# Patient Record
Sex: FEMALE | Race: WHITE | Employment: OTHER | URBAN - NONMETROPOLITAN AREA
[De-identification: names, ages, dates, MRNs, and addresses within clinical notes are randomized per-mention and may not be internally consistent; named-entity substitution may affect disease eponyms.]

---

## 2017-01-03 ENCOUNTER — CARE COORDINATION (OUTPATIENT)
Dept: CARE COORDINATION | Age: 57
End: 2017-01-03

## 2017-01-13 DIAGNOSIS — I25.10 CAD (CORONARY ARTERY DISEASE): ICD-10-CM

## 2017-01-13 DIAGNOSIS — I10 HYPERTENSION: ICD-10-CM

## 2017-01-16 RX ORDER — METOPROLOL SUCCINATE 50 MG/1
TABLET, EXTENDED RELEASE ORAL
Qty: 30 TABLET | Refills: 11 | Status: SHIPPED | OUTPATIENT
Start: 2017-01-16 | End: 2017-05-22 | Stop reason: SDUPTHER

## 2017-01-16 RX ORDER — TIZANIDINE 4 MG/1
TABLET ORAL
Qty: 90 TABLET | Refills: 0 | Status: SHIPPED | OUTPATIENT
Start: 2017-01-16 | End: 2017-02-13 | Stop reason: SDUPTHER

## 2017-01-19 ENCOUNTER — OFFICE VISIT (OUTPATIENT)
Dept: PRIMARY CARE CLINIC | Age: 57
End: 2017-01-19
Payer: MEDICARE

## 2017-01-19 VITALS
BODY MASS INDEX: 43.79 KG/M2 | HEART RATE: 80 BPM | SYSTOLIC BLOOD PRESSURE: 130 MMHG | DIASTOLIC BLOOD PRESSURE: 80 MMHG | WEIGHT: 238 LBS | OXYGEN SATURATION: 97 % | HEIGHT: 62 IN | TEMPERATURE: 97 F

## 2017-01-19 DIAGNOSIS — R60.0 BILATERAL EDEMA OF LOWER EXTREMITY: ICD-10-CM

## 2017-01-19 DIAGNOSIS — R30.0 DYSURIA: ICD-10-CM

## 2017-01-19 DIAGNOSIS — N18.32 CHRONIC KIDNEY DISEASE (CKD) STAGE G3B/A2, MODERATELY DECREASED GLOMERULAR FILTRATION RATE (GFR) BETWEEN 30-44 ML/MIN/1.73 SQUARE METER AND ALBUMINURIA CREATININE RATIO BETWEEN 30-299 MG/G (HCC): ICD-10-CM
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
## 2017-01-19 DIAGNOSIS — D22.9 CHANGE IN MOLE: ICD-10-CM

## 2017-01-19 DIAGNOSIS — E11.42 TYPE 2 DIABETES MELLITUS WITH DIABETIC POLYNEUROPATHY, WITH LONG-TERM CURRENT USE OF INSULIN (HCC): Primary | ICD-10-CM

## 2017-01-19 DIAGNOSIS — Z80.8 FAMILY HISTORY OF SKIN CANCER: ICD-10-CM

## 2017-01-19 DIAGNOSIS — Z79.4 TYPE 2 DIABETES MELLITUS WITH DIABETIC POLYNEUROPATHY, WITH LONG-TERM CURRENT USE OF INSULIN (HCC): Primary | ICD-10-CM

## 2017-01-19 DIAGNOSIS — M17.5 OTHER SECONDARY OSTEOARTHRITIS OF RIGHT KNEE: ICD-10-CM

## 2017-01-19 DIAGNOSIS — I10 ESSENTIAL HYPERTENSION: ICD-10-CM

## 2017-01-19 PROCEDURE — G8419 CALC BMI OUT NRM PARAM NOF/U: HCPCS | Performed by: NURSE PRACTITIONER

## 2017-01-19 PROCEDURE — 3044F HG A1C LEVEL LT 7.0%: CPT | Performed by: NURSE PRACTITIONER

## 2017-01-19 PROCEDURE — G8427 DOCREV CUR MEDS BY ELIG CLIN: HCPCS | Performed by: NURSE PRACTITIONER

## 2017-01-19 PROCEDURE — G8599 NO ASA/ANTIPLAT THER USE RNG: HCPCS | Performed by: NURSE PRACTITIONER

## 2017-01-19 PROCEDURE — 1036F TOBACCO NON-USER: CPT | Performed by: NURSE PRACTITIONER

## 2017-01-19 PROCEDURE — 3017F COLORECTAL CA SCREEN DOC REV: CPT | Performed by: NURSE PRACTITIONER

## 2017-01-19 PROCEDURE — 99214 OFFICE O/P EST MOD 30 MIN: CPT | Performed by: NURSE PRACTITIONER

## 2017-01-19 PROCEDURE — G8484 FLU IMMUNIZE NO ADMIN: HCPCS | Performed by: NURSE PRACTITIONER

## 2017-01-19 PROCEDURE — 3014F SCREEN MAMMO DOC REV: CPT | Performed by: NURSE PRACTITIONER

## 2017-01-19 RX ORDER — FUROSEMIDE 40 MG/1
40 TABLET ORAL DAILY
Qty: 30 TABLET | Refills: 5 | Status: SHIPPED | OUTPATIENT
Start: 2017-01-19 | End: 2017-08-29 | Stop reason: SDUPTHER

## 2017-01-19 RX ORDER — CEFDINIR 300 MG/1
300 CAPSULE ORAL 2 TIMES DAILY
Qty: 20 CAPSULE | Refills: 0 | Status: SHIPPED | OUTPATIENT
Start: 2017-01-19 | End: 2017-01-29

## 2017-01-19 ASSESSMENT — ENCOUNTER SYMPTOMS
WHEEZING: 0
SORE THROAT: 0
VOMITING: 0
BACK PAIN: 0
EYE DISCHARGE: 0
EYE PAIN: 0
NAUSEA: 0
COUGH: 0
SINUS PRESSURE: 0
VOICE CHANGE: 0
CONSTIPATION: 0
ABDOMINAL PAIN: 0
SHORTNESS OF BREATH: 0
DIARRHEA: 0

## 2017-02-09 ENCOUNTER — OFFICE VISIT (OUTPATIENT)
Dept: PRIMARY CARE CLINIC | Age: 57
End: 2017-02-09
Payer: MEDICARE

## 2017-02-09 VITALS
BODY MASS INDEX: 42.51 KG/M2 | DIASTOLIC BLOOD PRESSURE: 80 MMHG | HEART RATE: 78 BPM | HEIGHT: 62 IN | TEMPERATURE: 97.6 F | WEIGHT: 231 LBS | OXYGEN SATURATION: 98 % | SYSTOLIC BLOOD PRESSURE: 126 MMHG

## 2017-02-09 DIAGNOSIS — Z79.4 TYPE 2 DIABETES MELLITUS WITH DIABETIC POLYNEUROPATHY, WITH LONG-TERM CURRENT USE OF INSULIN (HCC): ICD-10-CM

## 2017-02-09 DIAGNOSIS — E11.42 TYPE 2 DIABETES MELLITUS WITH DIABETIC POLYNEUROPATHY, WITH LONG-TERM CURRENT USE OF INSULIN (HCC): ICD-10-CM

## 2017-02-09 DIAGNOSIS — N18.32 CHRONIC KIDNEY DISEASE (CKD) STAGE G3B/A2, MODERATELY DECREASED GLOMERULAR FILTRATION RATE (GFR) BETWEEN 30-44 ML/MIN/1.73 SQUARE METER AND ALBUMINURIA CREATININE RATIO BETWEEN 30-299 MG/G (HCC): ICD-10-CM

## 2017-02-09 DIAGNOSIS — N39.0 URINARY TRACT INFECTION, SITE UNSPECIFIED: ICD-10-CM

## 2017-02-09 DIAGNOSIS — D50.8 OTHER IRON DEFICIENCY ANEMIA: ICD-10-CM

## 2017-02-09 DIAGNOSIS — M79.89 SWELLING OF BOTH LOWER EXTREMITIES: ICD-10-CM

## 2017-02-09 DIAGNOSIS — R63.5 WEIGHT GAIN: Primary | ICD-10-CM

## 2017-02-09 LAB
BILIRUBIN, POC: NORMAL
BLOOD URINE, POC: NORMAL
CLARITY, POC: CLEAR
COLOR, POC: YELLOW
GLUCOSE URINE, POC: 250
KETONES, POC: NORMAL
LEUKOCYTE EST, POC: NORMAL
NITRITE, POC: NORMAL
PH, POC: 5
PROTEIN, POC: NORMAL
SPECIFIC GRAVITY, POC: 1.01
UROBILINOGEN, POC: 0.2

## 2017-02-09 PROCEDURE — 3014F SCREEN MAMMO DOC REV: CPT | Performed by: NURSE PRACTITIONER

## 2017-02-09 PROCEDURE — 3044F HG A1C LEVEL LT 7.0%: CPT | Performed by: NURSE PRACTITIONER

## 2017-02-09 PROCEDURE — G8484 FLU IMMUNIZE NO ADMIN: HCPCS | Performed by: NURSE PRACTITIONER

## 2017-02-09 PROCEDURE — G8427 DOCREV CUR MEDS BY ELIG CLIN: HCPCS | Performed by: NURSE PRACTITIONER

## 2017-02-09 PROCEDURE — 1036F TOBACCO NON-USER: CPT | Performed by: NURSE PRACTITIONER

## 2017-02-09 PROCEDURE — G8599 NO ASA/ANTIPLAT THER USE RNG: HCPCS | Performed by: NURSE PRACTITIONER

## 2017-02-09 PROCEDURE — 81002 URINALYSIS NONAUTO W/O SCOPE: CPT | Performed by: NURSE PRACTITIONER

## 2017-02-09 PROCEDURE — 99214 OFFICE O/P EST MOD 30 MIN: CPT | Performed by: NURSE PRACTITIONER

## 2017-02-09 PROCEDURE — 3017F COLORECTAL CA SCREEN DOC REV: CPT | Performed by: NURSE PRACTITIONER

## 2017-02-09 PROCEDURE — G8419 CALC BMI OUT NRM PARAM NOF/U: HCPCS | Performed by: NURSE PRACTITIONER

## 2017-02-09 ASSESSMENT — ENCOUNTER SYMPTOMS
SHORTNESS OF BREATH: 0
BACK PAIN: 0
DIARRHEA: 0
EYE PAIN: 0
SINUS PRESSURE: 0
EYE DISCHARGE: 0
COUGH: 0
VOMITING: 0
SORE THROAT: 0
NAUSEA: 0
VOICE CHANGE: 0
ABDOMINAL PAIN: 0
WHEEZING: 0
CONSTIPATION: 0

## 2017-02-13 RX ORDER — TIZANIDINE 4 MG/1
TABLET ORAL
Qty: 90 TABLET | Refills: 0 | Status: SHIPPED | OUTPATIENT
Start: 2017-02-13 | End: 2017-03-13 | Stop reason: SDUPTHER

## 2017-02-20 ENCOUNTER — TELEPHONE (OUTPATIENT)
Dept: PRIMARY CARE CLINIC | Age: 57
End: 2017-02-20

## 2017-02-22 ENCOUNTER — OFFICE VISIT (OUTPATIENT)
Dept: PRIMARY CARE CLINIC | Age: 57
End: 2017-02-22
Payer: MEDICARE

## 2017-02-22 VITALS
DIASTOLIC BLOOD PRESSURE: 90 MMHG | BODY MASS INDEX: 43.05 KG/M2 | SYSTOLIC BLOOD PRESSURE: 128 MMHG | TEMPERATURE: 98.1 F | HEIGHT: 61 IN | HEART RATE: 66 BPM | WEIGHT: 228 LBS | OXYGEN SATURATION: 99 %

## 2017-02-22 DIAGNOSIS — N18.32 CHRONIC KIDNEY DISEASE (CKD) STAGE G3B/A2, MODERATELY DECREASED GLOMERULAR FILTRATION RATE (GFR) BETWEEN 30-44 ML/MIN/1.73 SQUARE METER AND ALBUMINURIA CREATININE RATIO BETWEEN 30-299 MG/G (HCC): ICD-10-CM

## 2017-02-22 DIAGNOSIS — I10 ESSENTIAL HYPERTENSION: Primary | ICD-10-CM

## 2017-02-22 DIAGNOSIS — E78.2 MIXED HYPERLIPIDEMIA: ICD-10-CM

## 2017-02-22 DIAGNOSIS — E03.9 ACQUIRED HYPOTHYROIDISM: ICD-10-CM

## 2017-02-22 DIAGNOSIS — F31.76 BIPOLAR DISORDER, IN FULL REMISSION, MOST RECENT EPISODE DEPRESSED (HCC): ICD-10-CM

## 2017-02-22 DIAGNOSIS — F51.01 PRIMARY INSOMNIA: ICD-10-CM

## 2017-02-22 DIAGNOSIS — E11.42 TYPE 2 DIABETES MELLITUS WITH DIABETIC POLYNEUROPATHY, WITH LONG-TERM CURRENT USE OF INSULIN (HCC): ICD-10-CM

## 2017-02-22 DIAGNOSIS — Z79.4 TYPE 2 DIABETES MELLITUS WITH DIABETIC POLYNEUROPATHY, WITH LONG-TERM CURRENT USE OF INSULIN (HCC): ICD-10-CM

## 2017-02-22 DIAGNOSIS — K21.9 GASTROESOPHAGEAL REFLUX DISEASE WITHOUT ESOPHAGITIS: ICD-10-CM

## 2017-02-22 DIAGNOSIS — F41.9 ANXIETY: ICD-10-CM

## 2017-02-22 PROCEDURE — 1036F TOBACCO NON-USER: CPT | Performed by: PEDIATRICS

## 2017-02-22 PROCEDURE — 99214 OFFICE O/P EST MOD 30 MIN: CPT | Performed by: PEDIATRICS

## 2017-02-22 PROCEDURE — 3044F HG A1C LEVEL LT 7.0%: CPT | Performed by: PEDIATRICS

## 2017-02-22 PROCEDURE — 3014F SCREEN MAMMO DOC REV: CPT | Performed by: PEDIATRICS

## 2017-02-22 PROCEDURE — G8427 DOCREV CUR MEDS BY ELIG CLIN: HCPCS | Performed by: PEDIATRICS

## 2017-02-22 PROCEDURE — G8417 CALC BMI ABV UP PARAM F/U: HCPCS | Performed by: PEDIATRICS

## 2017-02-22 PROCEDURE — G8599 NO ASA/ANTIPLAT THER USE RNG: HCPCS | Performed by: PEDIATRICS

## 2017-02-22 PROCEDURE — 3017F COLORECTAL CA SCREEN DOC REV: CPT | Performed by: PEDIATRICS

## 2017-02-22 PROCEDURE — G8484 FLU IMMUNIZE NO ADMIN: HCPCS | Performed by: PEDIATRICS

## 2017-02-22 RX ORDER — DIAZEPAM 10 MG/1
10 TABLET ORAL EVERY 8 HOURS PRN
Qty: 60 TABLET | Refills: 0 | Status: SHIPPED | OUTPATIENT
Start: 2017-02-22 | End: 2017-04-03 | Stop reason: SDUPTHER

## 2017-02-22 RX ORDER — RAMELTEON 8 MG/1
8 TABLET ORAL NIGHTLY PRN
Qty: 30 TABLET | Refills: 1 | Status: SHIPPED | OUTPATIENT
Start: 2017-02-22 | End: 2017-03-29 | Stop reason: SDUPTHER

## 2017-02-22 RX ORDER — PREDNISONE 10 MG/1
10 TABLET ORAL DAILY
COMMUNITY
Start: 2017-02-10 | End: 2017-03-28

## 2017-02-22 ASSESSMENT — ENCOUNTER SYMPTOMS
WHEEZING: 0
VOMITING: 0
DIARRHEA: 0
BACK PAIN: 0
COUGH: 0
SORE THROAT: 0
SINUS PRESSURE: 0
SHORTNESS OF BREATH: 0
EYE PAIN: 0
CONSTIPATION: 0
NAUSEA: 0
EYE DISCHARGE: 0
VOICE CHANGE: 0
ABDOMINAL PAIN: 0

## 2017-02-27 ENCOUNTER — PREP FOR PROCEDURE (OUTPATIENT)
Dept: SURGERY | Age: 57
End: 2017-02-27

## 2017-02-27 DIAGNOSIS — Z01.818 PRE-OP EVALUATION: Primary | ICD-10-CM

## 2017-02-27 RX ORDER — SODIUM CHLORIDE 0.9 % (FLUSH) 0.9 %
10 SYRINGE (ML) INJECTION PRN
Status: CANCELLED | OUTPATIENT
Start: 2017-02-27

## 2017-02-27 RX ORDER — SODIUM CHLORIDE, SODIUM LACTATE, POTASSIUM CHLORIDE, CALCIUM CHLORIDE 600; 310; 30; 20 MG/100ML; MG/100ML; MG/100ML; MG/100ML
INJECTION, SOLUTION INTRAVENOUS CONTINUOUS
Status: CANCELLED | OUTPATIENT
Start: 2017-02-27

## 2017-02-27 RX ORDER — SODIUM CHLORIDE 0.9 % (FLUSH) 0.9 %
10 SYRINGE (ML) INJECTION EVERY 12 HOURS SCHEDULED
Status: CANCELLED | OUTPATIENT
Start: 2017-02-27

## 2017-03-02 ENCOUNTER — OFFICE VISIT (OUTPATIENT)
Dept: PRIMARY CARE CLINIC | Age: 57
End: 2017-03-02
Payer: MEDICARE

## 2017-03-02 VITALS
TEMPERATURE: 97.6 F | HEIGHT: 61 IN | DIASTOLIC BLOOD PRESSURE: 62 MMHG | HEART RATE: 76 BPM | WEIGHT: 226 LBS | BODY MASS INDEX: 42.67 KG/M2 | SYSTOLIC BLOOD PRESSURE: 120 MMHG | OXYGEN SATURATION: 98 %

## 2017-03-02 DIAGNOSIS — G43.019 INTRACTABLE MIGRAINE WITHOUT AURA AND WITHOUT STATUS MIGRAINOSUS: ICD-10-CM

## 2017-03-02 DIAGNOSIS — R11.0 NAUSEA: ICD-10-CM

## 2017-03-02 DIAGNOSIS — E11.42 TYPE 2 DIABETES MELLITUS WITH DIABETIC POLYNEUROPATHY, WITH LONG-TERM CURRENT USE OF INSULIN (HCC): Primary | ICD-10-CM

## 2017-03-02 DIAGNOSIS — Z79.4 TYPE 2 DIABETES MELLITUS WITH DIABETIC POLYNEUROPATHY, WITH LONG-TERM CURRENT USE OF INSULIN (HCC): Primary | ICD-10-CM

## 2017-03-02 DIAGNOSIS — N18.3 CKD (CHRONIC KIDNEY DISEASE), STAGE 3 (MODERATE): ICD-10-CM

## 2017-03-02 LAB — HBA1C MFR BLD: 6.6 %

## 2017-03-02 PROCEDURE — 3017F COLORECTAL CA SCREEN DOC REV: CPT | Performed by: NURSE PRACTITIONER

## 2017-03-02 PROCEDURE — G8417 CALC BMI ABV UP PARAM F/U: HCPCS | Performed by: NURSE PRACTITIONER

## 2017-03-02 PROCEDURE — 1036F TOBACCO NON-USER: CPT | Performed by: NURSE PRACTITIONER

## 2017-03-02 PROCEDURE — 3044F HG A1C LEVEL LT 7.0%: CPT | Performed by: NURSE PRACTITIONER

## 2017-03-02 PROCEDURE — G8427 DOCREV CUR MEDS BY ELIG CLIN: HCPCS | Performed by: NURSE PRACTITIONER

## 2017-03-02 PROCEDURE — 3014F SCREEN MAMMO DOC REV: CPT | Performed by: NURSE PRACTITIONER

## 2017-03-02 PROCEDURE — 99214 OFFICE O/P EST MOD 30 MIN: CPT | Performed by: NURSE PRACTITIONER

## 2017-03-02 PROCEDURE — 83036 HEMOGLOBIN GLYCOSYLATED A1C: CPT | Performed by: NURSE PRACTITIONER

## 2017-03-02 PROCEDURE — G8599 NO ASA/ANTIPLAT THER USE RNG: HCPCS | Performed by: NURSE PRACTITIONER

## 2017-03-02 PROCEDURE — G8484 FLU IMMUNIZE NO ADMIN: HCPCS | Performed by: NURSE PRACTITIONER

## 2017-03-02 RX ORDER — BUTORPHANOL TARTRATE 10 MG/ML
1 SPRAY, METERED NASAL EVERY 4 HOURS PRN
Qty: 2.5 ML | Refills: 0 | Status: SHIPPED | OUTPATIENT
Start: 2017-03-02 | End: 2017-04-03 | Stop reason: SDUPTHER

## 2017-03-02 ASSESSMENT — ENCOUNTER SYMPTOMS
BACK PAIN: 0
VOMITING: 0
DIARRHEA: 0
EYE PAIN: 0
SINUS PRESSURE: 0
WHEEZING: 0
EYE DISCHARGE: 0
SORE THROAT: 0
VOICE CHANGE: 0
COUGH: 0
SHORTNESS OF BREATH: 0
ABDOMINAL PAIN: 0
CONSTIPATION: 0
NAUSEA: 0

## 2017-03-13 ENCOUNTER — TELEPHONE (OUTPATIENT)
Dept: PRIMARY CARE CLINIC | Age: 57
End: 2017-03-13

## 2017-03-13 RX ORDER — ATORVASTATIN CALCIUM 40 MG/1
40 TABLET, FILM COATED ORAL DAILY
Qty: 30 TABLET | Refills: 11 | Status: SHIPPED | OUTPATIENT
Start: 2017-03-13 | End: 2018-04-04 | Stop reason: SDUPTHER

## 2017-03-13 RX ORDER — TIZANIDINE 4 MG/1
TABLET ORAL
Qty: 90 TABLET | Refills: 3 | Status: SHIPPED | OUTPATIENT
Start: 2017-03-13 | End: 2017-07-13 | Stop reason: SDUPTHER

## 2017-03-14 ENCOUNTER — TELEPHONE (OUTPATIENT)
Dept: PRIMARY CARE CLINIC | Age: 57
End: 2017-03-14

## 2017-03-14 ENCOUNTER — NURSE ONLY (OUTPATIENT)
Dept: PRIMARY CARE CLINIC | Age: 57
End: 2017-03-14
Payer: MEDICARE

## 2017-03-14 DIAGNOSIS — Z12.11 COLON CANCER SCREENING: Primary | ICD-10-CM

## 2017-03-14 LAB
CONTROL: NORMAL
HEMOCCULT STL QL: NORMAL

## 2017-03-14 PROCEDURE — 82274 ASSAY TEST FOR BLOOD FECAL: CPT | Performed by: PEDIATRICS

## 2017-03-28 ENCOUNTER — HOSPITAL ENCOUNTER (OUTPATIENT)
Dept: INFUSION THERAPY | Age: 57
Setting detail: INFUSION SERIES
Discharge: HOME | End: 2017-03-28
Payer: MEDICARE

## 2017-03-28 VITALS
HEART RATE: 69 BPM | SYSTOLIC BLOOD PRESSURE: 153 MMHG | RESPIRATION RATE: 18 BRPM | TEMPERATURE: 98 F | DIASTOLIC BLOOD PRESSURE: 92 MMHG

## 2017-03-28 LAB
BASOPHILS ABSOLUTE: 0 K/UL (ref 0–0.2)
BASOPHILS RELATIVE PERCENT: 0.2 % (ref 0–1)
EOSINOPHILS ABSOLUTE: 0.1 K/UL (ref 0–0.6)
EOSINOPHILS RELATIVE PERCENT: 1.6 % (ref 0–5)
HCT VFR BLD CALC: 37.4 % (ref 37–47)
HEMOGLOBIN: 10.9 G/DL (ref 12–16)
LYMPHOCYTES ABSOLUTE: 1.4 K/UL (ref 1.1–4.5)
LYMPHOCYTES RELATIVE PERCENT: 16.7 % (ref 20–40)
MCH RBC QN AUTO: 23.7 PG (ref 27–31)
MCHC RBC AUTO-ENTMCNC: 29.1 G/DL (ref 33–37)
MCV RBC AUTO: 81.3 FL (ref 81–99)
MONOCYTES ABSOLUTE: 0.7 K/UL (ref 0–0.9)
MONOCYTES RELATIVE PERCENT: 8 % (ref 0–10)
NEUTROPHILS ABSOLUTE: 6.1 K/UL (ref 1.5–7.5)
NEUTROPHILS RELATIVE PERCENT: 73.3 % (ref 50–65)
PDW BLD-RTO: 19 % (ref 11.5–14.5)
PLATELET # BLD: 369 K/UL (ref 130–400)
PMV BLD AUTO: 11.6 FL (ref 7.4–10.4)
RBC # BLD: 4.6 M/UL (ref 4.2–5.4)
WBC # BLD: 8.4 K/UL (ref 4.8–10.8)

## 2017-03-28 PROCEDURE — 96365 THER/PROPH/DIAG IV INF INIT: CPT

## 2017-03-28 PROCEDURE — 2580000003 HC RX 258: Performed by: INTERNAL MEDICINE

## 2017-03-28 PROCEDURE — 85025 COMPLETE CBC W/AUTO DIFF WBC: CPT

## 2017-03-28 PROCEDURE — 6360000002 HC RX W HCPCS: Performed by: INTERNAL MEDICINE

## 2017-03-28 RX ADMIN — FERRIC CARBOXYMALTOSE INJECTION 750 MG: 50 INJECTION, SOLUTION INTRAVENOUS at 10:31

## 2017-03-29 DIAGNOSIS — F51.01 PRIMARY INSOMNIA: ICD-10-CM

## 2017-03-29 RX ORDER — RAMELTEON 8 MG/1
8 TABLET ORAL NIGHTLY PRN
Qty: 30 TABLET | Refills: 1 | Status: SHIPPED | OUTPATIENT
Start: 2017-03-29 | End: 2017-05-22 | Stop reason: SDUPTHER

## 2017-04-03 ENCOUNTER — OFFICE VISIT (OUTPATIENT)
Dept: PRIMARY CARE CLINIC | Age: 57
End: 2017-04-03
Payer: MEDICARE

## 2017-04-03 VITALS
SYSTOLIC BLOOD PRESSURE: 130 MMHG | OXYGEN SATURATION: 98 % | WEIGHT: 228 LBS | BODY MASS INDEX: 43.05 KG/M2 | TEMPERATURE: 97 F | HEIGHT: 61 IN | DIASTOLIC BLOOD PRESSURE: 72 MMHG | HEART RATE: 78 BPM

## 2017-04-03 DIAGNOSIS — E11.42 DIABETIC POLYNEUROPATHY ASSOCIATED WITH TYPE 2 DIABETES MELLITUS (HCC): ICD-10-CM

## 2017-04-03 DIAGNOSIS — G47.09 OTHER INSOMNIA: ICD-10-CM

## 2017-04-03 DIAGNOSIS — Z79.4 TYPE 2 DIABETES MELLITUS WITH DIABETIC POLYNEUROPATHY, WITH LONG-TERM CURRENT USE OF INSULIN (HCC): Primary | ICD-10-CM

## 2017-04-03 DIAGNOSIS — F33.41 RECURRENT MAJOR DEPRESSIVE DISORDER, IN PARTIAL REMISSION (HCC): ICD-10-CM

## 2017-04-03 DIAGNOSIS — F41.9 ANXIETY: ICD-10-CM

## 2017-04-03 DIAGNOSIS — N18.3 CKD (CHRONIC KIDNEY DISEASE), STAGE 3 (MODERATE): ICD-10-CM

## 2017-04-03 DIAGNOSIS — G43.019 INTRACTABLE MIGRAINE WITHOUT AURA AND WITHOUT STATUS MIGRAINOSUS: ICD-10-CM

## 2017-04-03 DIAGNOSIS — E03.9 ACQUIRED HYPOTHYROIDISM: ICD-10-CM

## 2017-04-03 DIAGNOSIS — K59.03 DRUG-INDUCED CONSTIPATION: ICD-10-CM

## 2017-04-03 DIAGNOSIS — R11.0 NAUSEA: ICD-10-CM

## 2017-04-03 DIAGNOSIS — E11.42 TYPE 2 DIABETES MELLITUS WITH DIABETIC POLYNEUROPATHY, WITH LONG-TERM CURRENT USE OF INSULIN (HCC): Primary | ICD-10-CM

## 2017-04-03 PROCEDURE — G8599 NO ASA/ANTIPLAT THER USE RNG: HCPCS | Performed by: NURSE PRACTITIONER

## 2017-04-03 PROCEDURE — 99214 OFFICE O/P EST MOD 30 MIN: CPT | Performed by: NURSE PRACTITIONER

## 2017-04-03 PROCEDURE — 3014F SCREEN MAMMO DOC REV: CPT | Performed by: NURSE PRACTITIONER

## 2017-04-03 PROCEDURE — 3044F HG A1C LEVEL LT 7.0%: CPT | Performed by: NURSE PRACTITIONER

## 2017-04-03 PROCEDURE — G8417 CALC BMI ABV UP PARAM F/U: HCPCS | Performed by: NURSE PRACTITIONER

## 2017-04-03 PROCEDURE — 3017F COLORECTAL CA SCREEN DOC REV: CPT | Performed by: NURSE PRACTITIONER

## 2017-04-03 PROCEDURE — 1036F TOBACCO NON-USER: CPT | Performed by: NURSE PRACTITIONER

## 2017-04-03 PROCEDURE — G8427 DOCREV CUR MEDS BY ELIG CLIN: HCPCS | Performed by: NURSE PRACTITIONER

## 2017-04-03 RX ORDER — LINACLOTIDE 145 UG/1
145 CAPSULE, GELATIN COATED ORAL DAILY
Qty: 30 CAPSULE | Refills: 11 | Status: SHIPPED | OUTPATIENT
Start: 2017-04-03 | End: 2017-05-22 | Stop reason: DRUGHIGH

## 2017-04-03 RX ORDER — BUTORPHANOL TARTRATE 10 MG/ML
1 SPRAY, METERED NASAL EVERY 4 HOURS PRN
Qty: 2.5 ML | Refills: 0 | Status: SHIPPED | OUTPATIENT
Start: 2017-04-03 | End: 2017-05-22

## 2017-04-03 RX ORDER — DIAZEPAM 10 MG/1
10 TABLET ORAL EVERY 8 HOURS PRN
Qty: 60 TABLET | Refills: 0 | Status: SHIPPED | OUTPATIENT
Start: 2017-04-03 | End: 2017-05-22

## 2017-04-03 RX ORDER — PREGABALIN 150 MG/1
150 CAPSULE ORAL 2 TIMES DAILY
Qty: 60 CAPSULE | Refills: 5 | Status: SHIPPED | OUTPATIENT
Start: 2017-04-03 | End: 2017-11-03 | Stop reason: SDUPTHER

## 2017-04-03 RX ORDER — LEVOTHYROXINE SODIUM 88 UG/1
TABLET ORAL
Qty: 90 TABLET | Refills: 3 | Status: SHIPPED | OUTPATIENT
Start: 2017-04-03 | End: 2017-08-29 | Stop reason: SDUPTHER

## 2017-04-03 RX ORDER — QUETIAPINE FUMARATE 300 MG/1
450 TABLET, FILM COATED ORAL NIGHTLY
Qty: 45 TABLET | Refills: 11 | Status: SHIPPED | OUTPATIENT
Start: 2017-04-03 | End: 2017-06-05 | Stop reason: SDUPTHER

## 2017-04-03 ASSESSMENT — ENCOUNTER SYMPTOMS
SINUS PRESSURE: 0
CONSTIPATION: 0
SORE THROAT: 0
VOMITING: 0
BACK PAIN: 0
DIARRHEA: 0
NAUSEA: 0
COUGH: 0
ABDOMINAL PAIN: 0
EYE PAIN: 0
EYE DISCHARGE: 0
VOICE CHANGE: 0
WHEEZING: 0
SHORTNESS OF BREATH: 0

## 2017-04-04 ENCOUNTER — HOSPITAL ENCOUNTER (OUTPATIENT)
Dept: INFUSION THERAPY | Age: 57
Setting detail: INFUSION SERIES
Discharge: HOME | End: 2017-04-04
Payer: MEDICARE

## 2017-04-04 VITALS
HEART RATE: 84 BPM | RESPIRATION RATE: 18 BRPM | DIASTOLIC BLOOD PRESSURE: 65 MMHG | TEMPERATURE: 97.5 F | SYSTOLIC BLOOD PRESSURE: 110 MMHG

## 2017-04-04 LAB
BASOPHILS ABSOLUTE: 0 K/UL (ref 0–0.2)
BASOPHILS RELATIVE PERCENT: 0.4 % (ref 0–1)
EOSINOPHILS ABSOLUTE: 0.2 K/UL (ref 0–0.6)
EOSINOPHILS RELATIVE PERCENT: 2.4 % (ref 0–5)
HCT VFR BLD CALC: 34.6 % (ref 37–47)
HEMOGLOBIN: 10.6 G/DL (ref 12–16)
LYMPHOCYTES ABSOLUTE: 1.4 K/UL (ref 1.1–4.5)
LYMPHOCYTES RELATIVE PERCENT: 18.4 % (ref 20–40)
MCH RBC QN AUTO: 25.4 PG (ref 27–31)
MCHC RBC AUTO-ENTMCNC: 30.6 G/DL (ref 33–37)
MCV RBC AUTO: 83 FL (ref 81–99)
MONOCYTES ABSOLUTE: 0.7 K/UL (ref 0–0.9)
MONOCYTES RELATIVE PERCENT: 8.6 % (ref 0–10)
NEUTROPHILS ABSOLUTE: 5.5 K/UL (ref 1.5–7.5)
NEUTROPHILS RELATIVE PERCENT: 70.2 % (ref 50–65)
PDW BLD-RTO: 21.2 % (ref 11.5–14.5)
PLATELET # BLD: 304 K/UL (ref 130–400)
PMV BLD AUTO: 12 FL (ref 7.4–10.4)
RBC # BLD: 4.17 M/UL (ref 4.2–5.4)
WBC # BLD: 7.8 K/UL (ref 4.8–10.8)

## 2017-04-04 PROCEDURE — 2580000003 HC RX 258: Performed by: INTERNAL MEDICINE

## 2017-04-04 PROCEDURE — 96365 THER/PROPH/DIAG IV INF INIT: CPT

## 2017-04-04 PROCEDURE — 85025 COMPLETE CBC W/AUTO DIFF WBC: CPT

## 2017-04-04 PROCEDURE — 6360000002 HC RX W HCPCS: Performed by: INTERNAL MEDICINE

## 2017-04-04 RX ORDER — DIPHENHYDRAMINE HYDROCHLORIDE 50 MG/ML
25 INJECTION INTRAMUSCULAR; INTRAVENOUS
Status: ACTIVE | OUTPATIENT
Start: 2017-04-04 | End: 2017-04-04

## 2017-04-04 RX ORDER — METHYLPREDNISOLONE SODIUM SUCCINATE 125 MG/2ML
125 INJECTION, POWDER, LYOPHILIZED, FOR SOLUTION INTRAMUSCULAR; INTRAVENOUS
Status: ACTIVE | OUTPATIENT
Start: 2017-04-04 | End: 2017-04-04

## 2017-04-04 RX ADMIN — FERRIC CARBOXYMALTOSE INJECTION 750 MG: 50 INJECTION, SOLUTION INTRAVENOUS at 10:28

## 2017-04-24 ENCOUNTER — TELEPHONE (OUTPATIENT)
Dept: PRIMARY CARE CLINIC | Age: 57
End: 2017-04-24

## 2017-04-24 DIAGNOSIS — R79.89 ABNORMAL CBC: Primary | ICD-10-CM

## 2017-04-24 DIAGNOSIS — Z00.00 PERIODIC HEALTH ASSESSMENT, GENERAL SCREENING, ADULT: ICD-10-CM

## 2017-04-25 ENCOUNTER — TELEPHONE (OUTPATIENT)
Dept: PRIMARY CARE CLINIC | Age: 57
End: 2017-04-25

## 2017-04-25 DIAGNOSIS — E03.9 ACQUIRED HYPOTHYROIDISM: ICD-10-CM

## 2017-04-25 DIAGNOSIS — E11.42 TYPE 2 DIABETES MELLITUS WITH DIABETIC POLYNEUROPATHY, WITH LONG-TERM CURRENT USE OF INSULIN (HCC): ICD-10-CM

## 2017-04-25 DIAGNOSIS — Z00.00 PERIODIC HEALTH ASSESSMENT, GENERAL SCREENING, ADULT: ICD-10-CM

## 2017-04-25 DIAGNOSIS — I10 ESSENTIAL HYPERTENSION: ICD-10-CM

## 2017-04-25 DIAGNOSIS — E78.2 MIXED HYPERLIPIDEMIA: ICD-10-CM

## 2017-04-25 DIAGNOSIS — R79.89 ABNORMAL CBC: ICD-10-CM

## 2017-04-25 DIAGNOSIS — Z79.4 TYPE 2 DIABETES MELLITUS WITH DIABETIC POLYNEUROPATHY, WITH LONG-TERM CURRENT USE OF INSULIN (HCC): ICD-10-CM

## 2017-04-25 LAB
ALBUMIN SERPL-MCNC: 3.6 G/DL (ref 3.5–5.2)
ALP BLD-CCNC: 142 U/L (ref 35–104)
ALT SERPL-CCNC: 18 U/L (ref 5–33)
ANION GAP SERPL CALCULATED.3IONS-SCNC: 16 MMOL/L (ref 7–19)
AST SERPL-CCNC: 23 U/L (ref 5–32)
BASOPHILS ABSOLUTE: 0.1 K/UL (ref 0–0.2)
BASOPHILS RELATIVE PERCENT: 0.7 % (ref 0–1)
BILIRUB SERPL-MCNC: <0.2 MG/DL (ref 0.2–1.2)
BUN BLDV-MCNC: 22 MG/DL (ref 6–20)
CALCIUM SERPL-MCNC: 9 MG/DL (ref 8.6–10)
CHLORIDE BLD-SCNC: 103 MMOL/L (ref 98–111)
CHOLESTEROL, TOTAL: 128 MG/DL (ref 160–199)
CO2: 22 MMOL/L (ref 22–29)
CREAT SERPL-MCNC: 1.5 MG/DL (ref 0.5–0.9)
EOSINOPHILS ABSOLUTE: 0.2 K/UL (ref 0–0.6)
EOSINOPHILS RELATIVE PERCENT: 2.6 % (ref 0–5)
FERRITIN: 593.4 NG/ML (ref 13–150)
GFR NON-AFRICAN AMERICAN: 36
GLOBULIN: 3.4 G/DL
GLUCOSE BLD-MCNC: 95 MG/DL (ref 74–109)
HBA1C MFR BLD: 6.1 %
HCT VFR BLD CALC: 39.6 % (ref 37–47)
HDLC SERPL-MCNC: 53 MG/DL (ref 65–121)
HEMOGLOBIN: 12.2 G/DL (ref 12–16)
IRON: 122 UG/DL (ref 37–145)
LDL CHOLESTEROL CALCULATED: 50 MG/DL
LYMPHOCYTES ABSOLUTE: 1.8 K/UL (ref 1.1–4.5)
LYMPHOCYTES RELATIVE PERCENT: 24.6 % (ref 20–40)
MCH RBC QN AUTO: 26.9 PG (ref 27–31)
MCHC RBC AUTO-ENTMCNC: 30.8 G/DL (ref 33–37)
MCV RBC AUTO: 87.4 FL (ref 81–99)
MONOCYTES ABSOLUTE: 0.6 K/UL (ref 0–0.9)
MONOCYTES RELATIVE PERCENT: 7.7 % (ref 0–10)
NEUTROPHILS ABSOLUTE: 4.7 K/UL (ref 1.5–7.5)
NEUTROPHILS RELATIVE PERCENT: 64.1 % (ref 50–65)
PDW BLD-RTO: 21.3 % (ref 11.5–14.5)
PLATELET # BLD: 244 K/UL (ref 130–400)
PMV BLD AUTO: 12 FL (ref 7.4–10.4)
POTASSIUM SERPL-SCNC: 4.5 MMOL/L (ref 3.5–5)
RAPID HIV 1&2: NORMAL
RBC # BLD: 4.53 M/UL (ref 4.2–5.4)
SODIUM BLD-SCNC: 141 MMOL/L (ref 136–145)
T4 FREE: 0.8 NG/ML (ref 0.9–1.7)
TOTAL IRON BINDING CAPACITY: 290 UG/DL (ref 250–400)
TOTAL PROTEIN: 7 G/DL (ref 6.6–8.7)
TRIGL SERPL-MCNC: 124 MG/DL (ref 150–199)
TSH SERPL DL<=0.05 MIU/L-ACNC: 2.88 UIU/ML (ref 0.27–4.2)
WBC # BLD: 7.3 K/UL (ref 4.8–10.8)

## 2017-05-01 DIAGNOSIS — I10 ESSENTIAL HYPERTENSION: Primary | ICD-10-CM

## 2017-05-01 DIAGNOSIS — I10 ESSENTIAL HYPERTENSION: ICD-10-CM

## 2017-05-01 LAB
CREATININE URINE: 30.7 MG/DL (ref 4.2–622)
MICROALBUMIN UR-MCNC: <1.2 MG/DL (ref 0–19)
MICROALBUMIN/CREAT UR-RTO: NORMAL MG/G

## 2017-05-12 ENCOUNTER — OFFICE VISIT (OUTPATIENT)
Dept: PRIMARY CARE CLINIC | Age: 57
End: 2017-05-12
Payer: MEDICARE

## 2017-05-12 VITALS
SYSTOLIC BLOOD PRESSURE: 108 MMHG | WEIGHT: 228.5 LBS | OXYGEN SATURATION: 96 % | DIASTOLIC BLOOD PRESSURE: 86 MMHG | BODY MASS INDEX: 43.14 KG/M2 | HEIGHT: 61 IN | HEART RATE: 76 BPM | TEMPERATURE: 98.3 F

## 2017-05-12 DIAGNOSIS — F31.76 BIPOLAR DISORDER, IN FULL REMISSION, MOST RECENT EPISODE DEPRESSED (HCC): ICD-10-CM

## 2017-05-12 DIAGNOSIS — I10 ESSENTIAL HYPERTENSION: ICD-10-CM

## 2017-05-12 DIAGNOSIS — E66.09 NON MORBID OBESITY DUE TO EXCESS CALORIES: ICD-10-CM

## 2017-05-12 DIAGNOSIS — Z79.4 TYPE 2 DIABETES MELLITUS WITH DIABETIC POLYNEUROPATHY, WITH LONG-TERM CURRENT USE OF INSULIN (HCC): Primary | ICD-10-CM

## 2017-05-12 DIAGNOSIS — E78.2 MIXED HYPERLIPIDEMIA: ICD-10-CM

## 2017-05-12 DIAGNOSIS — G47.09 OTHER INSOMNIA: ICD-10-CM

## 2017-05-12 DIAGNOSIS — K21.9 GASTROESOPHAGEAL REFLUX DISEASE WITHOUT ESOPHAGITIS: ICD-10-CM

## 2017-05-12 DIAGNOSIS — E11.42 TYPE 2 DIABETES MELLITUS WITH DIABETIC POLYNEUROPATHY, WITH LONG-TERM CURRENT USE OF INSULIN (HCC): Primary | ICD-10-CM

## 2017-05-12 DIAGNOSIS — E03.9 ACQUIRED HYPOTHYROIDISM: ICD-10-CM

## 2017-05-12 PROCEDURE — 3014F SCREEN MAMMO DOC REV: CPT | Performed by: NURSE PRACTITIONER

## 2017-05-12 PROCEDURE — G8599 NO ASA/ANTIPLAT THER USE RNG: HCPCS | Performed by: NURSE PRACTITIONER

## 2017-05-12 PROCEDURE — 1036F TOBACCO NON-USER: CPT | Performed by: NURSE PRACTITIONER

## 2017-05-12 PROCEDURE — 3044F HG A1C LEVEL LT 7.0%: CPT | Performed by: NURSE PRACTITIONER

## 2017-05-12 PROCEDURE — G8417 CALC BMI ABV UP PARAM F/U: HCPCS | Performed by: NURSE PRACTITIONER

## 2017-05-12 PROCEDURE — G8427 DOCREV CUR MEDS BY ELIG CLIN: HCPCS | Performed by: NURSE PRACTITIONER

## 2017-05-12 PROCEDURE — 3017F COLORECTAL CA SCREEN DOC REV: CPT | Performed by: NURSE PRACTITIONER

## 2017-05-12 PROCEDURE — 99214 OFFICE O/P EST MOD 30 MIN: CPT | Performed by: NURSE PRACTITIONER

## 2017-05-12 RX ORDER — TRAZODONE HYDROCHLORIDE 100 MG/1
100 TABLET ORAL NIGHTLY
Qty: 30 TABLET | Refills: 11 | Status: SHIPPED | OUTPATIENT
Start: 2017-05-12 | End: 2017-05-22 | Stop reason: SDUPTHER

## 2017-05-12 ASSESSMENT — ENCOUNTER SYMPTOMS
VOMITING: 0
VOICE CHANGE: 0
EYE PAIN: 0
NAUSEA: 0
COUGH: 0
WHEEZING: 0
SHORTNESS OF BREATH: 0
DIARRHEA: 0
ABDOMINAL PAIN: 0
CONSTIPATION: 0
SORE THROAT: 0
EYE DISCHARGE: 0
SINUS PRESSURE: 0
BACK PAIN: 0

## 2017-05-22 ENCOUNTER — OFFICE VISIT (OUTPATIENT)
Dept: PRIMARY CARE CLINIC | Age: 57
End: 2017-05-22
Payer: MEDICARE

## 2017-05-22 VITALS
DIASTOLIC BLOOD PRESSURE: 78 MMHG | HEART RATE: 89 BPM | SYSTOLIC BLOOD PRESSURE: 110 MMHG | TEMPERATURE: 96.3 F | WEIGHT: 228 LBS | OXYGEN SATURATION: 97 % | BODY MASS INDEX: 43.05 KG/M2 | HEIGHT: 61 IN

## 2017-05-22 DIAGNOSIS — I25.118 CORONARY ARTERY DISEASE INVOLVING NATIVE HEART WITH OTHER FORM OF ANGINA PECTORIS, UNSPECIFIED VESSEL OR LESION TYPE (HCC): ICD-10-CM

## 2017-05-22 DIAGNOSIS — E78.2 MIXED HYPERLIPIDEMIA: ICD-10-CM

## 2017-05-22 DIAGNOSIS — N18.32 CHRONIC KIDNEY DISEASE (CKD) STAGE G3B/A2, MODERATELY DECREASED GLOMERULAR FILTRATION RATE (GFR) BETWEEN 30-44 ML/MIN/1.73 SQUARE METER AND ALBUMINURIA CREATININE RATIO BETWEEN 30-299 MG/G (HCC): ICD-10-CM

## 2017-05-22 DIAGNOSIS — Z79.4 TYPE 2 DIABETES MELLITUS WITH DIABETIC POLYNEUROPATHY, WITH LONG-TERM CURRENT USE OF INSULIN (HCC): ICD-10-CM

## 2017-05-22 DIAGNOSIS — N63.0 BREAST NODULE: ICD-10-CM

## 2017-05-22 DIAGNOSIS — G47.09 OTHER INSOMNIA: ICD-10-CM

## 2017-05-22 DIAGNOSIS — Z12.31 ENCOUNTER FOR SCREENING MAMMOGRAM FOR MALIGNANT NEOPLASM OF BREAST: ICD-10-CM

## 2017-05-22 DIAGNOSIS — I10 ESSENTIAL HYPERTENSION: Primary | ICD-10-CM

## 2017-05-22 DIAGNOSIS — K59.04 CHRONIC IDIOPATHIC CONSTIPATION: ICD-10-CM

## 2017-05-22 DIAGNOSIS — E11.42 TYPE 2 DIABETES MELLITUS WITH DIABETIC POLYNEUROPATHY, WITH LONG-TERM CURRENT USE OF INSULIN (HCC): ICD-10-CM

## 2017-05-22 DIAGNOSIS — F31.76 BIPOLAR DISORDER, IN FULL REMISSION, MOST RECENT EPISODE DEPRESSED (HCC): ICD-10-CM

## 2017-05-22 DIAGNOSIS — E03.9 ACQUIRED HYPOTHYROIDISM: ICD-10-CM

## 2017-05-22 DIAGNOSIS — F51.01 PRIMARY INSOMNIA: ICD-10-CM

## 2017-05-22 DIAGNOSIS — K21.9 GASTROESOPHAGEAL REFLUX DISEASE WITHOUT ESOPHAGITIS: ICD-10-CM

## 2017-05-22 PROCEDURE — 3014F SCREEN MAMMO DOC REV: CPT | Performed by: PEDIATRICS

## 2017-05-22 PROCEDURE — G8417 CALC BMI ABV UP PARAM F/U: HCPCS | Performed by: PEDIATRICS

## 2017-05-22 PROCEDURE — G8599 NO ASA/ANTIPLAT THER USE RNG: HCPCS | Performed by: PEDIATRICS

## 2017-05-22 PROCEDURE — G8427 DOCREV CUR MEDS BY ELIG CLIN: HCPCS | Performed by: PEDIATRICS

## 2017-05-22 PROCEDURE — 99214 OFFICE O/P EST MOD 30 MIN: CPT | Performed by: PEDIATRICS

## 2017-05-22 PROCEDURE — 1036F TOBACCO NON-USER: CPT | Performed by: PEDIATRICS

## 2017-05-22 PROCEDURE — 3017F COLORECTAL CA SCREEN DOC REV: CPT | Performed by: PEDIATRICS

## 2017-05-22 PROCEDURE — 3044F HG A1C LEVEL LT 7.0%: CPT | Performed by: PEDIATRICS

## 2017-05-22 RX ORDER — RAMELTEON 8 MG/1
8 TABLET ORAL NIGHTLY PRN
Qty: 30 TABLET | Refills: 1 | Status: SHIPPED | OUTPATIENT
Start: 2017-05-22 | End: 2017-07-13 | Stop reason: SDUPTHER

## 2017-05-22 RX ORDER — TRAZODONE HYDROCHLORIDE 150 MG/1
150 TABLET ORAL NIGHTLY
Qty: 30 TABLET | Refills: 11 | Status: SHIPPED | OUTPATIENT
Start: 2017-05-22 | End: 2017-06-05 | Stop reason: SDUPTHER

## 2017-05-22 RX ORDER — DULOXETIN HYDROCHLORIDE 60 MG/1
60 CAPSULE, DELAYED RELEASE ORAL 2 TIMES DAILY
Qty: 60 CAPSULE | Refills: 11 | Status: SHIPPED | OUTPATIENT
Start: 2017-05-22 | End: 2017-08-29 | Stop reason: SDUPTHER

## 2017-05-22 RX ORDER — METOPROLOL SUCCINATE 50 MG/1
50 TABLET, EXTENDED RELEASE ORAL DAILY
Qty: 30 TABLET | Refills: 11
Start: 2017-05-22 | End: 2019-06-18 | Stop reason: SDUPTHER

## 2017-05-22 RX ORDER — PANTOPRAZOLE SODIUM 40 MG/1
40 TABLET, DELAYED RELEASE ORAL DAILY
Qty: 90 TABLET | Refills: 3 | Status: ON HOLD | OUTPATIENT
Start: 2017-05-22 | End: 2018-06-11 | Stop reason: SDUPTHER

## 2017-05-22 ASSESSMENT — ENCOUNTER SYMPTOMS
EYE PAIN: 0
WHEEZING: 0
SORE THROAT: 0
BACK PAIN: 0
ANAL BLEEDING: 0
CHEST TIGHTNESS: 0
VOMITING: 0
EYE REDNESS: 0
RHINORRHEA: 0
EYE DISCHARGE: 0
SHORTNESS OF BREATH: 0
COUGH: 0
NAUSEA: 0
ABDOMINAL PAIN: 0
DIARRHEA: 0

## 2017-05-30 ENCOUNTER — HOSPITAL ENCOUNTER (OUTPATIENT)
Dept: WOMENS IMAGING | Age: 57
Discharge: HOME | End: 2017-05-30
Payer: MEDICARE

## 2017-05-30 ENCOUNTER — HOSPITAL ENCOUNTER (OUTPATIENT)
Dept: ULTRASOUND IMAGING | Age: 57
Discharge: HOME | End: 2017-05-30
Payer: MEDICARE

## 2017-05-30 DIAGNOSIS — N63.0 LUMP OR MASS IN BREAST: ICD-10-CM

## 2017-05-30 DIAGNOSIS — N63.0 BREAST NODULE: ICD-10-CM

## 2017-05-30 PROCEDURE — G0204 DX MAMMO INCL CAD BI: HCPCS

## 2017-05-30 PROCEDURE — 76642 ULTRASOUND BREAST LIMITED: CPT

## 2017-05-31 ENCOUNTER — TELEPHONE (OUTPATIENT)
Dept: PRIMARY CARE CLINIC | Age: 57
End: 2017-05-31

## 2017-06-05 ENCOUNTER — OFFICE VISIT (OUTPATIENT)
Dept: PRIMARY CARE CLINIC | Age: 57
End: 2017-06-05
Payer: MEDICARE

## 2017-06-05 VITALS
HEIGHT: 61 IN | WEIGHT: 226 LBS | TEMPERATURE: 98 F | SYSTOLIC BLOOD PRESSURE: 126 MMHG | OXYGEN SATURATION: 98 % | BODY MASS INDEX: 42.67 KG/M2 | HEART RATE: 74 BPM | DIASTOLIC BLOOD PRESSURE: 88 MMHG

## 2017-06-05 DIAGNOSIS — E11.21 TYPE 2 DIABETES MELLITUS WITH DIABETIC NEPHROPATHY, UNSPECIFIED LONG TERM INSULIN USE STATUS: ICD-10-CM

## 2017-06-05 DIAGNOSIS — F31.76 BIPOLAR DISORDER, IN FULL REMISSION, MOST RECENT EPISODE DEPRESSED (HCC): ICD-10-CM

## 2017-06-05 DIAGNOSIS — F41.9 ANXIETY: ICD-10-CM

## 2017-06-05 DIAGNOSIS — Z01.818 PRE-OP EXAM: ICD-10-CM

## 2017-06-05 DIAGNOSIS — Z01.818 PRE-OP EXAM: Primary | ICD-10-CM

## 2017-06-05 DIAGNOSIS — G47.09 OTHER INSOMNIA: Primary | ICD-10-CM

## 2017-06-05 DIAGNOSIS — F33.41 RECURRENT MAJOR DEPRESSIVE DISORDER, IN PARTIAL REMISSION (HCC): ICD-10-CM

## 2017-06-05 LAB
ALBUMIN SERPL-MCNC: 3.9 G/DL (ref 3.5–5.2)
ALP BLD-CCNC: 150 U/L (ref 35–104)
ALT SERPL-CCNC: 21 U/L (ref 5–33)
ANION GAP SERPL CALCULATED.3IONS-SCNC: 17 MMOL/L (ref 7–19)
ANTIBODY SCREEN: NORMAL
AST SERPL-CCNC: 31 U/L (ref 5–32)
BASOPHILS ABSOLUTE: 0.1 K/UL (ref 0–0.2)
BASOPHILS RELATIVE PERCENT: 0.6 % (ref 0–1)
BILIRUB SERPL-MCNC: <0.2 MG/DL (ref 0.2–1.2)
BUN BLDV-MCNC: 27 MG/DL (ref 6–20)
CALCIUM SERPL-MCNC: 9 MG/DL (ref 8.6–10)
CHLORIDE BLD-SCNC: 106 MMOL/L (ref 98–111)
CO2: 20 MMOL/L (ref 22–29)
CREAT SERPL-MCNC: 1.6 MG/DL (ref 0.5–0.9)
EOSINOPHILS ABSOLUTE: 0.1 K/UL (ref 0–0.6)
EOSINOPHILS RELATIVE PERCENT: 1.6 % (ref 0–5)
GFR NON-AFRICAN AMERICAN: 33
GLUCOSE BLD-MCNC: 133 MG/DL (ref 74–109)
HCT VFR BLD CALC: 40.9 % (ref 37–47)
HEMOGLOBIN: 13.1 G/DL (ref 12–16)
INR BLD: 1.04 (ref 0.88–1.18)
LYMPHOCYTES ABSOLUTE: 1.2 K/UL (ref 1.1–4.5)
LYMPHOCYTES RELATIVE PERCENT: 14.4 % (ref 20–40)
MCH RBC QN AUTO: 28.7 PG (ref 27–31)
MCHC RBC AUTO-ENTMCNC: 32 G/DL (ref 33–37)
MCV RBC AUTO: 89.7 FL (ref 81–99)
MONOCYTES ABSOLUTE: 0.7 K/UL (ref 0–0.9)
MONOCYTES RELATIVE PERCENT: 8.8 % (ref 0–10)
NEUTROPHILS ABSOLUTE: 6.1 K/UL (ref 1.5–7.5)
NEUTROPHILS RELATIVE PERCENT: 74.1 % (ref 50–65)
PDW BLD-RTO: 18.4 % (ref 11.5–14.5)
PLATELET # BLD: 271 K/UL (ref 130–400)
PMV BLD AUTO: 12.4 FL (ref 9.4–12.3)
POTASSIUM SERPL-SCNC: 4.7 MMOL/L (ref 3.5–5)
PROTHROMBIN TIME: 13.5 SEC (ref 12–14.6)
RBC # BLD: 4.56 M/UL (ref 4.2–5.4)
SODIUM BLD-SCNC: 143 MMOL/L (ref 136–145)
TOTAL PROTEIN: 7.2 G/DL (ref 6.6–8.7)
WBC # BLD: 8.2 K/UL (ref 4.8–10.8)

## 2017-06-05 PROCEDURE — G8599 NO ASA/ANTIPLAT THER USE RNG: HCPCS | Performed by: NURSE PRACTITIONER

## 2017-06-05 PROCEDURE — G8417 CALC BMI ABV UP PARAM F/U: HCPCS | Performed by: NURSE PRACTITIONER

## 2017-06-05 PROCEDURE — 3017F COLORECTAL CA SCREEN DOC REV: CPT | Performed by: NURSE PRACTITIONER

## 2017-06-05 PROCEDURE — 99214 OFFICE O/P EST MOD 30 MIN: CPT | Performed by: NURSE PRACTITIONER

## 2017-06-05 PROCEDURE — G8427 DOCREV CUR MEDS BY ELIG CLIN: HCPCS | Performed by: NURSE PRACTITIONER

## 2017-06-05 PROCEDURE — 1036F TOBACCO NON-USER: CPT | Performed by: NURSE PRACTITIONER

## 2017-06-05 PROCEDURE — 3014F SCREEN MAMMO DOC REV: CPT | Performed by: NURSE PRACTITIONER

## 2017-06-05 RX ORDER — QUETIAPINE FUMARATE 300 MG/1
450 TABLET, FILM COATED ORAL NIGHTLY
Qty: 60 TABLET | Refills: 11 | Status: SHIPPED | OUTPATIENT
Start: 2017-06-05 | End: 2018-06-05

## 2017-06-05 RX ORDER — TRAZODONE HYDROCHLORIDE 100 MG/1
100 TABLET ORAL NIGHTLY
Qty: 30 TABLET | Refills: 11 | Status: SHIPPED | OUTPATIENT
Start: 2017-06-05 | End: 2017-07-13

## 2017-06-05 ASSESSMENT — ENCOUNTER SYMPTOMS
WHEEZING: 0
SHORTNESS OF BREATH: 0
VOMITING: 0
EYE DISCHARGE: 0
BACK PAIN: 0
ABDOMINAL PAIN: 0
NAUSEA: 0
TROUBLE SWALLOWING: 0
CONSTIPATION: 0
VOICE CHANGE: 0
PHOTOPHOBIA: 0
COUGH: 0
SINUS PRESSURE: 0
SORE THROAT: 0
BLOOD IN STOOL: 0
CHEST TIGHTNESS: 0
EYE PAIN: 0
DIARRHEA: 0

## 2017-06-06 ENCOUNTER — TELEPHONE (OUTPATIENT)
Dept: PRIMARY CARE CLINIC | Age: 57
End: 2017-06-06

## 2017-06-06 DIAGNOSIS — E55.9 VITAMIN D DEFICIENCY: Primary | ICD-10-CM

## 2017-06-06 LAB
ABO/RH: NORMAL
ANTIBODY SCREEN: NORMAL

## 2017-06-07 ENCOUNTER — TELEPHONE (OUTPATIENT)
Dept: PRIMARY CARE CLINIC | Age: 57
End: 2017-06-07

## 2017-06-07 ENCOUNTER — HOSPITAL ENCOUNTER (OUTPATIENT)
Dept: PREADMISSION TESTING | Age: 57
Discharge: HOME | End: 2017-06-07
Payer: MEDICARE

## 2017-06-07 VITALS — WEIGHT: 222 LBS | BODY MASS INDEX: 41.91 KG/M2 | HEIGHT: 61 IN

## 2017-06-07 LAB — VITAMIN D 25-HYDROXY: 54.4 NG/ML

## 2017-06-07 PROCEDURE — 93005 ELECTROCARDIOGRAM TRACING: CPT

## 2017-06-09 ENCOUNTER — PREP FOR PROCEDURE (OUTPATIENT)
Dept: SURGERY | Age: 57
End: 2017-06-09

## 2017-06-09 DIAGNOSIS — Z01.818 PRE-OP EVALUATION: Primary | ICD-10-CM

## 2017-06-09 RX ORDER — SODIUM CHLORIDE 0.9 % (FLUSH) 0.9 %
10 SYRINGE (ML) INJECTION PRN
Status: CANCELLED | OUTPATIENT
Start: 2017-06-09

## 2017-06-09 RX ORDER — SODIUM CHLORIDE 0.9 % (FLUSH) 0.9 %
10 SYRINGE (ML) INJECTION EVERY 12 HOURS SCHEDULED
Status: CANCELLED | OUTPATIENT
Start: 2017-06-09

## 2017-06-09 RX ORDER — SODIUM CHLORIDE, SODIUM LACTATE, POTASSIUM CHLORIDE, CALCIUM CHLORIDE 600; 310; 30; 20 MG/100ML; MG/100ML; MG/100ML; MG/100ML
INJECTION, SOLUTION INTRAVENOUS CONTINUOUS
Status: CANCELLED | OUTPATIENT
Start: 2017-06-09

## 2017-06-12 LAB
EKG P AXIS: 11 DEGREES
EKG P-R INTERVAL: 168 MS
EKG Q-T INTERVAL: 408 MS
EKG QRS DURATION: 86 MS
EKG QTC CALCULATION (BAZETT): 427 MS
EKG T AXIS: 32 DEGREES

## 2017-06-16 ENCOUNTER — ANESTHESIA EVENT (OUTPATIENT)
Dept: OPERATING ROOM | Age: 57
DRG: 470 | End: 2017-06-16
Payer: MEDICARE

## 2017-06-16 ENCOUNTER — HOSPITAL ENCOUNTER (INPATIENT)
Age: 57
LOS: 3 days | Discharge: HOME HEALTH | DRG: 470 | End: 2017-06-19
Attending: ORTHOPAEDIC SURGERY | Admitting: ORTHOPAEDIC SURGERY
Payer: MEDICARE

## 2017-06-16 ENCOUNTER — ANESTHESIA (OUTPATIENT)
Dept: OPERATING ROOM | Age: 57
DRG: 470 | End: 2017-06-16
Payer: MEDICARE

## 2017-06-16 VITALS
RESPIRATION RATE: 3 BRPM | TEMPERATURE: 98 F | SYSTOLIC BLOOD PRESSURE: 148 MMHG | OXYGEN SATURATION: 100 % | DIASTOLIC BLOOD PRESSURE: 110 MMHG

## 2017-06-16 PROBLEM — M17.12 PRIMARY OSTEOARTHRITIS OF LEFT KNEE: Status: ACTIVE | Noted: 2017-06-16

## 2017-06-16 LAB
ABO/RH: NORMAL
ANTIBODY SCREEN: NORMAL
GLUCOSE BLD-MCNC: 149 MG/DL (ref 70–99)
GLUCOSE BLD-MCNC: 303 MG/DL (ref 70–99)
GLUCOSE BLD-MCNC: 388 MG/DL (ref 70–99)
PERFORMED ON: ABNORMAL

## 2017-06-16 PROCEDURE — 7100000001 HC PACU RECOVERY - ADDTL 15 MIN: Performed by: ORTHOPAEDIC SURGERY

## 2017-06-16 PROCEDURE — 1210000000 HC MED SURG R&B

## 2017-06-16 PROCEDURE — C1776 JOINT DEVICE (IMPLANTABLE): HCPCS | Performed by: ORTHOPAEDIC SURGERY

## 2017-06-16 PROCEDURE — C1713 ANCHOR/SCREW BN/BN,TIS/BN: HCPCS | Performed by: ORTHOPAEDIC SURGERY

## 2017-06-16 PROCEDURE — 2580000003 HC RX 258: Performed by: ORTHOPAEDIC SURGERY

## 2017-06-16 PROCEDURE — 6360000002 HC RX W HCPCS: Performed by: ORTHOPAEDIC SURGERY

## 2017-06-16 PROCEDURE — 3700000001 HC ADD 15 MINUTES (ANESTHESIA): Performed by: ORTHOPAEDIC SURGERY

## 2017-06-16 PROCEDURE — 86900 BLOOD TYPING SEROLOGIC ABO: CPT

## 2017-06-16 PROCEDURE — 3600000005 HC SURGERY LEVEL 5 BASE: Performed by: ORTHOPAEDIC SURGERY

## 2017-06-16 PROCEDURE — 2720000001 HC MISC SURG SUPPLY STERILE $51-500: Performed by: ORTHOPAEDIC SURGERY

## 2017-06-16 PROCEDURE — 86901 BLOOD TYPING SEROLOGIC RH(D): CPT

## 2017-06-16 PROCEDURE — 2500000003 HC RX 250 WO HCPCS: Performed by: ORTHOPAEDIC SURGERY

## 2017-06-16 PROCEDURE — 6360000002 HC RX W HCPCS: Performed by: ANESTHESIOLOGY

## 2017-06-16 PROCEDURE — 82948 REAGENT STRIP/BLOOD GLUCOSE: CPT

## 2017-06-16 PROCEDURE — 36415 COLL VENOUS BLD VENIPUNCTURE: CPT

## 2017-06-16 PROCEDURE — 2700000000 HC OXYGEN THERAPY PER DAY

## 2017-06-16 PROCEDURE — 94664 DEMO&/EVAL PT USE INHALER: CPT

## 2017-06-16 PROCEDURE — 2500000003 HC RX 250 WO HCPCS: Performed by: NURSE ANESTHETIST, CERTIFIED REGISTERED

## 2017-06-16 PROCEDURE — 6360000002 HC RX W HCPCS: Performed by: NURSE ANESTHETIST, CERTIFIED REGISTERED

## 2017-06-16 PROCEDURE — 2580000003 HC RX 258: Performed by: ANESTHESIOLOGY

## 2017-06-16 PROCEDURE — 6370000000 HC RX 637 (ALT 250 FOR IP): Performed by: ORTHOPAEDIC SURGERY

## 2017-06-16 PROCEDURE — 2720000003 HC MISC SUTURE/STAPLES/RELOADS/ETC: Performed by: ORTHOPAEDIC SURGERY

## 2017-06-16 PROCEDURE — 7100000000 HC PACU RECOVERY - FIRST 15 MIN: Performed by: ORTHOPAEDIC SURGERY

## 2017-06-16 PROCEDURE — 0SRD0J9 REPLACEMENT OF LEFT KNEE JOINT WITH SYNTHETIC SUBSTITUTE, CEMENTED, OPEN APPROACH: ICD-10-PCS | Performed by: ORTHOPAEDIC SURGERY

## 2017-06-16 PROCEDURE — 3700000000 HC ANESTHESIA ATTENDED CARE: Performed by: ORTHOPAEDIC SURGERY

## 2017-06-16 PROCEDURE — 86850 RBC ANTIBODY SCREEN: CPT

## 2017-06-16 PROCEDURE — 3600000015 HC SURGERY LEVEL 5 ADDTL 15MIN: Performed by: ORTHOPAEDIC SURGERY

## 2017-06-16 PROCEDURE — 76942 ECHO GUIDE FOR BIOPSY: CPT | Performed by: NURSE ANESTHETIST, CERTIFIED REGISTERED

## 2017-06-16 DEVICE — IMPLANTABLE DEVICE: Type: IMPLANTABLE DEVICE | Site: KNEE | Status: FUNCTIONAL

## 2017-06-16 DEVICE — COMPONENT PAT DIA32MM KNEE POLY CEM MEDIALIZED ANAT ATTUNE: Type: IMPLANTABLE DEVICE | Site: KNEE | Status: FUNCTIONAL

## 2017-06-16 DEVICE — DISCONTINUED USE 416978 CEMENT PALACOS R SING DOSE 40GR: Type: IMPLANTABLE DEVICE | Site: KNEE | Status: FUNCTIONAL

## 2017-06-16 DEVICE — COMPONENT FEM SZ 4 L KNEE POST STBL CEM ATTUNE: Type: IMPLANTABLE DEVICE | Site: KNEE | Status: FUNCTIONAL

## 2017-06-16 RX ORDER — TIZANIDINE 4 MG/1
4 TABLET ORAL EVERY 6 HOURS PRN
Status: DISCONTINUED | OUTPATIENT
Start: 2017-06-16 | End: 2017-06-19 | Stop reason: HOSPADM

## 2017-06-16 RX ORDER — LABETALOL HYDROCHLORIDE 5 MG/ML
5 INJECTION, SOLUTION INTRAVENOUS EVERY 10 MIN PRN
Status: DISCONTINUED | OUTPATIENT
Start: 2017-06-16 | End: 2017-06-16 | Stop reason: HOSPADM

## 2017-06-16 RX ORDER — EPHEDRINE SULFATE 50 MG/ML
INJECTION, SOLUTION INTRAVENOUS PRN
Status: DISCONTINUED | OUTPATIENT
Start: 2017-06-16 | End: 2017-06-16 | Stop reason: SDUPTHER

## 2017-06-16 RX ORDER — NICOTINE POLACRILEX 4 MG
15 LOZENGE BUCCAL PRN
Status: DISCONTINUED | OUTPATIENT
Start: 2017-06-16 | End: 2017-06-19 | Stop reason: HOSPADM

## 2017-06-16 RX ORDER — HYDROCODONE BITARTRATE AND ACETAMINOPHEN 5; 325 MG/1; MG/1
2 TABLET ORAL EVERY 4 HOURS PRN
Status: DISCONTINUED | OUTPATIENT
Start: 2017-06-16 | End: 2017-06-19 | Stop reason: HOSPADM

## 2017-06-16 RX ORDER — SODIUM CHLORIDE 0.9 % (FLUSH) 0.9 %
10 SYRINGE (ML) INJECTION PRN
Status: DISCONTINUED | OUTPATIENT
Start: 2017-06-16 | End: 2017-06-16 | Stop reason: HOSPADM

## 2017-06-16 RX ORDER — LEVOTHYROXINE SODIUM 88 UG/1
88 TABLET ORAL DAILY
Status: DISCONTINUED | OUTPATIENT
Start: 2017-06-17 | End: 2017-06-19 | Stop reason: HOSPADM

## 2017-06-16 RX ORDER — ENALAPRILAT 2.5 MG/2ML
1.25 INJECTION INTRAVENOUS
Status: DISCONTINUED | OUTPATIENT
Start: 2017-06-16 | End: 2017-06-16 | Stop reason: HOSPADM

## 2017-06-16 RX ORDER — HYDROCODONE BITARTRATE AND ACETAMINOPHEN 5; 325 MG/1; MG/1
1 TABLET ORAL EVERY 4 HOURS PRN
Status: DISCONTINUED | OUTPATIENT
Start: 2017-06-16 | End: 2017-06-19 | Stop reason: HOSPADM

## 2017-06-16 RX ORDER — METOPROLOL SUCCINATE 50 MG/1
50 TABLET, EXTENDED RELEASE ORAL DAILY
Status: DISCONTINUED | OUTPATIENT
Start: 2017-06-17 | End: 2017-06-19 | Stop reason: HOSPADM

## 2017-06-16 RX ORDER — DULOXETIN HYDROCHLORIDE 60 MG/1
60 CAPSULE, DELAYED RELEASE ORAL 2 TIMES DAILY
Status: DISCONTINUED | OUTPATIENT
Start: 2017-06-16 | End: 2017-06-19 | Stop reason: HOSPADM

## 2017-06-16 RX ORDER — FENTANYL CITRATE 50 UG/ML
25 INJECTION, SOLUTION INTRAMUSCULAR; INTRAVENOUS
Status: DISCONTINUED | OUTPATIENT
Start: 2017-06-16 | End: 2017-06-16 | Stop reason: HOSPADM

## 2017-06-16 RX ORDER — ONDANSETRON 2 MG/ML
4 INJECTION INTRAMUSCULAR; INTRAVENOUS
Status: DISCONTINUED | OUTPATIENT
Start: 2017-06-16 | End: 2017-06-16 | Stop reason: HOSPADM

## 2017-06-16 RX ORDER — ROCURONIUM BROMIDE 10 MG/ML
INJECTION, SOLUTION INTRAVENOUS PRN
Status: DISCONTINUED | OUTPATIENT
Start: 2017-06-16 | End: 2017-06-16 | Stop reason: SDUPTHER

## 2017-06-16 RX ORDER — MORPHINE SULFATE 4 MG/ML
4 INJECTION, SOLUTION INTRAMUSCULAR; INTRAVENOUS
Status: DISCONTINUED | OUTPATIENT
Start: 2017-06-16 | End: 2017-06-19 | Stop reason: HOSPADM

## 2017-06-16 RX ORDER — PROPOFOL 10 MG/ML
INJECTION, EMULSION INTRAVENOUS PRN
Status: DISCONTINUED | OUTPATIENT
Start: 2017-06-16 | End: 2017-06-16 | Stop reason: SDUPTHER

## 2017-06-16 RX ORDER — FLUTICASONE PROPIONATE 50 MCG
1 SPRAY, SUSPENSION (ML) NASAL DAILY
Status: DISCONTINUED | OUTPATIENT
Start: 2017-06-17 | End: 2017-06-19 | Stop reason: HOSPADM

## 2017-06-16 RX ORDER — PANTOPRAZOLE SODIUM 40 MG/1
40 TABLET, DELAYED RELEASE ORAL DAILY
Status: DISCONTINUED | OUTPATIENT
Start: 2017-06-17 | End: 2017-06-19 | Stop reason: HOSPADM

## 2017-06-16 RX ORDER — ATORVASTATIN CALCIUM 40 MG/1
40 TABLET, FILM COATED ORAL DAILY
Status: DISCONTINUED | OUTPATIENT
Start: 2017-06-17 | End: 2017-06-19 | Stop reason: HOSPADM

## 2017-06-16 RX ORDER — DOCUSATE SODIUM 100 MG/1
100 CAPSULE, LIQUID FILLED ORAL 2 TIMES DAILY
Status: DISCONTINUED | OUTPATIENT
Start: 2017-06-16 | End: 2017-06-19 | Stop reason: HOSPADM

## 2017-06-16 RX ORDER — MORPHINE SULFATE 10 MG/ML
INJECTION, SOLUTION INTRAMUSCULAR; INTRAVENOUS PRN
Status: DISCONTINUED | OUTPATIENT
Start: 2017-06-16 | End: 2017-06-16 | Stop reason: SDUPTHER

## 2017-06-16 RX ORDER — ASPIRIN 81 MG
2 TABLET, DELAYED RELEASE (ENTERIC COATED) ORAL DAILY
Status: DISCONTINUED | OUTPATIENT
Start: 2017-06-16 | End: 2017-06-16 | Stop reason: SDUPTHER

## 2017-06-16 RX ORDER — FENTANYL CITRATE 50 UG/ML
50 INJECTION, SOLUTION INTRAMUSCULAR; INTRAVENOUS
Status: DISCONTINUED | OUTPATIENT
Start: 2017-06-16 | End: 2017-06-16 | Stop reason: HOSPADM

## 2017-06-16 RX ORDER — FENTANYL CITRATE 50 UG/ML
INJECTION, SOLUTION INTRAMUSCULAR; INTRAVENOUS PRN
Status: DISCONTINUED | OUTPATIENT
Start: 2017-06-16 | End: 2017-06-16 | Stop reason: SDUPTHER

## 2017-06-16 RX ORDER — MIDAZOLAM HYDROCHLORIDE 1 MG/ML
INJECTION INTRAMUSCULAR; INTRAVENOUS PRN
Status: DISCONTINUED | OUTPATIENT
Start: 2017-06-16 | End: 2017-06-16 | Stop reason: SDUPTHER

## 2017-06-16 RX ORDER — MORPHINE SULFATE 4 MG/ML
2 INJECTION, SOLUTION INTRAMUSCULAR; INTRAVENOUS
Status: DISCONTINUED | OUTPATIENT
Start: 2017-06-16 | End: 2017-06-19 | Stop reason: HOSPADM

## 2017-06-16 RX ORDER — SODIUM CHLORIDE 0.9 % (FLUSH) 0.9 %
10 SYRINGE (ML) INJECTION EVERY 12 HOURS SCHEDULED
Status: DISCONTINUED | OUTPATIENT
Start: 2017-06-16 | End: 2017-06-19 | Stop reason: HOSPADM

## 2017-06-16 RX ORDER — SODIUM CHLORIDE 0.9 % (FLUSH) 0.9 %
10 SYRINGE (ML) INJECTION EVERY 12 HOURS SCHEDULED
Status: DISCONTINUED | OUTPATIENT
Start: 2017-06-16 | End: 2017-06-16 | Stop reason: HOSPADM

## 2017-06-16 RX ORDER — SODIUM CHLORIDE, SODIUM LACTATE, POTASSIUM CHLORIDE, CALCIUM CHLORIDE 600; 310; 30; 20 MG/100ML; MG/100ML; MG/100ML; MG/100ML
INJECTION, SOLUTION INTRAVENOUS CONTINUOUS
Status: DISCONTINUED | OUTPATIENT
Start: 2017-06-16 | End: 2017-06-16

## 2017-06-16 RX ORDER — DEXTROSE MONOHYDRATE 50 MG/ML
100 INJECTION, SOLUTION INTRAVENOUS PRN
Status: DISCONTINUED | OUTPATIENT
Start: 2017-06-16 | End: 2017-06-19 | Stop reason: HOSPADM

## 2017-06-16 RX ORDER — SODIUM CHLORIDE, SODIUM LACTATE, POTASSIUM CHLORIDE, CALCIUM CHLORIDE 600; 310; 30; 20 MG/100ML; MG/100ML; MG/100ML; MG/100ML
INJECTION, SOLUTION INTRAVENOUS CONTINUOUS
Status: DISCONTINUED | OUTPATIENT
Start: 2017-06-16 | End: 2017-06-19 | Stop reason: HOSPADM

## 2017-06-16 RX ORDER — PREGABALIN 150 MG/1
150 CAPSULE ORAL 2 TIMES DAILY
Status: DISCONTINUED | OUTPATIENT
Start: 2017-06-16 | End: 2017-06-19 | Stop reason: HOSPADM

## 2017-06-16 RX ORDER — FUROSEMIDE 40 MG/1
40 TABLET ORAL DAILY
Status: DISCONTINUED | OUTPATIENT
Start: 2017-06-17 | End: 2017-06-19 | Stop reason: HOSPADM

## 2017-06-16 RX ORDER — DEXTROSE MONOHYDRATE 25 G/50ML
12.5 INJECTION, SOLUTION INTRAVENOUS PRN
Status: DISCONTINUED | OUTPATIENT
Start: 2017-06-16 | End: 2017-06-19 | Stop reason: HOSPADM

## 2017-06-16 RX ORDER — LORAZEPAM 0.5 MG/1
0.5 TABLET ORAL NIGHTLY
Status: DISCONTINUED | OUTPATIENT
Start: 2017-06-16 | End: 2017-06-19 | Stop reason: HOSPADM

## 2017-06-16 RX ORDER — METOCLOPRAMIDE HYDROCHLORIDE 5 MG/ML
10 INJECTION INTRAMUSCULAR; INTRAVENOUS
Status: DISCONTINUED | OUTPATIENT
Start: 2017-06-16 | End: 2017-06-16 | Stop reason: HOSPADM

## 2017-06-16 RX ORDER — HYDRALAZINE HYDROCHLORIDE 20 MG/ML
5 INJECTION INTRAMUSCULAR; INTRAVENOUS EVERY 10 MIN PRN
Status: DISCONTINUED | OUTPATIENT
Start: 2017-06-16 | End: 2017-06-16 | Stop reason: HOSPADM

## 2017-06-16 RX ORDER — TRAZODONE HYDROCHLORIDE 100 MG/1
100 TABLET ORAL NIGHTLY
Status: DISCONTINUED | OUTPATIENT
Start: 2017-06-16 | End: 2017-06-19 | Stop reason: HOSPADM

## 2017-06-16 RX ORDER — MEPERIDINE HYDROCHLORIDE 50 MG/ML
12.5 INJECTION INTRAMUSCULAR; INTRAVENOUS; SUBCUTANEOUS EVERY 5 MIN PRN
Status: DISCONTINUED | OUTPATIENT
Start: 2017-06-16 | End: 2017-06-16 | Stop reason: HOSPADM

## 2017-06-16 RX ORDER — MORPHINE SULFATE 4 MG/ML
4 INJECTION, SOLUTION INTRAMUSCULAR; INTRAVENOUS EVERY 5 MIN PRN
Status: DISCONTINUED | OUTPATIENT
Start: 2017-06-16 | End: 2017-06-16 | Stop reason: HOSPADM

## 2017-06-16 RX ORDER — FAMOTIDINE 20 MG/1
20 TABLET, FILM COATED ORAL 2 TIMES DAILY
Status: DISCONTINUED | OUTPATIENT
Start: 2017-06-16 | End: 2017-06-19 | Stop reason: HOSPADM

## 2017-06-16 RX ORDER — ERGOCALCIFEROL 1.25 MG/1
50000 CAPSULE ORAL WEEKLY
Status: DISCONTINUED | OUTPATIENT
Start: 2017-06-23 | End: 2017-06-19 | Stop reason: HOSPADM

## 2017-06-16 RX ORDER — VALSARTAN 160 MG/1
160 TABLET ORAL DAILY
Status: DISCONTINUED | OUTPATIENT
Start: 2017-06-16 | End: 2017-06-19 | Stop reason: HOSPADM

## 2017-06-16 RX ORDER — LIDOCAINE HYDROCHLORIDE 10 MG/ML
INJECTION, SOLUTION INFILTRATION; PERINEURAL PRN
Status: DISCONTINUED | OUTPATIENT
Start: 2017-06-16 | End: 2017-06-16 | Stop reason: SDUPTHER

## 2017-06-16 RX ORDER — CLINDAMYCIN PHOSPHATE 900 MG/50ML
900 INJECTION INTRAVENOUS EVERY 8 HOURS
Status: COMPLETED | OUTPATIENT
Start: 2017-06-16 | End: 2017-06-17

## 2017-06-16 RX ORDER — DEXAMETHASONE SODIUM PHOSPHATE 4 MG/ML
INJECTION, SOLUTION INTRA-ARTICULAR; INTRALESIONAL; INTRAMUSCULAR; INTRAVENOUS; SOFT TISSUE PRN
Status: DISCONTINUED | OUTPATIENT
Start: 2017-06-16 | End: 2017-06-16 | Stop reason: SDUPTHER

## 2017-06-16 RX ORDER — GLYCOPYRROLATE 0.2 MG/ML
INJECTION INTRAMUSCULAR; INTRAVENOUS PRN
Status: DISCONTINUED | OUTPATIENT
Start: 2017-06-16 | End: 2017-06-16 | Stop reason: SDUPTHER

## 2017-06-16 RX ORDER — MIDAZOLAM HYDROCHLORIDE 1 MG/ML
2 INJECTION INTRAMUSCULAR; INTRAVENOUS
Status: COMPLETED | OUTPATIENT
Start: 2017-06-16 | End: 2017-06-16

## 2017-06-16 RX ORDER — MORPHINE SULFATE 4 MG/ML
2 INJECTION, SOLUTION INTRAMUSCULAR; INTRAVENOUS EVERY 5 MIN PRN
Status: DISCONTINUED | OUTPATIENT
Start: 2017-06-16 | End: 2017-06-16 | Stop reason: HOSPADM

## 2017-06-16 RX ORDER — INSULIN GLARGINE 100 [IU]/ML
30 INJECTION, SOLUTION SUBCUTANEOUS NIGHTLY
Status: DISCONTINUED | OUTPATIENT
Start: 2017-06-16 | End: 2017-06-19

## 2017-06-16 RX ORDER — LIDOCAINE HYDROCHLORIDE 10 MG/ML
1 INJECTION, SOLUTION EPIDURAL; INFILTRATION; INTRACAUDAL; PERINEURAL
Status: DISCONTINUED | OUTPATIENT
Start: 2017-06-16 | End: 2017-06-16 | Stop reason: HOSPADM

## 2017-06-16 RX ORDER — LACTULOSE 10 G/15ML
10 SOLUTION ORAL 2 TIMES DAILY
Status: DISCONTINUED | OUTPATIENT
Start: 2017-06-16 | End: 2017-06-19 | Stop reason: HOSPADM

## 2017-06-16 RX ORDER — DIPHENHYDRAMINE HYDROCHLORIDE 50 MG/ML
12.5 INJECTION INTRAMUSCULAR; INTRAVENOUS
Status: DISCONTINUED | OUTPATIENT
Start: 2017-06-16 | End: 2017-06-16 | Stop reason: HOSPADM

## 2017-06-16 RX ORDER — ACETAMINOPHEN 325 MG/1
650 TABLET ORAL EVERY 4 HOURS PRN
Status: DISCONTINUED | OUTPATIENT
Start: 2017-06-16 | End: 2017-06-19 | Stop reason: HOSPADM

## 2017-06-16 RX ORDER — ONDANSETRON 2 MG/ML
4 INJECTION INTRAMUSCULAR; INTRAVENOUS EVERY 6 HOURS PRN
Status: DISCONTINUED | OUTPATIENT
Start: 2017-06-16 | End: 2017-06-19 | Stop reason: HOSPADM

## 2017-06-16 RX ORDER — ROPIVACAINE HYDROCHLORIDE 5 MG/ML
INJECTION, SOLUTION EPIDURAL; INFILTRATION; PERINEURAL PRN
Status: DISCONTINUED | OUTPATIENT
Start: 2017-06-16 | End: 2017-06-16 | Stop reason: SDUPTHER

## 2017-06-16 RX ORDER — SODIUM CHLORIDE 0.9 % (FLUSH) 0.9 %
10 SYRINGE (ML) INJECTION PRN
Status: DISCONTINUED | OUTPATIENT
Start: 2017-06-16 | End: 2017-06-19 | Stop reason: HOSPADM

## 2017-06-16 RX ORDER — LABETALOL HYDROCHLORIDE 5 MG/ML
INJECTION, SOLUTION INTRAVENOUS PRN
Status: DISCONTINUED | OUTPATIENT
Start: 2017-06-16 | End: 2017-06-16 | Stop reason: SDUPTHER

## 2017-06-16 RX ADMIN — Medication 2 G: at 11:25

## 2017-06-16 RX ADMIN — MORPHINE SULFATE 2 MG: 10 INJECTION INTRAMUSCULAR; INTRAVENOUS; SUBCUTANEOUS at 12:55

## 2017-06-16 RX ADMIN — SODIUM CHLORIDE, SODIUM LACTATE, POTASSIUM CHLORIDE, AND CALCIUM CHLORIDE: 600; 310; 30; 20 INJECTION, SOLUTION INTRAVENOUS at 11:05

## 2017-06-16 RX ADMIN — QUETIAPINE FUMARATE 450 MG: 400 TABLET, FILM COATED ORAL at 20:40

## 2017-06-16 RX ADMIN — LABETALOL HYDROCHLORIDE 5 MG: 5 INJECTION, SOLUTION INTRAVENOUS at 13:01

## 2017-06-16 RX ADMIN — DOCUSATE SODIUM 100 MG: 100 CAPSULE, LIQUID FILLED ORAL at 20:40

## 2017-06-16 RX ADMIN — LABETALOL HYDROCHLORIDE 5 MG: 5 INJECTION, SOLUTION INTRAVENOUS at 12:49

## 2017-06-16 RX ADMIN — PROPOFOL 140 MG: 10 INJECTION, EMULSION INTRAVENOUS at 11:15

## 2017-06-16 RX ADMIN — CLINDAMYCIN PHOSPHATE 900 MG: 18 INJECTION, SOLUTION INTRAVENOUS at 18:55

## 2017-06-16 RX ADMIN — RIVAROXABAN 10 MG: 10 TABLET, FILM COATED ORAL at 20:41

## 2017-06-16 RX ADMIN — FENTANYL CITRATE 100 MCG: 50 INJECTION INTRAMUSCULAR; INTRAVENOUS at 11:14

## 2017-06-16 RX ADMIN — EPHEDRINE SULFATE 10 MG: 50 INJECTION, SOLUTION INTRAMUSCULAR; INTRAVENOUS; SUBCUTANEOUS at 11:29

## 2017-06-16 RX ADMIN — GLYCOPYRROLATE 0.2 MG: 0.2 INJECTION, SOLUTION INTRAMUSCULAR; INTRAVENOUS at 11:35

## 2017-06-16 RX ADMIN — MEPERIDINE HYDROCHLORIDE 12.5 MG: 50 INJECTION, SOLUTION INTRAMUSCULAR; INTRAVENOUS; SUBCUTANEOUS at 13:33

## 2017-06-16 RX ADMIN — VALSARTAN 160 MG: 160 TABLET, FILM COATED ORAL at 15:45

## 2017-06-16 RX ADMIN — INSULIN GLARGINE 30 UNITS: 100 INJECTION, SOLUTION SUBCUTANEOUS at 20:43

## 2017-06-16 RX ADMIN — MIDAZOLAM HYDROCHLORIDE 1 MG: 1 INJECTION, SOLUTION INTRAMUSCULAR; INTRAVENOUS at 11:07

## 2017-06-16 RX ADMIN — TRAZODONE HYDROCHLORIDE 100 MG: 100 TABLET ORAL at 20:40

## 2017-06-16 RX ADMIN — PREGABALIN 150 MG: 150 CAPSULE ORAL at 20:41

## 2017-06-16 RX ADMIN — FENTANYL CITRATE 50 MCG: 50 INJECTION INTRAMUSCULAR; INTRAVENOUS at 11:53

## 2017-06-16 RX ADMIN — LABETALOL HYDROCHLORIDE 5 MG: 5 INJECTION, SOLUTION INTRAVENOUS at 11:59

## 2017-06-16 RX ADMIN — MORPHINE SULFATE 4 MG: 4 INJECTION, SOLUTION INTRAMUSCULAR; INTRAVENOUS at 13:09

## 2017-06-16 RX ADMIN — SODIUM CHLORIDE, SODIUM LACTATE, POTASSIUM CHLORIDE, AND CALCIUM CHLORIDE: 600; 310; 30; 20 INJECTION, SOLUTION INTRAVENOUS at 12:50

## 2017-06-16 RX ADMIN — EPHEDRINE SULFATE 10 MG: 50 INJECTION, SOLUTION INTRAMUSCULAR; INTRAVENOUS; SUBCUTANEOUS at 11:33

## 2017-06-16 RX ADMIN — LACTULOSE 10 G: 10 SOLUTION ORAL at 20:39

## 2017-06-16 RX ADMIN — MIDAZOLAM HYDROCHLORIDE 1 MG: 1 INJECTION, SOLUTION INTRAMUSCULAR; INTRAVENOUS at 11:11

## 2017-06-16 RX ADMIN — ROPIVACAINE HYDROCHLORIDE 15 ML: 5 INJECTION, SOLUTION EPIDURAL; INFILTRATION; PERINEURAL at 09:31

## 2017-06-16 RX ADMIN — MORPHINE SULFATE 4 MG: 10 INJECTION INTRAMUSCULAR; INTRAVENOUS; SUBCUTANEOUS at 12:24

## 2017-06-16 RX ADMIN — SODIUM CHLORIDE, SODIUM LACTATE, POTASSIUM CHLORIDE, AND CALCIUM CHLORIDE: 600; 310; 30; 20 INJECTION, SOLUTION INTRAVENOUS at 08:00

## 2017-06-16 RX ADMIN — EPHEDRINE SULFATE 10 MG: 50 INJECTION, SOLUTION INTRAMUSCULAR; INTRAVENOUS; SUBCUTANEOUS at 11:24

## 2017-06-16 RX ADMIN — GLYCOPYRROLATE 0.2 MG: 0.2 INJECTION, SOLUTION INTRAMUSCULAR; INTRAVENOUS at 11:39

## 2017-06-16 RX ADMIN — MORPHINE SULFATE 4 MG: 10 INJECTION INTRAMUSCULAR; INTRAVENOUS; SUBCUTANEOUS at 12:40

## 2017-06-16 RX ADMIN — EPHEDRINE SULFATE 10 MG: 50 INJECTION, SOLUTION INTRAMUSCULAR; INTRAVENOUS; SUBCUTANEOUS at 11:37

## 2017-06-16 RX ADMIN — FENTANYL CITRATE 50 MCG: 50 INJECTION INTRAMUSCULAR; INTRAVENOUS at 11:58

## 2017-06-16 RX ADMIN — TIZANIDINE 4 MG: 4 TABLET ORAL at 20:41

## 2017-06-16 RX ADMIN — HYDROCODONE BITARTRATE AND ACETAMINOPHEN 2 TABLET: 5; 325 TABLET ORAL at 15:45

## 2017-06-16 RX ADMIN — ROCURONIUM BROMIDE 50 MG: 10 INJECTION INTRAVENOUS at 11:15

## 2017-06-16 RX ADMIN — Medication 2 G: at 20:26

## 2017-06-16 RX ADMIN — SODIUM CHLORIDE, POTASSIUM CHLORIDE, SODIUM LACTATE AND CALCIUM CHLORIDE: 600; 310; 30; 20 INJECTION, SOLUTION INTRAVENOUS at 18:55

## 2017-06-16 RX ADMIN — FAMOTIDINE 20 MG: 20 TABLET ORAL at 20:41

## 2017-06-16 RX ADMIN — SUGAMMADEX 200 MG: 100 INJECTION, SOLUTION INTRAVENOUS at 12:48

## 2017-06-16 RX ADMIN — MIDAZOLAM 2 MG: 1 INJECTION INTRAMUSCULAR; INTRAVENOUS at 09:28

## 2017-06-16 RX ADMIN — HYDROCODONE BITARTRATE AND ACETAMINOPHEN 2 TABLET: 5; 325 TABLET ORAL at 20:40

## 2017-06-16 RX ADMIN — SODIUM CHLORIDE, SODIUM LACTATE, POTASSIUM CHLORIDE, AND CALCIUM CHLORIDE: 600; 310; 30; 20 INJECTION, SOLUTION INTRAVENOUS at 07:44

## 2017-06-16 RX ADMIN — MEPERIDINE HYDROCHLORIDE 12.5 MG: 50 INJECTION, SOLUTION INTRAMUSCULAR; INTRAVENOUS; SUBCUTANEOUS at 13:28

## 2017-06-16 RX ADMIN — FENTANYL CITRATE 50 MCG: 50 INJECTION INTRAMUSCULAR; INTRAVENOUS at 11:55

## 2017-06-16 RX ADMIN — LORAZEPAM 0.5 MG: 0.5 TABLET ORAL at 20:41

## 2017-06-16 RX ADMIN — DEXAMETHASONE SODIUM PHOSPHATE 4 MG: 4 INJECTION, SOLUTION INTRAMUSCULAR; INTRAVENOUS at 11:25

## 2017-06-16 RX ADMIN — DULOXETINE HYDROCHLORIDE 60 MG: 60 CAPSULE, DELAYED RELEASE ORAL at 20:41

## 2017-06-16 RX ADMIN — EPHEDRINE SULFATE 10 MG: 50 INJECTION, SOLUTION INTRAMUSCULAR; INTRAVENOUS; SUBCUTANEOUS at 11:19

## 2017-06-16 RX ADMIN — LIDOCAINE HYDROCHLORIDE 5 ML: 10 INJECTION, SOLUTION INFILTRATION; PERINEURAL at 11:15

## 2017-06-16 ASSESSMENT — PAIN DESCRIPTION - LOCATION
LOCATION: KNEE
LOCATION: KNEE

## 2017-06-16 ASSESSMENT — PAIN SCALES - GENERAL
PAINLEVEL_OUTOF10: 7
PAINLEVEL_OUTOF10: 5
PAINLEVEL_OUTOF10: 7
PAINLEVEL_OUTOF10: 6
PAINLEVEL_OUTOF10: 6
PAINLEVEL_OUTOF10: 10

## 2017-06-16 ASSESSMENT — PAIN DESCRIPTION - ORIENTATION
ORIENTATION: LEFT
ORIENTATION: LEFT

## 2017-06-16 ASSESSMENT — PAIN DESCRIPTION - PAIN TYPE
TYPE: SURGICAL PAIN
TYPE: SURGICAL PAIN

## 2017-06-17 PROBLEM — D50.9 IRON DEFICIENCY ANEMIA: Status: ACTIVE | Noted: 2017-06-17

## 2017-06-17 LAB
GLUCOSE BLD-MCNC: 174 MG/DL (ref 70–99)
GLUCOSE BLD-MCNC: 178 MG/DL (ref 70–99)
GLUCOSE BLD-MCNC: 195 MG/DL (ref 70–99)
GLUCOSE BLD-MCNC: 303 MG/DL (ref 70–99)
HCT VFR BLD CALC: 31.2 % (ref 37–47)
HEMOGLOBIN: 9.8 G/DL (ref 12–16)
MCH RBC QN AUTO: 28.9 PG (ref 27–31)
MCHC RBC AUTO-ENTMCNC: 31.4 G/DL (ref 33–37)
MCV RBC AUTO: 92 FL (ref 81–99)
PDW BLD-RTO: 17.3 % (ref 11.5–14.5)
PERFORMED ON: ABNORMAL
PLATELET # BLD: 187 K/UL (ref 130–400)
PMV BLD AUTO: 11.8 FL (ref 9.4–12.3)
RBC # BLD: 3.39 M/UL (ref 4.2–5.4)
WBC # BLD: 12.2 K/UL (ref 4.8–10.8)

## 2017-06-17 PROCEDURE — G8978 MOBILITY CURRENT STATUS: HCPCS

## 2017-06-17 PROCEDURE — 2700000000 HC OXYGEN THERAPY PER DAY

## 2017-06-17 PROCEDURE — 6370000000 HC RX 637 (ALT 250 FOR IP): Performed by: PHYSICIAN ASSISTANT

## 2017-06-17 PROCEDURE — 97530 THERAPEUTIC ACTIVITIES: CPT

## 2017-06-17 PROCEDURE — 6370000000 HC RX 637 (ALT 250 FOR IP): Performed by: ORTHOPAEDIC SURGERY

## 2017-06-17 PROCEDURE — 36415 COLL VENOUS BLD VENIPUNCTURE: CPT

## 2017-06-17 PROCEDURE — 97162 PT EVAL MOD COMPLEX 30 MIN: CPT

## 2017-06-17 PROCEDURE — 6360000002 HC RX W HCPCS: Performed by: ORTHOPAEDIC SURGERY

## 2017-06-17 PROCEDURE — 2580000003 HC RX 258: Performed by: ORTHOPAEDIC SURGERY

## 2017-06-17 PROCEDURE — 1210000000 HC MED SURG R&B

## 2017-06-17 PROCEDURE — 82948 REAGENT STRIP/BLOOD GLUCOSE: CPT

## 2017-06-17 PROCEDURE — G8979 MOBILITY GOAL STATUS: HCPCS

## 2017-06-17 PROCEDURE — 85027 COMPLETE CBC AUTOMATED: CPT

## 2017-06-17 PROCEDURE — 2500000003 HC RX 250 WO HCPCS: Performed by: ORTHOPAEDIC SURGERY

## 2017-06-17 PROCEDURE — 97116 GAIT TRAINING THERAPY: CPT

## 2017-06-17 RX ADMIN — HYDROCODONE BITARTRATE AND ACETAMINOPHEN 2 TABLET: 5; 325 TABLET ORAL at 17:09

## 2017-06-17 RX ADMIN — FAMOTIDINE 20 MG: 20 TABLET ORAL at 21:12

## 2017-06-17 RX ADMIN — INSULIN LISPRO 8 UNITS: 100 INJECTION, SOLUTION INTRAVENOUS; SUBCUTANEOUS at 08:38

## 2017-06-17 RX ADMIN — TRAZODONE HYDROCHLORIDE 100 MG: 100 TABLET ORAL at 21:13

## 2017-06-17 RX ADMIN — HYDROCODONE BITARTRATE AND ACETAMINOPHEN 2 TABLET: 5; 325 TABLET ORAL at 08:29

## 2017-06-17 RX ADMIN — LACTULOSE 10 G: 10 SOLUTION ORAL at 21:12

## 2017-06-17 RX ADMIN — Medication 2 G: at 04:40

## 2017-06-17 RX ADMIN — TIZANIDINE 4 MG: 4 TABLET ORAL at 15:50

## 2017-06-17 RX ADMIN — FLUTICASONE PROPIONATE 1 SPRAY: 50 SPRAY, METERED NASAL at 08:21

## 2017-06-17 RX ADMIN — DOCUSATE SODIUM 100 MG: 100 CAPSULE, LIQUID FILLED ORAL at 08:22

## 2017-06-17 RX ADMIN — Medication 10 ML: at 21:24

## 2017-06-17 RX ADMIN — LEVOTHYROXINE SODIUM 88 MCG: 88 TABLET ORAL at 06:24

## 2017-06-17 RX ADMIN — FAMOTIDINE 20 MG: 20 TABLET ORAL at 08:22

## 2017-06-17 RX ADMIN — INSULIN LISPRO 2 UNITS: 100 INJECTION, SOLUTION INTRAVENOUS; SUBCUTANEOUS at 13:17

## 2017-06-17 RX ADMIN — ATORVASTATIN CALCIUM 40 MG: 40 TABLET, FILM COATED ORAL at 08:22

## 2017-06-17 RX ADMIN — PANTOPRAZOLE SODIUM 40 MG: 40 TABLET, DELAYED RELEASE ORAL at 08:22

## 2017-06-17 RX ADMIN — INSULIN GLARGINE 30 UNITS: 100 INJECTION, SOLUTION SUBCUTANEOUS at 21:27

## 2017-06-17 RX ADMIN — HYDROCODONE BITARTRATE AND ACETAMINOPHEN 2 TABLET: 5; 325 TABLET ORAL at 21:12

## 2017-06-17 RX ADMIN — LORAZEPAM 0.5 MG: 0.5 TABLET ORAL at 21:13

## 2017-06-17 RX ADMIN — LACTULOSE 10 G: 10 SOLUTION ORAL at 08:26

## 2017-06-17 RX ADMIN — HYDROCODONE BITARTRATE AND ACETAMINOPHEN 2 TABLET: 5; 325 TABLET ORAL at 02:47

## 2017-06-17 RX ADMIN — PREGABALIN 150 MG: 150 CAPSULE ORAL at 21:13

## 2017-06-17 RX ADMIN — RIVAROXABAN 10 MG: 10 TABLET, FILM COATED ORAL at 17:09

## 2017-06-17 RX ADMIN — PREGABALIN 150 MG: 150 CAPSULE ORAL at 08:22

## 2017-06-17 RX ADMIN — FUROSEMIDE 40 MG: 40 TABLET ORAL at 08:21

## 2017-06-17 RX ADMIN — Medication 10 ML: at 08:32

## 2017-06-17 RX ADMIN — LINACLOTIDE 290 MCG: 145 CAPSULE, GELATIN COATED ORAL at 06:24

## 2017-06-17 RX ADMIN — INSULIN LISPRO 2 UNITS: 100 INJECTION, SOLUTION INTRAVENOUS; SUBCUTANEOUS at 17:10

## 2017-06-17 RX ADMIN — DULOXETINE HYDROCHLORIDE 60 MG: 60 CAPSULE, DELAYED RELEASE ORAL at 08:22

## 2017-06-17 RX ADMIN — CLINDAMYCIN PHOSPHATE 900 MG: 18 INJECTION, SOLUTION INTRAVENOUS at 02:46

## 2017-06-17 RX ADMIN — DOCUSATE SODIUM 100 MG: 100 CAPSULE, LIQUID FILLED ORAL at 21:13

## 2017-06-17 RX ADMIN — DULOXETINE HYDROCHLORIDE 60 MG: 60 CAPSULE, DELAYED RELEASE ORAL at 21:13

## 2017-06-17 RX ADMIN — HYDROCODONE BITARTRATE AND ACETAMINOPHEN 2 TABLET: 5; 325 TABLET ORAL at 13:15

## 2017-06-17 RX ADMIN — QUETIAPINE FUMARATE 450 MG: 400 TABLET, FILM COATED ORAL at 21:13

## 2017-06-17 RX ADMIN — INSULIN LISPRO 1 UNITS: 100 INJECTION, SOLUTION INTRAVENOUS; SUBCUTANEOUS at 21:26

## 2017-06-17 ASSESSMENT — PAIN SCALES - GENERAL
PAINLEVEL_OUTOF10: 8
PAINLEVEL_OUTOF10: 10
PAINLEVEL_OUTOF10: 3
PAINLEVEL_OUTOF10: 8
PAINLEVEL_OUTOF10: 10
PAINLEVEL_OUTOF10: 3
PAINLEVEL_OUTOF10: 2
PAINLEVEL_OUTOF10: 8
PAINLEVEL_OUTOF10: 7

## 2017-06-17 ASSESSMENT — PAIN DESCRIPTION - PAIN TYPE: TYPE: SURGICAL PAIN

## 2017-06-17 ASSESSMENT — PAIN DESCRIPTION - ORIENTATION
ORIENTATION: POSTERIOR
ORIENTATION: LEFT

## 2017-06-17 ASSESSMENT — PAIN DESCRIPTION - LOCATION: LOCATION: KNEE

## 2017-06-18 LAB
ANION GAP SERPL CALCULATED.3IONS-SCNC: 17 MMOL/L (ref 7–19)
BUN BLDV-MCNC: 21 MG/DL (ref 6–20)
CALCIUM SERPL-MCNC: 8.5 MG/DL (ref 8.6–10)
CHLORIDE BLD-SCNC: 97 MMOL/L (ref 98–111)
CO2: 24 MMOL/L (ref 22–29)
CREAT SERPL-MCNC: 1.5 MG/DL (ref 0.5–0.9)
GFR NON-AFRICAN AMERICAN: 36
GLUCOSE BLD-MCNC: 207 MG/DL (ref 74–109)
GLUCOSE BLD-MCNC: 238 MG/DL (ref 70–99)
GLUCOSE BLD-MCNC: 262 MG/DL (ref 70–99)
GLUCOSE BLD-MCNC: 283 MG/DL (ref 70–99)
GLUCOSE BLD-MCNC: 290 MG/DL (ref 70–99)
HBA1C MFR BLD: 5.7 %
HCT VFR BLD CALC: 29.2 % (ref 37–47)
HEMOGLOBIN: 9.3 G/DL (ref 12–16)
IRON SATURATION: 8 % (ref 14–50)
IRON: 16 UG/DL (ref 37–145)
MCH RBC QN AUTO: 28.8 PG (ref 27–31)
MCHC RBC AUTO-ENTMCNC: 31.8 G/DL (ref 33–37)
MCV RBC AUTO: 90.4 FL (ref 81–99)
PDW BLD-RTO: 17.5 % (ref 11.5–14.5)
PERFORMED ON: ABNORMAL
PLATELET # BLD: 169 K/UL (ref 130–400)
PMV BLD AUTO: 11.9 FL (ref 9.4–12.3)
POTASSIUM SERPL-SCNC: 4.1 MMOL/L (ref 3.5–5)
RBC # BLD: 3.23 M/UL (ref 4.2–5.4)
SODIUM BLD-SCNC: 138 MMOL/L (ref 136–145)
TOTAL IRON BINDING CAPACITY: 196 UG/DL (ref 250–400)
WBC # BLD: 8.5 K/UL (ref 4.8–10.8)

## 2017-06-18 PROCEDURE — 6370000000 HC RX 637 (ALT 250 FOR IP): Performed by: ORTHOPAEDIC SURGERY

## 2017-06-18 PROCEDURE — 82948 REAGENT STRIP/BLOOD GLUCOSE: CPT

## 2017-06-18 PROCEDURE — 97116 GAIT TRAINING THERAPY: CPT

## 2017-06-18 PROCEDURE — 36415 COLL VENOUS BLD VENIPUNCTURE: CPT

## 2017-06-18 PROCEDURE — 85027 COMPLETE CBC AUTOMATED: CPT

## 2017-06-18 PROCEDURE — 6370000000 HC RX 637 (ALT 250 FOR IP): Performed by: PHYSICIAN ASSISTANT

## 2017-06-18 PROCEDURE — 83036 HEMOGLOBIN GLYCOSYLATED A1C: CPT

## 2017-06-18 PROCEDURE — 2580000003 HC RX 258: Performed by: ORTHOPAEDIC SURGERY

## 2017-06-18 PROCEDURE — 1210000000 HC MED SURG R&B

## 2017-06-18 PROCEDURE — 83550 IRON BINDING TEST: CPT

## 2017-06-18 PROCEDURE — 83540 ASSAY OF IRON: CPT

## 2017-06-18 PROCEDURE — 80048 BASIC METABOLIC PNL TOTAL CA: CPT

## 2017-06-18 PROCEDURE — 6370000000 HC RX 637 (ALT 250 FOR IP): Performed by: FAMILY MEDICINE

## 2017-06-18 PROCEDURE — 97530 THERAPEUTIC ACTIVITIES: CPT

## 2017-06-18 RX ORDER — IRON POLYSACCHARIDE COMPLEX 150 MG
150 CAPSULE ORAL 2 TIMES DAILY
Status: DISCONTINUED | OUTPATIENT
Start: 2017-06-18 | End: 2017-06-19 | Stop reason: HOSPADM

## 2017-06-18 RX ADMIN — LACTULOSE 10 G: 10 SOLUTION ORAL at 20:23

## 2017-06-18 RX ADMIN — LEVOTHYROXINE SODIUM 88 MCG: 88 TABLET ORAL at 06:20

## 2017-06-18 RX ADMIN — HYDROCODONE BITARTRATE AND ACETAMINOPHEN 2 TABLET: 5; 325 TABLET ORAL at 07:26

## 2017-06-18 RX ADMIN — Medication 150 MG: at 20:23

## 2017-06-18 RX ADMIN — HYDROCODONE BITARTRATE AND ACETAMINOPHEN 2 TABLET: 5; 325 TABLET ORAL at 03:08

## 2017-06-18 RX ADMIN — DOCUSATE SODIUM 100 MG: 100 CAPSULE, LIQUID FILLED ORAL at 20:22

## 2017-06-18 RX ADMIN — PANTOPRAZOLE SODIUM 40 MG: 40 TABLET, DELAYED RELEASE ORAL at 07:29

## 2017-06-18 RX ADMIN — LORAZEPAM 0.5 MG: 0.5 TABLET ORAL at 20:23

## 2017-06-18 RX ADMIN — FUROSEMIDE 40 MG: 40 TABLET ORAL at 07:30

## 2017-06-18 RX ADMIN — FAMOTIDINE 20 MG: 20 TABLET ORAL at 07:29

## 2017-06-18 RX ADMIN — INSULIN LISPRO 3 UNITS: 100 INJECTION, SOLUTION INTRAVENOUS; SUBCUTANEOUS at 08:21

## 2017-06-18 RX ADMIN — INSULIN GLARGINE 30 UNITS: 100 INJECTION, SOLUTION SUBCUTANEOUS at 20:32

## 2017-06-18 RX ADMIN — DULOXETINE HYDROCHLORIDE 60 MG: 60 CAPSULE, DELAYED RELEASE ORAL at 20:22

## 2017-06-18 RX ADMIN — INSULIN LISPRO 3 UNITS: 100 INJECTION, SOLUTION INTRAVENOUS; SUBCUTANEOUS at 20:33

## 2017-06-18 RX ADMIN — DOCUSATE SODIUM 100 MG: 100 CAPSULE, LIQUID FILLED ORAL at 07:30

## 2017-06-18 RX ADMIN — PREGABALIN 150 MG: 150 CAPSULE ORAL at 07:30

## 2017-06-18 RX ADMIN — INSULIN LISPRO 6 UNITS: 100 INJECTION, SOLUTION INTRAVENOUS; SUBCUTANEOUS at 12:15

## 2017-06-18 RX ADMIN — HYDROCODONE BITARTRATE AND ACETAMINOPHEN 2 TABLET: 5; 325 TABLET ORAL at 16:38

## 2017-06-18 RX ADMIN — HYDROCODONE BITARTRATE AND ACETAMINOPHEN 2 TABLET: 5; 325 TABLET ORAL at 12:15

## 2017-06-18 RX ADMIN — ATORVASTATIN CALCIUM 40 MG: 40 TABLET, FILM COATED ORAL at 07:30

## 2017-06-18 RX ADMIN — INSULIN LISPRO 4 UNITS: 100 INJECTION, SOLUTION INTRAVENOUS; SUBCUTANEOUS at 18:10

## 2017-06-18 RX ADMIN — PREGABALIN 150 MG: 150 CAPSULE ORAL at 20:22

## 2017-06-18 RX ADMIN — QUETIAPINE FUMARATE 450 MG: 400 TABLET, FILM COATED ORAL at 20:22

## 2017-06-18 RX ADMIN — TIZANIDINE 4 MG: 4 TABLET ORAL at 07:29

## 2017-06-18 RX ADMIN — DULOXETINE HYDROCHLORIDE 60 MG: 60 CAPSULE, DELAYED RELEASE ORAL at 07:30

## 2017-06-18 RX ADMIN — RIVAROXABAN 10 MG: 10 TABLET, FILM COATED ORAL at 18:10

## 2017-06-18 RX ADMIN — FLUTICASONE PROPIONATE 1 SPRAY: 50 SPRAY, METERED NASAL at 07:31

## 2017-06-18 RX ADMIN — Medication 150 MG: at 08:22

## 2017-06-18 RX ADMIN — Medication 10 ML: at 20:24

## 2017-06-18 RX ADMIN — LACTULOSE 10 G: 10 SOLUTION ORAL at 07:30

## 2017-06-18 RX ADMIN — LINACLOTIDE 290 MCG: 145 CAPSULE, GELATIN COATED ORAL at 06:20

## 2017-06-18 RX ADMIN — TRAZODONE HYDROCHLORIDE 100 MG: 100 TABLET ORAL at 20:23

## 2017-06-18 RX ADMIN — FAMOTIDINE 20 MG: 20 TABLET ORAL at 20:22

## 2017-06-18 ASSESSMENT — PAIN SCALES - GENERAL
PAINLEVEL_OUTOF10: 3
PAINLEVEL_OUTOF10: 10
PAINLEVEL_OUTOF10: 3
PAINLEVEL_OUTOF10: 10
PAINLEVEL_OUTOF10: 3

## 2017-06-19 VITALS
RESPIRATION RATE: 15 BRPM | SYSTOLIC BLOOD PRESSURE: 109 MMHG | TEMPERATURE: 98.1 F | OXYGEN SATURATION: 97 % | DIASTOLIC BLOOD PRESSURE: 73 MMHG | HEART RATE: 98 BPM

## 2017-06-19 LAB
GLUCOSE BLD-MCNC: 235 MG/DL (ref 70–99)
GLUCOSE BLD-MCNC: 283 MG/DL (ref 70–99)
HCT VFR BLD CALC: 26.4 % (ref 37–47)
HEMOGLOBIN: 8.6 G/DL (ref 12–16)
MCH RBC QN AUTO: 29.4 PG (ref 27–31)
MCHC RBC AUTO-ENTMCNC: 32.6 G/DL (ref 33–37)
MCV RBC AUTO: 90.1 FL (ref 81–99)
PDW BLD-RTO: 17.5 % (ref 11.5–14.5)
PERFORMED ON: ABNORMAL
PERFORMED ON: ABNORMAL
PLATELET # BLD: 157 K/UL (ref 130–400)
PMV BLD AUTO: 11.6 FL (ref 9.4–12.3)
RBC # BLD: 2.93 M/UL (ref 4.2–5.4)
WBC # BLD: 7.6 K/UL (ref 4.8–10.8)

## 2017-06-19 PROCEDURE — 6370000000 HC RX 637 (ALT 250 FOR IP): Performed by: ORTHOPAEDIC SURGERY

## 2017-06-19 PROCEDURE — 85027 COMPLETE CBC AUTOMATED: CPT

## 2017-06-19 PROCEDURE — 6370000000 HC RX 637 (ALT 250 FOR IP): Performed by: FAMILY MEDICINE

## 2017-06-19 PROCEDURE — 36415 COLL VENOUS BLD VENIPUNCTURE: CPT

## 2017-06-19 PROCEDURE — 82948 REAGENT STRIP/BLOOD GLUCOSE: CPT

## 2017-06-19 RX ORDER — ASPIRIN/CALCIUM/MAG/ALUMINUM 325 MG
1 TABLET ORAL DAILY
Qty: 30 TABLET | Refills: 0 | Status: SHIPPED | OUTPATIENT
Start: 2017-06-19 | End: 2017-07-11

## 2017-06-19 RX ORDER — HYDROCODONE BITARTRATE AND ACETAMINOPHEN 10; 325 MG/1; MG/1
1 TABLET ORAL EVERY 4 HOURS PRN
Qty: 90 TABLET | Refills: 0 | Status: SHIPPED | OUTPATIENT
Start: 2017-06-19 | End: 2017-06-26

## 2017-06-19 RX ORDER — INSULIN GLARGINE 100 [IU]/ML
40 INJECTION, SOLUTION SUBCUTANEOUS NIGHTLY
Status: DISCONTINUED | OUTPATIENT
Start: 2017-06-19 | End: 2017-06-19 | Stop reason: HOSPADM

## 2017-06-19 RX ADMIN — LEVOTHYROXINE SODIUM 88 MCG: 88 TABLET ORAL at 06:41

## 2017-06-19 RX ADMIN — Medication 150 MG: at 08:22

## 2017-06-19 RX ADMIN — VALSARTAN 160 MG: 160 TABLET, FILM COATED ORAL at 08:21

## 2017-06-19 RX ADMIN — PANTOPRAZOLE SODIUM 40 MG: 40 TABLET, DELAYED RELEASE ORAL at 08:21

## 2017-06-19 RX ADMIN — LACTULOSE 10 G: 10 SOLUTION ORAL at 08:20

## 2017-06-19 RX ADMIN — FUROSEMIDE 40 MG: 40 TABLET ORAL at 08:21

## 2017-06-19 RX ADMIN — FLUTICASONE PROPIONATE 1 SPRAY: 50 SPRAY, METERED NASAL at 11:14

## 2017-06-19 RX ADMIN — LINACLOTIDE 290 MCG: 145 CAPSULE, GELATIN COATED ORAL at 06:41

## 2017-06-19 RX ADMIN — FAMOTIDINE 20 MG: 20 TABLET ORAL at 08:22

## 2017-06-19 RX ADMIN — ATORVASTATIN CALCIUM 40 MG: 40 TABLET, FILM COATED ORAL at 08:21

## 2017-06-19 RX ADMIN — HYDROCODONE BITARTRATE AND ACETAMINOPHEN 2 TABLET: 5; 325 TABLET ORAL at 00:06

## 2017-06-19 RX ADMIN — TIZANIDINE 4 MG: 4 TABLET ORAL at 11:14

## 2017-06-19 RX ADMIN — DULOXETINE HYDROCHLORIDE 60 MG: 60 CAPSULE, DELAYED RELEASE ORAL at 08:21

## 2017-06-19 RX ADMIN — PREGABALIN 150 MG: 150 CAPSULE ORAL at 08:21

## 2017-06-19 RX ADMIN — METOPROLOL SUCCINATE 50 MG: 50 TABLET, EXTENDED RELEASE ORAL at 08:21

## 2017-06-19 RX ADMIN — DOCUSATE SODIUM 100 MG: 100 CAPSULE, LIQUID FILLED ORAL at 08:21

## 2017-06-19 RX ADMIN — HYDROCODONE BITARTRATE AND ACETAMINOPHEN 2 TABLET: 5; 325 TABLET ORAL at 06:41

## 2017-06-19 RX ADMIN — HYDROCODONE BITARTRATE AND ACETAMINOPHEN 2 TABLET: 5; 325 TABLET ORAL at 11:14

## 2017-06-19 ASSESSMENT — PAIN SCALES - GENERAL
PAINLEVEL_OUTOF10: 6
PAINLEVEL_OUTOF10: 7
PAINLEVEL_OUTOF10: 7

## 2017-06-20 ENCOUNTER — CARE COORDINATION (OUTPATIENT)
Dept: CARE COORDINATION | Age: 57
End: 2017-06-20

## 2017-06-20 ENCOUNTER — TELEPHONE (OUTPATIENT)
Dept: PRIMARY CARE CLINIC | Age: 57
End: 2017-06-20

## 2017-06-23 ENCOUNTER — APPOINTMENT (OUTPATIENT)
Dept: GENERAL RADIOLOGY | Age: 57
End: 2017-06-23
Payer: MEDICARE

## 2017-06-23 ENCOUNTER — HOSPITAL ENCOUNTER (EMERGENCY)
Age: 57
Discharge: HOME | End: 2017-06-23
Attending: EMERGENCY MEDICINE
Payer: MEDICARE

## 2017-06-23 VITALS
OXYGEN SATURATION: 98 % | TEMPERATURE: 98.5 F | HEIGHT: 61 IN | WEIGHT: 220 LBS | HEART RATE: 78 BPM | SYSTOLIC BLOOD PRESSURE: 94 MMHG | RESPIRATION RATE: 18 BRPM | BODY MASS INDEX: 41.54 KG/M2 | DIASTOLIC BLOOD PRESSURE: 62 MMHG

## 2017-06-23 DIAGNOSIS — D64.9 ANEMIA, UNSPECIFIED TYPE: ICD-10-CM

## 2017-06-23 DIAGNOSIS — N18.9 CHRONIC RENAL INSUFFICIENCY, UNSPECIFIED STAGE: ICD-10-CM

## 2017-06-23 DIAGNOSIS — I95.1 ORTHOSTATIC HYPOTENSION: Primary | ICD-10-CM

## 2017-06-23 LAB
ALBUMIN SERPL-MCNC: 3.2 G/DL (ref 3.5–5.2)
ALP BLD-CCNC: 121 U/L (ref 35–104)
ALT SERPL-CCNC: 20 U/L (ref 5–33)
ANION GAP SERPL CALCULATED.3IONS-SCNC: 15 MMOL/L (ref 7–19)
AST SERPL-CCNC: 44 U/L (ref 5–32)
BACTERIA: ABNORMAL /HPF
BASOPHILS ABSOLUTE: 0 K/UL (ref 0–0.2)
BASOPHILS RELATIVE PERCENT: 0.4 % (ref 0–1)
BILIRUB SERPL-MCNC: 0.5 MG/DL (ref 0.2–1.2)
BILIRUBIN URINE: NEGATIVE
BLOOD, URINE: NEGATIVE
BUN BLDV-MCNC: 24 MG/DL (ref 6–20)
CALCIUM SERPL-MCNC: 8.8 MG/DL (ref 8.6–10)
CASTS: ABNORMAL /LPF
CHLORIDE BLD-SCNC: 99 MMOL/L (ref 98–111)
CLARITY: CLEAR
CO2: 24 MMOL/L (ref 22–29)
COLOR: YELLOW
CREAT SERPL-MCNC: 2 MG/DL (ref 0.5–0.9)
EOSINOPHILS ABSOLUTE: 0.2 K/UL (ref 0–0.6)
EOSINOPHILS RELATIVE PERCENT: 2.3 % (ref 0–5)
EPITHELIAL CELLS, UA: ABNORMAL /HPF
GFR NON-AFRICAN AMERICAN: 26
GLUCOSE BLD-MCNC: 126 MG/DL (ref 74–109)
GLUCOSE URINE: NEGATIVE MG/DL
HCT VFR BLD CALC: 29.3 % (ref 37–47)
HEMOGLOBIN: 9.1 G/DL (ref 12–16)
INR BLD: 1.03 (ref 0.88–1.18)
KETONES, URINE: NEGATIVE MG/DL
LEUKOCYTE ESTERASE, URINE: ABNORMAL
LYMPHOCYTES ABSOLUTE: 1.7 K/UL (ref 1.1–4.5)
LYMPHOCYTES RELATIVE PERCENT: 18.5 % (ref 20–40)
MCH RBC QN AUTO: 28.7 PG (ref 27–31)
MCHC RBC AUTO-ENTMCNC: 31.1 G/DL (ref 33–37)
MCV RBC AUTO: 92.4 FL (ref 81–99)
MONOCYTES ABSOLUTE: 1.1 K/UL (ref 0–0.9)
MONOCYTES RELATIVE PERCENT: 11.8 % (ref 0–10)
NEUTROPHILS ABSOLUTE: 5.9 K/UL (ref 1.5–7.5)
NEUTROPHILS RELATIVE PERCENT: 64.4 % (ref 50–65)
NITRITE, URINE: NEGATIVE
PDW BLD-RTO: 17.5 % (ref 11.5–14.5)
PERFORMED ON: NORMAL
PH UA: 5.5
PLATELET # BLD: 272 K/UL (ref 130–400)
PMV BLD AUTO: 11.1 FL (ref 9.4–12.3)
POC TROPONIN I: 0.01 NG/ML (ref 0–0.08)
POTASSIUM SERPL-SCNC: 4.1 MMOL/L (ref 3.5–5)
PRO-BNP: 787 PG/ML (ref 0–900)
PROTEIN UA: NEGATIVE MG/DL
PROTHROMBIN TIME: 13.4 SEC (ref 12–14.6)
RBC # BLD: 3.17 M/UL (ref 4.2–5.4)
SODIUM BLD-SCNC: 138 MMOL/L (ref 136–145)
SPECIFIC GRAVITY UA: 1.01
TOTAL PROTEIN: 7 G/DL (ref 6.6–8.7)
UROBILINOGEN, URINE: 0.2 E.U./DL
WBC # BLD: 9.2 K/UL (ref 4.8–10.8)
WBC UA: ABNORMAL /HPF (ref 0–5)

## 2017-06-23 PROCEDURE — 80053 COMPREHEN METABOLIC PANEL: CPT

## 2017-06-23 PROCEDURE — 85025 COMPLETE CBC W/AUTO DIFF WBC: CPT

## 2017-06-23 PROCEDURE — 87185 SC STD ENZYME DETCJ PER NZM: CPT

## 2017-06-23 PROCEDURE — 2580000003 HC RX 258: Performed by: EMERGENCY MEDICINE

## 2017-06-23 PROCEDURE — 84484 ASSAY OF TROPONIN QUANT: CPT

## 2017-06-23 PROCEDURE — 85610 PROTHROMBIN TIME: CPT

## 2017-06-23 PROCEDURE — 83880 ASSAY OF NATRIURETIC PEPTIDE: CPT

## 2017-06-23 PROCEDURE — 36415 COLL VENOUS BLD VENIPUNCTURE: CPT

## 2017-06-23 PROCEDURE — 99283 EMERGENCY DEPT VISIT LOW MDM: CPT | Performed by: EMERGENCY MEDICINE

## 2017-06-23 PROCEDURE — 81001 URINALYSIS AUTO W/SCOPE: CPT

## 2017-06-23 PROCEDURE — 87077 CULTURE AEROBIC IDENTIFY: CPT

## 2017-06-23 PROCEDURE — 93005 ELECTROCARDIOGRAM TRACING: CPT

## 2017-06-23 PROCEDURE — 71020 XR CHEST STANDARD TWO VW: CPT

## 2017-06-23 PROCEDURE — 99285 EMERGENCY DEPT VISIT HI MDM: CPT

## 2017-06-23 PROCEDURE — 87086 URINE CULTURE/COLONY COUNT: CPT

## 2017-06-23 RX ORDER — OXYCODONE AND ACETAMINOPHEN 10; 325 MG/1; MG/1
1 TABLET ORAL EVERY 4 HOURS PRN
COMMUNITY
End: 2017-07-13

## 2017-06-23 RX ORDER — 0.9 % SODIUM CHLORIDE 0.9 %
500 INTRAVENOUS SOLUTION INTRAVENOUS ONCE
Status: COMPLETED | OUTPATIENT
Start: 2017-06-23 | End: 2017-06-23

## 2017-06-23 RX ADMIN — SODIUM CHLORIDE 500 ML: 9 INJECTION, SOLUTION INTRAVENOUS at 18:03

## 2017-06-23 ASSESSMENT — PAIN DESCRIPTION - LOCATION: LOCATION: KNEE

## 2017-06-23 ASSESSMENT — ENCOUNTER SYMPTOMS
ABDOMINAL PAIN: 0
VOMITING: 0
SHORTNESS OF BREATH: 0
BACK PAIN: 0
NAUSEA: 0
COUGH: 0

## 2017-06-23 ASSESSMENT — PAIN DESCRIPTION - PAIN TYPE: TYPE: ACUTE PAIN

## 2017-06-23 ASSESSMENT — PAIN DESCRIPTION - ORIENTATION: ORIENTATION: LEFT

## 2017-06-23 ASSESSMENT — PAIN SCALES - GENERAL: PAINLEVEL_OUTOF10: 8

## 2017-06-23 ASSESSMENT — PAIN DESCRIPTION - DESCRIPTORS: DESCRIPTORS: THROBBING;SHARP

## 2017-06-25 LAB
ORGANISM: ABNORMAL
ORGANISM: ABNORMAL
URINE CULTURE, ROUTINE: ABNORMAL

## 2017-06-26 ENCOUNTER — CARE COORDINATION (OUTPATIENT)
Dept: PRIMARY CARE CLINIC | Age: 57
End: 2017-06-26

## 2017-06-27 ENCOUNTER — CARE COORDINATOR VISIT (OUTPATIENT)
Dept: CARE COORDINATION | Age: 57
End: 2017-06-27

## 2017-06-27 ENCOUNTER — OFFICE VISIT (OUTPATIENT)
Dept: PRIMARY CARE CLINIC | Age: 57
End: 2017-06-27
Payer: MEDICARE

## 2017-06-27 VITALS
BODY MASS INDEX: 43.23 KG/M2 | DIASTOLIC BLOOD PRESSURE: 86 MMHG | HEIGHT: 61 IN | HEART RATE: 86 BPM | OXYGEN SATURATION: 97 % | SYSTOLIC BLOOD PRESSURE: 120 MMHG | WEIGHT: 229 LBS | TEMPERATURE: 98.2 F

## 2017-06-27 DIAGNOSIS — Z96.652 STATUS POST TOTAL LEFT KNEE REPLACEMENT: ICD-10-CM

## 2017-06-27 DIAGNOSIS — D64.89 ANEMIA DUE TO OTHER CAUSE: ICD-10-CM

## 2017-06-27 DIAGNOSIS — I95.9 HYPOTENSION, UNSPECIFIED HYPOTENSION TYPE: ICD-10-CM

## 2017-06-27 DIAGNOSIS — K59.03 DRUG-INDUCED CONSTIPATION: ICD-10-CM

## 2017-06-27 DIAGNOSIS — Z09 HOSPITAL DISCHARGE FOLLOW-UP: Primary | ICD-10-CM

## 2017-06-27 LAB
EKG P AXIS: 42 DEGREES
EKG P-R INTERVAL: 160 MS
EKG Q-T INTERVAL: 398 MS
EKG QRS DURATION: 86 MS
EKG QTC CALCULATION (BAZETT): 424 MS
EKG T AXIS: 29 DEGREES

## 2017-06-27 PROCEDURE — 99214 OFFICE O/P EST MOD 30 MIN: CPT | Performed by: NURSE PRACTITIONER

## 2017-06-27 RX ORDER — DIPHENHYDRAMINE HCL 25 MG
25 CAPSULE ORAL EVERY 6 HOURS PRN
COMMUNITY
End: 2017-07-13

## 2017-06-27 ASSESSMENT — ENCOUNTER SYMPTOMS
WHEEZING: 0
DIARRHEA: 0
EYE DISCHARGE: 0
SORE THROAT: 0
TROUBLE SWALLOWING: 0
COUGH: 0
BLOOD IN STOOL: 0
ABDOMINAL PAIN: 0
EYE REDNESS: 0
CONSTIPATION: 0
RHINORRHEA: 0

## 2017-06-29 RX ORDER — FLUCONAZOLE 150 MG/1
150 TABLET ORAL DAILY
Qty: 3 TABLET | Refills: 0 | Status: SHIPPED | OUTPATIENT
Start: 2017-06-29 | End: 2017-07-02

## 2017-06-30 ENCOUNTER — CARE COORDINATION (OUTPATIENT)
Dept: CARE COORDINATION | Age: 57
End: 2017-06-30

## 2017-07-11 RX ORDER — ASPIRIN 81 MG/1
81 TABLET ORAL DAILY
COMMUNITY
End: 2018-05-22

## 2017-07-13 ENCOUNTER — TELEPHONE (OUTPATIENT)
Dept: PRIMARY CARE CLINIC | Age: 57
End: 2017-07-13

## 2017-07-13 ENCOUNTER — OFFICE VISIT (OUTPATIENT)
Dept: PRIMARY CARE CLINIC | Age: 57
End: 2017-07-13
Payer: MEDICARE

## 2017-07-13 VITALS
HEART RATE: 58 BPM | DIASTOLIC BLOOD PRESSURE: 102 MMHG | BODY MASS INDEX: 42.2 KG/M2 | OXYGEN SATURATION: 99 % | WEIGHT: 223.5 LBS | TEMPERATURE: 98.7 F | HEIGHT: 61 IN | SYSTOLIC BLOOD PRESSURE: 160 MMHG

## 2017-07-13 DIAGNOSIS — N18.4 CKD (CHRONIC KIDNEY DISEASE), STAGE 4 (SEVERE): ICD-10-CM

## 2017-07-13 DIAGNOSIS — M62.838 MUSCLE SPASM: ICD-10-CM

## 2017-07-13 DIAGNOSIS — Z79.4 TYPE 2 DIABETES MELLITUS WITH DIABETIC POLYNEUROPATHY, WITH LONG-TERM CURRENT USE OF INSULIN (HCC): Primary | ICD-10-CM

## 2017-07-13 DIAGNOSIS — E03.9 ACQUIRED HYPOTHYROIDISM: ICD-10-CM

## 2017-07-13 DIAGNOSIS — I10 ESSENTIAL HYPERTENSION: ICD-10-CM

## 2017-07-13 DIAGNOSIS — E78.2 MIXED HYPERLIPIDEMIA: ICD-10-CM

## 2017-07-13 DIAGNOSIS — E11.42 TYPE 2 DIABETES MELLITUS WITH DIABETIC POLYNEUROPATHY, WITH LONG-TERM CURRENT USE OF INSULIN (HCC): Primary | ICD-10-CM

## 2017-07-13 DIAGNOSIS — F51.01 PRIMARY INSOMNIA: ICD-10-CM

## 2017-07-13 LAB
BILIRUBIN, POC: NEGATIVE
BLOOD URINE, POC: NEGATIVE
CLARITY, POC: CLEAR
COLOR, POC: YELLOW
GLUCOSE URINE, POC: NEGATIVE
KETONES, POC: NEGATIVE
LEUKOCYTE EST, POC: NEGATIVE
NITRITE, POC: NEGATIVE
PH, POC: 5
PROTEIN, POC: NEGATIVE
SPECIFIC GRAVITY, POC: 1.01
UROBILINOGEN, POC: 0.2

## 2017-07-13 PROCEDURE — 99214 OFFICE O/P EST MOD 30 MIN: CPT | Performed by: PEDIATRICS

## 2017-07-13 PROCEDURE — 3046F HEMOGLOBIN A1C LEVEL >9.0%: CPT | Performed by: PEDIATRICS

## 2017-07-13 PROCEDURE — G8427 DOCREV CUR MEDS BY ELIG CLIN: HCPCS | Performed by: PEDIATRICS

## 2017-07-13 PROCEDURE — 1036F TOBACCO NON-USER: CPT | Performed by: PEDIATRICS

## 2017-07-13 PROCEDURE — 81002 URINALYSIS NONAUTO W/O SCOPE: CPT | Performed by: PEDIATRICS

## 2017-07-13 PROCEDURE — 3017F COLORECTAL CA SCREEN DOC REV: CPT | Performed by: PEDIATRICS

## 2017-07-13 PROCEDURE — G8417 CALC BMI ABV UP PARAM F/U: HCPCS | Performed by: PEDIATRICS

## 2017-07-13 PROCEDURE — G8598 ASA/ANTIPLAT THER USED: HCPCS | Performed by: PEDIATRICS

## 2017-07-13 PROCEDURE — 3014F SCREEN MAMMO DOC REV: CPT | Performed by: PEDIATRICS

## 2017-07-13 PROCEDURE — 1111F DSCHRG MED/CURRENT MED MERGE: CPT | Performed by: PEDIATRICS

## 2017-07-13 RX ORDER — TIZANIDINE 4 MG/1
TABLET ORAL
Qty: 90 TABLET | Refills: 3 | Status: SHIPPED | OUTPATIENT
Start: 2017-07-13 | End: 2017-11-13 | Stop reason: SDUPTHER

## 2017-07-13 RX ORDER — RAMELTEON 8 MG/1
8 TABLET ORAL NIGHTLY PRN
Qty: 30 TABLET | Refills: 1 | Status: SHIPPED | OUTPATIENT
Start: 2017-07-13 | End: 2017-08-29 | Stop reason: SDUPTHER

## 2017-07-13 ASSESSMENT — ENCOUNTER SYMPTOMS
ABDOMINAL DISTENTION: 0
SINUS PRESSURE: 0
NAUSEA: 0
BACK PAIN: 0
DIARRHEA: 0
COUGH: 0
SORE THROAT: 0
CHEST TIGHTNESS: 0
VOMITING: 0
CONSTIPATION: 0
TROUBLE SWALLOWING: 0
CHOKING: 0
WHEEZING: 0
ABDOMINAL PAIN: 0
SHORTNESS OF BREATH: 0
VOICE CHANGE: 0

## 2017-07-17 ENCOUNTER — CARE COORDINATION (OUTPATIENT)
Dept: FAMILY MEDICINE CLINIC | Age: 57
End: 2017-07-17

## 2017-07-18 ENCOUNTER — OFFICE VISIT (OUTPATIENT)
Dept: PRIMARY CARE CLINIC | Age: 57
End: 2017-07-18
Payer: MEDICARE

## 2017-07-18 VITALS
WEIGHT: 221 LBS | HEIGHT: 61 IN | SYSTOLIC BLOOD PRESSURE: 136 MMHG | DIASTOLIC BLOOD PRESSURE: 86 MMHG | BODY MASS INDEX: 41.72 KG/M2 | TEMPERATURE: 98.2 F | HEART RATE: 85 BPM | OXYGEN SATURATION: 97 %

## 2017-07-18 DIAGNOSIS — Z96.652 STATUS POST TOTAL LEFT KNEE REPLACEMENT: Primary | ICD-10-CM

## 2017-07-18 DIAGNOSIS — G89.29 CHRONIC PAIN OF LEFT KNEE: ICD-10-CM

## 2017-07-18 DIAGNOSIS — M25.562 CHRONIC PAIN OF LEFT KNEE: ICD-10-CM

## 2017-07-18 DIAGNOSIS — N18.4 CKD (CHRONIC KIDNEY DISEASE), STAGE 4 (SEVERE): ICD-10-CM

## 2017-07-18 LAB
ALBUMIN SERPL-MCNC: 3.6 G/DL (ref 3.5–5.2)
ALP BLD-CCNC: 144 U/L (ref 35–104)
ALT SERPL-CCNC: 8 U/L (ref 5–33)
ANION GAP SERPL CALCULATED.3IONS-SCNC: 19 MMOL/L (ref 7–19)
AST SERPL-CCNC: 20 U/L (ref 5–32)
BILIRUB SERPL-MCNC: 0.3 MG/DL (ref 0.2–1.2)
BUN BLDV-MCNC: 27 MG/DL (ref 6–20)
CALCIUM SERPL-MCNC: 8.7 MG/DL (ref 8.6–10)
CHLORIDE BLD-SCNC: 106 MMOL/L (ref 98–111)
CO2: 18 MMOL/L (ref 22–29)
CREAT SERPL-MCNC: 1.6 MG/DL (ref 0.5–0.9)
GFR NON-AFRICAN AMERICAN: 33
GLUCOSE BLD-MCNC: 71 MG/DL (ref 74–109)
POTASSIUM SERPL-SCNC: 4.5 MMOL/L (ref 3.5–5)
SODIUM BLD-SCNC: 143 MMOL/L (ref 136–145)
TOTAL PROTEIN: 7.6 G/DL (ref 6.6–8.7)

## 2017-07-18 PROCEDURE — 3017F COLORECTAL CA SCREEN DOC REV: CPT | Performed by: NURSE PRACTITIONER

## 2017-07-18 PROCEDURE — 1036F TOBACCO NON-USER: CPT | Performed by: NURSE PRACTITIONER

## 2017-07-18 PROCEDURE — 99213 OFFICE O/P EST LOW 20 MIN: CPT | Performed by: NURSE PRACTITIONER

## 2017-07-18 PROCEDURE — G8427 DOCREV CUR MEDS BY ELIG CLIN: HCPCS | Performed by: NURSE PRACTITIONER

## 2017-07-18 PROCEDURE — G8417 CALC BMI ABV UP PARAM F/U: HCPCS | Performed by: NURSE PRACTITIONER

## 2017-07-18 PROCEDURE — 3014F SCREEN MAMMO DOC REV: CPT | Performed by: NURSE PRACTITIONER

## 2017-07-18 PROCEDURE — G8598 ASA/ANTIPLAT THER USED: HCPCS | Performed by: NURSE PRACTITIONER

## 2017-07-18 PROCEDURE — 1111F DSCHRG MED/CURRENT MED MERGE: CPT | Performed by: NURSE PRACTITIONER

## 2017-07-18 RX ORDER — OXYCODONE AND ACETAMINOPHEN 7.5; 325 MG/1; MG/1
1 TABLET ORAL EVERY 8 HOURS PRN
Qty: 21 TABLET | Refills: 0 | Status: SHIPPED | OUTPATIENT
Start: 2017-07-18 | End: 2017-07-25

## 2017-07-18 ASSESSMENT — ENCOUNTER SYMPTOMS
SORE THROAT: 0
VOMITING: 0
CONSTIPATION: 0
EYE REDNESS: 0
COUGH: 0
SHORTNESS OF BREATH: 0
DIARRHEA: 0
RHINORRHEA: 0

## 2017-07-19 ENCOUNTER — TELEPHONE (OUTPATIENT)
Dept: PRIMARY CARE CLINIC | Age: 57
End: 2017-07-19

## 2017-07-25 DIAGNOSIS — E11.9 DM (DIABETES MELLITUS) (HCC): ICD-10-CM

## 2017-08-29 ENCOUNTER — CARE COORDINATOR VISIT (OUTPATIENT)
Dept: CARE COORDINATION | Age: 57
End: 2017-08-29

## 2017-08-29 ENCOUNTER — OFFICE VISIT (OUTPATIENT)
Dept: PRIMARY CARE CLINIC | Age: 57
End: 2017-08-29
Payer: MEDICARE

## 2017-08-29 VITALS
TEMPERATURE: 97 F | OXYGEN SATURATION: 98 % | BODY MASS INDEX: 40.97 KG/M2 | HEART RATE: 71 BPM | WEIGHT: 217 LBS | DIASTOLIC BLOOD PRESSURE: 84 MMHG | HEIGHT: 61 IN | SYSTOLIC BLOOD PRESSURE: 126 MMHG

## 2017-08-29 DIAGNOSIS — E11.65 TYPE 2 DIABETES MELLITUS WITH HYPERGLYCEMIA, WITH LONG-TERM CURRENT USE OF INSULIN (HCC): ICD-10-CM

## 2017-08-29 DIAGNOSIS — E03.9 ACQUIRED HYPOTHYROIDISM: ICD-10-CM

## 2017-08-29 DIAGNOSIS — Z79.4 TYPE 2 DIABETES MELLITUS WITH DIABETIC POLYNEUROPATHY, WITH LONG-TERM CURRENT USE OF INSULIN (HCC): Primary | ICD-10-CM

## 2017-08-29 DIAGNOSIS — Z79.4 TYPE 2 DIABETES MELLITUS WITH HYPERGLYCEMIA, WITH LONG-TERM CURRENT USE OF INSULIN (HCC): ICD-10-CM

## 2017-08-29 DIAGNOSIS — F51.01 PRIMARY INSOMNIA: ICD-10-CM

## 2017-08-29 DIAGNOSIS — E11.42 TYPE 2 DIABETES MELLITUS WITH DIABETIC POLYNEUROPATHY, WITH LONG-TERM CURRENT USE OF INSULIN (HCC): Primary | ICD-10-CM

## 2017-08-29 DIAGNOSIS — R60.0 BILATERAL EDEMA OF LOWER EXTREMITY: ICD-10-CM

## 2017-08-29 LAB — HBA1C MFR BLD: 5.6 %

## 2017-08-29 PROCEDURE — G8427 DOCREV CUR MEDS BY ELIG CLIN: HCPCS | Performed by: NURSE PRACTITIONER

## 2017-08-29 PROCEDURE — 99214 OFFICE O/P EST MOD 30 MIN: CPT | Performed by: NURSE PRACTITIONER

## 2017-08-29 PROCEDURE — 83036 HEMOGLOBIN GLYCOSYLATED A1C: CPT | Performed by: NURSE PRACTITIONER

## 2017-08-29 PROCEDURE — 3014F SCREEN MAMMO DOC REV: CPT | Performed by: NURSE PRACTITIONER

## 2017-08-29 PROCEDURE — G8417 CALC BMI ABV UP PARAM F/U: HCPCS | Performed by: NURSE PRACTITIONER

## 2017-08-29 PROCEDURE — 1036F TOBACCO NON-USER: CPT | Performed by: NURSE PRACTITIONER

## 2017-08-29 PROCEDURE — G8598 ASA/ANTIPLAT THER USED: HCPCS | Performed by: NURSE PRACTITIONER

## 2017-08-29 PROCEDURE — 3017F COLORECTAL CA SCREEN DOC REV: CPT | Performed by: NURSE PRACTITIONER

## 2017-08-29 PROCEDURE — 3046F HEMOGLOBIN A1C LEVEL >9.0%: CPT | Performed by: NURSE PRACTITIONER

## 2017-08-29 RX ORDER — DULOXETIN HYDROCHLORIDE 60 MG/1
60 CAPSULE, DELAYED RELEASE ORAL 2 TIMES DAILY
Qty: 60 CAPSULE | Refills: 11 | Status: SHIPPED | OUTPATIENT
Start: 2017-08-29 | End: 2018-11-19 | Stop reason: SDUPTHER

## 2017-08-29 RX ORDER — RAMELTEON 8 MG/1
8 TABLET ORAL NIGHTLY PRN
Qty: 30 TABLET | Refills: 1 | Status: SHIPPED | OUTPATIENT
Start: 2017-08-29 | End: 2017-09-14

## 2017-08-29 RX ORDER — LEVOTHYROXINE SODIUM 88 UG/1
TABLET ORAL
Qty: 90 TABLET | Refills: 3 | Status: SHIPPED | OUTPATIENT
Start: 2017-08-29 | End: 2018-10-18 | Stop reason: SDUPTHER

## 2017-08-29 RX ORDER — FUROSEMIDE 40 MG/1
40 TABLET ORAL DAILY
Qty: 30 TABLET | Refills: 5 | Status: SHIPPED | OUTPATIENT
Start: 2017-08-29 | End: 2018-04-04 | Stop reason: SDUPTHER

## 2017-08-29 RX ORDER — NYSTATIN 100000 U/G
OINTMENT TOPICAL
Qty: 30 G | Refills: 2 | Status: SHIPPED | OUTPATIENT
Start: 2017-08-29 | End: 2018-03-05 | Stop reason: SDUPTHER

## 2017-08-29 ASSESSMENT — ENCOUNTER SYMPTOMS
WHEEZING: 0
TROUBLE SWALLOWING: 0
COUGH: 0
SORE THROAT: 0
ABDOMINAL PAIN: 0
BACK PAIN: 0
EYES NEGATIVE: 1
SINUS PRESSURE: 0
SHORTNESS OF BREATH: 0

## 2017-08-31 ENCOUNTER — TELEPHONE (OUTPATIENT)
Dept: PRIMARY CARE CLINIC | Age: 57
End: 2017-08-31

## 2017-09-14 ENCOUNTER — OFFICE VISIT (OUTPATIENT)
Dept: PRIMARY CARE CLINIC | Age: 57
End: 2017-09-14
Payer: MEDICARE

## 2017-09-14 VITALS
TEMPERATURE: 97.5 F | BODY MASS INDEX: 41.86 KG/M2 | WEIGHT: 221.75 LBS | SYSTOLIC BLOOD PRESSURE: 100 MMHG | HEIGHT: 61 IN | HEART RATE: 61 BPM | DIASTOLIC BLOOD PRESSURE: 80 MMHG | OXYGEN SATURATION: 98 %

## 2017-09-14 DIAGNOSIS — Z23 NEED FOR VACCINATION FOR STREP PNEUMONIAE: ICD-10-CM

## 2017-09-14 DIAGNOSIS — E11.42 TYPE 2 DIABETES MELLITUS WITH DIABETIC POLYNEUROPATHY, WITH LONG-TERM CURRENT USE OF INSULIN (HCC): ICD-10-CM

## 2017-09-14 DIAGNOSIS — Z79.4 TYPE 2 DIABETES MELLITUS WITH HYPERGLYCEMIA, WITH LONG-TERM CURRENT USE OF INSULIN (HCC): Primary | ICD-10-CM

## 2017-09-14 DIAGNOSIS — Z79.4 TYPE 2 DIABETES MELLITUS WITH DIABETIC POLYNEUROPATHY, WITH LONG-TERM CURRENT USE OF INSULIN (HCC): ICD-10-CM

## 2017-09-14 DIAGNOSIS — E11.65 TYPE 2 DIABETES MELLITUS WITH HYPERGLYCEMIA, WITH LONG-TERM CURRENT USE OF INSULIN (HCC): Primary | ICD-10-CM

## 2017-09-14 DIAGNOSIS — G47.01 INSOMNIA DUE TO MEDICAL CONDITION: ICD-10-CM

## 2017-09-14 PROCEDURE — G8427 DOCREV CUR MEDS BY ELIG CLIN: HCPCS | Performed by: NURSE PRACTITIONER

## 2017-09-14 PROCEDURE — G0009 ADMIN PNEUMOCOCCAL VACCINE: HCPCS | Performed by: NURSE PRACTITIONER

## 2017-09-14 PROCEDURE — 1036F TOBACCO NON-USER: CPT | Performed by: NURSE PRACTITIONER

## 2017-09-14 PROCEDURE — 3017F COLORECTAL CA SCREEN DOC REV: CPT | Performed by: NURSE PRACTITIONER

## 2017-09-14 PROCEDURE — 90670 PCV13 VACCINE IM: CPT | Performed by: NURSE PRACTITIONER

## 2017-09-14 PROCEDURE — 99213 OFFICE O/P EST LOW 20 MIN: CPT | Performed by: NURSE PRACTITIONER

## 2017-09-14 PROCEDURE — 3014F SCREEN MAMMO DOC REV: CPT | Performed by: NURSE PRACTITIONER

## 2017-09-14 PROCEDURE — G8598 ASA/ANTIPLAT THER USED: HCPCS | Performed by: NURSE PRACTITIONER

## 2017-09-14 PROCEDURE — G8417 CALC BMI ABV UP PARAM F/U: HCPCS | Performed by: NURSE PRACTITIONER

## 2017-09-14 PROCEDURE — 3046F HEMOGLOBIN A1C LEVEL >9.0%: CPT | Performed by: NURSE PRACTITIONER

## 2017-09-14 RX ORDER — TEMAZEPAM 15 MG/1
15 CAPSULE ORAL NIGHTLY PRN
Qty: 30 CAPSULE | Refills: 0 | Status: SHIPPED | OUTPATIENT
Start: 2017-09-14 | End: 2017-10-05 | Stop reason: SDUPTHER

## 2017-09-14 ASSESSMENT — ENCOUNTER SYMPTOMS
BACK PAIN: 0
EYES NEGATIVE: 1
SINUS PRESSURE: 0
SORE THROAT: 0
COUGH: 0
TROUBLE SWALLOWING: 0
ABDOMINAL PAIN: 0
SHORTNESS OF BREATH: 0
WHEEZING: 0

## 2017-10-05 ENCOUNTER — OFFICE VISIT (OUTPATIENT)
Dept: PRIMARY CARE CLINIC | Age: 57
End: 2017-10-05
Payer: MEDICARE

## 2017-10-05 VITALS
SYSTOLIC BLOOD PRESSURE: 114 MMHG | BODY MASS INDEX: 42.29 KG/M2 | HEIGHT: 61 IN | TEMPERATURE: 98 F | WEIGHT: 224 LBS | DIASTOLIC BLOOD PRESSURE: 86 MMHG | OXYGEN SATURATION: 96 % | HEART RATE: 73 BPM

## 2017-10-05 DIAGNOSIS — G47.01 INSOMNIA DUE TO MEDICAL CONDITION: Primary | ICD-10-CM

## 2017-10-05 DIAGNOSIS — F51.5 NIGHTMARES: ICD-10-CM

## 2017-10-05 DIAGNOSIS — Z23 NEED FOR INFLUENZA VACCINATION: ICD-10-CM

## 2017-10-05 DIAGNOSIS — F43.10 PTSD (POST-TRAUMATIC STRESS DISORDER): ICD-10-CM

## 2017-10-05 PROCEDURE — G8598 ASA/ANTIPLAT THER USED: HCPCS | Performed by: NURSE PRACTITIONER

## 2017-10-05 PROCEDURE — 99214 OFFICE O/P EST MOD 30 MIN: CPT | Performed by: NURSE PRACTITIONER

## 2017-10-05 PROCEDURE — 3017F COLORECTAL CA SCREEN DOC REV: CPT | Performed by: NURSE PRACTITIONER

## 2017-10-05 PROCEDURE — G8484 FLU IMMUNIZE NO ADMIN: HCPCS | Performed by: NURSE PRACTITIONER

## 2017-10-05 PROCEDURE — G8427 DOCREV CUR MEDS BY ELIG CLIN: HCPCS | Performed by: NURSE PRACTITIONER

## 2017-10-05 PROCEDURE — 1036F TOBACCO NON-USER: CPT | Performed by: NURSE PRACTITIONER

## 2017-10-05 PROCEDURE — 3014F SCREEN MAMMO DOC REV: CPT | Performed by: NURSE PRACTITIONER

## 2017-10-05 PROCEDURE — G0008 ADMIN INFLUENZA VIRUS VAC: HCPCS | Performed by: NURSE PRACTITIONER

## 2017-10-05 PROCEDURE — 90686 IIV4 VACC NO PRSV 0.5 ML IM: CPT | Performed by: NURSE PRACTITIONER

## 2017-10-05 PROCEDURE — G8417 CALC BMI ABV UP PARAM F/U: HCPCS | Performed by: NURSE PRACTITIONER

## 2017-10-05 RX ORDER — PRAZOSIN HYDROCHLORIDE 1 MG/1
1 CAPSULE ORAL NIGHTLY
Qty: 30 CAPSULE | Refills: 5 | Status: SHIPPED | OUTPATIENT
Start: 2017-10-05 | End: 2017-12-04 | Stop reason: SDUPTHER

## 2017-10-05 RX ORDER — TEMAZEPAM 30 MG/1
30 CAPSULE ORAL NIGHTLY PRN
Qty: 30 CAPSULE | Refills: 0 | Status: SHIPPED | OUTPATIENT
Start: 2017-10-05 | End: 2017-11-06 | Stop reason: DRUGHIGH

## 2017-10-05 ASSESSMENT — ENCOUNTER SYMPTOMS
COUGH: 0
BACK PAIN: 0
SINUS PRESSURE: 0
WHEEZING: 0
ABDOMINAL PAIN: 0
TROUBLE SWALLOWING: 0
SORE THROAT: 0
EYES NEGATIVE: 1
SHORTNESS OF BREATH: 0

## 2017-10-05 NOTE — PATIENT INSTRUCTIONS
should you call for help? Watch closely for changes in your health, and be sure to contact your doctor if:  · Your efforts to improve your sleep do not work. · Your insomnia gets worse. · You have been feeling down, depressed, or hopeless or have lost interest in things that you usually enjoy. Where can you learn more? Go to [email or web address] and sign in to your Cheggin account. Enter P513 in the FusionAds box to learn more about \"Insomnia: Care Instructions. \"     If you do not have an account, please click on the \"Sign Up Now\" link. Current as of: July 26, 2016  Content Version: 11.3  © 1846-9323 SMART. Care instructions adapted under license by [facility] ([facility]). If you have questions about a medical condition or this instruction, always ask your healthcare professional. Nicolette Jocy any warranty or liability for your use of this information. Learning About Sleeping Well  What does sleeping well mean? Sleeping well means getting enough sleep. How much sleep is enough varies among people. The number of hours you sleep is not as important as how you feel when you wake up. If you do not feel refreshed, you probably need more sleep. Another sign of not getting enough sleep is feeling tired during the day. The average total nightly sleep time is 7½ to 8 hours. Healthy adults may need a little more or a little less than this. Why is getting enough sleep important? Getting enough quality sleep is a basic part of good health. When your sleep suffers, your mood and your thoughts can suffer too. You may find yourself feeling more grumpy or stressed. Not getting enough sleep also can lead to serious problems, including injury, accidents, anxiety, and depression. What might cause poor sleeping? Many things can cause sleep problems, including:  · Stress. Stress can be caused by fear about a single event, such as giving a speech.  Or you may have ongoing stress, such as worry about work or school. · Depression, anxiety, and other mental or emotional conditions. · Changes in your sleep habits or surroundings. This includes changes that happen where you sleep, such as noise, light, or sleeping in a different bed. It also includes changes in your sleep pattern, such as having jet lag or working a late shift. · Health problems, such as pain, breathing problems, and restless legs syndrome. · Lack of regular exercise. How can you help yourself? Here are some tips that may help you sleep more soundly and wake up feeling more refreshed. Your sleeping area  · Use your bedroom only for sleeping and sex. A bit of light reading may help you fall asleep. But if it doesn't, do your reading elsewhere in the house. Don't watch TV in bed. · Be sure your bed is big enough to stretch out comfortably, especially if you have a sleep partner. · Keep your bedroom quiet, dark, and cool. Use curtains, blinds, or a sleep mask to block out light. To block out noise, use earplugs, soothing music, or a \"white noise\" machine. Your evening and bedtime routine  · Create a relaxing bedtime routine. You might want to take a warm shower or bath, listen to soothing music, or drink a cup of noncaffeinated tea. · Go to bed at the same time every night. And get up at the same time every morning, even if you feel tired. What to avoid  · Limit caffeine (coffee, tea, caffeinated sodas) during the day, and don't have any for at least 4 to 6 hours before bedtime. · Don't drink alcohol before bedtime. Alcohol can cause you to wake up more often during the night. · Don't smoke or use tobacco, especially in the evening. Nicotine can keep you awake. · Don't take naps during the day, especially close to bedtime. · Don't lie in bed awake for too long.  If you can't fall asleep, or if you wake up in the middle of the night and can't get back to sleep within 15 minutes or so, get out of bed and go to another room until you feel sleepy. · Don't take medicine right before bed that may keep you awake or make you feel hyper or energized. Your doctor can tell you if your medicine may do this and if you can take it earlier in the day. If you can't sleep  · Imagine yourself in a peaceful, pleasant scene. Focus on the details and feelings of being in a place that is relaxing. · Get up and do a quiet or boring activity until you feel sleepy. · Don't drink any liquids after 6 p.m. if you wake up often because you have to go to the bathroom. Where can you learn more? Go to [email or web address] and sign in to your [employer] account. Enter X457 in the Z80 Labs Technology [employer]ubator box to learn more about \"Learning About Sleeping Well. \"     If you do not have an account, please click on the \"Sign Up Now\" link. Current as of: July 26, 2016  Content Version: 11.3  © 4617-8044 LendInvest, Incorporated. Care instructions adapted under license by [facility] ([facility]). If you have questions about a medical condition or this instruction, always ask your healthcare professional. [place] [address] any warranty or liability for your use of this information.

## 2017-10-05 NOTE — PROGRESS NOTES
06/23/2017 0.5     Alkaline Phosphatase 06/23/2017 121*    ALT 06/23/2017 20     AST 06/23/2017 44*    Pro-BNP 06/23/2017 787     Protime 06/23/2017 13.4     INR 06/23/2017 1.03     Color, UA 06/23/2017 YELLOW     Clarity, UA 06/23/2017 Clear     Glucose, Ur 06/23/2017 Negative     Bilirubin Urine 06/23/2017 Negative     Ketones, Urine 06/23/2017 Negative     Specific Gravity, UA 06/23/2017 1.008     Blood, Urine 06/23/2017 Negative     pH, UA 06/23/2017 5.5     Protein, UA 06/23/2017 Negative     Urobilinogen, Urine 06/23/2017 0.2     Nitrite, Urine 06/23/2017 Negative     Leukocyte Esterase, Urine 06/23/2017 TRACE*    P-R Interval 06/23/2017 160     QRS Duration 06/23/2017 86     Q-T Interval 06/23/2017 398     QTc Calculation (Bazett) 06/23/2017 424     P Axis 06/23/2017 42     T Axis 06/23/2017 29     POC Troponin I 06/23/2017 0.01     Performed on 06/23/2017 i-Stat     Urine Culture, Routine 06/23/2017 <50,000 CFU/ml*    Organism 06/23/2017 Escherichia coli*    Urine Culture, Routine 06/23/2017 Light growth     Organism 06/23/2017 Enterococcus species*    Urine Culture, Routine 06/23/2017 Light growth     Casts 06/23/2017 0-1 Hyaline*    WBC, UA 06/23/2017 0-1     Epi Cells 06/23/2017 0-2     Bacteria, UA 06/23/2017 trace*   Admission on 06/16/2017, Discharged on 06/19/2017   Component Date Value    ABO/Rh 06/16/2017 O NEG     Antibody Screen 06/16/2017 NEG     POC Glucose 06/16/2017 149*    Performed on 06/16/2017 AccuChek     POC Glucose 06/16/2017 303*    Performed on 06/16/2017 AccuChek     WBC 06/17/2017 12.2*    RBC 06/17/2017 3.39*    Hemoglobin 06/17/2017 9.8*    Hematocrit 06/17/2017 31.2*    MCV 06/17/2017 92.0     MCH 06/17/2017 28.9     MCHC 06/17/2017 31.4*    RDW 06/17/2017 17.3*    Platelets 26/66/4055 187     MPV 06/17/2017 11.8     POC Glucose 06/16/2017 388*    Performed on 06/16/2017 AccuChek     POC Glucose 06/17/2017 303*    Performed Orders Only on 06/05/2017   Component Date Value    WBC 06/05/2017 8.2     RBC 06/05/2017 4.56     Hemoglobin 06/05/2017 13.1     Hematocrit 06/05/2017 40.9     MCV 06/05/2017 89.7     MCH 06/05/2017 28.7     MCHC 06/05/2017 32.0*    RDW 06/05/2017 18.4*    Platelets 98/78/9952 271     MPV 06/05/2017 12.4*    Neutrophils % 06/05/2017 74.1*    Lymphocytes % 06/05/2017 14.4*    Monocytes % 06/05/2017 8.8     Eosinophils % 06/05/2017 1.6     Basophils % 06/05/2017 0.6     Neutrophils # 06/05/2017 6.1     Lymphocytes # 06/05/2017 1.2     Monocytes # 06/05/2017 0.70     Eosinophils # 06/05/2017 0.10     Basophils # 06/05/2017 0.10     Sodium 06/05/2017 143     Potassium 06/05/2017 4.7     Chloride 06/05/2017 106     CO2 06/05/2017 20*    Anion Gap 06/05/2017 17     Glucose 06/05/2017 133*    BUN 06/05/2017 27*    CREATININE 06/05/2017 1.6*    GFR Non- 06/05/2017 33*    Calcium 06/05/2017 9.0     Total Protein 06/05/2017 7.2     Alb 06/05/2017 3.9     Total Bilirubin 06/05/2017 <0.2     Alkaline Phosphatase 06/05/2017 150*    ALT 06/05/2017 21     AST 06/05/2017 31     Protime 06/05/2017 13.5     INR 06/05/2017 1.04     ABO/Rh 06/05/2017 O NEG     Antibody Screen 06/05/2017 NEG, REPEAT IN GEL NEGATIVE     Antibody Screen 06/05/2017 NEG, SCREEN POS ON ECHO NEG GEL    Orders Only on 05/01/2017   Component Date Value    MICROALBUMIN, RANDOM URI* 05/01/2017 <1.20     Creatinine, Ur 05/01/2017 30.7     Microalb Creat Ratio 05/01/2017 see below    Orders Only on 04/25/2017   Component Date Value    WBC 04/25/2017 7.3     RBC 04/25/2017 4.53     Hemoglobin 04/25/2017 12.2     Hematocrit 04/25/2017 39.6     MCV 04/25/2017 87.4     MCH 04/25/2017 26.9*    MCHC 04/25/2017 30.8*    RDW 04/25/2017 21.3*    Platelets 57/48/3053 244     MPV 04/25/2017 12.0*    Neutrophils % 04/25/2017 64.1     Lymphocytes % 04/25/2017 24.6     Monocytes % 04/25/2017 7.7     UNIT/ML injection pen Inject 20 Units into the skin 3 times daily (before meals) INJECT THREE TIMES DAILY WITH MEALS BASED UPON RATIO AND CORRECTIVE FACTOR. AVERAGE 60 UNITS DAILY Yes TRESSA Bassett   nystatin (MYCOSTATIN) 859100 UNIT/GM ointment Apply topically 2 times daily.  Yes TRESSA Bassett   tiZANidine (ZANAFLEX) 4 MG tablet TAKE ONE TABLET BY MOUTH EVERY 8 HOURS AS NEEDED FOR MUSCLE SPASMS Yes LAZARO Arnold DO   aspirin 81 MG EC tablet Take 81 mg by mouth daily Yes Historical Provider, MD   QUEtiapine (SEROQUEL) 300 MG tablet Take 1.5 tablets by mouth nightly  Patient taking differently: Take 300 mg by mouth nightly Indications: Manic-Depression  Yes TRESSA Bassett   metoprolol succinate (TOPROL XL) 50 MG extended release tablet Take 1 tablet by mouth daily  Patient taking differently: Take 50 mg by mouth daily 6/27/17: Pt currently takes 1/2 tablet (25 mg) Yes LAZARO Arnold DO   pantoprazole (PROTONIX) 40 MG tablet Take 1 tablet by mouth daily Yes LAZARO Arnold DO   linaclotide (LINZESS) 290 MCG CAPS capsule Take 1 capsule by mouth every morning (before breakfast) Yes LAZARO Arnold DO   pregabalin (LYRICA) 150 MG capsule Take 1 capsule by mouth 2 times daily Yes TRESSA Bassett   atorvastatin (LIPITOR) 40 MG tablet Take 1 tablet by mouth daily Yes LAZARO Arnold DO   valsartan (DIOVAN) 160 MG tablet Take 1 tablet by mouth daily  Patient taking differently: Take 160 mg by mouth daily 6/27/17: Pt holding med presently Yes TRESSA Bassett   fluticasone (FLONASE) 50 MCG/ACT nasal spray USE TWO SPRAY(S) IN EACH NOSTRIL ONCE DAILY Yes LAZARO Arnold DO   Docusate Sodium (STOOL SOFTENER) 100 MG TABS Take 2 capsules by mouth daily  Yes Historical Provider, MD   ergocalciferol (ERGOCALCIFEROL) 76668 UNITS capsule Take 50,000 Units by mouth once a week Indications: patient takes 10,000 units once a day for 5 days,  skipping weekends, diaphoresis, fatigue and fever. HENT: Negative for congestion, ear discharge, postnasal drip, sinus pressure, sore throat and trouble swallowing. Eyes: Negative. Respiratory: Negative for cough, shortness of breath and wheezing. Cardiovascular: Negative for chest pain, palpitations and leg swelling. Gastrointestinal: Negative for abdominal pain. Endocrine: Negative for polydipsia, polyphagia and polyuria. Genitourinary: Negative for difficulty urinating. Musculoskeletal: Negative for arthralgias and back pain. Skin: Negative for rash. Neurological: Negative for headaches. Psychiatric/Behavioral: Positive for sleep disturbance (will do nightmare medication and restoril). Negative for behavioral problems and suicidal ideas. The patient is nervous/anxious. With nightmares and fear           Physical Exam   Constitutional: She is oriented to person, place, and time. She appears well-developed and well-nourished. No distress. HENT:   Head: Normocephalic. Right Ear: External ear normal.   Left Ear: External ear normal.   Mouth/Throat: No oropharyngeal exudate. Eyes: Pupils are equal, round, and reactive to light. Right eye exhibits no discharge. Left eye exhibits no discharge. Neck: Normal range of motion. Cardiovascular: Normal rate, regular rhythm, normal heart sounds and intact distal pulses. No murmur heard. Pulmonary/Chest: Effort normal and breath sounds normal. No respiratory distress. She has no wheezes. Abdominal: Soft. Bowel sounds are normal. She exhibits no distension. Musculoskeletal: Normal range of motion. Lymphadenopathy:     She has no cervical adenopathy. Neurological: She is alert and oriented to person, place, and time. No cranial nerve deficit. Skin: Skin is warm and dry. No rash noted. She is not diaphoretic. Psychiatric: She has a normal mood and affect. Her behavior is normal.   Vitals reviewed. ASSESSMENT/ PLAN    1.  PTSD doctor if you think you are having a problem with your medicine. · Go to your counseling sessions and follow-up appointments. · Recognize and accept your anxiety. Then, when you are in a situation that makes you anxious, say to yourself, \"This is not an emergency. I feel uncomfortable, but I am not in danger. I can keep going even if I feel anxious. \"  · Be kind to your body:  ¨ Relieve tension with exercise or a massage. ¨ Get enough rest.  ¨ Avoid alcohol, caffeine, nicotine, and illegal drugs. They can increase your anxiety level and cause sleep problems. ¨ Learn and do relaxation techniques. See below for more about these techniques. · Engage your mind. Get out and do something you enjoy. Go to a funny movie, or take a walk or hike. Plan your day. Having too much or too little to do can make you anxious. · Keep a record of your symptoms. Discuss your fears with a good friend or family member, or join a support group for people with similar problems. Talking to others sometimes relieves stress. · Get involved in social groups, or volunteer to help others. Being alone sometimes makes things seem worse than they are. · Get at least 30 minutes of exercise on most days of the week. Walking is a good choice. You also may want to do other activities, such as running, swimming, cycling, or playing tennis or team sports. · Keep the numbers for these national suicide hotlines: 6-933-213-TALK (7-986.152.4652) and 9-351-MSFWARB (7-997.266.1033). If you or someone you know talks about suicide or feeling hopeless, get help right away. Relaxation techniques  Do relaxation exercises 10 to 20 minutes a day. You can play soothing, relaxing music while you do them, if you wish. · Tell others in your house that you are going to do your relaxation exercises. Ask them not to disturb you. · Find a comfortable place, away from all distractions and noise. · Lie down on your back, or sit with your back straight.   · Focus on please click on the \"Sign Up Now\" link. Current as of: July 26, 2016  Content Version: 11.3  © 8159-2797 "[employer]". Care instructions adapted under license by [facility] ([facility]). If you have questions about a medical condition or this instruction, always ask your healthcare professional. [address] any warranty or liability for your use of this information. Insomnia: Care Instructions  Your Care Instructions  Insomnia is the inability to sleep well. It is a common problem for most people at some time. Insomnia may make it hard for you to get to sleep, stay asleep, or sleep as long as you need to. This can make you tired and grouchy during the day. It can also make you forgetful, less effective at work, and unhappy. Insomnia can be caused by conditions such as depression or anxiety. Pain can also affect your ability to sleep. When these problems are solved, the insomnia usually clears up. But sometimes bad sleep habits can cause insomnia. If insomnia is affecting your work or your enjoyment of life, you can take steps to improve your sleep. Follow-up care is a key part of your treatment and safety. Be sure to make and go to all appointments, and call your doctor if you are having problems. It's also a good idea to know your test results and keep a list of the medicines you take. How can you care for yourself at home? What to avoid  · Do not have drinks with caffeine, such as coffee or black tea, for 8 hours before bed. · Do not smoke or use other types of tobacco near bedtime. Nicotine is a stimulant and can keep you awake. · Avoid drinking alcohol late in the evening, because it can cause you to wake in the middle of the night. · Do not eat a big meal close to bedtime. If you are hungry, eat a light snack. · Do not drink a lot of water close to bedtime, because the need to urinate may wake you up during the night. · Do not read or watch TV in bed.  Use the bed only mean?  Sleeping well means getting enough sleep. How much sleep is enough varies among people. The number of hours you sleep is not as important as how you feel when you wake up. If you do not feel refreshed, you probably need more sleep. Another sign of not getting enough sleep is feeling tired during the day. The average total nightly sleep time is 7½ to 8 hours. Healthy adults may need a little more or a little less than this. Why is getting enough sleep important? Getting enough quality sleep is a basic part of good health. When your sleep suffers, your mood and your thoughts can suffer too. You may find yourself feeling more grumpy or stressed. Not getting enough sleep also can lead to serious problems, including injury, accidents, anxiety, and depression. What might cause poor sleeping? Many things can cause sleep problems, including:  · Stress. Stress can be caused by fear about a single event, such as giving a speech. Or you may have ongoing stress, such as worry about work or school. · Depression, anxiety, and other mental or emotional conditions. · Changes in your sleep habits or surroundings. This includes changes that happen where you sleep, such as noise, light, or sleeping in a different bed. It also includes changes in your sleep pattern, such as having jet lag or working a late shift. · Health problems, such as pain, breathing problems, and restless legs syndrome. · Lack of regular exercise. How can you help yourself? Here are some tips that may help you sleep more soundly and wake up feeling more refreshed. Your sleeping area  · Use your bedroom only for sleeping and sex. A bit of light reading may help you fall asleep. But if it doesn't, do your reading elsewhere in the house. Don't watch TV in bed. · Be sure your bed is big enough to stretch out comfortably, especially if you have a sleep partner. · Keep your bedroom quiet, dark, and cool.  Use curtains, blinds, or a sleep mask to block out light. To block out noise, use earplugs, soothing music, or a \"white noise\" machine. Your evening and bedtime routine  · Create a relaxing bedtime routine. You might want to take a warm shower or bath, listen to soothing music, or drink a cup of noncaffeinated tea. · Go to bed at the same time every night. And get up at the same time every morning, even if you feel tired. What to avoid  · Limit caffeine (coffee, tea, caffeinated sodas) during the day, and don't have any for at least 4 to 6 hours before bedtime. · Don't drink alcohol before bedtime. Alcohol can cause you to wake up more often during the night. · Don't smoke or use tobacco, especially in the evening. Nicotine can keep you awake. · Don't take naps during the day, especially close to bedtime. · Don't lie in bed awake for too long. If you can't fall asleep, or if you wake up in the middle of the night and can't get back to sleep within 15 minutes or so, get out of bed and go to another room until you feel sleepy. · Don't take medicine right before bed that may keep you awake or make you feel hyper or energized. Your doctor can tell you if your medicine may do this and if you can take it earlier in the day. If you can't sleep  · Imagine yourself in a peaceful, pleasant scene. Focus on the details and feelings of being in a place that is relaxing. · Get up and do a quiet or boring activity until you feel sleepy. · Don't drink any liquids after 6 p.m. if you wake up often because you have to go to the bathroom. Where can you learn more? Go to [email or web address] and sign in to your GTx account. Enter Z233 in the ClickMechanic box to learn more about \"Learning About Sleeping Well. \"     If you do not have an account, please click on the \"Sign Up Now\" link. Current as of: July 26, 2016  Content Version: 11.3  © 5326-9754 [employer]Vivio[employer], Incorporated.  Care instructions adapted under license by [facility]. If you have questions about a medical condition or this instruction, always ask your healthcare professional. [place] [address] any warranty or liability for your use of this information. Controlled Substances Monitoring: Additional Instructions: As always, patient is advised to bring in medication bottles in order to correctly reconcile with our current list.      Collin Kay received counseling on the following healthy behaviors: none    Patient given educational materials on plan of care    I have instructed Mitch to complete a self tracking handout on none and instructed them to bring it with them to her next appointment. Discussed use, benefit, and side effects of prescribed medications. Barriers to medication compliance addressed. All patient questions answered. Pt voiced understanding.      Rubye Gottron, APRN

## 2017-10-05 NOTE — MR AVS SNAPSHOT
¨ Get enough rest.  ¨ Avoid alcohol, caffeine, nicotine, and illegal drugs. They can increase your anxiety level and cause sleep problems. ¨ Learn and do relaxation techniques. See below for more about these techniques. · Engage your mind. Get out and do something you enjoy. Go to a funny movie, or take a walk or hike. Plan your day. Having too much or too little to do can make you anxious. · Keep a record of your symptoms. Discuss your fears with a good friend or family member, or join a support group for people with similar problems. Talking to others sometimes relieves stress. · Get involved in social groups, or volunteer to help others. Being alone sometimes makes things seem worse than they are. · Get at least 30 minutes of exercise on most days of the week. Walking is a good choice. You also may want to do other activities, such as running, swimming, cycling, or playing tennis or team sports. · Keep the numbers for these national suicide hotlines: 3-004-135-TALK (3-106.382.3206) and 0-870-NYPROKT (9-338.785.4107). If you or someone you know talks about suicide or feeling hopeless, get help right away. Relaxation techniques  Do relaxation exercises 10 to 20 minutes a day. You can play soothing, relaxing music while you do them, if you wish. · Tell others in your house that you are going to do your relaxation exercises. Ask them not to disturb you. · Find a comfortable place, away from all distractions and noise. · Lie down on your back, or sit with your back straight. · Focus on your breathing. Make it slow and steady. · Breathe in through your nose. Breathe out through either your nose or mouth. · Breathe deeply, filling up the area between your navel and your rib cage. Breathe so that your belly goes up and down. · Do not hold your breath. · Breathe like this for 5 to 10 minutes. Notice the feeling of calmness throughout your whole body. Incorporated disclaims any warranty or liability for your use of this information. Insomnia: Care Instructions  Your Care Instructions  Insomnia is the inability to sleep well. It is a common problem for most people at some time. Insomnia may make it hard for you to get to sleep, stay asleep, or sleep as long as you need to. This can make you tired and grouchy during the day. It can also make you forgetful, less effective at work, and unhappy. Insomnia can be caused by conditions such as depression or anxiety. Pain can also affect your ability to sleep. When these problems are solved, the insomnia usually clears up. But sometimes bad sleep habits can cause insomnia. If insomnia is affecting your work or your enjoyment of life, you can take steps to improve your sleep. Follow-up care is a key part of your treatment and safety. Be sure to make and go to all appointments, and call your doctor if you are having problems. It's also a good idea to know your test results and keep a list of the medicines you take. How can you care for yourself at home? What to avoid  · Do not have drinks with caffeine, such as coffee or black tea, for 8 hours before bed. · Do not smoke or use other types of tobacco near bedtime. Nicotine is a stimulant and can keep you awake. · Avoid drinking alcohol late in the evening, because it can cause you to wake in the middle of the night. · Do not eat a big meal close to bedtime. If you are hungry, eat a light snack. · Do not drink a lot of water close to bedtime, because the need to urinate may wake you up during the night. · Do not read or watch TV in bed. Use the bed only for sleeping and sexual activity. What to try  · Go to bed at the same time every night, and wake up at the same time every morning. Do not take naps during the day. · Keep your bedroom quiet, dark, and cool. · Sleep on a comfortable pillow and mattress. or bath, listen to soothing music, or drink a cup of noncaffeinated tea. · Go to bed at the same time every night. And get up at the same time every morning, even if you feel tired. What to avoid  · Limit caffeine (coffee, tea, caffeinated sodas) during the day, and don't have any for at least 4 to 6 hours before bedtime. · Don't drink alcohol before bedtime. Alcohol can cause you to wake up more often during the night. · Don't smoke or use tobacco, especially in the evening. Nicotine can keep you awake. · Don't take naps during the day, especially close to bedtime. · Don't lie in bed awake for too long. If you can't fall asleep, or if you wake up in the middle of the night and can't get back to sleep within 15 minutes or so, get out of bed and go to another room until you feel sleepy. · Don't take medicine right before bed that may keep you awake or make you feel hyper or energized. Your doctor can tell you if your medicine may do this and if you can take it earlier in the day. If you can't sleep  · Imagine yourself in a peaceful, pleasant scene. Focus on the details and feelings of being in a place that is relaxing. · Get up and do a quiet or boring activity until you feel sleepy. · Don't drink any liquids after 6 p.m. if you wake up often because you have to go to the bathroom. Where can you learn more? Go to [email or web address] and sign in to your [employer] account. Enter Q533 in the MocoSpace box to learn more about \"Learning About Sleeping Well. \"     If you do not have an account, please click on the \"Sign Up Now\" link. Current as of: July 26, 2016  Content Version: 11.3  © 2253-5249 [employer]. Care instructions adapted under license by [facility] ([facility]).  If you have questions about a medical condition or this instruction, always ask your healthcare professional. [place] [address] any warranty or liability for your use of this information. Today's Medication Changes          These changes are accurate as of: 10/5/17 10:58 AM.  If you have any questions, ask your nurse or doctor. START taking these medications           prazosin 1 MG capsule   Commonly known as:  MINIPRESS   Instructions: Take 1 capsule by mouth nightly   Quantity:  30 capsule   Refills:  5   Started by:  TRESSA Worley         CHANGE how you take these medications           temazepam 30 MG capsule   Commonly known as:  RESTORIL   Instructions: Take 1 capsule by mouth nightly as needed for Sleep   Quantity:  30 capsule   Refills:  0   What changed:    - medication strength  - how much to take   Changed by:  TRESSA Worley            Where to Get Your Medications      You can get these medications from any pharmacy     Bring a paper prescription for each of these medications     prazosin 1 MG capsule    temazepam 30 MG capsule               Your Current Medications Are              temazepam (RESTORIL) 30 MG capsule Take 1 capsule by mouth nightly as needed for Sleep    prazosin (MINIPRESS) 1 MG capsule Take 1 capsule by mouth nightly    insulin glargine (LANTUS SOLOSTAR) 100 UNIT/ML injection pen Inject 22 Units into the skin nightly    levothyroxine (SYNTHROID) 88 MCG tablet TAKE ONE TABLET BY MOUTH ONCE DAILY    DULoxetine (CYMBALTA) 60 MG extended release capsule Take 1 capsule by mouth 2 times daily    furosemide (LASIX) 40 MG tablet Take 1 tablet by mouth daily    insulin aspart (NOVOLOG FLEXPEN) 100 UNIT/ML injection pen Inject 20 Units into the skin 3 times daily (before meals) INJECT THREE TIMES DAILY WITH MEALS BASED UPON RATIO AND CORRECTIVE FACTOR. AVERAGE 60 UNITS DAILY    nystatin (MYCOSTATIN) 850090 UNIT/GM ointment Apply topically 2 times daily.     tiZANidine (ZANAFLEX) 4 MG tablet TAKE ONE TABLET BY MOUTH EVERY 8 HOURS AS NEEDED FOR MUSCLE SPASMS    aspirin 81 MG EC tablet Take 81 mg by mouth daily B12 deficiency    Cataract    DM (diabetes mellitus), type 2, uncontrolled ([facility] [address]    Bipolar disorder ([facility] [address]    Insomnia    Anxiety    Hypothyroidism    GERD (gastroesophageal reflux disease)    Insomnia    Obesity    Polyarticular arthritis      Your Goals as of 10/5/2017 at 10:58 AM                 Care Coordination    Self Monitoring     Notes    Self-Monitored Blood Glucose - I will check my blood sugar blood sugars ac and Other: 2 hours post meal  I will notify my provider of any trends of increasing or decreasing blood sugars over a 1 month period. I will notify my provider if I have any blood sugar readings less than 70 more than 2 times a month. Patient Reported Blood Glucose   Blood Glucose Tracker 1/26/2016 1/24/2016 1/23/2016 1/22/2016 1/21/2016   Before breakfast blood glucose 94 174 69 87 119   Insulin dose - Breakfast (No Data) - - - -   Insulin dose - Breakfast Novolog - - - -   Before lunch blood glucose 142 191 124 96 141   Insulin dose - Lunch (No Data) - - - -   Insulin dose - Lunch Novolog - - - -   Before dinner blood glucose - 60 106 110 164   Before bed blood glucose 98 - 72 115 129   Evening insulin dose 12 - - - -   Evening insulin dose Lantus - - - -       Barriers: lack of education  Plan for overcoming my barriers:   Pt is willing to start counting carbs per meal and keep a log / include amount of insulin taken   Pt will bring her glucometer into next appt for accuracy check  Confidence: 9/10  Anticipated Goal Completion Date: 9/19/17          Immunizations as of 10/5/2017     Name Date    Pneumococcal 13-valent Conjugate (Xusrzhd74) 9/14/2017    Pneumococcal Polysaccharide (Stzcberht52) 10/15/2015      Preventive Care        Date Due    Hepatitis C screening is recommended for all adults regardless of risk factors born between [facility] at least once (lifetime) who have never been tested.  1960    Tetanus Combination Vaccine (1 - Tdap) 5/12/1980 Eye Exam By An Eye Doctor 1/1/2013    Yearly Flu Vaccine (1) 9/1/2017    Pap Smear 2/9/2018    Colon Cancer Stool Test 3/14/2018    Cholesterol Screening 4/25/2018    Diabetic Foot Exam 5/22/2018    Hemoglobin A1C (Test For Long-Term Glucose Control) 8/29/2018    Mammograms are recommended every 2 years for low/average risk patients aged 48 - 69, and every year for high risk patients per updated national guidelines. However these guidelines can be individualized by your provider. 5/30/2019    Pneumococcal Vaccines (two) for adults aged 19-64  years who are immunocompromised or whose spleen is missing or not working  (3 of 3 - PPSV23) 10/15/2020            MyChart Signup           [employer]ient allows you to send messages to your doctor, view your test results, renew your prescriptions, schedule appointments, view visit notes, and more. How Do I Sign Up? 1. In your Internet browser, go to [email or web address]  2. Click on the Sign Up Now link in the Sign In box. You will see the New Member Sign Up page. 3. Enter your Owlient Access Code exactly as it appears below. You will not need to use this code after youve completed the sign-up process. If you do not sign up before the expiration date, you must request a new code. [employer]wlient Access Code: [email or web address]  Expires: 10/28/2017 12:24 PM    4. Enter your Social Security Number ([id number]) and Date of Birth (mm/dd/yyyy) as indicated and click Submit. You will be taken to the next sign-up page. 5. Create a Owlient ID. This will be your Owlient login ID and cannot be changed, so think of one that is secure and easy to remember. 6. Create a Owlient password. You can change your password at any time. 7. Enter your Password Reset Question and Answer. This can be used at a later time if you forget your password. 8. Enter your e-mail address. You will receive e-mail notification when new information is available in [address] 9. Click Sign Up. You can now view your medical record. Additional Information  If you have questions, please contact the physician practice where you receive care. Remember, [employer] is NOT to be used for urgent needs. For medical emergencies, dial 911. For questions regarding your [facility] account call [phone]. If you have a clinical question, please call your doctor's office.

## 2017-10-05 NOTE — PROGRESS NOTES
After obtaining consent, and per orders of Cheryle Starch, injection of influenza vaccine given in Left deltoid by Malena Side. Patient instructed to remain in clinic for 20 minutes afterwards, and to report any adverse reaction to me immediately.

## 2017-10-06 ENCOUNTER — TELEPHONE (OUTPATIENT)
Dept: PRIMARY CARE CLINIC | Age: 57
End: 2017-10-06

## 2017-11-03 DIAGNOSIS — I10 ESSENTIAL HYPERTENSION: ICD-10-CM

## 2017-11-03 DIAGNOSIS — E11.42 DIABETIC POLYNEUROPATHY ASSOCIATED WITH TYPE 2 DIABETES MELLITUS (HCC): ICD-10-CM

## 2017-11-03 RX ORDER — PREGABALIN 150 MG/1
150 CAPSULE ORAL 2 TIMES DAILY
Qty: 60 CAPSULE | Refills: 5 | Status: SHIPPED | OUTPATIENT
Start: 2017-11-03 | End: 2018-05-07 | Stop reason: SDUPTHER

## 2017-11-03 RX ORDER — VALSARTAN 160 MG/1
TABLET ORAL
Qty: 30 TABLET | Refills: 11 | Status: SHIPPED | OUTPATIENT
Start: 2017-11-03 | End: 2018-05-22 | Stop reason: SDUPTHER

## 2017-11-03 RX ORDER — FLUTICASONE PROPIONATE 50 MCG
SPRAY, SUSPENSION (ML) NASAL
Qty: 1 BOTTLE | Refills: 11 | Status: SHIPPED | OUTPATIENT
Start: 2017-11-03 | End: 2018-12-06 | Stop reason: SDUPTHER

## 2017-11-06 ENCOUNTER — OFFICE VISIT (OUTPATIENT)
Dept: PRIMARY CARE CLINIC | Age: 57
End: 2017-11-06
Payer: MEDICARE

## 2017-11-06 ENCOUNTER — CARE COORDINATOR VISIT (OUTPATIENT)
Dept: CARE COORDINATION | Age: 57
End: 2017-11-06

## 2017-11-06 VITALS
SYSTOLIC BLOOD PRESSURE: 100 MMHG | HEART RATE: 74 BPM | WEIGHT: 222.75 LBS | DIASTOLIC BLOOD PRESSURE: 82 MMHG | HEIGHT: 61 IN | TEMPERATURE: 98.6 F | OXYGEN SATURATION: 98 % | BODY MASS INDEX: 42.06 KG/M2

## 2017-11-06 DIAGNOSIS — I10 ESSENTIAL HYPERTENSION: ICD-10-CM

## 2017-11-06 DIAGNOSIS — Z79.4 TYPE 2 DIABETES MELLITUS WITH DIABETIC POLYNEUROPATHY, WITH LONG-TERM CURRENT USE OF INSULIN (HCC): ICD-10-CM

## 2017-11-06 DIAGNOSIS — E11.42 TYPE 2 DIABETES MELLITUS WITH DIABETIC POLYNEUROPATHY, WITH LONG-TERM CURRENT USE OF INSULIN (HCC): Primary | ICD-10-CM

## 2017-11-06 DIAGNOSIS — E11.42 TYPE 2 DIABETES MELLITUS WITH DIABETIC POLYNEUROPATHY, WITH LONG-TERM CURRENT USE OF INSULIN (HCC): ICD-10-CM

## 2017-11-06 DIAGNOSIS — Z79.4 TYPE 2 DIABETES MELLITUS WITH DIABETIC POLYNEUROPATHY, WITH LONG-TERM CURRENT USE OF INSULIN (HCC): Primary | ICD-10-CM

## 2017-11-06 DIAGNOSIS — G47.01 INSOMNIA DUE TO MEDICAL CONDITION: ICD-10-CM

## 2017-11-06 DIAGNOSIS — F43.10 PTSD (POST-TRAUMATIC STRESS DISORDER): ICD-10-CM

## 2017-11-06 LAB
ALBUMIN SERPL-MCNC: 4.1 G/DL (ref 3.5–5.2)
ALP BLD-CCNC: 150 U/L (ref 35–104)
ALT SERPL-CCNC: 19 U/L (ref 5–33)
ANION GAP SERPL CALCULATED.3IONS-SCNC: 15 MMOL/L (ref 7–19)
AST SERPL-CCNC: 36 U/L (ref 5–32)
BASOPHILS ABSOLUTE: 0.1 K/UL (ref 0–0.2)
BASOPHILS RELATIVE PERCENT: 0.8 % (ref 0–1)
BILIRUB SERPL-MCNC: 0.4 MG/DL (ref 0.2–1.2)
BUN BLDV-MCNC: 23 MG/DL (ref 6–20)
CALCIUM SERPL-MCNC: 9.2 MG/DL (ref 8.6–10)
CHLORIDE BLD-SCNC: 98 MMOL/L (ref 98–111)
CO2: 25 MMOL/L (ref 22–29)
CREAT SERPL-MCNC: 1.5 MG/DL (ref 0.5–0.9)
CREATININE URINE: 33.5 MG/DL (ref 4.2–622)
EOSINOPHILS ABSOLUTE: 0.2 K/UL (ref 0–0.6)
EOSINOPHILS RELATIVE PERCENT: 2.1 % (ref 0–5)
GFR NON-AFRICAN AMERICAN: 36
GLUCOSE BLD-MCNC: 102 MG/DL (ref 74–109)
HBA1C MFR BLD: 5.9 %
HCT VFR BLD CALC: 42.7 % (ref 37–47)
HEMOGLOBIN: 13.8 G/DL (ref 12–16)
LYMPHOCYTES ABSOLUTE: 2.2 K/UL (ref 1.1–4.5)
LYMPHOCYTES RELATIVE PERCENT: 25.3 % (ref 20–40)
MCH RBC QN AUTO: 31.1 PG (ref 27–31)
MCHC RBC AUTO-ENTMCNC: 32.3 G/DL (ref 33–37)
MCV RBC AUTO: 96.2 FL (ref 81–99)
MICROALBUMIN UR-MCNC: <1.2 MG/DL (ref 0–19)
MICROALBUMIN/CREAT UR-RTO: NORMAL MG/G
MONOCYTES ABSOLUTE: 0.8 K/UL (ref 0–0.9)
MONOCYTES RELATIVE PERCENT: 8.9 % (ref 0–10)
NEUTROPHILS ABSOLUTE: 5.3 K/UL (ref 1.5–7.5)
NEUTROPHILS RELATIVE PERCENT: 62.4 % (ref 50–65)
PDW BLD-RTO: 14.2 % (ref 11.5–14.5)
PLATELET # BLD: 306 K/UL (ref 130–400)
PMV BLD AUTO: 11.8 FL (ref 9.4–12.3)
POTASSIUM SERPL-SCNC: 5.5 MMOL/L (ref 3.5–5)
RBC # BLD: 4.44 M/UL (ref 4.2–5.4)
SODIUM BLD-SCNC: 138 MMOL/L (ref 136–145)
T4 FREE: 1.2 NG/DL (ref 0.9–1.7)
TOTAL PROTEIN: 7.8 G/DL (ref 6.6–8.7)
TSH SERPL DL<=0.05 MIU/L-ACNC: 1.29 UIU/ML (ref 0.27–4.2)
WBC # BLD: 8.6 K/UL (ref 4.8–10.8)

## 2017-11-06 PROCEDURE — G8484 FLU IMMUNIZE NO ADMIN: HCPCS | Performed by: NURSE PRACTITIONER

## 2017-11-06 PROCEDURE — 3017F COLORECTAL CA SCREEN DOC REV: CPT | Performed by: NURSE PRACTITIONER

## 2017-11-06 PROCEDURE — G8417 CALC BMI ABV UP PARAM F/U: HCPCS | Performed by: NURSE PRACTITIONER

## 2017-11-06 PROCEDURE — 3044F HG A1C LEVEL LT 7.0%: CPT | Performed by: NURSE PRACTITIONER

## 2017-11-06 PROCEDURE — G8427 DOCREV CUR MEDS BY ELIG CLIN: HCPCS | Performed by: NURSE PRACTITIONER

## 2017-11-06 PROCEDURE — G8598 ASA/ANTIPLAT THER USED: HCPCS | Performed by: NURSE PRACTITIONER

## 2017-11-06 PROCEDURE — 3014F SCREEN MAMMO DOC REV: CPT | Performed by: NURSE PRACTITIONER

## 2017-11-06 PROCEDURE — 1036F TOBACCO NON-USER: CPT | Performed by: NURSE PRACTITIONER

## 2017-11-06 PROCEDURE — 99214 OFFICE O/P EST MOD 30 MIN: CPT | Performed by: NURSE PRACTITIONER

## 2017-11-06 RX ORDER — ZOLPIDEM TARTRATE 10 MG/1
10 TABLET ORAL NIGHTLY PRN
Qty: 30 TABLET | Refills: 0 | Status: SHIPPED | OUTPATIENT
Start: 2017-11-06 | End: 2017-12-04 | Stop reason: SDUPTHER

## 2017-11-06 RX ORDER — TEMAZEPAM 30 MG/1
30 CAPSULE ORAL NIGHTLY PRN
Qty: 30 CAPSULE | Refills: 0 | Status: CANCELLED | OUTPATIENT
Start: 2017-11-06

## 2017-11-06 ASSESSMENT — PATIENT HEALTH QUESTIONNAIRE - PHQ9
1. LITTLE INTEREST OR PLEASURE IN DOING THINGS: 0
8. MOVING OR SPEAKING SO SLOWLY THAT OTHER PEOPLE COULD HAVE NOTICED. OR THE OPPOSITE, BEING SO FIGETY OR RESTLESS THAT YOU HAVE BEEN MOVING AROUND A LOT MORE THAN USUAL: 2
4. FEELING TIRED OR HAVING LITTLE ENERGY: 3
9. THOUGHTS THAT YOU WOULD BE BETTER OFF DEAD, OR OF HURTING YOURSELF: 0
1. LITTLE INTEREST OR PLEASURE IN DOING THINGS: 3
6. FEELING BAD ABOUT YOURSELF - OR THAT YOU ARE A FAILURE OR HAVE LET YOURSELF OR YOUR FAMILY DOWN: 1
2. FEELING DOWN, DEPRESSED OR HOPELESS: 2
SUM OF ALL RESPONSES TO PHQ9 QUESTIONS 1 & 2: 5
7. TROUBLE CONCENTRATING ON THINGS, SUCH AS READING THE NEWSPAPER OR WATCHING TELEVISION: 1
2. FEELING DOWN, DEPRESSED OR HOPELESS: 0
3. TROUBLE FALLING OR STAYING ASLEEP: 3
10. IF YOU CHECKED OFF ANY PROBLEMS, HOW DIFFICULT HAVE THESE PROBLEMS MADE IT FOR YOU TO DO YOUR WORK, TAKE CARE OF THINGS AT HOME, OR GET ALONG WITH OTHER PEOPLE: 0
SUM OF ALL RESPONSES TO PHQ9 QUESTIONS 1 & 2: 0
SUM OF ALL RESPONSES TO PHQ QUESTIONS 1-9: 0
5. POOR APPETITE OR OVEREATING: 0
SUM OF ALL RESPONSES TO PHQ QUESTIONS 1-9: 15

## 2017-11-06 ASSESSMENT — ENCOUNTER SYMPTOMS
EYES NEGATIVE: 1
COUGH: 0
SINUS PRESSURE: 0
SORE THROAT: 0
SHORTNESS OF BREATH: 0
BACK PAIN: 0
WHEEZING: 0
ABDOMINAL PAIN: 0
TROUBLE SWALLOWING: 0

## 2017-11-06 NOTE — PROGRESS NOTES
07/18/2017   Component Date Value    Sodium 07/18/2017 143     Potassium 07/18/2017 4.5     Chloride 07/18/2017 106     CO2 07/18/2017 18*    Anion Gap 07/18/2017 19     Glucose 07/18/2017 71*    BUN 07/18/2017 27*    CREATININE 07/18/2017 1.6*    GFR Non- 07/18/2017 33*    Calcium 07/18/2017 8.7     Total Protein 07/18/2017 7.6     Alb 07/18/2017 3.6     Total Bilirubin 07/18/2017 0.3     Alkaline Phosphatase 07/18/2017 144*    ALT 07/18/2017 8     AST 07/18/2017 20    Office Visit on 07/13/2017   Component Date Value    Color, UA 07/13/2017 Yellow     Clarity, UA 07/13/2017 Clear     Glucose, UA POC 07/13/2017 Negative     Bilirubin, UA 07/13/2017 Negative     Ketones, UA 07/13/2017 Negative     Spec Grav, UA 07/13/2017 1.010     Blood, UA POC 07/13/2017 Negative     pH, UA 07/13/2017 5.0     Protein, UA POC 07/13/2017 Negative     Urobilinogen, UA 07/13/2017 0.2     Leukocytes, UA 07/13/2017 Negative     Nitrite, UA 07/13/2017 Negative    Admission on 06/23/2017, Discharged on 06/23/2017   Component Date Value    WBC 06/23/2017 9.2     RBC 06/23/2017 3.17*    Hemoglobin 06/23/2017 9.1*    Hematocrit 06/23/2017 29.3*    MCV 06/23/2017 92.4     MCH 06/23/2017 28.7     MCHC 06/23/2017 31.1*    RDW 06/23/2017 17.5*    Platelets 04/95/9916 272     MPV 06/23/2017 11.1     Neutrophils % 06/23/2017 64.4     Lymphocytes % 06/23/2017 18.5*    Monocytes % 06/23/2017 11.8*    Eosinophils % 06/23/2017 2.3     Basophils % 06/23/2017 0.4     Neutrophils # 06/23/2017 5.9     Lymphocytes # 06/23/2017 1.7     Monocytes # 06/23/2017 1.10*    Eosinophils # 06/23/2017 0.20     Basophils # 06/23/2017 0.00     Sodium 06/23/2017 138     Potassium 06/23/2017 4.1     Chloride 06/23/2017 99     CO2 06/23/2017 24     Anion Gap 06/23/2017 15     Glucose 06/23/2017 126*    BUN 06/23/2017 24*    CREATININE 06/23/2017 2.0*    GFR Non- 06/23/2017 26*    Calcium 06/23/2017 8.8     Total Protein 06/23/2017 7.0     Alb 06/23/2017 3.2*    Total Bilirubin 06/23/2017 0.5     Alkaline Phosphatase 06/23/2017 121*    ALT 06/23/2017 20     AST 06/23/2017 44*    Pro-BNP 06/23/2017 787     Protime 06/23/2017 13.4     INR 06/23/2017 1.03     Color, UA 06/23/2017 YELLOW     Clarity, UA 06/23/2017 Clear     Glucose, Ur 06/23/2017 Negative     Bilirubin Urine 06/23/2017 Negative     Ketones, Urine 06/23/2017 Negative     Specific Gravity, UA 06/23/2017 1.008     Blood, Urine 06/23/2017 Negative     pH, UA 06/23/2017 5.5     Protein, UA 06/23/2017 Negative     Urobilinogen, Urine 06/23/2017 0.2     Nitrite, Urine 06/23/2017 Negative     Leukocyte Esterase, Urine 06/23/2017 TRACE*    P-R Interval 06/27/2017 160     QRS Duration 06/27/2017 86     Q-T Interval 06/27/2017 398     QTc Calculation (Bazett) 06/27/2017 424     P Axis 06/27/2017 42     T Axis 06/27/2017 29     POC Troponin I 06/23/2017 0.01     Performed on 06/23/2017 i-Stat     Urine Culture, Routine 06/25/2017 <50,000 CFU/ml*    Organism 06/25/2017 Escherichia coli*    Urine Culture, Routine 06/25/2017 Light growth     Organism 06/25/2017 Enterococcus species*    Urine Culture, Routine 06/25/2017 Light growth     Casts 06/23/2017 0-1 Hyaline*    WBC, UA 06/23/2017 0-1     Epi Cells 06/23/2017 0-2     Bacteria, UA 06/23/2017 trace*   Admission on 06/16/2017, Discharged on 06/19/2017   Component Date Value    ABO/Rh 06/16/2017 O NEG     Antibody Screen 06/16/2017 NEG     POC Glucose 06/16/2017 149*    Performed on 06/16/2017 AccuChek     POC Glucose 06/16/2017 303*    Performed on 06/16/2017 AccuChek     WBC 06/17/2017 12.2*    RBC 06/17/2017 3.39*    Hemoglobin 06/17/2017 9.8*    Hematocrit 06/17/2017 31.2*    MCV 06/17/2017 92.0     MCH 06/17/2017 28.9     MCHC 06/17/2017 31.4*    RDW 06/17/2017 17.3*    Platelets 28/63/5137 187     MPV 06/17/2017 11.8     POC Glucose 06/16/2017 388*    Performed on 06/16/2017 AccuChek     POC Glucose 06/17/2017 303*    Performed on 06/17/2017 AccuChek     POC Glucose 06/17/2017 178*    Performed on 06/17/2017 AccuChek     POC Glucose 06/17/2017 174*    Performed on 06/17/2017 AccuChek     WBC 06/18/2017 8.5     RBC 06/18/2017 3.23*    Hemoglobin 06/18/2017 9.3*    Hematocrit 06/18/2017 29.2*    MCV 06/18/2017 90.4     MCH 06/18/2017 28.8     MCHC 06/18/2017 31.8*    RDW 06/18/2017 17.5*    Platelets 29/80/6843 169     MPV 06/18/2017 11.9     Hemoglobin A1C 06/18/2017 5.7     Iron 06/18/2017 16*    TIBC 06/18/2017 196*    Iron Saturation 06/18/2017 8*    Sodium 06/18/2017 138     Potassium 06/18/2017 4.1     Chloride 06/18/2017 97*    CO2 06/18/2017 24     Anion Gap 06/18/2017 17     Glucose 06/18/2017 207*    BUN 06/18/2017 21*    CREATININE 06/18/2017 1.5*    GFR Non- 06/18/2017 36*    Calcium 06/18/2017 8.5*    POC Glucose 06/17/2017 195*    Performed on 06/17/2017 AccuChek     POC Glucose 06/18/2017 262*    Performed on 06/18/2017 AccuChek     POC Glucose 06/18/2017 283*    Performed on 06/18/2017 AccuChek     POC Glucose 06/18/2017 238*    Performed on 06/18/2017 AccuChek     WBC 06/19/2017 7.6     RBC 06/19/2017 2.93*    Hemoglobin 06/19/2017 8.6*    Hematocrit 06/19/2017 26.4*    MCV 06/19/2017 90.1     MCH 06/19/2017 29.4     MCHC 06/19/2017 32.6*    RDW 06/19/2017 17.5*    Platelets 00/30/7013 157     MPV 06/19/2017 11.6     POC Glucose 06/18/2017 290*    Performed on 06/18/2017 AccuChek     POC Glucose 06/19/2017 235*    Performed on 06/19/2017 AccuChek     POC Glucose 06/19/2017 283*    Performed on 06/19/2017 [facility] Outpatient Visit on 06/07/2017   Component Date Value    P-R Interval 06/12/2017 168     QRS Duration 06/12/2017 86     Q-T Interval 06/12/2017 408     QTc Calculation (Bazett) 06/12/2017 427     P Axis 06/12/2017 11     T Axis 06/12/2017 32    Telephone on 06/06/2017   Component Date Value    Vit D, 25-Hydroxy 06/07/2017 54.4    Orders Only on 06/05/2017   Component Date Value    WBC 06/05/2017 8.2     RBC 06/05/2017 4.56     Hemoglobin 06/05/2017 13.1     Hematocrit 06/05/2017 40.9     MCV 06/05/2017 89.7     MCH 06/05/2017 28.7     MCHC 06/05/2017 32.0*    RDW 06/05/2017 18.4*    Platelets 05/29/5247 271     MPV 06/05/2017 12.4*    Neutrophils % 06/05/2017 74.1*    Lymphocytes % 06/05/2017 14.4*    Monocytes % 06/05/2017 8.8     Eosinophils % 06/05/2017 1.6     Basophils % 06/05/2017 0.6     Neutrophils # 06/05/2017 6.1     Lymphocytes # 06/05/2017 1.2     Monocytes # 06/05/2017 0.70     Eosinophils # 06/05/2017 0.10     Basophils # 06/05/2017 0.10     Sodium 06/05/2017 143     Potassium 06/05/2017 4.7     Chloride 06/05/2017 106     CO2 06/05/2017 20*    Anion Gap 06/05/2017 17     Glucose 06/05/2017 133*    BUN 06/05/2017 27*    CREATININE 06/05/2017 1.6*    GFR Non- 06/05/2017 33*    Calcium 06/05/2017 9.0     Total Protein 06/05/2017 7.2     Alb 06/05/2017 3.9     Total Bilirubin 06/05/2017 <0.2     Alkaline Phosphatase 06/05/2017 150*    ALT 06/05/2017 21     AST 06/05/2017 31     Protime 06/05/2017 13.5     INR 06/05/2017 1.04     ABO/Rh 06/06/2017 O NEG     Antibody Screen 06/06/2017 NEG, REPEAT IN GEL NEGATIVE     Antibody Screen 06/05/2017 NEG, SCREEN POS ON ECHO NEG GEL      Copies of these are in the chart. Prior to Visit Medications    Medication Sig Taking?  Authorizing Provider   zolpidem (AMBIEN) 10 MG tablet Take 1 tablet by mouth nightly as needed for Sleep Yes TRESSA Haque   valsartan (DIOVAN) 160 MG tablet TAKE ONE TABLET BY MOUTH ONCE DAILY Yes TRESSA Haque   fluticasone (FLONASE) 50 MCG/ACT nasal spray USE TWO SPRAY(S) IN EACH NOSTRIL ONCE DAILY Yes TRESSA Haque   pregabalin (LYRICA) 150 MG capsule Take 1 capsule by mouth 2 Social History   Substance Use Topics    Smoking status: Never Smoker    Smokeless tobacco: Former User    Alcohol use No       Review of Systems   Constitutional: Negative for activity change, appetite change, diaphoresis, fatigue and fever. HENT: Negative for congestion, ear discharge, postnasal drip, sinus pressure, sore throat and trouble swallowing. Eyes: Negative. Respiratory: Negative for cough, shortness of breath and wheezing. Cardiovascular: Negative for chest pain, palpitations and leg swelling. Gastrointestinal: Negative for abdominal pain. Endocrine: Negative for polydipsia, polyphagia and polyuria. Genitourinary: Negative for difficulty urinating. Musculoskeletal: Negative for arthralgias and back pain. Skin: Negative for rash. Neurological: Negative for headaches. Psychiatric/Behavioral: Positive for sleep disturbance (will do nightmare medication and try ambien). Negative for behavioral problems and suicidal ideas. The patient is nervous/anxious. With nightmares and fear improving             Physical Exam   Constitutional: She is oriented to person, place, and time. She appears well-developed and well-nourished. No distress. HENT:   Head: Normocephalic. Right Ear: External ear normal.   Left Ear: External ear normal.   Mouth/Throat: No oropharyngeal exudate. Eyes: Pupils are equal, round, and reactive to light. Right eye exhibits no discharge. Left eye exhibits no discharge. Neck: Normal range of motion. Cardiovascular: Normal rate, regular rhythm, normal heart sounds and intact distal pulses. No murmur heard. Pulmonary/Chest: Effort normal and breath sounds normal. No respiratory distress. She has no wheezes. Abdominal: Soft. Bowel sounds are normal. She exhibits no distension. Musculoskeletal: Normal range of motion. Lymphadenopathy:     She has no cervical adenopathy.    Neurological: She is alert and oriented to person, place, and time. No cranial nerve deficit. Skin: Skin is warm and dry. No rash noted. She is not diaphoretic. Psychiatric: She has a normal mood and affect. Her behavior is normal.   Vitals reviewed. ASSESSMENT/ PLAN    1. Type 2 diabetes mellitus with diabetic polyneuropathy, with long-term current use of insulin (HCC)  Cont log  Will cont and monitor closely  Cont protein in diet  - Hemoglobin A1C; Future  - CBC Auto Differential; Future  - Comprehensive Metabolic Panel; Future  - TSH without Reflex; Future  - T4, Free; Future  - Microalbumin / Creatinine Urine Ratio; Future    2. Insomnia due to medical condition  Will try   Take when gets in bed  Voiced understanding  - zolpidem (AMBIEN) 10 MG tablet; Take 1 tablet by mouth nightly as needed for Sleep  Dispense: 30 tablet; Refill: 0    3. PTSD (post-traumatic stress disorder)  Doing well  Cont counsceling  - zolpidem (AMBIEN) 10 MG tablet; Take 1 tablet by mouth nightly as needed for Sleep  Dispense: 30 tablet; Refill: 0    4. Essential hypertension  Cont tight control    - Microalbumin / Creatinine Urine Ratio; Future      Orders Placed This Encounter   Procedures    Hemoglobin A1C    CBC Auto Differential    Comprehensive Metabolic Panel    TSH without Reflex    T4, Free    Microalbumin / Creatinine Urine Ratio        Return in about 4 weeks (around 12/5/2017) for insomnia and diabetes. Patient Instructions     Patient Education        Learning About Diabetes Food Guidelines  Your Care Instructions  Meal planning is important to manage diabetes. It helps keep your blood sugar at a target level (which you set with your doctor). You don't have to eat special foods. You can eat what your family eats, including sweets once in a while. But you do have to pay attention to how often you eat and how much you eat of certain foods. You may want to work with a dietitian or a certified diabetes educator (CDE) to help you plan meals and snacks.  A dietitian or CDE can also help you lose weight if that is one of your goals. What should you know about eating carbs? Managing the amount of carbohydrate (carbs) you eat is an important part of healthy meals when you have diabetes. Carbohydrate is found in many foods. · Learn which foods have carbs. And learn the amounts of carbs in different foods. ¨ Bread, cereal, pasta, and rice have about 15 grams of carbs in a serving. A serving is 1 slice of bread (1 ounce), ½ cup of cooked cereal, or 1/3 cup of cooked pasta or rice. ¨ Fruits have 15 grams of carbs in a serving. A serving is 1 small fresh fruit, such as an apple or orange; ½ of a banana; ½ cup of cooked or canned fruit; ½ cup of fruit juice; 1 cup of melon or raspberries; or 2 tablespoons of dried fruit. ¨ Milk and no-sugar-added yogurt have 15 grams of carbs in a serving. A serving is 1 cup of milk or 2/3 cup of no-sugar-added yogurt. ¨ Starchy vegetables have 15 grams of carbs in a serving. A serving is ½ cup of mashed potatoes or sweet potato; 1 cup winter squash; ½ of a small baked potato; ½ cup of cooked beans; or ½ cup cooked corn or green peas. · Learn how much carbs to eat each day and at each meal. A dietitian or CDE can teach you how to keep track of the amount of carbs you eat. This is called carbohydrate counting. · If you are not sure how to count carbohydrate grams, use the Plate Method to plan meals. It is a good, quick way to make sure that you have a balanced meal. It also helps you spread carbs throughout the day. ¨ Divide your plate by types of foods. Put non-starchy vegetables on half the plate, meat or other protein food on one-quarter of the plate, and a grain or starchy vegetable in the final quarter of the plate.  To this you can add a small piece of fruit and 1 cup of milk or yogurt, depending on how many carbs you are supposed to eat at a meal.  · Try to eat about the same amount of carbs at each meal. Do not \"save up\" your daily allowance of carbs to eat at one meal.  · Proteins have very little or no carbs per serving. Examples of proteins are beef, chicken, turkey, fish, eggs, tofu, cheese, cottage cheese, and peanut butter. A serving size of meat is 3 ounces, which is about the size of a deck of cards. Examples of meat substitute serving sizes (equal to 1 ounce of meat) are 1/4 cup of cottage cheese, 1 egg, 1 tablespoon of peanut butter, and ½ cup of tofu. How can you eat out and still eat healthy? · Learn to estimate the serving sizes of foods that have carbohydrate. If you measure food at home, it will be easier to estimate the amount in a serving of restaurant food. · If the meal you order has too much carbohydrate (such as potatoes, corn, or baked beans), ask to have a low-carbohydrate food instead. Ask for a salad or green vegetables. · If you use insulin, check your blood sugar before and after eating out to help you plan how much to eat in the future. · If you eat more carbohydrate at a meal than you had planned, take a walk or do other exercise. This will help lower your blood sugar. What else should you know? · Limit saturated fat, such as the fat from meat and dairy products. This is a healthy choice because people who have diabetes are at higher risk of heart disease. So choose lean cuts of meat and nonfat or low-fat dairy products. Use olive or canola oil instead of butter or shortening when cooking. · Don't skip meals. Your blood sugar may drop too low if you skip meals and take insulin or certain medicines for diabetes. · Check with your doctor before you drink alcohol. Alcohol can cause your blood sugar to drop too low. Alcohol can also cause a bad reaction if you take certain diabetes medicines. Follow-up care is a key part of your treatment and safety. Be sure to make and go to all appointments, and call your doctor if you are having problems.  It's also a good idea to know your test results and keep a list of the medicines you take. Where can you learn more? Go to https://chpepiceweb.Meilishuo. org and sign in to your [employer] account. Enter D258 in the Tamion box to learn more about \"Learning About Diabetes Food Guidelines. \"     If you do not have an account, please click on the \"Sign Up Now\" link. Current as of: March 13, 2017  Content Version: 11.3  © 0155-3714 [employer]. Care instructions adapted under license by [facility] ([facility]). If you have questions about a medical condition or this instruction, always ask your healthcare professional. [place] [address] any warranty or liability for your use of this information. Patient Education        Diabetic Neuropathy: Care Instructions  Your Care Instructions  When you have diabetes, your blood sugar level may get too high. Over time, high blood sugar levels can damage nerves. This is called diabetic neuropathy. Nerve damage can cause pain, burning, tingling, and numbness and may leave you feeling weak. The feet are often affected. When you have nerve damage in your feet, you cannot feel your feet and toes as well as normal and may not notice cuts or sores. Even a small injury can lead to a serious infection. It is very important that you follow your doctor's advice on foot care. Sometimes diabetes damages nerves that help the body function. If this happens, your blood pressure, sweating, digestion, and urination might be affected. Your doctor may give you a target blood sugar level that is higher or lower than you are used to. Try to keep your blood sugar very close to this target level to prevent more damage. Follow-up care is a key part of your treatment and safety. Be sure to make and go to all appointments, and call your doctor if you are having problems. It's also a good idea to know your test results and keep a list of the medicines you take. How can you care for yourself at home?   · Take your medicines exactly as then rub lotion on your feet, but not between your toes. Use a handheld mirror or magnifying mirror to inspect your feet for blisters, cuts, cracks, or sores. · Have your toenails trimmed and filed straight across. · Wear shoes and socks that fit well. Soft shoes that have good support and that fit well (such as tennis shoes) are best for your feet. · Check your shoes for any loose objects or rough edges before you put them on. · Ask your doctor to check your feet during each visit. Your doctor may notice a foot problem you have missed. · Get early treatment for any foot problem, even a minor one. When should you call for help? Call your doctor now or seek immediate medical care if:  · You have symptoms of infection, such as:  ¨ Increased pain, swelling, warmth, or redness. ¨ Red streaks leading from the area. ¨ Pus draining from the area. ¨ A fever. · You have new or worse numbness, pain, or tingling in any part of your body. Watch closely for changes in your health, and be sure to contact your doctor if:  · You have a new problem with your feet, such as:  ¨ A new sore or ulcer. ¨ A break in the skin that is not healing after several days. ¨ Bleeding corns or calluses. ¨ An ingrown toenail. · You do not get better as expected. Where can you learn more? Go to https://Neighbortree.compechinoHiptype.[email or web address] value. org and sign in to your FreeAgent account. Enter G409 in the [facility] box to learn more about \"Diabetic Neuropathy: Care Instructions. \"     If you do not have an account, please click on the \"Sign Up Now\" link. Current as of: March 13, 2017  Content Version: 11.3  © 1110-8977 [employer]. Care instructions adapted under license by [facility] ([facility]). If you have questions about a medical condition or this instruction, always ask your healthcare professional. [address] any warranty or liability for your use of this information.        Patient Education Insomnia: Care Instructions  Your Care Instructions  Insomnia is the inability to sleep well. It is a common problem for most people at some time. Insomnia may make it hard for you to get to sleep, stay asleep, or sleep as long as you need to. This can make you tired and grouchy during the day. It can also make you forgetful, less effective at work, and unhappy. Insomnia can be caused by conditions such as depression or anxiety. Pain can also affect your ability to sleep. When these problems are solved, the insomnia usually clears up. But sometimes bad sleep habits can cause insomnia. If insomnia is affecting your work or your enjoyment of life, you can take steps to improve your sleep. Follow-up care is a key part of your treatment and safety. Be sure to make and go to all appointments, and call your doctor if you are having problems. It's also a good idea to know your test results and keep a list of the medicines you take. How can you care for yourself at home? What to avoid  · Do not have drinks with caffeine, such as coffee or black tea, for 8 hours before bed. · Do not smoke or use other types of tobacco near bedtime. Nicotine is a stimulant and can keep you awake. · Avoid drinking alcohol late in the evening, because it can cause you to wake in the middle of the night. · Do not eat a big meal close to bedtime. If you are hungry, eat a light snack. · Do not drink a lot of water close to bedtime, because the need to urinate may wake you up during the night. · Do not read or watch TV in bed. Use the bed only for sleeping and sexual activity. What to try  · Go to bed at the same time every night, and wake up at the same time every morning. Do not take naps during the day. · Keep your bedroom quiet, dark, and cool. · Sleep on a comfortable pillow and mattress. · If watching the clock makes you anxious, turn it facing away from you so you cannot see the time.   · If you worry when you lie down, start a worry book. Well before bedtime, write down your worries, and then set the book and your concerns aside. · Try meditation or other relaxation techniques before you go to bed. · If you cannot fall asleep, get up and go to another room until you feel sleepy. Do something relaxing. Repeat your bedtime routine before you go to bed again. · Make your house quiet and calm about an hour before bedtime. Turn down the lights, turn off the TV, log off the computer, and turn down the volume on music. This can help you relax after a busy day. When should you call for help? Watch closely for changes in your health, and be sure to contact your doctor if:  · Your efforts to improve your sleep do not work. · Your insomnia gets worse. · You have been feeling down, depressed, or hopeless or have lost interest in things that you usually enjoy. Where can you learn more? Go to https://UpTo.Tuolar.com. org and sign in to your [employer] account. Enter P513 in the PressBaby box to learn more about \"Insomnia: Care Instructions. \"     If you do not have an account, please click on the \"Sign Up Now\" link. Current as of: July 26, 2016  Content Version: 11.3  © 7458-0103 [employer]. Care instructions adapted under license by [facility] ([facility]). If you have questions about a medical condition or this instruction, always ask your healthcare professional. [place] [address] any warranty or liability for your use of this information. Patient Education          zolpidem  Pronunciation:  zole PI dem  Brand:  Ambien, Ambien CR, Edluar, Devon, Zolpimist  What is the most important information I should know about zolpidem? Zolpidem may cause a severe allergic reaction. Stop taking zolpidem and get emergency medical help if you have any of these signs of an allergic reaction: hives; difficulty breathing; swelling of your face, lips, tongue, or throat.   Never take zolpidem in larger sleep);  · myasthenia gravis;  · a history of depression, mental illness, or suicidal thoughts; or  · a history of drug or alcohol addiction. It is not known whether this medicine will harm an unborn baby. Tell your doctor if you are pregnant or plan to become pregnant. Zolpidem can pass into breast milk and may harm a nursing baby. Tell your doctor if you are breast-feeding a baby. The sedative effects of zolpidem may be stronger in older adults. Zolpidem is not approved for use by anyone younger than 25years old. It is dangerous to try and purchase zolpidem on the Internet or from vendors outside of the [place]. Medications distributed from [employer] may contain dangerous ingredients, or may not be distributed by a licensed pharmacy. Samples of zolpidem purchased on the Internet have been found to contain haloperidol (Haldol), a potent antipsychotic drug with dangerous side effects. For more information, contact the U.[place]. Food and Drug Administration (FDA) or visit www.fda.gov/buyonlineguide. How should I take zolpidem? In January 2013, the Food and Drug Administration (FDA) lowered the recommended dose for Ambien, Edluar, and Zolpimist. If you have taken zolpidem in the past, your doctor may direct you to take a lower dose of this medicine than you did before. Follow all directions on your prescription label. Never take zolpidem in larger amounts, or for longer than prescribed. Read all patient information, medication guides, and instruction sheets provided to you. Ask your doctor or pharmacist if you have any questions. Zolpidem may be habit-forming. Never share zolpidem with another person, especially someone with a history of drug abuse or addiction. Keep the medication in a place where others cannot get to it. Selling or giving away this medicine is against the law. The recommended doses of zolpidem are not the same in men and women, and this drug is not approved for use in children. Misuse of this medication can result in dangerous side effects. Never take Ambien, Edluar, or Zolpimist if you do not have a full 7 to 8 hours to sleep before being active again. Do not take Intermezzo for middle-of-the-night insomnia unless you have 4 hours of sleep time left before being active. Zolpidem is for short-term use only. Tell your doctor if your insomnia symptoms do not improve, or if they get worse after using this medication for 7 to 10 nights in a row. Do not take zolpidem for longer than 4 or 5 weeks without your doctor's advice. Do not stop using zolpidem suddenly after long-term use, or you could have unpleasant withdrawal symptoms. Ask your doctor how to avoid withdrawal symptoms when you stop using zolpidem. Insomnia symptoms may also return after you stop taking zolpidem. These symptoms may seem to be even worse than before you started taking the medicine. Call your doctor if you still have worsened insomnia after the first few nights without taking zolpidem. Do not crush, chew, or break an Ambien CR tablet. Swallow the pill whole. Do not swallow the Edluar or Intermezzo tablet whole. Place the tablet under your tongue and allow it to dissolve in your mouth without water. Spray Zolpimist directly into your mouth over your tongue. Prime the spray before the first use by pumping 5 test sprays into the air, away from your face. Prime the spray with 1 test spray if it has not been used for longer than 14 days. Store at room temperature away from moisture and heat. Do not freeze. Keep the Zolpimist  bottle upright when not in use. What happens if I miss a dose? Since zolpidem is taken only at bedtime if needed, you are not likely to miss a dose. What happens if I overdose? Seek emergency medical attention or call the Poison Help line at 1-593.147.3167. An overdose of zolpidem can be fatal, especially when it is taken together with other medications that can cause drowsiness.   Overdose symptoms may include sleepiness, confusion, shallow breathing, feeling light-headed, fainting, or coma. What should I avoid while taking zolpidem? Zolpidem may impair your thinking or reactions. You may still feel sleepy the morning after taking zolpidem, especially if you take the extended-release tablet, or if you are a woman. Wait until you are fully awake before you drive, operate machinery,  an airplane, or do anything that requires you to be awake and alert. Dizziness or severe drowsiness can cause falls, accidents, or severe injuries. Avoid taking zolpidem during travel, such as to sleep on an airplane. You may be awakened before the effects of the medicine have worn off. Amnesia (forgetfulness) is more common if you do not get a full 7 to 8 hours of sleep after taking zolpidem. Do not take this medicine if you have consumed alcohol during the day or just before bed. What are the possible side effects of zolpidem? Zolpidem may cause a severe allergic reaction. Stop taking zolpidem and get emergency medical help if you have signs of an allergic reaction: hives; difficulty breathing; swelling of your face, lips, tongue, or throat. Report any new or worsening symptoms to your doctor, such as: depression, anxiety, aggression, agitation, confusion, unusual thoughts, hallucinations, memory problems, changes in personality, risk-taking behavior, decreased inhibitions, no fear of danger, or thoughts of suicide or hurting yourself. Stop using zolpidem and call your doctor at once if you have:  · chest pain, fast or irregular heartbeat, feeling short of breath;  · trouble breathing or swallowing; or  · feeling like you might pass out.   Common side effects may include:  · daytime drowsiness, dizziness, weakness, feeling \"drugged\" or light-headed;  · tired feeling, loss of coordination;  · stuffy nose, dry mouth, nose or throat irritation;  · nausea, constipation, diarrhea, upset stomach; or  · headache, muscle pain. This is not a complete list of side effects and others may occur. Call your doctor for medical advice about side effects. You may report side effects to FDA at 3-986-GMU-1604. What other drugs will affect zolpidem? Taking zolpidem with other drugs that make you sleepy or slow your breathing can cause dangerous or life-threatening side effects. Ask your doctor before taking a sleeping pill, narcotic pain medicine, prescription cough medicine, a muscle relaxer, or medicine for anxiety, depression, or seizures. Other drugs may interact with zolpidem, including prescription and over-the-counter medicines, vitamins, and herbal products. Tell each of your health care providers about all medicines you use now and any medicine you start or stop using. Where can I get more information? Your pharmacist can provide more information about zolpidem. Remember, keep this and all other medicines out of the reach of children, never share your medicines with others, and use this medication only for the indication prescribed. Every effort has been made to ensure that the information provided by [address] is accurate, up-to-date, and complete, but no guarantee is made to that effect. Drug information contained herein may be time sensitive. [facility] information has been compiled for use by healthcare practitioners and consumers in the [place] and therefore [facility] does not warrant that uses outside of the [place] are appropriate, unless specifically indicated otherwise. [facility]'s drug information does not endorse drugs, diagnose patients or recommend therapy. [facility] drug information is an informational resource designed to assist licensed healthcare practitioners in caring for their patients and/or to serve consumers viewing this service as a supplement to, and not a substitute for, the expertise, skill, knowledge and judgment of healthcare practitioners.  The absence of a warning for a given drug or drug combination in no way should be construed to indicate that the drug or drug combination is safe, effective or appropriate for any given patient. [facility] does not assume any responsibility for any aspect of healthcare administered with the aid of information [facility] provides. The information contained herein is not intended to cover all possible uses, directions, precautions, warnings, drug interactions, allergic reactions, or adverse effects. If you have questions about the drugs you are taking, check with your doctor, nurse or pharmacist.  Copyright 0754-7867 [address]: 10.04. Revision date: 9/19/2016. Care instructions adapted under license by [facility] ([facility]). If you have questions about a medical condition or this instruction, always ask your healthcare professional. [place] [address] any warranty or liability for your use of this information. Controlled Substances Monitoring:     Attestation: The Prescription Monitoring Report for this patient was reviewed today. Melissa Keane, TRESSA)  Documentation: Possible medication side effects, risk of tolerance and/or dependence, and alternative treatments discussed., Obtaining appropriate analgesic effect of treatment., No signs of potential drug abuse or diversion identified. ([id number]) Melissa Keane, APRN)            Additional Instructions: As always, patient is advised to bring in medication bottles in order to correctly reconcile with our current list.      Ivy Breaux received counseling on the following healthy behaviors: preventive    Patient given educational materials on plan of care    I have instructed Mitch to complete a self tracking handout on blood pressure weights and sugar and instructed them to bring it with them to her next appointment. Discussed use, benefit, and side effects of prescribed medications. Barriers to medication compliance addressed.   All patient questions answered. Pt voiced understanding.      TRESSA Venegas

## 2017-11-06 NOTE — PATIENT INSTRUCTIONS
Patient Education        Learning About Diabetes Food Guidelines  Your Care Instructions  Meal planning is important to manage diabetes. It helps keep your blood sugar at a target level (which you set with your doctor). You don't have to eat special foods. You can eat what your family eats, including sweets once in a while. But you do have to pay attention to how often you eat and how much you eat of certain foods. You may want to work with a dietitian or a certified diabetes educator (CDE) to help you plan meals and snacks. A dietitian or CDE can also help you lose weight if that is one of your goals. What should you know about eating carbs? Managing the amount of carbohydrate (carbs) you eat is an important part of healthy meals when you have diabetes. Carbohydrate is found in many foods. · Learn which foods have carbs. And learn the amounts of carbs in different foods. ¨ Bread, cereal, pasta, and rice have about 15 grams of carbs in a serving. A serving is 1 slice of bread (1 ounce), ½ cup of cooked cereal, or 1/3 cup of cooked pasta or rice. ¨ Fruits have 15 grams of carbs in a serving. A serving is 1 small fresh fruit, such as an apple or orange; ½ of a banana; ½ cup of cooked or canned fruit; ½ cup of fruit juice; 1 cup of melon or raspberries; or 2 tablespoons of dried fruit. ¨ Milk and no-sugar-added yogurt have 15 grams of carbs in a serving. A serving is 1 cup of milk or 2/3 cup of no-sugar-added yogurt. ¨ Starchy vegetables have 15 grams of carbs in a serving. A serving is ½ cup of mashed potatoes or sweet potato; 1 cup winter squash; ½ of a small baked potato; ½ cup of cooked beans; or ½ cup cooked corn or green peas. · Learn how much carbs to eat each day and at each meal. A dietitian or CDE can teach you how to keep track of the amount of carbs you eat. This is called carbohydrate counting. · If you are not sure how to count carbohydrate grams, use the Plate Method to plan meals.  It is a good, quick way to make sure that you have a balanced meal. It also helps you spread carbs throughout the day. ¨ Divide your plate by types of foods. Put non-starchy vegetables on half the plate, meat or other protein food on one-quarter of the plate, and a grain or starchy vegetable in the final quarter of the plate. To this you can add a small piece of fruit and 1 cup of milk or yogurt, depending on how many carbs you are supposed to eat at a meal.  · Try to eat about the same amount of carbs at each meal. Do not \"save up\" your daily allowance of carbs to eat at one meal.  · Proteins have very little or no carbs per serving. Examples of proteins are beef, chicken, turkey, fish, eggs, tofu, cheese, cottage cheese, and peanut butter. A serving size of meat is 3 ounces, which is about the size of a deck of cards. Examples of meat substitute serving sizes (equal to 1 ounce of meat) are 1/4 cup of cottage cheese, 1 egg, 1 tablespoon of peanut butter, and ½ cup of tofu. How can you eat out and still eat healthy? · Learn to estimate the serving sizes of foods that have carbohydrate. If you measure food at home, it will be easier to estimate the amount in a serving of restaurant food. · If the meal you order has too much carbohydrate (such as potatoes, corn, or baked beans), ask to have a low-carbohydrate food instead. Ask for a salad or green vegetables. · If you use insulin, check your blood sugar before and after eating out to help you plan how much to eat in the future. · If you eat more carbohydrate at a meal than you had planned, take a walk or do other exercise. This will help lower your blood sugar. What else should you know? · Limit saturated fat, such as the fat from meat and dairy products. This is a healthy choice because people who have diabetes are at higher risk of heart disease. So choose lean cuts of meat and nonfat or low-fat dairy products.  Use olive or canola oil instead of butter or shortening when cooking. · Don't skip meals. Your blood sugar may drop too low if you skip meals and take insulin or certain medicines for diabetes. · Check with your doctor before you drink alcohol. Alcohol can cause your blood sugar to drop too low. Alcohol can also cause a bad reaction if you take certain diabetes medicines. Follow-up care is a key part of your treatment and safety. Be sure to make and go to all appointments, and call your doctor if you are having problems. It's also a good idea to know your test results and keep a list of the medicines you take. Where can you learn more? Go to [email or web address] and sign in to your GoGoPin account. Enter W820 in the Touch of Classic box to learn more about \"Learning About Diabetes Food Guidelines. \"     If you do not have an account, please click on the \"Sign Up Now\" link. Current as of: March 13, 2017  Content Version: 11.3  © 4718-4040 [employer]. Care instructions adapted under license by [facility] ([facility]). If you have questions about a medical condition or this instruction, always ask your healthcare professional. [place] [address] any warranty or liability for your use of this information. Patient Education        Diabetic Neuropathy: Care Instructions  Your Care Instructions  When you have diabetes, your blood sugar level may get too high. Over time, high blood sugar levels can damage nerves. This is called diabetic neuropathy. Nerve damage can cause pain, burning, tingling, and numbness and may leave you feeling weak. The feet are often affected. When you have nerve damage in your feet, you cannot feel your feet and toes as well as normal and may not notice cuts or sores. Even a small injury can lead to a serious infection. It is very important that you follow your doctor's advice on foot care. Sometimes diabetes damages nerves that help the body function.  If this happens, your blood pressure, sweating, digestion, and urination might be affected. Your doctor may give you a target blood sugar level that is higher or lower than you are used to. Try to keep your blood sugar very close to this target level to prevent more damage. Follow-up care is a key part of your treatment and safety. Be sure to make and go to all appointments, and call your doctor if you are having problems. It's also a good idea to know your test results and keep a list of the medicines you take. How can you care for yourself at home? · Take your medicines exactly as prescribed. Call your doctor if you think you are having a problem with your medicine. It is very important that you take your insulin or diabetes pills as your doctor tells you. · Try to keep blood sugar at your target level. ¨ Eat a variety of healthy foods, with carbohydrate spread out in your meals. A dietitian can help you plan meals. ¨ Try to get at least 30 minutes of exercise on most days. ¨ Check your blood sugar as many times each day as your doctor recommends. · Take and record your blood pressure at home if your doctor tells you to. Learn the importance of the two measures of blood pressure (such as 130 over 80, or 130/80). To take your blood pressure at home:  ¨ Ask your doctor to check your blood pressure monitor to be sure it is accurate and the cuff fits you. Also ask your doctor to watch you to make sure that you are using it right. ¨ Do not use medicine known to raise blood pressure (such as some nasal decongestant sprays) before taking your blood pressure. ¨ Avoid taking your blood pressure if you have just exercised or are nervous or upset. Rest at least 15 minutes before you take a reading. · Take pain medicines exactly as directed. ¨ If the doctor gave you a prescription medicine for pain, take it as prescribed. ¨ If you are not taking a prescription pain medicine, ask your doctor if you can take an over-the-counter medicine.   · Do not smoke. Smoking can increase your chance for a heart attack or stroke. If you need help quitting, talk to your doctor about stop-smoking programs and medicines. These can increase your chances of quitting for good. · Limit alcohol to 2 drinks a day for men and 1 drink a day for women. Too much alcohol can cause health problems. · Eat small meals often, rather than 2 or 3 large meals a day. To care for your feet  · Prevent injury by wearing shoes at all times, even when you are indoors. · Do foot care as part of your daily routine. Wash your feet and then rub lotion on your feet, but not between your toes. Use a handheld mirror or magnifying mirror to inspect your feet for blisters, cuts, cracks, or sores. · Have your toenails trimmed and filed straight across. · Wear shoes and socks that fit well. Soft shoes that have good support and that fit well (such as tennis shoes) are best for your feet. · Check your shoes for any loose objects or rough edges before you put them on. · Ask your doctor to check your feet during each visit. Your doctor may notice a foot problem you have missed. · Get early treatment for any foot problem, even a minor one. When should you call for help? Call your doctor now or seek immediate medical care if:  · You have symptoms of infection, such as:  ¨ Increased pain, swelling, warmth, or redness. ¨ Red streaks leading from the area. ¨ Pus draining from the area. ¨ A fever. · You have new or worse numbness, pain, or tingling in any part of your body. Watch closely for changes in your health, and be sure to contact your doctor if:  · You have a new problem with your feet, such as:  ¨ A new sore or ulcer. ¨ A break in the skin that is not healing after several days. ¨ Bleeding corns or calluses. ¨ An ingrown toenail. · You do not get better as expected. Where can you learn more? Go to https://zakia.CrowdCompass. org and sign in to your WalletKit account.  Enter F802 in not drink a lot of water close to bedtime, because the need to urinate may wake you up during the night. · Do not read or watch TV in bed. Use the bed only for sleeping and sexual activity. What to try  · Go to bed at the same time every night, and wake up at the same time every morning. Do not take naps during the day. · Keep your bedroom quiet, dark, and cool. · Sleep on a comfortable pillow and mattress. · If watching the clock makes you anxious, turn it facing away from you so you cannot see the time. · If you worry when you lie down, start a worry book. Well before bedtime, write down your worries, and then set the book and your concerns aside. · Try meditation or other relaxation techniques before you go to bed. · If you cannot fall asleep, get up and go to another room until you feel sleepy. Do something relaxing. Repeat your bedtime routine before you go to bed again. · Make your house quiet and calm about an hour before bedtime. Turn down the lights, turn off the TV, log off the computer, and turn down the volume on music. This can help you relax after a busy day. When should you call for help? Watch closely for changes in your health, and be sure to contact your doctor if:  · Your efforts to improve your sleep do not work. · Your insomnia gets worse. · You have been feeling down, depressed, or hopeless or have lost interest in things that you usually enjoy. Where can you learn more? Go to [email or web address] [email or web address] and sign in to your KXEN account. Enter P513 in the [facility] box to learn more about \"Insomnia: Care Instructions. \"     If you do not have an account, please click on the \"Sign Up Now\" link. Current as of: July 26, 2016  Content Version: 11.3  © 9819-8373 [employer]. Care instructions adapted under license by [facility] ([facility]).  If you have questions about a medical condition or this instruction, always ask your healthcare professional. Healthwise, [facility] disclaims any warranty or liability for your use of this information. Patient Education          zolpidem  Pronunciation:  zole PI dem  Brand:  Ambien, Ambien CR, Edluar, Intermezzo, Zolpimist  What is the most important information I should know about zolpidem? Zolpidem may cause a severe allergic reaction. Stop taking zolpidem and get emergency medical help if you have any of these signs of an allergic reaction: hives; difficulty breathing; swelling of your face, lips, tongue, or throat. Never take zolpidem in larger amounts or for longer than prescribed. Do not take zolpidem if you have consumed alcohol during the day or just before bed. Some people using this medicine have engaged in activity such as driving, eating, walking, making phone calls, or having sex and later having no memory of the activity. What is zolpidem? Zolpidem is a sedative, also called a hypnotic. It affects chemicals in the brain that may be unbalanced in people with sleep problems (insomnia). Zolpidem is used to treat insomnia. The immediate-release forms of zolpidem are Ambien, Intermezzo,Edluar, and Zolpimist, which are used to help you fall asleep. The extended-release form of zolpidem is Ambien CR, which has a first layer that dissolves quickly to help you fall asleep, and a second layer that dissolves slowly to help you stay asleep. Ambien, Edluar, and Zolpimist are used to help you fall asleep when you first go to bed. Intermezzo, is used to help you fall back to sleep if you wake up in the middle of the night and then have trouble sleeping. Your doctor will determine which form of zolpidem is best for you. Zolpidem may also be used for purposes not listed in this medication guide. What should I discuss with my healthcare provider before taking zolpidem?   Some people using this medicine have engaged in activity such as driving, eating, walking, making phone calls, or having sex and later inhibitions, no fear of danger, or thoughts of suicide or hurting yourself. Stop using zolpidem and call your doctor at once if you have:  · chest pain, fast or irregular heartbeat, feeling short of breath;  · trouble breathing or swallowing; or  · feeling like you might pass out. Common side effects may include:  · daytime drowsiness, dizziness, weakness, feeling \"drugged\" or light-headed;  · tired feeling, loss of coordination;  · stuffy nose, dry mouth, nose or throat irritation;  · nausea, constipation, diarrhea, upset stomach; or  · headache, muscle pain. This is not a complete list of side effects and others may occur. Call your doctor for medical advice about side effects. You may report side effects to FDA at 9-504-FDA-7579. What other drugs will affect zolpidem? Taking zolpidem with other drugs that make you sleepy or slow your breathing can cause dangerous or life-threatening side effects. Ask your doctor before taking a sleeping pill, narcotic pain medicine, prescription cough medicine, a muscle relaxer, or medicine for anxiety, depression, or seizures. Other drugs may interact with zolpidem, including prescription and over-the-counter medicines, vitamins, and herbal products. Tell each of your health care providers about all medicines you use now and any medicine you start or stop using. Where can I get more information? Your pharmacist can provide more information about zolpidem. Remember, keep this and all other medicines out of the reach of children, never share your medicines with others, and use this medication only for the indication prescribed. Every effort has been made to ensure that the information provided by Brenden K[address] is accurate, up-to-date, and complete, but no guarantee is made to that effect. Drug information contained herein may be time sensitive.  [employer] information has been compiled for use by healthcare practitioners and consumers in the [place] and therefore [employer] does not warrant that uses outside of the [place] are appropriate, unless specifically indicated otherwise. [employer]'s drug information does not endorse drugs, diagnose patients or recommend therapy. [facility] drug information is an informational resource designed to assist licensed healthcare practitioners in caring for their patients and/or to serve consumers viewing this service as a supplement to, and not a substitute for, the expertise, skill, knowledge and judgment of healthcare practitioners. The absence of a warning for a given drug or drug combination in no way should be construed to indicate that the drug or drug combination is safe, effective or appropriate for any given patient. [facility] does not assume any responsibility for any aspect of healthcare administered with the aid of information [facility] provides. The information contained herein is not intended to cover all possible uses, directions, precautions, warnings, drug interactions, allergic reactions, or adverse effects. If you have questions about the drugs you are taking, check with your doctor, nurse or pharmacist.  Copyright 9048-1271 [address]: 10.04. Revision date: 9/19/2016. Care instructions adapted under license by [facility] ([facility]). If you have questions about a medical condition or this instruction, always ask your healthcare professional. [place] [address] any warranty or liability for your use of this information.

## 2017-11-06 NOTE — CARE COORDINATION
for overcoming my barriers:   Pt is willing to start counting carbs per meal and keep a log / include amount of insulin taken   Pt will bring her glucometer into next appt for accuracy check  Confidence: 9/10  Anticipated Goal Completion Date: 9/19/17              Prior to Admission medications    Medication Sig Start Date End Date Taking? Authorizing Provider   zolpidem (AMBIEN) 10 MG tablet Take 1 tablet by mouth nightly as needed for Sleep 11/6/17   TRESSA Becerril   valsartan (DIOVAN) 160 MG tablet TAKE ONE TABLET BY MOUTH ONCE DAILY 11/3/17   TRESSA Becerril   fluticasone Phylliss Cortes) 50 MCG/ACT nasal spray USE TWO SPRAY(S) IN EACH NOSTRIL ONCE DAILY 11/3/17   TRESSA Becerril   pregabalin (LYRICA) 150 MG capsule Take 1 capsule by mouth 2 times daily 11/3/17   TRESSA Becerril   prazosin (MINIPRESS) 1 MG capsule Take 1 capsule by mouth nightly 10/5/17   TRESSA Becerril   insulin glargine (LANTUS SOLOSTAR) 100 UNIT/ML injection pen Inject 22 Units into the skin nightly 9/14/17   TRESSA Becerril   levothyroxine (SYNTHROID) 88 MCG tablet TAKE ONE TABLET BY MOUTH ONCE DAILY 8/29/17   TRESSA Becerril   DULoxetine (CYMBALTA) 60 MG extended release capsule Take 1 capsule by mouth 2 times daily 8/29/17   TRESSA Becerril   furosemide (LASIX) 40 MG tablet Take 1 tablet by mouth daily 8/29/17   TRESSA Becerril   insulin aspart (NOVOLOG FLEXPEN) 100 UNIT/ML injection pen Inject 20 Units into the skin 3 times daily (before meals) INJECT THREE TIMES DAILY WITH MEALS BASED UPON RATIO AND CORRECTIVE FACTOR. AVERAGE 60 UNITS DAILY 8/29/17   TRESSA Becerril   nystatin (MYCOSTATIN) 987723 UNIT/GM ointment Apply topically 2 times daily.  8/29/17   TRESSA Becerril   tiZANidine (ZANAFLEX) 4 MG tablet TAKE ONE TABLET BY MOUTH EVERY 8 HOURS AS NEEDED FOR MUSCLE SPASMS 7/13/17   LAZARO Sousa DO   aspirin 81 MG EC tablet Take 81 mg by mouth daily

## 2017-11-08 ENCOUNTER — TELEPHONE (OUTPATIENT)
Dept: PRIMARY CARE CLINIC | Age: 57
End: 2017-11-08

## 2017-11-08 DIAGNOSIS — E87.5 SERUM POTASSIUM ELEVATED: Primary | ICD-10-CM

## 2017-11-08 DIAGNOSIS — R74.8 ELEVATED ALKALINE PHOSPHATASE LEVEL: ICD-10-CM

## 2017-11-08 DIAGNOSIS — E55.9 VITAMIN D DEFICIENCY: ICD-10-CM

## 2017-11-08 NOTE — TELEPHONE ENCOUNTER
----- Message from TRESSA Raman sent at 11/7/2017  8:41 AM CST -----  Urine good no abnormal protein  a1c 5.9 great control recheck in 3 months  Thyroid wnl  cmp K elevated avoid K supplements  Let cut diovan down to half daily  And recheck cmp in 1 week  Kidney function stable : will need to avoid nsaids  Alk phos elevated  Add vitamin d in 1 week

## 2017-11-13 DIAGNOSIS — M62.838 MUSCLE SPASM: ICD-10-CM

## 2017-11-13 DIAGNOSIS — E11.42 TYPE 2 DIABETES MELLITUS WITH DIABETIC POLYNEUROPATHY, WITH LONG-TERM CURRENT USE OF INSULIN (HCC): ICD-10-CM

## 2017-11-13 DIAGNOSIS — Z79.4 TYPE 2 DIABETES MELLITUS WITH DIABETIC POLYNEUROPATHY, WITH LONG-TERM CURRENT USE OF INSULIN (HCC): ICD-10-CM

## 2017-11-13 RX ORDER — TIZANIDINE 4 MG/1
TABLET ORAL
Qty: 90 TABLET | Refills: 3 | Status: SHIPPED | OUTPATIENT
Start: 2017-11-13 | End: 2018-03-05 | Stop reason: SDUPTHER

## 2017-11-20 ENCOUNTER — CARE COORDINATION (OUTPATIENT)
Dept: CARE COORDINATION | Age: 57
End: 2017-11-20

## 2017-12-04 ENCOUNTER — CARE COORDINATOR VISIT (OUTPATIENT)
Dept: CARE COORDINATION | Age: 57
End: 2017-12-04

## 2017-12-04 ENCOUNTER — OFFICE VISIT (OUTPATIENT)
Dept: PRIMARY CARE CLINIC | Age: 57
End: 2017-12-04
Payer: MEDICARE

## 2017-12-04 VITALS
HEART RATE: 85 BPM | SYSTOLIC BLOOD PRESSURE: 100 MMHG | WEIGHT: 226 LBS | HEIGHT: 61 IN | DIASTOLIC BLOOD PRESSURE: 68 MMHG | TEMPERATURE: 97 F | OXYGEN SATURATION: 96 % | BODY MASS INDEX: 42.67 KG/M2

## 2017-12-04 DIAGNOSIS — R74.8 ELEVATED ALKALINE PHOSPHATASE LEVEL: ICD-10-CM

## 2017-12-04 DIAGNOSIS — E87.5 HYPERKALEMIA: ICD-10-CM

## 2017-12-04 DIAGNOSIS — F51.5 NIGHTMARES: ICD-10-CM

## 2017-12-04 DIAGNOSIS — E55.9 VITAMIN D DEFICIENCY: ICD-10-CM

## 2017-12-04 DIAGNOSIS — F43.10 PTSD (POST-TRAUMATIC STRESS DISORDER): ICD-10-CM

## 2017-12-04 DIAGNOSIS — G47.01 INSOMNIA DUE TO MEDICAL CONDITION: Primary | ICD-10-CM

## 2017-12-04 LAB
ALBUMIN SERPL-MCNC: 4 G/DL (ref 3.5–5.2)
ALP BLD-CCNC: 139 U/L (ref 35–104)
ALT SERPL-CCNC: 13 U/L (ref 5–33)
ANION GAP SERPL CALCULATED.3IONS-SCNC: 16 MMOL/L (ref 7–19)
AST SERPL-CCNC: 21 U/L (ref 5–32)
BILIRUB SERPL-MCNC: <0.2 MG/DL (ref 0.2–1.2)
BUN BLDV-MCNC: 25 MG/DL (ref 6–20)
CALCIUM SERPL-MCNC: 9 MG/DL (ref 8.6–10)
CHLORIDE BLD-SCNC: 101 MMOL/L (ref 98–111)
CO2: 23 MMOL/L (ref 22–29)
CREAT SERPL-MCNC: 1.5 MG/DL (ref 0.5–0.9)
GFR NON-AFRICAN AMERICAN: 36
GLUCOSE BLD-MCNC: 212 MG/DL (ref 74–109)
POTASSIUM SERPL-SCNC: 4.3 MMOL/L (ref 3.5–5)
SODIUM BLD-SCNC: 140 MMOL/L (ref 136–145)
TOTAL PROTEIN: 7.3 G/DL (ref 6.6–8.7)
VITAMIN D 25-HYDROXY: 51.6 NG/ML

## 2017-12-04 PROCEDURE — G8484 FLU IMMUNIZE NO ADMIN: HCPCS | Performed by: NURSE PRACTITIONER

## 2017-12-04 PROCEDURE — 3014F SCREEN MAMMO DOC REV: CPT | Performed by: NURSE PRACTITIONER

## 2017-12-04 PROCEDURE — 3017F COLORECTAL CA SCREEN DOC REV: CPT | Performed by: NURSE PRACTITIONER

## 2017-12-04 PROCEDURE — 1036F TOBACCO NON-USER: CPT | Performed by: NURSE PRACTITIONER

## 2017-12-04 PROCEDURE — G8417 CALC BMI ABV UP PARAM F/U: HCPCS | Performed by: NURSE PRACTITIONER

## 2017-12-04 PROCEDURE — 99214 OFFICE O/P EST MOD 30 MIN: CPT | Performed by: NURSE PRACTITIONER

## 2017-12-04 PROCEDURE — G8427 DOCREV CUR MEDS BY ELIG CLIN: HCPCS | Performed by: NURSE PRACTITIONER

## 2017-12-04 PROCEDURE — G8598 ASA/ANTIPLAT THER USED: HCPCS | Performed by: NURSE PRACTITIONER

## 2017-12-04 RX ORDER — PRAZOSIN HYDROCHLORIDE 2 MG/1
2 CAPSULE ORAL NIGHTLY
Qty: 30 CAPSULE | Refills: 11 | Status: ON HOLD | OUTPATIENT
Start: 2017-12-04 | End: 2018-06-28 | Stop reason: HOSPADM

## 2017-12-04 RX ORDER — ZOLPIDEM TARTRATE 10 MG/1
10 TABLET ORAL NIGHTLY PRN
Qty: 30 TABLET | Refills: 0 | Status: SHIPPED | OUTPATIENT
Start: 2017-12-04 | End: 2018-01-05 | Stop reason: SDUPTHER

## 2017-12-04 ASSESSMENT — ENCOUNTER SYMPTOMS
TROUBLE SWALLOWING: 0
ABDOMINAL PAIN: 0
EYES NEGATIVE: 1
SHORTNESS OF BREATH: 0
COUGH: 0
BACK PAIN: 0
SINUS PRESSURE: 0
SORE THROAT: 0
WHEEZING: 0

## 2017-12-04 NOTE — CARE COORDINATION
month. Patient Reported Blood Glucose   Blood Glucose Tracker 12/4/2017 12/3/2017 12/2/2017 12/1/2017 11/30/2017   Before breakfast blood glucose 124 133 109 79 140   Before lunch blood glucose - 166 152 96 119   Before dinner blood glucose - 137 140 103 96       Barriers: lack of education  Plan for overcoming my barriers:   Pt is willing to start counting carbs per meal and keep a log / include amount of insulin taken   Pt will bring her glucometer into next appt for accuracy check  Confidence: 9/10  Anticipated Goal Completion Date: 9/19/17              Prior to Admission medications    Medication Sig Start Date End Date Taking? Authorizing Provider   zolpidem (AMBIEN) 10 MG tablet Take 1 tablet by mouth nightly as needed for Sleep .  12/4/17   TRESSA Quiroz   prazosin (MINIPRESS) 2 MG capsule Take 1 capsule by mouth nightly 12/4/17   TRESSA Quiroz   tiZANidine (ZANAFLEX) 4 MG tablet TAKE ONE TABLET BY MOUTH EVERY 8 HOURS AS NEEDED FOR MUSCLE SPASMS 11/13/17   LAZARO Dhaliwal DO   Insulin Pen Needle 31G X 8 MM MISC 1 each by Does not apply route daily 11/13/17   LAZARO Dhaliwal DO   valsartan (DIOVAN) 160 MG tablet TAKE ONE TABLET BY MOUTH ONCE DAILY 11/3/17   TRESSA Quiroz   fluticasone (FLONASE) 50 MCG/ACT nasal spray USE TWO SPRAY(S) IN EACH NOSTRIL ONCE DAILY 11/3/17   TRESSA Quiroz   pregabalin (LYRICA) 150 MG capsule Take 1 capsule by mouth 2 times daily 11/3/17   TRESSA Quiroz   insulin glargine (LANTUS SOLOSTAR) 100 UNIT/ML injection pen Inject 22 Units into the skin nightly 9/14/17   TRESSA Quiroz   levothyroxine (SYNTHROID) 88 MCG tablet TAKE ONE TABLET BY MOUTH ONCE DAILY 8/29/17   TRESSA Quiroz   DULoxetine (CYMBALTA) 60 MG extended release capsule Take 1 capsule by mouth 2 times daily 8/29/17   TRESSA Quiroz   furosemide (LASIX) 40 MG tablet Take 1 tablet by mouth daily 8/29/17   TRESSA Quiroz

## 2017-12-04 NOTE — PATIENT INSTRUCTIONS
Patient Education        Insomnia: Care Instructions  Your Care Instructions  Insomnia is the inability to sleep well. It is a common problem for most people at some time. Insomnia may make it hard for you to get to sleep, stay asleep, or sleep as long as you need to. This can make you tired and grouchy during the day. It can also make you forgetful, less effective at work, and unhappy. Insomnia can be caused by conditions such as depression or anxiety. Pain can also affect your ability to sleep. When these problems are solved, the insomnia usually clears up. But sometimes bad sleep habits can cause insomnia. If insomnia is affecting your work or your enjoyment of life, you can take steps to improve your sleep. Follow-up care is a key part of your treatment and safety. Be sure to make and go to all appointments, and call your doctor if you are having problems. It's also a good idea to know your test results and keep a list of the medicines you take. How can you care for yourself at home? What to avoid  · Do not have drinks with caffeine, such as coffee or black tea, for 8 hours before bed. · Do not smoke or use other types of tobacco near bedtime. Nicotine is a stimulant and can keep you awake. · Avoid drinking alcohol late in the evening, because it can cause you to wake in the middle of the night. · Do not eat a big meal close to bedtime. If you are hungry, eat a light snack. · Do not drink a lot of water close to bedtime, because the need to urinate may wake you up during the night. · Do not read or watch TV in bed. Use the bed only for sleeping and sexual activity. What to try  · Go to bed at the same time every night, and wake up at the same time every morning. Do not take naps during the day. · Keep your bedroom quiet, dark, and cool. · Sleep on a comfortable pillow and mattress. · If watching the clock makes you anxious, turn it facing away from you so you cannot see the time.   · If you worry when you lie down, start a worry book. Well before bedtime, write down your worries, and then set the book and your concerns aside. · Try meditation or other relaxation techniques before you go to bed. · If you cannot fall asleep, get up and go to another room until you feel sleepy. Do something relaxing. Repeat your bedtime routine before you go to bed again. · Make your house quiet and calm about an hour before bedtime. Turn down the lights, turn off the TV, log off the computer, and turn down the volume on music. This can help you relax after a busy day. When should you call for help? Watch closely for changes in your health, and be sure to contact your doctor if:  · Your efforts to improve your sleep do not work. · Your insomnia gets worse. · You have been feeling down, depressed, or hopeless or have lost interest in things that you usually enjoy. Where can you learn more? Go to https://ePrep.Teach 'n Go. org and sign in to your [employer] account. Enter P513 in the [employer] box to learn more about \"Insomnia: Care Instructions. \"     If you do not have an account, please click on the \"Sign Up Now\" link. Current as of: July 26, 2016  Content Version: 11.3  © 5010-0114 [facility], [employer]. Care instructions adapted under license by [facility] ([facility]). If you have questions about a medical condition or this instruction, always ask your healthcare professional. [place] [address] any warranty or liability for your use of this information. Patient Education        Learning About Vitamin D  Why is it important to get enough vitamin D? Your body needs vitamin D to absorb calcium. Calcium keeps your bones and muscles, including your heart, healthy and strong. If your muscles don't get enough calcium, they can cramp, hurt, or feel weak. You may have long-term (chronic) muscle aches and pains.   If you don't get enough vitamin D throughout life, you have an increased chance of having thin and brittle bones (osteoporosis) in your later years. Children who don't get enough vitamin D may not grow as much as others their age. They also have a chance of getting a rare disease called rickets. It causes weak bones. Vitamin D and calcium are added to many foods. And your body uses sunshine to make its own vitamin D. How much vitamin D do you need? The [place] of [employer] recommends that people ages 3 through 79 get 600 IU (international units) every day. Adults 71 and older need 800 IU every day. Blood tests for vitamin D can check your vitamin D level. But there is no standard normal range used by all laboratories. The [place] of [employer] recommends a blood level of 20 ng/mL of vitamin D for healthy bones. And most people in the [place] and [facility]) meet this goal.  How can you get more vitamin D? Foods that contain vitamin D include:  · Live Oak, tuna, and mackerel. These are some of the best foods to eat when you need to get more vitamin D.  · Cheese, egg yolks, and beef liver. These foods have vitamin D in small amounts. · Milk, soy drinks, orange juice, yogurt, margarine, and some kinds of cereal have vitamin D added to them. Some people don't make vitamin D as well as others. They may have to take extra care in getting enough vitamin D. Things that reduce how much vitamin D your body makes include:  · Dark skin, such as many  Americans have. · Age, especially if you are older than 72. · Digestive problems, such as Crohn's or celiac disease. · Liver and kidney disease. Some people who do not get enough vitamin D may need supplements. Are there any risks from taking vitamin D?  · Too much vitamin D:  ¨ Can damage your kidneys. ¨ Can cause nausea and vomiting, constipation, and weakness. ¨ Raises the amount of calcium in your blood. If this happens, you can get confused or have an irregular heart rhythm. · Vitamin D may interact with other medicines.  Tell your doctor about all of the medicines you take, including over-the-counter drugs, herbs, and pills. Tell your doctor about all of your current medical problems. Where can you learn more? Go to https://chpesimone.Sendmybag. org and sign in to your JolieBox account. Enter [id number] in the [facility] box to learn more about \"Learning About Vitamin D. \"     If you do not have an account, please click on the \"Sign Up Now\" link. Current as of: July 26, 2016  Content Version: 11.3  © 4522-0997 [employer]. Care instructions adapted under license by [facility] ([facility]). If you have questions about a medical condition or this instruction, always ask your healthcare professional. [place] [address] any warranty or liability for your use of this information. Patient Education        Post-Traumatic Stress Disorder (PTSD): Care Instructions  Your Care Instructions  Post-traumatic stress disorder (PTSD) is a mental condition that can result from being in or seeing a traumatic or terrifying event. These events can include combat, a terrorist attack, a natural disaster, a serious accident, an assault, or a rape. If you have PTSD, you may often relive the experience in nightmares or flashbacks. These are clear and frightening memories of the event. You may also have trouble sleeping. PTSD affects people in very different ways. It can interfere with daily activities such as work or school, and it can make you withdraw from friends or loved ones. Follow-up care is a key part of your treatment and safety. Be sure to make and go to all appointments, and call your doctor if you are having problems. It's also a good idea to know your test results and keep a list of the medicines you take. How can you care for yourself at home? · Take medicines exactly as directed. Call your doctor if you think you are having a problem with your medicine.   · Go to your counseling sessions and follow-up [employer]. Care instructions adapted under license by [facility] ([facility]). If you have questions about a medical condition or this instruction, always ask your healthcare professional. [address] any warranty or liability for your use of this information.

## 2017-12-05 ENCOUNTER — TELEPHONE (OUTPATIENT)
Dept: PRIMARY CARE CLINIC | Age: 57
End: 2017-12-05

## 2017-12-05 NOTE — TELEPHONE ENCOUNTER
----- Message from TRESSA Martínez sent at 12/4/2017  4:09 PM CST -----  cmp electroltes   Glucose 212 cont tight control  Kidney stable   Cont increase fluids  Alk phose elevated check vitamin d level

## 2017-12-19 ENCOUNTER — CARE COORDINATION (OUTPATIENT)
Dept: CARE COORDINATION | Age: 57
End: 2017-12-19

## 2018-01-05 ENCOUNTER — OFFICE VISIT (OUTPATIENT)
Dept: PRIMARY CARE CLINIC | Age: 58
End: 2018-01-05
Payer: MEDICARE

## 2018-01-05 VITALS
DIASTOLIC BLOOD PRESSURE: 82 MMHG | OXYGEN SATURATION: 98 % | BODY MASS INDEX: 43.85 KG/M2 | HEIGHT: 61 IN | TEMPERATURE: 97.8 F | HEART RATE: 82 BPM | WEIGHT: 232.25 LBS | SYSTOLIC BLOOD PRESSURE: 128 MMHG

## 2018-01-05 DIAGNOSIS — Z79.4 TYPE 2 DIABETES MELLITUS WITH DIABETIC POLYNEUROPATHY, WITH LONG-TERM CURRENT USE OF INSULIN (HCC): Primary | ICD-10-CM

## 2018-01-05 DIAGNOSIS — N18.32 CHRONIC KIDNEY DISEASE (CKD) STAGE G3B/A2, MODERATELY DECREASED GLOMERULAR FILTRATION RATE (GFR) BETWEEN 30-44 ML/MIN/1.73 SQUARE METER AND ALBUMINURIA CREATININE RATIO BETWEEN 30-299 MG/G (HCC): ICD-10-CM

## 2018-01-05 DIAGNOSIS — E78.2 MIXED HYPERLIPIDEMIA: ICD-10-CM

## 2018-01-05 DIAGNOSIS — E11.42 TYPE 2 DIABETES MELLITUS WITH DIABETIC POLYNEUROPATHY, WITH LONG-TERM CURRENT USE OF INSULIN (HCC): Primary | ICD-10-CM

## 2018-01-05 DIAGNOSIS — G47.01 INSOMNIA DUE TO MEDICAL CONDITION: ICD-10-CM

## 2018-01-05 DIAGNOSIS — I10 ESSENTIAL HYPERTENSION: ICD-10-CM

## 2018-01-05 DIAGNOSIS — E03.9 ACQUIRED HYPOTHYROIDISM: ICD-10-CM

## 2018-01-05 DIAGNOSIS — F43.10 PTSD (POST-TRAUMATIC STRESS DISORDER): ICD-10-CM

## 2018-01-05 PROCEDURE — G8598 ASA/ANTIPLAT THER USED: HCPCS | Performed by: PEDIATRICS

## 2018-01-05 PROCEDURE — 3017F COLORECTAL CA SCREEN DOC REV: CPT | Performed by: PEDIATRICS

## 2018-01-05 PROCEDURE — 3014F SCREEN MAMMO DOC REV: CPT | Performed by: PEDIATRICS

## 2018-01-05 PROCEDURE — G8427 DOCREV CUR MEDS BY ELIG CLIN: HCPCS | Performed by: PEDIATRICS

## 2018-01-05 PROCEDURE — 3046F HEMOGLOBIN A1C LEVEL >9.0%: CPT | Performed by: PEDIATRICS

## 2018-01-05 PROCEDURE — G8484 FLU IMMUNIZE NO ADMIN: HCPCS | Performed by: PEDIATRICS

## 2018-01-05 PROCEDURE — 99214 OFFICE O/P EST MOD 30 MIN: CPT | Performed by: PEDIATRICS

## 2018-01-05 PROCEDURE — 1036F TOBACCO NON-USER: CPT | Performed by: PEDIATRICS

## 2018-01-05 PROCEDURE — G8417 CALC BMI ABV UP PARAM F/U: HCPCS | Performed by: PEDIATRICS

## 2018-01-05 RX ORDER — ZOLPIDEM TARTRATE 10 MG/1
10 TABLET ORAL NIGHTLY PRN
Qty: 30 TABLET | Refills: 0 | Status: SHIPPED | OUTPATIENT
Start: 2018-01-05 | End: 2018-02-04

## 2018-01-05 ASSESSMENT — ENCOUNTER SYMPTOMS
NAUSEA: 0
CHOKING: 0
WHEEZING: 0
SHORTNESS OF BREATH: 0
VOICE CHANGE: 0
TROUBLE SWALLOWING: 0
SINUS PRESSURE: 0
SORE THROAT: 0
DIARRHEA: 0
ABDOMINAL PAIN: 0
BACK PAIN: 0
VOMITING: 0
CONSTIPATION: 0
ABDOMINAL DISTENTION: 0
COUGH: 0
CHEST TIGHTNESS: 0

## 2018-01-05 NOTE — PROGRESS NOTES
[facility], [address]  Phone [phone]   Fax [phone]      OFFICE VISIT: 2018    Fortino Baca- : 1960      HPI  Reason For Visit:  Karen Wilhelm is a 65 y.o. Health Maintenance : Hep C, Diabetic Eye Exam   Date of Most Recent Physical:  Due   Medicare Health Risk Assessment Form completed and in chart? 17         The patient presents today for a 1 month follow up. See office note from 2017. Pt reports her blood sugar has been running mediocre. Pt has a log of readings here today for review. She reports that she is checking 3 times a day blood sugars upper 90s To approximately 170. On average probably around 130  She is taking Lantus 14 units nightly and NovoLog 20 units with meals. She is taking Lyrica and Cymbalta for her diabetic neuropathy    Blood pressure:  Metoprolol succinate 50 mg daily  Diovan 160 mg daily (this is decreased from 320 mg daily)  Home blood pressure monitoring      Pt is requesting some zofran for her stomach. Insomnia  She is taking [place] and needs refill today. Last fill of this was on 17 for #30  TOMMY was reviewed today per office protocol. Report shows No discrepancies. Fill pattern is consistent from single provider(s) at single pharmacy(s). Request # [id number]  Controlled Substances Monitoring:     Attestation: The Prescription Monitoring Report for this patient was reviewed today. Hayder Bass DO)  Documentation: Possible medication side effects, risk of tolerance and/or dependence, and alternative treatments discussed., No signs of potential drug abuse or diversion identified. (Karen Carvalho, )  Medication Contracts: Existing medication contract. Hayder Bass, )  She is also taking minipress for nightmares. She is also taking seroquel 450mg nightly. She is still struggling with sleep  She is very anxious about night time.   It is very stressful to Tenderness found. Medial joint line tenderness noted. Lymphadenopathy:     She has no cervical adenopathy. Neurological: She is alert and oriented to person, place, and time. She has normal strength. She displays no atrophy, no tremor and normal reflexes. No cranial nerve deficit or sensory deficit. Coordination and gait normal.   No focal deficits appreciated   Skin: Skin is warm and dry. No rash noted. She is not diaphoretic. Unable to see knee as tight jeans. Psychiatric: She has a normal mood and affect. Her speech is normal and behavior is normal.        Vitals reviewed. ASSESSMENT      ICD-10-CM ICD-9-CM    1. Type 2 diabetes mellitus with diabetic polyneuropathy, with long-term current use of insulin ([facility]) E11.42 250.60     Z79.4 357.2      V58.67    2. Insomnia due to medical condition G47.01 327.01 zolpidem (AMBIEN) 10 MG tablet   3. PTSD (post-traumatic stress disorder) F43.10 309.81 zolpidem (AMBIEN) 10 MG tablet   4. Essential hypertension I10 401.9    5. Mixed hyperlipidemia E78.2 272.2    6. Acquired hypothyroidism E03.9 244.9    7. Chronic kidney disease (CKD) stage G3b/A2, moderately decreased glomerular filtration rate (GFR) between 30-44 mL/min/1.73 square meter and albuminuria creatinine ratio between  mg/g N18.3 585.3        PLAN      ICD-10-CM ICD-9-CM    1. Type 2 diabetes mellitus with diabetic polyneuropathy, with long-term current use of insulin ([facility]) E11.42 250.60 Is fairly well controlled on most recent hemoglobin A1c which was 6.6.      Z79.4 357.2      V58.67    2. Insomnia due to medical condition G47.01 327.01 zolpidem (AMBIEN) 10 MG tablet   3. PTSD (post-traumatic stress disorder) F43.10 309.81 zolpidem (AMBIEN) 10 MG tablet  She is presently taking Cymbalta 60 mg twice daily as well as Seroquel 450 mg nightly   4. Essential hypertension I10 401.9 This is controlled on recheck and she is stable on her present medication regimen.      5. Mixed hyperlipidemia E78.2

## 2018-02-05 ENCOUNTER — OFFICE VISIT (OUTPATIENT)
Dept: PRIMARY CARE CLINIC | Age: 58
End: 2018-02-05
Payer: MEDICARE

## 2018-02-05 VITALS
DIASTOLIC BLOOD PRESSURE: 80 MMHG | TEMPERATURE: 98 F | HEIGHT: 61 IN | BODY MASS INDEX: 43.99 KG/M2 | SYSTOLIC BLOOD PRESSURE: 130 MMHG | WEIGHT: 233 LBS | OXYGEN SATURATION: 98 % | HEART RATE: 75 BPM

## 2018-02-05 DIAGNOSIS — E11.42 TYPE 2 DIABETES MELLITUS WITH DIABETIC POLYNEUROPATHY, WITH LONG-TERM CURRENT USE OF INSULIN (HCC): Primary | ICD-10-CM

## 2018-02-05 DIAGNOSIS — F43.10 PTSD (POST-TRAUMATIC STRESS DISORDER): ICD-10-CM

## 2018-02-05 DIAGNOSIS — F31.78 BIPOLAR DISORDER, IN FULL REMISSION, MOST RECENT EPISODE MIXED (HCC): ICD-10-CM

## 2018-02-05 DIAGNOSIS — F99 INSOMNIA DUE TO OTHER MENTAL DISORDER: ICD-10-CM

## 2018-02-05 DIAGNOSIS — N18.32 CHRONIC KIDNEY DISEASE (CKD) STAGE G3B/A2, MODERATELY DECREASED GLOMERULAR FILTRATION RATE (GFR) BETWEEN 30-44 ML/MIN/1.73 SQUARE METER AND ALBUMINURIA CREATININE RATIO BETWEEN 30-299 MG/G (HCC): ICD-10-CM

## 2018-02-05 DIAGNOSIS — F41.8 SITUATIONAL ANXIETY: ICD-10-CM

## 2018-02-05 DIAGNOSIS — G47.01 INSOMNIA DUE TO MEDICAL CONDITION: ICD-10-CM

## 2018-02-05 DIAGNOSIS — F51.05 INSOMNIA DUE TO OTHER MENTAL DISORDER: ICD-10-CM

## 2018-02-05 DIAGNOSIS — Z79.4 TYPE 2 DIABETES MELLITUS WITH DIABETIC POLYNEUROPATHY, WITH LONG-TERM CURRENT USE OF INSULIN (HCC): Primary | ICD-10-CM

## 2018-02-05 PROCEDURE — 3014F SCREEN MAMMO DOC REV: CPT | Performed by: NURSE PRACTITIONER

## 2018-02-05 PROCEDURE — G8427 DOCREV CUR MEDS BY ELIG CLIN: HCPCS | Performed by: NURSE PRACTITIONER

## 2018-02-05 PROCEDURE — G8598 ASA/ANTIPLAT THER USED: HCPCS | Performed by: NURSE PRACTITIONER

## 2018-02-05 PROCEDURE — 99213 OFFICE O/P EST LOW 20 MIN: CPT | Performed by: NURSE PRACTITIONER

## 2018-02-05 PROCEDURE — 3017F COLORECTAL CA SCREEN DOC REV: CPT | Performed by: NURSE PRACTITIONER

## 2018-02-05 PROCEDURE — 3046F HEMOGLOBIN A1C LEVEL >9.0%: CPT | Performed by: NURSE PRACTITIONER

## 2018-02-05 PROCEDURE — G8417 CALC BMI ABV UP PARAM F/U: HCPCS | Performed by: NURSE PRACTITIONER

## 2018-02-05 PROCEDURE — 1036F TOBACCO NON-USER: CPT | Performed by: NURSE PRACTITIONER

## 2018-02-05 PROCEDURE — G8484 FLU IMMUNIZE NO ADMIN: HCPCS | Performed by: NURSE PRACTITIONER

## 2018-02-05 RX ORDER — DIAZEPAM 2 MG/1
2 TABLET ORAL EVERY 12 HOURS PRN
Qty: 10 TABLET | Refills: 0 | Status: SHIPPED | OUTPATIENT
Start: 2018-02-05 | End: 2018-02-19 | Stop reason: SDUPTHER

## 2018-02-05 RX ORDER — ZOLPIDEM TARTRATE 10 MG/1
10 TABLET ORAL NIGHTLY PRN
Qty: 30 TABLET | Refills: 0 | Status: SHIPPED | OUTPATIENT
Start: 2018-02-05 | End: 2018-03-05 | Stop reason: SDUPTHER

## 2018-02-05 RX ORDER — ONDANSETRON 4 MG/1
4 TABLET, ORALLY DISINTEGRATING ORAL EVERY 8 HOURS PRN
Qty: 30 TABLET | Refills: 0 | Status: SHIPPED | OUTPATIENT
Start: 2018-02-05 | End: 2018-03-05 | Stop reason: SDUPTHER

## 2018-02-05 ASSESSMENT — ENCOUNTER SYMPTOMS
TROUBLE SWALLOWING: 0
COUGH: 0
BACK PAIN: 0
SORE THROAT: 0
WHEEZING: 0
EYES NEGATIVE: 1
SINUS PRESSURE: 0
ABDOMINAL PAIN: 0
SHORTNESS OF BREATH: 0

## 2018-02-05 NOTE — PROGRESS NOTES
chart.    Prior to Visit Medications    Medication Sig Taking? Authorizing Provider   zolpidem (AMBIEN) 10 MG tablet Take 1 tablet by mouth nightly as needed for Sleep for up to 30 days. Yes TRESSA Bonilla   ondansetron (ZOFRAN ODT) 4 MG disintegrating tablet Take 1 tablet by mouth every 8 hours as needed for Nausea or Vomiting Yes TRESSA Bonilla   diazepam (VALIUM) 2 MG tablet Take 1 tablet by mouth every 12 hours as needed for Anxiety for up to 30 days. Yes TRESSA Bonilla   prazosin (MINIPRESS) 2 MG capsule Take 1 capsule by mouth nightly Yes TRESSA Bonilla   tiZANidine (ZANAFLEX) 4 MG tablet TAKE ONE TABLET BY MOUTH EVERY 8 HOURS AS NEEDED FOR MUSCLE SPASMS Yes LAZARO [address] Lopez,    valsartan (DIOVAN) 160 MG tablet TAKE ONE TABLET BY MOUTH ONCE DAILY  Patient taking differently: TAKE ONE HALF TABLET BY MOUTH ONCE DAILY Yes TRESSA Bonilla   fluticasone (FLONASE) 50 MCG/ACT nasal spray USE TWO SPRAY(S) IN EACH NOSTRIL ONCE DAILY Yes TRESSA Bonilla   pregabalin (LYRICA) 150 MG capsule Take 1 capsule by mouth 2 times daily Yes TRESSA Bonilla   insulin glargine (LANTUS SOLOSTAR) 100 UNIT/ML injection pen Inject 22 Units into the skin nightly  Patient taking differently: Inject 14 Units into the skin nightly  Yes TRESSA Bonilla   levothyroxine (SYNTHROID) 88 MCG tablet TAKE ONE TABLET BY MOUTH ONCE DAILY Yes TRESSA Bonilla   DULoxetine (CYMBALTA) 60 MG extended release capsule Take 1 capsule by mouth 2 times daily Yes TRESSA Bonilla   furosemide (LASIX) 40 MG tablet Take 1 tablet by mouth daily Yes TRESSA Bonilla   insulin aspart (NOVOLOG FLEXPEN) 100 UNIT/ML injection pen Inject 20 Units into the skin 3 times daily (before meals) INJECT THREE TIMES DAILY WITH MEALS BASED UPON RATIO AND CORRECTIVE FACTOR.  AVERAGE 60 UNITS DAILY Yes TRESSA Bonilla   nystatin (MYCOSTATIN) 670765 UNIT/GM ointment Apply topically 2 times daily. Patient taking differently: Apply topically 2 times daily as needed Apply topically 2 times daily.  Yes Edmund Blue, APRN   aspirin 81 MG EC tablet Take 81 mg by mouth daily Yes Historical Provider, MD   QUEtiapine (SEROQUEL) 300 MG tablet Take 1.5 tablets by mouth nightly  Patient taking differently: Take 300 mg by mouth nightly Indications: Manic-Depression  Yes Edmund Blue, APRN   metoprolol succinate (TOPROL XL) 50 MG extended release tablet Take 1 tablet by mouth daily Yes LAZARO Jarvis DO   pantoprazole (PROTONIX) 40 MG tablet Take 1 tablet by mouth daily Yes LAZARO Jarvis DO   linaclotide (LINZESS) 290 MCG CAPS capsule Take 1 capsule by mouth every morning (before breakfast) Yes LAZARO Jarvis DO   atorvastatin (LIPITOR) 40 MG tablet Take 1 tablet by mouth daily Yes LAZARO Jarvis DO       Allergies: Dilaudid [hydromorphone hcl] and Hydrocodone    Past Medical History:   Diagnosis Date    Anxiety     Bipolar 1 disorder ([facility]     CAD (coronary artery disease)     Chronic kidney disease (CKD) stage G3b/A2, moderately decreased glomerular filtration rate (GFR) between 30-44 mL/min/1.73 square meter and albuminuria creatinine ratio between  mg/g 11/21/2016    Depression     [facility]     GERD (gastroesophageal reflux disease)     History of blood transfusion     History of suicidal ideation     Hyperlipidemia     Hypertension     Hypothyroidism     Insomnia     Kidney disease     stage 3 failure    MI, 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 Well before bedtime, write down your worries, and then set the book and your concerns aside. · Try meditation or other relaxation techniques before you go to bed. · If you cannot fall asleep, get up and go to another room until you feel sleepy. Do something relaxing. Repeat your bedtime routine before you go to bed again. · Make your house quiet and calm about an hour before bedtime. Turn down the lights, turn off the TV, log off the computer, and turn down the volume on music. This can help you relax after a busy day. When should you call for help? Watch closely for changes in your health, and be sure to contact your doctor if:  ? · Your efforts to improve your sleep do not work. ? · Your insomnia gets worse. ? · You have been feeling down, depressed, or hopeless or have lost interest in things that you usually enjoy. Where can you learn more? Go to [email or web address].[email or web address]. org and sign in to your [employer] account. Enter P513 in the [facility] box to learn more about \"Insomnia: Care Instructions. \"     If you do not have an account, please click on the \"Sign Up Now\" link. Current as of: March 5, 2017  Content Version: 11.5  © 6641-3497 Healthwise, [employer]. Care instructions adapted under license by [facility] ([facility]). If you have questions about a medical condition or this instruction, always ask your healthcare professional. [place] [address] any warranty or liability for your use of this information. Controlled Substances Monitoring:                   Additional Instructions: As always, patient is advised to bring in medication bottles in order to correctly reconcile with our current list.      Kashmir Arias received counseling on the following healthy behaviors: none    Patient given educational materials on plan of care    I have instructed Lilliamgo to complete a self tracking handout on blood sugars and instructed them to bring it with them to

## 2018-02-19 ENCOUNTER — OFFICE VISIT (OUTPATIENT)
Dept: PRIMARY CARE CLINIC | Age: 58
End: 2018-02-19
Payer: MEDICARE

## 2018-02-19 VITALS
HEIGHT: 61 IN | SYSTOLIC BLOOD PRESSURE: 130 MMHG | WEIGHT: 236.75 LBS | DIASTOLIC BLOOD PRESSURE: 86 MMHG | BODY MASS INDEX: 44.7 KG/M2 | HEART RATE: 86 BPM | TEMPERATURE: 98 F | OXYGEN SATURATION: 98 %

## 2018-02-19 DIAGNOSIS — B37.9 YEAST INFECTION: ICD-10-CM

## 2018-02-19 DIAGNOSIS — F99 INSOMNIA DUE TO OTHER MENTAL DISORDER: ICD-10-CM

## 2018-02-19 DIAGNOSIS — F41.8 SITUATIONAL ANXIETY: Primary | ICD-10-CM

## 2018-02-19 DIAGNOSIS — F51.05 INSOMNIA DUE TO OTHER MENTAL DISORDER: ICD-10-CM

## 2018-02-19 PROCEDURE — 3014F SCREEN MAMMO DOC REV: CPT | Performed by: NURSE PRACTITIONER

## 2018-02-19 PROCEDURE — G8598 ASA/ANTIPLAT THER USED: HCPCS | Performed by: NURSE PRACTITIONER

## 2018-02-19 PROCEDURE — 3017F COLORECTAL CA SCREEN DOC REV: CPT | Performed by: NURSE PRACTITIONER

## 2018-02-19 PROCEDURE — 99213 OFFICE O/P EST LOW 20 MIN: CPT | Performed by: NURSE PRACTITIONER

## 2018-02-19 PROCEDURE — G8484 FLU IMMUNIZE NO ADMIN: HCPCS | Performed by: NURSE PRACTITIONER

## 2018-02-19 PROCEDURE — G8427 DOCREV CUR MEDS BY ELIG CLIN: HCPCS | Performed by: NURSE PRACTITIONER

## 2018-02-19 PROCEDURE — 1036F TOBACCO NON-USER: CPT | Performed by: NURSE PRACTITIONER

## 2018-02-19 PROCEDURE — G8417 CALC BMI ABV UP PARAM F/U: HCPCS | Performed by: NURSE PRACTITIONER

## 2018-02-19 RX ORDER — NYSTATIN 100000 [USP'U]/G
POWDER TOPICAL
Qty: 45 G | Refills: 5 | Status: SHIPPED | OUTPATIENT
Start: 2018-02-19 | End: 2018-05-22 | Stop reason: ALTCHOICE

## 2018-02-19 RX ORDER — DIAZEPAM 5 MG/1
5 TABLET ORAL EVERY EVENING
Qty: 30 TABLET | Refills: 0 | Status: SHIPPED | OUTPATIENT
Start: 2018-02-19 | End: 2018-03-23 | Stop reason: SDUPTHER

## 2018-02-19 ASSESSMENT — ENCOUNTER SYMPTOMS
BACK PAIN: 0
SORE THROAT: 0
SINUS PRESSURE: 0
EYES NEGATIVE: 1
WHEEZING: 0
COUGH: 0
ABDOMINAL PAIN: 0
SHORTNESS OF BREATH: 0
TROUBLE SWALLOWING: 0

## 2018-02-19 NOTE — PROGRESS NOTES
[address]  [place], [address] [place] [address] Rd  Phone [phone]   Fax [phone]      OFFICE VISIT: 2018    Grover Loraine- : 1960      Reason For Visit:  Judy Arauz is a 65 y.o. female who is here for Discuss Medications (pt needs more valium due to scheduling conflicts ) and Yeast Infection (wans nystatin cream )         Health Maintenance none      HPI        Patient is here about her anxiety medication  She has been taking the valium twice a day  Reports that taking 2mg every evening  Reports it seems to work ok  But not sleeping well still  She can fall asleep but doesn't stay asleep  She just feels nervous like trying to crawl out of skin  And wonders if the does needs changed    She reports no issues at home or with family  But just still this way    She reports was to see Dr Ed Aldrich  But they called that am he was sick  And wont see again till next month on the 12th    He is the only one there  And will have to see her to start treatment  She is anxious about it    '    Reports also has yeast in the \"folds\"  She is trying to keep dry  But issues  She is considering sleeve for her stomach  But trying to watch what she is eating some         height is 5' 1\" (1.549 m) and weight is 236 lb 12 oz (107.4 kg). Her temporal temperature is 98 °F (36.7 °C). Her blood pressure is 130/86 and her pulse is 86. Her oxygen saturation is 98%. Body mass index is 44.73 kg/m².     Results for orders placed or performed in visit on 17   Comprehensive Metabolic Panel   Result Value Ref Range    Sodium 140 136 - 145 mmol/L    Potassium 4.3 3.5 - 5.0 mmol/L    Chloride 101 98 - 111 mmol/L    CO2 23 22 - 29 mmol/L    Anion Gap 16 7 - 19 mmol/L    Glucose 212 (H) 74 - 109 mg/dL    BUN 25 (H) 6 - 20 mg/dL    CREATININE 1.5 (H) 0.5 - 0.9 mg/dL    GFR Non- 36 (A) >60    Calcium 9.0 8.6 - 10.0 mg/dL    Total Protein 7.3 6.6 - 8.7 g/dL    Alb 4.0 3.5 - 5.2 g/dL    Total Bilirubin <0.2 0.2 - 1.2 mg/dL Alkaline Phosphatase 139 (H) 35 - 104 U/L    ALT 13 5 - 33 U/L    AST 21 5 - 32 U/L   Vitamin D 25 Hydroxy   Result Value Ref Range    Vit D, 25-Hydroxy 51.6 >=30 ng/mL       I have reviewed the following with the Ms. Olivarez   Lab Review   Orders Only on 12/04/2017   Component Date Value    Sodium 12/04/2017 140     Potassium 12/04/2017 4.3     Chloride 12/04/2017 101     CO2 12/04/2017 23     Anion Gap 12/04/2017 16     Glucose 12/04/2017 212*    BUN 12/04/2017 25*    CREATININE 12/04/2017 1.5*    GFR Non- 12/04/2017 36*    Calcium 12/04/2017 9.0     Total Protein 12/04/2017 7.3     Alb 12/04/2017 4.0     Total Bilirubin 12/04/2017 <0.2     Alkaline Phosphatase 12/04/2017 139*    ALT 12/04/2017 13     AST 12/04/2017 21     Vit D, 25-Hydroxy 12/04/2017 51.6    Orders Only on 11/06/2017   Component Date Value    Hemoglobin A1C 11/06/2017 5.9     WBC 11/06/2017 8.6     RBC 11/06/2017 4.44     Hemoglobin 11/06/2017 13.8     Hematocrit 11/06/2017 42.7     MCV 11/06/2017 96.2     MCH 11/06/2017 31.1*    MCHC 11/06/2017 32.3*    RDW 11/06/2017 14.2     Platelets 63/19/0705 306     MPV 11/06/2017 11.8     Neutrophils % 11/06/2017 62.4     Lymphocytes % 11/06/2017 25.3     Monocytes % 11/06/2017 8.9     Eosinophils % 11/06/2017 2.1     Basophils % 11/06/2017 0.8     Neutrophils # 11/06/2017 5.3     Lymphocytes # 11/06/2017 2.2     Monocytes # 11/06/2017 0.80     Eosinophils # 11/06/2017 0.20     Basophils # 11/06/2017 0.10     Sodium 11/06/2017 138     Potassium 11/06/2017 5.5*    Chloride 11/06/2017 98     CO2 11/06/2017 25     Anion Gap 11/06/2017 15     Glucose 11/06/2017 102     BUN 11/06/2017 23*    CREATININE 11/06/2017 1.5*    GFR Non- 11/06/2017 36*    Calcium 11/06/2017 9.2     Total Protein 11/06/2017 7.8     Alb 11/06/2017 4.1     Total Bilirubin 11/06/2017 0.4     Alkaline Phosphatase 11/06/2017 150*    ALT 11/06/2017 19     Polyarticular arthritis     Type II or unspecified type diabetes mellitus without mention of complication, not stated as uncontrolled        Past Surgical History:   Procedure Laterality Date    ABDOMINOPLASTY      ANGIOPLASTY  05    stent placement to LAD and diagonal with normal left vent function    BREAST REDUCTION SURGERY      CARDIAC CATHETERIZATION  05, 05    see report  Stents x3    CARDIAC CATHETERIZATION  3/23/15  JDT    EF 50%    CARPAL TUNNEL RELEASE       SECTION      x2    CHOLECYSTECTOMY      GASTRIC BYPASS SURGERY      HERNIA REPAIR      umbilical with mesh    HYSTERECTOMY      INTRACAPSULAR CATARACT EXTRACTION Left 2016    LT EYE CATARACT EMULSIFICATION IOL IMPLANT performed by Sanaz Rudolph MD at [address] Left 2017    KNEE TOTAL ARTHROPLASTY performed by Venita Reyez DO at [facility] History   Substance Use Topics    Smoking status: Never Smoker    Smokeless tobacco: Former User    Alcohol use No       Review of Systems   Constitutional: Negative for activity change, appetite change, diaphoresis, fatigue and fever. HENT: Negative for congestion, ear discharge, postnasal drip, sinus pressure, sore throat and trouble swallowing. Eyes: Negative. Respiratory: Negative for cough, shortness of breath and wheezing. Cardiovascular: Negative for chest pain, palpitations and leg swelling. Gastrointestinal: Negative for abdominal pain. Endocrine: Negative for polydipsia, polyphagia and polyuria. Reports glucose 140s-150s     Genitourinary: Negative for difficulty urinating. Musculoskeletal: Negative for arthralgias and back pain. Skin: Negative for rash. Neurological: Negative for headaches. Psychiatric/Behavioral: Positive for sleep disturbance (will do nightmare medication continue ambien). Negative for behavioral problems and suicidal ideas.  The patient is nervous/anxious (worse Pedi-Dri  What is the most important information I should know about nystatin topical?  Do not use nystatin topical to treat any skin condition that has not been checked by your doctor. Nystatin topical (for the skin) is not for use to treat a vaginal yeast infection. Avoid getting this medication in your eyes or mouth. If this does happen, rinse with water. Use this medication for the full prescribed length of time. Your symptoms may improve before the infection is completely cleared. Call your doctor if your symptoms do not improve, or if they get worse while using nystatin topical.  Do not share this medication with another person, even if they have the same symptoms you have. What is nystatin topical?  Nystatin is an antifungal medication. Nystatin prevents fungus from growing on your skin. Nystatin topical (for the skin) is used to treat skin infections caused by yeast.  Nystatin topical is not for use to treat a vaginal yeast infection. Nystatin topical may also be used for purposes not listed in this medication guide. What should I discuss with my healthcare provider before using nystatin topical?  You should not use nystatin topical if you have ever had an allergic reaction to it. FDA pregnancy category C. It is not known whether nystatin topical will harm an unborn baby. Tell your doctor if you are pregnant or plan to become pregnant while using this medication. It is not known whether nystatin topical passes into breast milk or if it could harm a nursing baby. Do not use this medication without telling your doctor if you are breast-feeding a baby. How should I use nystatin topical?  Use exactly as prescribed by your doctor. Do not use in larger or smaller amounts or for longer than recommended. Follow the directions on your prescription label. Do not use nystatin topical to treat any skin condition that has not been checked by your doctor.   Wash your hands before and after using this medication. Clean and dry the skin before you apply nystatin topical.  Do not cover treated skin with bandages or dressings that do not allow air circulation unless your doctor tells you to. Use this medication for the full prescribed length of time. Your symptoms may improve before the infection is completely cleared. Call your doctor if your symptoms do not improve, or if they get worse while using nystatin topical.  Do not share this medication with another person, even if they have the same symptoms you have. Store at room temperature away from moisture and heat. What happens if I miss a dose? Use the missed dose as soon as you remember. Skip the missed dose if it is almost time for your next scheduled dose. Do not  use extra medicine to make up the missed dose. What happens if I overdose? Seek emergency medical attention or call the Poison Help line at 1-278.872.5377. What should I avoid while using nystatin topical?  Avoid getting this medication in your eyes or mouth. If this does happen, rinse with water. Avoid wearing tight-fitting, synthetic clothing (such as nylon) that doesn't allow air circulation. Wear clothing made of loose cotton and other natural fibers until your infection is healed. What are the possible side effects of nystatin topical?  Get emergency medical help if you have any of these signs of an allergic reaction:  hives; difficulty breathing; swelling of your face, lips, tongue, or throat. Stop using nystatin topical and call your doctor at once if you have severe burning, itching, rash, pain, or other irritation where the medicine is applied. Less serious side effects may include mild itching or irritation. This is not a complete list of side effects and others may occur. Call your doctor for medical advice about side effects. You may report side effects to FDA at 8-440-TTC-7024.   What other drugs will affect nystatin topical?  It is not likely that other drugs you take effects. If you have questions about the drugs you are taking, check with your doctor, nurse or pharmacist.  Copyright 6441-6472 [address]. Version: 7.02. Revision date: 12/3/2013. Care instructions adapted under license by [facility] ([facility]). If you have questions about a medical condition or this instruction, always ask your healthcare professional. [place] [address] any warranty or liability for your use of this information. Controlled Substances Monitoring: Additional Instructions: As always, patient is advised to bring in medication bottles in order to correctly reconcile with our current list.      Karen Wilhelm received counseling on the following healthy behaviors: none    Patient given educational materials on plan of care    I have instructed Mitch to complete a self tracking handout on blood sugar and instructed them to bring it with them to her next appointment. Discussed use, benefit, and side effects of prescribed medications. Barriers to medication compliance addressed. All patient questions answered. Pt voiced understanding.      TRESSA Dorado

## 2018-03-01 ENCOUNTER — CARE COORDINATION (OUTPATIENT)
Dept: CARE COORDINATION | Age: 58
End: 2018-03-01

## 2018-03-01 NOTE — CARE COORDINATION
less than 71 more than 2 times a month. Barriers: lack of education  Plan for overcoming my barriers:   Pt is willing to start counting carbs per meal and keep a log / include amount of insulin taken   Pt will bring her glucometer into next appt for accuracy check  Confidence: 9/10  Anticipated Goal Completion Date: 9/19/17         Self Monitoring                Blood Pressure - I will take my blood pressure as directed - Daily and Other: If sx of elev BP - headache, vision changes, fatigue. I will notify my provider of any trends of increasing or decreasing blood pressures over a month period of time. I will notify my provider of any changes in blood pressure associated with symptoms of dizziness, falls, passing out, headache, confusion/change in mental status. Patient Reported Blood Pressure   Patient Reported Blood Pressure 2/28/2018   Home Blood Pressure 160/93       Barriers: none  Plan for overcoming my barriers: N/A  Confidence: 10/10  Anticipated Goal Completion Date: 6/4/18              Prior to Admission medications    Medication Sig Start Date End Date Taking? Authorizing Provider   diazepam (VALIUM) 5 MG tablet Take 1 tablet by mouth every evening for 30 days. 2/19/18 3/21/18 Yes TRESSA Nick   nystatin (MYCOSTATIN) 549620 UNIT/GM powder Apply 3 times daily. 2/19/18  Yes TRESSA Nick   zolpidem (AMBIEN) 10 MG tablet Take 1 tablet by mouth nightly as needed for Sleep for up to 30 days.  2/5/18 3/7/18 Yes TRESSA Nick   ondansetron (ZOFRAN ODT) 4 MG disintegrating tablet Take 1 tablet by mouth every 8 hours as needed for Nausea or Vomiting 2/5/18 3/7/18 Yes TRESSA Nick   prazosin (MINIPRESS) 2 MG capsule Take 1 capsule by mouth nightly 12/4/17  Yes TRESSA Nick   tiZANidine (ZANAFLEX) 4 MG tablet TAKE ONE TABLET BY MOUTH EVERY 8 HOURS AS NEEDED FOR MUSCLE SPASMS 11/13/17  Yes LAZARO Jacob, DO   valsartan (DIOVAN) 160 MG tablet TAKE ONE TABLET BY MOUTH ONCE DAILY  Patient taking differently: TAKE ONE HALF TABLET BY MOUTH ONCE DAILY 11/3/17  Yes TRESSA Werner   fluticasone [facility]) 50 MCG/ACT nasal spray USE TWO SPRAY(S) IN EACH NOSTRIL ONCE DAILY 11/3/17  Yes TRESSA Werner   pregabalin (LYRICA) 150 MG capsule Take 1 capsule by mouth 2 times daily 11/3/17  Yes TRESSA Werner   insulin glargine (LANTUS SOLOSTAR) 100 UNIT/ML injection pen Inject 22 Units into the skin nightly  Patient taking differently: Inject 14 Units into the skin nightly  9/14/17  Yes TRESSA Werner   levothyroxine (SYNTHROID) 88 MCG tablet TAKE ONE TABLET BY MOUTH ONCE DAILY 8/29/17  Yes TRESSA Werner   DULoxetine (CYMBALTA) 60 MG extended release capsule Take 1 capsule by mouth 2 times daily 8/29/17  Yes TRESSA Werner   furosemide (LASIX) 40 MG tablet Take 1 tablet by mouth daily 8/29/17  Yes TRESSA Werner   insulin aspart (NOVOLOG FLEXPEN) 100 UNIT/ML injection pen Inject 20 Units into the skin 3 times daily (before meals) INJECT THREE TIMES DAILY WITH MEALS BASED UPON RATIO AND CORRECTIVE FACTOR. AVERAGE 60 UNITS DAILY 8/29/17  Yes TRESSA Werner   nystatin (MYCOSTATIN) 593912 UNIT/GM ointment Apply topically 2 times daily. Patient taking differently: Apply topically 2 times daily as needed Apply topically 2 times daily.  8/29/17  Yes TRESSA Werner   aspirin 81 MG EC tablet Take 81 mg by mouth daily   Yes Historical Provider, MD   QUEtiapine (SEROQUEL) 300 MG tablet Take 1.5 tablets by mouth nightly  Patient taking differently: Take 300 mg by mouth nightly Indications: Manic-Depression  6/5/17  Yes TRESSA Werner   metoprolol succinate (TOPROL XL) 50 MG extended release tablet Take 1 tablet by mouth daily 5/22/17  Yes B Yordy Mo, DO   pantoprazole (PROTONIX) 40 MG tablet Take 1 tablet by mouth daily 5/22/17  Yes B Yordy Mo, DO   linaclotide (LINZESS) 290 MCG

## 2018-03-03 DIAGNOSIS — I10 HYPERTENSION: ICD-10-CM

## 2018-03-03 DIAGNOSIS — I25.10 CAD (CORONARY ARTERY DISEASE): ICD-10-CM

## 2018-03-05 ENCOUNTER — OFFICE VISIT (OUTPATIENT)
Dept: PRIMARY CARE CLINIC | Age: 58
End: 2018-03-05
Payer: MEDICARE

## 2018-03-05 VITALS
HEART RATE: 92 BPM | DIASTOLIC BLOOD PRESSURE: 70 MMHG | SYSTOLIC BLOOD PRESSURE: 108 MMHG | BODY MASS INDEX: 44.07 KG/M2 | TEMPERATURE: 98.2 F | WEIGHT: 233.25 LBS | OXYGEN SATURATION: 99 %

## 2018-03-05 DIAGNOSIS — F43.10 PTSD (POST-TRAUMATIC STRESS DISORDER): ICD-10-CM

## 2018-03-05 DIAGNOSIS — Z79.4 TYPE 2 DIABETES MELLITUS WITH DIABETIC POLYNEUROPATHY, WITH LONG-TERM CURRENT USE OF INSULIN (HCC): ICD-10-CM

## 2018-03-05 DIAGNOSIS — B37.9 YEAST INFECTION: Primary | ICD-10-CM

## 2018-03-05 DIAGNOSIS — M62.838 MUSCLE SPASM: ICD-10-CM

## 2018-03-05 DIAGNOSIS — E11.42 TYPE 2 DIABETES MELLITUS WITH DIABETIC POLYNEUROPATHY, WITH LONG-TERM CURRENT USE OF INSULIN (HCC): ICD-10-CM

## 2018-03-05 DIAGNOSIS — G47.01 INSOMNIA DUE TO MEDICAL CONDITION: ICD-10-CM

## 2018-03-05 DIAGNOSIS — Z12.11 SCREEN FOR COLON CANCER: ICD-10-CM

## 2018-03-05 DIAGNOSIS — E66.01 MORBID OBESITY WITH BMI OF 40.0-44.9, ADULT (HCC): ICD-10-CM

## 2018-03-05 DIAGNOSIS — K58.2 IRRITABLE BOWEL SYNDROME WITH BOTH CONSTIPATION AND DIARRHEA: ICD-10-CM

## 2018-03-05 LAB
ALBUMIN SERPL-MCNC: 3.6 G/DL (ref 3.5–5.2)
ALP BLD-CCNC: 154 U/L (ref 35–104)
ALT SERPL-CCNC: 19 U/L (ref 5–33)
ANION GAP SERPL CALCULATED.3IONS-SCNC: 19 MMOL/L (ref 7–19)
AST SERPL-CCNC: 26 U/L (ref 5–32)
BASOPHILS ABSOLUTE: 0.1 K/UL (ref 0–0.2)
BASOPHILS RELATIVE PERCENT: 0.4 % (ref 0–1)
BILIRUB SERPL-MCNC: 0.5 MG/DL (ref 0.2–1.2)
BUN BLDV-MCNC: 44 MG/DL (ref 6–20)
CALCIUM SERPL-MCNC: 9 MG/DL (ref 8.6–10)
CHLORIDE BLD-SCNC: 96 MMOL/L (ref 98–111)
CO2: 21 MMOL/L (ref 22–29)
CREAT SERPL-MCNC: 2.5 MG/DL (ref 0.5–0.9)
CREATININE URINE: 16.8 MG/DL (ref 4.2–622)
EOSINOPHILS ABSOLUTE: 0.2 K/UL (ref 0–0.6)
EOSINOPHILS RELATIVE PERCENT: 1.1 % (ref 0–5)
GFR NON-AFRICAN AMERICAN: 20
GLUCOSE BLD-MCNC: 193 MG/DL (ref 74–109)
HBA1C MFR BLD: 6.9 %
HCT VFR BLD CALC: 40.3 % (ref 37–47)
HEMOGLOBIN: 12.9 G/DL (ref 12–16)
LYMPHOCYTES ABSOLUTE: 1.5 K/UL (ref 1.1–4.5)
LYMPHOCYTES RELATIVE PERCENT: 9.6 % (ref 20–40)
MCH RBC QN AUTO: 30.4 PG (ref 27–31)
MCHC RBC AUTO-ENTMCNC: 32 G/DL (ref 33–37)
MCV RBC AUTO: 94.8 FL (ref 81–99)
MICROALBUMIN UR-MCNC: <1.2 MG/DL (ref 0–19)
MICROALBUMIN/CREAT UR-RTO: NORMAL MG/G
MONOCYTES ABSOLUTE: 0.9 K/UL (ref 0–0.9)
MONOCYTES RELATIVE PERCENT: 6 % (ref 0–10)
NEUTROPHILS ABSOLUTE: 13 K/UL (ref 1.5–7.5)
NEUTROPHILS RELATIVE PERCENT: 82.5 % (ref 50–65)
PDW BLD-RTO: 14.4 % (ref 11.5–14.5)
PLATELET # BLD: 294 K/UL (ref 130–400)
PMV BLD AUTO: 11.8 FL (ref 9.4–12.3)
POTASSIUM SERPL-SCNC: 4.6 MMOL/L (ref 3.5–5)
RBC # BLD: 4.25 M/UL (ref 4.2–5.4)
SODIUM BLD-SCNC: 136 MMOL/L (ref 136–145)
TOTAL PROTEIN: 7.7 G/DL (ref 6.6–8.7)
WBC # BLD: 15.8 K/UL (ref 4.8–10.8)

## 2018-03-05 PROCEDURE — 3046F HEMOGLOBIN A1C LEVEL >9.0%: CPT | Performed by: NURSE PRACTITIONER

## 2018-03-05 PROCEDURE — G8598 ASA/ANTIPLAT THER USED: HCPCS | Performed by: NURSE PRACTITIONER

## 2018-03-05 PROCEDURE — 1036F TOBACCO NON-USER: CPT | Performed by: NURSE PRACTITIONER

## 2018-03-05 PROCEDURE — 3017F COLORECTAL CA SCREEN DOC REV: CPT | Performed by: NURSE PRACTITIONER

## 2018-03-05 PROCEDURE — 3014F SCREEN MAMMO DOC REV: CPT | Performed by: NURSE PRACTITIONER

## 2018-03-05 PROCEDURE — G8427 DOCREV CUR MEDS BY ELIG CLIN: HCPCS | Performed by: NURSE PRACTITIONER

## 2018-03-05 PROCEDURE — G8484 FLU IMMUNIZE NO ADMIN: HCPCS | Performed by: NURSE PRACTITIONER

## 2018-03-05 PROCEDURE — G8417 CALC BMI ABV UP PARAM F/U: HCPCS | Performed by: NURSE PRACTITIONER

## 2018-03-05 PROCEDURE — 99214 OFFICE O/P EST MOD 30 MIN: CPT | Performed by: NURSE PRACTITIONER

## 2018-03-05 RX ORDER — TIZANIDINE 4 MG/1
TABLET ORAL
Qty: 90 TABLET | Refills: 3 | Status: SHIPPED | OUTPATIENT
Start: 2018-03-05 | End: 2018-07-25 | Stop reason: SDUPTHER

## 2018-03-05 RX ORDER — METOPROLOL SUCCINATE 50 MG/1
TABLET, EXTENDED RELEASE ORAL
Qty: 30 TABLET | Refills: 11 | Status: SHIPPED | OUTPATIENT
Start: 2018-03-05 | End: 2018-05-22

## 2018-03-05 RX ORDER — ZOLPIDEM TARTRATE 10 MG/1
10 TABLET ORAL NIGHTLY PRN
Qty: 30 TABLET | Refills: 0 | Status: SHIPPED | OUTPATIENT
Start: 2018-03-05 | End: 2018-03-23

## 2018-03-05 RX ORDER — NYSTATIN 100000 U/G
OINTMENT TOPICAL
Qty: 30 G | Refills: 2 | Status: SHIPPED | OUTPATIENT
Start: 2018-03-05 | End: 2018-05-22 | Stop reason: DRUGHIGH

## 2018-03-05 RX ORDER — ONDANSETRON 4 MG/1
4 TABLET, ORALLY DISINTEGRATING ORAL EVERY 8 HOURS PRN
Qty: 30 TABLET | Refills: 0 | Status: SHIPPED | OUTPATIENT
Start: 2018-03-05 | End: 2018-04-04

## 2018-03-05 ASSESSMENT — ENCOUNTER SYMPTOMS
SINUS PRESSURE: 0
TROUBLE SWALLOWING: 0
WHEEZING: 0
BACK PAIN: 0
SORE THROAT: 0
EYES NEGATIVE: 1
COUGH: 0
SHORTNESS OF BREATH: 0
ABDOMINAL PAIN: 0

## 2018-03-05 NOTE — PATIENT INSTRUCTIONS
carbohydrate serving. Count carbs  Counting carbs lets you know how much rapid-acting insulin to take before you eat. If you use an insulin pump, you get a constant rate of insulin during the day. So the pump must be programmed at meals. This gives you extra insulin to cover the rise in blood sugar after meals. If you take insulin:  · Learn your own insulin-to-carb ratio. You and your diabetes health professional will figure out the ratio. You can do this by testing your blood sugar after meals. For example, you may need a certain amount of insulin for every 15 grams of carbs. · Add up the carb grams in a meal. Then you can figure out how many units of insulin to take based on your insulin-to-carb ratio. · Exercise lowers blood sugar. You can use less insulin than you would if you were not doing exercise. Keep in mind that timing matters. If you exercise within 1 hour after a meal, your body may need less insulin for that meal than it would if you exercised 3 hours after the meal. Test your blood sugar to find out how exercise affects your need for insulin. If you do or don't take insulin:  · Look at labels on packaged foods. This can tell you how many carbs are in a serving. You can also use guides from the American Diabetes Association. · Be aware of portions, or serving sizes. If a package has two servings and you eat the whole package, you need to double the number of grams of carbohydrate listed for one serving. · Protein, fat, and fiber do not raise blood sugar as much as carbs do. If you eat a lot of these nutrients in a meal, your blood sugar will rise more slowly than it would otherwise. Eat from all food groups  · Eat at least three meals a day. · Plan meals to include food from all the food groups. The food groups include grains, fruits, dairy, proteins, and vegetables. · Talk to your dietitian or diabetes educator about ways to add limited amounts of sweets into your meal plan.   · If you drink alcohol, talk to your doctor. It may not be recommended when you are taking certain diabetes medicines. Where can you learn more? Go to [email or web address] and sign in to your [employer] account. Enter D392 in the [employer] box to learn more about \"Counting Carbohydrates: Care Instructions. \"     If you do not have an account, please click on the \"Sign Up Now\" link. Current as of: March 13, 2017  Content Version: 11.5  © 6114-3137 [employer]. Care instructions adapted under license by [employer]. If you have questions about a medical condition or this instruction, always ask your healthcare professional. [address] any warranty or liability for your use of this information.

## 2018-03-05 NOTE — TELEPHONE ENCOUNTER
Pt seen 3/5/18      Requested Prescriptions     Pending Prescriptions Disp Refills    metoprolol succinate (TOPROL XL) 50 MG extended release tablet [Pharmacy Med Name: METOPROLOL ER 50MG  TAB] 30 tablet 11     Sig: TAKE ONE TABLET BY MOUTH ONCE DAILY

## 2018-03-05 NOTE — PROGRESS NOTES
see below      Copies of these are in the chart. Prior to Visit Medications    Medication Sig Taking? Authorizing Provider   ondansetron (ZOFRAN ODT) 4 MG disintegrating tablet Take 1 tablet by mouth every 8 hours as needed for Nausea or Vomiting Yes TRESSA Andino   tiZANidine (ZANAFLEX) 4 MG tablet TAKE ONE TABLET BY MOUTH EVERY 8 HOURS AS NEEDED FOR MUSCLE SPASMS Yes TRESSA Andino   zolpidem (AMBIEN) 10 MG tablet Take 1 tablet by mouth nightly as needed for Sleep for up to 30 days. Yes TRESSA Andino   nystatin (MYCOSTATIN) 758510 UNIT/GM ointment Apply topically 2 times daily. Yes TRESSA Andino   diazepam (VALIUM) 5 MG tablet Take 1 tablet by mouth every evening for 30 days. Yes TRESSA Andino   nystatin (MYCOSTATIN) 685579 UNIT/GM powder Apply 3 times daily.  Yes TRESSA Andino   prazosin (MINIPRESS) 2 MG capsule Take 1 capsule by mouth nightly Yes TRESSA Andino   valsartan (DIOVAN) 160 MG tablet TAKE ONE TABLET BY MOUTH ONCE DAILY  Patient taking differently: TAKE ONE HALF TABLET BY MOUTH ONCE DAILY Yes TRESSA Andino   fluticasone (FLONASE) 50 MCG/ACT nasal spray USE TWO SPRAY(S) IN EACH NOSTRIL ONCE DAILY Yes TRESSA Andino   pregabalin (LYRICA) 150 MG capsule Take 1 capsule by mouth 2 times daily Yes TRESSA Andino   insulin glargine (LANTUS SOLOSTAR) 100 UNIT/ML injection pen Inject 22 Units into the skin nightly  Patient taking differently: Inject 14 Units into the skin nightly  Yes TRESSA Andino   levothyroxine (SYNTHROID) 88 MCG tablet TAKE ONE TABLET BY MOUTH ONCE DAILY Yes TRESSA Andino   DULoxetine (CYMBALTA) 60 MG extended release capsule Take 1 capsule by mouth 2 times daily Yes TRESSA Andino   furosemide (LASIX) 40 MG tablet Take 1 tablet by mouth daily Yes TRESSA Andino   insulin aspart (NOVOLOG FLEXPEN) 100 UNIT/ML injection pen Inject 20 Units into the skin 3 well-developed and well-nourished. No distress. HENT:   Head: Normocephalic. Right Ear: External ear normal.   Left Ear: External ear normal.   Mouth/Throat: No oropharyngeal exudate. Eyes: Pupils are equal, round, and reactive to light. Right eye exhibits no discharge. Left eye exhibits no discharge. Neck: Normal range of motion. Cardiovascular: Normal rate, regular rhythm, normal heart sounds and intact distal pulses. No murmur heard. Pulmonary/Chest: Effort normal and breath sounds normal. No respiratory distress. She has no wheezes. Abdominal: Soft. Bowel sounds are normal. She exhibits no distension. Musculoskeletal: Normal range of motion. Lymphadenopathy:     She has no cervical adenopathy. Neurological: She is alert and oriented to person, place, and time. No cranial nerve deficit. Skin: Skin is warm and dry. No rash noted. She is not diaphoretic. Rash in groins and under breasts   Psychiatric: She has a normal mood and affect. Her behavior is normal.   Vitals reviewed. ASSESSMENT/ PLAN    1. Type 2 diabetes mellitus with diabetic polyneuropathy, with long-term current use of insulin (HCC)  Cont log   Doing well  Cont to monitor  Will refer to GI to check for nausea and constipation and diarrhea  - ondansetron (ZOFRAN ODT) 4 MG disintegrating tablet; Take 1 tablet by mouth every 8 hours as needed for Nausea or Vomiting  Dispense: 30 tablet; Refill: 0  - CBC Auto Differential; Future  - Comprehensive Metabolic Panel; Future  - Hemoglobin A1C; Future  - Microalbumin / Creatinine Urine Ratio; Future  - [id number] [facility]- Colby Stark MD    2. Insomnia due to medical condition  Cont ambien  And valium will see fidencio this month  - ondansetron (ZOFRAN ODT) 4 MG disintegrating tablet; Take 1 tablet by mouth every 8 hours as needed for Nausea or Vomiting  Dispense: 30 tablet; Refill: 0  - zolpidem (AMBIEN) 10 MG tablet;  Take 1 tablet by mouth nightly as needed for Sleep for up to 30 days. Dispense: 30 tablet; Refill: 0    3. Muscle spasm  Doing well  - tiZANidine (ZANAFLEX) 4 MG tablet; TAKE ONE TABLET BY MOUTH EVERY 8 HOURS AS NEEDED FOR MUSCLE SPASMS  Dispense: 90 tablet; Refill: 3    4. PTSD (post-traumatic stress disorder)    - zolpidem (AMBIEN) 10 MG tablet; Take 1 tablet by mouth nightly as needed for Sleep for up to 30 days. Dispense: 30 tablet; Refill: 0    5. Morbid obesity with BMI of 40.0-44.9, adult ([address]  Cont to monitor closely    6. Yeast infection  usingand doing well  - nystatin (MYCOSTATIN) 599607 UNIT/GM ointment; Apply topically 2 times daily. Dispense: 30 g; Refill: 2    7. Irritable bowel syndrome with both constipation and diarrhea  Discussed need to check  [facility]- Elina Pop MD    8. Screen for colon cancer  As above  - [facility]- Elina Pop MD      Orders Placed This Encounter   Procedures    CBC Auto Differential    Comprehensive Metabolic Panel    Hemoglobin A1C    Microalbumin / Creatinine Urine Ratio   [facility]- Elina Pop MD        Return in about 2 months (around 5/4/2018) for medicare check up. Patient Instructions     Patient Education        Counting Carbohydrates: Care Instructions  Your Care Instructions    You don't have to eat special foods when you have diabetes. You just have to be careful to eat healthy foods. Carbohydrates (carbs) raise blood sugar higher and quicker than any other nutrient. Carbs are found in desserts, breads and cereals, and fruit. They're also in starchy vegetables. These include potatoes, corn, and grains such as rice and pasta. Carbs are also in milk and yogurt. The more carbs you eat at one time, the higher your blood sugar will rise. Spreading carbs all through the day helps keep your blood sugar levels within your target range. Counting carbs is one of the best ways to keep your blood sugar under control.   If you use insulin, counting carbs helps you match the right amount of insulin to the number of grams of carbs in a meal. Then you can change your diet and insulin dose as needed. Testing your blood sugar several times a day can help you learn how carbs affect your blood sugar. A registered dietitian or certified diabetes educator can help you plan meals and snacks. Follow-up care is a key part of your treatment and safety. Be sure to make and go to all appointments, and call your doctor if you are having problems. It's also a good idea to know your test results and keep a list of the medicines you take. How can you care for yourself at home? Know your daily amount of carbohydrates  Your daily amount depends on several things, such as your weight, how active you are, which diabetes medicines you take, and what your goals are for your blood sugar levels. A registered dietitian or certified diabetes educator can help you plan how many carbs to include in each meal and snack. For most adults, a guideline for the daily amount of carbs is:  · 45 to 60 grams at each meal. That's about the same as 3 to 4 carbohydrate servings. · 15 to 20 grams at each snack. That's about the same as 1 carbohydrate serving. Count carbs  Counting carbs lets you know how much rapid-acting insulin to take before you eat. If you use an insulin pump, you get a constant rate of insulin during the day. So the pump must be programmed at meals. This gives you extra insulin to cover the rise in blood sugar after meals. If you take insulin:  · Learn your own insulin-to-carb ratio. You and your diabetes health professional will figure out the ratio. You can do this by testing your blood sugar after meals. For example, you may need a certain amount of insulin for every 15 grams of carbs. · Add up the carb grams in a meal. Then you can figure out how many units of insulin to take based on your insulin-to-carb ratio. · Exercise lowers blood sugar.  You can use less insulin than you would if you were not doing exercise. Keep in mind that timing matters. If you exercise within 1 hour after a meal, your body may need less insulin for that meal than it would if you exercised 3 hours after the meal. Test your blood sugar to find out how exercise affects your need for insulin. If you do or don't take insulin:  · Look at labels on packaged foods. This can tell you how many carbs are in a serving. You can also use guides from the American Diabetes Association. · Be aware of portions, or serving sizes. If a package has two servings and you eat the whole package, you need to double the number of grams of carbohydrate listed for one serving. · Protein, fat, and fiber do not raise blood sugar as much as carbs do. If you eat a lot of these nutrients in a meal, your blood sugar will rise more slowly than it would otherwise. Eat from all food groups  · Eat at least three meals a day. · Plan meals to include food from all the food groups. The food groups include grains, fruits, dairy, proteins, and vegetables. · Talk to your dietitian or diabetes educator about ways to add limited amounts of sweets into your meal plan. · If you drink alcohol, talk to your doctor. It may not be recommended when you are taking certain diabetes medicines. Where can you learn more? Go to https://Cont3nt.com.[employer]arm[employer]. org and sign in to your [employer] account. Enter X879 in the [facility] box to learn more about \"Counting Carbohydrates: Care Instructions. \"     If you do not have an account, please click on the \"Sign Up Now\" link. Current as of: March 13, 2017  Content Version: 11.5  © 8120-1361 [employer]. Care instructions adapted under license by [facility] ([facility]). If you have questions about a medical condition or this instruction, always ask your healthcare professional. [address] any warranty or liability for your use of this information.              Controlled Substances

## 2018-03-06 ENCOUNTER — TELEPHONE (OUTPATIENT)
Dept: PRIMARY CARE CLINIC | Age: 58
End: 2018-03-06

## 2018-03-23 ENCOUNTER — OFFICE VISIT (OUTPATIENT)
Dept: PRIMARY CARE CLINIC | Age: 58
End: 2018-03-23
Payer: MEDICARE

## 2018-03-23 VITALS
RESPIRATION RATE: 18 BRPM | WEIGHT: 230.75 LBS | BODY MASS INDEX: 43.57 KG/M2 | SYSTOLIC BLOOD PRESSURE: 128 MMHG | OXYGEN SATURATION: 99 % | TEMPERATURE: 98.2 F | HEIGHT: 61 IN | HEART RATE: 78 BPM | DIASTOLIC BLOOD PRESSURE: 78 MMHG

## 2018-03-23 DIAGNOSIS — F99 INSOMNIA DUE TO OTHER MENTAL DISORDER: ICD-10-CM

## 2018-03-23 DIAGNOSIS — F51.05 INSOMNIA DUE TO OTHER MENTAL DISORDER: ICD-10-CM

## 2018-03-23 DIAGNOSIS — F31.78 BIPOLAR DISORDER, IN FULL REMISSION, MOST RECENT EPISODE MIXED (HCC): Primary | ICD-10-CM

## 2018-03-23 DIAGNOSIS — F41.8 SITUATIONAL ANXIETY: ICD-10-CM

## 2018-03-23 PROCEDURE — G8482 FLU IMMUNIZE ORDER/ADMIN: HCPCS | Performed by: NURSE PRACTITIONER

## 2018-03-23 PROCEDURE — 3017F COLORECTAL CA SCREEN DOC REV: CPT | Performed by: NURSE PRACTITIONER

## 2018-03-23 PROCEDURE — 99213 OFFICE O/P EST LOW 20 MIN: CPT | Performed by: NURSE PRACTITIONER

## 2018-03-23 PROCEDURE — G8417 CALC BMI ABV UP PARAM F/U: HCPCS | Performed by: NURSE PRACTITIONER

## 2018-03-23 PROCEDURE — G8598 ASA/ANTIPLAT THER USED: HCPCS | Performed by: NURSE PRACTITIONER

## 2018-03-23 PROCEDURE — G8427 DOCREV CUR MEDS BY ELIG CLIN: HCPCS | Performed by: NURSE PRACTITIONER

## 2018-03-23 PROCEDURE — 1036F TOBACCO NON-USER: CPT | Performed by: NURSE PRACTITIONER

## 2018-03-23 PROCEDURE — 3014F SCREEN MAMMO DOC REV: CPT | Performed by: NURSE PRACTITIONER

## 2018-03-23 RX ORDER — AMITRIPTYLINE HYDROCHLORIDE 50 MG/1
50 TABLET, FILM COATED ORAL NIGHTLY
Qty: 30 TABLET | Refills: 5 | Status: SHIPPED | OUTPATIENT
Start: 2018-03-23 | End: 2018-04-05 | Stop reason: SDUPTHER

## 2018-03-23 RX ORDER — DIAZEPAM 5 MG/1
5 TABLET ORAL EVERY EVENING
Qty: 30 TABLET | Refills: 0 | Status: SHIPPED | OUTPATIENT
Start: 2018-03-23 | End: 2019-12-06 | Stop reason: SDUPTHER

## 2018-03-23 ASSESSMENT — ENCOUNTER SYMPTOMS
WHEEZING: 0
ABDOMINAL PAIN: 0
TROUBLE SWALLOWING: 0
SORE THROAT: 0
BACK PAIN: 0
EYES NEGATIVE: 1
COUGH: 0
SINUS PRESSURE: 0
SHORTNESS OF BREATH: 0

## 2018-03-23 NOTE — PATIENT INSTRUCTIONS
alert to changes in your mood or symptoms. It is not known whether amitriptyline will harm an unborn baby. Tell your doctor if you are pregnant or plan to become pregnant while using this medication. Amitriptyline can pass into breast milk and may harm a nursing baby. You should not breast-feed while you are using amitriptyline. Amitriptyline is not approved for use by anyone younger than 15years old. How should I take amitriptyline? Follow all directions on your prescription label. Do not take this medicine in larger or smaller amounts or for longer than recommended. It may take up to 4 weeks before your symptoms improve. Keep using the medication as directed and tell your doctor if your symptoms do not improve. If you need surgery, tell the surgeon ahead of time that you are using amitriptyline. You may need to stop using the medicine for a short time. Do not stop using amitriptyline suddenly, or you could have unpleasant withdrawal symptoms. Ask your doctor how to safely stop using amitriptyline. Store at room temperature away from moisture, heat, and light. What happens if I miss a dose? Take the missed dose as soon as you remember. Skip the missed dose if it is almost time for your next scheduled dose. Do not take extra medicine to make up the missed dose. What happens if I overdose? Seek emergency medical attention or call the Poison Help line at 1-513.727.6866. An overdose of amitriptyline can be fatal.  Overdose symptoms may include uneven heartbeats, extreme drowsiness, confusion, agitation, vomiting, hallucinations, feeling hot or cold, muscle stiffness, seizure (convulsions), or fainting. What should I avoid while taking amitriptyline? Do not drink alcohol. Dangerous side effects or death can occur when alcohol is combined with amitriptyline. This medication may impair your thinking or reactions. Be careful if you drive or do anything that requires you to be alert.   Avoid exposure to sunlight or tanning beds. Amitriptyline can make you sunburn more easily. Wear protective clothing and use sunscreen (SPF 30 or higher) when you are outdoors. What are the possible side effects of amitriptyline? Get emergency medical help if you have signs of an allergic reaction: hives; difficulty breathing; swelling of your face, lips, tongue, or throat. Report any new or worsening symptoms to your doctor, such as: mood or behavior changes, anxiety, panic attacks, trouble sleeping, or if you feel impulsive, irritable, agitated, hostile, aggressive, restless, hyperactive (mentally or physically), more depressed, or have thoughts about suicide or hurting yourself. Call your doctor at once if you have:  · unusual thoughts or behavior;  · a light-headed feeling, like you might pass out;  · chest pain or pressure, pain spreading to your jaw or shoulder, nausea, sweating;  · pounding heartbeats or fluttering in your chest;  · confusion, hallucinations;  · a seizure (convulsions);  · painful or difficult urination;  · severe constipation;  · easy bruising, unusual bleeding; or  · sudden weakness or ill feeling, fever, chills, sore throat, mouth sores, red or swollen gums, trouble swallowing. Common side effects may include:  · constipation, diarrhea;  · nausea, vomiting, upset stomach;  · mouth pain, unusual taste, black tongue;  · appetite or weight changes;  · urinating less than usual;  · itching or rash;  · breast swelling (in men or women); or  · decreased sex drive, impotence, or difficulty having an orgasm. This is not a complete list of side effects and others may occur. Call your doctor for medical advice about side effects. You may report side effects to FDA at 1-384-FDA-1097. What other drugs will affect amitriptyline? Taking this medicine with other drugs that make you sleepy can worsen this effect.  Ask your doctor before taking amitriptyline with a sleeping pill, narcotic pain medicine, muscle relaxer, or medicine for anxiety, depression, or seizures. Tell your doctor if you have used an \"SSRI\" antidepressant in the past 5 weeks, such as citalopram, escitalopram, fluoxetine (Prozac), fluvoxamine, paroxetine, sertraline (Zoloft), trazodone, or vilazodone. Tell your doctor about all your current medicines and any you start or stop using, especially:  · other antidepressants;  · cimetidine;  · heart rhythm medicine such as flecainide, propafenone, quinidine, and others; or  · medicine to treat mental illness. This list is not complete. Other drugs may interact with amitriptyline, including prescription and over-the-counter medicines, vitamins, and herbal products. Not all possible interactions are listed in this medication guide. Where can I get more information? Your pharmacist can provide more information about amitriptyline. Remember, keep this and all other medicines out of the reach of children, never share your medicines with others, and use this medication only for the indication prescribed. Every effort has been made to ensure that the information provided by [address] is accurate, up-to-date, and complete, but no guarantee is made to that effect. Drug information contained herein may be time sensitive. [email or web address] information has been compiled for use by healthcare practitioners and consumers in the [place] and therefore [employer] does not warrant that uses outside of the [place] are appropriate, unless specifically indicated otherwise. [facility]'s drug information does not endorse drugs, diagnose patients or recommend therapy. [facility] drug information is an informational resource designed to assist licensed healthcare practitioners in caring for their patients and/or to serve consumers viewing this service as a supplement to, and not a substitute for, the expertise, skill, knowledge and judgment of healthcare practitioners.  The absence of a warning for a given drug

## 2018-03-23 NOTE — PROGRESS NOTES
[address]  [place], [address]  Phone [phone]   Fax [phone]      OFFICE VISIT: 3/23/2018    Harris Wade- : 1960      Reason For Visit:  Isidro Venegas is a 65 y.o. female who is here for Dental Pain; Discuss Medications (wants to change the [place] and get put back on amytriplyline ); and Medication Refill (valium)         Health Maintenance none      HPI        Patient is here about her anxiety /sleep issues  She reports Dr Daxa Camarena has had to reschedule yet again    She is frustrated with her sleep issue  But the [place] evidently is making her do weird things and her partner has said to stop the medication  She reports she had a bunch of teeth removed and her mouth hurts  And she is miserable all together  She feels like she should go back to elavil  Reports that she is frustrated with her sleeping issues  And embarrassed she been acting weird  She has failed restoril and rozerem both and belsomra  Also tried seroquel before           height is 5' 1\" (1.549 m) and weight is 230 lb 12 oz (104.7 kg). Her temperature is 98.2 °F (36.8 °C). Her blood pressure is 128/78 and her pulse is 78. Her respiration is 18 and oxygen saturation is 99%. Body mass index is 43.6 kg/m².     Results for orders placed or performed in visit on 18   CBC Auto Differential   Result Value Ref Range    WBC 15.8 (H) 4.8 - 10.8 K/uL    RBC 4.25 4.20 - 5.40 M/uL    Hemoglobin 12.9 12.0 - 16.0 g/dL    Hematocrit 40.3 37.0 - 47.0 %    MCV 94.8 81.0 - 99.0 fL    MCH 30.4 27.0 - 31.0 pg    MCHC 32.0 (L) 33.0 - 37.0 g/dL    RDW 14.4 11.5 - 14.5 %    Platelets 433 537 - 507 K/uL    MPV 11.8 9.4 - 12.3 fL    Neutrophils % 82.5 (H) 50.0 - 65.0 %    Lymphocytes % 9.6 (L) 20.0 - 40.0 %    Monocytes % 6.0 0.0 - 10.0 %    Eosinophils % 1.1 0.0 - 5.0 %    Basophils % 0.4 0.0 - 1.0 %    Neutrophils # 13.0 (H) 1.5 - 7.5 K/uL    Lymphocytes # 1.5 1.1 - 4.5 K/uL    Monocytes # 0.90 0.00 - 0.90 K/uL    Eosinophils # 0.20 0.00 - 0.60 K/uL    Basophils # 0.10 0.00 - 0.20 K/uL   Comprehensive Metabolic Panel   Result Value Ref Range    Sodium 136 136 - 145 mmol/L    Potassium 4.6 3.5 - 5.0 mmol/L    Chloride 96 (L) 98 - 111 mmol/L    CO2 21 (L) 22 - 29 mmol/L    Anion Gap 19 7 - 19 mmol/L    Glucose 193 (H) 74 - 109 mg/dL    BUN 44 (H) 6 - 20 mg/dL    CREATININE 2.5 (H) 0.5 - 0.9 mg/dL    GFR Non-African American 20 (A) >60    Calcium 9.0 8.6 - 10.0 mg/dL    Total Protein 7.7 6.6 - 8.7 g/dL    Alb 3.6 3.5 - 5.2 g/dL    Total Bilirubin 0.5 0.2 - 1.2 mg/dL    Alkaline Phosphatase 154 (H) 35 - 104 U/L    ALT 19 5 - 33 U/L    AST 26 5 - 32 U/L   Hemoglobin A1C   Result Value Ref Range    Hemoglobin A1C 6.9 (H) See comment %   Microalbumin / Creatinine Urine Ratio   Result Value Ref Range    Microalbumin, Random Urine <1.20 0.00 - 19.00 mg/dL    Creatinine, Ur 16.8 4.2 - 622.0 mg/dL    Microalbumin Creatinine Ratio see below mg/g       I have reviewed the following with the Ms. Olivarez   Lab Review   Orders Only on 03/05/2018   Component Date Value    WBC 03/05/2018 15.8*    RBC 03/05/2018 4.25     Hemoglobin 03/05/2018 12.9     Hematocrit 03/05/2018 40.3     MCV 03/05/2018 94.8     MCH 03/05/2018 30.4     MCHC 03/05/2018 32.0*    RDW 03/05/2018 14.4     Platelets 33/38/9117 294     MPV 03/05/2018 11.8     Neutrophils % 03/05/2018 82.5*    Lymphocytes % 03/05/2018 9.6*    Monocytes % 03/05/2018 6.0     Eosinophils % 03/05/2018 1.1     Basophils % 03/05/2018 0.4     Neutrophils # 03/05/2018 13.0*    Lymphocytes # 03/05/2018 1.5     Monocytes # 03/05/2018 0.90     Eosinophils # 03/05/2018 0.20     Basophils # 03/05/2018 0.10     Sodium 03/05/2018 136     Potassium 03/05/2018 4.6     Chloride 03/05/2018 96*    CO2 03/05/2018 21*    Anion Gap 03/05/2018 19     Glucose 03/05/2018 193*    BUN 03/05/2018 44*    CREATININE 03/05/2018 2.5*    GFR Non- 03/05/2018 20*    Calcium 03/05/2018 9.0     Total Protein 03/05/2018 7.7     Alb 03/05/2018 3.6     Total Bilirubin 03/05/2018 0.5     Alkaline Phosphatase 03/05/2018 154*    ALT 03/05/2018 19     AST 03/05/2018 26     Hemoglobin A1C 03/05/2018 6.9*    Microalbumin, Random Uri* 03/05/2018 <1.20     Creatinine, Ur 03/05/2018 16.8     Microalbumin Creatinine * 03/05/2018 see below    Orders Only on 12/04/2017   Component Date Value    Sodium 12/04/2017 140     Potassium 12/04/2017 4.3     Chloride 12/04/2017 101     CO2 12/04/2017 23     Anion Gap 12/04/2017 16     Glucose 12/04/2017 212*    BUN 12/04/2017 25*    CREATININE 12/04/2017 1.5*    GFR Non- 12/04/2017 36*    Calcium 12/04/2017 9.0     Total Protein 12/04/2017 7.3     Alb 12/04/2017 4.0     Total Bilirubin 12/04/2017 <0.2     Alkaline Phosphatase 12/04/2017 139*    ALT 12/04/2017 13     AST 12/04/2017 21     Vit D, 25-Hydroxy 12/04/2017 51.6    Orders Only on 11/06/2017   Component Date Value    Hemoglobin A1C 11/06/2017 5.9     WBC 11/06/2017 8.6     RBC 11/06/2017 4.44     Hemoglobin 11/06/2017 13.8     Hematocrit 11/06/2017 42.7     MCV 11/06/2017 96.2     MCH 11/06/2017 31.1*    MCHC 11/06/2017 32.3*    RDW 11/06/2017 14.2     Platelets 34/02/0792 306     MPV 11/06/2017 11.8     Neutrophils % 11/06/2017 62.4     Lymphocytes % 11/06/2017 25.3     Monocytes % 11/06/2017 8.9     Eosinophils % 11/06/2017 2.1     Basophils % 11/06/2017 0.8     Neutrophils # 11/06/2017 5.3     Lymphocytes # 11/06/2017 2.2     Monocytes # 11/06/2017 0.80     Eosinophils # 11/06/2017 0.20     Basophils # 11/06/2017 0.10     Sodium 11/06/2017 138     Potassium 11/06/2017 5.5*    Chloride 11/06/2017 98     CO2 11/06/2017 25     Anion Gap 11/06/2017 15     Glucose 11/06/2017 102     BUN 11/06/2017 23*    CREATININE 11/06/2017 1.5*    GFR Non- 11/06/2017 36*    Calcium 11/06/2017 9.2     Total Protein 11/06/2017 7.8     Alb 11/06/2017 4.1     Total Psychiatric/Behavioral: Positive for sleep disturbance (will do nightmare medication continue ambien). Negative for behavioral problems and suicidal ideas. The patient is nervous/anxious (worse lately and doing weird on [address]). With nightmares and fear improving             Physical Exam   Constitutional: She is oriented to person, place, and time. She appears well-developed and well-nourished. No distress. HENT:   Head: Normocephalic. Right Ear: External ear normal.   Left Ear: External ear normal.   Mouth/Throat: No oropharyngeal exudate. Eyes: Pupils are equal, round, and reactive to light. Right eye exhibits no discharge. Left eye exhibits no discharge. Neck: Normal range of motion. Cardiovascular: Normal rate, regular rhythm, normal heart sounds and intact distal pulses. No murmur heard. Pulmonary/Chest: Effort normal and breath sounds normal. No respiratory distress. She has no wheezes. Abdominal: Soft. Bowel sounds are normal. She exhibits no distension. Musculoskeletal: Normal range of motion. Lymphadenopathy:     She has no cervical adenopathy. Neurological: She is alert and oriented to person, place, and time. No cranial nerve deficit. Skin: Skin is warm and dry. No rash noted. She is not diaphoretic. Rash in groins and under breasts   Psychiatric: She has a normal mood and affect. Her behavior is normal.   Vitals reviewed. ASSESSMENT/ PLAN    1. Situational anxiety  Will see fidencio soon  Or return to old place  This is just temporary for me  Consider mountain comp  - diazepam (VALIUM) 5 MG tablet; Take 1 tablet by mouth every evening for 30 days. Dispense: 30 tablet; Refill: 0  - amitriptyline (ELAVIL) 50 MG tablet; Take 1 tablet by mouth nightly  Dispense: 30 tablet; Refill: 5    2. Insomnia due to other mental disorder  Will return to elavil with valium  102 elavil nightly  - diazepam (VALIUM) 5 MG tablet; Take 1 tablet by mouth every evening for 30 days. tongue;  · appetite or weight changes;  · urinating less than usual;  · itching or rash;  · breast swelling (in men or women); or  · decreased sex drive, impotence, or difficulty having an orgasm. This is not a complete list of side effects and others may occur. Call your doctor for medical advice about side effects. You may report side effects to FDA at 1-784-ZZJ-8595. What other drugs will affect amitriptyline? Taking this medicine with other drugs that make you sleepy can worsen this effect. Ask your doctor before taking amitriptyline with a sleeping pill, narcotic pain medicine, muscle relaxer, or medicine for anxiety, depression, or seizures. Tell your doctor if you have used an \"SSRI\" antidepressant in the past 5 weeks, such as citalopram, escitalopram, fluoxetine (Prozac), fluvoxamine, paroxetine, sertraline (Zoloft), trazodone, or vilazodone. Tell your doctor about all your current medicines and any you start or stop using, especially:  · other antidepressants;  · cimetidine;  · heart rhythm medicine such as flecainide, propafenone, quinidine, and others; or  · medicine to treat mental illness. This list is not complete. Other drugs may interact with amitriptyline, including prescription and over-the-counter medicines, vitamins, and herbal products. Not all possible interactions are listed in this medication guide. Where can I get more information? Your pharmacist can provide more information about amitriptyline. Remember, keep this and all other medicines out of the reach of children, never share your medicines with others, and use this medication only for the indication prescribed. Every effort has been made to ensure that the information provided by [facility] is accurate, up-to-date, and complete, but no guarantee is made to that effect. Drug information contained herein may be time sensitive.  [facility] information has been compiled for use by healthcare practitioners and consumers in the [place] and therefore [employer] does not warrant that uses outside of the [place] are appropriate, unless specifically indicated otherwise. "[employer]s drug information does not endorse drugs, diagnose patients or recommend therapy. "[employer]s drug information is an informational resource designed to assist licensed healthcare practitioners in caring for their patients and/or to serve consumers viewing this service as a supplement to, and not a substitute for, the expertise, skill, knowledge and judgment of healthcare practitioners. The absence of a warning for a given drug or drug combination in no way should be construed to indicate that the drug or drug combination is safe, effective or appropriate for any given patient. [employer] does not assume any responsibility for any aspect of healthcare administered with the aid of information [employer] provides. The information contained herein is not intended to cover all possible uses, directions, precautions, warnings, drug interactions, allergic reactions, or adverse effects. If you have questions about the drugs you are taking, check with your doctor, nurse or pharmacist.  Copyright 4260-3598 [address]: 9.03. Revision date: 7/22/2016. Care instructions adapted under license by [facility] ([facility]). If you have questions about a medical condition or this instruction, always ask your healthcare professional. [place] [address] any warranty or liability for your use of this information. Patient Education        Insomnia: Care Instructions  Your Care Instructions    Insomnia is the inability to sleep well. It is a common problem for most people at some time. Insomnia may make it hard for you to get to sleep, stay asleep, or sleep as long as you need to. This can make you tired and grouchy during the day. It can also make you forgetful, less effective at work, and unhappy.   Insomnia can be caused by conditions such as

## 2018-03-29 ENCOUNTER — TELEPHONE (OUTPATIENT)
Dept: PRIMARY CARE CLINIC | Age: 58
End: 2018-03-29

## 2018-04-04 DIAGNOSIS — R60.0 BILATERAL EDEMA OF LOWER EXTREMITY: ICD-10-CM

## 2018-04-04 RX ORDER — FUROSEMIDE 40 MG/1
TABLET ORAL
Qty: 90 TABLET | Refills: 3 | Status: ON HOLD | OUTPATIENT
Start: 2018-04-04 | End: 2018-06-28 | Stop reason: HOSPADM

## 2018-04-04 RX ORDER — ATORVASTATIN CALCIUM 40 MG/1
TABLET, FILM COATED ORAL
Qty: 90 TABLET | Refills: 3 | Status: SHIPPED | OUTPATIENT
Start: 2018-04-04 | End: 2019-05-01 | Stop reason: SDUPTHER

## 2018-04-05 ENCOUNTER — OFFICE VISIT (OUTPATIENT)
Dept: PRIMARY CARE CLINIC | Age: 58
End: 2018-04-05
Payer: MEDICARE

## 2018-04-05 VITALS
TEMPERATURE: 97.7 F | HEIGHT: 61 IN | OXYGEN SATURATION: 96 % | HEART RATE: 51 BPM | BODY MASS INDEX: 44.37 KG/M2 | SYSTOLIC BLOOD PRESSURE: 124 MMHG | WEIGHT: 235 LBS | DIASTOLIC BLOOD PRESSURE: 80 MMHG

## 2018-04-05 DIAGNOSIS — F31.78 BIPOLAR DISORDER, IN FULL REMISSION, MOST RECENT EPISODE MIXED (HCC): ICD-10-CM

## 2018-04-05 DIAGNOSIS — F41.8 SITUATIONAL ANXIETY: ICD-10-CM

## 2018-04-05 DIAGNOSIS — Z12.11 SCREEN FOR COLON CANCER: ICD-10-CM

## 2018-04-05 DIAGNOSIS — Z79.4 TYPE 2 DIABETES MELLITUS WITH DIABETIC POLYNEUROPATHY, WITH LONG-TERM CURRENT USE OF INSULIN (HCC): Primary | ICD-10-CM

## 2018-04-05 DIAGNOSIS — E78.2 MIXED HYPERLIPIDEMIA: ICD-10-CM

## 2018-04-05 DIAGNOSIS — E11.42 TYPE 2 DIABETES MELLITUS WITH DIABETIC POLYNEUROPATHY, WITH LONG-TERM CURRENT USE OF INSULIN (HCC): Primary | ICD-10-CM

## 2018-04-05 DIAGNOSIS — N18.4 CHRONIC KIDNEY DISEASE (CKD), STAGE IV (SEVERE) (HCC): ICD-10-CM

## 2018-04-05 DIAGNOSIS — M13.0 POLYARTICULAR ARTHRITIS: ICD-10-CM

## 2018-04-05 DIAGNOSIS — F99 INSOMNIA DUE TO OTHER MENTAL DISORDER: ICD-10-CM

## 2018-04-05 DIAGNOSIS — I10 ESSENTIAL HYPERTENSION: ICD-10-CM

## 2018-04-05 DIAGNOSIS — F51.05 INSOMNIA DUE TO OTHER MENTAL DISORDER: ICD-10-CM

## 2018-04-05 PROCEDURE — 99214 OFFICE O/P EST MOD 30 MIN: CPT | Performed by: NURSE PRACTITIONER

## 2018-04-05 PROCEDURE — 3017F COLORECTAL CA SCREEN DOC REV: CPT | Performed by: NURSE PRACTITIONER

## 2018-04-05 PROCEDURE — 1036F TOBACCO NON-USER: CPT | Performed by: NURSE PRACTITIONER

## 2018-04-05 PROCEDURE — G8598 ASA/ANTIPLAT THER USED: HCPCS | Performed by: NURSE PRACTITIONER

## 2018-04-05 PROCEDURE — 3044F HG A1C LEVEL LT 7.0%: CPT | Performed by: NURSE PRACTITIONER

## 2018-04-05 PROCEDURE — 3014F SCREEN MAMMO DOC REV: CPT | Performed by: NURSE PRACTITIONER

## 2018-04-05 PROCEDURE — G8427 DOCREV CUR MEDS BY ELIG CLIN: HCPCS | Performed by: NURSE PRACTITIONER

## 2018-04-05 PROCEDURE — G8417 CALC BMI ABV UP PARAM F/U: HCPCS | Performed by: NURSE PRACTITIONER

## 2018-04-05 RX ORDER — AMITRIPTYLINE HYDROCHLORIDE 150 MG/1
150 TABLET, FILM COATED ORAL NIGHTLY
Qty: 30 TABLET | Refills: 11 | Status: SHIPPED | OUTPATIENT
Start: 2018-04-05 | End: 2018-12-11 | Stop reason: SDUPTHER

## 2018-04-05 ASSESSMENT — ENCOUNTER SYMPTOMS
TROUBLE SWALLOWING: 0
WHEEZING: 0
ABDOMINAL PAIN: 0
SHORTNESS OF BREATH: 0
COUGH: 0
SINUS PRESSURE: 0
EYES NEGATIVE: 1
BACK PAIN: 0
SORE THROAT: 0

## 2018-05-07 DIAGNOSIS — E11.42 DIABETIC POLYNEUROPATHY ASSOCIATED WITH TYPE 2 DIABETES MELLITUS (HCC): ICD-10-CM

## 2018-05-09 ENCOUNTER — TELEPHONE (OUTPATIENT)
Dept: PRIMARY CARE CLINIC | Age: 58
End: 2018-05-09

## 2018-05-09 DIAGNOSIS — F31.78 BIPOLAR DISORDER, IN FULL REMISSION, MOST RECENT EPISODE MIXED (HCC): ICD-10-CM

## 2018-05-09 DIAGNOSIS — F41.9 ANXIETY: Primary | ICD-10-CM

## 2018-05-18 ENCOUNTER — TELEPHONE (OUTPATIENT)
Dept: PRIMARY CARE CLINIC | Age: 58
End: 2018-05-18

## 2018-05-22 ENCOUNTER — HOSPITAL ENCOUNTER (EMERGENCY)
Age: 58
Discharge: HOME | End: 2018-05-22
Attending: EMERGENCY MEDICINE
Payer: MEDICARE

## 2018-05-22 ENCOUNTER — OFFICE VISIT (OUTPATIENT)
Dept: PRIMARY CARE CLINIC | Age: 58
End: 2018-05-22
Payer: MEDICARE

## 2018-05-22 ENCOUNTER — CARE COORDINATION (OUTPATIENT)
Dept: CARE COORDINATION | Age: 58
End: 2018-05-22

## 2018-05-22 VITALS
HEART RATE: 80 BPM | OXYGEN SATURATION: 98 % | WEIGHT: 232.5 LBS | TEMPERATURE: 97.7 F | BODY MASS INDEX: 43.9 KG/M2 | HEIGHT: 61 IN | DIASTOLIC BLOOD PRESSURE: 88 MMHG | SYSTOLIC BLOOD PRESSURE: 128 MMHG

## 2018-05-22 VITALS
RESPIRATION RATE: 20 BRPM | HEIGHT: 61 IN | BODY MASS INDEX: 44.18 KG/M2 | OXYGEN SATURATION: 99 % | HEART RATE: 87 BPM | DIASTOLIC BLOOD PRESSURE: 87 MMHG | SYSTOLIC BLOOD PRESSURE: 123 MMHG | TEMPERATURE: 98 F | WEIGHT: 234 LBS

## 2018-05-22 DIAGNOSIS — N18.32 CHRONIC KIDNEY DISEASE (CKD) STAGE G3B/A2, MODERATELY DECREASED GLOMERULAR FILTRATION RATE (GFR) BETWEEN 30-44 ML/MIN/1.73 SQUARE METER AND ALBUMINURIA CREATININE RATIO BETWEEN 30-299 MG/G (HCC): ICD-10-CM

## 2018-05-22 DIAGNOSIS — N18.9 CHRONIC KIDNEY DISEASE, UNSPECIFIED CKD STAGE: Primary | ICD-10-CM

## 2018-05-22 DIAGNOSIS — E78.2 MIXED HYPERLIPIDEMIA: ICD-10-CM

## 2018-05-22 DIAGNOSIS — R30.0 DYSURIA: ICD-10-CM

## 2018-05-22 DIAGNOSIS — I10 ESSENTIAL HYPERTENSION: ICD-10-CM

## 2018-05-22 LAB
ALBUMIN SERPL-MCNC: 4.2 G/DL (ref 3.5–5.2)
ALP BLD-CCNC: 149 U/L (ref 35–104)
ALT SERPL-CCNC: 29 U/L (ref 5–33)
ANION GAP SERPL CALCULATED.3IONS-SCNC: 12 MMOL/L (ref 7–19)
ANION GAP SERPL CALCULATED.3IONS-SCNC: 13 MMOL/L (ref 7–19)
AST SERPL-CCNC: 35 U/L (ref 5–32)
BASOPHILS ABSOLUTE: 0.1 K/UL (ref 0–0.2)
BASOPHILS RELATIVE PERCENT: 0.5 % (ref 0–1)
BILIRUB SERPL-MCNC: 0.3 MG/DL (ref 0.2–1.2)
BILIRUBIN, POC: NORMAL
BLOOD URINE, POC: NORMAL
BUN BLDV-MCNC: 25 MG/DL (ref 6–20)
BUN BLDV-MCNC: 26 MG/DL (ref 6–20)
CALCIUM SERPL-MCNC: 8.9 MG/DL (ref 8.6–10)
CALCIUM SERPL-MCNC: 9.5 MG/DL (ref 8.6–10)
CHLORIDE BLD-SCNC: 102 MMOL/L (ref 98–111)
CHLORIDE BLD-SCNC: 98 MMOL/L (ref 98–111)
CLARITY, POC: CLEAR
CO2: 25 MMOL/L (ref 22–29)
CO2: 26 MMOL/L (ref 22–29)
COLOR, POC: YELLOW
CREAT SERPL-MCNC: 1.6 MG/DL (ref 0.5–0.9)
CREAT SERPL-MCNC: 1.7 MG/DL (ref 0.5–0.9)
EOSINOPHILS ABSOLUTE: 0.2 K/UL (ref 0–0.6)
EOSINOPHILS RELATIVE PERCENT: 1.7 % (ref 0–5)
GFR NON-AFRICAN AMERICAN: 31
GFR NON-AFRICAN AMERICAN: 33
GLUCOSE BLD-MCNC: 105 MG/DL (ref 74–109)
GLUCOSE BLD-MCNC: 107 MG/DL (ref 74–109)
GLUCOSE URINE, POC: NORMAL
HBA1C MFR BLD: 7.2 %
HCT VFR BLD CALC: 42.6 % (ref 37–47)
HEMOGLOBIN: 13.6 G/DL (ref 12–16)
KETONES, POC: NORMAL
LEUKOCYTE EST, POC: NORMAL
LYMPHOCYTES ABSOLUTE: 2.1 K/UL (ref 1.1–4.5)
LYMPHOCYTES RELATIVE PERCENT: 19.2 % (ref 20–40)
MCH RBC QN AUTO: 30.3 PG (ref 27–31)
MCHC RBC AUTO-ENTMCNC: 31.9 G/DL (ref 33–37)
MCV RBC AUTO: 94.9 FL (ref 81–99)
MONOCYTES ABSOLUTE: 1 K/UL (ref 0–0.9)
MONOCYTES RELATIVE PERCENT: 9 % (ref 0–10)
NEUTROPHILS ABSOLUTE: 7.5 K/UL (ref 1.5–7.5)
NEUTROPHILS RELATIVE PERCENT: 69 % (ref 50–65)
NITRITE, POC: NORMAL
PDW BLD-RTO: 14 % (ref 11.5–14.5)
PH, POC: 5
PLATELET # BLD: 264 K/UL (ref 130–400)
PMV BLD AUTO: 12 FL (ref 9.4–12.3)
POTASSIUM SERPL-SCNC: 4.5 MMOL/L (ref 3.5–5)
POTASSIUM SERPL-SCNC: 6 MMOL/L (ref 3.5–5)
PROTEIN, POC: NORMAL
RBC # BLD: 4.49 M/UL (ref 4.2–5.4)
SODIUM BLD-SCNC: 137 MMOL/L (ref 136–145)
SODIUM BLD-SCNC: 139 MMOL/L (ref 136–145)
SPECIFIC GRAVITY, POC: 1.01
TOTAL PROTEIN: 7.9 G/DL (ref 6.6–8.7)
UROBILINOGEN, POC: 0.2
WBC # BLD: 10.8 K/UL (ref 4.8–10.8)

## 2018-05-22 PROCEDURE — 99214 OFFICE O/P EST MOD 30 MIN: CPT | Performed by: NURSE PRACTITIONER

## 2018-05-22 PROCEDURE — 3044F HG A1C LEVEL LT 7.0%: CPT | Performed by: NURSE PRACTITIONER

## 2018-05-22 PROCEDURE — G8417 CALC BMI ABV UP PARAM F/U: HCPCS | Performed by: NURSE PRACTITIONER

## 2018-05-22 PROCEDURE — 99284 EMERGENCY DEPT VISIT MOD MDM: CPT

## 2018-05-22 PROCEDURE — 81002 URINALYSIS NONAUTO W/O SCOPE: CPT | Performed by: NURSE PRACTITIONER

## 2018-05-22 PROCEDURE — 6370000000 HC RX 637 (ALT 250 FOR IP): Performed by: EMERGENCY MEDICINE

## 2018-05-22 PROCEDURE — G8427 DOCREV CUR MEDS BY ELIG CLIN: HCPCS | Performed by: NURSE PRACTITIONER

## 2018-05-22 PROCEDURE — G8598 ASA/ANTIPLAT THER USED: HCPCS | Performed by: NURSE PRACTITIONER

## 2018-05-22 PROCEDURE — 93005 ELECTROCARDIOGRAM TRACING: CPT

## 2018-05-22 PROCEDURE — 36415 COLL VENOUS BLD VENIPUNCTURE: CPT

## 2018-05-22 PROCEDURE — 2022F DILAT RTA XM EVC RTNOPTHY: CPT | Performed by: NURSE PRACTITIONER

## 2018-05-22 PROCEDURE — 1036F TOBACCO NON-USER: CPT | Performed by: NURSE PRACTITIONER

## 2018-05-22 PROCEDURE — 3017F COLORECTAL CA SCREEN DOC REV: CPT | Performed by: NURSE PRACTITIONER

## 2018-05-22 PROCEDURE — 99283 EMERGENCY DEPT VISIT LOW MDM: CPT | Performed by: EMERGENCY MEDICINE

## 2018-05-22 PROCEDURE — 80048 BASIC METABOLIC PNL TOTAL CA: CPT

## 2018-05-22 RX ORDER — LISINOPRIL 40 MG/1
TABLET ORAL
COMMUNITY
Start: 2018-05-17 | End: 2018-05-22 | Stop reason: DRUGHIGH

## 2018-05-22 RX ORDER — DIAZEPAM 5 MG/1
TABLET ORAL NIGHTLY PRN
COMMUNITY
Start: 2018-05-15 | End: 2018-07-05 | Stop reason: ALTCHOICE

## 2018-05-22 RX ORDER — VALSARTAN 160 MG/1
160 TABLET ORAL DAILY
Qty: 30 TABLET | Refills: 11 | Status: ON HOLD | OUTPATIENT
Start: 2018-05-22 | End: 2018-06-28 | Stop reason: HOSPADM

## 2018-05-22 RX ORDER — LAMOTRIGINE 25 MG/1
50 TABLET ORAL 2 TIMES DAILY
COMMUNITY
Start: 2018-05-11 | End: 2018-08-09

## 2018-05-22 RX ORDER — ACETAMINOPHEN 325 MG/1
650 TABLET ORAL ONCE
Status: COMPLETED | OUTPATIENT
Start: 2018-05-22 | End: 2018-05-22

## 2018-05-22 RX ORDER — CEFDINIR 300 MG/1
300 CAPSULE ORAL DAILY
Qty: 10 CAPSULE | Refills: 0 | Status: SHIPPED | OUTPATIENT
Start: 2018-05-22 | End: 2018-06-01

## 2018-05-22 RX ORDER — AMITRIPTYLINE HYDROCHLORIDE 50 MG/1
50 TABLET, FILM COATED ORAL NIGHTLY
COMMUNITY
Start: 2018-03-23 | End: 2018-06-05

## 2018-05-22 RX ADMIN — ACETAMINOPHEN 650 MG: 325 TABLET ORAL at 21:44

## 2018-05-22 ASSESSMENT — PAIN SCALES - GENERAL
PAINLEVEL_OUTOF10: 0
PAINLEVEL_OUTOF10: 8
PAINLEVEL_OUTOF10: 8

## 2018-05-22 ASSESSMENT — ENCOUNTER SYMPTOMS
SORE THROAT: 0
ABDOMINAL PAIN: 0
WHEEZING: 0
COUGH: 0

## 2018-05-22 ASSESSMENT — PAIN DESCRIPTION - LOCATION: LOCATION: HEAD

## 2018-05-23 LAB
EKG P AXIS: 34 DEGREES
EKG P-R INTERVAL: 182 MS
EKG Q-T INTERVAL: 394 MS
EKG QRS DURATION: 86 MS
EKG QTC CALCULATION (BAZETT): 416 MS
EKG T AXIS: 32 DEGREES

## 2018-05-24 ENCOUNTER — CARE COORDINATION (OUTPATIENT)
Dept: CARE COORDINATION | Age: 58
End: 2018-05-24

## 2018-05-24 RX ORDER — BUTALBITAL, ACETAMINOPHEN AND CAFFEINE 50; 325; 40 MG/1; MG/1; MG/1
TABLET ORAL
Qty: 12 TABLET | Refills: 0 | Status: SHIPPED | OUTPATIENT
Start: 2018-05-24 | End: 2018-06-05

## 2018-05-29 ENCOUNTER — CARE COORDINATION (OUTPATIENT)
Dept: CARE COORDINATION | Age: 58
End: 2018-05-29

## 2018-06-05 ENCOUNTER — APPOINTMENT (OUTPATIENT)
Dept: GENERAL RADIOLOGY | Age: 58
End: 2018-06-05
Payer: MEDICARE

## 2018-06-05 ENCOUNTER — OFFICE VISIT (OUTPATIENT)
Dept: PRIMARY CARE CLINIC | Age: 58
End: 2018-06-05
Payer: MEDICARE

## 2018-06-05 ENCOUNTER — HOSPITAL ENCOUNTER (EMERGENCY)
Age: 58
Discharge: HOME | End: 2018-06-05
Payer: MEDICARE

## 2018-06-05 ENCOUNTER — TELEPHONE (OUTPATIENT)
Dept: PRIMARY CARE CLINIC | Age: 58
End: 2018-06-05

## 2018-06-05 VITALS
SYSTOLIC BLOOD PRESSURE: 162 MMHG | RESPIRATION RATE: 20 BRPM | HEART RATE: 91 BPM | BODY MASS INDEX: 43.99 KG/M2 | WEIGHT: 233 LBS | TEMPERATURE: 96.7 F | HEIGHT: 61 IN | DIASTOLIC BLOOD PRESSURE: 60 MMHG | OXYGEN SATURATION: 96 %

## 2018-06-05 VITALS
WEIGHT: 233 LBS | DIASTOLIC BLOOD PRESSURE: 78 MMHG | BODY MASS INDEX: 43.99 KG/M2 | HEART RATE: 94 BPM | OXYGEN SATURATION: 98 % | SYSTOLIC BLOOD PRESSURE: 122 MMHG | TEMPERATURE: 98.2 F | HEIGHT: 61 IN
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
## 2018-06-05 DIAGNOSIS — F41.9 ANXIETY: ICD-10-CM

## 2018-06-05 DIAGNOSIS — E04.1 THYROID NODULE: ICD-10-CM

## 2018-06-05 DIAGNOSIS — F33.41 RECURRENT MAJOR DEPRESSIVE DISORDER, IN PARTIAL REMISSION (HCC): ICD-10-CM

## 2018-06-05 DIAGNOSIS — Z79.4 TYPE 2 DIABETES MELLITUS WITH DIABETIC POLYNEUROPATHY, WITH LONG-TERM CURRENT USE OF INSULIN (HCC): ICD-10-CM

## 2018-06-05 DIAGNOSIS — N18.32 CHRONIC KIDNEY DISEASE (CKD) STAGE G3B/A2, MODERATELY DECREASED GLOMERULAR FILTRATION RATE (GFR) BETWEEN 30-44 ML/MIN/1.73 SQUARE METER AND ALBUMINURIA CREATININE RATIO BETWEEN 30-299 MG/G (HCC): ICD-10-CM

## 2018-06-05 DIAGNOSIS — S82.841A CLOSED BIMALLEOLAR FRACTURE OF RIGHT ANKLE, INITIAL ENCOUNTER: Primary | ICD-10-CM

## 2018-06-05 DIAGNOSIS — G47.09 OTHER INSOMNIA: ICD-10-CM

## 2018-06-05 DIAGNOSIS — Z87.440 HISTORY OF UTI: ICD-10-CM

## 2018-06-05 DIAGNOSIS — E11.42 TYPE 2 DIABETES MELLITUS WITH DIABETIC POLYNEUROPATHY, WITH LONG-TERM CURRENT USE OF INSULIN (HCC): Primary | ICD-10-CM

## 2018-06-05 DIAGNOSIS — Z79.4 TYPE 2 DIABETES MELLITUS WITH DIABETIC POLYNEUROPATHY, WITH LONG-TERM CURRENT USE OF INSULIN (HCC): Primary | ICD-10-CM

## 2018-06-05 DIAGNOSIS — E87.5 SERUM POTASSIUM ELEVATED: Primary | ICD-10-CM

## 2018-06-05 DIAGNOSIS — E11.42 TYPE 2 DIABETES MELLITUS WITH DIABETIC POLYNEUROPATHY, WITH LONG-TERM CURRENT USE OF INSULIN (HCC): ICD-10-CM

## 2018-06-05 LAB
ALBUMIN SERPL-MCNC: 4.3 G/DL (ref 3.5–5.2)
ALP BLD-CCNC: 160 U/L (ref 35–104)
ALT SERPL-CCNC: 23 U/L (ref 5–33)
ANION GAP SERPL CALCULATED.3IONS-SCNC: 15 MMOL/L (ref 7–19)
AST SERPL-CCNC: 27 U/L (ref 5–32)
BASOPHILS ABSOLUTE: 0.1 K/UL (ref 0–0.2)
BASOPHILS RELATIVE PERCENT: 0.7 % (ref 0–1)
BILIRUB SERPL-MCNC: 0.4 MG/DL (ref 0.2–1.2)
BILIRUBIN URINE: NEGATIVE
BLOOD, URINE: NEGATIVE
BUN BLDV-MCNC: 30 MG/DL (ref 6–20)
CALCIUM SERPL-MCNC: 9.3 MG/DL (ref 8.6–10)
CHLORIDE BLD-SCNC: 96 MMOL/L (ref 98–111)
CLARITY: CLEAR
CO2: 24 MMOL/L (ref 22–29)
COLOR: YELLOW
CREAT SERPL-MCNC: 1.7 MG/DL (ref 0.5–0.9)
EOSINOPHILS ABSOLUTE: 0.2 K/UL (ref 0–0.6)
EOSINOPHILS RELATIVE PERCENT: 2.2 % (ref 0–5)
GFR NON-AFRICAN AMERICAN: 31
GLUCOSE BLD-MCNC: 128 MG/DL (ref 74–109)
GLUCOSE URINE: NEGATIVE MG/DL
HCT VFR BLD CALC: 43 % (ref 37–47)
HEMOGLOBIN: 13.7 G/DL (ref 12–16)
KETONES, URINE: NEGATIVE MG/DL
LEUKOCYTE ESTERASE, URINE: NEGATIVE
LYMPHOCYTES ABSOLUTE: 2.1 K/UL (ref 1.1–4.5)
LYMPHOCYTES RELATIVE PERCENT: 21.2 % (ref 20–40)
MCH RBC QN AUTO: 30.5 PG (ref 27–31)
MCHC RBC AUTO-ENTMCNC: 31.9 G/DL (ref 33–37)
MCV RBC AUTO: 95.8 FL (ref 81–99)
MONOCYTES ABSOLUTE: 0.8 K/UL (ref 0–0.9)
MONOCYTES RELATIVE PERCENT: 7.6 % (ref 0–10)
NEUTROPHILS ABSOLUTE: 6.9 K/UL (ref 1.5–7.5)
NEUTROPHILS RELATIVE PERCENT: 67.9 % (ref 50–65)
NITRITE, URINE: NEGATIVE
PDW BLD-RTO: 13.7 % (ref 11.5–14.5)
PH UA: 5.5
PLATELET # BLD: 334 K/UL (ref 130–400)
PMV BLD AUTO: 11.6 FL (ref 9.4–12.3)
POTASSIUM SERPL-SCNC: 5.4 MMOL/L (ref 3.5–5)
PROTEIN UA: NEGATIVE MG/DL
RBC # BLD: 4.49 M/UL (ref 4.2–5.4)
SODIUM BLD-SCNC: 135 MMOL/L (ref 136–145)
SPECIFIC GRAVITY UA: 1.01
T4 FREE: 1 NG/DL (ref 0.9–1.7)
TOTAL PROTEIN: 8 G/DL (ref 6.6–8.7)
TSH SERPL DL<=0.05 MIU/L-ACNC: 1.63 UIU/ML (ref 0.27–4.2)
UROBILINOGEN, URINE: 0.2 E.U./DL
WBC # BLD: 10.1 K/UL (ref 4.8–10.8)

## 2018-06-05 PROCEDURE — 3017F COLORECTAL CA SCREEN DOC REV: CPT | Performed by: NURSE PRACTITIONER

## 2018-06-05 PROCEDURE — 73610 X-RAY EXAM OF ANKLE: CPT

## 2018-06-05 PROCEDURE — 6370000000 HC RX 637 (ALT 250 FOR IP): Performed by: NURSE PRACTITIONER

## 2018-06-05 PROCEDURE — G8417 CALC BMI ABV UP PARAM F/U: HCPCS | Performed by: NURSE PRACTITIONER

## 2018-06-05 PROCEDURE — 99284 EMERGENCY DEPT VISIT MOD MDM: CPT | Performed by: NURSE PRACTITIONER

## 2018-06-05 PROCEDURE — 99283 EMERGENCY DEPT VISIT LOW MDM: CPT

## 2018-06-05 PROCEDURE — G8598 ASA/ANTIPLAT THER USED: HCPCS | Performed by: NURSE PRACTITIONER

## 2018-06-05 PROCEDURE — G8427 DOCREV CUR MEDS BY ELIG CLIN: HCPCS | Performed by: NURSE PRACTITIONER

## 2018-06-05 PROCEDURE — 29515 APPLICATION SHORT LEG SPLINT: CPT

## 2018-06-05 PROCEDURE — 2022F DILAT RTA XM EVC RTNOPTHY: CPT | Performed by: NURSE PRACTITIONER

## 2018-06-05 PROCEDURE — 3045F PR MOST RECENT HEMOGLOBIN A1C LEVEL 7.0-9.0%: CPT | Performed by: NURSE PRACTITIONER

## 2018-06-05 PROCEDURE — 99214 OFFICE O/P EST MOD 30 MIN: CPT | Performed by: NURSE PRACTITIONER

## 2018-06-05 PROCEDURE — 73560 X-RAY EXAM OF KNEE 1 OR 2: CPT

## 2018-06-05 PROCEDURE — 27808 TREATMENT OF ANKLE FRACTURE: CPT | Performed by: NURSE PRACTITIONER

## 2018-06-05 PROCEDURE — 1036F TOBACCO NON-USER: CPT | Performed by: NURSE PRACTITIONER

## 2018-06-05 RX ORDER — QUETIAPINE FUMARATE 200 MG/1
200 TABLET, FILM COATED ORAL NIGHTLY
Qty: 30 TABLET | Refills: 5 | Status: SHIPPED | OUTPATIENT
Start: 2018-06-05 | End: 2018-11-19 | Stop reason: SDUPTHER

## 2018-06-05 RX ORDER — HYDROCODONE BITARTRATE AND ACETAMINOPHEN 5; 325 MG/1; MG/1
1 TABLET ORAL EVERY 6 HOURS PRN
Qty: 10 TABLET | Refills: 0 | Status: SHIPPED | OUTPATIENT
Start: 2018-06-05 | End: 2018-06-08

## 2018-06-05 RX ORDER — HYDROCODONE BITARTRATE AND ACETAMINOPHEN 5; 325 MG/1; MG/1
1 TABLET ORAL ONCE
Status: COMPLETED | OUTPATIENT
Start: 2018-06-05 | End: 2018-06-05

## 2018-06-05 RX ORDER — QUETIAPINE FUMARATE 200 MG/1
200 TABLET, FILM COATED ORAL 2 TIMES DAILY
COMMUNITY
End: 2018-06-05 | Stop reason: SDUPTHER

## 2018-06-05 RX ORDER — FLUCONAZOLE 150 MG/1
TABLET ORAL DAILY
Status: ON HOLD | COMMUNITY
Start: 2018-05-26 | End: 2018-06-11 | Stop reason: ALTCHOICE

## 2018-06-05 RX ADMIN — HYDROCODONE BITARTRATE AND ACETAMINOPHEN 1 TABLET: 5; 325 TABLET ORAL at 22:55

## 2018-06-05 ASSESSMENT — ENCOUNTER SYMPTOMS
WHEEZING: 0
SORE THROAT: 0
ABDOMINAL PAIN: 0
COUGH: 0

## 2018-06-05 ASSESSMENT — PAIN SCALES - GENERAL
PAINLEVEL_OUTOF10: 9
PAINLEVEL_OUTOF10: 8

## 2018-06-05 ASSESSMENT — PAIN DESCRIPTION - PAIN TYPE: TYPE: ACUTE PAIN

## 2018-06-05 ASSESSMENT — PAIN DESCRIPTION - LOCATION: LOCATION: ANKLE;KNEE

## 2018-06-07 ENCOUNTER — CARE COORDINATION (OUTPATIENT)
Dept: CARE COORDINATION | Age: 58
End: 2018-06-07

## 2018-06-11 ENCOUNTER — HOSPITAL ENCOUNTER (OUTPATIENT)
Age: 58
Setting detail: OUTPATIENT SURGERY
Discharge: HOME | DRG: 493 | End: 2018-06-11
Attending: ORTHOPAEDIC SURGERY | Admitting: ORTHOPAEDIC SURGERY
Payer: MEDICARE

## 2018-06-11 ENCOUNTER — APPOINTMENT (OUTPATIENT)
Dept: GENERAL RADIOLOGY | Age: 58
DRG: 493 | End: 2018-06-11
Attending: ORTHOPAEDIC SURGERY
Payer: MEDICARE

## 2018-06-11 ENCOUNTER — ANESTHESIA EVENT (OUTPATIENT)
Dept: OPERATING ROOM | Age: 58
DRG: 493 | End: 2018-06-11
Payer: MEDICARE

## 2018-06-11 ENCOUNTER — ANESTHESIA (OUTPATIENT)
Dept: OPERATING ROOM | Age: 58
DRG: 493 | End: 2018-06-11
Payer: MEDICARE

## 2018-06-11 VITALS
BODY MASS INDEX: 43.99 KG/M2 | WEIGHT: 233 LBS | SYSTOLIC BLOOD PRESSURE: 135 MMHG | DIASTOLIC BLOOD PRESSURE: 75 MMHG | RESPIRATION RATE: 16 BRPM | HEART RATE: 74 BPM | HEIGHT: 61 IN | TEMPERATURE: 97.3 F | OXYGEN SATURATION: 100 %

## 2018-06-11 VITALS
DIASTOLIC BLOOD PRESSURE: 65 MMHG | RESPIRATION RATE: 10 BRPM | OXYGEN SATURATION: 100 % | SYSTOLIC BLOOD PRESSURE: 116 MMHG

## 2018-06-11 DIAGNOSIS — S82.841A CLOSED BIMALLEOLAR FRACTURE OF RIGHT ANKLE, INITIAL ENCOUNTER: Primary | ICD-10-CM

## 2018-06-11 DIAGNOSIS — K21.9 GASTROESOPHAGEAL REFLUX DISEASE WITHOUT ESOPHAGITIS: ICD-10-CM

## 2018-06-11 LAB
ANION GAP SERPL CALCULATED.3IONS-SCNC: 12 MMOL/L (ref 7–19)
BUN BLDV-MCNC: 35 MG/DL (ref 6–20)
CALCIUM SERPL-MCNC: 8.6 MG/DL (ref 8.6–10)
CHLORIDE BLD-SCNC: 97 MMOL/L (ref 98–111)
CO2: 24 MMOL/L (ref 22–29)
CREAT SERPL-MCNC: 1.6 MG/DL (ref 0.5–0.9)
GFR NON-AFRICAN AMERICAN: 33
GLUCOSE BLD-MCNC: 196 MG/DL (ref 74–109)
POTASSIUM SERPL-SCNC: 5.8 MMOL/L (ref 3.5–4.9)
SODIUM BLD-SCNC: 133 MMOL/L (ref 136–145)

## 2018-06-11 PROCEDURE — 3700000001 HC ADD 15 MINUTES (ANESTHESIA): Performed by: ORTHOPAEDIC SURGERY

## 2018-06-11 PROCEDURE — 0QSG04Z REPOSITION RIGHT TIBIA WITH INTERNAL FIXATION DEVICE, OPEN APPROACH: ICD-10-PCS | Performed by: ORTHOPAEDIC SURGERY

## 2018-06-11 PROCEDURE — 6360000002 HC RX W HCPCS: Performed by: ANESTHESIOLOGY

## 2018-06-11 PROCEDURE — 2500000003 HC RX 250 WO HCPCS

## 2018-06-11 PROCEDURE — 2W5CX2Z REMOVAL OF CAST ON RIGHT LOWER ARM: ICD-10-PCS | Performed by: ORTHOPAEDIC SURGERY

## 2018-06-11 PROCEDURE — 73610 X-RAY EXAM OF ANKLE: CPT

## 2018-06-11 PROCEDURE — 3700000000 HC ANESTHESIA ATTENDED CARE: Performed by: ORTHOPAEDIC SURGERY

## 2018-06-11 PROCEDURE — 3600000014 HC SURGERY LEVEL 4 ADDTL 15MIN: Performed by: ORTHOPAEDIC SURGERY

## 2018-06-11 PROCEDURE — 0QSJ04Z REPOSITION RIGHT FIBULA WITH INTERNAL FIXATION DEVICE, OPEN APPROACH: ICD-10-PCS | Performed by: ORTHOPAEDIC SURGERY

## 2018-06-11 PROCEDURE — 2W3CX1Z IMMOBILIZATION OF RIGHT LOWER ARM USING SPLINT: ICD-10-PCS | Performed by: ORTHOPAEDIC SURGERY

## 2018-06-11 PROCEDURE — 36415 COLL VENOUS BLD VENIPUNCTURE: CPT

## 2018-06-11 PROCEDURE — C1769 GUIDE WIRE: HCPCS | Performed by: ORTHOPAEDIC SURGERY

## 2018-06-11 PROCEDURE — 2580000003 HC RX 258: Performed by: ANESTHESIOLOGY

## 2018-06-11 PROCEDURE — 2720000001 HC MISC SURG SUPPLY STERILE $51-500: Performed by: ORTHOPAEDIC SURGERY

## 2018-06-11 PROCEDURE — 2720000010 HC SURG SUPPLY STERILE: Performed by: ORTHOPAEDIC SURGERY

## 2018-06-11 PROCEDURE — 3600000004 HC SURGERY LEVEL 4 BASE: Performed by: ORTHOPAEDIC SURGERY

## 2018-06-11 PROCEDURE — 7100000001 HC PACU RECOVERY - ADDTL 15 MIN: Performed by: ORTHOPAEDIC SURGERY

## 2018-06-11 PROCEDURE — C1713 ANCHOR/SCREW BN/BN,TIS/BN: HCPCS | Performed by: ORTHOPAEDIC SURGERY

## 2018-06-11 PROCEDURE — 6360000002 HC RX W HCPCS: Performed by: ORTHOPAEDIC SURGERY

## 2018-06-11 PROCEDURE — 7100000010 HC PHASE II RECOVERY - FIRST 15 MIN: Performed by: ORTHOPAEDIC SURGERY

## 2018-06-11 PROCEDURE — 3209999900 FLUORO FOR SURGICAL PROCEDURES

## 2018-06-11 PROCEDURE — 6360000002 HC RX W HCPCS

## 2018-06-11 PROCEDURE — 7100000000 HC PACU RECOVERY - FIRST 15 MIN: Performed by: ORTHOPAEDIC SURGERY

## 2018-06-11 PROCEDURE — 80048 BASIC METABOLIC PNL TOTAL CA: CPT

## 2018-06-11 PROCEDURE — 64447 NJX AA&/STRD FEMORAL NRV IMG: CPT

## 2018-06-11 DEVICE — SCREW CORTCL SLFTP FTHRD 2.7X24MM: Type: IMPLANTABLE DEVICE | Site: ANKLE | Status: FUNCTIONAL

## 2018-06-11 DEVICE — PLATE BNE L86MM 4 H R DST LAT FIBULAR S STL LOK COMPR FOR: Type: IMPLANTABLE DEVICE | Site: ANKLE | Status: FUNCTIONAL

## 2018-06-11 DEVICE — SCREW CORTX SLFTP FTHRD 3.5X18MM: Type: IMPLANTABLE DEVICE | Site: ANKLE | Status: FUNCTIONAL

## 2018-06-11 DEVICE — SCREW BNE L14MM DIA3.5MM CORT S STL ST NONCANNULATED LOK: Type: IMPLANTABLE DEVICE | Site: ANKLE | Status: FUNCTIONAL

## 2018-06-11 DEVICE — SCREW BNE L16MM DIA2.7MM CORT S STL ST LOK FULL THRD T8: Type: IMPLANTABLE DEVICE | Site: ANKLE | Status: FUNCTIONAL

## 2018-06-11 DEVICE — SCREW BNE L12MM DIA2.7MM CORT S STL ST LOK FULL THRD T8: Type: IMPLANTABLE DEVICE | Site: ANKLE | Status: FUNCTIONAL

## 2018-06-11 DEVICE — SCREW BNE L36MM DIA4MM S STL CANN SHT 1/3 THRD SM HEX SOCK: Type: IMPLANTABLE DEVICE | Site: ANKLE | Status: FUNCTIONAL

## 2018-06-11 DEVICE — SCREW BNE L16MM DIA3.5MM CORT S STL ST NONCANNULATED LOK: Type: IMPLANTABLE DEVICE | Site: ANKLE | Status: FUNCTIONAL

## 2018-06-11 DEVICE — SCREW BNE L14MM DIA2.7MM CORT S STL ST LOK FULL THRD T8: Type: IMPLANTABLE DEVICE | Site: ANKLE | Status: FUNCTIONAL

## 2018-06-11 RX ORDER — DIPHENHYDRAMINE HYDROCHLORIDE 50 MG/ML
12.5 INJECTION INTRAMUSCULAR; INTRAVENOUS
Status: DISCONTINUED | OUTPATIENT
Start: 2018-06-11 | End: 2018-06-11 | Stop reason: HOSPADM

## 2018-06-11 RX ORDER — PROPOFOL 10 MG/ML
INJECTION, EMULSION INTRAVENOUS PRN
Status: DISCONTINUED | OUTPATIENT
Start: 2018-06-11 | End: 2018-06-11 | Stop reason: SDUPTHER

## 2018-06-11 RX ORDER — MORPHINE SULFATE 1 MG/ML
4 INJECTION, SOLUTION EPIDURAL; INTRATHECAL; INTRAVENOUS EVERY 5 MIN PRN
Status: DISCONTINUED | OUTPATIENT
Start: 2018-06-11 | End: 2018-06-11 | Stop reason: HOSPADM

## 2018-06-11 RX ORDER — SODIUM CHLORIDE 0.9 % (FLUSH) 0.9 %
10 SYRINGE (ML) INJECTION EVERY 12 HOURS SCHEDULED
Status: DISCONTINUED | OUTPATIENT
Start: 2018-06-11 | End: 2018-06-11 | Stop reason: HOSPADM

## 2018-06-11 RX ORDER — DEXAMETHASONE SODIUM PHOSPHATE 10 MG/ML
INJECTION INTRAMUSCULAR; INTRAVENOUS PRN
Status: DISCONTINUED | OUTPATIENT
Start: 2018-06-11 | End: 2018-06-11 | Stop reason: SDUPTHER

## 2018-06-11 RX ORDER — MIDAZOLAM HYDROCHLORIDE 1 MG/ML
2 INJECTION INTRAMUSCULAR; INTRAVENOUS
Status: COMPLETED | OUTPATIENT
Start: 2018-06-11 | End: 2018-06-11

## 2018-06-11 RX ORDER — FENTANYL CITRATE 50 UG/ML
25 INJECTION, SOLUTION INTRAMUSCULAR; INTRAVENOUS
Status: DISCONTINUED | OUTPATIENT
Start: 2018-06-11 | End: 2018-06-11 | Stop reason: HOSPADM

## 2018-06-11 RX ORDER — LIDOCAINE HYDROCHLORIDE 10 MG/ML
1 INJECTION, SOLUTION EPIDURAL; INFILTRATION; INTRACAUDAL; PERINEURAL
Status: DISCONTINUED | OUTPATIENT
Start: 2018-06-11 | End: 2018-06-11 | Stop reason: HOSPADM

## 2018-06-11 RX ORDER — ONDANSETRON 4 MG/1
4 TABLET, FILM COATED ORAL DAILY PRN
Qty: 20 TABLET | Refills: 0 | Status: SHIPPED | OUTPATIENT
Start: 2018-06-11 | End: 2018-09-07 | Stop reason: SDUPTHER

## 2018-06-11 RX ORDER — METOCLOPRAMIDE HYDROCHLORIDE 5 MG/ML
10 INJECTION INTRAMUSCULAR; INTRAVENOUS
Status: DISCONTINUED | OUTPATIENT
Start: 2018-06-11 | End: 2018-06-11 | Stop reason: HOSPADM

## 2018-06-11 RX ORDER — HYDRALAZINE HYDROCHLORIDE 20 MG/ML
5 INJECTION INTRAMUSCULAR; INTRAVENOUS EVERY 10 MIN PRN
Status: DISCONTINUED | OUTPATIENT
Start: 2018-06-11 | End: 2018-06-11 | Stop reason: HOSPADM

## 2018-06-11 RX ORDER — LABETALOL HYDROCHLORIDE 5 MG/ML
5 INJECTION, SOLUTION INTRAVENOUS EVERY 10 MIN PRN
Status: DISCONTINUED | OUTPATIENT
Start: 2018-06-11 | End: 2018-06-11 | Stop reason: HOSPADM

## 2018-06-11 RX ORDER — ROPIVACAINE HYDROCHLORIDE 5 MG/ML
INJECTION, SOLUTION EPIDURAL; INFILTRATION; PERINEURAL PRN
Status: DISCONTINUED | OUTPATIENT
Start: 2018-06-11 | End: 2018-06-11 | Stop reason: SDUPTHER

## 2018-06-11 RX ORDER — PROMETHAZINE HYDROCHLORIDE 25 MG/ML
6.25 INJECTION, SOLUTION INTRAMUSCULAR; INTRAVENOUS
Status: DISCONTINUED | OUTPATIENT
Start: 2018-06-11 | End: 2018-06-11 | Stop reason: HOSPADM

## 2018-06-11 RX ORDER — FENTANYL CITRATE 50 UG/ML
50 INJECTION, SOLUTION INTRAMUSCULAR; INTRAVENOUS
Status: DISCONTINUED | OUTPATIENT
Start: 2018-06-11 | End: 2018-06-11 | Stop reason: HOSPADM

## 2018-06-11 RX ORDER — SODIUM CHLORIDE 0.9 % (FLUSH) 0.9 %
10 SYRINGE (ML) INJECTION PRN
Status: DISCONTINUED | OUTPATIENT
Start: 2018-06-11 | End: 2018-06-11 | Stop reason: HOSPADM

## 2018-06-11 RX ORDER — MORPHINE SULFATE 1 MG/ML
2 INJECTION, SOLUTION EPIDURAL; INTRATHECAL; INTRAVENOUS EVERY 5 MIN PRN
Status: DISCONTINUED | OUTPATIENT
Start: 2018-06-11 | End: 2018-06-11 | Stop reason: HOSPADM

## 2018-06-11 RX ORDER — SODIUM CHLORIDE, SODIUM LACTATE, POTASSIUM CHLORIDE, CALCIUM CHLORIDE 600; 310; 30; 20 MG/100ML; MG/100ML; MG/100ML; MG/100ML
INJECTION, SOLUTION INTRAVENOUS CONTINUOUS
Status: DISCONTINUED | OUTPATIENT
Start: 2018-06-11 | End: 2018-06-11 | Stop reason: HOSPADM

## 2018-06-11 RX ORDER — HYDROCODONE BITARTRATE AND ACETAMINOPHEN 10; 325 MG/1; MG/1
TABLET ORAL
Qty: 40 TABLET | Refills: 0 | Status: SHIPPED | OUTPATIENT
Start: 2018-06-11 | End: 2018-06-11

## 2018-06-11 RX ORDER — LIDOCAINE HYDROCHLORIDE 10 MG/ML
INJECTION, SOLUTION EPIDURAL; INFILTRATION; INTRACAUDAL; PERINEURAL PRN
Status: DISCONTINUED | OUTPATIENT
Start: 2018-06-11 | End: 2018-06-11 | Stop reason: SDUPTHER

## 2018-06-11 RX ORDER — MEPERIDINE HYDROCHLORIDE 50 MG/ML
12.5 INJECTION INTRAMUSCULAR; INTRAVENOUS; SUBCUTANEOUS EVERY 5 MIN PRN
Status: DISCONTINUED | OUTPATIENT
Start: 2018-06-11 | End: 2018-06-11 | Stop reason: HOSPADM

## 2018-06-11 RX ORDER — ENALAPRILAT 2.5 MG/2ML
1.25 INJECTION INTRAVENOUS
Status: DISCONTINUED | OUTPATIENT
Start: 2018-06-11 | End: 2018-06-11 | Stop reason: HOSPADM

## 2018-06-11 RX ORDER — FENTANYL CITRATE 50 UG/ML
INJECTION, SOLUTION INTRAMUSCULAR; INTRAVENOUS PRN
Status: DISCONTINUED | OUTPATIENT
Start: 2018-06-11 | End: 2018-06-11 | Stop reason: SDUPTHER

## 2018-06-11 RX ORDER — HYDROCODONE BITARTRATE AND ACETAMINOPHEN 10; 325 MG/1; MG/1
TABLET ORAL
Qty: 40 TABLET | Refills: 0 | Status: ON HOLD | OUTPATIENT
Start: 2018-06-11 | End: 2018-06-28 | Stop reason: HOSPADM

## 2018-06-11 RX ORDER — ONDANSETRON 2 MG/ML
INJECTION INTRAMUSCULAR; INTRAVENOUS PRN
Status: DISCONTINUED | OUTPATIENT
Start: 2018-06-11 | End: 2018-06-11 | Stop reason: SDUPTHER

## 2018-06-11 RX ORDER — SODIUM CHLORIDE 9 MG/ML
INJECTION, SOLUTION INTRAVENOUS CONTINUOUS
Status: DISCONTINUED | OUTPATIENT
Start: 2018-06-11 | End: 2018-06-11 | Stop reason: HOSPADM

## 2018-06-11 RX ORDER — EPHEDRINE SULFATE 50 MG/ML
INJECTION, SOLUTION INTRAVENOUS PRN
Status: DISCONTINUED | OUTPATIENT
Start: 2018-06-11 | End: 2018-06-11 | Stop reason: SDUPTHER

## 2018-06-11 RX ADMIN — EPHEDRINE SULFATE 10 MG: 50 INJECTION, SOLUTION INTRAMUSCULAR; INTRAVENOUS; SUBCUTANEOUS at 09:50

## 2018-06-11 RX ADMIN — MIDAZOLAM HYDROCHLORIDE 2 MG: 2 INJECTION, SOLUTION INTRAMUSCULAR; INTRAVENOUS at 08:46

## 2018-06-11 RX ADMIN — ROPIVACAINE HYDROCHLORIDE 20 ML: 5 INJECTION, SOLUTION EPIDURAL; INFILTRATION; PERINEURAL at 09:07

## 2018-06-11 RX ADMIN — PHENYLEPHRINE HYDROCHLORIDE 80 MCG: 10 INJECTION INTRAVENOUS at 09:58

## 2018-06-11 RX ADMIN — FENTANYL CITRATE 50 MCG: 50 INJECTION, SOLUTION INTRAMUSCULAR; INTRAVENOUS at 11:10

## 2018-06-11 RX ADMIN — ROPIVACAINE HYDROCHLORIDE 20 ML: 5 INJECTION, SOLUTION EPIDURAL; INFILTRATION; PERINEURAL at 09:10

## 2018-06-11 RX ADMIN — DEXAMETHASONE SODIUM PHOSPHATE 10 MG: 10 INJECTION INTRAMUSCULAR; INTRAVENOUS at 09:34

## 2018-06-11 RX ADMIN — EPHEDRINE SULFATE 5 MG: 50 INJECTION, SOLUTION INTRAMUSCULAR; INTRAVENOUS; SUBCUTANEOUS at 10:04

## 2018-06-11 RX ADMIN — EPHEDRINE SULFATE 5 MG: 50 INJECTION, SOLUTION INTRAMUSCULAR; INTRAVENOUS; SUBCUTANEOUS at 10:10

## 2018-06-11 RX ADMIN — PROPOFOL 150 MG: 10 INJECTION, EMULSION INTRAVENOUS at 09:29

## 2018-06-11 RX ADMIN — FENTANYL CITRATE 50 MCG: 50 INJECTION, SOLUTION INTRAMUSCULAR; INTRAVENOUS at 09:29

## 2018-06-11 RX ADMIN — PHENYLEPHRINE HYDROCHLORIDE 160 MCG: 10 INJECTION INTRAVENOUS at 10:10

## 2018-06-11 RX ADMIN — PHENYLEPHRINE HYDROCHLORIDE 80 MCG: 10 INJECTION INTRAVENOUS at 10:04

## 2018-06-11 RX ADMIN — SODIUM CHLORIDE, SODIUM LACTATE, POTASSIUM CHLORIDE, AND CALCIUM CHLORIDE: 600; 310; 30; 20 INJECTION, SOLUTION INTRAVENOUS at 08:27

## 2018-06-11 RX ADMIN — ONDANSETRON HYDROCHLORIDE 4 MG: 2 SOLUTION INTRAMUSCULAR; INTRAVENOUS at 10:45

## 2018-06-11 RX ADMIN — SODIUM CHLORIDE, SODIUM LACTATE, POTASSIUM CHLORIDE, AND CALCIUM CHLORIDE: 600; 310; 30; 20 INJECTION, SOLUTION INTRAVENOUS at 10:36

## 2018-06-11 RX ADMIN — LIDOCAINE HYDROCHLORIDE 50 MG: 10 INJECTION, SOLUTION EPIDURAL; INFILTRATION; INTRACAUDAL; PERINEURAL at 09:29

## 2018-06-11 RX ADMIN — Medication 2 G: at 09:32

## 2018-06-11 ASSESSMENT — PAIN SCALES - GENERAL: PAINLEVEL_OUTOF10: 0

## 2018-06-11 ASSESSMENT — PAIN - FUNCTIONAL ASSESSMENT: PAIN_FUNCTIONAL_ASSESSMENT: 0-10

## 2018-06-11 ASSESSMENT — LIFESTYLE VARIABLES: SMOKING_STATUS: 0

## 2018-06-12 ENCOUNTER — APPOINTMENT (OUTPATIENT)
Dept: GENERAL RADIOLOGY | Age: 58
DRG: 493 | End: 2018-06-12
Payer: MEDICARE

## 2018-06-12 ENCOUNTER — ANESTHESIA (OUTPATIENT)
Dept: OPERATING ROOM | Age: 58
DRG: 493 | End: 2018-06-12
Payer: MEDICARE

## 2018-06-12 ENCOUNTER — HOSPITAL ENCOUNTER (INPATIENT)
Age: 58
LOS: 2 days | Discharge: NURSING, REHAB OR LONG-TERM CARE | DRG: 493 | End: 2018-06-14
Attending: EMERGENCY MEDICINE | Admitting: ORTHOPAEDIC SURGERY
Payer: MEDICARE

## 2018-06-12 ENCOUNTER — ANESTHESIA EVENT (OUTPATIENT)
Dept: OPERATING ROOM | Age: 58
DRG: 493 | End: 2018-06-12
Payer: MEDICARE

## 2018-06-12 VITALS
DIASTOLIC BLOOD PRESSURE: 71 MMHG | SYSTOLIC BLOOD PRESSURE: 149 MMHG | OXYGEN SATURATION: 99 % | RESPIRATION RATE: 15 BRPM

## 2018-06-12 DIAGNOSIS — S82.891B TYPE I OR II OPEN FRACTURE OF RIGHT ANKLE, INITIAL ENCOUNTER: Primary | ICD-10-CM

## 2018-06-12 PROBLEM — E87.5 HYPERKALEMIA: Status: ACTIVE | Noted: 2018-06-12

## 2018-06-12 LAB
ALBUMIN SERPL-MCNC: 3.4 G/DL (ref 3.5–5.2)
ALP BLD-CCNC: 128 U/L (ref 35–104)
ALT SERPL-CCNC: 13 U/L (ref 5–33)
ANION GAP SERPL CALCULATED.3IONS-SCNC: 11 MMOL/L (ref 7–19)
AST SERPL-CCNC: 20 U/L (ref 5–32)
BILIRUB SERPL-MCNC: <0.2 MG/DL (ref 0.2–1.2)
BUN BLDV-MCNC: 28 MG/DL (ref 6–20)
CALCIUM SERPL-MCNC: 9 MG/DL (ref 8.6–10)
CHLORIDE BLD-SCNC: 102 MMOL/L (ref 98–111)
CO2: 25 MMOL/L (ref 22–29)
CREAT SERPL-MCNC: 1.3 MG/DL (ref 0.5–0.9)
GFR NON-AFRICAN AMERICAN: 42
GLUCOSE BLD-MCNC: 221 MG/DL (ref 70–99)
GLUCOSE BLD-MCNC: 221 MG/DL (ref 74–109)
GLUCOSE BLD-MCNC: 300 MG/DL (ref 70–99)
HCT VFR BLD CALC: 31.9 % (ref 37–47)
HEMOGLOBIN: 10.5 G/DL (ref 12–16)
INR BLD: 1 (ref 0.88–1.18)
MCH RBC QN AUTO: 30.5 PG (ref 27–31)
MCHC RBC AUTO-ENTMCNC: 32.9 G/DL (ref 33–37)
MCV RBC AUTO: 92.7 FL (ref 81–99)
PDW BLD-RTO: 13.9 % (ref 11.5–14.5)
PERFORMED ON: ABNORMAL
PERFORMED ON: ABNORMAL
PLATELET # BLD: 256 K/UL (ref 130–400)
PMV BLD AUTO: 10.5 FL (ref 9.4–12.3)
POTASSIUM SERPL-SCNC: 4.6 MMOL/L (ref 3.5–5)
PROTHROMBIN TIME: 13.1 SEC (ref 12–14.6)
RBC # BLD: 3.44 M/UL (ref 4.2–5.4)
SODIUM BLD-SCNC: 138 MMOL/L (ref 136–145)
TOTAL PROTEIN: 6.5 G/DL (ref 6.6–8.7)
WBC # BLD: 8.4 K/UL (ref 4.8–10.8)

## 2018-06-12 PROCEDURE — 99285 EMERGENCY DEPT VISIT HI MDM: CPT | Performed by: EMERGENCY MEDICINE

## 2018-06-12 PROCEDURE — 73610 X-RAY EXAM OF ANKLE: CPT

## 2018-06-12 PROCEDURE — 0QPG04Z REMOVAL OF INTERNAL FIXATION DEVICE FROM RIGHT TIBIA, OPEN APPROACH: ICD-10-PCS | Performed by: ORTHOPAEDIC SURGERY

## 2018-06-12 PROCEDURE — 82948 REAGENT STRIP/BLOOD GLUCOSE: CPT

## 2018-06-12 PROCEDURE — 85027 COMPLETE CBC AUTOMATED: CPT

## 2018-06-12 PROCEDURE — 99999 PR OFFICE/OUTPT VISIT,PROCEDURE ONLY: CPT | Performed by: HOSPITALIST

## 2018-06-12 PROCEDURE — 6370000000 HC RX 637 (ALT 250 FOR IP): Performed by: PHYSICIAN ASSISTANT

## 2018-06-12 PROCEDURE — 2500000003 HC RX 250 WO HCPCS

## 2018-06-12 PROCEDURE — 3600000014 HC SURGERY LEVEL 4 ADDTL 15MIN: Performed by: ORTHOPAEDIC SURGERY

## 2018-06-12 PROCEDURE — 0QPJ04Z REMOVAL OF INTERNAL FIXATION DEVICE FROM RIGHT FIBULA, OPEN APPROACH: ICD-10-PCS | Performed by: ORTHOPAEDIC SURGERY

## 2018-06-12 PROCEDURE — 0QSG04Z REPOSITION RIGHT TIBIA WITH INTERNAL FIXATION DEVICE, OPEN APPROACH: ICD-10-PCS | Performed by: ORTHOPAEDIC SURGERY

## 2018-06-12 PROCEDURE — 6370000000 HC RX 637 (ALT 250 FOR IP): Performed by: ORTHOPAEDIC SURGERY

## 2018-06-12 PROCEDURE — 6360000002 HC RX W HCPCS

## 2018-06-12 PROCEDURE — 7100000000 HC PACU RECOVERY - FIRST 15 MIN: Performed by: ORTHOPAEDIC SURGERY

## 2018-06-12 PROCEDURE — 2720000001 HC MISC SURG SUPPLY STERILE $51-500: Performed by: ORTHOPAEDIC SURGERY

## 2018-06-12 PROCEDURE — 3700000000 HC ANESTHESIA ATTENDED CARE: Performed by: ORTHOPAEDIC SURGERY

## 2018-06-12 PROCEDURE — 2580000003 HC RX 258: Performed by: ANESTHESIOLOGY

## 2018-06-12 PROCEDURE — 27818 TREATMENT OF ANKLE FRACTURE: CPT

## 2018-06-12 PROCEDURE — 99222 1ST HOSP IP/OBS MODERATE 55: CPT | Performed by: PHYSICIAN ASSISTANT

## 2018-06-12 PROCEDURE — 6360000002 HC RX W HCPCS: Performed by: ORTHOPAEDIC SURGERY

## 2018-06-12 PROCEDURE — 96375 TX/PRO/DX INJ NEW DRUG ADDON: CPT

## 2018-06-12 PROCEDURE — 96374 THER/PROPH/DIAG INJ IV PUSH: CPT

## 2018-06-12 PROCEDURE — 7100000001 HC PACU RECOVERY - ADDTL 15 MIN: Performed by: ORTHOPAEDIC SURGERY

## 2018-06-12 PROCEDURE — 2720000000 HC MISC SURG SUPPLY STERILE $0-50: Performed by: ORTHOPAEDIC SURGERY

## 2018-06-12 PROCEDURE — 3700000001 HC ADD 15 MINUTES (ANESTHESIA): Performed by: ORTHOPAEDIC SURGERY

## 2018-06-12 PROCEDURE — 2W6QXZZ TRACTION OF RIGHT LOWER LEG: ICD-10-PCS | Performed by: ORTHOPAEDIC SURGERY

## 2018-06-12 PROCEDURE — 6360000002 HC RX W HCPCS: Performed by: EMERGENCY MEDICINE

## 2018-06-12 PROCEDURE — C1713 ANCHOR/SCREW BN/BN,TIS/BN: HCPCS | Performed by: ORTHOPAEDIC SURGERY

## 2018-06-12 PROCEDURE — 0QSJ04Z REPOSITION RIGHT FIBULA WITH INTERNAL FIXATION DEVICE, OPEN APPROACH: ICD-10-PCS | Performed by: ORTHOPAEDIC SURGERY

## 2018-06-12 PROCEDURE — 3209999900 FLUORO FOR SURGICAL PROCEDURES

## 2018-06-12 PROCEDURE — 99285 EMERGENCY DEPT VISIT HI MDM: CPT

## 2018-06-12 PROCEDURE — 36415 COLL VENOUS BLD VENIPUNCTURE: CPT

## 2018-06-12 PROCEDURE — 2580000003 HC RX 258: Performed by: EMERGENCY MEDICINE

## 2018-06-12 PROCEDURE — 73110 X-RAY EXAM OF WRIST: CPT

## 2018-06-12 PROCEDURE — 2580000003 HC RX 258

## 2018-06-12 PROCEDURE — 0S9F0ZZ DRAINAGE OF RIGHT ANKLE JOINT, OPEN APPROACH: ICD-10-PCS | Performed by: ORTHOPAEDIC SURGERY

## 2018-06-12 PROCEDURE — 64447 NJX AA&/STRD FEMORAL NRV IMG: CPT

## 2018-06-12 PROCEDURE — 80053 COMPREHEN METABOLIC PANEL: CPT

## 2018-06-12 PROCEDURE — 29515 APPLICATION SHORT LEG SPLINT: CPT | Performed by: EMERGENCY MEDICINE

## 2018-06-12 PROCEDURE — 1210000000 HC MED SURG R&B

## 2018-06-12 PROCEDURE — 2580000003 HC RX 258: Performed by: ORTHOPAEDIC SURGERY

## 2018-06-12 PROCEDURE — 6360000002 HC RX W HCPCS: Performed by: ANESTHESIOLOGY

## 2018-06-12 PROCEDURE — 85610 PROTHROMBIN TIME: CPT

## 2018-06-12 PROCEDURE — 3600000004 HC SURGERY LEVEL 4 BASE: Performed by: ORTHOPAEDIC SURGERY

## 2018-06-12 PROCEDURE — C1769 GUIDE WIRE: HCPCS | Performed by: ORTHOPAEDIC SURGERY

## 2018-06-12 DEVICE — SCREW BNE L14MM DIA2.7MM CORT S STL ST T8 STARDRV RECESS: Type: IMPLANTABLE DEVICE | Site: ANKLE | Status: FUNCTIONAL

## 2018-06-12 DEVICE — SCREW BNE L12MM DIA2.7MM CORT S STL ST T8 STARDRV RECESS: Type: IMPLANTABLE DEVICE | Site: ANKLE | Status: FUNCTIONAL

## 2018-06-12 DEVICE — IMPLANTABLE DEVICE: Type: IMPLANTABLE DEVICE | Site: ANKLE | Status: FUNCTIONAL

## 2018-06-12 DEVICE — SCREW BNE L16MM DIA2.7MM ANK S STL ST VAR ANG LOK FULL THRD: Type: IMPLANTABLE DEVICE | Site: ANKLE | Status: FUNCTIONAL

## 2018-06-12 DEVICE — SCREW BNE L28MM DIA3.5MM CORT S STL ST FULL THRD T15 DRV: Type: IMPLANTABLE DEVICE | Site: ANKLE | Status: FUNCTIONAL

## 2018-06-12 DEVICE — SCREW BNE L18MM DIA2.7MM ANK S STL ST VAR ANG LOK FULL THRD: Type: IMPLANTABLE DEVICE | Site: ANKLE | Status: FUNCTIONAL

## 2018-06-12 DEVICE — SCREW BNE L45MM DIA4MM CANC S STL ST CANN NONLOCKING FULL: Type: IMPLANTABLE DEVICE | Site: ANKLE | Status: FUNCTIONAL

## 2018-06-12 DEVICE — PLATE BNE L62MM 3 H S STL LOK COMPR HK FOR 3.5MM SCR LCP: Type: IMPLANTABLE DEVICE | Site: ANKLE | Status: FUNCTIONAL

## 2018-06-12 DEVICE — SCREW BNE L16MM DIA2.7MM CORT S STL ST T8 STARDRV RECESS: Type: IMPLANTABLE DEVICE | Site: ANKLE | Status: FUNCTIONAL

## 2018-06-12 DEVICE — PLATE BNE L131MM 7 H NONSTERILE R DST LAT FIBULAR S STL VAR: Type: IMPLANTABLE DEVICE | Site: ANKLE | Status: FUNCTIONAL

## 2018-06-12 DEVICE — SCREW BNE L36MM DIA4MM S STL CANN SHT 1/3 THRD SM HEX SOCK: Type: IMPLANTABLE DEVICE | Site: ANKLE | Status: FUNCTIONAL

## 2018-06-12 RX ORDER — SODIUM CHLORIDE 0.9 % (FLUSH) 0.9 %
10 SYRINGE (ML) INJECTION EVERY 12 HOURS SCHEDULED
Status: DISCONTINUED | OUTPATIENT
Start: 2018-06-12 | End: 2018-06-12 | Stop reason: SDUPTHER

## 2018-06-12 RX ORDER — PANTOPRAZOLE SODIUM 40 MG/1
40 TABLET, DELAYED RELEASE ORAL DAILY
Status: DISCONTINUED | OUTPATIENT
Start: 2018-06-12 | End: 2018-06-14 | Stop reason: HOSPADM

## 2018-06-12 RX ORDER — LAMOTRIGINE 25 MG/1
50 TABLET ORAL 2 TIMES DAILY
Status: DISCONTINUED | OUTPATIENT
Start: 2018-06-12 | End: 2018-06-14 | Stop reason: HOSPADM

## 2018-06-12 RX ORDER — MORPHINE SULFATE 1 MG/ML
4 INJECTION, SOLUTION EPIDURAL; INTRATHECAL; INTRAVENOUS EVERY 5 MIN PRN
Status: DISCONTINUED | OUTPATIENT
Start: 2018-06-12 | End: 2018-06-12 | Stop reason: HOSPADM

## 2018-06-12 RX ORDER — LIDOCAINE HYDROCHLORIDE 10 MG/ML
INJECTION, SOLUTION EPIDURAL; INFILTRATION; INTRACAUDAL; PERINEURAL PRN
Status: DISCONTINUED | OUTPATIENT
Start: 2018-06-12 | End: 2018-06-12 | Stop reason: SDUPTHER

## 2018-06-12 RX ORDER — MORPHINE SULFATE 1 MG/ML
2 INJECTION, SOLUTION EPIDURAL; INTRATHECAL; INTRAVENOUS EVERY 5 MIN PRN
Status: DISCONTINUED | OUTPATIENT
Start: 2018-06-12 | End: 2018-06-12 | Stop reason: HOSPADM

## 2018-06-12 RX ORDER — FENTANYL CITRATE 50 UG/ML
INJECTION, SOLUTION INTRAMUSCULAR; INTRAVENOUS PRN
Status: DISCONTINUED | OUTPATIENT
Start: 2018-06-12 | End: 2018-06-12 | Stop reason: SDUPTHER

## 2018-06-12 RX ORDER — ATORVASTATIN CALCIUM 40 MG/1
40 TABLET, FILM COATED ORAL DAILY
Status: DISCONTINUED | OUTPATIENT
Start: 2018-06-12 | End: 2018-06-14 | Stop reason: HOSPADM

## 2018-06-12 RX ORDER — HYDROCODONE BITARTRATE AND ACETAMINOPHEN 10; 325 MG/1; MG/1
1 TABLET ORAL EVERY 6 HOURS PRN
Status: DISCONTINUED | OUTPATIENT
Start: 2018-06-12 | End: 2018-06-14 | Stop reason: HOSPADM

## 2018-06-12 RX ORDER — DOCUSATE SODIUM 100 MG/1
100 CAPSULE, LIQUID FILLED ORAL 2 TIMES DAILY
Status: DISCONTINUED | OUTPATIENT
Start: 2018-06-12 | End: 2018-06-14 | Stop reason: HOSPADM

## 2018-06-12 RX ORDER — OXYCODONE HYDROCHLORIDE AND ACETAMINOPHEN 5; 325 MG/1; MG/1
1 TABLET ORAL EVERY 4 HOURS PRN
Status: DISCONTINUED | OUTPATIENT
Start: 2018-06-12 | End: 2018-06-14 | Stop reason: HOSPADM

## 2018-06-12 RX ORDER — HYDROMORPHONE HCL IN 0.9% NACL 0.5 MG/ML
0.5 SYRINGE (ML) INTRAVENOUS EVERY 5 MIN PRN
Status: DISCONTINUED | OUTPATIENT
Start: 2018-06-12 | End: 2018-06-12 | Stop reason: HOSPADM

## 2018-06-12 RX ORDER — METOCLOPRAMIDE HYDROCHLORIDE 5 MG/ML
10 INJECTION INTRAMUSCULAR; INTRAVENOUS
Status: DISCONTINUED | OUTPATIENT
Start: 2018-06-12 | End: 2018-06-12 | Stop reason: HOSPADM

## 2018-06-12 RX ORDER — FLUTICASONE PROPIONATE 50 MCG
1 SPRAY, SUSPENSION (ML) NASAL DAILY
Status: DISCONTINUED | OUTPATIENT
Start: 2018-06-12 | End: 2018-06-14 | Stop reason: HOSPADM

## 2018-06-12 RX ORDER — DULOXETIN HYDROCHLORIDE 60 MG/1
60 CAPSULE, DELAYED RELEASE ORAL DAILY
Status: DISCONTINUED | OUTPATIENT
Start: 2018-06-12 | End: 2018-06-14 | Stop reason: HOSPADM

## 2018-06-12 RX ORDER — DEXAMETHASONE SODIUM PHOSPHATE 10 MG/ML
INJECTION INTRAMUSCULAR; INTRAVENOUS PRN
Status: DISCONTINUED | OUTPATIENT
Start: 2018-06-12 | End: 2018-06-12 | Stop reason: SDUPTHER

## 2018-06-12 RX ORDER — SODIUM CHLORIDE, SODIUM LACTATE, POTASSIUM CHLORIDE, CALCIUM CHLORIDE 600; 310; 30; 20 MG/100ML; MG/100ML; MG/100ML; MG/100ML
INJECTION, SOLUTION INTRAVENOUS CONTINUOUS PRN
Status: DISCONTINUED | OUTPATIENT
Start: 2018-06-12 | End: 2018-06-12 | Stop reason: SDUPTHER

## 2018-06-12 RX ORDER — MIDAZOLAM HYDROCHLORIDE 1 MG/ML
INJECTION INTRAMUSCULAR; INTRAVENOUS
Status: COMPLETED
Start: 2018-06-12 | End: 2018-06-12

## 2018-06-12 RX ORDER — DIPHENHYDRAMINE HYDROCHLORIDE 50 MG/ML
12.5 INJECTION INTRAMUSCULAR; INTRAVENOUS
Status: DISCONTINUED | OUTPATIENT
Start: 2018-06-12 | End: 2018-06-12 | Stop reason: HOSPADM

## 2018-06-12 RX ORDER — LEVOTHYROXINE SODIUM 88 UG/1
88 TABLET ORAL DAILY
Status: DISCONTINUED | OUTPATIENT
Start: 2018-06-12 | End: 2018-06-14 | Stop reason: HOSPADM

## 2018-06-12 RX ORDER — SODIUM CHLORIDE 0.9 % (FLUSH) 0.9 %
10 SYRINGE (ML) INJECTION PRN
Status: DISCONTINUED | OUTPATIENT
Start: 2018-06-12 | End: 2018-06-12 | Stop reason: SDUPTHER

## 2018-06-12 RX ORDER — DIAZEPAM 5 MG/1
5 TABLET ORAL NIGHTLY PRN
Status: DISCONTINUED | OUTPATIENT
Start: 2018-06-12 | End: 2018-06-14 | Stop reason: HOSPADM

## 2018-06-12 RX ORDER — PANTOPRAZOLE SODIUM 40 MG/1
TABLET, DELAYED RELEASE ORAL
Qty: 90 TABLET | Refills: 3 | Status: SHIPPED | OUTPATIENT
Start: 2018-06-12 | End: 2020-03-12 | Stop reason: SDUPTHER

## 2018-06-12 RX ORDER — MORPHINE SULFATE 1 MG/ML
4 INJECTION, SOLUTION EPIDURAL; INTRATHECAL; INTRAVENOUS
Status: DISCONTINUED | OUTPATIENT
Start: 2018-06-12 | End: 2018-06-14 | Stop reason: HOSPADM

## 2018-06-12 RX ORDER — ACETAMINOPHEN 325 MG/1
650 TABLET ORAL EVERY 4 HOURS PRN
Status: DISCONTINUED | OUTPATIENT
Start: 2018-06-12 | End: 2018-06-12 | Stop reason: SDUPTHER

## 2018-06-12 RX ORDER — ENALAPRILAT 2.5 MG/2ML
1.25 INJECTION INTRAVENOUS
Status: DISCONTINUED | OUTPATIENT
Start: 2018-06-12 | End: 2018-06-12 | Stop reason: HOSPADM

## 2018-06-12 RX ORDER — NICOTINE POLACRILEX 4 MG
15 LOZENGE BUCCAL PRN
Status: DISCONTINUED | OUTPATIENT
Start: 2018-06-12 | End: 2018-06-14 | Stop reason: HOSPADM

## 2018-06-12 RX ORDER — PREGABALIN 150 MG/1
150 CAPSULE ORAL 2 TIMES DAILY
Status: DISCONTINUED | OUTPATIENT
Start: 2018-06-12 | End: 2018-06-14 | Stop reason: HOSPADM

## 2018-06-12 RX ORDER — MORPHINE SULFATE 1 MG/ML
2 INJECTION, SOLUTION EPIDURAL; INTRATHECAL; INTRAVENOUS
Status: DISCONTINUED | OUTPATIENT
Start: 2018-06-12 | End: 2018-06-14 | Stop reason: HOSPADM

## 2018-06-12 RX ORDER — CEFAZOLIN SODIUM 1 G/3ML
INJECTION, POWDER, FOR SOLUTION INTRAMUSCULAR; INTRAVENOUS PRN
Status: DISCONTINUED | OUTPATIENT
Start: 2018-06-12 | End: 2018-06-12 | Stop reason: SDUPTHER

## 2018-06-12 RX ORDER — SODIUM CHLORIDE 0.9 % (FLUSH) 0.9 %
10 SYRINGE (ML) INJECTION PRN
Status: DISCONTINUED | OUTPATIENT
Start: 2018-06-12 | End: 2018-06-14 | Stop reason: HOSPADM

## 2018-06-12 RX ORDER — ACETAMINOPHEN 325 MG/1
650 TABLET ORAL EVERY 4 HOURS PRN
Status: DISCONTINUED | OUTPATIENT
Start: 2018-06-12 | End: 2018-06-14 | Stop reason: HOSPADM

## 2018-06-12 RX ORDER — ONDANSETRON 2 MG/ML
4 INJECTION INTRAMUSCULAR; INTRAVENOUS EVERY 6 HOURS PRN
Status: DISCONTINUED | OUTPATIENT
Start: 2018-06-12 | End: 2018-06-12 | Stop reason: SDUPTHER

## 2018-06-12 RX ORDER — TIZANIDINE 4 MG/1
4 TABLET ORAL EVERY 6 HOURS PRN
Status: DISCONTINUED | OUTPATIENT
Start: 2018-06-12 | End: 2018-06-14 | Stop reason: HOSPADM

## 2018-06-12 RX ORDER — MEPERIDINE HYDROCHLORIDE 50 MG/ML
12.5 INJECTION INTRAMUSCULAR; INTRAVENOUS; SUBCUTANEOUS EVERY 5 MIN PRN
Status: DISCONTINUED | OUTPATIENT
Start: 2018-06-12 | End: 2018-06-12 | Stop reason: HOSPADM

## 2018-06-12 RX ORDER — HYDRALAZINE HYDROCHLORIDE 20 MG/ML
5 INJECTION INTRAMUSCULAR; INTRAVENOUS EVERY 10 MIN PRN
Status: DISCONTINUED | OUTPATIENT
Start: 2018-06-12 | End: 2018-06-12 | Stop reason: HOSPADM

## 2018-06-12 RX ORDER — ONDANSETRON 2 MG/ML
4 INJECTION INTRAMUSCULAR; INTRAVENOUS EVERY 6 HOURS PRN
Status: DISCONTINUED | OUTPATIENT
Start: 2018-06-12 | End: 2018-06-14 | Stop reason: HOSPADM

## 2018-06-12 RX ORDER — ONDANSETRON 2 MG/ML
INJECTION INTRAMUSCULAR; INTRAVENOUS
Status: COMPLETED
Start: 2018-06-12 | End: 2018-06-12

## 2018-06-12 RX ORDER — PROPOFOL 10 MG/ML
INJECTION, EMULSION INTRAVENOUS PRN
Status: DISCONTINUED | OUTPATIENT
Start: 2018-06-12 | End: 2018-06-12 | Stop reason: SDUPTHER

## 2018-06-12 RX ORDER — PROMETHAZINE HYDROCHLORIDE 25 MG/ML
6.25 INJECTION, SOLUTION INTRAMUSCULAR; INTRAVENOUS
Status: DISCONTINUED | OUTPATIENT
Start: 2018-06-12 | End: 2018-06-12 | Stop reason: HOSPADM

## 2018-06-12 RX ORDER — METOPROLOL SUCCINATE 50 MG/1
50 TABLET, EXTENDED RELEASE ORAL DAILY
Status: DISCONTINUED | OUTPATIENT
Start: 2018-06-12 | End: 2018-06-14 | Stop reason: HOSPADM

## 2018-06-12 RX ORDER — ROPIVACAINE HYDROCHLORIDE 5 MG/ML
INJECTION, SOLUTION EPIDURAL; INFILTRATION; PERINEURAL PRN
Status: DISCONTINUED | OUTPATIENT
Start: 2018-06-12 | End: 2018-06-12 | Stop reason: SDUPTHER

## 2018-06-12 RX ORDER — SODIUM CHLORIDE, SODIUM LACTATE, POTASSIUM CHLORIDE, CALCIUM CHLORIDE 600; 310; 30; 20 MG/100ML; MG/100ML; MG/100ML; MG/100ML
INJECTION, SOLUTION INTRAVENOUS ONCE
Status: COMPLETED | OUTPATIENT
Start: 2018-06-12 | End: 2018-06-12

## 2018-06-12 RX ORDER — QUETIAPINE FUMARATE 200 MG/1
200 TABLET, FILM COATED ORAL NIGHTLY
Status: DISCONTINUED | OUTPATIENT
Start: 2018-06-12 | End: 2018-06-14 | Stop reason: HOSPADM

## 2018-06-12 RX ORDER — ONDANSETRON 2 MG/ML
4 INJECTION INTRAMUSCULAR; INTRAVENOUS ONCE
Status: COMPLETED | OUTPATIENT
Start: 2018-06-12 | End: 2018-06-12

## 2018-06-12 RX ORDER — ONDANSETRON 2 MG/ML
INJECTION INTRAMUSCULAR; INTRAVENOUS PRN
Status: DISCONTINUED | OUTPATIENT
Start: 2018-06-12 | End: 2018-06-12 | Stop reason: SDUPTHER

## 2018-06-12 RX ORDER — MIDAZOLAM HYDROCHLORIDE 1 MG/ML
INJECTION INTRAMUSCULAR; INTRAVENOUS PRN
Status: DISCONTINUED | OUTPATIENT
Start: 2018-06-12 | End: 2018-06-12 | Stop reason: SDUPTHER

## 2018-06-12 RX ORDER — OXYCODONE HYDROCHLORIDE AND ACETAMINOPHEN 5; 325 MG/1; MG/1
2 TABLET ORAL EVERY 4 HOURS PRN
Status: DISCONTINUED | OUTPATIENT
Start: 2018-06-12 | End: 2018-06-14 | Stop reason: HOSPADM

## 2018-06-12 RX ORDER — DEXTROSE MONOHYDRATE 25 G/50ML
12.5 INJECTION, SOLUTION INTRAVENOUS PRN
Status: DISCONTINUED | OUTPATIENT
Start: 2018-06-12 | End: 2018-06-14 | Stop reason: HOSPADM

## 2018-06-12 RX ORDER — DEXTROSE MONOHYDRATE 50 MG/ML
100 INJECTION, SOLUTION INTRAVENOUS PRN
Status: DISCONTINUED | OUTPATIENT
Start: 2018-06-12 | End: 2018-06-14 | Stop reason: HOSPADM

## 2018-06-12 RX ORDER — VALSARTAN 160 MG/1
160 TABLET ORAL DAILY
Status: DISCONTINUED | OUTPATIENT
Start: 2018-06-12 | End: 2018-06-13

## 2018-06-12 RX ORDER — FUROSEMIDE 40 MG/1
40 TABLET ORAL DAILY
Status: DISCONTINUED | OUTPATIENT
Start: 2018-06-12 | End: 2018-06-14 | Stop reason: HOSPADM

## 2018-06-12 RX ORDER — HYDROMORPHONE HCL IN 0.9% NACL 0.5 MG/ML
0.25 SYRINGE (ML) INTRAVENOUS EVERY 5 MIN PRN
Status: DISCONTINUED | OUTPATIENT
Start: 2018-06-12 | End: 2018-06-12 | Stop reason: HOSPADM

## 2018-06-12 RX ORDER — SODIUM CHLORIDE 9 MG/ML
INJECTION, SOLUTION INTRAVENOUS CONTINUOUS
Status: DISCONTINUED | OUTPATIENT
Start: 2018-06-12 | End: 2018-06-14 | Stop reason: HOSPADM

## 2018-06-12 RX ORDER — HYDROMORPHONE HCL IN 0.9% NACL 0.5 MG/ML
0.5 SYRINGE (ML) INTRAVENOUS
Status: DISCONTINUED | OUTPATIENT
Start: 2018-06-12 | End: 2018-06-14 | Stop reason: HOSPADM

## 2018-06-12 RX ORDER — LABETALOL HYDROCHLORIDE 5 MG/ML
5 INJECTION, SOLUTION INTRAVENOUS EVERY 10 MIN PRN
Status: DISCONTINUED | OUTPATIENT
Start: 2018-06-12 | End: 2018-06-12 | Stop reason: HOSPADM

## 2018-06-12 RX ORDER — AMITRIPTYLINE HYDROCHLORIDE 75 MG/1
150 TABLET, FILM COATED ORAL NIGHTLY
Status: DISCONTINUED | OUTPATIENT
Start: 2018-06-12 | End: 2018-06-14 | Stop reason: HOSPADM

## 2018-06-12 RX ORDER — SODIUM CHLORIDE 0.9 % (FLUSH) 0.9 %
10 SYRINGE (ML) INJECTION EVERY 12 HOURS SCHEDULED
Status: DISCONTINUED | OUTPATIENT
Start: 2018-06-12 | End: 2018-06-14 | Stop reason: HOSPADM

## 2018-06-12 RX ORDER — MORPHINE SULFATE 1 MG/ML
4 INJECTION, SOLUTION EPIDURAL; INTRATHECAL; INTRAVENOUS ONCE
Status: COMPLETED | OUTPATIENT
Start: 2018-06-12 | End: 2018-06-12

## 2018-06-12 RX ADMIN — SODIUM CHLORIDE, SODIUM LACTATE, POTASSIUM CHLORIDE, AND CALCIUM CHLORIDE: 600; 310; 30; 20 INJECTION, SOLUTION INTRAVENOUS at 17:10

## 2018-06-12 RX ADMIN — ASPIRIN 325 MG: 325 TABLET, DELAYED RELEASE ORAL at 21:18

## 2018-06-12 RX ADMIN — INSULIN LISPRO 4 UNITS: 100 INJECTION, SOLUTION INTRAVENOUS; SUBCUTANEOUS at 22:35

## 2018-06-12 RX ADMIN — METOPROLOL SUCCINATE 50 MG: 50 TABLET, EXTENDED RELEASE ORAL at 14:40

## 2018-06-12 RX ADMIN — ONDANSETRON HYDROCHLORIDE 4 MG: 2 INJECTION, SOLUTION INTRAMUSCULAR; INTRAVENOUS at 04:13

## 2018-06-12 RX ADMIN — Medication 4 MG: at 05:43

## 2018-06-12 RX ADMIN — FENTANYL CITRATE 50 MCG: 50 INJECTION, SOLUTION INTRAMUSCULAR; INTRAVENOUS at 17:36

## 2018-06-12 RX ADMIN — ROPIVACAINE HYDROCHLORIDE 20 ML: 5 INJECTION, SOLUTION EPIDURAL; INFILTRATION; PERINEURAL at 16:34

## 2018-06-12 RX ADMIN — ONDANSETRON 4 MG: 2 INJECTION INTRAMUSCULAR; INTRAVENOUS at 04:13

## 2018-06-12 RX ADMIN — Medication 10 ML: at 09:31

## 2018-06-12 RX ADMIN — SODIUM CHLORIDE, SODIUM LACTATE, POTASSIUM CHLORIDE, AND CALCIUM CHLORIDE: 600; 310; 30; 20 INJECTION, SOLUTION INTRAVENOUS at 16:00

## 2018-06-12 RX ADMIN — ONDANSETRON HYDROCHLORIDE 4 MG: 2 SOLUTION INTRAMUSCULAR; INTRAVENOUS at 16:50

## 2018-06-12 RX ADMIN — WATER 1 G: 1 INJECTION INTRAMUSCULAR; INTRAVENOUS; SUBCUTANEOUS at 04:05

## 2018-06-12 RX ADMIN — DEXAMETHASONE SODIUM PHOSPHATE 10 MG: 10 INJECTION INTRAMUSCULAR; INTRAVENOUS at 16:50

## 2018-06-12 RX ADMIN — SODIUM CHLORIDE, SODIUM LACTATE, POTASSIUM CHLORIDE, AND CALCIUM CHLORIDE: 600; 310; 30; 20 INJECTION, SOLUTION INTRAVENOUS at 16:11

## 2018-06-12 RX ADMIN — LAMOTRIGINE 50 MG: 25 TABLET ORAL at 21:18

## 2018-06-12 RX ADMIN — Medication 2 MG: at 13:34

## 2018-06-12 RX ADMIN — PROPOFOL 160 MG: 10 INJECTION, EMULSION INTRAVENOUS at 16:42

## 2018-06-12 RX ADMIN — TIZANIDINE 4 MG: 4 TABLET ORAL at 21:18

## 2018-06-12 RX ADMIN — LIDOCAINE HYDROCHLORIDE 50 MG: 10 INJECTION, SOLUTION EPIDURAL; INFILTRATION; INTRACAUDAL; PERINEURAL at 16:42

## 2018-06-12 RX ADMIN — FENTANYL CITRATE 50 MCG: 50 INJECTION, SOLUTION INTRAMUSCULAR; INTRAVENOUS at 16:35

## 2018-06-12 RX ADMIN — AMITRIPTYLINE HYDROCHLORIDE 150 MG: 75 TABLET, FILM COATED ORAL at 21:18

## 2018-06-12 RX ADMIN — FENTANYL CITRATE 50 MCG: 50 INJECTION, SOLUTION INTRAMUSCULAR; INTRAVENOUS at 17:00

## 2018-06-12 RX ADMIN — FENTANYL CITRATE 25 MCG: 50 INJECTION, SOLUTION INTRAMUSCULAR; INTRAVENOUS at 18:12

## 2018-06-12 RX ADMIN — Medication 4 MG: at 09:39

## 2018-06-12 RX ADMIN — QUETIAPINE FUMARATE 200 MG: 200 TABLET ORAL at 21:18

## 2018-06-12 RX ADMIN — ROPIVACAINE HYDROCHLORIDE 20 ML: 5 INJECTION, SOLUTION EPIDURAL; INFILTRATION; PERINEURAL at 16:25

## 2018-06-12 RX ADMIN — MIDAZOLAM HYDROCHLORIDE 2 MG: 1 INJECTION, SOLUTION INTRAMUSCULAR; INTRAVENOUS at 16:30

## 2018-06-12 RX ADMIN — DOCUSATE SODIUM 100 MG: 100 CAPSULE, LIQUID FILLED ORAL at 21:18

## 2018-06-12 RX ADMIN — CEFAZOLIN 2000 MG: 1 INJECTION, POWDER, FOR SOLUTION INTRAMUSCULAR; INTRAVENOUS; PARENTERAL at 16:50

## 2018-06-12 RX ADMIN — FENTANYL CITRATE 25 MCG: 50 INJECTION, SOLUTION INTRAMUSCULAR; INTRAVENOUS at 18:47

## 2018-06-12 RX ADMIN — PREGABALIN 150 MG: 150 CAPSULE ORAL at 21:18

## 2018-06-12 ASSESSMENT — ENCOUNTER SYMPTOMS
RHINORRHEA: 0
PHOTOPHOBIA: 0
BACK PAIN: 0
VOMITING: 0
TROUBLE SWALLOWING: 0
COUGH: 0
ABDOMINAL PAIN: 0
COLOR CHANGE: 0
EYE PAIN: 0
NAUSEA: 0
ABDOMINAL DISTENTION: 0
WHEEZING: 0
DIARRHEA: 0
CHEST TIGHTNESS: 0
CONSTIPATION: 0
SORE THROAT: 0
SHORTNESS OF BREATH: 0

## 2018-06-12 ASSESSMENT — LIFESTYLE VARIABLES: SMOKING_STATUS: 0

## 2018-06-12 ASSESSMENT — PAIN SCALES - GENERAL
PAINLEVEL_OUTOF10: 10
PAINLEVEL_OUTOF10: 8
PAINLEVEL_OUTOF10: 9
PAINLEVEL_OUTOF10: 9
PAINLEVEL_OUTOF10: 10
PAINLEVEL_OUTOF10: 9

## 2018-06-13 LAB
ANION GAP SERPL CALCULATED.3IONS-SCNC: 11 MMOL/L (ref 7–19)
ANION GAP SERPL CALCULATED.3IONS-SCNC: 14 MMOL/L (ref 7–19)
BUN BLDV-MCNC: 24 MG/DL (ref 6–20)
BUN BLDV-MCNC: 26 MG/DL (ref 6–20)
CALCIUM SERPL-MCNC: 7.9 MG/DL (ref 8.6–10)
CALCIUM SERPL-MCNC: 8.5 MG/DL (ref 8.6–10)
CHLORIDE BLD-SCNC: 100 MMOL/L (ref 98–111)
CHLORIDE BLD-SCNC: 103 MMOL/L (ref 98–111)
CO2: 22 MMOL/L (ref 22–29)
CO2: 23 MMOL/L (ref 22–29)
CREAT SERPL-MCNC: 1.5 MG/DL (ref 0.5–0.9)
CREAT SERPL-MCNC: 1.5 MG/DL (ref 0.5–0.9)
GFR NON-AFRICAN AMERICAN: 36
GFR NON-AFRICAN AMERICAN: 36
GLUCOSE BLD-MCNC: 174 MG/DL (ref 70–99)
GLUCOSE BLD-MCNC: 232 MG/DL (ref 70–99)
GLUCOSE BLD-MCNC: 232 MG/DL (ref 74–109)
GLUCOSE BLD-MCNC: 257 MG/DL (ref 70–99)
GLUCOSE BLD-MCNC: 264 MG/DL (ref 70–99)
GLUCOSE BLD-MCNC: 326 MG/DL (ref 74–109)
HCT VFR BLD CALC: 29.9 % (ref 37–47)
HEMOGLOBIN: 9.4 G/DL (ref 12–16)
MCH RBC QN AUTO: 29.7 PG (ref 27–31)
MCHC RBC AUTO-ENTMCNC: 31.4 G/DL (ref 33–37)
MCV RBC AUTO: 94.6 FL (ref 81–99)
PDW BLD-RTO: 14.1 % (ref 11.5–14.5)
PERFORMED ON: ABNORMAL
PLATELET # BLD: 260 K/UL (ref 130–400)
PMV BLD AUTO: 11.2 FL (ref 9.4–12.3)
POTASSIUM REFLEX MAGNESIUM: 6 MMOL/L (ref 3.5–5)
POTASSIUM SERPL-SCNC: 4 MMOL/L (ref 3.5–5)
POTASSIUM SERPL-SCNC: 4.2 MMOL/L (ref 3.5–5)
RBC # BLD: 3.16 M/UL (ref 4.2–5.4)
SODIUM BLD-SCNC: 133 MMOL/L (ref 136–145)
SODIUM BLD-SCNC: 140 MMOL/L (ref 136–145)
WBC # BLD: 9.3 K/UL (ref 4.8–10.8)

## 2018-06-13 PROCEDURE — G8987 SELF CARE CURRENT STATUS: HCPCS

## 2018-06-13 PROCEDURE — 6360000002 HC RX W HCPCS: Performed by: ORTHOPAEDIC SURGERY

## 2018-06-13 PROCEDURE — G8978 MOBILITY CURRENT STATUS: HCPCS

## 2018-06-13 PROCEDURE — 2580000003 HC RX 258: Performed by: ORTHOPAEDIC SURGERY

## 2018-06-13 PROCEDURE — 84132 ASSAY OF SERUM POTASSIUM: CPT

## 2018-06-13 PROCEDURE — 80048 BASIC METABOLIC PNL TOTAL CA: CPT

## 2018-06-13 PROCEDURE — 2580000003 HC RX 258: Performed by: INTERNAL MEDICINE

## 2018-06-13 PROCEDURE — 6370000000 HC RX 637 (ALT 250 FOR IP): Performed by: ORTHOPAEDIC SURGERY

## 2018-06-13 PROCEDURE — 97161 PT EVAL LOW COMPLEX 20 MIN: CPT

## 2018-06-13 PROCEDURE — 6370000000 HC RX 637 (ALT 250 FOR IP): Performed by: PHYSICIAN ASSISTANT

## 2018-06-13 PROCEDURE — 36415 COLL VENOUS BLD VENIPUNCTURE: CPT

## 2018-06-13 PROCEDURE — G8988 SELF CARE GOAL STATUS: HCPCS

## 2018-06-13 PROCEDURE — 97165 OT EVAL LOW COMPLEX 30 MIN: CPT

## 2018-06-13 PROCEDURE — 97530 THERAPEUTIC ACTIVITIES: CPT

## 2018-06-13 PROCEDURE — 82948 REAGENT STRIP/BLOOD GLUCOSE: CPT

## 2018-06-13 PROCEDURE — 85027 COMPLETE CBC AUTOMATED: CPT

## 2018-06-13 PROCEDURE — G8979 MOBILITY GOAL STATUS: HCPCS

## 2018-06-13 PROCEDURE — 1210000000 HC MED SURG R&B

## 2018-06-13 PROCEDURE — 99232 SBSQ HOSP IP/OBS MODERATE 35: CPT | Performed by: FAMILY MEDICINE

## 2018-06-13 PROCEDURE — 6370000000 HC RX 637 (ALT 250 FOR IP): Performed by: INTERNAL MEDICINE

## 2018-06-13 RX ORDER — SODIUM POLYSTYRENE SULFONATE 15 G/60ML
15 SUSPENSION ORAL; RECTAL ONCE
Status: COMPLETED | OUTPATIENT
Start: 2018-06-13 | End: 2018-06-13

## 2018-06-13 RX ORDER — DEXTROSE MONOHYDRATE 25 G/50ML
25 INJECTION, SOLUTION INTRAVENOUS ONCE
Status: COMPLETED | OUTPATIENT
Start: 2018-06-13 | End: 2018-06-13

## 2018-06-13 RX ADMIN — AMITRIPTYLINE HYDROCHLORIDE 150 MG: 75 TABLET, FILM COATED ORAL at 19:41

## 2018-06-13 RX ADMIN — LAMOTRIGINE 50 MG: 25 TABLET ORAL at 19:41

## 2018-06-13 RX ADMIN — OXYCODONE HYDROCHLORIDE AND ACETAMINOPHEN 2 TABLET: 5; 325 TABLET ORAL at 12:19

## 2018-06-13 RX ADMIN — DOCUSATE SODIUM 100 MG: 100 CAPSULE, LIQUID FILLED ORAL at 08:22

## 2018-06-13 RX ADMIN — INSULIN LISPRO 3 UNITS: 100 INJECTION, SOLUTION INTRAVENOUS; SUBCUTANEOUS at 21:23

## 2018-06-13 RX ADMIN — Medication 4 MG: at 19:45

## 2018-06-13 RX ADMIN — HYDROCODONE BITARTRATE AND ACETAMINOPHEN 1 TABLET: 10; 325 TABLET ORAL at 16:53

## 2018-06-13 RX ADMIN — SODIUM POLYSTYRENE SULFONATE 15 G: 15 SUSPENSION ORAL; RECTAL at 05:45

## 2018-06-13 RX ADMIN — ASPIRIN 325 MG: 325 TABLET, DELAYED RELEASE ORAL at 19:42

## 2018-06-13 RX ADMIN — LEVOTHYROXINE SODIUM 88 MCG: 88 TABLET ORAL at 08:23

## 2018-06-13 RX ADMIN — Medication 10 ML: at 08:24

## 2018-06-13 RX ADMIN — OXYCODONE HYDROCHLORIDE AND ACETAMINOPHEN 2 TABLET: 5; 325 TABLET ORAL at 21:23

## 2018-06-13 RX ADMIN — SODIUM CHLORIDE: 9 INJECTION, SOLUTION INTRAVENOUS at 19:45

## 2018-06-13 RX ADMIN — Medication 2 G: at 02:53

## 2018-06-13 RX ADMIN — INSULIN LISPRO 4 UNITS: 100 INJECTION, SOLUTION INTRAVENOUS; SUBCUTANEOUS at 17:44

## 2018-06-13 RX ADMIN — FLUTICASONE PROPIONATE 1 SPRAY: 50 SPRAY, METERED NASAL at 08:23

## 2018-06-13 RX ADMIN — QUETIAPINE FUMARATE 200 MG: 200 TABLET ORAL at 19:41

## 2018-06-13 RX ADMIN — PREGABALIN 150 MG: 150 CAPSULE ORAL at 08:23

## 2018-06-13 RX ADMIN — METOPROLOL SUCCINATE 50 MG: 50 TABLET, EXTENDED RELEASE ORAL at 08:23

## 2018-06-13 RX ADMIN — INSULIN HUMAN 10 UNITS: 100 INJECTION, SOLUTION PARENTERAL at 05:46

## 2018-06-13 RX ADMIN — Medication 2 G: at 08:23

## 2018-06-13 RX ADMIN — PANTOPRAZOLE SODIUM 40 MG: 40 TABLET, DELAYED RELEASE ORAL at 08:23

## 2018-06-13 RX ADMIN — ENOXAPARIN SODIUM 40 MG: 40 INJECTION SUBCUTANEOUS at 08:23

## 2018-06-13 RX ADMIN — PREGABALIN 150 MG: 150 CAPSULE ORAL at 19:41

## 2018-06-13 RX ADMIN — DOCUSATE SODIUM 100 MG: 100 CAPSULE, LIQUID FILLED ORAL at 19:41

## 2018-06-13 RX ADMIN — ATORVASTATIN CALCIUM 40 MG: 40 TABLET, FILM COATED ORAL at 08:23

## 2018-06-13 RX ADMIN — INSULIN LISPRO 2 UNITS: 100 INJECTION, SOLUTION INTRAVENOUS; SUBCUTANEOUS at 09:10

## 2018-06-13 RX ADMIN — DEXTROSE MONOHYDRATE 25 G: 500 INJECTION PARENTERAL at 05:47

## 2018-06-13 RX ADMIN — OXYCODONE HYDROCHLORIDE AND ACETAMINOPHEN 2 TABLET: 5; 325 TABLET ORAL at 15:47

## 2018-06-13 RX ADMIN — DULOXETINE HYDROCHLORIDE 60 MG: 60 CAPSULE, DELAYED RELEASE ORAL at 08:22

## 2018-06-13 RX ADMIN — LAMOTRIGINE 50 MG: 25 TABLET ORAL at 08:22

## 2018-06-13 RX ADMIN — HYDROCODONE BITARTRATE AND ACETAMINOPHEN 1 TABLET: 10; 325 TABLET ORAL at 08:22

## 2018-06-13 RX ADMIN — FUROSEMIDE 40 MG: 40 TABLET ORAL at 08:23

## 2018-06-13 RX ADMIN — ASPIRIN 325 MG: 325 TABLET, DELAYED RELEASE ORAL at 08:22

## 2018-06-13 ASSESSMENT — PAIN SCALES - GENERAL
PAINLEVEL_OUTOF10: 10
PAINLEVEL_OUTOF10: 10
PAINLEVEL_OUTOF10: 9
PAINLEVEL_OUTOF10: 10
PAINLEVEL_OUTOF10: 10
PAINLEVEL_OUTOF10: 9
PAINLEVEL_OUTOF10: 9
PAINLEVEL_OUTOF10: 8

## 2018-06-14 ENCOUNTER — HOSPITAL ENCOUNTER (INPATIENT)
Age: 58
LOS: 14 days | Discharge: HOME HEALTH | DRG: 559 | End: 2018-06-28
Attending: PSYCHIATRY & NEUROLOGY | Admitting: PSYCHIATRY & NEUROLOGY
Payer: MEDICARE

## 2018-06-14 VITALS
TEMPERATURE: 97.1 F | HEART RATE: 96 BPM | DIASTOLIC BLOOD PRESSURE: 73 MMHG | SYSTOLIC BLOOD PRESSURE: 118 MMHG | HEIGHT: 61 IN | BODY MASS INDEX: 43.99 KG/M2 | WEIGHT: 233 LBS | OXYGEN SATURATION: 98 % | RESPIRATION RATE: 18 BRPM

## 2018-06-14 DIAGNOSIS — I10 ESSENTIAL HYPERTENSION: Primary | ICD-10-CM

## 2018-06-14 DIAGNOSIS — F41.9 ANXIETY: ICD-10-CM

## 2018-06-14 DIAGNOSIS — S52.502D CLOSED FRACTURE OF DISTAL END OF LEFT RADIUS WITH ROUTINE HEALING, UNSPECIFIED FRACTURE MORPHOLOGY, SUBSEQUENT ENCOUNTER: ICD-10-CM

## 2018-06-14 DIAGNOSIS — R26.9 GAIT ABNORMALITY: ICD-10-CM

## 2018-06-14 DIAGNOSIS — M17.12 PRIMARY OSTEOARTHRITIS OF LEFT KNEE: ICD-10-CM

## 2018-06-14 DIAGNOSIS — M13.0 POLYARTICULAR ARTHRITIS: ICD-10-CM

## 2018-06-14 PROBLEM — S82.891S OPEN RIGHT ANKLE FRACTURE, SEQUELA: Status: ACTIVE | Noted: 2018-06-14

## 2018-06-14 LAB
ANION GAP SERPL CALCULATED.3IONS-SCNC: 13 MMOL/L (ref 7–19)
BILIRUBIN URINE: NEGATIVE
BLOOD, URINE: NEGATIVE
BUN BLDV-MCNC: 21 MG/DL (ref 6–20)
CALCIUM SERPL-MCNC: 7.9 MG/DL (ref 8.6–10)
CHLORIDE BLD-SCNC: 104 MMOL/L (ref 98–111)
CLARITY: CLEAR
CO2: 23 MMOL/L (ref 22–29)
COLOR: YELLOW
CREAT SERPL-MCNC: 1.4 MG/DL (ref 0.5–0.9)
GFR NON-AFRICAN AMERICAN: 39
GLUCOSE BLD-MCNC: 186 MG/DL (ref 70–99)
GLUCOSE BLD-MCNC: 195 MG/DL (ref 70–99)
GLUCOSE BLD-MCNC: 219 MG/DL (ref 70–99)
GLUCOSE BLD-MCNC: 243 MG/DL (ref 74–109)
GLUCOSE BLD-MCNC: 273 MG/DL (ref 70–99)
GLUCOSE URINE: 100 MG/DL
KETONES, URINE: NEGATIVE MG/DL
LEUKOCYTE ESTERASE, URINE: NEGATIVE
NITRITE, URINE: NEGATIVE
PERFORMED ON: ABNORMAL
PH UA: 5.5
POTASSIUM SERPL-SCNC: 4.5 MMOL/L (ref 3.5–5)
PREALBUMIN: 14 MG/DL (ref 20–40)
PROTEIN UA: NEGATIVE MG/DL
SODIUM BLD-SCNC: 140 MMOL/L (ref 136–145)
SPECIFIC GRAVITY UA: 1.01
T4 FREE: 1.5 NG/DL (ref 0.9–1.7)
TSH SERPL DL<=0.05 MIU/L-ACNC: 3.58 UIU/ML (ref 0.27–4.2)
UROBILINOGEN, URINE: 0.2 E.U./DL
VITAMIN B-12: 395 PG/ML (ref 211–946)

## 2018-06-14 PROCEDURE — 6360000002 HC RX W HCPCS: Performed by: ORTHOPAEDIC SURGERY

## 2018-06-14 PROCEDURE — 6370000000 HC RX 637 (ALT 250 FOR IP): Performed by: PHYSICIAN ASSISTANT

## 2018-06-14 PROCEDURE — 84439 ASSAY OF FREE THYROXINE: CPT

## 2018-06-14 PROCEDURE — 1180000000 HC REHAB R&B

## 2018-06-14 PROCEDURE — 87086 URINE CULTURE/COLONY COUNT: CPT

## 2018-06-14 PROCEDURE — 81003 URINALYSIS AUTO W/O SCOPE: CPT

## 2018-06-14 PROCEDURE — 87186 SC STD MICRODIL/AGAR DIL: CPT

## 2018-06-14 PROCEDURE — 82607 VITAMIN B-12: CPT

## 2018-06-14 PROCEDURE — 87077 CULTURE AEROBIC IDENTIFY: CPT

## 2018-06-14 PROCEDURE — 2580000003 HC RX 258: Performed by: ORTHOPAEDIC SURGERY

## 2018-06-14 PROCEDURE — 94664 DEMO&/EVAL PT USE INHALER: CPT

## 2018-06-14 PROCEDURE — 36415 COLL VENOUS BLD VENIPUNCTURE: CPT

## 2018-06-14 PROCEDURE — 80048 BASIC METABOLIC PNL TOTAL CA: CPT

## 2018-06-14 PROCEDURE — 82948 REAGENT STRIP/BLOOD GLUCOSE: CPT

## 2018-06-14 PROCEDURE — 97530 THERAPEUTIC ACTIVITIES: CPT

## 2018-06-14 PROCEDURE — 6370000000 HC RX 637 (ALT 250 FOR IP): Performed by: PSYCHIATRY & NEUROLOGY

## 2018-06-14 PROCEDURE — 84443 ASSAY THYROID STIM HORMONE: CPT

## 2018-06-14 PROCEDURE — 84134 ASSAY OF PREALBUMIN: CPT

## 2018-06-14 PROCEDURE — 6370000000 HC RX 637 (ALT 250 FOR IP): Performed by: ORTHOPAEDIC SURGERY

## 2018-06-14 RX ORDER — ACETAMINOPHEN 325 MG/1
650 TABLET ORAL EVERY 4 HOURS PRN
Status: DISCONTINUED | OUTPATIENT
Start: 2018-06-14 | End: 2018-06-28 | Stop reason: HOSPADM

## 2018-06-14 RX ORDER — AMITRIPTYLINE HYDROCHLORIDE 75 MG/1
150 TABLET, FILM COATED ORAL NIGHTLY
Status: DISCONTINUED | OUTPATIENT
Start: 2018-06-14 | End: 2018-06-28 | Stop reason: HOSPADM

## 2018-06-14 RX ORDER — ONDANSETRON 2 MG/ML
4 INJECTION INTRAMUSCULAR; INTRAVENOUS EVERY 6 HOURS PRN
Status: DISCONTINUED | OUTPATIENT
Start: 2018-06-14 | End: 2018-06-18

## 2018-06-14 RX ORDER — NICOTINE POLACRILEX 4 MG
15 LOZENGE BUCCAL PRN
Status: CANCELLED | OUTPATIENT
Start: 2018-06-14

## 2018-06-14 RX ORDER — OXYCODONE HYDROCHLORIDE AND ACETAMINOPHEN 5; 325 MG/1; MG/1
1 TABLET ORAL EVERY 4 HOURS PRN
Status: DISCONTINUED | OUTPATIENT
Start: 2018-06-14 | End: 2018-06-14

## 2018-06-14 RX ORDER — SODIUM CHLORIDE 0.9 % (FLUSH) 0.9 %
10 SYRINGE (ML) INJECTION PRN
Status: DISCONTINUED | OUTPATIENT
Start: 2018-06-14 | End: 2018-06-14

## 2018-06-14 RX ORDER — DIAZEPAM 5 MG/1
5 TABLET ORAL NIGHTLY PRN
Status: DISCONTINUED | OUTPATIENT
Start: 2018-06-14 | End: 2018-06-28 | Stop reason: HOSPADM

## 2018-06-14 RX ORDER — FLUTICASONE PROPIONATE 50 MCG
1 SPRAY, SUSPENSION (ML) NASAL DAILY
Status: CANCELLED | OUTPATIENT
Start: 2018-06-14

## 2018-06-14 RX ORDER — DIAZEPAM 5 MG/1
5 TABLET ORAL NIGHTLY PRN
Status: CANCELLED | OUTPATIENT
Start: 2018-06-14

## 2018-06-14 RX ORDER — QUETIAPINE FUMARATE 200 MG/1
200 TABLET, FILM COATED ORAL NIGHTLY
Status: CANCELLED | OUTPATIENT
Start: 2018-06-14

## 2018-06-14 RX ORDER — SODIUM CHLORIDE 0.9 % (FLUSH) 0.9 %
10 SYRINGE (ML) INJECTION EVERY 12 HOURS SCHEDULED
Status: CANCELLED | OUTPATIENT
Start: 2018-06-14

## 2018-06-14 RX ORDER — OXYCODONE HYDROCHLORIDE AND ACETAMINOPHEN 5; 325 MG/1; MG/1
2 TABLET ORAL EVERY 4 HOURS PRN
Status: DISCONTINUED | OUTPATIENT
Start: 2018-06-14 | End: 2018-06-14

## 2018-06-14 RX ORDER — QUETIAPINE FUMARATE 100 MG/1
200 TABLET, FILM COATED ORAL NIGHTLY
Status: DISCONTINUED | OUTPATIENT
Start: 2018-06-14 | End: 2018-06-28 | Stop reason: HOSPADM

## 2018-06-14 RX ORDER — PREGABALIN 150 MG/1
150 CAPSULE ORAL 2 TIMES DAILY
Status: CANCELLED | OUTPATIENT
Start: 2018-06-14

## 2018-06-14 RX ORDER — DULOXETIN HYDROCHLORIDE 60 MG/1
60 CAPSULE, DELAYED RELEASE ORAL DAILY
Status: CANCELLED | OUTPATIENT
Start: 2018-06-14

## 2018-06-14 RX ORDER — TIZANIDINE 4 MG/1
4 TABLET ORAL EVERY 6 HOURS PRN
Status: CANCELLED | OUTPATIENT
Start: 2018-06-14

## 2018-06-14 RX ORDER — PANTOPRAZOLE SODIUM 40 MG/1
40 TABLET, DELAYED RELEASE ORAL DAILY
Status: DISCONTINUED | OUTPATIENT
Start: 2018-06-15 | End: 2018-06-28 | Stop reason: HOSPADM

## 2018-06-14 RX ORDER — SODIUM CHLORIDE 0.9 % (FLUSH) 0.9 %
10 SYRINGE (ML) INJECTION PRN
Status: CANCELLED | OUTPATIENT
Start: 2018-06-14

## 2018-06-14 RX ORDER — FUROSEMIDE 40 MG/1
40 TABLET ORAL DAILY
Status: DISCONTINUED | OUTPATIENT
Start: 2018-06-15 | End: 2018-06-22

## 2018-06-14 RX ORDER — FUROSEMIDE 40 MG/1
40 TABLET ORAL DAILY
Status: CANCELLED | OUTPATIENT
Start: 2018-06-14

## 2018-06-14 RX ORDER — SODIUM CHLORIDE 0.9 % (FLUSH) 0.9 %
10 SYRINGE (ML) INJECTION EVERY 12 HOURS SCHEDULED
Status: DISCONTINUED | OUTPATIENT
Start: 2018-06-14 | End: 2018-06-15

## 2018-06-14 RX ORDER — LAMOTRIGINE 25 MG/1
50 TABLET ORAL 2 TIMES DAILY
Status: CANCELLED | OUTPATIENT
Start: 2018-06-14

## 2018-06-14 RX ORDER — HYDROCODONE BITARTRATE AND ACETAMINOPHEN 10; 325 MG/1; MG/1
1 TABLET ORAL EVERY 6 HOURS PRN
Status: CANCELLED | OUTPATIENT
Start: 2018-06-14

## 2018-06-14 RX ORDER — LEVOTHYROXINE SODIUM 88 UG/1
88 TABLET ORAL DAILY
Status: DISCONTINUED | OUTPATIENT
Start: 2018-06-15 | End: 2018-06-28 | Stop reason: HOSPADM

## 2018-06-14 RX ORDER — DULOXETIN HYDROCHLORIDE 60 MG/1
60 CAPSULE, DELAYED RELEASE ORAL DAILY
Status: DISCONTINUED | OUTPATIENT
Start: 2018-06-15 | End: 2018-06-28 | Stop reason: HOSPADM

## 2018-06-14 RX ORDER — ATORVASTATIN CALCIUM 40 MG/1
40 TABLET, FILM COATED ORAL DAILY
Status: DISCONTINUED | OUTPATIENT
Start: 2018-06-15 | End: 2018-06-28 | Stop reason: HOSPADM

## 2018-06-14 RX ORDER — OXYCODONE HYDROCHLORIDE AND ACETAMINOPHEN 5; 325 MG/1; MG/1
1 TABLET ORAL EVERY 4 HOURS PRN
Status: CANCELLED | OUTPATIENT
Start: 2018-06-14

## 2018-06-14 RX ORDER — OXYCODONE HYDROCHLORIDE AND ACETAMINOPHEN 5; 325 MG/1; MG/1
2 TABLET ORAL EVERY 4 HOURS PRN
Status: CANCELLED | OUTPATIENT
Start: 2018-06-14

## 2018-06-14 RX ORDER — FLUTICASONE PROPIONATE 50 MCG
1 SPRAY, SUSPENSION (ML) NASAL DAILY
Status: DISCONTINUED | OUTPATIENT
Start: 2018-06-15 | End: 2018-06-28 | Stop reason: HOSPADM

## 2018-06-14 RX ORDER — LAMOTRIGINE 25 MG/1
50 TABLET ORAL 2 TIMES DAILY
Status: DISCONTINUED | OUTPATIENT
Start: 2018-06-14 | End: 2018-06-28 | Stop reason: HOSPADM

## 2018-06-14 RX ORDER — DEXTROSE MONOHYDRATE 50 MG/ML
100 INJECTION, SOLUTION INTRAVENOUS PRN
Status: CANCELLED | OUTPATIENT
Start: 2018-06-14

## 2018-06-14 RX ORDER — ONDANSETRON 2 MG/ML
4 INJECTION INTRAMUSCULAR; INTRAVENOUS EVERY 6 HOURS PRN
Status: CANCELLED | OUTPATIENT
Start: 2018-06-14

## 2018-06-14 RX ORDER — METOPROLOL SUCCINATE 50 MG/1
50 TABLET, EXTENDED RELEASE ORAL DAILY
Status: CANCELLED | OUTPATIENT
Start: 2018-06-14

## 2018-06-14 RX ORDER — HYDROCODONE BITARTRATE AND ACETAMINOPHEN 10; 325 MG/1; MG/1
1 TABLET ORAL EVERY 6 HOURS PRN
Status: DISCONTINUED | OUTPATIENT
Start: 2018-06-14 | End: 2018-06-14

## 2018-06-14 RX ORDER — AMITRIPTYLINE HYDROCHLORIDE 75 MG/1
150 TABLET, FILM COATED ORAL NIGHTLY
Status: CANCELLED | OUTPATIENT
Start: 2018-06-14

## 2018-06-14 RX ORDER — ATORVASTATIN CALCIUM 40 MG/1
40 TABLET, FILM COATED ORAL DAILY
Status: CANCELLED | OUTPATIENT
Start: 2018-06-14

## 2018-06-14 RX ORDER — DOCUSATE SODIUM 100 MG/1
100 CAPSULE, LIQUID FILLED ORAL 2 TIMES DAILY
Status: CANCELLED | OUTPATIENT
Start: 2018-06-14

## 2018-06-14 RX ORDER — PRAZOSIN HYDROCHLORIDE 1 MG/1
2 CAPSULE ORAL NIGHTLY
Status: DISCONTINUED | OUTPATIENT
Start: 2018-06-14 | End: 2018-06-14 | Stop reason: HOSPADM

## 2018-06-14 RX ORDER — DEXTROSE MONOHYDRATE 50 MG/ML
100 INJECTION, SOLUTION INTRAVENOUS PRN
Status: DISCONTINUED | OUTPATIENT
Start: 2018-06-14 | End: 2018-06-28 | Stop reason: HOSPADM

## 2018-06-14 RX ORDER — ACETAMINOPHEN 325 MG/1
650 TABLET ORAL EVERY 4 HOURS PRN
Status: CANCELLED | OUTPATIENT
Start: 2018-06-14

## 2018-06-14 RX ORDER — NICOTINE POLACRILEX 4 MG
15 LOZENGE BUCCAL PRN
Status: DISCONTINUED | OUTPATIENT
Start: 2018-06-14 | End: 2018-06-28 | Stop reason: HOSPADM

## 2018-06-14 RX ORDER — PANTOPRAZOLE SODIUM 40 MG/1
40 TABLET, DELAYED RELEASE ORAL DAILY
Status: CANCELLED | OUTPATIENT
Start: 2018-06-14

## 2018-06-14 RX ORDER — DOCUSATE SODIUM 100 MG/1
100 CAPSULE, LIQUID FILLED ORAL 2 TIMES DAILY
Status: DISCONTINUED | OUTPATIENT
Start: 2018-06-14 | End: 2018-06-28 | Stop reason: HOSPADM

## 2018-06-14 RX ORDER — ACETAMINOPHEN 325 MG/1
650 TABLET ORAL EVERY 4 HOURS PRN
Status: DISCONTINUED | OUTPATIENT
Start: 2018-06-14 | End: 2018-06-14

## 2018-06-14 RX ORDER — TIZANIDINE 4 MG/1
4 TABLET ORAL EVERY 6 HOURS PRN
Status: DISCONTINUED | OUTPATIENT
Start: 2018-06-14 | End: 2018-06-28 | Stop reason: HOSPADM

## 2018-06-14 RX ORDER — LEVOTHYROXINE SODIUM 88 UG/1
88 TABLET ORAL DAILY
Status: CANCELLED | OUTPATIENT
Start: 2018-06-14

## 2018-06-14 RX ORDER — BISACODYL 10 MG
10 SUPPOSITORY, RECTAL RECTAL DAILY PRN
Status: DISCONTINUED | OUTPATIENT
Start: 2018-06-14 | End: 2018-06-28 | Stop reason: HOSPADM

## 2018-06-14 RX ORDER — DOCUSATE SODIUM 100 MG/1
100 CAPSULE, LIQUID FILLED ORAL 2 TIMES DAILY PRN
Status: DISCONTINUED | OUTPATIENT
Start: 2018-06-14 | End: 2018-06-28 | Stop reason: HOSPADM

## 2018-06-14 RX ORDER — METOPROLOL SUCCINATE 50 MG/1
50 TABLET, EXTENDED RELEASE ORAL DAILY
Status: DISCONTINUED | OUTPATIENT
Start: 2018-06-15 | End: 2018-06-20

## 2018-06-14 RX ORDER — HYDROCODONE BITARTRATE AND ACETAMINOPHEN 10; 325 MG/1; MG/1
1 TABLET ORAL EVERY 4 HOURS PRN
Status: DISCONTINUED | OUTPATIENT
Start: 2018-06-14 | End: 2018-06-28 | Stop reason: HOSPADM

## 2018-06-14 RX ORDER — PREGABALIN 150 MG/1
150 CAPSULE ORAL 2 TIMES DAILY
Status: DISCONTINUED | OUTPATIENT
Start: 2018-06-14 | End: 2018-06-28 | Stop reason: HOSPADM

## 2018-06-14 RX ADMIN — INSULIN LISPRO 3 UNITS: 100 INJECTION, SOLUTION INTRAVENOUS; SUBCUTANEOUS at 21:02

## 2018-06-14 RX ADMIN — INSULIN LISPRO 4 UNITS: 100 INJECTION, SOLUTION INTRAVENOUS; SUBCUTANEOUS at 18:15

## 2018-06-14 RX ADMIN — FLUTICASONE PROPIONATE 1 SPRAY: 50 SPRAY, METERED NASAL at 07:48

## 2018-06-14 RX ADMIN — LAMOTRIGINE 50 MG: 25 TABLET ORAL at 20:55

## 2018-06-14 RX ADMIN — QUETIAPINE FUMARATE 200 MG: 100 TABLET ORAL at 20:55

## 2018-06-14 RX ADMIN — ENOXAPARIN SODIUM 40 MG: 40 INJECTION SUBCUTANEOUS at 07:46

## 2018-06-14 RX ADMIN — HYDROCODONE BITARTRATE AND ACETAMINOPHEN 1 TABLET: 10; 325 TABLET ORAL at 21:24

## 2018-06-14 RX ADMIN — FUROSEMIDE 40 MG: 40 TABLET ORAL at 07:46

## 2018-06-14 RX ADMIN — DOCUSATE SODIUM 100 MG: 100 CAPSULE, LIQUID FILLED ORAL at 20:54

## 2018-06-14 RX ADMIN — TIZANIDINE 4 MG: 4 TABLET ORAL at 10:13

## 2018-06-14 RX ADMIN — ATORVASTATIN CALCIUM 40 MG: 40 TABLET, FILM COATED ORAL at 07:45

## 2018-06-14 RX ADMIN — INSULIN LISPRO 2 UNITS: 100 INJECTION, SOLUTION INTRAVENOUS; SUBCUTANEOUS at 07:41

## 2018-06-14 RX ADMIN — PREGABALIN 150 MG: 150 CAPSULE ORAL at 20:54

## 2018-06-14 RX ADMIN — Medication 4 MG: at 00:37

## 2018-06-14 RX ADMIN — ASPIRIN 325 MG: 325 TABLET, DELAYED RELEASE ORAL at 20:54

## 2018-06-14 RX ADMIN — PREGABALIN 150 MG: 150 CAPSULE ORAL at 07:45

## 2018-06-14 RX ADMIN — AMITRIPTYLINE HYDROCHLORIDE 150 MG: 75 TABLET, FILM COATED ORAL at 20:54

## 2018-06-14 RX ADMIN — LEVOTHYROXINE SODIUM 88 MCG: 88 TABLET ORAL at 07:46

## 2018-06-14 RX ADMIN — HYDROCODONE BITARTRATE AND ACETAMINOPHEN 1 TABLET: 10; 325 TABLET ORAL at 16:51

## 2018-06-14 RX ADMIN — DOCUSATE SODIUM 100 MG: 100 CAPSULE, LIQUID FILLED ORAL at 07:45

## 2018-06-14 RX ADMIN — HYDROCODONE BITARTRATE AND ACETAMINOPHEN 1 TABLET: 10; 325 TABLET ORAL at 07:45

## 2018-06-14 RX ADMIN — ASPIRIN 325 MG: 325 TABLET, DELAYED RELEASE ORAL at 07:46

## 2018-06-14 RX ADMIN — LAMOTRIGINE 50 MG: 25 TABLET ORAL at 07:46

## 2018-06-14 RX ADMIN — OXYCODONE HYDROCHLORIDE AND ACETAMINOPHEN 2 TABLET: 5; 325 TABLET ORAL at 10:19

## 2018-06-14 RX ADMIN — DULOXETINE HYDROCHLORIDE 60 MG: 60 CAPSULE, DELAYED RELEASE ORAL at 07:45

## 2018-06-14 RX ADMIN — PANTOPRAZOLE SODIUM 40 MG: 40 TABLET, DELAYED RELEASE ORAL at 07:45

## 2018-06-14 RX ADMIN — METOPROLOL SUCCINATE 50 MG: 50 TABLET, EXTENDED RELEASE ORAL at 07:46

## 2018-06-14 RX ADMIN — OXYCODONE HYDROCHLORIDE AND ACETAMINOPHEN 2 TABLET: 5; 325 TABLET ORAL at 01:48

## 2018-06-14 RX ADMIN — SODIUM CHLORIDE: 9 INJECTION, SOLUTION INTRAVENOUS at 03:07

## 2018-06-14 ASSESSMENT — PAIN SCALES - GENERAL
PAINLEVEL_OUTOF10: 9
PAINLEVEL_OUTOF10: 8
PAINLEVEL_OUTOF10: 10
PAINLEVEL_OUTOF10: 8
PAINLEVEL_OUTOF10: 6
PAINLEVEL_OUTOF10: 4
PAINLEVEL_OUTOF10: 10

## 2018-06-14 NOTE — PROGRESS NOTES
Pt admitted to unit via bed. Instructed on use of safety devices, safety precautions, use of call light, telephone, Tv remove,. Instructed if needing to get out of bed must have help from nursing staff.

## 2018-06-14 NOTE — PLAN OF CARE
[address] TREATMENT PLAN      Natalya Milton    : 1960  Acct #: [de-identified]  MRN: [id number]   PHYSICIAN:  Juanpablo Cueto MD  Primary Problem    Patient Active Problem List   Diagnosis    CAD (coronary artery disease)    Obesity    DM (diabetes mellitus)    Hypertension    Polyarticular arthritis    Hypothyroidism    GERD (gastroesophageal reflux disease)    Hyperlipidemia    Insomnia    Anxiety    Insomnia    Bipolar disorder ([facility] [address]    Cataract    Thyroid nodule    Personal history of diabetic foot ulcer    B12 deficiency    Chronic kidney disease (CKD) stage G3b/A2, moderately decreased glomerular filtration rate (GFR) between 30-44 mL/min/1.73 square meter and albuminuria creatinine ratio between  mg/g    Primary osteoarthritis of left knee    Iron deficiency anemia    Type 2 diabetes mellitus with diabetic polyneuropathy, with long-term current use of insulin ([facility])    Ankle fracture, right, open type I or II, initial encounter    Hyperkalemia    Closed fracture of distal end of left radius with routine healing    Open right ankle fracture, sequela    Gait abnormality       Rehabilitation Diagnosis:     Open right ankle fracture, sequela [S82.891S]  Open right ankle fracture, sequela [S82.891S]       ADMIT DATE:2018   CARE PLAN     NURSING:  Natalya Milton while on this unit will:     [x] Be continent of bowel and bladder      [x] Have an adequate number of bowel movements   [] Urinate with no urinary retention >300ml in bladder   [] Complete bladder protocol with rosa removal   [] Maintain O2 SATs at ___%   [x] Have pain managed while on ARU        [x] Be pain free by discharge    [x] Have no skin breakdown while on ARU   [] Have improved skin integrity via wound measurements   [x] Have no signs/symptoms of infection at the wound site  [x] Be free from injury during hospitalization   [] Complete education with patient/family with understanding assessment and Quin Pérez is in agreement    Plan of Care: Frequency:   [] 5 days per week, 90 minutes per day     [] 5 days per week, 60 minutes per day                           Treatments may include IADL retraining, strengthening, safety education and training, patient/caregiver education and training, equipment evaluation/ training/procurement, neuromuscular reeducation, wheelchair mobility training. SPEECH THERAPY:   Plan of Care and Goals:   LTG    FIM Goals: Comprehension:    Expression:    Social:    Problem Solving:    Memory:                                                   Plan of Care:  Frequency:   [] 5 days per week, 60 minutes per day                            [] Not appropriate for Speech Therapy  Treatments may include speech/language/communication therapy, cognitive training, group therapy, education, and/or dysphagia therapy based on the above goals. These goals were reviewed with this patient at the time of assessment and Quin Pérez is in agreement. CASE MANAGEMENT:  Goals:   Assist patient/family with discharge planning, patient/family counseling,  and coordination with insurance during ARU stay. Patient Goals: to go home with      Current  FIMS and Goals:       Bowel Level of Assistance: 4- Requires Minimal assistance to manage or place device, helper inserts suppository without digital stimulation, patient performs 75% or more of the bowel management tasks  Transfers  Bed, Chair, Wheel Chair: 2 - Requires 50-74% assistance to transfer  GOAL: Bed, Chair, Wheel Chair: 6  Sphincter Control  Bladder: 4 - Requires touching assistance to manage or place device (e.g. place urinal) < 25% assist  Bladder Level of Assistance: 4- Requires Minimal assistance to manage or place device, patient performs 75% or more of the bladder management tasks  Bladder Frequency of Accidents: 6 - No accidents,uses device like urinal, bedpan, absorbant pad, or requires medication addition, dietician/nutritionist may monitor calorie count as well as intake and collaboratively work with SLP on dietary upgrades. Neuropsychology/Psychology may evaluate and provide necessary support. Medical issues being managed closely and that require 24 hour availability of a physician:   [] Swallowing Precautions  [] Bowel/Bladder Fx  [] Weight bearing precautions   [] Wound Care    [] Pain Mgmt   [] Infection Protection   [] DVT Prophylaxis   [] Fall Precautions  [] Fluid/Electrolyte/Nutrition Balance   [] Voice Protection   [] Respiratory  [] Other:    Medical Prognosis: [] Good  [] Fair    [] Guarded   Total expected IRF days:  12  Anticipated discharge destination:    [] Home Independently   [] Home with supervision    []SNF     [] Other                                           Physician anticipated functional outcomes:  Transfer wheelchair to/from surface independently. IPOC brief synthesis: Acute inpatient rehabilitation with occupational   physical therapy 180 minutes, 5 every 7   days will address basic and advancing mobility with self-care   instruction and adaptive equipment training. Caregiver education will   be offered. Expected length of stay prior to the supervised level of   functional discharge to home with home care is 12 days. Assessment and Plan:  1. Status post fracture of the right lower leg/right radius-pain control/nonweightbearing/DVT prophylaxis  2. Insomnia/mood disorderon medications  3. Coronary artery disease -on aspirin/statin  4. GIbowel regimen/proton pump inhibitor  5. Neuropathy-Cymbalta/Lyrica  6. Diabetesmonitor blood sugar  7. Hypertensionon medications continue to monitor  8. Hypothyroidism test on Synthroid  9. Pain controlNorco as needed  10.  PT/OT            Case Mgmt:Electronically Signed by Dakota Rodriguez Admissions/Discharge Coordinator 6/15/2018 2:44 PM  OT:     PT:Electronically signed by Marina Chino PT on 6/15/2018 at 1:24 PM    RN:

## 2018-06-15 PROBLEM — R26.9 GAIT ABNORMALITY: Status: ACTIVE | Noted: 2018-06-15

## 2018-06-15 LAB
ALBUMIN SERPL-MCNC: 3.2 G/DL (ref 3.5–5.2)
ALP BLD-CCNC: 126 U/L (ref 35–104)
ALT SERPL-CCNC: <5 U/L (ref 5–33)
ANION GAP SERPL CALCULATED.3IONS-SCNC: 13 MMOL/L (ref 7–19)
AST SERPL-CCNC: 19 U/L (ref 5–32)
BILIRUB SERPL-MCNC: 0.3 MG/DL (ref 0.2–1.2)
BUN BLDV-MCNC: 18 MG/DL (ref 6–20)
CALCIUM SERPL-MCNC: 9.1 MG/DL (ref 8.6–10)
CHLORIDE BLD-SCNC: 97 MMOL/L (ref 98–111)
CO2: 25 MMOL/L (ref 22–29)
CREAT SERPL-MCNC: 1.2 MG/DL (ref 0.5–0.9)
GFR NON-AFRICAN AMERICAN: 46
GLUCOSE BLD-MCNC: 171 MG/DL (ref 70–99)
GLUCOSE BLD-MCNC: 193 MG/DL (ref 70–99)
GLUCOSE BLD-MCNC: 218 MG/DL (ref 70–99)
GLUCOSE BLD-MCNC: 298 MG/DL (ref 74–109)
GLUCOSE BLD-MCNC: 337 MG/DL (ref 70–99)
HCT VFR BLD CALC: 32.6 % (ref 37–47)
HEMOGLOBIN: 10.3 G/DL (ref 12–16)
MCH RBC QN AUTO: 29.9 PG (ref 27–31)
MCHC RBC AUTO-ENTMCNC: 31.6 G/DL (ref 33–37)
MCV RBC AUTO: 94.5 FL (ref 81–99)
PDW BLD-RTO: 14.2 % (ref 11.5–14.5)
PERFORMED ON: ABNORMAL
PLATELET # BLD: 293 K/UL (ref 130–400)
PMV BLD AUTO: 10.8 FL (ref 9.4–12.3)
POTASSIUM SERPL-SCNC: 4.4 MMOL/L (ref 3.5–5)
PREALBUMIN: 14 MG/DL (ref 20–40)
RBC # BLD: 3.45 M/UL (ref 4.2–5.4)
SODIUM BLD-SCNC: 135 MMOL/L (ref 136–145)
TOTAL PROTEIN: 6.5 G/DL (ref 6.6–8.7)
WBC # BLD: 9.3 K/UL (ref 4.8–10.8)

## 2018-06-15 PROCEDURE — 97165 OT EVAL LOW COMPLEX 30 MIN: CPT

## 2018-06-15 PROCEDURE — 6370000000 HC RX 637 (ALT 250 FOR IP): Performed by: ORTHOPAEDIC SURGERY

## 2018-06-15 PROCEDURE — 97530 THERAPEUTIC ACTIVITIES: CPT

## 2018-06-15 PROCEDURE — 6360000002 HC RX W HCPCS: Performed by: ORTHOPAEDIC SURGERY

## 2018-06-15 PROCEDURE — 84134 ASSAY OF PREALBUMIN: CPT

## 2018-06-15 PROCEDURE — 80053 COMPREHEN METABOLIC PANEL: CPT

## 2018-06-15 PROCEDURE — 82948 REAGENT STRIP/BLOOD GLUCOSE: CPT

## 2018-06-15 PROCEDURE — 97161 PT EVAL LOW COMPLEX 20 MIN: CPT

## 2018-06-15 PROCEDURE — 97535 SELF CARE MNGMENT TRAINING: CPT

## 2018-06-15 PROCEDURE — 6370000000 HC RX 637 (ALT 250 FOR IP): Performed by: PSYCHIATRY & NEUROLOGY

## 2018-06-15 PROCEDURE — 36415 COLL VENOUS BLD VENIPUNCTURE: CPT

## 2018-06-15 PROCEDURE — 85027 COMPLETE CBC AUTOMATED: CPT

## 2018-06-15 PROCEDURE — 97110 THERAPEUTIC EXERCISES: CPT

## 2018-06-15 PROCEDURE — 99223 1ST HOSP IP/OBS HIGH 75: CPT | Performed by: PSYCHIATRY & NEUROLOGY

## 2018-06-15 PROCEDURE — 1180000000 HC REHAB R&B

## 2018-06-15 RX ORDER — VALSARTAN 160 MG/1
160 TABLET ORAL NIGHTLY
Status: DISCONTINUED | OUTPATIENT
Start: 2018-06-15 | End: 2018-06-22

## 2018-06-15 RX ORDER — POLYETHYLENE GLYCOL 3350 17 G/17G
17 POWDER, FOR SOLUTION ORAL DAILY
Status: DISCONTINUED | OUTPATIENT
Start: 2018-06-15 | End: 2018-06-28 | Stop reason: HOSPADM

## 2018-06-15 RX ADMIN — INSULIN LISPRO 2 UNITS: 100 INJECTION, SOLUTION INTRAVENOUS; SUBCUTANEOUS at 07:54

## 2018-06-15 RX ADMIN — HYDROCODONE BITARTRATE AND ACETAMINOPHEN 1 TABLET: 10; 325 TABLET ORAL at 19:45

## 2018-06-15 RX ADMIN — DULOXETINE HYDROCHLORIDE 60 MG: 60 CAPSULE, DELAYED RELEASE ORAL at 07:48

## 2018-06-15 RX ADMIN — LAMOTRIGINE 50 MG: 25 TABLET ORAL at 21:22

## 2018-06-15 RX ADMIN — ASPIRIN 325 MG: 325 TABLET, DELAYED RELEASE ORAL at 07:47

## 2018-06-15 RX ADMIN — HYDROCODONE BITARTRATE AND ACETAMINOPHEN 1 TABLET: 10; 325 TABLET ORAL at 14:37

## 2018-06-15 RX ADMIN — DOCUSATE SODIUM 100 MG: 100 CAPSULE, LIQUID FILLED ORAL at 07:48

## 2018-06-15 RX ADMIN — VALSARTAN 160 MG: 160 TABLET ORAL at 21:22

## 2018-06-15 RX ADMIN — HYDROCODONE BITARTRATE AND ACETAMINOPHEN 1 TABLET: 10; 325 TABLET ORAL at 01:24

## 2018-06-15 RX ADMIN — HYDROCODONE BITARTRATE AND ACETAMINOPHEN 1 TABLET: 10; 325 TABLET ORAL at 05:40

## 2018-06-15 RX ADMIN — PREGABALIN 150 MG: 150 CAPSULE ORAL at 21:22

## 2018-06-15 RX ADMIN — PANTOPRAZOLE SODIUM 40 MG: 40 TABLET, DELAYED RELEASE ORAL at 07:48

## 2018-06-15 RX ADMIN — FLUTICASONE PROPIONATE 1 SPRAY: 50 SPRAY, METERED NASAL at 07:46

## 2018-06-15 RX ADMIN — LAMOTRIGINE 50 MG: 25 TABLET ORAL at 07:47

## 2018-06-15 RX ADMIN — ENOXAPARIN SODIUM 40 MG: 40 INJECTION SUBCUTANEOUS at 07:47

## 2018-06-15 RX ADMIN — INSULIN LISPRO 4 UNITS: 100 INJECTION, SOLUTION INTRAVENOUS; SUBCUTANEOUS at 12:17

## 2018-06-15 RX ADMIN — ASPIRIN 325 MG: 325 TABLET, DELAYED RELEASE ORAL at 21:21

## 2018-06-15 RX ADMIN — AMITRIPTYLINE HYDROCHLORIDE 150 MG: 75 TABLET, FILM COATED ORAL at 21:22

## 2018-06-15 RX ADMIN — QUETIAPINE FUMARATE 200 MG: 100 TABLET ORAL at 21:22

## 2018-06-15 RX ADMIN — PREGABALIN 150 MG: 150 CAPSULE ORAL at 07:48

## 2018-06-15 RX ADMIN — HYDROCODONE BITARTRATE AND ACETAMINOPHEN 1 TABLET: 10; 325 TABLET ORAL at 09:42

## 2018-06-15 RX ADMIN — METOPROLOL SUCCINATE 50 MG: 50 TABLET, EXTENDED RELEASE ORAL at 07:47

## 2018-06-15 RX ADMIN — DOCUSATE SODIUM 100 MG: 100 CAPSULE, LIQUID FILLED ORAL at 21:22

## 2018-06-15 RX ADMIN — INSULIN LISPRO 8 UNITS: 100 INJECTION, SOLUTION INTRAVENOUS; SUBCUTANEOUS at 17:19

## 2018-06-15 RX ADMIN — FUROSEMIDE 40 MG: 40 TABLET ORAL at 07:47

## 2018-06-15 RX ADMIN — LEVOTHYROXINE SODIUM 88 MCG: 88 TABLET ORAL at 05:35

## 2018-06-15 RX ADMIN — ATORVASTATIN CALCIUM 40 MG: 40 TABLET, FILM COATED ORAL at 07:47

## 2018-06-15 ASSESSMENT — PAIN SCALES - GENERAL
PAINLEVEL_OUTOF10: 0
PAINLEVEL_OUTOF10: 8
PAINLEVEL_OUTOF10: 7
PAINLEVEL_OUTOF10: 4
PAINLEVEL_OUTOF10: 9
PAINLEVEL_OUTOF10: 8
PAINLEVEL_OUTOF10: 6
PAINLEVEL_OUTOF10: 10
PAINLEVEL_OUTOF10: 8

## 2018-06-15 NOTE — PLAN OF CARE
[address] TREATMENT PLAN      Sandy Pal    : 1960  [facility]  MRN: [id number]   PHYSICIAN:  Jorgito Clemente MD  Primary Problem    Patient Active Problem List   Diagnosis    CAD (coronary artery disease)    Obesity    DM (diabetes mellitus)    Hypertension    Polyarticular arthritis    Hypothyroidism    GERD (gastroesophageal reflux disease)    Hyperlipidemia    Insomnia    Anxiety    Insomnia    Bipolar disorder ([facility] [address]    Cataract    Thyroid nodule    Personal history of diabetic foot ulcer    B12 deficiency    Chronic kidney disease (CKD) stage G3b/A2, moderately decreased glomerular filtration rate (GFR) between 30-44 mL/min/1.73 square meter and albuminuria creatinine ratio between  mg/g    Primary osteoarthritis of left knee    Iron deficiency anemia    Type 2 diabetes mellitus with diabetic polyneuropathy, with long-term current use of insulin ([facility])    Ankle fracture, right, open type I or II, initial encounter    Hyperkalemia    Closed fracture of distal end of left radius with routine healing    Open right ankle fracture, sequela    Gait abnormality       Rehabilitation Diagnosis:     Open right ankle fracture, sequela [S82.891S]  Open right ankle fracture, sequela [S82.891S]       ADMIT DATE:2018   CARE PLAN     NURSING:  Sandy Pal while on this unit will:     [x] Be continent of bowel and bladder      [x] Have an adequate number of bowel movements   [] Urinate with no urinary retention >300ml in bladder   [] Complete bladder protocol with rosa removal   [] Maintain O2 SATs at ___%   [x] Have pain managed while on ARU        [x] Be pain free by discharge    [x] Have no skin breakdown while on ARU   [] Have improved skin integrity via wound measurements   [x] Have no signs/symptoms of infection at the wound site  [x] Be free from injury during hospitalization   [] Complete education with patient/family with understanding demonstrated for:  [x] Adjustment   [] Other:   Nursing interventions may include bowel/bladder training, education for medical assistive devices, medication education, O2 saturation management, energy conservation, stress management techniques, fall prevention, alarms protocol, seating and positioning, skin/wound care, pressure relief instruction,dressing changes,  infection protection, DVT prophylaxis, and/or assistance with in room safety with transfers to bed, toilet, wheelchair, shower as well as bathroom activities and hygiene. Patient/caregiver education for:   [x] Disease/sustained injury/management      [x] Medication Use   [x] Surgical intervention   [x] Safety   [x] Body mechanics and or joint protection   [x] Health maintenance         PHYSICAL THERAPY:  Goals:                  Short term goals  Time Frame for Short term goals: 1 WEEK  Short term goal 1: TF FIM 4  Short term goal 2: WC FIM 2  Short term goal 3: HEP SBA            Long term goals  Time Frame for Long term goals : 2-3 WEEKS  Long term goal 1: TF FIM 6  Long term goal 2: AMB FIM 1 (10 FT WITH PFRW FOR TF PURPOSES MOD A)  Long term goal 3: WC FIM 6  Long term goal 4: STEP FIM 1 (NOT TOPERFORM)  Long term goal 5: HEP INDEP  These goals were reviewed with this patient at the time of assessment and Maria Luisa Muñoz is in agreement.      Plan of Care: Frequency:  [x] 5 days per week, 90 minutes per day                             []  5 days per week, 60 minutes per day               Current Treatment Recommendations: Strengthening, ROM, Balance Training, Functional Mobility Training, Transfer Training, Endurance Training, Wheelchair Mobility Training, Gait Training, Stair training, Home Exercise Program, Safety Education & Training, Patient/Caregiver Education & Training, Equipment Evaluation, Education, & procurement      OCCUPATIONAL THERAPY:  Goals:                06/15/18   Patient Goals    Patient goals  return home   Short term goals Time Frame for Short term goals 1 week   Short term goal 1 don/doff pullover shirt with setup   Short term goal 2 complete LB dressing with min A and AE prn using recommended tech   Short term goal 3 complete toilet transfers with supervision using recommended transfer tech without cues for WB   Short term goal 4 complete toilet hygiene/clothing mgmt with min A   Short term goal 5 complete bathing transfer with min A   Short term goal 6 complete simple w/c level home making act with supervision   Long term goals   Time Frame for Long term goals  2 weeks   Long term goal 1 complete overall toileting with modified independence   Long term goal 2 complete overall dressing with modifed independence   Long term goal 3 verbalize DME   Long term goal 4 complete HEP with independence   Long term goal 5 complete overall bathing with supervision         These goals were reviewed with this patient at the time of assessment and Pramod Escalante is in agreement    Plan of Care: Frequency:   [x] 5 days per week, 90 minutes per day     [] 5 days per week, 60 minutes per day                           Treatments may include IADL retraining, strengthening, safety education and training, patient/caregiver education and training, equipment evaluation/ training/procurement, neuromuscular reeducation, wheelchair mobility training. SPEECH THERAPY:   Plan of Care and Goals:   LTG    FIM Goals: Comprehension:    Expression:    Social:    Problem Solving:    Memory:                                                   Plan of Care:  Frequency:   [] 5 days per week, 60 minutes per day                            [x] Not appropriate for Speech Therapy  Treatments may include speech/language/communication therapy, cognitive training, group therapy, education, and/or dysphagia therapy based on the above goals. These goals were reviewed with this patient at the time of assessment and Pramod Escalante is in agreement.     CASE MANAGEMENT:  Goals:   Assist patient/family with discharge planning, patient/family counseling,  and coordination with insurance during ARU stay. Patient Goals: to go home with      Current  FIMS and Goals:       Bowel Level of Assistance: 4- Requires Minimal assistance to manage or place device, helper inserts suppository without digital stimulation, patient performs 75% or more of the bowel management tasks  Transfers  Bed, Chair, Wheel Chair: 2 - Requires 50-74% assistance to transfer  GOAL: Bed, Chair, Wheel Chair: 6  Sphincter Control  Bladder: 4 - Requires touching assistance to manage or place device (e.g. place urinal) < 25% assist  Bladder Level of Assistance: 4- Requires Minimal assistance to manage or place device, patient performs 75% or more of the bladder management tasks  Bladder Frequency of Accidents: 6 - No accidents,uses device like urinal, bedpan, absorbant pad, or requires medication to manage bladder  Bowel: 4 - Requires touching assistance to manage or place device (e.g.  insertion of suppository only) < 25% assist  Bowel Level of Assistance: 4- Requires Minimal assistance to manage or place device, helper inserts suppository without digital stimulation, patient performs 75% or more of the bowel management tasks  Bowel Frequency of Accidents: 6 - No accidents, uses device like bedpan, diaper, bedside commode colostomy, or requires medication to manage bowels  Locomotion  Primary Mode: Wheel Chair  GOAL: Walk/Wheel Chair: 6  Distance Walked: 0 FT  Distance Traveled in Wheel Chair: 5 FT  Walk: 0 - Activity Not Assessed/Does Not Occur  Wheel Chair: 1 - Total Assistance Walks/operates wheelchair < 50 feet OR requires two or more people OR patient performs < 25% of locomotion effort  Stairs: 0 - Activity Does not Occur ( 0 only for the admission assessment)  GOAL: Stairs: 1  Communication  Comprehension: 5 - Patient understands basic needs (hungry/hot/pain)  Expression: 5 - Expresses basic ideas/needs only (hungry/hot/pain)  Social Cognition  Social Interaction: 5 - Patient is appropriate with supervision/cues  Problem Solvin - Patient able to solve simple/routine tasks  Memory: 5 - Patient requires prompting with stress/unfamiliar situations    Activities Prior to Admit:                         Eneida Pfeiffer will be seen a minimum of 3 hours of therapy per day/a minimum of 5 out of 7 days per week. [] In this rare instance due to the nature of this patient's medical involvement, this patient will be seen 15 hours per week (900 minutes within a 7 day period). Treatments may include therapeutic exercises, gait training, neuromuscular re-ed, transfer training, community reintegration, bed mobility, w/c mobility and training, self care, home mgmt, cognitive training, energy conservation,dysphagia tx, speech/language/communication therapy, group therapy, and patient/family education. In addition, dietician/nutritionist may monitor calorie count as well as intake and collaboratively work with SLP on dietary upgrades. Neuropsychology/Psychology may evaluate and provide necessary support. Medical issues being managed closely and that require 24 hour availability of a physician:   [] Swallowing Precautions  [x] Bowel/Bladder Fx  [] Weight bearing precautions   [x] Wound Care    [x] Pain Mgmt   [] Infection Protection   [x] DVT Prophylaxis   [x] Fall Precautions  [] Fluid/Electrolyte/Nutrition Balance   [] Voice Protection   [] Respiratory  [] Other:    Medical Prognosis: [] Good  [x] Fair    [] Guarded   Total expected IRF days:  12  Anticipated discharge destination:    [] Home Independently   [x] Home with supervision    []SNF     [] Other                                           Physician anticipated functional outcomes:  Transfer wheelchair to/from surface independently.    IPOC brief synthesis: Acute inpatient rehabilitation with occupational   physical therapy 180 minutes, 5 every 7

## 2018-06-15 NOTE — H&P
[facility]   History and Physical        Patient:   Quin Pérez  MR#:    [id number]  Account Number:                   [id number]      Room:    [address] [place], [place] [address]-   YOB: 1960  Date of Progress Note: 6/15/2018  Time of Note                           7:25 AM  Attending Physician:  Brendalyn Sandhoff, MD        Admitting diagnosis: Other multiple trauma, fractures of right lower leg initial encounter for open fracture type 1 or 2, unspecified fracture of the right lower and left he right radius initial encounter for close fracture    Secondary diagnoses: Essential hypertension, obesity unspecified, atherosclerotic heart disease of native coronary artery without angina pectoris, hyperlipidemia, chronic kidney disease stage , type II diabetes with diabetic neuropathy, hypothyroidism unspecified, gastroesophageal reflux disease without esophagitis, insomnia unspecified, anxiety disorder unspecified, bipolar disorder unspecified, acute posthemorrhagic anemia arthritis, iron deficiency anemia,    CHIEF COMPLAINT:  Status post fracture of the right lower leg and right radius       HISTORY OF PRESENT ILLNESS:   This 63 y.o. female  who had an ORIF of a right closed displaced bimalleolar ankle fracture, as well as closed treatment of a two-part extra-articular distal radius fracture by Dr. Robyn Lieberman as outpatient on 6/11/18. She was discharged home. On 6/12/18 she presented to [facility] ER after having a fall at home while trying to do a pivot transfer. She had immediate onset of pain and bleeding from her surgical site on her ankle. X-rays revealed her to have an open fracture dislocation of her right ankle with displacment of hardware screws. She was admitted by Dr. Robyn Lieberman for I&D of her open wound and revision ORIF. She tolerated the procedure well. She will be non-weight bearing on her right LE, as well as her right UE. She has had post op anemia but hasn't yet required transfusion.  She continues to have signifigant SURGERY      CARDIAC CATHETERIZATION  05, 05    see report  Stents x3    CARDIAC CATHETERIZATION  3/23/15  JDT    EF 50%    CARPAL TUNNEL RELEASE       SECTION      x2    CHOLECYSTECTOMY      GASTRIC BYPASS SURGERY      HERNIA REPAIR      umbilical with mesh    INTRACAPSULAR CATARACT EXTRACTION Left 2016    LT EYE CATARACT EMULSIFICATION IOL IMPLANT performed by Caitlin Galicia MD at [address] [place] [address] OPEN TREATMENT BIMALLEOLAR ANKLE FRACTURE Right 2018    ORIF RIGHT BIMALLEOLAR ANKLE FRACTURE, RIGHT WRIST CAST REMOVAL performed by Sharee Perea MD at [address] [place] [address] Right 2018    ANKLE OPEN REDUCTION INTERNAL FIXATION performed by Sharee Perea MD at [place] [address] Left 2017    KNEE TOTAL ARTHROPLASTY performed by Nubia Degroot DO at [facility]       Medications in Hospital:      Current Facility-Administered Medications:     valsartan (DIOVAN) tablet 160 mg, 160 mg, Oral, Nightly, Michelle Ruano MD    acetaminophen (TYLENOL) tablet 650 mg, 650 mg, Oral, Q4H PRN, Sharee Perea MD    aspirin EC tablet 325 mg, 325 mg, Oral, BID, Sharee Perea MD, 325 mg at 18    docusate sodium (COLACE) capsule 100 mg, 100 mg, Oral, BID, Sharee Perea MD, 100 mg at 18    ondansetron (ZOFRAN) injection 4 mg, 4 mg, Intravenous, Q6H PRN, Sharee Perea MD    sodium chloride flush 0.9 % injection 10 mL, 10 mL, Intravenous, 2 times per day, Sharee Perea MD    amitriptyline (ELAVIL) tablet 150 mg, 150 mg, Oral, Nightly, Sharee Perea MD, 150 mg at 18    furosemide (LASIX) tablet 40 mg, 40 mg, Oral, Daily, Sharee Perea MD    QUEtiapine (SEROQUEL) tablet 200 mg, 200 mg, Oral, Nightly, Sharee Perea MD, 200 mg at 18    tiZANidine (ZANAFLEX) tablet 4 mg, 4 mg, Oral, Q6H PRN, Sharee Perea MD    enoxaparin (LOVENOX) injection 40 mg, 40 mg, Subcutaneous, Daily, Sharee Perea MD    atorvastatin (LIPITOR) tablet 40 mg, 40 mg, Oral, Daily, Freedom Morley MD    dextrose 5 % solution, 100 mL/hr, Intravenous, PRN, Freedom Morley MD    diazepam (VALIUM) tablet 5 mg, 5 mg, Oral, Nightly PRN, Freedom Morley MD    DULoxetine (CYMBALTA) extended release capsule 60 mg, 60 mg, Oral, Daily, Freedom Morley MD    fluticasone [facility]) 50 MCG/ACT nasal spray 1 spray, 1 spray, Each Nare, Daily, Freedom Morley MD    glucagon (rDNA) injection 1 mg, 1 mg, Intramuscular, PRN, Freedom Morley MD    glucose (GLUTOSE) 40 % oral gel 15 g, 15 g, Oral, PRN, Freedom Morley MD    insulin lispro (HUMALOG) injection vial 0-12 Units, 0-12 Units, Subcutaneous, TID WC, rFeedom Morley MD, 4 Units at 06/14/18 1815    insulin lispro (HUMALOG) injection vial 0-6 Units, 0-6 Units, Subcutaneous, Nightly, Freedom Morley MD, 3 Units at 06/14/18 2102    lamoTRIgine (LAMICTAL) tablet 50 mg, 50 mg, Oral, BID, Freedom Morley MD, 50 mg at 06/14/18 2055    levothyroxine (SYNTHROID) tablet 88 mcg, 88 mcg, Oral, Daily, Freedom Morley MD, 88 mcg at 06/15/18 0535    linaclotide (LINZESS) capsule 290 mcg, 290 mcg, Oral, PRN, Freedom Morley MD    metoprolol succinate (TOPROL XL) extended release tablet 50 mg, 50 mg, Oral, Daily, Freedom Morley MD    pantoprazole (PROTONIX) tablet 40 mg, 40 mg, Oral, Daily, Freedom Morley MD    pregabalin (LYRICA) capsule 150 mg, 150 mg, Oral, BID, Freedom Morley MD, 150 mg at 06/14/18 2054    magnesium hydroxide (MILK OF MAGNESIA) 400 MG/5ML suspension 30 mL, 30 mL, Oral, Daily PRN, Marcus Menezes MD    docusate sodium (COLACE) capsule 100 mg, 100 mg, Oral, BID PRN, Marcus Menezes MD    bisacodyl (DULCOLAX) suppository 10 mg, 10 mg, Rectal, Daily PRN, Marcus Menezes MD    HYDROcodone-acetaminophen (NORCO)  MG per tablet 1 tablet, 1 tablet, Oral, Q4H PRN, Marcus Menezes MD, 1 tablet at 06/15/18 0540    Allergies:  Dilaudid [hydromorphone hcl]    Social History:   TOBACCO:   reports that she has never smoked.  She has quit using smokeless tobacco. Her smokeless tobacco use included Snuff. ETOH:   reports that she does not drink alcohol. Family History:       Problem Relation Age of Onset    Depression Mother     Heart Attack Mother     High Blood Pressure Mother     Kidney Disease Father     Alcohol Abuse Father     Depression Father     Heart Attack Father     High Blood Pressure Father     Kidney Disease Brother     Heart Disease Other     Depression Sister            Physical Exam:    Vitals: BP (!) 141/90   Pulse 103   Temp 97.9 °F (36.6 °C)   Resp 18   Ht 5' 1\" (1.549 m)   Wt 233 lb (105.7 kg)   SpO2 96%   BMI 44.02 kg/m²     Constitutional  well developed, well nourished. Eyes  conjunctiva normal.  Pupils react to light  Ear, nose, throat -hearing intact to finger rub No scars, masses, or lesions over external nose or ears, no atrophy of tongue  Neck-symmetric, no masses noted, no jugular vein distension  Respiration- chest wall appears symmetric, good expansion,   normal effort without use of accessory muscles  Musculoskeletal  no significant wasting of muscles noted, no bony deformities  Extremities-no clubbing, cyanosis or edema  Skin  warm, dry, and intact. No rash, erythema, or pallor.   Psychiatric  mood, affect, and behavior appear normal.      Neurological exam  Awake, alert, fluent oriented x 3 appropriate affect  Attention and concentration appear appropriate  Recent and remote memory appears unremarkable  Speech normal without dysarthria  No clear issues with language of fund of knowledge    Cranial Nerve Exam   CN II- Visual fields grossly unremarkable  CN III, IV,VI-EOMI, No nystagmus, conjugate eye movements, no ptosis  CN V-sensation intact to LT over face  CN VII-no facial assymetry  CN VIII-Hearing intact to finger rub  CN IX and X- Palate elevates in midline  CN XI-good shoulder shrug  CN XII-Tongue midline with no fasciculations or fibrillations    Motor Exam  Antigravity throughout upper and lower extremities bilaterally. Limited in the right leg, no cogwheeling, normal tone    Sensory Exam  Sensation intact to light touch and temperature upper and lower extremities bilaterally    Reflexes   2+ biceps bilaterally  2+ brachioradialis  2+patella  2+ ankle jerks  No clonus ankles  No Castanon's sign bilateral hands    Tremors- no tremors in hands or head noted    Gait  Not tested    Coordination  Finger to nose-unremarkable        CBC:   Recent Labs      06/12/18   1406  06/13/18   0244   WBC  8.4  9.3   HGB  10.5*  9.4*   PLT  256  260       BMP:    Recent Labs      06/13/18   0244  06/13/18   0928  06/13/18   1610  06/14/18   0400   NA  133*   --   140  140   K  6.0*  4.0  4.2  4.5   CL  100   --   103  104   CO2  22   --   23  23   BUN  26*   --   24*  21*   CREATININE  1.5*   --   1.5*  1.4*   GLUCOSE  326*   --   232*  243*       Hepatic:   Recent Labs      06/12/18   1406   AST  20   ALT  13   BILITOT  <0.2   ALKPHOS  128*       Lipids: No results for input(s): CHOL, HDL in the last 72 hours. Invalid input(s): LDLCALCU    INR:   Recent Labs      06/12/18   1406   INR  1.00           Assessment and Plan     1. Status post fracture of the right lower leg/right radius-pain control/nonweightbearing/DVT prophylaxis  2. Insomnia/mood disorderon medications  3. Coronary artery disease -on aspirin/statin  4. GIbowel regimen/proton pump inhibitor  5. Neuropathy-Cymbalta/Lyrica  6. Diabetesmonitor blood sugar  7. Hypertensionon medications continue to monitor  8. Hypothyroidism test on Synthroid  9. Pain controlNorco as needed  10. PT/OT      Patient's functional assessment  Prior to hospitalization the patient was continent of bowel and bladder    Current therapy  Requires PT, OT and/or speech therapy    Anticipated discharge approximately 12 days    Functional assessment  All notes from reehab data were reviewed regarding the patient's functional status.     Hospital patient is independent of all

## 2018-06-15 NOTE — PROGRESS NOTES
or more people OR patient performs < 25% of locomotion effort  Stairs: 0 - Activity Does not Occur ( 0 only for the admission assessment)  ADMIT  Bed, Chair, Wheel Chair: 2 - Requires 50-74% assistance to transfer   Walk: 0 - Activity Not Assessed/Does Not Occur   Wheel Chair: 1 - Total Assistance Walks/operates wheelchair < 50 feet OR requires two or more people OR patient performs < 25% of locomotion effort   Stairs: 0 - Activity Does not Occur ( 0 only for the admission assessment)      GOALS  GOAL: Bed, Chair, Wheel Chair: 6  GOAL: Walk/Wheel Chair: 6  GOAL: Stairs: 1     GOALS:  Short term goals  Time Frame for Short term goals: 1 WEEK  Short term goal 1: TF FIM 4  Short term goal 2: WC FIM 2  Short term goal 3: HEP SBA    Long term goals  Time Frame for Long term goals : 2-3 WEEKS  Long term goal 1: TF FIM 6  Long term goal 2: AMB FIM 1 (10 FT WITH PFRW FOR TF PURPOSES MOD A)  Long term goal 3: WC FIM 6  Long term goal 4: STEP FIM 1 (NOT TOPERFORM)  Long term goal 5: HEP INDEP  HOME LIVING:                    ASSESSMENT (IMPAIRMENTS/BARRIERS): Body structures, Functions, Activity limitations: Decreased functional mobility , Decreased ADL status, Decreased ROM, Decreased strength, Decreased endurance, Decreased balance  Assessment: PATIENT PRESENTS WITH STRENGTH, BALANACE, ENDURANCE, ROM IMPAIRMENTS WHICH CAUSE LIMITATIONS WITH FUNCTIONAL MOBILITY AND AMBULATION. IMPROVED SQUAT PIVOT TF BED TO CHAIR VS CHAIR TO BED.  PATIENT APPREHENSIVE  Activity Tolerance: Patient limited by fatigue, Patient limited by pain, Patient limited by endurance  Specific instructions for Next Treatment: TRANSFER TRAINING  EDUCATION:  Patient Education: NWB STATUS, SQUAT PIVOT TRANSFER  Barriers to Learning: APPREHENSION  PLAN:  Plan  Times per week: 5-6  Times per day: Twice a day  Plan weeks: 2-3  Specific instructions for Next Treatment: TRANSFER TRAINING  Current Treatment Recommendations: Strengthening, ROM, Balance Training, Functional Mobility Training, Transfer Training, Endurance Training, Wheelchair Mobility Training, Gait Training, Stair training, Home Exercise Program, Safety Education & Training, Patient/Caregiver Education & Training, Equipment Evaluation, Education, & procurement  PATIENT REQUIRES AND IS REASONABLY EXPECTED TO ACTIVELY PARTICIPATE IN AT LEAST 3 HOURS OF INTENSIVE THERAPY PER DAY AT LEAST 5 DAYS PER WEEK, AND BE EXPECTED TO MAKE MEASURABLE IMPROVEMENT THAT WILL BE OF PRACTICAL VALUE TO IMPROVE THE PATIENT'S FUNCTIONAL CAPACITY OR ADAPTATION TO IMPAIRMENTS.    PATIENT GOAL FOR REHAB:  RETURN TO PRIOR LEVEL OF FUNCTION   DISCHARGE PLANS:  Discharge Recommendations: Continue to assess pending progress  ELOS:  2-3 WEEKS        IRF/TRACY    ROLLING  Roll Left and Right  Assistance Needed: Supervision  Physical Assistance Level: No physical assistance  CARE Score: 4  SIT TO SUPINE  Sit to Lying  Assistance Needed: Verbal cues  Physical Assistance Level: No physical assistance  CARE Score: 4  SUPINE TO SIT  Lying to Sitting on Side of Bed  Assistance Needed: Verbal cues  Physical Assistance Level: No physical assistance  CARE Score: 4  SIT TO STAND  Sit to Stand  Assistance Needed: Physical assistance  Physical Assistance Level: 50%-74%  CARE Score: 2  TRANSFERS  Chair/Bed-to-Chair Transfer  Assistance Needed: Physical assistance  Physical Assistance Level: 50%-74%  CARE Score: 2  Discharge Goal: Independent  CAR TRANSFERS  Car Transfer  Reason if not Attempted: Safety concerns  CARE Score: 88  WALK 10 FT  Walk 10 Feet  Reason if not Attempted: Safety concerns  CARE Score: 88  WALK 50 FT  Walk 50 Feet with Two Turns  Reason if not Attempted: Safety concerns  CARE Score: 88  WALK 150 FT  Walk 150 Feet  Reason if not Attempted: Safety concerns  CARE Score: 88  WALK 10 FT UNEVEN SURFACES  Walking 10 Feet on Uneven Surfaces  Reason if not Attempted: Safety concerns  CARE Score: 88  1 STEP  1 Step (Curb)  Reason if not Attempted: Safety concerns  CARE Score: 88  4 STEPS  4 Steps  Reason if not Attempted: Safety concerns  CARE Score: 88  12 STEPS  12 Steps  Reason if not Attempted: Safety concerns  CARE Score: 88  PICKING UP OBJECT  Picking Up Object  Reason if not Attempted: Safety concerns  CARE Score: 88  WHEELCHAIR 50 FT  Wheel 50 Feet with Two Turns  Comment: 5 FT  Reason if not Attempted: Safety concerns  CARE Score: 88  WHEELCHAIR 150 FT  Wheel 150 Feet  Comment: 5 FT  Reason if not Attempted: Safety concerns  CARE Score: 88      LAST TREATMENT TIME  PT Individual Minutes  Time In: 0830  Time Out: 1000  Minutes: 90            Electronically signed by Lane Beatty PT on 6/15/2018 at 1:24 PM

## 2018-06-15 NOTE — PROGRESS NOTES
Occupational Therapy   Occupational Therapy Initial Assessment  Date: 6/15/2018   Patient Name: Hiren Pérez  MRN: [id number]     : 1960    Date of Service: 6/15/2018    Discharge Recommendations:  IP Rehab         Patient Diagnosis(es): There were no encounter diagnoses. has a past medical history of Anxiety; Arthritis; B[facility] disorder ([facility] CAD (coronary artery disease); CHF (congestive heart failure) ([facility] Chronic kidney disease (CKD) stage G3b/A2, moderately decreased glomerular filtration rate (GFR) between 30-44 mL/min/1.73 square meter and albuminuria creatinine ratio between  mg/g; Depression; DM (diabetes mellitus) ([facility] GERD (gastroesophageal reflux disease); History of blood transfusion; History of suicidal ideation; Hyperlipidemia; Hypertension; Hypothyroidism; Insomnia; Kidney disease; MI, old; Obesity; Polyarticular arthritis; and Type II or unspecified type diabetes mellitus without mention of complication, not stated as uncontrolled. has a past surgical history that includes angioplasty (05); Gastric bypass surgery;  section; Cholecystectomy; Carpal tunnel release; Intracapsular cataract extraction (Left, 2016); Cardiac catheterization (05, 05); Cardiac catheterization (3/23/15  [facility]); hernia repair; Abdominoplasty; Breast reduction surgery; Total knee arthroplasty (Left, 2017); vira and bso (cervix removed) (); open treatment bimalleolar ankle fracture (Right, 2018); and open treatment bimalleolar ankle fracture (Right, 2018).     Treatment Diagnosis: R ankle revision, R wrist fx      Restrictions  Restrictions/Precautions  Restrictions/Precautions: Fall Risk, Weight Bearing  Required Braces or Orthoses?: Yes  Lower Extremity Weight Bearing Restrictions  Right Lower Extremity Weight Bearing: Non Weight Bearing  Left Lower Extremity Weight Bearing: Weight Bearing As Tolerated  Upper Extremity Weight Bearing Restrictions  Right

## 2018-06-15 NOTE — PROGRESS NOTES
06/15/18 1300   Transfers   Sit to Stand Moderate Assistance  (PULL TO STAND )   Bed to Chair Maximum assistance; Moderate assistance   Other exercises   Other exercises? Yes   Other exercises 1 SITTING BILAT LE HIP FLEX/EXT, ABD/ADD; KNEE EXT; PF/DF X 10 EACH WITH MANUAL RESISTANCE. Other exercises 2 STATIC STANCE VERTICAL BAR X3 MIN   Conditions Requiring Skilled Therapeutic Intervention   Body structures, Functions, Activity limitations Decreased functional mobility ; Decreased ADL status; Decreased ROM; Decreased strength;Decreased endurance;Decreased balance   Assessment IMPROVED BILAT LE ROM THIS PM.  TRANSFER TRAINING.   (TRANSFERED PLINTH TO CHAIR BEFORE THERAPIST READY.   MAX A)

## 2018-06-16 ENCOUNTER — APPOINTMENT (OUTPATIENT)
Dept: GENERAL RADIOLOGY | Age: 58
DRG: 559 | End: 2018-06-16
Attending: PSYCHIATRY & NEUROLOGY
Payer: MEDICARE

## 2018-06-16 LAB
ANION GAP SERPL CALCULATED.3IONS-SCNC: 13 MMOL/L (ref 7–19)
BUN BLDV-MCNC: 18 MG/DL (ref 6–20)
CALCIUM SERPL-MCNC: 8.7 MG/DL (ref 8.6–10)
CHLORIDE BLD-SCNC: 98 MMOL/L (ref 98–111)
CO2: 27 MMOL/L (ref 22–29)
CREAT SERPL-MCNC: 1.2 MG/DL (ref 0.5–0.9)
GFR NON-AFRICAN AMERICAN: 46
GLUCOSE BLD-MCNC: 207 MG/DL (ref 70–99)
GLUCOSE BLD-MCNC: 211 MG/DL (ref 74–109)
GLUCOSE BLD-MCNC: 240 MG/DL (ref 70–99)
GLUCOSE BLD-MCNC: 280 MG/DL (ref 70–99)
GLUCOSE BLD-MCNC: 316 MG/DL (ref 70–99)
PERFORMED ON: ABNORMAL
POTASSIUM SERPL-SCNC: 4.3 MMOL/L (ref 3.5–5)
SODIUM BLD-SCNC: 138 MMOL/L (ref 136–145)

## 2018-06-16 PROCEDURE — 97535 SELF CARE MNGMENT TRAINING: CPT

## 2018-06-16 PROCEDURE — 82948 REAGENT STRIP/BLOOD GLUCOSE: CPT

## 2018-06-16 PROCEDURE — 73610 X-RAY EXAM OF ANKLE: CPT

## 2018-06-16 PROCEDURE — 97110 THERAPEUTIC EXERCISES: CPT

## 2018-06-16 PROCEDURE — 97530 THERAPEUTIC ACTIVITIES: CPT

## 2018-06-16 PROCEDURE — 6370000000 HC RX 637 (ALT 250 FOR IP): Performed by: ORTHOPAEDIC SURGERY

## 2018-06-16 PROCEDURE — 6370000000 HC RX 637 (ALT 250 FOR IP): Performed by: PSYCHIATRY & NEUROLOGY

## 2018-06-16 PROCEDURE — 6360000002 HC RX W HCPCS: Performed by: ORTHOPAEDIC SURGERY

## 2018-06-16 PROCEDURE — 1180000000 HC REHAB R&B

## 2018-06-16 PROCEDURE — 80048 BASIC METABOLIC PNL TOTAL CA: CPT

## 2018-06-16 PROCEDURE — 99232 SBSQ HOSP IP/OBS MODERATE 35: CPT | Performed by: PSYCHIATRY & NEUROLOGY

## 2018-06-16 PROCEDURE — 36415 COLL VENOUS BLD VENIPUNCTURE: CPT

## 2018-06-16 RX ADMIN — INSULIN LISPRO 6 UNITS: 100 INJECTION, SOLUTION INTRAVENOUS; SUBCUTANEOUS at 12:11

## 2018-06-16 RX ADMIN — QUETIAPINE FUMARATE 200 MG: 100 TABLET ORAL at 20:37

## 2018-06-16 RX ADMIN — PREGABALIN 150 MG: 150 CAPSULE ORAL at 20:37

## 2018-06-16 RX ADMIN — PREGABALIN 150 MG: 150 CAPSULE ORAL at 08:06

## 2018-06-16 RX ADMIN — FUROSEMIDE 40 MG: 40 TABLET ORAL at 08:06

## 2018-06-16 RX ADMIN — HYDROCODONE BITARTRATE AND ACETAMINOPHEN 1 TABLET: 10; 325 TABLET ORAL at 20:37

## 2018-06-16 RX ADMIN — INSULIN LISPRO 4 UNITS: 100 INJECTION, SOLUTION INTRAVENOUS; SUBCUTANEOUS at 08:07

## 2018-06-16 RX ADMIN — DULOXETINE HYDROCHLORIDE 60 MG: 60 CAPSULE, DELAYED RELEASE ORAL at 08:06

## 2018-06-16 RX ADMIN — INSULIN LISPRO 4 UNITS: 100 INJECTION, SOLUTION INTRAVENOUS; SUBCUTANEOUS at 22:39

## 2018-06-16 RX ADMIN — ASPIRIN 325 MG: 325 TABLET, DELAYED RELEASE ORAL at 20:37

## 2018-06-16 RX ADMIN — ASPIRIN 325 MG: 325 TABLET, DELAYED RELEASE ORAL at 08:06

## 2018-06-16 RX ADMIN — LAMOTRIGINE 50 MG: 25 TABLET ORAL at 08:05

## 2018-06-16 RX ADMIN — DOCUSATE SODIUM 100 MG: 100 CAPSULE, LIQUID FILLED ORAL at 20:37

## 2018-06-16 RX ADMIN — INSULIN LISPRO 4 UNITS: 100 INJECTION, SOLUTION INTRAVENOUS; SUBCUTANEOUS at 17:15

## 2018-06-16 RX ADMIN — TIZANIDINE 4 MG: 4 TABLET ORAL at 09:48

## 2018-06-16 RX ADMIN — HYDROCODONE BITARTRATE AND ACETAMINOPHEN 1 TABLET: 10; 325 TABLET ORAL at 08:06

## 2018-06-16 RX ADMIN — ATORVASTATIN CALCIUM 40 MG: 40 TABLET, FILM COATED ORAL at 08:06

## 2018-06-16 RX ADMIN — VALSARTAN 160 MG: 160 TABLET ORAL at 20:37

## 2018-06-16 RX ADMIN — LINACLOTIDE 290 MCG: 145 CAPSULE, GELATIN COATED ORAL at 08:05

## 2018-06-16 RX ADMIN — HYDROCODONE BITARTRATE AND ACETAMINOPHEN 1 TABLET: 10; 325 TABLET ORAL at 16:17

## 2018-06-16 RX ADMIN — POLYETHYLENE GLYCOL 3350 17 G: 17 POWDER, FOR SOLUTION ORAL at 08:05

## 2018-06-16 RX ADMIN — TIZANIDINE 4 MG: 4 TABLET ORAL at 16:17

## 2018-06-16 RX ADMIN — ENOXAPARIN SODIUM 40 MG: 40 INJECTION SUBCUTANEOUS at 08:06

## 2018-06-16 RX ADMIN — PANTOPRAZOLE SODIUM 40 MG: 40 TABLET, DELAYED RELEASE ORAL at 08:06

## 2018-06-16 RX ADMIN — LEVOTHYROXINE SODIUM 88 MCG: 88 TABLET ORAL at 05:45

## 2018-06-16 RX ADMIN — HYDROCODONE BITARTRATE AND ACETAMINOPHEN 1 TABLET: 10; 325 TABLET ORAL at 12:14

## 2018-06-16 RX ADMIN — HYDROCODONE BITARTRATE AND ACETAMINOPHEN 1 TABLET: 10; 325 TABLET ORAL at 02:36

## 2018-06-16 RX ADMIN — AMITRIPTYLINE HYDROCHLORIDE 150 MG: 75 TABLET, FILM COATED ORAL at 20:37

## 2018-06-16 RX ADMIN — METOPROLOL SUCCINATE 50 MG: 50 TABLET, EXTENDED RELEASE ORAL at 08:06

## 2018-06-16 RX ADMIN — DOCUSATE SODIUM 100 MG: 100 CAPSULE, LIQUID FILLED ORAL at 08:06

## 2018-06-16 RX ADMIN — LAMOTRIGINE 50 MG: 25 TABLET ORAL at 20:37

## 2018-06-16 ASSESSMENT — PAIN DESCRIPTION - PROGRESSION: CLINICAL_PROGRESSION: NOT CHANGED

## 2018-06-16 ASSESSMENT — PAIN DESCRIPTION - PAIN TYPE
TYPE: ACUTE PAIN
TYPE: SURGICAL PAIN
TYPE: SURGICAL PAIN

## 2018-06-16 ASSESSMENT — PAIN DESCRIPTION - LOCATION
LOCATION: ANKLE

## 2018-06-16 ASSESSMENT — PAIN SCALES - GENERAL
PAINLEVEL_OUTOF10: 3
PAINLEVEL_OUTOF10: 7
PAINLEVEL_OUTOF10: 8
PAINLEVEL_OUTOF10: 10
PAINLEVEL_OUTOF10: 3
PAINLEVEL_OUTOF10: 10
PAINLEVEL_OUTOF10: 6
PAINLEVEL_OUTOF10: 9
PAINLEVEL_OUTOF10: 8
PAINLEVEL_OUTOF10: 8
PAINLEVEL_OUTOF10: 9

## 2018-06-16 ASSESSMENT — PAIN DESCRIPTION - ORIENTATION
ORIENTATION: RIGHT

## 2018-06-16 ASSESSMENT — PAIN DESCRIPTION - FREQUENCY: FREQUENCY: CONTINUOUS

## 2018-06-16 ASSESSMENT — PAIN DESCRIPTION - DESCRIPTORS: DESCRIPTORS: ACHING

## 2018-06-16 NOTE — FLOWSHEET NOTE
06/15/18 1939   Encounter Summary   Services provided to: Patient   Referral/Consult From: Patient   Complexity of Encounter Low   Length of Encounter 15 minutes   Spiritual/Adventist   Type Spiritual support   Assessment Approachable;Coping   Intervention Active listening;Explored feelings, thoughts, concerns;Prayer   Outcome Connection/belonging;Expressed gratitude;Encouraged;Receptive

## 2018-06-16 NOTE — PROGRESS NOTES
06/16/18 1300   Transfers   Bed to Chair Moderate assistance;Minimal assistance  (SQUAT PIVOT)   Other exercises   Other exercises 1 SQUAT PIVOT X 6 BED TO/FROM [facility] AND RECLINER   Conditions Requiring Skilled Therapeutic Intervention   Body structures, Functions, Activity limitations Decreased functional mobility ; Decreased ADL status; Decreased ROM; Decreased strength;Decreased endurance;Decreased balance   Assessment PRACTICED PROPER SET UP FOR BED TO/FROM [facility] TRANSFER. IMPROVED PERFORMANCE WITH REPETITIOINS.

## 2018-06-16 NOTE — PROGRESS NOTES
Occupational Therapy     06/16/18 0815   General   Diagnosis R ankle ORIF revision from fall ( initial fx 6/11), Distal right radial fx   Pain Assessment   Patient Currently in Pain Yes   Pain Assessment 0-10   Pain Level 8   Pain Type Acute pain   Pain Location Ankle   Pain Orientation Right   Pain Descriptors Aching   Pain Frequency Continuous   Clinical Progression Not changed   Pain Intervention(s) Medication (see eMar);Repositioned; Rest   Response to Pain Intervention Patient Satisfied   Bed mobility   Supine to Sit Supervision   Sit to Supine Minimal assistance  (Assistance with RLE.)   Assessment   Performance deficits / Impairments Decreased functional mobility ; Decreased ADL status; Decreased balance;Decreased safe awareness;Decreased endurance   Assessment Required cues for WB on RUE multiple ocassions. Demonstrated good understanding of LB dressing tech with AE but mobility is limited due to pain in RLE, will require further training for increased independence during task. Treatment Diagnosis R ankle revision, R wrist fx   Prognosis Good   Patient Education LB dressing tech with AE and UB dressing tech while following NWB on R UE/LE. Timed Code Treatment Minutes 45 Minutes   Activity Tolerance   Activity Tolerance Patient Tolerated treatment well   Safety Devices   Safety Devices in place Yes   Type of devices Call light within reach; Bed alarm in place; Left in bed   Plan   Current Treatment Recommendations Patient/Caregiver Education & Training;Home Management Training;Equipment Evaluation, Education, & procurement;Self-Care / ADL; Safety Education & Training; Endurance Training;Functional Mobility Training;Balance Training      06/16/18 0815   Self-Care   Grooming 5   Bathing 3   Dressing-Upper 4  (Completed sitting EOB.)   Dressing-Lower 3  (With AE, rolled in bed to don/doff pants over buttocks. )   FIM Progress   Pain Level 8

## 2018-06-16 NOTE — PLAN OF CARE
Problem: Falls - Risk of:  Goal: Will remain free from falls  Will remain free from falls   Outcome: Ongoing      Problem: Risk for Impaired Skin Integrity  Goal: Tissue integrity - skin and mucous membranes  Structural intactness and normal physiological function of skin and  mucous membranes.    Outcome: Ongoing      Problem: Pain:  Goal: Pain level will decrease  Pain level will decrease   Outcome: Ongoing

## 2018-06-16 NOTE — PROGRESS NOTES
06/16/18 1544   Restrictions/Precautions   Restrictions/Precautions Fall Risk;Weight Bearing   Required Braces or Orthoses? Yes   General   Chart Reviewed Yes   Patient assessed for rehabilitation services? Yes   Diagnosis R ankle ORIF revision from fall ( initial fx 6/11), Distal right radial fx   General Comment   Comments ready for therapy   Pain Assessment   Patient Currently in Pain Yes   Pain Assessment 0-10   Pain Level 3   Pain Type Surgical pain   Pain Location Ankle   Pain Orientation Right   Pain Intervention(s) Repositioned;Elevation   Response to Pain Intervention Patient Satisfied   Functional Mobility   Functional Mobility Comments Patient in 22 wide jennifer height wheelchair. Needs jennifer height to jennifer propel her chair, but still having difficulty due to large front rubberized casters. Decided to try patient in a 20 in wheelchair for accessiblity improvements. She likes this width much better. This particular wheelchair has traditional casters and is able to move more precisely, but it is currently set at standard height and will need to be eventually lowered to jennifer height. Bed mobility   Sit to Supine Supervision   Transfers   Transfer Comments Patient feeling weak and having trouble even initiating squat pivot transfer so began working on sliding board transfers. Patient performed several sliding board transfers after set up, with very level heights with just CGA. The challenge now will be to manipulate heights to allow for function. Used  floor platform for slide board back to bed. Toilet Transfers   Toilet Transfers Comments Problem solved a variety of set ups and situations to determine best method of transfer for toileting. Lateral transfer to toilet in bathroom are difficult. Had some seat height issues combined with w/c issues  for padded drop arm BSC transfer from w/c. Standard width  of the commode was a hindrance as well.   Patient may do best using bed to wide drop arm BSC transfers and eliminating need for slide board. Will need to continue to problem solve   Assessment   Assessment Emphasis of this treatment was sliding board transfers. Emphasis was on importance of using level surfaces, wearing appropriate clothing, and achieving an ideal board placement. Working on [facility] changes and wheelchair changes. Will need to work on what is best for function within this setting as well as work specifically toward the home setting. Treatment Diagnosis R ankle revision, R wrist fx   Barriers to Learning none   REQUIRES OT FOLLOW UP No   Activity Tolerance   Activity Tolerance Patient Tolerated treatment well   Safety Devices   Safety Devices in place Yes   Type of devices Bed alarm in place;Call light within reach;Nurse notified; Left in bed   Occupational Therapy    Electronically signed by Og Romero OT on 6/16/2018 at 4:09 PM

## 2018-06-17 LAB
ANION GAP SERPL CALCULATED.3IONS-SCNC: 13 MMOL/L (ref 7–19)
BUN BLDV-MCNC: 22 MG/DL (ref 6–20)
CALCIUM SERPL-MCNC: 9 MG/DL (ref 8.6–10)
CHLORIDE BLD-SCNC: 98 MMOL/L (ref 98–111)
CO2: 28 MMOL/L (ref 22–29)
CREAT SERPL-MCNC: 1.2 MG/DL (ref 0.5–0.9)
GFR NON-AFRICAN AMERICAN: 46
GLUCOSE BLD-MCNC: 201 MG/DL (ref 70–99)
GLUCOSE BLD-MCNC: 206 MG/DL (ref 74–109)
GLUCOSE BLD-MCNC: 210 MG/DL (ref 70–99)
GLUCOSE BLD-MCNC: 226 MG/DL (ref 70–99)
GLUCOSE BLD-MCNC: 313 MG/DL (ref 70–99)
ORGANISM: ABNORMAL
PERFORMED ON: ABNORMAL
POTASSIUM SERPL-SCNC: 4.7 MMOL/L (ref 3.5–5)
SODIUM BLD-SCNC: 139 MMOL/L (ref 136–145)
URINE CULTURE, ROUTINE: ABNORMAL
URINE CULTURE, ROUTINE: ABNORMAL

## 2018-06-17 PROCEDURE — 6370000000 HC RX 637 (ALT 250 FOR IP): Performed by: PSYCHIATRY & NEUROLOGY

## 2018-06-17 PROCEDURE — 1180000000 HC REHAB R&B

## 2018-06-17 PROCEDURE — 82948 REAGENT STRIP/BLOOD GLUCOSE: CPT

## 2018-06-17 PROCEDURE — 80048 BASIC METABOLIC PNL TOTAL CA: CPT

## 2018-06-17 PROCEDURE — 36415 COLL VENOUS BLD VENIPUNCTURE: CPT

## 2018-06-17 PROCEDURE — 6360000002 HC RX W HCPCS: Performed by: ORTHOPAEDIC SURGERY

## 2018-06-17 PROCEDURE — 99232 SBSQ HOSP IP/OBS MODERATE 35: CPT | Performed by: PSYCHIATRY & NEUROLOGY

## 2018-06-17 PROCEDURE — 6370000000 HC RX 637 (ALT 250 FOR IP): Performed by: ORTHOPAEDIC SURGERY

## 2018-06-17 RX ADMIN — HYDROCODONE BITARTRATE AND ACETAMINOPHEN 1 TABLET: 10; 325 TABLET ORAL at 09:40

## 2018-06-17 RX ADMIN — INSULIN LISPRO 4 UNITS: 100 INJECTION, SOLUTION INTRAVENOUS; SUBCUTANEOUS at 21:26

## 2018-06-17 RX ADMIN — FLUTICASONE PROPIONATE 1 SPRAY: 50 SPRAY, METERED NASAL at 08:09

## 2018-06-17 RX ADMIN — ENOXAPARIN SODIUM 40 MG: 40 INJECTION SUBCUTANEOUS at 08:10

## 2018-06-17 RX ADMIN — DOCUSATE SODIUM 100 MG: 100 CAPSULE, LIQUID FILLED ORAL at 08:09

## 2018-06-17 RX ADMIN — HYDROCODONE BITARTRATE AND ACETAMINOPHEN 1 TABLET: 10; 325 TABLET ORAL at 15:14

## 2018-06-17 RX ADMIN — DULOXETINE HYDROCHLORIDE 60 MG: 60 CAPSULE, DELAYED RELEASE ORAL at 08:09

## 2018-06-17 RX ADMIN — PREGABALIN 150 MG: 150 CAPSULE ORAL at 21:26

## 2018-06-17 RX ADMIN — LAMOTRIGINE 50 MG: 25 TABLET ORAL at 08:09

## 2018-06-17 RX ADMIN — POLYETHYLENE GLYCOL 3350 17 G: 17 POWDER, FOR SOLUTION ORAL at 08:09

## 2018-06-17 RX ADMIN — PREGABALIN 150 MG: 150 CAPSULE ORAL at 08:09

## 2018-06-17 RX ADMIN — DOCUSATE SODIUM 100 MG: 100 CAPSULE, LIQUID FILLED ORAL at 21:25

## 2018-06-17 RX ADMIN — INSULIN LISPRO 4 UNITS: 100 INJECTION, SOLUTION INTRAVENOUS; SUBCUTANEOUS at 12:22

## 2018-06-17 RX ADMIN — ATORVASTATIN CALCIUM 40 MG: 40 TABLET, FILM COATED ORAL at 08:10

## 2018-06-17 RX ADMIN — FUROSEMIDE 40 MG: 40 TABLET ORAL at 08:10

## 2018-06-17 RX ADMIN — QUETIAPINE FUMARATE 200 MG: 100 TABLET ORAL at 21:26

## 2018-06-17 RX ADMIN — METOPROLOL SUCCINATE 50 MG: 50 TABLET, EXTENDED RELEASE ORAL at 08:10

## 2018-06-17 RX ADMIN — TIZANIDINE 4 MG: 4 TABLET ORAL at 21:29

## 2018-06-17 RX ADMIN — LAMOTRIGINE 50 MG: 25 TABLET ORAL at 21:26

## 2018-06-17 RX ADMIN — VALSARTAN 160 MG: 160 TABLET ORAL at 21:26

## 2018-06-17 RX ADMIN — INSULIN LISPRO 4 UNITS: 100 INJECTION, SOLUTION INTRAVENOUS; SUBCUTANEOUS at 08:11

## 2018-06-17 RX ADMIN — HYDROCODONE BITARTRATE AND ACETAMINOPHEN 1 TABLET: 10; 325 TABLET ORAL at 21:26

## 2018-06-17 RX ADMIN — PANTOPRAZOLE SODIUM 40 MG: 40 TABLET, DELAYED RELEASE ORAL at 08:10

## 2018-06-17 RX ADMIN — LEVOTHYROXINE SODIUM 88 MCG: 88 TABLET ORAL at 05:39

## 2018-06-17 RX ADMIN — ASPIRIN 325 MG: 325 TABLET, DELAYED RELEASE ORAL at 08:10

## 2018-06-17 RX ADMIN — MAGESIUM CITRATE 296 ML: 1.75 LIQUID ORAL at 09:40

## 2018-06-17 RX ADMIN — INSULIN LISPRO 4 UNITS: 100 INJECTION, SOLUTION INTRAVENOUS; SUBCUTANEOUS at 17:41

## 2018-06-17 RX ADMIN — ASPIRIN 325 MG: 325 TABLET, DELAYED RELEASE ORAL at 21:25

## 2018-06-17 RX ADMIN — AMITRIPTYLINE HYDROCHLORIDE 150 MG: 75 TABLET, FILM COATED ORAL at 21:26

## 2018-06-17 RX ADMIN — TIZANIDINE 4 MG: 4 TABLET ORAL at 08:18

## 2018-06-17 RX ADMIN — HYDROCODONE BITARTRATE AND ACETAMINOPHEN 1 TABLET: 10; 325 TABLET ORAL at 05:39

## 2018-06-17 ASSESSMENT — PAIN SCALES - GENERAL
PAINLEVEL_OUTOF10: 8
PAINLEVEL_OUTOF10: 0
PAINLEVEL_OUTOF10: 10
PAINLEVEL_OUTOF10: 7
PAINLEVEL_OUTOF10: 0
PAINLEVEL_OUTOF10: 0
PAINLEVEL_OUTOF10: 8

## 2018-06-17 ASSESSMENT — PAIN DESCRIPTION - ORIENTATION: ORIENTATION: RIGHT

## 2018-06-17 ASSESSMENT — PAIN DESCRIPTION - LOCATION: LOCATION: ANKLE

## 2018-06-17 ASSESSMENT — PAIN DESCRIPTION - PAIN TYPE: TYPE: SURGICAL PAIN

## 2018-06-17 NOTE — PROGRESS NOTES
Patient:   Dagoberto Morrow  MR#:    [id number]   Room:    0815/815-01   YOB: 1960  Date of Progress Note: 6/17/2018  Time of Note                           9:05 AM  Consulting Physician:   Arno Boeck, M.D. Attending Physician:  Arno Boeck, MD     Chief complaint Status post fracture of the right lower leg and right radius     S:This 63 y.o. female  who had an ORIF of a right closed displaced bimalleolar ankle fracture, as well as closed treatment of a two-part extra-articular distal radius fracture by Dr. Mikey Coto as outpatient on 6/11/18. She was discharged home. On 6/12/18 she presented to [facility] ER after having a fall at home while trying to do a pivot transfer. She had immediate onset of pain and bleeding from her surgical site on her ankle. X-rays revealed her to have an open fracture dislocation of her right ankle with displacment of hardware screws. She was admitted by Dr. Mikey Coto for I&D of her open wound and revision ORIF. She tolerated the procedure well. She will be non-weight bearing on her right LE, as well as her right UE. She has had post op anemia but hasn't yet required transfusion. She continues to have signifigant pain rating 8/10. She is participating in both PT/OT. She is felt to need a stay on Rehab to work on safe technique for transfers and wheelchair level ADLs.  She is now felt ready to start the Rehab program. No new issues    REVIEW OF SYSTEMS:  Constitutional: No fevers No chills  Neck:No stiffness  Respiratory: No shortness of breath  Cardiovascular: No chest pain No palpitations  Gastrointestinal: No abdominal pain    Genitourinary: No Dysuria  Neurological: No headache, no confusion    Past Medical History:      Diagnosis Date    Anxiety     Arthritis     Bipolar 1 disorder ([facility]     CAD (coronary artery disease)     CHF (congestive heart failure) (HCC)     Chronic kidney disease (CKD) stage G3b/A2, moderately decreased glomerular filtration rate (GFR) between 30-44 mL/min/1.73 square meter and albuminuria creatinine ratio between  mg/g 2016    Depression     DM (diabetes mellitus) ([facility]     GERD (gastroesophageal reflux disease)     History of blood transfusion     History of suicidal ideation     Hyperlipidemia     Hypertension     Hypothyroidism     Insomnia     Kidney disease     stage 3 failure    MI, old 2005    Obesity     Polyarticular arthritis     Type II or unspecified type diabetes mellitus without mention of complication, not stated as uncontrolled        Past Surgical History:      Procedure Laterality Date    ABDOMINOPLASTY      ANGIOPLASTY  05    stent placement to LAD and diagonal with normal left vent function    BREAST REDUCTION SURGERY      CARDIAC CATHETERIZATION  05, 05    see report  Stents x3    CARDIAC CATHETERIZATION  3/23/15  [facility]DT    EF 50%    CARPAL TUNNEL RELEASE       SECTION      x2    CHOLECYSTECTOMY      GASTRIC BYPASS SURGERY      HERNIA REPAIR      umbilical with mesh    INTRACAPSULAR CATARACT EXTRACTION Left 2016    LT EYE CATARACT EMULSIFICATION IOL IMPLANT performed by Gerald Jauregui MD at [address] [place] [address] Right 2018    ORIF RIGHT BIMALLEOLAR ANKLE FRACTURE, RIGHT WRIST CAST REMOVAL performed by Sun Carvalho MD at [address] [place] [address] Right 2018    ANKLE OPEN REDUCTION INTERNAL FIXATION performed by Sun Carvalho MD at [place] [address] Left 2017    KNEE TOTAL ARTHROPLASTY performed by Carmelo Brock DO at [address] [place] [address] OR       Medications in Hospital:      Current Facility-Administered Medications:     valsartan (DIOVAN) tablet 160 mg, 160 mg, Oral, Nightly, Lane Duenas MD, 160 mg at 18    polyethylene glycol (GLYCOLAX) packet 17 g, 17 g, Oral, Daily, Lane Duenas MD, 17 g at 18 0809    acetaminophen (TYLENOL) tablet 650 mg, 650 mg, Oral, Q4H PRN, Karene Gottron, MD    aspirin EC tablet 325 mg, 325 mg, Oral, BID, Karene Gottron, MD, 325 mg at 06/17/18 0810    docusate sodium (COLACE) capsule 100 mg, 100 mg, Oral, BID, Karene Gottron, MD, 100 mg at 06/17/18 0809    ondansetron (ZOFRAN) injection 4 mg, 4 mg, Intravenous, Q6H PRN, Karene Gottron, MD    amitriptyline (ELAVIL) tablet 150 mg, 150 mg, Oral, Nightly, Karene Gottron, MD, 150 mg at 06/16/18 2037    furosemide (LASIX) tablet 40 mg, 40 mg, Oral, Daily, Karene Gottron, MD, 40 mg at 06/17/18 0810    QUEtiapine (SEROQUEL) tablet 200 mg, 200 mg, Oral, Nightly, Karene Gottron, MD, 200 mg at 06/16/18 2037    tiZANidine (ZANAFLEX) tablet 4 mg, 4 mg, Oral, Q6H PRN, Karene Gottron, MD, 4 mg at 06/17/18 0818    enoxaparin (LOVENOX) injection 40 mg, 40 mg, Subcutaneous, Daily, Karene Gottron, MD, 40 mg at 06/17/18 0810    atorvastatin (LIPITOR) tablet 40 mg, 40 mg, Oral, Daily, Karene Gottron, MD, 40 mg at 06/17/18 0810    dextrose 5 % solution, 100 mL/hr, Intravenous, PRN, Karene Gottron, MD    diazepam (VALIUM) tablet 5 mg, 5 mg, Oral, Nightly PRN, Karene Gottron, MD    DULoxetine (CYMBALTA) extended release capsule 60 mg, 60 mg, Oral, Daily, Karene Gottron, MD, 60 mg at 06/17/18 0809    fluticasone (FLONASE) 50 MCG/ACT nasal spray 1 spray, 1 spray, Each Nare, Daily, Karene Gottron, MD, 1 spray at 06/17/18 0809    glucagon (rDNA) injection 1 mg, 1 mg, Intramuscular, PRN, Karene Gottron, MD    glucose (GLUTOSE) 40 % oral gel 15 g, 15 g, Oral, PRN, Karene Gottron, MD    insulin lispro (HUMALOG) injection vial 0-12 Units, 0-12 Units, Subcutaneous, TID WC, Karene Gottron, MD, 4 Units at 06/17/18 0811    insulin lispro (HUMALOG) injection vial 0-6 Units, 0-6 Units, Subcutaneous, Nightly, Karene Gottron, MD, 4 Units at 06/16/18 2239    lamoTRIgine (LAMICTAL) tablet 50 mg, 50 mg, Oral, BID, Karene Gottron, MD, 50 mg at 06/17/18 0809    levothyroxine (SYNTHROID) tablet 88 mcg, 88 mcg, Oral, Daily, Karene Gottron, MD, 88 mcg at 06/17/18 0539    linaclotide

## 2018-06-17 NOTE — PLAN OF CARE
Problem: Falls - Risk of:  Goal: Will remain free from falls  Will remain free from falls   Outcome: Ongoing  Patient has Personal and Bed Alarm in place for safety. Problem: Risk for Impaired Skin Integrity  Goal: Tissue integrity - skin and mucous membranes  Structural intactness and normal physiological function of skin and  mucous membranes. Outcome: Ongoing  Skin remains intact with no skin breakdown noted. Problem: Pain:  Goal: Pain level will decrease  Pain level will decrease   Pain medications given as ordered PRN. Will continue to monitor pain levels.

## 2018-06-18 LAB
ALBUMIN SERPL-MCNC: 3 G/DL (ref 3.5–5.2)
ALP BLD-CCNC: 133 U/L (ref 35–104)
ALT SERPL-CCNC: 14 U/L (ref 5–33)
ANION GAP SERPL CALCULATED.3IONS-SCNC: 16 MMOL/L (ref 7–19)
AST SERPL-CCNC: 29 U/L (ref 5–32)
BASOPHILS ABSOLUTE: 0.1 K/UL (ref 0–0.2)
BASOPHILS RELATIVE PERCENT: 0.6 % (ref 0–1)
BILIRUB SERPL-MCNC: 0.3 MG/DL (ref 0.2–1.2)
BUN BLDV-MCNC: 26 MG/DL (ref 6–20)
CALCIUM SERPL-MCNC: 8.7 MG/DL (ref 8.6–10)
CHLORIDE BLD-SCNC: 94 MMOL/L (ref 98–111)
CO2: 27 MMOL/L (ref 22–29)
CREAT SERPL-MCNC: 1.5 MG/DL (ref 0.5–0.9)
EOSINOPHILS ABSOLUTE: 0.4 K/UL (ref 0–0.6)
EOSINOPHILS RELATIVE PERCENT: 4.1 % (ref 0–5)
GFR NON-AFRICAN AMERICAN: 36
GLUCOSE BLD-MCNC: 202 MG/DL (ref 70–99)
GLUCOSE BLD-MCNC: 205 MG/DL (ref 70–99)
GLUCOSE BLD-MCNC: 216 MG/DL (ref 70–99)
GLUCOSE BLD-MCNC: 217 MG/DL (ref 74–109)
GLUCOSE BLD-MCNC: 335 MG/DL (ref 70–99)
HCT VFR BLD CALC: 30.5 % (ref 37–47)
HEMOGLOBIN: 9.5 G/DL (ref 12–16)
LYMPHOCYTES ABSOLUTE: 1.7 K/UL (ref 1.1–4.5)
LYMPHOCYTES RELATIVE PERCENT: 19.5 % (ref 20–40)
MCH RBC QN AUTO: 30.4 PG (ref 27–31)
MCHC RBC AUTO-ENTMCNC: 31.1 G/DL (ref 33–37)
MCV RBC AUTO: 97.4 FL (ref 81–99)
MONOCYTES ABSOLUTE: 1.1 K/UL (ref 0–0.9)
MONOCYTES RELATIVE PERCENT: 12.6 % (ref 0–10)
NEUTROPHILS ABSOLUTE: 5.4 K/UL (ref 1.5–7.5)
NEUTROPHILS RELATIVE PERCENT: 61.9 % (ref 50–65)
PDW BLD-RTO: 14.6 % (ref 11.5–14.5)
PERFORMED ON: ABNORMAL
PLATELET # BLD: 258 K/UL (ref 130–400)
PMV BLD AUTO: 11 FL (ref 9.4–12.3)
POTASSIUM REFLEX MAGNESIUM: 4.5 MMOL/L (ref 3.5–5)
RBC # BLD: 3.13 M/UL (ref 4.2–5.4)
SODIUM BLD-SCNC: 137 MMOL/L (ref 136–145)
TOTAL PROTEIN: 6.4 G/DL (ref 6.6–8.7)
WBC # BLD: 8.8 K/UL (ref 4.8–10.8)

## 2018-06-18 PROCEDURE — 85025 COMPLETE CBC W/AUTO DIFF WBC: CPT

## 2018-06-18 PROCEDURE — 6360000002 HC RX W HCPCS: Performed by: ORTHOPAEDIC SURGERY

## 2018-06-18 PROCEDURE — 97530 THERAPEUTIC ACTIVITIES: CPT

## 2018-06-18 PROCEDURE — 36415 COLL VENOUS BLD VENIPUNCTURE: CPT

## 2018-06-18 PROCEDURE — 6370000000 HC RX 637 (ALT 250 FOR IP): Performed by: PSYCHIATRY & NEUROLOGY

## 2018-06-18 PROCEDURE — 97535 SELF CARE MNGMENT TRAINING: CPT

## 2018-06-18 PROCEDURE — 6370000000 HC RX 637 (ALT 250 FOR IP): Performed by: ORTHOPAEDIC SURGERY

## 2018-06-18 PROCEDURE — 82948 REAGENT STRIP/BLOOD GLUCOSE: CPT

## 2018-06-18 PROCEDURE — 6360000002 HC RX W HCPCS: Performed by: PSYCHIATRY & NEUROLOGY

## 2018-06-18 PROCEDURE — 97110 THERAPEUTIC EXERCISES: CPT

## 2018-06-18 PROCEDURE — 80053 COMPREHEN METABOLIC PANEL: CPT

## 2018-06-18 PROCEDURE — 99232 SBSQ HOSP IP/OBS MODERATE 35: CPT | Performed by: PSYCHIATRY & NEUROLOGY

## 2018-06-18 PROCEDURE — 1180000000 HC REHAB R&B

## 2018-06-18 RX ORDER — ONDANSETRON 4 MG/1
4 TABLET, FILM COATED ORAL EVERY 6 HOURS PRN
Status: DISCONTINUED | OUTPATIENT
Start: 2018-06-18 | End: 2018-06-28 | Stop reason: HOSPADM

## 2018-06-18 RX ADMIN — INSULIN LISPRO 4 UNITS: 100 INJECTION, SOLUTION INTRAVENOUS; SUBCUTANEOUS at 21:56

## 2018-06-18 RX ADMIN — HYDROCODONE BITARTRATE AND ACETAMINOPHEN 1 TABLET: 10; 325 TABLET ORAL at 20:56

## 2018-06-18 RX ADMIN — DOCUSATE SODIUM 100 MG: 100 CAPSULE, LIQUID FILLED ORAL at 20:56

## 2018-06-18 RX ADMIN — ASPIRIN 325 MG: 325 TABLET, DELAYED RELEASE ORAL at 20:56

## 2018-06-18 RX ADMIN — HYDROCODONE BITARTRATE AND ACETAMINOPHEN 1 TABLET: 10; 325 TABLET ORAL at 14:36

## 2018-06-18 RX ADMIN — ACETAMINOPHEN 650 MG: 325 TABLET ORAL at 10:09

## 2018-06-18 RX ADMIN — PREGABALIN 150 MG: 150 CAPSULE ORAL at 08:11

## 2018-06-18 RX ADMIN — ASPIRIN 325 MG: 325 TABLET, DELAYED RELEASE ORAL at 08:11

## 2018-06-18 RX ADMIN — HYDROCODONE BITARTRATE AND ACETAMINOPHEN 1 TABLET: 10; 325 TABLET ORAL at 06:22

## 2018-06-18 RX ADMIN — ONDANSETRON HYDROCHLORIDE 4 MG: 4 TABLET, FILM COATED ORAL at 20:56

## 2018-06-18 RX ADMIN — QUETIAPINE FUMARATE 200 MG: 100 TABLET ORAL at 20:56

## 2018-06-18 RX ADMIN — METOPROLOL SUCCINATE 50 MG: 50 TABLET, EXTENDED RELEASE ORAL at 08:11

## 2018-06-18 RX ADMIN — LAMOTRIGINE 50 MG: 25 TABLET ORAL at 08:11

## 2018-06-18 RX ADMIN — ATORVASTATIN CALCIUM 40 MG: 40 TABLET, FILM COATED ORAL at 08:10

## 2018-06-18 RX ADMIN — INSULIN LISPRO 4 UNITS: 100 INJECTION, SOLUTION INTRAVENOUS; SUBCUTANEOUS at 17:31

## 2018-06-18 RX ADMIN — DULOXETINE HYDROCHLORIDE 60 MG: 60 CAPSULE, DELAYED RELEASE ORAL at 08:10

## 2018-06-18 RX ADMIN — POLYETHYLENE GLYCOL 3350 17 G: 17 POWDER, FOR SOLUTION ORAL at 08:10

## 2018-06-18 RX ADMIN — ENOXAPARIN SODIUM 40 MG: 40 INJECTION SUBCUTANEOUS at 08:10

## 2018-06-18 RX ADMIN — INSULIN LISPRO 4 UNITS: 100 INJECTION, SOLUTION INTRAVENOUS; SUBCUTANEOUS at 12:54

## 2018-06-18 RX ADMIN — PANTOPRAZOLE SODIUM 40 MG: 40 TABLET, DELAYED RELEASE ORAL at 08:11

## 2018-06-18 RX ADMIN — FUROSEMIDE 40 MG: 40 TABLET ORAL at 08:11

## 2018-06-18 RX ADMIN — INSULIN LISPRO 4 UNITS: 100 INJECTION, SOLUTION INTRAVENOUS; SUBCUTANEOUS at 08:16

## 2018-06-18 RX ADMIN — LAMOTRIGINE 50 MG: 25 TABLET ORAL at 20:56

## 2018-06-18 RX ADMIN — VALSARTAN 160 MG: 160 TABLET ORAL at 20:55

## 2018-06-18 RX ADMIN — BISACODYL 10 MG: 10 SUPPOSITORY RECTAL at 13:23

## 2018-06-18 RX ADMIN — TIZANIDINE 4 MG: 4 TABLET ORAL at 20:56

## 2018-06-18 RX ADMIN — TIZANIDINE 4 MG: 4 TABLET ORAL at 08:15

## 2018-06-18 RX ADMIN — AMITRIPTYLINE HYDROCHLORIDE 150 MG: 75 TABLET, FILM COATED ORAL at 20:55

## 2018-06-18 RX ADMIN — FLUTICASONE PROPIONATE 1 SPRAY: 50 SPRAY, METERED NASAL at 08:10

## 2018-06-18 RX ADMIN — LEVOTHYROXINE SODIUM 88 MCG: 88 TABLET ORAL at 06:22

## 2018-06-18 RX ADMIN — PREGABALIN 150 MG: 150 CAPSULE ORAL at 20:56

## 2018-06-18 RX ADMIN — DOCUSATE SODIUM 100 MG: 100 CAPSULE, LIQUID FILLED ORAL at 08:10

## 2018-06-18 ASSESSMENT — PAIN SCALES - GENERAL
PAINLEVEL_OUTOF10: 3
PAINLEVEL_OUTOF10: 3
PAINLEVEL_OUTOF10: 10
PAINLEVEL_OUTOF10: 1
PAINLEVEL_OUTOF10: 7
PAINLEVEL_OUTOF10: 5
PAINLEVEL_OUTOF10: 7

## 2018-06-18 ASSESSMENT — PAIN DESCRIPTION - LOCATION
LOCATION: WRIST;ANKLE
LOCATION: FOOT;ANKLE
LOCATION: ANKLE
LOCATION: WRIST;ANKLE
LOCATION: WRIST;ANKLE

## 2018-06-18 ASSESSMENT — PAIN DESCRIPTION - DESCRIPTORS
DESCRIPTORS: ACHING
DESCRIPTORS: ACHING

## 2018-06-18 ASSESSMENT — PAIN DESCRIPTION - PROGRESSION
CLINICAL_PROGRESSION: NOT CHANGED
CLINICAL_PROGRESSION: NOT CHANGED

## 2018-06-18 ASSESSMENT — PAIN DESCRIPTION - PAIN TYPE
TYPE: ACUTE PAIN
TYPE: ACUTE PAIN

## 2018-06-18 ASSESSMENT — PAIN DESCRIPTION - ORIENTATION
ORIENTATION: RIGHT

## 2018-06-18 ASSESSMENT — PAIN DESCRIPTION - FREQUENCY
FREQUENCY: CONTINUOUS
FREQUENCY: CONTINUOUS

## 2018-06-18 ASSESSMENT — PAIN DESCRIPTION - ONSET: ONSET: ON-GOING

## 2018-06-18 NOTE — PLAN OF CARE
Problem: Falls - Risk of:  Goal: Will remain free from falls  Will remain free from falls   Outcome: Ongoing  Patient  remained free from falls this shift. Fall precautions in place. Bed alarm in use. Will continue to monitor. Goal: Absence of physical injury  Absence of physical injury   Outcome: Ongoing      Problem: Risk for Impaired Skin Integrity  Goal: Tissue integrity - skin and mucous membranes  Structural intactness and normal physiological function of skin and  mucous membranes. Outcome: Ongoing  Skin remains intact with no new skin breakdown noted this shift. Problem: Pain:  Goal: Pain level will decrease  Pain level will decrease   Outcome: Ongoing  Patient pain has remained under control with use of pain medicine (prn Norco) as ordered for pain control. Goal: Control of acute pain  Control of acute pain   Outcome: Ongoing    Goal: Control of chronic pain  Control of chronic pain   Outcome: Ongoing      Problem: Mood - Altered:  Goal: Mood stable  Mood stable  Outcome: Ongoing  Patient interacts with staff appropriately today. Use of diversion activities in order to decrease depression. Problem: Bleeding:  Goal: Will show no signs and symptoms of excessive bleeding  Will show no signs and symptoms of excessive bleeding  Outcome: Ongoing  No bleeding noted this shift. Will continue to monitor. Problem: Serum Glucose Level - Abnormal:  Goal: Ability to maintain appropriate glucose levels has stabilized  Ability to maintain appropriate glucose levels has stabilized  Outcome: Not Met This Shift  Glucose not within normal range prior to lunch requiring sliding scale Humalog insulin coverage. Problem: Anxiety:  Goal: Level of anxiety will decrease  Level of anxiety will decrease  Outcome: Ongoing   Maintained a calm environment. Avoided caffeine, sugar and other stimulants today.

## 2018-06-18 NOTE — PROGRESS NOTES
650 mg, Oral, Q4H PRN, Felipa Guerrero MD    aspirin EC tablet 325 mg, 325 mg, Oral, BID, Felipa Guerrero MD, 325 mg at 06/17/18 2125    docusate sodium (COLACE) capsule 100 mg, 100 mg, Oral, BID, Felipa Guerrero MD, 100 mg at 06/17/18 2125    ondansetron (ZOFRAN) injection 4 mg, 4 mg, Intravenous, Q6H PRN, Felipa Guerrero MD    amitriptyline (ELAVIL) tablet 150 mg, 150 mg, Oral, Nightly, Felipa Guerrero MD, 150 mg at 06/17/18 2126    furosemide (LASIX) tablet 40 mg, 40 mg, Oral, Daily, Felipa Guerrero MD, 40 mg at 06/17/18 0810    QUEtiapine (SEROQUEL) tablet 200 mg, 200 mg, Oral, Nightly, Felipa Guerrero MD, 200 mg at 06/17/18 2126    tiZANidine (ZANAFLEX) tablet 4 mg, 4 mg, Oral, Q6H PRN, Felipa Guerrero MD, 4 mg at 06/17/18 2129    enoxaparin (LOVENOX) injection 40 mg, 40 mg, Subcutaneous, Daily, Felipa Guerrero MD, 40 mg at 06/17/18 0810    atorvastatin (LIPITOR) tablet 40 mg, 40 mg, Oral, Daily, Felipa Guerrero MD, 40 mg at 06/17/18 0810    dextrose 5 % solution, 100 mL/hr, Intravenous, PRN, Felipa Guerrero MD    diazepam (VALIUM) tablet 5 mg, 5 mg, Oral, Nightly PRN, Felipa Guerrero MD    DULoxetine (CYMBALTA) extended release capsule 60 mg, 60 mg, Oral, Daily, Felipa Guerrero MD, 60 mg at 06/17/18 0809    fluticasone (FLONASE) 50 MCG/ACT nasal spray 1 spray, 1 spray, Each Nare, Daily, Felipa Guerrero MD, 1 spray at 06/17/18 0809    glucagon (rDNA) injection 1 mg, 1 mg, Intramuscular, PRN, Felipa Guerrero MD    glucose (GLUTOSE) 40 % oral gel 15 g, 15 g, Oral, PRN, Felipa Guerrero MD    insulin lispro (HUMALOG) injection vial 0-12 Units, 0-12 Units, Subcutaneous, TID WC, Felipa Guerrero MD, 4 Units at 06/17/18 1741    insulin lispro (HUMALOG) injection vial 0-6 Units, 0-6 Units, Subcutaneous, Nightly, Felipa Guerrero MD, 4 Units at 06/17/18 2126    lamoTRIgine (LAMICTAL) tablet 50 mg, 50 mg, Oral, BID, Felipa Guerrero MD, 50 mg at 06/17/18 2126    levothyroxine (SYNTHROID) tablet 88 mcg, 88 mcg, Oral, Daily, Felipa Guerrero MD, 88 mcg at 06/18/18 0622    linaclotide accessory muscles  Musculoskeletal  no significant wasting of muscles noted, no bony deformities Extremities-no clubbing, cyanosis or edema  Skin  warm, dry, and intact. No rash, erythema, or pallor. Psychiatric  mood, affect, and behavior appear normal.      Neurology  NEUROLOGICAL EXAM:      Mental status   Awake, alert, fluent oriented appropriate affect  Attention and concentration appear appropriate  Recent and remote memory appears unremarkable  Speech normal without dysarthria  No clear issues with language       Cranial Nerves     CN III, IV,VI-EOMI, No nystagmus, conjugate eye movements, no ptosis    CN VII-no facial assymetry         Motor function  Antigravity x 4 limited RLE     Sensory function      Cerebellar      Tremor None present     Gait                  Not tested           Nursing/pcp notes, imaging,labs and vitals reviewed. PT,OT and/or speech notes reviewed    Lab Results   Component Value Date    WBC 8.8 06/18/2018    HGB 9.5 (L) 06/18/2018    HCT 30.5 (L) 06/18/2018    MCV 97.4 06/18/2018     06/18/2018     Lab Results   Component Value Date     06/18/2018    K 4.5 06/18/2018    CL 94 (L) 06/18/2018    CO2 27 06/18/2018    BUN 26 (H) 06/18/2018    CREATININE 1.5 (H) 06/18/2018    GLUCOSE 217 (H) 06/18/2018    CALCIUM 8.7 06/18/2018    PROT 6.4 (L) 06/18/2018    LABALBU 3.0 (L) 06/18/2018    BILITOT 0.3 06/18/2018    ALKPHOS 133 (H) 06/18/2018    AST 29 06/18/2018    ALT 14 06/18/2018    LABGLOM 36 (A) 06/18/2018    GLOB 3.4 04/25/2017     Lab Results   Component Value Date    INR 1.00 06/12/2018    INR 1.03 06/23/2017    INR 1.04 06/05/2017    PROTIME 13.1 06/12/2018    PROTIME 13.4 06/23/2017    PROTIME 13.5 06/05/2017         06/15/18 1300   Transfers   Sit to Stand Moderate Assistance  (PULL TO STAND )   Bed to Chair Maximum assistance; Moderate assistance   Other exercises   Other exercises?  Yes   Other exercises 1 SITTING BILAT LE HIP FLEX/EXT, ABD/ADD; KNEE EXT;

## 2018-06-18 NOTE — PATIENT CARE CONFERENCE
details. NURSING    FIMS:  Bladder  Level of Assistance: Bladder Level of Assistance: 4- Requires Minimal assistance to manage or place device, patient performs 75% or more of the bladder management tasks  Frequency of Accidents: Bladder Frequency of Accidents: 6 - No accidents,uses device like urinal, bedpan, absorbant pad, or requires medication to manage bladder    Bowel  Level of Assistance: Bowel Level of Assistance: 4- Requires Minimal assistance to manage or place device, helper inserts suppository without digital stimulation, patient performs 75% or more of the bowel management tasks  Frequency of Accidents: Bowel Frequency of Accidents: 6 - No accidents, uses device like bedpan, diaper, bedside commode colostomy, or requires medication to manage bowels    Wounds/Incisions/Ulcers: YAMINI, SPLINT RLE  Armani Scale Score: 18    Pain: Patient's pain is currently controlled with norco PRN    Consultations/Labs/X-rays: Dr. Walker Jacobs    Family Education: Family available and participating in education     Fall Risk:  Blanco Score: 36    Fall in the last week? none      Other Nursing Issues: Alert and oriented x4, cont of bowel and bladder, meds whole with water, assist x1, NWB RLE, brace to right hand,  BM 18, Lovenox therapy, accu chek QID,         SOCIAL WORK/CASE MANAGEMENT  Assessment: Cooperative, participates and indicates she is learning a lot with rehabilitation. She has many home medications and indicates that may have contributed to her fall. Discharge Plan   Estimated Length of Stay: 6/28/18  Destination: home health    Pass: No    Services at Discharge: [address], Occupational Therapy and Nursing per evaluation    Equipment at Discharge: wheelchair and another other equipment recommended    Progress made in the prior week:  1. EVAL 6/15/2018; pt now completes toilet transfers, requiring Min A.   2.  3.  4.  5.      Goals for following week:  1.  Pt will complete clothing management/hygiene for toileting, requiring Mod A.   2.   3.   4.   5.     Factors facilitating achievement of predicted outcomes: Cooperative    Barriers to the achievement of predicted outcomes: need to find wheelchair adapatation- if wheelchair lower have more difficulty sit-stand vs height for transfers    Team Members Present at Conference:  : Damon Mcdonald   Occupational Therapist: Darline Dempsey, OTR/L  Physical Therapist: Leon Kim PT,DPT  Speech Therapist: N/A  Nurse: Neal Fletcher LPN   Nurse Manager:    Dietitian:  Khadra Wong MS, RD, LD  Rehab Director:  Tomas Brock approve the established interdisciplinary plan of care as documented within the medical record of [facility].

## 2018-06-18 NOTE — PROGRESS NOTES
06/18/18 1104   General   Response To Previous Treatment --    Family / Caregiver Present --    Subjective   Subjective --    Pain Screening   Patient Currently in Pain --    Pain Assessment   Pain Assessment --    Pain Level --    Pain Type --    Pain Location --    Pain Orientation --    Pain Descriptors --    Pain Frequency --    Pain Onset --    Clinical Progression --    Patient's Stated Pain Goal --    Pain Intervention(s) --    Response to Pain Intervention --    Multiple Pain Sites --    Vital Signs   Level of Consciousness --    Pre Treatment Pain Screening   Pain at present --    Scale Used --    Intervention List --    Orientation   Overall Orientation Status --    Bed Mobility   Rolling --    Supine to Sit --    Sit to Supine --    Scooting --    Transfers   Sit to Stand --    Stand to sit --    Bed to Chair --    Stand Pivot Transfers --    Ambulation   Ambulation? No   Stairs/Curb   Stairs? No   Wheelchair Activities   Wheelchair Parts Management No   Propulsion No   Exercises   Comments Supine Vasquez LE ther ex x 20 reps   Conditions Requiring Skilled Therapeutic Intervention   Body structures, Functions, Activity limitations Decreased functional mobility ; Decreased ADL status; Decreased strength;Decreased endurance;Decreased balance   Assessment Practiced Stand-Pivot TF's from bed to [facility] and back. Pt improved each time. Practiced 4 times. Pt tolerated supine ther ex well w/ minimal fatigue.      Prognosis Good   Activity Tolerance   Activity Tolerance Patient Tolerated treatment well   Electronically signed by Zohra Harmon PTA on 6/18/2018 at 11:07 AM

## 2018-06-18 NOTE — PROGRESS NOTES
Occupational Therapy  Facility/Department: [facility] 8 REHAB UNIT  Daily Treatment Note  NAME: Janet Jauregui  : 1960  MRN: [id number]    Date of Service: 2018    Discharge Recommendations:  IP Rehab       Patient Diagnosis(es): There were no encounter diagnoses. has a past medical history of Anxiety; Arthritis; Bipolar 1 disorder ([facility] [address] CAD (coronary artery disease); CHF (congestive heart failure) ([facility] Chronic kidney disease (CKD) stage G3b/A2, moderately decreased glomerular filtration rate (GFR) between 30-44 mL/min/1.73 square meter and albuminuria creatinine ratio between  mg/g; Depression; DM (diabetes mellitus) ([facility] GERD (gastroesophageal reflux disease); History of blood transfusion; History of suicidal ideation; Hyperlipidemia; Hypertension; Hypothyroidism; Insomnia; Kidney disease; MI, old; Obesity; Polyarticular arthritis; and Type II or unspecified type diabetes mellitus without mention of complication, not stated as uncontrolled. has a past surgical history that includes angioplasty (05); Gastric bypass surgery;  section; Cholecystectomy; Carpal tunnel release; Intracapsular cataract extraction (Left, 2016); Cardiac catheterization (05, 05); Cardiac catheterization (3/23/15  [facility]); hernia repair; Abdominoplasty; Breast reduction surgery; Total knee arthroplasty (Left, 2017); vira and bso (cervix removed) (); open treatment bimalleolar ankle fracture (Right, 2018); and open treatment bimalleolar ankle fracture (Right, 2018).     Restrictions  Restrictions/Precautions  Restrictions/Precautions: Fall Risk, Weight Bearing  Required Braces or Orthoses?: Yes  Lower Extremity Weight Bearing Restrictions  Right Lower Extremity Weight Bearing: Non Weight Bearing  Left Lower Extremity Weight Bearing: Weight Bearing As Tolerated  Upper Extremity Weight Bearing Restrictions  Right Upper Extremity Weight Bearing: Non Weight Bearing  Left Upper

## 2018-06-19 LAB
ANION GAP SERPL CALCULATED.3IONS-SCNC: 13 MMOL/L (ref 7–19)
BUN BLDV-MCNC: 25 MG/DL (ref 6–20)
CALCIUM SERPL-MCNC: 9 MG/DL (ref 8.6–10)
CHLORIDE BLD-SCNC: 98 MMOL/L (ref 98–111)
CO2: 27 MMOL/L (ref 22–29)
CREAT SERPL-MCNC: 1.4 MG/DL (ref 0.5–0.9)
GFR NON-AFRICAN AMERICAN: 39
GLUCOSE BLD-MCNC: 163 MG/DL (ref 70–99)
GLUCOSE BLD-MCNC: 223 MG/DL (ref 70–99)
GLUCOSE BLD-MCNC: 228 MG/DL (ref 74–109)
GLUCOSE BLD-MCNC: 245 MG/DL (ref 70–99)
GLUCOSE BLD-MCNC: 263 MG/DL (ref 70–99)
PERFORMED ON: ABNORMAL
POTASSIUM SERPL-SCNC: 4.8 MMOL/L (ref 3.5–5)
SODIUM BLD-SCNC: 138 MMOL/L (ref 136–145)

## 2018-06-19 PROCEDURE — 97535 SELF CARE MNGMENT TRAINING: CPT

## 2018-06-19 PROCEDURE — 36415 COLL VENOUS BLD VENIPUNCTURE: CPT

## 2018-06-19 PROCEDURE — 82948 REAGENT STRIP/BLOOD GLUCOSE: CPT

## 2018-06-19 PROCEDURE — 99233 SBSQ HOSP IP/OBS HIGH 50: CPT | Performed by: PSYCHIATRY & NEUROLOGY

## 2018-06-19 PROCEDURE — 1180000000 HC REHAB R&B

## 2018-06-19 PROCEDURE — 6360000002 HC RX W HCPCS: Performed by: ORTHOPAEDIC SURGERY

## 2018-06-19 PROCEDURE — 97110 THERAPEUTIC EXERCISES: CPT

## 2018-06-19 PROCEDURE — 6370000000 HC RX 637 (ALT 250 FOR IP): Performed by: PSYCHIATRY & NEUROLOGY

## 2018-06-19 PROCEDURE — 80048 BASIC METABOLIC PNL TOTAL CA: CPT

## 2018-06-19 PROCEDURE — 97530 THERAPEUTIC ACTIVITIES: CPT

## 2018-06-19 PROCEDURE — 6370000000 HC RX 637 (ALT 250 FOR IP): Performed by: ORTHOPAEDIC SURGERY

## 2018-06-19 RX ORDER — INSULIN GLARGINE 100 [IU]/ML
10 INJECTION, SOLUTION SUBCUTANEOUS NIGHTLY
Status: DISCONTINUED | OUTPATIENT
Start: 2018-06-19 | End: 2018-06-28 | Stop reason: HOSPADM

## 2018-06-19 RX ADMIN — DOCUSATE SODIUM 100 MG: 100 CAPSULE, LIQUID FILLED ORAL at 07:41

## 2018-06-19 RX ADMIN — FUROSEMIDE 40 MG: 40 TABLET ORAL at 07:43

## 2018-06-19 RX ADMIN — AMITRIPTYLINE HYDROCHLORIDE 150 MG: 75 TABLET, FILM COATED ORAL at 19:54

## 2018-06-19 RX ADMIN — DULOXETINE HYDROCHLORIDE 60 MG: 60 CAPSULE, DELAYED RELEASE ORAL at 07:44

## 2018-06-19 RX ADMIN — HYDROCODONE BITARTRATE AND ACETAMINOPHEN 1 TABLET: 10; 325 TABLET ORAL at 19:54

## 2018-06-19 RX ADMIN — VALSARTAN 160 MG: 160 TABLET ORAL at 19:53

## 2018-06-19 RX ADMIN — TIZANIDINE 4 MG: 4 TABLET ORAL at 07:41

## 2018-06-19 RX ADMIN — DOCUSATE SODIUM 100 MG: 100 CAPSULE, LIQUID FILLED ORAL at 19:54

## 2018-06-19 RX ADMIN — DIAZEPAM 5 MG: 5 TABLET ORAL at 19:54

## 2018-06-19 RX ADMIN — ENOXAPARIN SODIUM 40 MG: 40 INJECTION SUBCUTANEOUS at 07:42

## 2018-06-19 RX ADMIN — LAMOTRIGINE 50 MG: 25 TABLET ORAL at 07:42

## 2018-06-19 RX ADMIN — HYDROCODONE BITARTRATE AND ACETAMINOPHEN 1 TABLET: 10; 325 TABLET ORAL at 12:51

## 2018-06-19 RX ADMIN — PANTOPRAZOLE SODIUM 40 MG: 40 TABLET, DELAYED RELEASE ORAL at 07:43

## 2018-06-19 RX ADMIN — HYDROCODONE BITARTRATE AND ACETAMINOPHEN 1 TABLET: 10; 325 TABLET ORAL at 07:42

## 2018-06-19 RX ADMIN — INSULIN LISPRO 2 UNITS: 100 INJECTION, SOLUTION INTRAVENOUS; SUBCUTANEOUS at 17:48

## 2018-06-19 RX ADMIN — POLYETHYLENE GLYCOL 3350 17 G: 17 POWDER, FOR SOLUTION ORAL at 07:43

## 2018-06-19 RX ADMIN — INSULIN LISPRO 3 UNITS: 100 INJECTION, SOLUTION INTRAVENOUS; SUBCUTANEOUS at 22:07

## 2018-06-19 RX ADMIN — FLUTICASONE PROPIONATE 1 SPRAY: 50 SPRAY, METERED NASAL at 07:44

## 2018-06-19 RX ADMIN — ASPIRIN 325 MG: 325 TABLET, DELAYED RELEASE ORAL at 07:43

## 2018-06-19 RX ADMIN — METOPROLOL SUCCINATE 50 MG: 50 TABLET, EXTENDED RELEASE ORAL at 07:43

## 2018-06-19 RX ADMIN — PREGABALIN 150 MG: 150 CAPSULE ORAL at 19:53

## 2018-06-19 RX ADMIN — INSULIN LISPRO 4 UNITS: 100 INJECTION, SOLUTION INTRAVENOUS; SUBCUTANEOUS at 07:41

## 2018-06-19 RX ADMIN — LEVOTHYROXINE SODIUM 88 MCG: 88 TABLET ORAL at 06:23

## 2018-06-19 RX ADMIN — QUETIAPINE FUMARATE 200 MG: 100 TABLET ORAL at 19:54

## 2018-06-19 RX ADMIN — ASPIRIN 325 MG: 325 TABLET, DELAYED RELEASE ORAL at 19:54

## 2018-06-19 RX ADMIN — PREGABALIN 150 MG: 150 CAPSULE ORAL at 07:44

## 2018-06-19 RX ADMIN — INSULIN LISPRO 4 UNITS: 100 INJECTION, SOLUTION INTRAVENOUS; SUBCUTANEOUS at 12:18

## 2018-06-19 RX ADMIN — INSULIN GLARGINE 10 UNITS: 100 INJECTION, SOLUTION SUBCUTANEOUS at 22:07

## 2018-06-19 RX ADMIN — ATORVASTATIN CALCIUM 40 MG: 40 TABLET, FILM COATED ORAL at 07:43

## 2018-06-19 RX ADMIN — LAMOTRIGINE 50 MG: 25 TABLET ORAL at 19:54

## 2018-06-19 ASSESSMENT — PAIN DESCRIPTION - ORIENTATION
ORIENTATION: RIGHT

## 2018-06-19 ASSESSMENT — PAIN DESCRIPTION - DESCRIPTORS
DESCRIPTORS: ACHING

## 2018-06-19 ASSESSMENT — PAIN DESCRIPTION - FREQUENCY
FREQUENCY: CONTINUOUS

## 2018-06-19 ASSESSMENT — PAIN DESCRIPTION - PAIN TYPE
TYPE: ACUTE PAIN

## 2018-06-19 ASSESSMENT — PAIN SCALES - GENERAL
PAINLEVEL_OUTOF10: 7
PAINLEVEL_OUTOF10: 4
PAINLEVEL_OUTOF10: 8
PAINLEVEL_OUTOF10: 5
PAINLEVEL_OUTOF10: 6
PAINLEVEL_OUTOF10: 9
PAINLEVEL_OUTOF10: 3
PAINLEVEL_OUTOF10: 7
PAINLEVEL_OUTOF10: 6

## 2018-06-19 ASSESSMENT — PAIN DESCRIPTION - LOCATION
LOCATION: ANKLE

## 2018-06-19 ASSESSMENT — PAIN DESCRIPTION - PROGRESSION
CLINICAL_PROGRESSION: GRADUALLY IMPROVING
CLINICAL_PROGRESSION: GRADUALLY IMPROVING
CLINICAL_PROGRESSION: NOT CHANGED

## 2018-06-19 ASSESSMENT — PAIN DESCRIPTION - ONSET
ONSET: ON-GOING
ONSET: ON-GOING

## 2018-06-19 NOTE — PLAN OF CARE
Pt called regarding cast was hurting her leg. Pt's cast was evaluated and adjusted. Pt has f/u appt in clinic next week. Her current cast will be removed and a new one applied at that time.  CHERELLE

## 2018-06-19 NOTE — PROGRESS NOTES
06/19/18 0815   Restrictions/Precautions   Restrictions/Precautions Fall Risk;Weight Bearing   Lower Extremity Weight Bearing Restrictions   Right Lower Extremity Weight Bearing Non Weight Bearing   Upper Extremity Weight Bearing Restrictions   Right Upper Extremity Weight Bearing Non Weight Bearing   Required Braces or Orthoses   Right Upper Extremity Brace/Splint radial fx splint   General   Diagnosis R ankle ORIF revision from fall ( initial fx 6/11), Distal right radial fx   Subjective   Subjective Pt states she sent her w/c back when she had her additional surgery. Has been borrowing a standard BSC. Balance   Standing Balance Contact guard assistance   Standing Balance   Time brief   Sit to stand Contact guard assistance   Comment to GB   Functional Mobility   Functional - Mobility Device Wheelchair   Activity Other   Assist Level Stand by assistance   Functional Mobility Comments able to self propel on level surface   Bed mobility   Supine to Sit Stand by assistance   Comment instructed in hospital bed features   Wheelchair Bed Transfers   Wheelchair/Bed - Technique Stand pivot   Equipment Used Bed; Wheelchair   Wheelchair Transfers Comments moves quickly, rec firther addressing SB if not able to slow down pivot d/t increased chance of LOB   Type of ROM/Therapeutic Exercise   Type of ROM/Therapeutic Exercise Herrera   Comment 15#, modified R for distal NWB   Assessment   Performance deficits / Impairments Decreased functional mobility ; Decreased ADL status; Decreased balance;Decreased safe awareness;Decreased endurance;Decreased high-level IADLs   Patient Education transfer techs, safety, hospital bed features for supine <-> sit   Timed Code Treatment Minutes 45 Minutes   Activity Tolerance   Activity Tolerance Patient Tolerated treatment well   Safety Devices   Safety Devices in place Yes   Type of devices Call light within reach; Chair alarm in place

## 2018-06-19 NOTE — PROGRESS NOTES
Occupational Therapy     06/19/18 1345   Pain Assessment   Patient Currently in Pain Yes   Pain Assessment 0-10   Pain Level 7   Pain Type Acute pain   Pain Location Ankle   Pain Orientation Right   Pain Descriptors Aching   Pain Frequency Continuous   Clinical Progression Not changed   Pain Intervention(s) Medication (see eMar);Repositioned   Response to Pain Intervention Patient Satisfied   ADL   UE Dressing Supervision;Setup   LE Dressing Stand by assistance  (Shoe only. Used AE PRN. Pt. able to flex LLE up to adjust shoe.)   Toileting Moderate assistance   Functional Mobility   Functional - Mobility Device Wheelchair   Activity Other   Assist Level Stand by assistance   Functional Mobility Comments Room 815>OT gym. Toilet Transfers   Toilet - Technique Sit pivot   Equipment Used Extra wide bedside commode   Toilet Transfer Minimal assistance   Toilet Transfers Comments To/from w/c. Cues to maintain WB precautions on RUE. Short term goals   Short term goal 1 MET   Assessment   Performance deficits / Impairments Decreased functional mobility ; Decreased ADL status; Decreased balance;Decreased safe awareness;Decreased endurance;Decreased high-level IADLs   Treatment Diagnosis R ankle revision, R wrist fx   Prognosis Good   Patient Education UB dressing tech, donning/doffing shoes with AE PRN. Timed Code Treatment Minutes 45 Minutes   Activity Tolerance   Activity Tolerance Patient Tolerated treatment well   Safety Devices   Safety Devices in place Yes   Type of devices Call light within reach; Chair alarm in place; Left in chair   Plan   Specific instructions for Next Treatment LB dressing with AE PRN. Current Treatment Recommendations Patient/Caregiver Education & Training;Home Management Training;Equipment Evaluation, Education, & procurement;Self-Care / ADL; Safety Education & Training; Endurance Training;Functional Mobility Training;Balance Training

## 2018-06-19 NOTE — PROGRESS NOTES
Patient:   María Abdullahi  MR#:    [id number]   Room:    0815/815-01   YOB: 1960  Date of Progress Note: 6/19/2018  Time of Note                           8:14 AM  Consulting Physician:   Peyton Coronado M.D. Attending Physician:  Peyton Coronado MD     Chief complaint Status post fracture of the right lower leg and right radius     S:This 63 y.o. female  who had an ORIF of a right closed displaced bimalleolar ankle fracture, as well as closed treatment of a two-part extra-articular distal radius fracture by Dr. Fartun Matamoros as outpatient on 6/11/18. She was discharged home. On 6/12/18 she presented to [facility] ER after having a fall at home while trying to do a pivot transfer. She had immediate onset of pain and bleeding from her surgical site on her ankle. X-rays revealed her to have an open fracture dislocation of her right ankle with displacment of hardware screws. She was admitted by Dr. Fartun Matamoros for I&D of her open wound and revision ORIF. She tolerated the procedure well. She will be non-weight bearing on her right LE, as well as her right UE. She has had post op anemia but hasn't yet required transfusion. She continues to have signifigant pain rating 8/10. She is participating in both PT/OT. She is felt to need a stay on Rehab to work on safe technique for transfers and wheelchair level ADLs.  She is now felt ready to start the Rehab program. No new issues    REVIEW OF SYSTEMS:  Constitutional: No fevers No chills  Neck:No stiffness  Respiratory: No shortness of breath  Cardiovascular: No chest pain No palpitations  Gastrointestinal: No abdominal pain    Genitourinary: No Dysuria  Neurological: No headache, no confusion    Past Medical History:      Diagnosis Date    Anxiety     Arthritis     Bipolar 1 disorder ([facility] [address]     CAD (coronary artery disease)     CHF (congestive heart failure) (HCC)     Chronic kidney disease (CKD) stage G3b/A2, moderately decreased glomerular filtration rate (GFR) between 30-44 mL/min/1.73 square meter and albuminuria creatinine ratio between  mg/g 2016    Depression     DM (diabetes mellitus) ([place] [address]     GERD (gastroesophageal reflux disease)     History of blood transfusion     History of suicidal ideation     Hyperlipidemia     Hypertension     Hypothyroidism     Insomnia     Kidney disease     stage 3 failure    MI, old 2005    Obesity     Polyarticular arthritis     Type II or unspecified type diabetes mellitus without mention of complication, not stated as uncontrolled        Past Surgical History:      Procedure Laterality Date    ABDOMINOPLASTY      ANGIOPLASTY  05    stent placement to LAD and diagonal with normal left vent function    BREAST REDUCTION SURGERY      CARDIAC CATHETERIZATION  05, 05    see report  Stents x3    CARDIAC CATHETERIZATION  3/23/15  JDT    EF 50%    CARPAL TUNNEL RELEASE       SECTION      x2    CHOLECYSTECTOMY      GASTRIC BYPASS SURGERY      HERNIA REPAIR      umbilical with mesh    INTRACAPSULAR CATARACT EXTRACTION Left 2016    LT EYE CATARACT EMULSIFICATION IOL IMPLANT performed by Zoya Aceves MD at [address] [place] [address] Right 2018    ORIF RIGHT BIMALLEOLAR ANKLE FRACTURE, RIGHT WRIST CAST REMOVAL performed by Lisa Fried MD at [address] [place] [address] Right 2018    ANKLE OPEN REDUCTION INTERNAL FIXATION performed by Lisa Fried MD at [place] [address] Left 2017    KNEE TOTAL ARTHROPLASTY performed by Reyna Burgos DO at [address] [place] [address] OR       Medications in Hospital:      Current Facility-Administered Medications:     ondansetron (ZOFRAN) tablet 4 mg, 4 mg, Oral, Q6H PRN, King Bell MD, 4 mg at 18    valsartan (DIOVAN) tablet 160 mg, 160 mg, Oral, Nightly, King Bell MD, 160 mg at 18    polyethylene glycol (GLYCOLAX) packet 17 g, 17 g, Oral, Daily, Adriane Loyd MD, 17 g at 06/19/18 0743    acetaminophen (TYLENOL) tablet 650 mg, 650 mg, Oral, Q4H PRN, Sahra Garcia MD, 650 mg at 06/18/18 1009    aspirin EC tablet 325 mg, 325 mg, Oral, BID, Sahra Garcia MD, 325 mg at 06/19/18 0743    docusate sodium (COLACE) capsule 100 mg, 100 mg, Oral, BID, Sahra Garcia MD, 100 mg at 06/19/18 0741    amitriptyline (ELAVIL) tablet 150 mg, 150 mg, Oral, Nightly, Sahra Garcia MD, 150 mg at 06/18/18 2055    furosemide (LASIX) tablet 40 mg, 40 mg, Oral, Daily, Sahra Garcia MD, 40 mg at 06/19/18 0743    QUEtiapine (SEROQUEL) tablet 200 mg, 200 mg, Oral, Nightly, Sahra Garcia MD, 200 mg at 06/18/18 2056    tiZANidine (ZANAFLEX) tablet 4 mg, 4 mg, Oral, Q6H PRN, Sahra Garcia MD, 4 mg at 06/19/18 0741    enoxaparin (LOVENOX) injection 40 mg, 40 mg, Subcutaneous, Daily, Sahra Garcia MD, 40 mg at 06/19/18 0742    atorvastatin (LIPITOR) tablet 40 mg, 40 mg, Oral, Daily, Sahra Garcia MD, 40 mg at 06/19/18 0743    dextrose 5 % solution, 100 mL/hr, Intravenous, PRN, Sahra Garcia MD    diazepam (VALIUM) tablet 5 mg, 5 mg, Oral, Nightly PRN, Sahra Garcia MD    DULoxetine (CYMBALTA) extended release capsule 60 mg, 60 mg, Oral, Daily, Sahra Garcia MD, 60 mg at 06/19/18 0744    fluticasone (FLONASE) 50 MCG/ACT nasal spray 1 spray, 1 spray, Each Nare, Daily, Sahra Garcia MD, 1 spray at 06/19/18 0744    glucagon (rDNA) injection 1 mg, 1 mg, Intramuscular, PRN, Sahra Garcia MD    glucose (GLUTOSE) 40 % oral gel 15 g, 15 g, Oral, PRN, Sahra Garcia MD    insulin lispro (HUMALOG) injection vial 0-12 Units, 0-12 Units, Subcutaneous, TID WC, Sahra Garcia MD, 4 Units at 06/19/18 0741    insulin lispro (HUMALOG) injection vial 0-6 Units, 0-6 Units, Subcutaneous, Nightly, Sahra Garcia MD, 4 Units at 06/18/18 2156    lamoTRIgine (LAMICTAL) tablet 50 mg, 50 mg, Oral, BID, Sahra Garcia MD, 50 mg at 06/19/18 7975    levothyroxine (SYNTHROID) tablet 88 mcg, 88 mcg, Oral, Daily, Sahra Garcia MD,

## 2018-06-19 NOTE — PLAN OF CARE
Problem: Serum Glucose Level - Abnormal:  Goal: Ability to maintain appropriate glucose levels has stabilized  Ability to maintain appropriate glucose levels has stabilized   Outcome: Not Met This Shift  Blood sugar not within therapeutic range prior to lunch requiring sliding scale Humalog insulin coverage.

## 2018-06-19 NOTE — PLAN OF CARE
Problem: Falls - Risk of:  Goal: Will remain free from falls  Will remain free from falls   Outcome: Ongoing  Bed alarm set, call light within reach, side rails up times two    Problem: Mood - Altered:  Goal: Mood stable  Mood stable   Outcome: Ongoing  Patients mood stable this shift    Problem: Bleeding:  Goal: Will show no signs and symptoms of excessive bleeding  Will show no signs and symptoms of excessive bleeding   Outcome: Ongoing  Continue to monitor signs and symptoms of bleeding due to anticoagulant. Problem: Serum Glucose Level - Abnormal:  Goal: Ability to maintain appropriate glucose levels has stabilized  Ability to maintain appropriate glucose levels has stabilized   Outcome: Ongoing      Problem: Anxiety:  Goal: Level of anxiety will decrease  Level of anxiety will decrease   Outcome: Ongoing  No signs of anxiety this shift.

## 2018-06-20 LAB
ANION GAP SERPL CALCULATED.3IONS-SCNC: 14 MMOL/L (ref 7–19)
BUN BLDV-MCNC: 28 MG/DL (ref 6–20)
CALCIUM SERPL-MCNC: 9 MG/DL (ref 8.6–10)
CHLORIDE BLD-SCNC: 94 MMOL/L (ref 98–111)
CO2: 25 MMOL/L (ref 22–29)
CREAT SERPL-MCNC: 1.6 MG/DL (ref 0.5–0.9)
GFR NON-AFRICAN AMERICAN: 33
GLUCOSE BLD-MCNC: 213 MG/DL (ref 70–99)
GLUCOSE BLD-MCNC: 216 MG/DL (ref 70–99)
GLUCOSE BLD-MCNC: 228 MG/DL (ref 74–109)
GLUCOSE BLD-MCNC: 235 MG/DL (ref 70–99)
GLUCOSE BLD-MCNC: 256 MG/DL (ref 70–99)
PERFORMED ON: ABNORMAL
POTASSIUM SERPL-SCNC: 4.4 MMOL/L (ref 3.5–5)
SODIUM BLD-SCNC: 133 MMOL/L (ref 136–145)

## 2018-06-20 PROCEDURE — 82948 REAGENT STRIP/BLOOD GLUCOSE: CPT

## 2018-06-20 PROCEDURE — 6360000002 HC RX W HCPCS: Performed by: ORTHOPAEDIC SURGERY

## 2018-06-20 PROCEDURE — 97110 THERAPEUTIC EXERCISES: CPT

## 2018-06-20 PROCEDURE — 1180000000 HC REHAB R&B

## 2018-06-20 PROCEDURE — 36415 COLL VENOUS BLD VENIPUNCTURE: CPT

## 2018-06-20 PROCEDURE — 99232 SBSQ HOSP IP/OBS MODERATE 35: CPT | Performed by: PSYCHIATRY & NEUROLOGY

## 2018-06-20 PROCEDURE — 80048 BASIC METABOLIC PNL TOTAL CA: CPT

## 2018-06-20 PROCEDURE — 6370000000 HC RX 637 (ALT 250 FOR IP): Performed by: PSYCHIATRY & NEUROLOGY

## 2018-06-20 PROCEDURE — 97530 THERAPEUTIC ACTIVITIES: CPT

## 2018-06-20 PROCEDURE — 97535 SELF CARE MNGMENT TRAINING: CPT

## 2018-06-20 PROCEDURE — 6370000000 HC RX 637 (ALT 250 FOR IP): Performed by: ORTHOPAEDIC SURGERY

## 2018-06-20 RX ORDER — FLUCONAZOLE 100 MG/1
100 TABLET ORAL DAILY
Status: COMPLETED | OUTPATIENT
Start: 2018-06-20 | End: 2018-06-22

## 2018-06-20 RX ADMIN — HYDROCODONE BITARTRATE AND ACETAMINOPHEN 1 TABLET: 10; 325 TABLET ORAL at 08:42

## 2018-06-20 RX ADMIN — INSULIN LISPRO 4 UNITS: 100 INJECTION, SOLUTION INTRAVENOUS; SUBCUTANEOUS at 18:06

## 2018-06-20 RX ADMIN — POLYETHYLENE GLYCOL 3350 17 G: 17 POWDER, FOR SOLUTION ORAL at 08:28

## 2018-06-20 RX ADMIN — PANTOPRAZOLE SODIUM 40 MG: 40 TABLET, DELAYED RELEASE ORAL at 08:27

## 2018-06-20 RX ADMIN — AMITRIPTYLINE HYDROCHLORIDE 150 MG: 75 TABLET, FILM COATED ORAL at 20:58

## 2018-06-20 RX ADMIN — LEVOTHYROXINE SODIUM 88 MCG: 88 TABLET ORAL at 06:12

## 2018-06-20 RX ADMIN — INSULIN GLARGINE 10 UNITS: 100 INJECTION, SOLUTION SUBCUTANEOUS at 20:59

## 2018-06-20 RX ADMIN — ATORVASTATIN CALCIUM 40 MG: 40 TABLET, FILM COATED ORAL at 08:27

## 2018-06-20 RX ADMIN — LAMOTRIGINE 50 MG: 25 TABLET ORAL at 08:27

## 2018-06-20 RX ADMIN — HYDROCODONE BITARTRATE AND ACETAMINOPHEN 1 TABLET: 10; 325 TABLET ORAL at 18:06

## 2018-06-20 RX ADMIN — DOCUSATE SODIUM 100 MG: 100 CAPSULE, LIQUID FILLED ORAL at 08:27

## 2018-06-20 RX ADMIN — FLUCONAZOLE 100 MG: 100 TABLET ORAL at 08:27

## 2018-06-20 RX ADMIN — ASPIRIN 325 MG: 325 TABLET, DELAYED RELEASE ORAL at 20:58

## 2018-06-20 RX ADMIN — FLUTICASONE PROPIONATE 1 SPRAY: 50 SPRAY, METERED NASAL at 08:26

## 2018-06-20 RX ADMIN — LAMOTRIGINE 50 MG: 25 TABLET ORAL at 20:58

## 2018-06-20 RX ADMIN — ASPIRIN 325 MG: 325 TABLET, DELAYED RELEASE ORAL at 08:27

## 2018-06-20 RX ADMIN — PREGABALIN 150 MG: 150 CAPSULE ORAL at 08:28

## 2018-06-20 RX ADMIN — DULOXETINE HYDROCHLORIDE 60 MG: 60 CAPSULE, DELAYED RELEASE ORAL at 08:27

## 2018-06-20 RX ADMIN — PREGABALIN 150 MG: 150 CAPSULE ORAL at 20:58

## 2018-06-20 RX ADMIN — INSULIN LISPRO 4 UNITS: 100 INJECTION, SOLUTION INTRAVENOUS; SUBCUTANEOUS at 08:34

## 2018-06-20 RX ADMIN — TIZANIDINE 4 MG: 4 TABLET ORAL at 11:26

## 2018-06-20 RX ADMIN — VALSARTAN 160 MG: 160 TABLET ORAL at 20:58

## 2018-06-20 RX ADMIN — HYDROCODONE BITARTRATE AND ACETAMINOPHEN 1 TABLET: 10; 325 TABLET ORAL at 03:56

## 2018-06-20 RX ADMIN — METOPROLOL SUCCINATE 75 MG: 50 TABLET, EXTENDED RELEASE ORAL at 08:33

## 2018-06-20 RX ADMIN — FUROSEMIDE 40 MG: 40 TABLET ORAL at 08:27

## 2018-06-20 RX ADMIN — INSULIN LISPRO 6 UNITS: 100 INJECTION, SOLUTION INTRAVENOUS; SUBCUTANEOUS at 12:59

## 2018-06-20 RX ADMIN — TIZANIDINE 4 MG: 4 TABLET ORAL at 20:58

## 2018-06-20 RX ADMIN — INSULIN LISPRO 2 UNITS: 100 INJECTION, SOLUTION INTRAVENOUS; SUBCUTANEOUS at 21:03

## 2018-06-20 RX ADMIN — QUETIAPINE FUMARATE 200 MG: 100 TABLET ORAL at 20:58

## 2018-06-20 RX ADMIN — DOCUSATE SODIUM 100 MG: 100 CAPSULE, LIQUID FILLED ORAL at 20:58

## 2018-06-20 RX ADMIN — ENOXAPARIN SODIUM 40 MG: 40 INJECTION SUBCUTANEOUS at 08:27

## 2018-06-20 ASSESSMENT — PAIN DESCRIPTION - LOCATION: LOCATION: ANKLE

## 2018-06-20 ASSESSMENT — PAIN DESCRIPTION - DESCRIPTORS: DESCRIPTORS: ACHING

## 2018-06-20 ASSESSMENT — PAIN SCALES - GENERAL
PAINLEVEL_OUTOF10: 7
PAINLEVEL_OUTOF10: 7
PAINLEVEL_OUTOF10: 2
PAINLEVEL_OUTOF10: 0
PAINLEVEL_OUTOF10: 8
PAINLEVEL_OUTOF10: 7

## 2018-06-20 ASSESSMENT — PAIN DESCRIPTION - FREQUENCY: FREQUENCY: CONTINUOUS

## 2018-06-20 ASSESSMENT — PAIN DESCRIPTION - ORIENTATION: ORIENTATION: RIGHT

## 2018-06-20 ASSESSMENT — PAIN DESCRIPTION - PROGRESSION: CLINICAL_PROGRESSION: GRADUALLY WORSENING

## 2018-06-20 ASSESSMENT — PAIN DESCRIPTION - PAIN TYPE: TYPE: ACUTE PAIN

## 2018-06-20 NOTE — PROGRESS NOTES
--    Level --  Level Tile --    Method --  LUE;LLE --    Level of Assistance --  Stand by assistance --    Distance --  180' --    Exercises   Comments Supine BLE ex x20 reps with 1-1/2 lb weight. --  --    Conditions Requiring Skilled Therapeutic Intervention   Assessment --  Pt vinnie supine LE ex well. --    Activity Tolerance   Activity Tolerance --  Patient Tolerated treatment well --    Safety Devices   Type of devices --  --  Call light within reach; Chair alarm in place   Electronically signed by Lady Andreia PTA on 6/20/2018 at 10:52 AM

## 2018-06-20 NOTE — PROGRESS NOTES
Occupational Therapy     06/20/18 0815   Pain Assessment   Patient Currently in Pain Yes   Pain Assessment 0-10   Pain Level 7   Pain Type Acute pain   Pain Location Ankle   Pain Orientation Right   Pain Descriptors Aching   Pain Frequency Continuous   Clinical Progression Gradually worsening   Pain Intervention(s) Medication (see eMar);Repositioned   Response to Pain Intervention Patient Satisfied   Functional Mobility   Functional - Mobility Device Wheelchair   Activity Other; To/from bathroom   Assist Level Stand by assistance   Functional Mobility Comments In bedroom and bathroom during morning ADL tasks. Transfers   Sit Pivot Transfers Minimal assistance  (Bed>w/c.)   Assessment   Performance deficits / Impairments Decreased functional mobility ; Decreased ADL status; Decreased balance;Decreased safe awareness;Decreased endurance;Decreased high-level IADLs   Treatment Diagnosis R ankle revision, R wrist fx   Prognosis Good   Patient Education LB dressing with AE. Timed Code Treatment Minutes 45 Minutes   Activity Tolerance   Activity Tolerance Patient Tolerated treatment well   Safety Devices   Safety Devices in place Yes   Type of devices Call light within reach; Chair alarm in place; Left in chair   Plan   Current Treatment Recommendations Patient/Caregiver Education & Training;Home Management Training;Equipment Evaluation, Education, & procurement;Self-Care / ADL; Safety Education & Training; Endurance Training;Functional Mobility Training;Balance Training      06/20/18 0815   Self-Care   Grooming 7   Bathing 5  (Sponge bath.)   Dressing-Upper 7  (Clothing retrieval from w/c level. )   Dressing-Lower 4  (With AE and jennifer[employer]s. CGA. Rolls in bed to pull up/down clothing over buttock.  Cues for WB on RUE.)   FIM Progress   Pain Level 7

## 2018-06-20 NOTE — PLAN OF CARE
Problem: Falls - Risk of:  Goal: Will remain free from falls  Will remain free from falls   Outcome: Ongoing  Bed alarm set, call light within reach, side rails up times two    Problem: Risk for Impaired Skin Integrity  Goal: Tissue integrity - skin and mucous membranes  Structural intactness and normal physiological function of skin and  mucous membranes. Outcome: Ongoing  Use proper transfer/turning techniques. Problem: Pain:  Goal: Pain level will decrease  Pain level will decrease   Outcome: Ongoing  Pain controlled with current regimen    Problem: Mood - Altered:  Goal: Mood stable  Mood stable   Outcome: Ongoing  Patients mood stable this shift    Problem: Bleeding:  Goal: Will show no signs and symptoms of excessive bleeding  Will show no signs and symptoms of excessive bleeding   Outcome: Ongoing  Continue to monitor signs and symptoms of bleeding due to anticoagulant. Problem: Serum Glucose Level - Abnormal:  Goal: Ability to maintain appropriate glucose levels has stabilized  Ability to maintain appropriate glucose levels has stabilized   Outcome: Ongoing      Problem: Anxiety:  Goal: Level of anxiety will decrease  Level of anxiety will decrease   Outcome: Ongoing  No signs of anxiety this shift.

## 2018-06-20 NOTE — PROGRESS NOTES
Patient:   Janet Jauregui  MR#:    [id number]   Room:    0815/815-01   YOB: 1960  Date of Progress Note: 6/20/2018  Time of Note                           7:25 AM  Consulting Physician:   Ambrose Scott M.D. Attending Physician:  Ambrose Scott MD     Chief complaint Status post fracture of the right lower leg and right radius     S:This 63 y.o. female  who had an ORIF of a right closed displaced bimalleolar ankle fracture, as well as closed treatment of a two-part extra-articular distal radius fracture by Dr. Amilcar Baca as outpatient on 6/11/18. She was discharged home. On 6/12/18 she presented to [facility] ER after having a fall at home while trying to do a pivot transfer. She had immediate onset of pain and bleeding from her surgical site on her ankle. X-rays revealed her to have an open fracture dislocation of her right ankle with displacment of hardware screws. She was admitted by Dr. Amilcar Baca for I&D of her open wound and revision ORIF. She tolerated the procedure well. She will be non-weight bearing on her right LE, as well as her right UE. She has had post op anemia but hasn't yet required transfusion. She continues to have signifigant pain rating 8/10. She is participating in both PT/OT. She is felt to need a stay on Rehab to work on safe technique for transfers and wheelchair level ADLs.  She is now felt ready to start the Rehab program. No acute issues    REVIEW OF SYSTEMS:  Constitutional: No fevers No chills  Neck:No stiffness  Respiratory: No shortness of breath  Cardiovascular: No chest pain No palpitations  Gastrointestinal: No abdominal pain    Genitourinary: No Dysuria  Neurological: No headache, no confusion    Past Medical History:      Diagnosis Date    Anxiety     Arthritis     B[facility] 1 disorder ([facility] [address]     CAD (coronary artery disease)     CHF (congestive heart failure) (HCC)     Chronic kidney disease (CKD) stage G3b/A2, moderately decreased glomerular filtration rate (GFR) between 30-44 mL/min/1.73 square meter and albuminuria creatinine ratio between  mg/g 2016    Depression     DM (diabetes mellitus) ([facility] [address]     GERD (gastroesophageal reflux disease)     History of blood transfusion     History of suicidal ideation     Hyperlipidemia     Hypertension     Hypothyroidism     Insomnia     Kidney disease     stage 3 failure    MI, old 2005    Obesity     Polyarticular arthritis     Type II or unspecified type diabetes mellitus without mention of complication, not stated as uncontrolled        Past Surgical History:      Procedure Laterality Date    ABDOMINOPLASTY      ANGIOPLASTY  05    stent placement to LAD and diagonal with normal left vent function    BREAST REDUCTION SURGERY      CARDIAC CATHETERIZATION  05, 05    see report  Stents x3    CARDIAC CATHETERIZATION  3/23/15  JDT    EF 50%    CARPAL TUNNEL RELEASE       SECTION      x2    CHOLECYSTECTOMY      GASTRIC BYPASS SURGERY      HERNIA REPAIR      umbilical with mesh    INTRACAPSULAR CATARACT EXTRACTION Left 2016    LT EYE CATARACT EMULSIFICATION IOL IMPLANT performed by Juan Masterson MD at [address] [place] [address] Right 2018    ORIF RIGHT BIMALLEOLAR ANKLE FRACTURE, RIGHT WRIST CAST REMOVAL performed by Travis Muse MD at [address] [place] [address] Right 2018    ANKLE OPEN REDUCTION INTERNAL FIXATION performed by Travis Muse MD at [place] [address] Left 2017    KNEE TOTAL ARTHROPLASTY performed by Samantha Narayanan DO at [facility] OR       Medications in Hospital:      Current Facility-Administered Medications:     fluconazole (DIFLUCAN) tablet 100 mg, 100 mg, Oral, Daily, Nadja Edgar MD    insulin glargine (LANTUS) injection vial 10 Units, 10 Units, Subcutaneous, Nightly, Nadja Edgar MD, 10 Units at 18    ondansetron (ZOFRAN) tablet 4 mg, 4 mg, Oral, Q6H PRN, Marianna Drake MD, 4 mg at 06/18/18 2056    valsartan (DIOVAN) tablet 160 mg, 160 mg, Oral, Nightly, Marianna Drake MD, 160 mg at 06/19/18 1953    polyethylene glycol (GLYCOLAX) packet 17 g, 17 g, Oral, Daily, Marianna Drake MD, 17 g at 06/19/18 0743    acetaminophen (TYLENOL) tablet 650 mg, 650 mg, Oral, Q4H PRN, Leon Meredith MD, 650 mg at 06/18/18 1009    aspirin EC tablet 325 mg, 325 mg, Oral, BID, Leon Meredith MD, 325 mg at 06/19/18 1954    docusate sodium (COLACE) capsule 100 mg, 100 mg, Oral, BID, Leon Meredith MD, 100 mg at 06/19/18 1954    amitriptyline (ELAVIL) tablet 150 mg, 150 mg, Oral, Nightly, Leon Meredith MD, 150 mg at 06/19/18 1954    furosemide (LASIX) tablet 40 mg, 40 mg, Oral, Daily, Leon Meredith MD, 40 mg at 06/19/18 0743    QUEtiapine (SEROQUEL) tablet 200 mg, 200 mg, Oral, Nightly, Leon Meredith MD, 200 mg at 06/19/18 1954    tiZANidine (ZANAFLEX) tablet 4 mg, 4 mg, Oral, Q6H PRN, Leon Meredith MD, 4 mg at 06/19/18 0741    enoxaparin (LOVENOX) injection 40 mg, 40 mg, Subcutaneous, Daily, Leon Meredith MD, 40 mg at 06/19/18 0742    atorvastatin (LIPITOR) tablet 40 mg, 40 mg, Oral, Daily, Leon Meredith MD, 40 mg at 06/19/18 0743    dextrose 5 % solution, 100 mL/hr, Intravenous, PRN, Leon Meredith MD    diazepam (VALIUM) tablet 5 mg, 5 mg, Oral, Nightly PRN, Leon Meredith MD, 5 mg at 06/19/18 1954    DULoxetine (CYMBALTA) extended release capsule 60 mg, 60 mg, Oral, Daily, Leon Meredith MD, 60 mg at 06/19/18 0744    fluticasone (FLONASE) 50 MCG/ACT nasal spray 1 spray, 1 spray, Each Nare, Daily, Leon Meredith MD, 1 spray at 06/19/18 0744    glucagon (rDNA) injection 1 mg, 1 mg, Intramuscular, PRN, Leon Meredith MD    glucose (GLUTOSE) 40 % oral gel 15 g, 15 g, Oral, PRN, Leon Meredith MD    insulin lispro (HUMALOG) injection vial 0-12 Units, 0-12 Units, Subcutaneous, TID WC, Leon Meredith MD, 2 Units at 06/19/18 1748    insulin lispro (HUMALOG) injection vial 0-6 Units, 0-6 Units, Subcutaneous, Nightly, Farhat Palacios MD, 3 Units at 06/19/18 2207    lamoTRIgine (LAMICTAL) tablet 50 mg, 50 mg, Oral, BID, Farhat Palacios MD, 50 mg at 06/19/18 1954    levothyroxine (SYNTHROID) tablet 88 mcg, 88 mcg, Oral, Daily, Farhat Palacios MD, 88 mcg at 06/20/18 0612    linaclotide (LINZESS) capsule 290 mcg, 290 mcg, Oral, PRN, Farhat Palacios MD, 290 mcg at 06/16/18 0805    metoprolol succinate (TOPROL XL) extended release tablet 50 mg, 50 mg, Oral, Daily, Farhat Palacios MD, 50 mg at 06/19/18 0743    pantoprazole (PROTONIX) tablet 40 mg, 40 mg, Oral, Daily, Farhat Palacios MD, 40 mg at 06/19/18 0743    pregabalin (LYRICA) capsule 150 mg, 150 mg, Oral, BID, Farhat Palacios MD, 150 mg at 06/19/18 1953    magnesium hydroxide (MILK OF MAGNESIA) 400 MG/5ML suspension 30 mL, 30 mL, Oral, Daily PRN, Charly Brandon MD    docusate sodium (COLACE) capsule 100 mg, 100 mg, Oral, BID PRN, Charly Brandon MD    bisacodyl (DULCOLAX) suppository 10 mg, 10 mg, Rectal, Daily PRN, Charly Brandon MD, 10 mg at 06/18/18 1323    HYDROcodone-acetaminophen (NORCO)  MG per tablet 1 tablet, 1 tablet, Oral, Q4H PRN, Charly Brandon MD, 1 tablet at 06/20/18 0356    Allergies:  Dilaudid [hydromorphone hcl]    Social History:   TOBACCO:   reports that she has never smoked. She has quit using smokeless tobacco. Her smokeless tobacco use included Snuff. ETOH:   reports that she does not drink alcohol.     Family History:       Problem Relation Age of Onset    Depression Mother     Heart Attack Mother     High Blood Pressure Mother     Kidney Disease Father     Alcohol Abuse Father     Depression Father     Heart Attack Father     High Blood Pressure Father     Kidney Disease Brother     Heart Disease Other     Depression Sister          PHYSICAL EXAM:  BP (!) 149/91   Pulse 84   Temp 97.9 °F (36.6 °C)   Resp 18   Ht 5' 1\" (1.549 m)   Wt 237 lb 0.3 oz (107.5 kg)   SpO2 98%   BMI 44.78 kg/m²     Constitutional: she appears 06/12/2018    PROTIME 13.4 06/23/2017    PROTIME 13.5 06/05/2017         06/15/18 1300   Transfers   Sit to Stand Moderate Assistance  (PULL TO STAND )   Bed to Chair Maximum assistance; Moderate assistance   Other exercises   Other exercises? Yes   Other exercises 1 SITTING BILAT LE HIP FLEX/EXT, ABD/ADD; KNEE EXT; PF/DF X 10 EACH WITH MANUAL RESISTANCE. Other exercises 2 STATIC STANCE VERTICAL BAR X3 MIN   Conditions Requiring Skilled Therapeutic Intervention   Body structures, Functions, Activity limitations Decreased functional mobility ; Decreased ADL status; Decreased ROM; Decreased strength;Decreased endurance;Decreased balance   Assessment IMPROVED BILAT LE ROM THIS PM.  TRANSFER TRAINING.   (TRANSFERED PLINTH TO CHAIR BEFORE THERAPIST READY. MAX A)            RECORD REVIEW: Previous medical records, medications were reviewed at today's visit    IMPRESSION:     1. Status post fracture of the right lower leg/right radius-pain control/nonweightbearing/DVT prophylaxis  2. Insomnia/mood disorderon medications  3. Coronary artery disease -on aspirin/statin  4. GIbowel regimen/proton pump inhibitor  5. Neuropathy-Cymbalta/Lyrica  6. Diabetesmonitor blood sugar  7. Hypertensionon medications continue to monitor  8. Hypothyroidism test on Synthroid  9. Pain controlNorco as needed  10.  PT/OT  Diflucan for yeast infection  Continue current care      June 28th    Expected duration and frequency therapy: 180 minutes per day, 5 days per week    [address] [facility]  [phone] CELL  Dr Myrtha Sandifer

## 2018-06-21 LAB
ALBUMIN SERPL-MCNC: 3.1 G/DL (ref 3.5–5.2)
ALP BLD-CCNC: 174 U/L (ref 35–104)
ALT SERPL-CCNC: 22 U/L (ref 5–33)
ANION GAP SERPL CALCULATED.3IONS-SCNC: 18 MMOL/L (ref 7–19)
AST SERPL-CCNC: 38 U/L (ref 5–32)
BASOPHILS ABSOLUTE: 0.1 K/UL (ref 0–0.2)
BASOPHILS RELATIVE PERCENT: 0.4 % (ref 0–1)
BILIRUB SERPL-MCNC: 0.4 MG/DL (ref 0.2–1.2)
BUN BLDV-MCNC: 35 MG/DL (ref 6–20)
CALCIUM SERPL-MCNC: 9 MG/DL (ref 8.6–10)
CHLORIDE BLD-SCNC: 92 MMOL/L (ref 98–111)
CO2: 22 MMOL/L (ref 22–29)
CREAT SERPL-MCNC: 2.3 MG/DL (ref 0.5–0.9)
EOSINOPHILS ABSOLUTE: 0.2 K/UL (ref 0–0.6)
EOSINOPHILS RELATIVE PERCENT: 1.6 % (ref 0–5)
GFR NON-AFRICAN AMERICAN: 22
GLUCOSE BLD-MCNC: 168 MG/DL (ref 70–99)
GLUCOSE BLD-MCNC: 198 MG/DL (ref 70–99)
GLUCOSE BLD-MCNC: 240 MG/DL (ref 74–109)
GLUCOSE BLD-MCNC: 311 MG/DL (ref 70–99)
GLUCOSE BLD-MCNC: 368 MG/DL (ref 70–99)
HCT VFR BLD CALC: 32.8 % (ref 37–47)
HEMOGLOBIN: 10.1 G/DL (ref 12–16)
LYMPHOCYTES ABSOLUTE: 1.1 K/UL (ref 1.1–4.5)
LYMPHOCYTES RELATIVE PERCENT: 8.3 % (ref 20–40)
MCH RBC QN AUTO: 30.1 PG (ref 27–31)
MCHC RBC AUTO-ENTMCNC: 30.8 G/DL (ref 33–37)
MCV RBC AUTO: 97.6 FL (ref 81–99)
MONOCYTES ABSOLUTE: 1 K/UL (ref 0–0.9)
MONOCYTES RELATIVE PERCENT: 7.5 % (ref 0–10)
NEUTROPHILS ABSOLUTE: 11 K/UL (ref 1.5–7.5)
NEUTROPHILS RELATIVE PERCENT: 80.9 % (ref 50–65)
PDW BLD-RTO: 15.1 % (ref 11.5–14.5)
PERFORMED ON: ABNORMAL
PLATELET # BLD: 317 K/UL (ref 130–400)
PMV BLD AUTO: 10.8 FL (ref 9.4–12.3)
POTASSIUM SERPL-SCNC: 5.2 MMOL/L (ref 3.5–5)
RBC # BLD: 3.36 M/UL (ref 4.2–5.4)
SODIUM BLD-SCNC: 132 MMOL/L (ref 136–145)
TOTAL PROTEIN: 6.7 G/DL (ref 6.6–8.7)
WBC # BLD: 13.5 K/UL (ref 4.8–10.8)

## 2018-06-21 PROCEDURE — 85025 COMPLETE CBC W/AUTO DIFF WBC: CPT

## 2018-06-21 PROCEDURE — 80053 COMPREHEN METABOLIC PANEL: CPT

## 2018-06-21 PROCEDURE — 36415 COLL VENOUS BLD VENIPUNCTURE: CPT

## 2018-06-21 PROCEDURE — 97110 THERAPEUTIC EXERCISES: CPT

## 2018-06-21 PROCEDURE — 99232 SBSQ HOSP IP/OBS MODERATE 35: CPT | Performed by: PSYCHIATRY & NEUROLOGY

## 2018-06-21 PROCEDURE — 82948 REAGENT STRIP/BLOOD GLUCOSE: CPT

## 2018-06-21 PROCEDURE — 6360000002 HC RX W HCPCS: Performed by: ORTHOPAEDIC SURGERY

## 2018-06-21 PROCEDURE — 97535 SELF CARE MNGMENT TRAINING: CPT

## 2018-06-21 PROCEDURE — 6370000000 HC RX 637 (ALT 250 FOR IP): Performed by: ORTHOPAEDIC SURGERY

## 2018-06-21 PROCEDURE — 6370000000 HC RX 637 (ALT 250 FOR IP): Performed by: PSYCHIATRY & NEUROLOGY

## 2018-06-21 PROCEDURE — 1180000000 HC REHAB R&B

## 2018-06-21 PROCEDURE — 97530 THERAPEUTIC ACTIVITIES: CPT

## 2018-06-21 RX ADMIN — FLUCONAZOLE 100 MG: 100 TABLET ORAL at 08:52

## 2018-06-21 RX ADMIN — PREGABALIN 150 MG: 150 CAPSULE ORAL at 20:51

## 2018-06-21 RX ADMIN — ATORVASTATIN CALCIUM 40 MG: 40 TABLET, FILM COATED ORAL at 08:51

## 2018-06-21 RX ADMIN — LAMOTRIGINE 50 MG: 25 TABLET ORAL at 20:51

## 2018-06-21 RX ADMIN — TIZANIDINE 4 MG: 4 TABLET ORAL at 09:32

## 2018-06-21 RX ADMIN — DULOXETINE HYDROCHLORIDE 60 MG: 60 CAPSULE, DELAYED RELEASE ORAL at 08:51

## 2018-06-21 RX ADMIN — LAMOTRIGINE 50 MG: 25 TABLET ORAL at 08:50

## 2018-06-21 RX ADMIN — PREGABALIN 150 MG: 150 CAPSULE ORAL at 08:50

## 2018-06-21 RX ADMIN — PANTOPRAZOLE SODIUM 40 MG: 40 TABLET, DELAYED RELEASE ORAL at 08:52

## 2018-06-21 RX ADMIN — HYDROCODONE BITARTRATE AND ACETAMINOPHEN 1 TABLET: 10; 325 TABLET ORAL at 09:32

## 2018-06-21 RX ADMIN — INSULIN GLARGINE 10 UNITS: 100 INJECTION, SOLUTION SUBCUTANEOUS at 21:49

## 2018-06-21 RX ADMIN — DOCUSATE SODIUM 100 MG: 100 CAPSULE, LIQUID FILLED ORAL at 08:50

## 2018-06-21 RX ADMIN — ASPIRIN 325 MG: 325 TABLET, DELAYED RELEASE ORAL at 20:51

## 2018-06-21 RX ADMIN — QUETIAPINE FUMARATE 200 MG: 100 TABLET ORAL at 20:51

## 2018-06-21 RX ADMIN — METOPROLOL SUCCINATE 75 MG: 50 TABLET, EXTENDED RELEASE ORAL at 08:51

## 2018-06-21 RX ADMIN — FUROSEMIDE 40 MG: 40 TABLET ORAL at 08:50

## 2018-06-21 RX ADMIN — HYDROCODONE BITARTRATE AND ACETAMINOPHEN 1 TABLET: 10; 325 TABLET ORAL at 16:58

## 2018-06-21 RX ADMIN — TIZANIDINE 4 MG: 4 TABLET ORAL at 20:51

## 2018-06-21 RX ADMIN — INSULIN LISPRO 4 UNITS: 100 INJECTION, SOLUTION INTRAVENOUS; SUBCUTANEOUS at 21:50

## 2018-06-21 RX ADMIN — VALSARTAN 160 MG: 160 TABLET ORAL at 20:51

## 2018-06-21 RX ADMIN — LEVOTHYROXINE SODIUM 88 MCG: 88 TABLET ORAL at 05:37

## 2018-06-21 RX ADMIN — INSULIN LISPRO 2 UNITS: 100 INJECTION, SOLUTION INTRAVENOUS; SUBCUTANEOUS at 16:59

## 2018-06-21 RX ADMIN — AMITRIPTYLINE HYDROCHLORIDE 150 MG: 75 TABLET, FILM COATED ORAL at 20:51

## 2018-06-21 RX ADMIN — FLUTICASONE PROPIONATE 1 SPRAY: 50 SPRAY, METERED NASAL at 08:52

## 2018-06-21 RX ADMIN — ASPIRIN 325 MG: 325 TABLET, DELAYED RELEASE ORAL at 08:50

## 2018-06-21 RX ADMIN — INSULIN LISPRO 10 UNITS: 100 INJECTION, SOLUTION INTRAVENOUS; SUBCUTANEOUS at 12:03

## 2018-06-21 RX ADMIN — POLYETHYLENE GLYCOL 3350 17 G: 17 POWDER, FOR SOLUTION ORAL at 08:49

## 2018-06-21 RX ADMIN — HYDROCODONE BITARTRATE AND ACETAMINOPHEN 1 TABLET: 10; 325 TABLET ORAL at 20:51

## 2018-06-21 RX ADMIN — ENOXAPARIN SODIUM 40 MG: 40 INJECTION SUBCUTANEOUS at 08:52

## 2018-06-21 RX ADMIN — DOCUSATE SODIUM 100 MG: 100 CAPSULE, LIQUID FILLED ORAL at 20:52

## 2018-06-21 RX ADMIN — HYDROCODONE BITARTRATE AND ACETAMINOPHEN 1 TABLET: 10; 325 TABLET ORAL at 05:37

## 2018-06-21 RX ADMIN — INSULIN LISPRO 2 UNITS: 100 INJECTION, SOLUTION INTRAVENOUS; SUBCUTANEOUS at 08:53

## 2018-06-21 ASSESSMENT — PAIN DESCRIPTION - FREQUENCY: FREQUENCY: CONTINUOUS

## 2018-06-21 ASSESSMENT — PAIN SCALES - GENERAL
PAINLEVEL_OUTOF10: 9
PAINLEVEL_OUTOF10: 6
PAINLEVEL_OUTOF10: 8
PAINLEVEL_OUTOF10: 10
PAINLEVEL_OUTOF10: 7
PAINLEVEL_OUTOF10: 5
PAINLEVEL_OUTOF10: 7

## 2018-06-21 ASSESSMENT — PAIN DESCRIPTION - DESCRIPTORS: DESCRIPTORS: ACHING

## 2018-06-21 ASSESSMENT — PAIN DESCRIPTION - PAIN TYPE: TYPE: ACUTE PAIN

## 2018-06-21 ASSESSMENT — PAIN DESCRIPTION - PROGRESSION: CLINICAL_PROGRESSION: NOT CHANGED

## 2018-06-21 ASSESSMENT — PAIN DESCRIPTION - ORIENTATION: ORIENTATION: RIGHT

## 2018-06-21 ASSESSMENT — PAIN DESCRIPTION - LOCATION: LOCATION: ANKLE

## 2018-06-21 NOTE — PROGRESS NOTES
06/21/18 1104 06/21/18 1105 06/21/18 1124   Pain Screening   Patient Currently in Pain No --  --    Bed Mobility   Rolling Independent --  --    Supine to Sit Independent --  --    Sit to Supine Independent --  --    Scooting Independent --  --    Transfers   Sit to Stand Minimal Assistance --  --    Stand to sit Minimal Assistance --  --    Bed to Chair Minimal assistance  (W/C to/from mat table) --  --    Exercises   Comments --  Sitting and supine BLE ex x20-25 reps with 1-1/2 lb weight on LLE. --    Conditions Requiring Skilled Therapeutic Intervention   Assessment --  --  --    Activity Tolerance   Activity Tolerance --  --  Patient Tolerated treatment well   Safety Devices   Type of devices --  --  --        06/21/18 1144 06/21/18 1145   Pain Screening   Patient Currently in Pain --  --    Bed Mobility   Rolling --  --    Supine to Sit --  --    Sit to Supine --  --    Scooting --  --    Transfers   Sit to Stand --  --    Stand to sit --  --    Bed to Chair --  --    Exercises   Comments --  --    Conditions Requiring Skilled Therapeutic Intervention   Assessment Angel session well --    Activity Tolerance   Activity Tolerance --  --    Safety Devices   Type of devices --  Call light within reach   Electronically signed by Nia Bae PTA on 6/21/2018 at 11:47 AM

## 2018-06-21 NOTE — PROGRESS NOTES
Occupational Therapy     06/21/18 0815   Pain Assessment   Patient Currently in Pain No   Pain Assessment 0-10   Pain Level 7   Pain Type Acute pain   Pain Location Ankle   Pain Orientation Right   Pain Descriptors Aching   Pain Frequency Continuous   Clinical Progression Not changed   Pain Intervention(s) Medication (see eMar);Repositioned   Response to Pain Intervention Patient Satisfied   ADL   Toileting Minimal assistance   Balance   Standing Balance Contact guard assistance   Standing Balance   Sit to stand Contact guard assistance   Stand to sit Contact guard assistance   Functional Mobility   Functional - Mobility Device Wheelchair   Activity Other;Retrieve items   Assist Level Supervision   Functional Mobility Comments Room 815<>OT gym, navigating bedroom. Transfers   Stand Pivot Transfers Minimal assistance   Toilet Transfers   Toilet - Technique Stand pivot   Equipment Used Extra wide bedside commode   Toilet Transfer Minimal assistance   Short term goals   Short term goal 4 MET   Assessment   Performance deficits / Impairments Decreased functional mobility ; Decreased ADL status; Decreased balance;Decreased endurance;Decreased high-level IADLs;Decreased safe awareness   Assessment Discussed home setup for bathing and toileting ADLs. Treatment Diagnosis R ankle revision, R wrist fx   Prognosis Good   Timed Code Treatment Minutes 45 Minutes   Activity Tolerance   Activity Tolerance Patient Tolerated treatment well   Safety Devices   Safety Devices in place Yes   Type of devices Call light within reach; Chair alarm in place; Left in chair   Plan   Current Treatment Recommendations Patient/Caregiver Education & Training;Home Management Training;Equipment Evaluation, Education, & procurement;Self-Care / ADL; Safety Education & Training; Endurance Training;Functional Mobility Training;Balance Training

## 2018-06-21 NOTE — PLAN OF CARE
Problem: Falls - Risk of:  Goal: Will remain free from falls  Will remain free from falls   Outcome: Ongoing  Patient using call light to ask assistance to bed side commode. Problem: Pain:  Goal: Pain level will decrease  Pain level will decrease   Outcome: Ongoing  Pain controlled with current regimen    Problem: Mood - Altered:  Goal: Mood stable  Mood stable   Outcome: Ongoing  Pain controlled with current regimen    Problem: Bleeding:  Goal: Will show no signs and symptoms of excessive bleeding  Will show no signs and symptoms of excessive bleeding   Outcome: Ongoing  Continue to monitor signs and symptoms of bleeding due to anticoagulant. Problem: Serum Glucose Level - Abnormal:  Goal: Ability to maintain appropriate glucose levels has stabilized  Ability to maintain appropriate glucose levels has stabilized   Outcome: Ongoing      Problem: Anxiety:  Goal: Level of anxiety will decrease  Level of anxiety will decrease   Outcome: Ongoing  No signs of anxiety this shift.

## 2018-06-21 NOTE — PROGRESS NOTES
Patient:   Jennie Peñaloza  MR#:    [id number]   Room:    15/815-01   YOB: 1960  Date of Progress Note: 6/21/2018  Time of Note                           8:00 AM  Consulting Physician:   Lane Duenas M.D. Attending Physician:  Lane Duenas MD     Chief complaint Status post fracture of the right lower leg and right radius     S:This 63 y.o. female  who had an ORIF of a right closed displaced bimalleolar ankle fracture, as well as closed treatment of a two-part extra-articular distal radius fracture by Dr. Jamison Olivia as outpatient on 6/11/18. She was discharged home. On 6/12/18 she presented to [facility] ER after having a fall at home while trying to do a pivot transfer. She had immediate onset of pain and bleeding from her surgical site on her ankle. X-rays revealed her to have an open fracture dislocation of her right ankle with displacment of hardware screws. She was admitted by Dr. Jamison Olivia for I&D of her open wound and revision ORIF. She tolerated the procedure well. She will be non-weight bearing on her right LE, as well as her right UE. She has had post op anemia but hasn't yet required transfusion. She continues to have signifigant pain rating 8/10. She is participating in both PT/OT. She is felt to need a stay on Rehab to work on safe technique for transfers and wheelchair level ADLs.  She is now felt ready to start the Rehab program. No new issues    REVIEW OF SYSTEMS:  Constitutional: No fevers No chills  Neck:No stiffness  Respiratory: No shortness of breath  Cardiovascular: No chest pain No palpitations  Gastrointestinal: No abdominal pain    Genitourinary: No Dysuria  Neurological: No headache, no confusion    Past Medical History:      Diagnosis Date    Anxiety     Arthritis     Bipolar 1 disorder ([facility] [address]     CAD (coronary artery disease)     CHF (congestive heart failure) (HCC)     Chronic kidney disease (CKD) stage G3b/A2, moderately decreased glomerular filtration rate (GFR) between glargine (LANTUS) injection vial 10 Units, 10 Units, Subcutaneous, Nightly, Gonzalo Hawkins MD, 10 Units at 06/20/18 2059    ondansetron (ZOFRAN) tablet 4 mg, 4 mg, Oral, Q6H PRN, Gonzalo Hawkins MD, 4 mg at 06/18/18 2056    valsartan (DIOVAN) tablet 160 mg, 160 mg, Oral, Nightly, Gonzalo Hawkins MD, 160 mg at 06/20/18 2058    polyethylene glycol (GLYCOLAX) packet 17 g, 17 g, Oral, Daily, Gonzalo Hawkins MD, 17 g at 06/20/18 0828    acetaminophen (TYLENOL) tablet 650 mg, 650 mg, Oral, Q4H PRN, Roberta Nguyen MD, 650 mg at 06/18/18 1009    aspirin EC tablet 325 mg, 325 mg, Oral, BID, Roberta Nguyen MD, 325 mg at 06/20/18 2058    docusate sodium (COLACE) capsule 100 mg, 100 mg, Oral, BID, Roberta Nguyen MD, 100 mg at 06/20/18 2058    amitriptyline (ELAVIL) tablet 150 mg, 150 mg, Oral, Nightly, Roberta Nguyen MD, 150 mg at 06/20/18 2058    furosemide (LASIX) tablet 40 mg, 40 mg, Oral, Daily, Roberta Nguyen MD, 40 mg at 06/20/18 0827    QUEtiapine (SEROQUEL) tablet 200 mg, 200 mg, Oral, Nightly, Roberta Nguyen MD, 200 mg at 06/20/18 2058    tiZANidine (ZANAFLEX) tablet 4 mg, 4 mg, Oral, Q6H PRN, Roberta Nguyen MD, 4 mg at 06/20/18 2058    enoxaparin (LOVENOX) injection 40 mg, 40 mg, Subcutaneous, Daily, Roberta Nguyen MD, 40 mg at 06/20/18 0827    atorvastatin (LIPITOR) tablet 40 mg, 40 mg, Oral, Daily, Roberta Nguyen MD, 40 mg at 06/20/18 0827    dextrose 5 % solution, 100 mL/hr, Intravenous, PRN, Roberta Nguyen MD    diazepam (VALIUM) tablet 5 mg, 5 mg, Oral, Nightly PRN, Roberta Nguyen MD, 5 mg at 06/19/18 1954    DULoxetine (CYMBALTA) extended release capsule 60 mg, 60 mg, Oral, Daily, Roberta Nguyen MD, 60 mg at 06/20/18 0827    fluticasone (FLONASE) 50 MCG/ACT nasal spray 1 spray, 1 spray, Each Nare, Daily, Roberta Nguyen MD, 1 spray at 06/20/18 0826    glucagon (rDNA) injection 1 mg, 1 mg, Intramuscular, PRN, Roberta Nguyen MD    glucose (GLUTOSE) 40 % oral gel 15 g, 15 g, Oral, PRN, Roberta Nguyen MD    insulin lispro (HUMALOG) injection

## 2018-06-22 ENCOUNTER — APPOINTMENT (OUTPATIENT)
Dept: ULTRASOUND IMAGING | Age: 58
DRG: 559 | End: 2018-06-22
Attending: PSYCHIATRY & NEUROLOGY
Payer: MEDICARE

## 2018-06-22 LAB
ALBUMIN SERPL-MCNC: 3.4 G/DL (ref 3.5–5.2)
ALP BLD-CCNC: 174 U/L (ref 35–104)
ALT SERPL-CCNC: 22 U/L (ref 5–33)
ANION GAP SERPL CALCULATED.3IONS-SCNC: 19 MMOL/L (ref 7–19)
AST SERPL-CCNC: 46 U/L (ref 5–32)
BILIRUB SERPL-MCNC: 0.5 MG/DL (ref 0.2–1.2)
BUN BLDV-MCNC: 48 MG/DL (ref 6–20)
CALCIUM SERPL-MCNC: 8.7 MG/DL (ref 8.6–10)
CHLORIDE BLD-SCNC: 88 MMOL/L (ref 98–111)
CO2: 20 MMOL/L (ref 22–29)
CREAT SERPL-MCNC: 2.8 MG/DL (ref 0.5–0.9)
GFR NON-AFRICAN AMERICAN: 17
GLUCOSE BLD-MCNC: 148 MG/DL (ref 70–99)
GLUCOSE BLD-MCNC: 205 MG/DL (ref 70–99)
GLUCOSE BLD-MCNC: 215 MG/DL (ref 74–109)
GLUCOSE BLD-MCNC: 226 MG/DL (ref 70–99)
GLUCOSE BLD-MCNC: 251 MG/DL (ref 70–99)
GLUCOSE BLD-MCNC: 278 MG/DL (ref 70–99)
PERFORMED ON: ABNORMAL
POTASSIUM SERPL-SCNC: 4.5 MMOL/L (ref 3.5–5)
SODIUM BLD-SCNC: 127 MMOL/L (ref 136–145)
TOTAL PROTEIN: 6.7 G/DL (ref 6.6–8.7)

## 2018-06-22 PROCEDURE — 97535 SELF CARE MNGMENT TRAINING: CPT

## 2018-06-22 PROCEDURE — 6370000000 HC RX 637 (ALT 250 FOR IP): Performed by: INTERNAL MEDICINE

## 2018-06-22 PROCEDURE — 99233 SBSQ HOSP IP/OBS HIGH 50: CPT | Performed by: PSYCHIATRY & NEUROLOGY

## 2018-06-22 PROCEDURE — 82948 REAGENT STRIP/BLOOD GLUCOSE: CPT

## 2018-06-22 PROCEDURE — 97110 THERAPEUTIC EXERCISES: CPT

## 2018-06-22 PROCEDURE — 76770 US EXAM ABDO BACK WALL COMP: CPT

## 2018-06-22 PROCEDURE — 80053 COMPREHEN METABOLIC PANEL: CPT

## 2018-06-22 PROCEDURE — 6360000002 HC RX W HCPCS: Performed by: ORTHOPAEDIC SURGERY

## 2018-06-22 PROCEDURE — 36415 COLL VENOUS BLD VENIPUNCTURE: CPT

## 2018-06-22 PROCEDURE — 1180000000 HC REHAB R&B

## 2018-06-22 PROCEDURE — 6370000000 HC RX 637 (ALT 250 FOR IP): Performed by: ORTHOPAEDIC SURGERY

## 2018-06-22 PROCEDURE — 6370000000 HC RX 637 (ALT 250 FOR IP): Performed by: PSYCHIATRY & NEUROLOGY

## 2018-06-22 RX ORDER — FUROSEMIDE 20 MG/1
20 TABLET ORAL DAILY
Status: DISCONTINUED | OUTPATIENT
Start: 2018-06-23 | End: 2018-06-28 | Stop reason: HOSPADM

## 2018-06-22 RX ORDER — VALSARTAN 80 MG/1
80 TABLET ORAL NIGHTLY
Status: DISCONTINUED | OUTPATIENT
Start: 2018-06-22 | End: 2018-06-28 | Stop reason: HOSPADM

## 2018-06-22 RX ORDER — SODIUM BICARBONATE 650 MG/1
650 TABLET ORAL 3 TIMES DAILY
Status: DISCONTINUED | OUTPATIENT
Start: 2018-06-22 | End: 2018-06-23

## 2018-06-22 RX ADMIN — TIZANIDINE 4 MG: 4 TABLET ORAL at 06:03

## 2018-06-22 RX ADMIN — INSULIN LISPRO 6 UNITS: 100 INJECTION, SOLUTION INTRAVENOUS; SUBCUTANEOUS at 08:49

## 2018-06-22 RX ADMIN — HYDROCODONE BITARTRATE AND ACETAMINOPHEN 1 TABLET: 10; 325 TABLET ORAL at 06:03

## 2018-06-22 RX ADMIN — DOCUSATE SODIUM 100 MG: 100 CAPSULE, LIQUID FILLED ORAL at 08:43

## 2018-06-22 RX ADMIN — LAMOTRIGINE 50 MG: 25 TABLET ORAL at 21:05

## 2018-06-22 RX ADMIN — ASPIRIN 325 MG: 325 TABLET, DELAYED RELEASE ORAL at 08:44

## 2018-06-22 RX ADMIN — METOPROLOL SUCCINATE 75 MG: 50 TABLET, EXTENDED RELEASE ORAL at 08:43

## 2018-06-22 RX ADMIN — LAMOTRIGINE 50 MG: 25 TABLET ORAL at 08:43

## 2018-06-22 RX ADMIN — FLUCONAZOLE 100 MG: 100 TABLET ORAL at 08:44

## 2018-06-22 RX ADMIN — SODIUM BICARBONATE 650 MG: 650 TABLET ORAL at 14:04

## 2018-06-22 RX ADMIN — PREGABALIN 150 MG: 150 CAPSULE ORAL at 08:43

## 2018-06-22 RX ADMIN — INSULIN GLARGINE 10 UNITS: 100 INJECTION, SOLUTION SUBCUTANEOUS at 21:44

## 2018-06-22 RX ADMIN — DULOXETINE HYDROCHLORIDE 60 MG: 60 CAPSULE, DELAYED RELEASE ORAL at 08:44

## 2018-06-22 RX ADMIN — LEVOTHYROXINE SODIUM 88 MCG: 88 TABLET ORAL at 06:03

## 2018-06-22 RX ADMIN — ASPIRIN 325 MG: 325 TABLET, DELAYED RELEASE ORAL at 21:06

## 2018-06-22 RX ADMIN — POLYETHYLENE GLYCOL 3350 17 G: 17 POWDER, FOR SOLUTION ORAL at 08:44

## 2018-06-22 RX ADMIN — INSULIN LISPRO 2 UNITS: 100 INJECTION, SOLUTION INTRAVENOUS; SUBCUTANEOUS at 17:45

## 2018-06-22 RX ADMIN — SODIUM BICARBONATE 650 MG: 650 TABLET ORAL at 21:00

## 2018-06-22 RX ADMIN — MAGESIUM CITRATE 296 ML: 1.75 LIQUID ORAL at 11:50

## 2018-06-22 RX ADMIN — QUETIAPINE FUMARATE 200 MG: 100 TABLET ORAL at 21:05

## 2018-06-22 RX ADMIN — PANTOPRAZOLE SODIUM 40 MG: 40 TABLET, DELAYED RELEASE ORAL at 08:44

## 2018-06-22 RX ADMIN — DOCUSATE SODIUM 100 MG: 100 CAPSULE, LIQUID FILLED ORAL at 21:06

## 2018-06-22 RX ADMIN — VALSARTAN 80 MG: 80 TABLET ORAL at 21:05

## 2018-06-22 RX ADMIN — INSULIN LISPRO 6 UNITS: 100 INJECTION, SOLUTION INTRAVENOUS; SUBCUTANEOUS at 12:08

## 2018-06-22 RX ADMIN — AMITRIPTYLINE HYDROCHLORIDE 150 MG: 75 TABLET, FILM COATED ORAL at 21:05

## 2018-06-22 RX ADMIN — FUROSEMIDE 40 MG: 40 TABLET ORAL at 08:43

## 2018-06-22 RX ADMIN — ATORVASTATIN CALCIUM 40 MG: 40 TABLET, FILM COATED ORAL at 08:43

## 2018-06-22 RX ADMIN — HYDROCODONE BITARTRATE AND ACETAMINOPHEN 1 TABLET: 10; 325 TABLET ORAL at 11:13

## 2018-06-22 RX ADMIN — HYDROCODONE BITARTRATE AND ACETAMINOPHEN 1 TABLET: 10; 325 TABLET ORAL at 15:54

## 2018-06-22 RX ADMIN — ENOXAPARIN SODIUM 40 MG: 40 INJECTION SUBCUTANEOUS at 08:46

## 2018-06-22 RX ADMIN — FLUTICASONE PROPIONATE 1 SPRAY: 50 SPRAY, METERED NASAL at 08:45

## 2018-06-22 ASSESSMENT — PAIN DESCRIPTION - LOCATION: LOCATION: ANKLE

## 2018-06-22 ASSESSMENT — PAIN DESCRIPTION - DESCRIPTORS: DESCRIPTORS: ACHING

## 2018-06-22 ASSESSMENT — PAIN SCALES - GENERAL
PAINLEVEL_OUTOF10: 10
PAINLEVEL_OUTOF10: 5
PAINLEVEL_OUTOF10: 9
PAINLEVEL_OUTOF10: 7

## 2018-06-22 ASSESSMENT — PAIN DESCRIPTION - ONSET: ONSET: ON-GOING

## 2018-06-22 ASSESSMENT — PAIN DESCRIPTION - PROGRESSION: CLINICAL_PROGRESSION: GRADUALLY IMPROVING

## 2018-06-22 ASSESSMENT — PAIN DESCRIPTION - PAIN TYPE: TYPE: ACUTE PAIN

## 2018-06-22 ASSESSMENT — PAIN DESCRIPTION - ORIENTATION: ORIENTATION: RIGHT

## 2018-06-22 ASSESSMENT — PAIN DESCRIPTION - FREQUENCY: FREQUENCY: CONTINUOUS

## 2018-06-22 NOTE — PROGRESS NOTES
Physical THerapy  Name: Jennie Peñaloza  MRN:  [id number]  Date of service:  6/22/2018 06/22/18 1155   Restrictions/Precautions   Restrictions/Precautions Fall Risk;Weight Bearing   Required Braces or Orthoses? Yes   Lower Extremity Weight Bearing Restrictions   Right Lower Extremity Weight Bearing Non Weight Bearing   Left Lower Extremity Weight Bearing Weight Bearing As Tolerated   Upper Extremity Weight Bearing Restrictions   Right Upper Extremity Weight Bearing Non Weight Bearing   Left Upper Extremity Weight Bearing Weight Bearing As Tolerated   Required Braces or Orthoses   Right Upper Extremity Brace/Splint radial fx splint   General   Additional Pertinent Hx OUTPATIENT RIGHT BIMALLEOLAR ANKLE FX ORIF, RIGHT RADIAL FX ORIF. FALL AT HOME. READMIT FOR I&D, ORIF REVISION   Subjective   Subjective Patient agreeable to work with therapy   Bed Mobility   Rolling Independent   Supine to Sit Independent   Sit to Supine Independent   Scooting Independent   Transfers   Sit to Stand Minimal Assistance;Contact guard assistance   Stand to sit Minimal Assistance;Contact guard assistance   Bed to Chair Minimal assistance;Contact guard assistance   Stand Pivot Transfers Contact guard assistance  (less assist required with transfer to left side)   Ambulation   Ambulation? No   Ambulation 1   Comments Unable to amb due to NWB RUE and RLE. Propulsion 1   Propulsion Manual   Level Level Tile   Method LUE;LLE   Level of Assistance Stand by assistance   Description/ Details Pt propelled W/C well w/ minimal cues for techniques.    Distance 100'   Exercises   Comments Supine mat ex's x 20 reps/ 2 sets, seated ex's x 20 reps, red tband and ball squeezes x 20 reps, all AROM   Other Activities   Comment several pivot transfers, patient rocks for momentum to stand   Short term goals   Time Frame for Short term goals 1 WEEK   Short term goal 1 TF FIM 4   Short term goal 2 WC FIM 2   Short term goal 3 HEP SBA   Long term goals Time Frame for Long term goals  2-3 WEEKS   Long term goal 1 TF FIM 6   Long term goal 2 AMB FIM 1   Long term goal 3 WC FIM 6   Long term goal 4 STEP FIM 1   Long term goal 5 HEP INDEP   Conditions Requiring Skilled Therapeutic Intervention   Body structures, Functions, Activity limitations Decreased functional mobility ; Decreased ADL status; Decreased strength;Decreased endurance   Assessment Patient does well with ex's. Assisted patient to room and left with all needs in reach.    Treatment Diagnosis INTERFERENCE WITH ACTIVITY   Prognosis Good       Electronically signed by Haylee Yap PTA on 6/22/2018 at 12:00 PM

## 2018-06-22 NOTE — PLAN OF CARE
Problem: Falls - Risk of:  Goal: Will remain free from falls  Will remain free from falls   Outcome: Ongoing    Goal: Absence of physical injury  Absence of physical injury   Outcome: Ongoing      Problem: Risk for Impaired Skin Integrity  Goal: Tissue integrity - skin and mucous membranes  Structural intactness and normal physiological function of skin and  mucous membranes.    Outcome: Ongoing      Problem: Pain:  Goal: Pain level will decrease  Pain level will decrease   Outcome: Ongoing    Goal: Control of acute pain  Control of acute pain   Outcome: Ongoing    Goal: Control of chronic pain  Control of chronic pain   Outcome: Ongoing      Problem: Mood - Altered:  Goal: Mood stable  Mood stable   Outcome: Ongoing      Problem: Bleeding:  Goal: Will show no signs and symptoms of excessive bleeding  Will show no signs and symptoms of excessive bleeding   Outcome: Ongoing      Problem: Serum Glucose Level - Abnormal:  Goal: Ability to maintain appropriate glucose levels has stabilized  Ability to maintain appropriate glucose levels has stabilized   Outcome: Ongoing      Problem: Anxiety:  Goal: Level of anxiety will decrease  Level of anxiety will decrease   Outcome: Ongoing

## 2018-06-22 NOTE — PROGRESS NOTES
Patient:   Julian Hawkins  MR#:    [id number]   Room:    15/815-01   YOB: 1960  Date of Progress Note: 6/22/2018  Time of Note                           8:11 AM  Consulting Physician:   Lynne Rodrigues M.D. Attending Physician:  Lynne Rodrigues MD     Chief complaint Status post fracture of the right lower leg and right radius     S:This 63 y.o. female  who had an ORIF of a right closed displaced bimalleolar ankle fracture, as well as closed treatment of a two-part extra-articular distal radius fracture by Dr. Linda Martin as outpatient on 6/11/18. She was discharged home. On 6/12/18 she presented to [facility] ER after having a fall at home while trying to do a pivot transfer. She had immediate onset of pain and bleeding from her surgical site on her ankle. X-rays revealed her to have an open fracture dislocation of her right ankle with displacment of hardware screws. She was admitted by Dr. Linda Martin for I&D of her open wound and revision ORIF. She tolerated the procedure well. She will be non-weight bearing on her right LE, as well as her right UE. She has had post op anemia but hasn't yet required transfusion. She continues to have signifigant pain rating 8/10. She is participating in both PT/OT. She is felt to need a stay on Rehab to work on safe technique for transfers and wheelchair level ADLs.  She is now felt ready to start the Rehab program. No acute issues overnight    REVIEW OF SYSTEMS:  Constitutional: No fevers No chills  Neck:No stiffness  Respiratory: No shortness of breath  Cardiovascular: No chest pain No palpitations  Gastrointestinal: No abdominal pain    Genitourinary: No Dysuria  Neurological: No headache, no confusion    Past Medical History:      Diagnosis Date    Anxiety     Arthritis     Bipolar 1 disorder ([facility] [address]     CAD (coronary artery disease)     CHF (congestive heart failure) (HCC)     Chronic kidney disease (CKD) stage G3b/A2, moderately decreased glomerular filtration rate insulin glargine (LANTUS) injection vial 10 Units, 10 Units, Subcutaneous, Nightly, Daphnie Vang MD, 10 Units at 06/21/18 2149    ondansetron (ZOFRAN) tablet 4 mg, 4 mg, Oral, Q6H PRN, Daphnie Vang MD, 4 mg at 06/18/18 2056    valsartan (DIOVAN) tablet 160 mg, 160 mg, Oral, Nightly, Daphnie Vang MD, 160 mg at 06/21/18 2051    polyethylene glycol (GLYCOLAX) packet 17 g, 17 g, Oral, Daily, Daphnie Vang MD, 17 g at 06/21/18 0849    acetaminophen (TYLENOL) tablet 650 mg, 650 mg, Oral, Q4H PRN, Sydnee Crawford MD, 650 mg at 06/18/18 1009    aspirin EC tablet 325 mg, 325 mg, Oral, BID, Sydnee Crawford MD, 325 mg at 06/21/18 2051    docusate sodium (COLACE) capsule 100 mg, 100 mg, Oral, BID, Sydnee Crawford MD, 100 mg at 06/21/18 2052    amitriptyline (ELAVIL) tablet 150 mg, 150 mg, Oral, Nightly, Sydnee Crawford MD, 150 mg at 06/21/18 2051    furosemide (LASIX) tablet 40 mg, 40 mg, Oral, Daily, Sydnee Crawford MD, 40 mg at 06/21/18 0850    QUEtiapine (SEROQUEL) tablet 200 mg, 200 mg, Oral, Nightly, Sydnee Crawford MD, 200 mg at 06/21/18 2051    tiZANidine (ZANAFLEX) tablet 4 mg, 4 mg, Oral, Q6H PRN, Sydnee Crawford MD, 4 mg at 06/22/18 0603    enoxaparin (LOVENOX) injection 40 mg, 40 mg, Subcutaneous, Daily, Sydnee Crawford MD, 40 mg at 06/21/18 6801    atorvastatin (LIPITOR) tablet 40 mg, 40 mg, Oral, Daily, Sydnee Crawford MD, 40 mg at 06/21/18 0851    dextrose 5 % solution, 100 mL/hr, Intravenous, PRN, Sydnee Crawford MD    diazepam (VALIUM) tablet 5 mg, 5 mg, Oral, Nightly PRN, Sydnee Crawford MD, 5 mg at 06/19/18 1954    DULoxetine (CYMBALTA) extended release capsule 60 mg, 60 mg, Oral, Daily, Sydnee Crawford MD, 60 mg at 06/21/18 0851    fluticasone (FLONASE) 50 MCG/ACT nasal spray 1 spray, 1 spray, Each Nare, Daily, Sydnee Crawford MD, 1 spray at 06/21/18 0852    glucagon (rDNA) injection 1 mg, 1 mg, Intramuscular, PRN, Sydnee Crawford MD    glucose (GLUTOSE) 40 % oral gel 15 g, 15 g, Oral, PRN, Sydnee Crawford MD    insulin lispro (HUMALOG) Moderate Assistance  (PULL TO STAND )   Bed to Chair Maximum assistance; Moderate assistance   Other exercises   Other exercises? Yes   Other exercises 1 SITTING BILAT LE HIP FLEX/EXT, ABD/ADD; KNEE EXT; PF/DF X 10 EACH WITH MANUAL RESISTANCE. Other exercises 2 STATIC STANCE VERTICAL BAR X3 MIN   Conditions Requiring Skilled Therapeutic Intervention   Body structures, Functions, Activity limitations Decreased functional mobility ; Decreased ADL status; Decreased ROM; Decreased strength;Decreased endurance;Decreased balance   Assessment IMPROVED BILAT LE ROM THIS PM.  TRANSFER TRAINING.   (TRANSFERED PLINTH TO CHAIR BEFORE THERAPIST READY. MAX A)            RECORD REVIEW: Previous medical records, medications were reviewed at today's visit    IMPRESSION:     1. Status post fracture of the right lower leg/right radius-pain control/nonweightbearing/DVT prophylaxis  2. Insomnia/mood disorderon medications  3. Coronary artery disease -on aspirin/statin  4. GIbowel regimen/proton pump inhibitor  5. Neuropathy-Cymbalta/Lyrica  6. Diabetesmonitor blood sugar  7. Hypertensionon medications continue to monitor  8. Hypothyroidism test on Synthroid  9. Pain controlNorco as needed  10.  PT/OT  Diflucan for yeast infection      Acute renal failure- renal consult/renal US/Fluid rest    June 28th    Expected duration and frequency therapy: 180 minutes per day, 5 days per week    [address] CELL  Dr Noah Victoria

## 2018-06-22 NOTE — CONSULTS
Inpatient consult to Nephrology  Consult performed by: Jimena BROUSSARD  Consult ordered by: Favio Espinoza  Assessment/Recommendations: Nephrology [facility]) Consult Note      Patient:  Joe Riggins  YOB: 1960  Date of Service: 6/22/2018  MRN: [id number]   Acct: [de-identified]   Primary Care Physician: Nicolette Donato DO  Advance Directive: Full Code  Admit Date: 6/14/2018       Hospital Day: 8  Referring Provider: Daphnie Vang MD    Patient independently seen and examined, Chart, Consults, Notes, Operative notes, Labs, Cardiology, and Radiology studies reviewed as able. Result consultation: Hyponatremia and acute kidney injury. Subjective:  Joe Riggins is a 63 y.o. female  whom we were consulted for acute kidney injury/hyponatremia/chronic kidney disease. She has stage IV chronic kidney disease and follows me in the office her baseline GFR is 27 mL. Patient has recently fallen and has bimalleolar fracture of her right ankle, underwent internal fixation by Dr. Ana Pack. Postoperatively she was admitted to rehab floor for rehabilitation as well as occupational therapy. Incidental finding on the labs shows serum sodium is 127. Apparently she is drinking lots of water. She is also taking SSRI for the treatment of depression. Patient also suffered acute kidney injury with a creatinine now 2.8 mg.     Allergies:  Dilaudid (hydromorphone hcl)    Medicines:  Current Facility-Administered Medications:  (START ON 6/23/2018) furosemide (LASIX) tablet 20 mg, 20 mg, Oral, Daily, Reece Palma MD  sodium bicarbonate tablet 650 mg, 650 mg, Oral, TID, Reece Palma MD  metoprolol succinate (TOPROL XL) extended release tablet 75 mg, 75 mg, Oral, Daily, Daphnie Vang MD, 75 mg at 06/22/18 0837  insulin glargine (LANTUS) injection vial 10 Units, 10 Units, Subcutaneous, Nightly, Daphnie Vang MD, 10 Units at 06/21/18 7385  ondansetron (ZOFRAN) tablet 4 mg, 4 mg, Oral, Q6H PRN, Charly Brandon MD, 4 mg at 06/18/18 2056  valsartan (DIOVAN) tablet 160 mg, 160 mg, Oral, Nightly, Charly Brandon MD, 160 mg at 06/21/18 2051  polyethylene glycol (GLYCOLAX) packet 17 g, 17 g, Oral, Daily, Charly Brandon MD, 17 g at 06/22/18 0844  acetaminophen (TYLENOL) tablet 650 mg, 650 mg, Oral, Q4H PRN, Farhat Palacios MD, 650 mg at 06/18/18 1009  aspirin EC tablet 325 mg, 325 mg, Oral, BID, Farhat Palacios MD, 325 mg at 06/22/18 0844  docusate sodium (COLACE) capsule 100 mg, 100 mg, Oral, BID, Farhat Palacios MD, 100 mg at 06/22/18 0843  amitriptyline (ELAVIL) tablet 150 mg, 150 mg, Oral, Nightly, Farhat Palacios MD, 150 mg at 06/21/18 2051  QUEtiapine (SEROQUEL) tablet 200 mg, 200 mg, Oral, Nightly, Farhat Palacios MD, 200 mg at 06/21/18 2051  tiZANidine (ZANAFLEX) tablet 4 mg, 4 mg, Oral, Q6H PRN, Farhat Palacios MD, 4 mg at 06/22/18 0603  enoxaparin (LOVENOX) injection 40 mg, 40 mg, Subcutaneous, Daily, Farhat Palacios MD, 40 mg at 06/22/18 0846  atorvastatin (LIPITOR) tablet 40 mg, 40 mg, Oral, Daily, Farhat Palacios MD, 40 mg at 06/22/18 0843  dextrose 5 % solution, 100 mL/hr, Intravenous, PRN, Farhat Palacios MD  diazepam (VALIUM) tablet 5 mg, 5 mg, Oral, Nightly PRN, Farhat Palacios MD, 5 mg at 06/19/18 1954  DULoxetine (CYMBALTA) extended release capsule 60 mg, 60 mg, Oral, Daily, Farhat Palacios MD, 60 mg at 06/22/18 0844  fluticasone (FLONASE) 50 MCG/ACT nasal spray 1 spray, 1 spray, Each Nare, Daily, Farhat Palacios MD, 1 spray at 06/22/18 0845  glucagon (rDNA) injection 1 mg, 1 mg, Intramuscular, PRN, Farhat Palacios MD  glucose (GLUTOSE) 40 % oral gel 15 g, 15 g, Oral, PRN, Farhat Palacios MD  insulin lispro (HUMALOG) injection vial 0-12 Units, 0-12 Units, Subcutaneous, TID WC, Farhat Palacios MD, 6 Units at 06/22/18 0849  insulin lispro (HUMALOG) injection vial 0-6 Units, 0-6 Units, Subcutaneous, Nightly, Farhat Palacios MD, 4 Units at 06/21/18 2150  lamoTRIgine (LAMICTAL) tablet 50 mg, 50 mg, Oral, BID, Farhat Palacios MD, 50 mg at 06/22/18 0843  levothyroxine Types: Snuff    Alcohol use: No              Drug use: No              Sexual activity: No                   Other Topics            Concern    None on file    Social History Narrative    None on file          Review of Systems:  History obtained from chart review and the patient  General ROS: No fever or chills  Respiratory ROS: positive for - shortness of breath  Cardiovascular ROS: No chest pain or palpitations  Gastrointestinal ROS: No abdominal pain or melena  Genito-Urinary ROS: No dysuria or hematuria  Musculoskeletal ROS: Complaining of pain at right ankle  14 point ROS reviewed with the patient and negative except as noted above and in the HPI unless unable to obtain. Objective:  Blood pressure 122/80, pulse 71, temperature 97.4 °F (36.3 °C), temperature source Temporal, resp. rate 18, height 5' 1\" (1.549 m), weight 237 lb 0.3 oz (107.5 kg), SpO2 96 %, not currently breastfeeding. Intake/Output Summary (Last 24 hours) at 06/22/18 1049  Last data filed at 06/22/18 0943          Gross per 24 hour  Intake:             1080 ml  Output:                0 ml  Net   :             1080 ml  General: awake/alert    HEENT: Normocephalic atraumatic head  Neck: Supple with no JVD or carotid bruits. Chest:  clear to auscultation bilaterally without respiratory distress  CVS: regular rate and rhythm  Abdominal: soft, nontender, normal bowel sounds  Extremities: Right ankle has cast around it.   Skin: warm and dry without rash      Labs:  BMP:                 06/20/18 06/21/18 06/22/18                       0440          0533          0511          NA           133*         132*         127*          K            4.4          5.2*         4.5           CL           94*          92*          88*           CO2          25           22           20*           BUN          28*          35*          48*           CREATININE   1.6*         2.3*         2.8*          CALCIUM      9.0          9.0          8.7

## 2018-06-23 LAB
ALBUMIN SERPL-MCNC: 2.7 G/DL (ref 3.5–5.2)
ALP BLD-CCNC: 165 U/L (ref 35–104)
ALT SERPL-CCNC: 28 U/L (ref 5–33)
ANION GAP SERPL CALCULATED.3IONS-SCNC: 16 MMOL/L (ref 7–19)
AST SERPL-CCNC: 64 U/L (ref 5–32)
BILIRUB SERPL-MCNC: 0.6 MG/DL (ref 0.2–1.2)
BUN BLDV-MCNC: 54 MG/DL (ref 6–20)
CALCIUM SERPL-MCNC: 8.7 MG/DL (ref 8.6–10)
CHLORIDE BLD-SCNC: 92 MMOL/L (ref 98–111)
CO2: 19 MMOL/L (ref 22–29)
CREAT SERPL-MCNC: 2.4 MG/DL (ref 0.5–0.9)
GFR NON-AFRICAN AMERICAN: 21
GLUCOSE BLD-MCNC: 188 MG/DL (ref 74–109)
GLUCOSE BLD-MCNC: 189 MG/DL (ref 70–99)
GLUCOSE BLD-MCNC: 276 MG/DL (ref 70–99)
GLUCOSE BLD-MCNC: 349 MG/DL (ref 70–99)
GLUCOSE BLD-MCNC: 90 MG/DL (ref 70–99)
OSMOLALITY URINE: 203 MOSM/KG (ref 250–1200)
PERFORMED ON: ABNORMAL
PERFORMED ON: NORMAL
POTASSIUM SERPL-SCNC: 4.6 MMOL/L (ref 3.5–5)
SODIUM BLD-SCNC: 127 MMOL/L (ref 136–145)
SODIUM URINE: 42 MMOL/L
TOTAL PROTEIN: 6.5 G/DL (ref 6.6–8.7)

## 2018-06-23 PROCEDURE — 80053 COMPREHEN METABOLIC PANEL: CPT

## 2018-06-23 PROCEDURE — 84300 ASSAY OF URINE SODIUM: CPT

## 2018-06-23 PROCEDURE — 6370000000 HC RX 637 (ALT 250 FOR IP): Performed by: PSYCHIATRY & NEUROLOGY

## 2018-06-23 PROCEDURE — 1180000000 HC REHAB R&B

## 2018-06-23 PROCEDURE — 82948 REAGENT STRIP/BLOOD GLUCOSE: CPT

## 2018-06-23 PROCEDURE — 6370000000 HC RX 637 (ALT 250 FOR IP): Performed by: ORTHOPAEDIC SURGERY

## 2018-06-23 PROCEDURE — 6370000000 HC RX 637 (ALT 250 FOR IP): Performed by: INTERNAL MEDICINE

## 2018-06-23 PROCEDURE — 36415 COLL VENOUS BLD VENIPUNCTURE: CPT

## 2018-06-23 PROCEDURE — 97110 THERAPEUTIC EXERCISES: CPT

## 2018-06-23 PROCEDURE — 6360000002 HC RX W HCPCS: Performed by: ORTHOPAEDIC SURGERY

## 2018-06-23 PROCEDURE — 99232 SBSQ HOSP IP/OBS MODERATE 35: CPT | Performed by: PSYCHIATRY & NEUROLOGY

## 2018-06-23 PROCEDURE — 83935 ASSAY OF URINE OSMOLALITY: CPT

## 2018-06-23 RX ORDER — SODIUM BICARBONATE 650 MG/1
1300 TABLET ORAL 3 TIMES DAILY
Status: DISCONTINUED | OUTPATIENT
Start: 2018-06-23 | End: 2018-06-26

## 2018-06-23 RX ADMIN — PANTOPRAZOLE SODIUM 40 MG: 40 TABLET, DELAYED RELEASE ORAL at 07:44

## 2018-06-23 RX ADMIN — ATORVASTATIN CALCIUM 40 MG: 40 TABLET, FILM COATED ORAL at 07:44

## 2018-06-23 RX ADMIN — INSULIN LISPRO 2 UNITS: 100 INJECTION, SOLUTION INTRAVENOUS; SUBCUTANEOUS at 07:46

## 2018-06-23 RX ADMIN — DULOXETINE HYDROCHLORIDE 60 MG: 60 CAPSULE, DELAYED RELEASE ORAL at 07:44

## 2018-06-23 RX ADMIN — DOCUSATE SODIUM 100 MG: 100 CAPSULE, LIQUID FILLED ORAL at 07:43

## 2018-06-23 RX ADMIN — FLUTICASONE PROPIONATE 1 SPRAY: 50 SPRAY, METERED NASAL at 07:44

## 2018-06-23 RX ADMIN — INSULIN GLARGINE 10 UNITS: 100 INJECTION, SOLUTION SUBCUTANEOUS at 21:34

## 2018-06-23 RX ADMIN — AMITRIPTYLINE HYDROCHLORIDE 150 MG: 75 TABLET, FILM COATED ORAL at 21:34

## 2018-06-23 RX ADMIN — DOCUSATE SODIUM 100 MG: 100 CAPSULE, LIQUID FILLED ORAL at 21:34

## 2018-06-23 RX ADMIN — LAMOTRIGINE 50 MG: 25 TABLET ORAL at 07:45

## 2018-06-23 RX ADMIN — VALSARTAN 80 MG: 80 TABLET ORAL at 21:33

## 2018-06-23 RX ADMIN — HYDROCODONE BITARTRATE AND ACETAMINOPHEN 1 TABLET: 10; 325 TABLET ORAL at 12:17

## 2018-06-23 RX ADMIN — INSULIN LISPRO 8 UNITS: 100 INJECTION, SOLUTION INTRAVENOUS; SUBCUTANEOUS at 12:12

## 2018-06-23 RX ADMIN — FUROSEMIDE 20 MG: 20 TABLET ORAL at 07:52

## 2018-06-23 RX ADMIN — SODIUM BICARBONATE 650 MG: 650 TABLET ORAL at 07:44

## 2018-06-23 RX ADMIN — LEVOTHYROXINE SODIUM 88 MCG: 88 TABLET ORAL at 05:51

## 2018-06-23 RX ADMIN — SODIUM BICARBONATE 1300 MG: 650 TABLET ORAL at 14:19

## 2018-06-23 RX ADMIN — METOPROLOL SUCCINATE 75 MG: 50 TABLET, EXTENDED RELEASE ORAL at 07:43

## 2018-06-23 RX ADMIN — ENOXAPARIN SODIUM 40 MG: 40 INJECTION SUBCUTANEOUS at 07:45

## 2018-06-23 RX ADMIN — LAMOTRIGINE 50 MG: 25 TABLET ORAL at 21:34

## 2018-06-23 RX ADMIN — HYDROCODONE BITARTRATE AND ACETAMINOPHEN 1 TABLET: 10; 325 TABLET ORAL at 07:41

## 2018-06-23 RX ADMIN — PREGABALIN 150 MG: 150 CAPSULE ORAL at 07:43

## 2018-06-23 RX ADMIN — HYDROCODONE BITARTRATE AND ACETAMINOPHEN 1 TABLET: 10; 325 TABLET ORAL at 17:27

## 2018-06-23 RX ADMIN — ASPIRIN 325 MG: 325 TABLET, DELAYED RELEASE ORAL at 07:44

## 2018-06-23 RX ADMIN — SODIUM BICARBONATE 1300 MG: 650 TABLET ORAL at 21:33

## 2018-06-23 RX ADMIN — QUETIAPINE FUMARATE 200 MG: 100 TABLET ORAL at 21:33

## 2018-06-23 RX ADMIN — ASPIRIN 325 MG: 325 TABLET, DELAYED RELEASE ORAL at 21:33

## 2018-06-23 ASSESSMENT — PAIN SCALES - GENERAL
PAINLEVEL_OUTOF10: 9
PAINLEVEL_OUTOF10: 7
PAINLEVEL_OUTOF10: 7
PAINLEVEL_OUTOF10: 10
PAINLEVEL_OUTOF10: 8
PAINLEVEL_OUTOF10: 7
PAINLEVEL_OUTOF10: 10

## 2018-06-23 ASSESSMENT — PAIN DESCRIPTION - DESCRIPTORS: DESCRIPTORS: ACHING

## 2018-06-23 ASSESSMENT — PAIN DESCRIPTION - LOCATION: LOCATION: ANKLE;LEG

## 2018-06-23 ASSESSMENT — PAIN DESCRIPTION - FREQUENCY: FREQUENCY: CONTINUOUS

## 2018-06-23 ASSESSMENT — PAIN DESCRIPTION - ONSET: ONSET: ON-GOING

## 2018-06-23 ASSESSMENT — PAIN DESCRIPTION - PROGRESSION: CLINICAL_PROGRESSION: GRADUALLY WORSENING

## 2018-06-23 ASSESSMENT — PAIN DESCRIPTION - PAIN TYPE: TYPE: ACUTE PAIN

## 2018-06-23 ASSESSMENT — PAIN DESCRIPTION - ORIENTATION: ORIENTATION: RIGHT

## 2018-06-23 NOTE — PROGRESS NOTES
Patient:   Hiren Wolff  MR#:    [id number]   Room:    15/815-01   YOB: 1960  Date of Progress Note: 6/23/2018  Time of Note                           8:15 AM  Consulting Physician:   Khushi Hedrick M.D. Attending Physician:  Khushi Hedrick MD     Chief complaint Status post fracture of the right lower leg and right radius     S:This 63 y.o. female  who had an ORIF of a right closed displaced bimalleolar ankle fracture, as well as closed treatment of a two-part extra-articular distal radius fracture by Dr. Ludin Yang as outpatient on 6/11/18. She was discharged home. On 6/12/18 she presented to [facility] ER after having a fall at home while trying to do a pivot transfer. She had immediate onset of pain and bleeding from her surgical site on her ankle. X-rays revealed her to have an open fracture dislocation of her right ankle with displacment of hardware screws. She was admitted by Dr. Ludin Yang for I&D of her open wound and revision ORIF. She tolerated the procedure well. She will be non-weight bearing on her right LE, as well as her right UE. She has had post op anemia but hasn't yet required transfusion. She continues to have signifigant pain rating 8/10. She is participating in both PT/OT. She is felt to need a stay on Rehab to work on safe technique for transfers and wheelchair level ADLs.  She is now felt ready to start the Rehab program. No new issues overnight    REVIEW OF SYSTEMS:  Constitutional: No fevers No chills  Neck:No stiffness  Respiratory: No shortness of breath  Cardiovascular: No chest pain No palpitations  Gastrointestinal: No abdominal pain    Genitourinary: No Dysuria  Neurological: No headache, no confusion    Past Medical History:      Diagnosis Date    Anxiety     Arthritis     [facility] 1 [facility] ([facility] [address]     CAD (coronary artery disease)     CHF (congestive heart failure) (HCC)     Chronic kidney disease (CKD) stage G3b/A2, moderately decreased glomerular filtration rate (GFR) between 30-44 mL/min/1.73 square meter and albuminuria creatinine ratio between  mg/g 2016    Depression     DM (diabetes mellitus) ([address]     GERD (gastroesophageal reflux disease)     History of blood transfusion     History of suicidal ideation     Hyperlipidemia     Hypertension     Hypothyroidism     Insomnia     Kidney disease     stage 3 failure    MI, old 2005    Obesity     Polyarticular arthritis     Type II or unspecified type diabetes mellitus without mention of complication, not stated as uncontrolled        Past Surgical History:      Procedure Laterality Date    ABDOMINOPLASTY      ANGIOPLASTY  05    stent placement to LAD and diagonal with normal left vent function    BREAST REDUCTION SURGERY      CARDIAC CATHETERIZATION  05, 05    see report  Stents x3    CARDIAC CATHETERIZATION  3/23/15  JDT    EF 50%    CARPAL TUNNEL RELEASE       SECTION      x2    CHOLECYSTECTOMY      GASTRIC BYPASS SURGERY      HERNIA REPAIR      umbilical with mesh    INTRACAPSULAR CATARACT EXTRACTION Left 2016    LT EYE CATARACT EMULSIFICATION IOL IMPLANT performed by Ivory De León MD at [address] [place] [address] Right 2018    ORIF RIGHT BIMALLEOLAR ANKLE FRACTURE, RIGHT WRIST CAST REMOVAL performed by John Mccullough MD at [address] [place] [address] Right 2018    ANKLE OPEN REDUCTION INTERNAL FIXATION performed by John Mccullough MD at [place] [address] Left 2017    KNEE TOTAL ARTHROPLASTY performed by Michael Parikh DO at [address] [place] [address] OR       Medications in Hospital:      Current Facility-Administered Medications:     furosemide (LASIX) tablet 20 mg, 20 mg, Oral, Daily, Alyne Collet, MD, 20 mg at 18 0752    sodium bicarbonate tablet 650 mg, 650 mg, Oral, TID, Alyne Collet, MD, 650 mg at 18 0744    valsartan (DIOVAN) tablet 80 mg, 80 mg, Oral, appears well-developed and well-nourished. Eyes  conjunctiva normal.   Ear, nose, throat - No scars, masses, or lesions over external nose or ears, no atrophy of tongue  Neck-symmetric, no masses noted, no jugular vein distension  Respiration- chest wall appears symmetric, good expansion,   normal effort without use of accessory muscles  Musculoskeletal  no significant wasting of muscles noted, no bony deformities Extremities-no clubbing, cyanosis or edema  Skin  warm, dry, and intact. No rash, erythema, or pallor. Psychiatric  mood, affect, and behavior appear normal.      Neurology  NEUROLOGICAL EXAM:      Mental status   Somnolent this am but responds appropriately       Cranial Nerves     CN III, IV,VI-EOMI, No nystagmus, conjugate eye movements, no ptosis    CN VII-no facial assymetry         Motor function  Antigravity x 4 limited RLE     Sensory function      Cerebellar      Tremor None present     Gait                  Not tested           Nursing/pcp notes, imaging,labs and vitals reviewed.      PT,OT and/or speech notes reviewed    Lab Results   Component Value Date    WBC 13.5 (H) 06/21/2018    HGB 10.1 (L) 06/21/2018    HCT 32.8 (L) 06/21/2018    MCV 97.6 06/21/2018     06/21/2018     Lab Results   Component Value Date     (L) 06/23/2018    K 4.6 06/23/2018    CL 92 (L) 06/23/2018    CO2 19 (L) 06/23/2018    BUN 54 (H) 06/23/2018    CREATININE 2.4 (H) 06/23/2018    GLUCOSE 188 (H) 06/23/2018    CALCIUM 8.7 06/23/2018    PROT 6.5 (L) 06/23/2018    LABALBU 2.7 (L) 06/23/2018    BILITOT 0.6 06/23/2018    ALKPHOS 165 (H) 06/23/2018    AST 64 (H) 06/23/2018    ALT 28 06/23/2018    LABGLOM 21 (A) 06/23/2018    GLOB 3.4 04/25/2017     Lab Results   Component Value Date    INR 1.00 06/12/2018    INR 1.03 06/23/2017    INR 1.04 06/05/2017    PROTIME 13.1 06/12/2018    PROTIME 13.4 06/23/2017    PROTIME 13.5 06/05/2017         06/15/18 1300   Transfers   Sit to Stand Moderate Assistance  (PULL

## 2018-06-23 NOTE — PROGRESS NOTES
(TYLENOL) tablet 650 mg  650 mg Oral Q4H PRN Magalene Level, MD   650 mg at 06/18/18 1009    aspirin EC tablet 325 mg  325 mg Oral BID Magalene Level, MD   325 mg at 06/23/18 0744    docusate sodium (COLACE) capsule 100 mg  100 mg Oral BID Magalene Level, MD   100 mg at 06/23/18 0743    amitriptyline (ELAVIL) tablet 150 mg  150 mg Oral Nightly Magalene Level, MD   150 mg at 06/22/18 2105    QUEtiapine (SEROQUEL) tablet 200 mg  200 mg Oral Nightly Magalene Level, MD   200 mg at 06/22/18 2105    tiZANidine (ZANAFLEX) tablet 4 mg  4 mg Oral Q6H PRN Magalene Level, MD   4 mg at 06/22/18 0603    enoxaparin (LOVENOX) injection 40 mg  40 mg Subcutaneous Daily Magalene Level, MD   40 mg at 06/23/18 0745    atorvastatin (LIPITOR) tablet 40 mg  40 mg Oral Daily Magalene Level, MD   40 mg at 06/23/18 0744    dextrose 5 % solution  100 mL/hr Intravenous PRN Magalene Level, MD        diazepam (VALIUM) tablet 5 mg  5 mg Oral Nightly PRN Magalene Level, MD   5 mg at 06/19/18 1954    DULoxetine (CYMBALTA) extended release capsule 60 mg  60 mg Oral Daily Magalene Level, MD   60 mg at 06/23/18 0744    fluticasone (FLONASE) 50 MCG/ACT nasal spray 1 spray  1 spray Each Nare Daily Magalene Level, MD   1 spray at 06/23/18 0744    glucagon (rDNA) injection 1 mg  1 mg Intramuscular PRN Magalene Level, MD        glucose (GLUTOSE) 40 % oral gel 15 g  15 g Oral PRN Magalene Level, MD        insulin lispro (HUMALOG) injection vial 0-12 Units  0-12 Units Subcutaneous TID WC Magalene Level, MD   2 Units at 06/23/18 0746    insulin lispro (HUMALOG) injection vial 0-6 Units  0-6 Units Subcutaneous Nightly Magalene Level, MD   4 Units at 06/21/18 2150    lamoTRIgine (LAMICTAL) tablet 50 mg  50 mg Oral BID Magalene Level, MD   50 mg at 06/23/18 0745    levothyroxine (SYNTHROID) tablet 88 mcg  88 mcg Oral Daily Magalene MD Mariya   88 mcg at 06/23/18 0551    linaclotide (LINZESS) capsule 290 mcg  290 mcg Oral PRN Magalene MD Mariya   290 mcg at 06/16/18 0805    pantoprazole (PROTONIX) tablet 40 mg  40 mg mesh    INTRACAPSULAR CATARACT EXTRACTION Left 7/11/2016    LT EYE CATARACT EMULSIFICATION IOL IMPLANT performed by Herman Harrell MD at [facility] OPEN TREATMENT BIMALLEOLAR ANKLE FRACTURE Right 6/11/2018    ORIF RIGHT BIMALLEOLAR ANKLE FRACTURE, RIGHT WRIST CAST REMOVAL performed by Filiberto Dee MD at [address] [place] [address] OPEN TREATMENT BIMALLEOLAR ANKLE FRACTURE Right 6/12/2018    ANKLE OPEN REDUCTION INTERNAL FIXATION performed by Filiberto Dee MD at [place] [address] Left 6/16/2017    KNEE TOTAL ARTHROPLASTY performed by Lang Telles DO at [facility] OR       Family History  Family History   Problem Relation Age of Onset    Depression Mother     Heart Attack Mother     High Blood Pressure Mother     Kidney Disease Father     Alcohol Abuse Father     Depression Father     Heart Attack Father     High Blood Pressure Father     Kidney Disease Brother     Heart Disease Other     Depression Sister        Social History  Social History     Social History    Marital status:      Spouse name: N/A    Number of children: N/A    Years of education: N/A     Occupational History    Not on file.      Social History Main Topics    Smoking status: Never Smoker    Smokeless tobacco: Former User     Types: Snuff    Alcohol use No    Drug use: No    Sexual activity: No     Other Topics Concern    Not on file     Social History Narrative    No narrative on file         Review of Systems:  History obtained from chart review and the patient  General ROS: No fever or chills  Respiratory ROS: No cough, shortness of breath, wheezing  Cardiovascular ROS: No chest pain or palpitations  Gastrointestinal ROS: No abdominal pain or melena  Genito-Urinary ROS: No dysuria or hematuria  Musculoskeletal ROS: Positive for weakness and fracture right ankle   14 point ROS reviewed with the patient and negative except as noted above and in the HPI unless unable to obtain. Objective:  Blood pressure 122/78, pulse 80, temperature 98.1 °F (36.7 °C), resp. rate 16, height 5' 1\" (1.549 m), weight 237 lb 0.3 oz (107.5 kg), SpO2 98 %, not currently breastfeeding. Intake/Output Summary (Last 24 hours) at 06/23/18 1107  Last data filed at 06/23/18 1003   Gross per 24 hour   Intake             1440 ml   Output                0 ml   Net             1440 ml     General: awake/alert   HEENT: Normocephalic atraumatic head  Neck: Supple with no JVD. Chest:  clear to auscultation bilaterally without respiratory distress  CVS: regular rate and rhythm  Abdominal: soft, nontender, normal bowel sounds/abdominal obesity. Extremities: Right ankle cast.  Skin: warm and dry without rash      Labs:  BMP: Recent Labs      06/21/18   0533  06/22/18   0511  06/23/18   0421   NA  132*  127*  127*   K  5.2*  4.5  4.6   CL  92*  88*  92*   CO2  22  20*  19*   BUN  35*  48*  54*   CREATININE  2.3*  2.8*  2.4*   CALCIUM  9.0  8.7  8.7     CBC: Recent Labs      06/21/18   0533   WBC  13.5*   HGB  10.1*   HCT  32.8*   MCV  97.6   PLT  317     LIVER PROFILE:   Recent Labs      06/21/18   0533  06/22/18   0511  06/23/18   0421   AST  38*  46*  64*   ALT  22  22  28   BILITOT  0.4  0.5  0.6   ALKPHOS  174*  174*  165*     PT/INR: No results for input(s): PROTIME, INR in the last 72 hours. APTT: No results for input(s): APTT in the last 72 hours. BNP:  No results for input(s): BNP in the last 72 hours. Ionized Calcium:No results for input(s): IONCA in the last 72 hours. Magnesium:No results for input(s): MG in the last 72 hours. Phosphorus:No results for input(s): PHOS in the last 72 hours. HgbA1C: No results for input(s): LABA1C in the last 72 hours.   Hepatic: Recent Labs      06/21/18   0533  06/22/18   0511  06/23/18   0421   ALKPHOS  174*  174*  165*   ALT  22  22  28   AST  38*  46*  64*   PROT  6.7  6.7  6.5*   BILITOT  0.4  0.5  0.6   LABALBU  3.1*  3.4*  2.7*     Lactic Acid: No results for input(s): LACTA in the last 72 hours. Troponin: No results for input(s): CKTOTAL, CKMB, TROPONINT in the last 72 hours. ABGs: No results for input(s): PH, PCO2, PO2, HCO3, O2SAT in the last 72 hours. CRP:  No results for input(s): CRP in the last 72 hours. Sed Rate:  No results for input(s): SEDRATE in the last 72 hours. Cultures:   No results for input(s): CULTURE in the last 72 hours. No results for input(s): BC, Terry Tl in the last 72 hours. No results for input(s): CXSURG in the last 72 hours. Radiology reports as per the Radiologist  Radiology: No results found. Assessment   1. HYPONATREMIA secondary to possible SIADH/SSRI. 2. Acute kidney injury secondary to acute tubular necrosis. 3. Stage IV chronic kidney disease baseline. 4. Diabetic nephropathy. 5. Insulin-dependent type II diabetes. 6. Metabolic acidemia. 8. Morbid abdominal obesity. 9. Anemia of chronic kidney disease. 10. Right ankle fracture. Plan:  1. Recommended water restriction. 2. Low-dose loop diuretics. 3. Sodium bicarbonate.       Nichole Adams MD  06/23/18  11:07 AM

## 2018-06-24 LAB
ALBUMIN SERPL-MCNC: 2.7 G/DL (ref 3.5–5.2)
ALP BLD-CCNC: 147 U/L (ref 35–104)
ALT SERPL-CCNC: 25 U/L (ref 5–33)
ANION GAP SERPL CALCULATED.3IONS-SCNC: 10 MMOL/L (ref 7–19)
AST SERPL-CCNC: 42 U/L (ref 5–32)
BILIRUB SERPL-MCNC: 0.4 MG/DL (ref 0.2–1.2)
BUN BLDV-MCNC: 49 MG/DL (ref 6–20)
CALCIUM SERPL-MCNC: 8.3 MG/DL (ref 8.6–10)
CHLORIDE BLD-SCNC: 95 MMOL/L (ref 98–111)
CO2: 25 MMOL/L (ref 22–29)
CREAT SERPL-MCNC: 2 MG/DL (ref 0.5–0.9)
GFR NON-AFRICAN AMERICAN: 26
GLUCOSE BLD-MCNC: 183 MG/DL (ref 70–99)
GLUCOSE BLD-MCNC: 209 MG/DL (ref 70–99)
GLUCOSE BLD-MCNC: 218 MG/DL (ref 74–109)
GLUCOSE BLD-MCNC: 227 MG/DL (ref 70–99)
GLUCOSE BLD-MCNC: 318 MG/DL (ref 70–99)
HCT VFR BLD CALC: 24.4 % (ref 37–47)
HEMOGLOBIN: 7.7 G/DL (ref 12–16)
MCH RBC QN AUTO: 30 PG (ref 27–31)
MCHC RBC AUTO-ENTMCNC: 31.6 G/DL (ref 33–37)
MCV RBC AUTO: 94.9 FL (ref 81–99)
PDW BLD-RTO: 14.8 % (ref 11.5–14.5)
PERFORMED ON: ABNORMAL
PLATELET # BLD: 247 K/UL (ref 130–400)
PMV BLD AUTO: 11.1 FL (ref 9.4–12.3)
POTASSIUM SERPL-SCNC: 4 MMOL/L (ref 3.5–5)
RBC # BLD: 2.57 M/UL (ref 4.2–5.4)
SODIUM BLD-SCNC: 130 MMOL/L (ref 136–145)
TOTAL PROTEIN: 5.5 G/DL (ref 6.6–8.7)
WBC # BLD: 5.8 K/UL (ref 4.8–10.8)

## 2018-06-24 PROCEDURE — 6360000002 HC RX W HCPCS: Performed by: ORTHOPAEDIC SURGERY

## 2018-06-24 PROCEDURE — 85027 COMPLETE CBC AUTOMATED: CPT

## 2018-06-24 PROCEDURE — 82948 REAGENT STRIP/BLOOD GLUCOSE: CPT

## 2018-06-24 PROCEDURE — 1180000000 HC REHAB R&B

## 2018-06-24 PROCEDURE — 6370000000 HC RX 637 (ALT 250 FOR IP): Performed by: PSYCHIATRY & NEUROLOGY

## 2018-06-24 PROCEDURE — 99232 SBSQ HOSP IP/OBS MODERATE 35: CPT | Performed by: PSYCHIATRY & NEUROLOGY

## 2018-06-24 PROCEDURE — 36415 COLL VENOUS BLD VENIPUNCTURE: CPT

## 2018-06-24 PROCEDURE — 6370000000 HC RX 637 (ALT 250 FOR IP): Performed by: INTERNAL MEDICINE

## 2018-06-24 PROCEDURE — 6370000000 HC RX 637 (ALT 250 FOR IP): Performed by: ORTHOPAEDIC SURGERY

## 2018-06-24 PROCEDURE — 80053 COMPREHEN METABOLIC PANEL: CPT

## 2018-06-24 RX ORDER — METOPROLOL SUCCINATE 50 MG/1
50 TABLET, EXTENDED RELEASE ORAL DAILY
Status: DISCONTINUED | OUTPATIENT
Start: 2018-06-25 | End: 2018-06-28 | Stop reason: HOSPADM

## 2018-06-24 RX ADMIN — ENOXAPARIN SODIUM 40 MG: 40 INJECTION SUBCUTANEOUS at 08:44

## 2018-06-24 RX ADMIN — SODIUM BICARBONATE 1300 MG: 650 TABLET ORAL at 08:42

## 2018-06-24 RX ADMIN — VALSARTAN 80 MG: 80 TABLET ORAL at 20:31

## 2018-06-24 RX ADMIN — METOPROLOL SUCCINATE 75 MG: 50 TABLET, EXTENDED RELEASE ORAL at 08:42

## 2018-06-24 RX ADMIN — INSULIN LISPRO 8 UNITS: 100 INJECTION, SOLUTION INTRAVENOUS; SUBCUTANEOUS at 16:53

## 2018-06-24 RX ADMIN — PREGABALIN 150 MG: 150 CAPSULE ORAL at 20:31

## 2018-06-24 RX ADMIN — DOCUSATE SODIUM 100 MG: 100 CAPSULE, LIQUID FILLED ORAL at 20:32

## 2018-06-24 RX ADMIN — FLUTICASONE PROPIONATE 1 SPRAY: 50 SPRAY, METERED NASAL at 08:41

## 2018-06-24 RX ADMIN — FUROSEMIDE 20 MG: 20 TABLET ORAL at 08:43

## 2018-06-24 RX ADMIN — DOCUSATE SODIUM 100 MG: 100 CAPSULE, LIQUID FILLED ORAL at 08:43

## 2018-06-24 RX ADMIN — ASPIRIN 325 MG: 325 TABLET, DELAYED RELEASE ORAL at 08:43

## 2018-06-24 RX ADMIN — INSULIN LISPRO 4 UNITS: 100 INJECTION, SOLUTION INTRAVENOUS; SUBCUTANEOUS at 12:05

## 2018-06-24 RX ADMIN — SODIUM BICARBONATE 1300 MG: 650 TABLET ORAL at 20:31

## 2018-06-24 RX ADMIN — HYDROCODONE BITARTRATE AND ACETAMINOPHEN 1 TABLET: 10; 325 TABLET ORAL at 13:02

## 2018-06-24 RX ADMIN — SODIUM BICARBONATE 1300 MG: 650 TABLET ORAL at 13:02

## 2018-06-24 RX ADMIN — LAMOTRIGINE 50 MG: 25 TABLET ORAL at 08:42

## 2018-06-24 RX ADMIN — ASPIRIN 325 MG: 325 TABLET, DELAYED RELEASE ORAL at 20:32

## 2018-06-24 RX ADMIN — DULOXETINE HYDROCHLORIDE 60 MG: 60 CAPSULE, DELAYED RELEASE ORAL at 08:43

## 2018-06-24 RX ADMIN — LEVOTHYROXINE SODIUM 88 MCG: 88 TABLET ORAL at 05:44

## 2018-06-24 RX ADMIN — HYDROCODONE BITARTRATE AND ACETAMINOPHEN 1 TABLET: 10; 325 TABLET ORAL at 17:01

## 2018-06-24 RX ADMIN — ATORVASTATIN CALCIUM 40 MG: 40 TABLET, FILM COATED ORAL at 08:43

## 2018-06-24 RX ADMIN — QUETIAPINE FUMARATE 200 MG: 100 TABLET ORAL at 20:31

## 2018-06-24 RX ADMIN — AMITRIPTYLINE HYDROCHLORIDE 150 MG: 75 TABLET, FILM COATED ORAL at 20:32

## 2018-06-24 RX ADMIN — PANTOPRAZOLE SODIUM 40 MG: 40 TABLET, DELAYED RELEASE ORAL at 08:43

## 2018-06-24 RX ADMIN — LAMOTRIGINE 50 MG: 25 TABLET ORAL at 20:31

## 2018-06-24 RX ADMIN — INSULIN LISPRO 2 UNITS: 100 INJECTION, SOLUTION INTRAVENOUS; SUBCUTANEOUS at 08:44

## 2018-06-24 RX ADMIN — HYDROCODONE BITARTRATE AND ACETAMINOPHEN 1 TABLET: 10; 325 TABLET ORAL at 08:43

## 2018-06-24 RX ADMIN — INSULIN GLARGINE 10 UNITS: 100 INJECTION, SOLUTION SUBCUTANEOUS at 20:32

## 2018-06-24 RX ADMIN — PREGABALIN 150 MG: 150 CAPSULE ORAL at 08:42

## 2018-06-24 ASSESSMENT — PAIN SCALES - GENERAL
PAINLEVEL_OUTOF10: 2
PAINLEVEL_OUTOF10: 8
PAINLEVEL_OUTOF10: 8
PAINLEVEL_OUTOF10: 2
PAINLEVEL_OUTOF10: 8
PAINLEVEL_OUTOF10: 2

## 2018-06-24 NOTE — PLAN OF CARE
Problem: Falls - Risk of:  Goal: Will remain free from falls  Will remain free from falls   Outcome: Ongoing  Bed alarm set, call light within reach, side rails up times two  Goal: Absence of physical injury  Absence of physical injury   Outcome: Ongoing  Bed alarm set, call light within reach, side rails up times two    Problem: Risk for Impaired Skin Integrity  Goal: Tissue integrity - skin and mucous membranes  Structural intactness and normal physiological function of skin and  mucous membranes. Outcome: Ongoing  Turn and reposition every 2 hours    Problem: Pain:  Goal: Pain level will decrease  Pain level will decrease   Outcome: Ongoing  Patient encouraged to ask for pain med as needed to control acute pain  Goal: Control of acute pain  Control of acute pain   Outcome: Ongoing  Patient encouraged to ask for pain med as needed to control acute pain  Goal: Control of chronic pain  Control of chronic pain   Outcome: Ongoing      Problem: Mood - Altered:  Goal: Mood stable  Mood stable   Outcome: Ongoing  Patients mood stable this shift    Problem: Serum Glucose Level - Abnormal:  Goal: Ability to maintain appropriate glucose levels has stabilized  Ability to maintain appropriate glucose levels has stabilized   Outcome: Ongoing      Problem: Anxiety:  Goal: Level of anxiety will decrease  Level of anxiety will decrease   Outcome: Ongoing  No signs of anxiety this shift.

## 2018-06-24 NOTE — PROGRESS NOTES
furosemide (LASIX) tablet 20 mg, 20 mg, Oral, Daily, Yen Ramirez MD, 20 mg at 06/24/18 0843    valsartan (DIOVAN) tablet 80 mg, 80 mg, Oral, Nightly, Yen Ramirez MD, 80 mg at 06/23/18 2133    insulin glargine (LANTUS) injection vial 10 Units, 10 Units, Subcutaneous, Nightly, Gus Martell MD, 10 Units at 06/23/18 2134    ondansetron (ZOFRAN) tablet 4 mg, 4 mg, Oral, Q6H PRN, Gus Martell MD, 4 mg at 06/18/18 2056    polyethylene glycol (GLYCOLAX) packet 17 g, 17 g, Oral, Daily, Gus Martell MD, 17 g at 06/22/18 0844    acetaminophen (TYLENOL) tablet 650 mg, 650 mg, Oral, Q4H PRN, Michael Wang MD, 650 mg at 06/18/18 1009    aspirin EC tablet 325 mg, 325 mg, Oral, BID, Michael Wang MD, 325 mg at 06/24/18 0843    docusate sodium (COLACE) capsule 100 mg, 100 mg, Oral, BID, Michael Wang MD, 100 mg at 06/24/18 0843    amitriptyline (ELAVIL) tablet 150 mg, 150 mg, Oral, Nightly, Michael Wang MD, 150 mg at 06/23/18 2134    QUEtiapine (SEROQUEL) tablet 200 mg, 200 mg, Oral, Nightly, Michael Wang MD, 200 mg at 06/23/18 2133    tiZANidine (ZANAFLEX) tablet 4 mg, 4 mg, Oral, Q6H PRN, Michael Wang MD, 4 mg at 06/22/18 0603    enoxaparin (LOVENOX) injection 40 mg, 40 mg, Subcutaneous, Daily, Michael Wang MD, 40 mg at 06/24/18 0844    atorvastatin (LIPITOR) tablet 40 mg, 40 mg, Oral, Daily, Michael Wang MD, 40 mg at 06/24/18 0843    dextrose 5 % solution, 100 mL/hr, Intravenous, PRN, Michael Wang MD    diazepam (VALIUM) tablet 5 mg, 5 mg, Oral, Nightly PRN, Michael Wang MD, 5 mg at 06/19/18 1954    DULoxetine (CYMBALTA) extended release capsule 60 mg, 60 mg, Oral, Daily, Michael Wang MD, 60 mg at 06/24/18 0843    fluticasone (FLONASE) 50 MCG/ACT nasal spray 1 spray, 1 spray, Each Nare, Daily, Michael Wang MD, 1 spray at 06/24/18 0841    glucagon (rDNA) injection 1 mg, 1 mg, Intramuscular, PRN, Michael Wang MD    glucose (GLUTOSE) 40 % oral gel 15 g, 15 g, Oral, PRN, Michael Wang MD    insulin lispro (HUMALOG) injection

## 2018-06-24 NOTE — PROGRESS NOTES
Nephrology ([facility]) Progress Note      Patient:  Mayank Triplett  YOB: 1960  Date of Service: 6/24/2018  MRN: [id number]   Acct: [de-identified]   Primary Care Physician: Lupillo Matthew DO  Advance Directive: Full Code  Admit Date: 6/14/2018       Hospital Day: 10  Referring Provider: Gonzalo Hawkins MD    Patient independently seen and examined, Chart, Consults, Notes, Operative notes, Labs, Cardiology, and Radiology studies reviewed as able. Subjective:  Mayank Triplett is a 63 y.o. female  whom we were consulted for hyponatremia/acute kidney injury/chronic kidney disease. Patient has stage IV chronic kidney disease and see his me in the office. Her baseline serum creatinine is 2.1 mg at GFR 25 mL. She has recently fallen and has bimalleolar fracture of right ankle. Patient underwent internal fixation. She is currently at rehab, receiving physical therapy and occupational therapy. Incidental finding on the lab her serum sodium was 127 mmol. Hospital course remarkable for fluid restriction, serum sodium has been improving slowly. Her renal function has improved to baseline also.     Allergies:  Dilaudid [hydromorphone hcl]    Medicines:  Current Facility-Administered Medications   Medication Dose Route Frequency Provider Last Rate Last Dose    [START ON 6/25/2018] metoprolol succinate (TOPROL XL) extended release tablet 50 mg  50 mg Oral Daily Gonzalo Hawkins MD        sodium bicarbonate tablet 1,300 mg  1,300 mg Oral TID Luz Marina Persaud MD   1,300 mg at 06/24/18 1302    furosemide (LASIX) tablet 20 mg  20 mg Oral Daily Luz Marina Persaud MD   20 mg at 06/24/18 0843    valsartan (DIOVAN) tablet 80 mg  80 mg Oral Nightly Luz Marina Persaud MD   80 mg at 06/23/18 2133    insulin glargine (LANTUS) injection vial 10 Units  10 Units Subcutaneous Nightly Gonzalo Hawkins MD   10 Units at 06/23/18 2134    ondansetron (ZOFRAN) tablet 4 mg  4 mg Oral Q6H PRN Gonzalo Hawkins MD   4 mg at 06/18/18 2056    polyethylene glycol (GLYCOLAX) packet 17 g  17 g Oral Daily Dione Pelletier MD   17 g at 06/22/18 0844    acetaminophen (TYLENOL) tablet 650 mg  650 mg Oral Q4H PRN Elmer Adan MD   650 mg at 06/18/18 1009    aspirin EC tablet 325 mg  325 mg Oral BID Elmer Adan MD   325 mg at 06/24/18 0843    docusate sodium (COLACE) capsule 100 mg  100 mg Oral BID Elmer Adan MD   100 mg at 06/24/18 0843    amitriptyline (ELAVIL) tablet 150 mg  150 mg Oral Nightly Elmer Adan MD   150 mg at 06/23/18 2134    QUEtiapine (SEROQUEL) tablet 200 mg  200 mg Oral Nightly Elmer Adan MD   200 mg at 06/23/18 2133    tiZANidine (ZANAFLEX) tablet 4 mg  4 mg Oral Q6H PRN Elmer Adan MD   4 mg at 06/22/18 0603    enoxaparin (LOVENOX) injection 40 mg  40 mg Subcutaneous Daily Elmer Adan MD   40 mg at 06/24/18 0844    atorvastatin (LIPITOR) tablet 40 mg  40 mg Oral Daily Elmer Adan MD   40 mg at 06/24/18 0843    dextrose 5 % solution  100 mL/hr Intravenous PRN Elmer Adan MD        diazepam (VALIUM) tablet 5 mg  5 mg Oral Nightly PRN Elmer Adan MD   5 mg at 06/19/18 1954    DULoxetine (CYMBALTA) extended release capsule 60 mg  60 mg Oral Daily Elmer Adan MD   60 mg at 06/24/18 0843    fluticasone (FLONASE) 50 MCG/ACT nasal spray 1 spray  1 spray Each Nare Daily Elmer Adan MD   1 spray at 06/24/18 0841    glucagon (rDNA) injection 1 mg  1 mg Intramuscular PRN Elmer Adan MD        glucose (GLUTOSE) 40 % oral gel 15 g  15 g Oral PRN Elmer Adan MD        insulin lispro (HUMALOG) injection vial 0-12 Units  0-12 Units Subcutaneous TID WC Elmer Adan MD   4 Units at 06/24/18 1205    insulin lispro (HUMALOG) injection vial 0-6 Units  0-6 Units Subcutaneous Nightly Elmer Adan MD   4 Units at 06/21/18 2150    lamoTRIgine (LAMICTAL) tablet 50 mg  50 mg Oral BID Elmer Adan MD   50 mg at 06/24/18 7987    levothyroxine (SYNTHROID) tablet 88 mcg  88 mcg Oral Daily Elmer Adan MD   88 mcg at 06/24/18 4248    linaclotide (LINZESS) capsule 290 mcg  290 mcg Oral PRN Carmelo Negron MD   290 mcg at 06/16/18 0805    pantoprazole (PROTONIX) tablet 40 mg  40 mg Oral Daily Carmelo Negron MD   40 mg at 06/24/18 0843    pregabalin (LYRICA) capsule 150 mg  150 mg Oral BID Carmelo Negron MD   150 mg at 06/24/18 0789    magnesium hydroxide (MILK OF MAGNESIA) 400 MG/5ML suspension 30 mL  30 mL Oral Daily PRN Katie Fraire MD        docusate sodium (COLACE) capsule 100 mg  100 mg Oral BID PRN Katie Fraire MD        bisacodyl (DULCOLAX) suppository 10 mg  10 mg Rectal Daily PRN Katie Fraire MD   10 mg at 06/18/18 1323    HYDROcodone-acetaminophen (Jacolyn Garcia)  MG per tablet 1 tablet  1 tablet Oral Q4H PRN Katie Fraire MD   1 tablet at 06/24/18 1302       Past Medical History:  Past Medical History:   Diagnosis Date    Anxiety     Arthritis     B[facility] 1 disorder ([facility]     CAD (coronary artery disease)     [facility]     Chronic kidney disease (CKD) stage G3b/A2, moderately decreased glomerular filtration rate (GFR) between 30-44 mL/min/1.73 square meter and albuminuria creatinine ratio between  mg/g 11/21/2016    Depression     DM (diabetes mellitus) ([facility]     GERD (gastroesophageal reflux disease)     History of blood transfusion     History of suicidal ideation     Hyperlipidemia     Hypertension     Hypothyroidism     Insomnia     Kidney disease     stage 3 failure    MI, 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    Obesity     Polyarticular arthritis     Type II or unspecified type diabetes mellitus without mention of complication, not stated as uncontrolled        Past Surgical History:  Past Surgical History:   Procedure Laterality Date    ABDOMINOPLASTY      ANGIOPLASTY  1/20/05    stent placement to LAD and diagonal with normal left vent function    BREAST REDUCTION SURGERY      CARDIAC CATHETERIZATION  2/07/05, 9/17/05    see report  Stents x3    CARDIAC CATHETERIZATION  3/23/15  JDT    EF 50%  CARPAL TUNNEL RELEASE       SECTION      x2    CHOLECYSTECTOMY      GASTRIC BYPASS SURGERY      HERNIA REPAIR      umbilical with mesh    INTRACAPSULAR CATARACT EXTRACTION Left 2016    LT EYE CATARACT EMULSIFICATION IOL IMPLANT performed by Drew Carmichael MD at [facility] OPEN TREATMENT BIMALLEOLAR ANKLE FRACTURE Right 2018    ORIF RIGHT BIMALLEOLAR ANKLE FRACTURE, RIGHT WRIST CAST REMOVAL performed by Leon Meredith MD at [address] [place] [address] OPEN TREATMENT BIMALLEOLAR ANKLE FRACTURE Right 2018    ANKLE OPEN REDUCTION INTERNAL FIXATION performed by Leon Meredith MD at [place] [address] Left 2017    KNEE TOTAL ARTHROPLASTY performed by [place] Jesus DO at [facility] OR       Family History  Family History   Problem Relation Age of Onset    Depression Mother     Heart Attack Mother     High Blood Pressure Mother     Kidney Disease Father     Alcohol Abuse Father     Depression Father     Heart Attack Father     High Blood Pressure Father     Kidney Disease Brother     Heart Disease Other     Depression Sister        Social History  Social History     Social History    Marital status:      Spouse name: N/A    Number of children: N/A    Years of education: N/A     Occupational History    Not on file.      Social History Main Topics    Smoking status: Never Smoker    Smokeless tobacco: Former User     Types: Snuff    Alcohol use No    Drug use: No    Sexual activity: No     Other Topics Concern    Not on file     Social History Narrative    No narrative on file         Review of Systems:  History obtained from chart review and the patient  General ROS: No fever or chills  Respiratory ROS: No cough, shortness of breath, wheezing  Cardiovascular ROS: No chest pain or palpitations  Gastrointestinal ROS: No abdominal pain or melena  Genito-Urinary ROS: No dysuria or hematuria  Musculoskeletal ROS: Positive for weakness and fracture right ankle   14 point ROS reviewed with the patient and negative except as noted above and in the HPI unless unable to obtain. Objective:  Blood pressure 133/74, pulse 89, temperature 98.1 °F (36.7 °C), temperature source Temporal, resp. rate 18, height 5' 1\" (1.549 m), weight 240 lb (108.9 kg), SpO2 100 %, not currently breastfeeding. Intake/Output Summary (Last 24 hours) at 06/24/18 1504  Last data filed at 06/24/18 1336   Gross per 24 hour   Intake              840 ml   Output             1350 ml   Net             -510 ml     General: awake/alert   HEENT: Normocephalic atraumatic head  Neck: Supple with no JVD. Chest:  clear to auscultation bilaterally without respiratory distress  CVS: regular rate and rhythm  Abdominal: soft, nontender, normal bowel sounds/abdominal obesity. Extremities: Right ankle cast.  Skin: warm and dry without rash      Labs:  BMP:   Recent Labs      06/22/18   0511  06/23/18   0421  06/24/18   0336   NA  127*  127*  130*   K  4.5  4.6  4.0   CL  88*  92*  95*   CO2  20*  19*  25   BUN  48*  54*  49*   CREATININE  2.8*  2.4*  2.0*   CALCIUM  8.7  8.7  8.3*     CBC:   Recent Labs      06/24/18   0336   WBC  5.8   HGB  7.7*   HCT  24.4*   MCV  94.9   PLT  247     LIVER PROFILE:   Recent Labs      06/22/18   0511  06/23/18   0421  06/24/18   0336   AST  46*  64*  42*   ALT  22  28  25   BILITOT  0.5  0.6  0.4   ALKPHOS  174*  165*  147*     PT/INR: No results for input(s): PROTIME, INR in the last 72 hours. APTT: No results for input(s): APTT in the last 72 hours. BNP:  No results for input(s): BNP in the last 72 hours. Ionized Calcium:No results for input(s): IONCA in the last 72 hours. Magnesium:No results for input(s): MG in the last 72 hours. Phosphorus:No results for input(s): PHOS in the last 72 hours. HgbA1C: No results for input(s): LABA1C in the last 72 hours.   Hepatic:   Recent Labs      06/22/18   0511  06/23/18   0421  06/24/18   0336   ALKPHOS  174*  165*  147* ALT  22  28  25   AST  46*  64*  42*   PROT  6.7  6.5*  5.5*   BILITOT  0.5  0.6  0.4   LABALBU  3.4*  2.7*  2.7*     Lactic Acid: No results for input(s): LACTA in the last 72 hours. Troponin: No results for input(s): CKTOTAL, CKMB, TROPONINT in the last 72 hours. ABGs: No results for input(s): PH, PCO2, PO2, HCO3, O2SAT in the last 72 hours. CRP:  No results for input(s): CRP in the last 72 hours. Sed Rate:  No results for input(s): SEDRATE in the last 72 hours. Cultures:   No results for input(s): CULTURE in the last 72 hours. No results for input(s): BC, Toy Clancy in the last 72 hours. No results for input(s): CXSURG in the last 72 hours. Radiology reports as per the Radiologist  Radiology: No results found. Assessment   1. HYPONATREMIA secondary to possible SIADH/SSRI. 2. Acute kidney injury secondary to acute tubular necrosis. 3. Stage IV chronic kidney disease baseline. 4. Diabetic nephropathy. 5. Insulin-dependent type II diabetes. 6. Metabolic acidemia. 8. Morbid abdominal obesity. 9. Anemia of chronic kidney disease. 10. Right ankle fracture. Plan:  1. Recommended water restriction. 2. Low-dose loop diuretics. 3. Sodium bicarbonate. 4. Continue to monitor electrolytes.       Sherif Morgan MD  06/24/18  3:04 PM

## 2018-06-25 LAB
ALBUMIN SERPL-MCNC: 2.9 G/DL (ref 3.5–5.2)
ALP BLD-CCNC: 144 U/L (ref 35–104)
ALT SERPL-CCNC: 25 U/L (ref 5–33)
ANION GAP SERPL CALCULATED.3IONS-SCNC: 14 MMOL/L (ref 7–19)
AST SERPL-CCNC: 37 U/L (ref 5–32)
BASOPHILS ABSOLUTE: 0 K/UL (ref 0–0.2)
BASOPHILS RELATIVE PERCENT: 0.8 % (ref 0–1)
BILIRUB SERPL-MCNC: 0.3 MG/DL (ref 0.2–1.2)
BUN BLDV-MCNC: 31 MG/DL (ref 6–20)
CALCIUM SERPL-MCNC: 8.6 MG/DL (ref 8.6–10)
CHLORIDE BLD-SCNC: 96 MMOL/L (ref 98–111)
CO2: 25 MMOL/L (ref 22–29)
CREAT SERPL-MCNC: 1.4 MG/DL (ref 0.5–0.9)
EOSINOPHILS ABSOLUTE: 0.3 K/UL (ref 0–0.6)
EOSINOPHILS RELATIVE PERCENT: 5.5 % (ref 0–5)
GFR NON-AFRICAN AMERICAN: 39
GLUCOSE BLD-MCNC: 132 MG/DL (ref 74–109)
GLUCOSE BLD-MCNC: 137 MG/DL (ref 70–99)
GLUCOSE BLD-MCNC: 207 MG/DL (ref 70–99)
GLUCOSE BLD-MCNC: 211 MG/DL (ref 70–99)
GLUCOSE BLD-MCNC: 242 MG/DL (ref 70–99)
HCT VFR BLD CALC: 27.7 % (ref 37–47)
HEMOGLOBIN: 8.6 G/DL (ref 12–16)
LYMPHOCYTES ABSOLUTE: 1.8 K/UL (ref 1.1–4.5)
LYMPHOCYTES RELATIVE PERCENT: 34.7 % (ref 20–40)
MCH RBC QN AUTO: 29.6 PG (ref 27–31)
MCHC RBC AUTO-ENTMCNC: 31 G/DL (ref 33–37)
MCV RBC AUTO: 95.2 FL (ref 81–99)
MONOCYTES ABSOLUTE: 0.9 K/UL (ref 0–0.9)
MONOCYTES RELATIVE PERCENT: 18 % (ref 0–10)
NEUTROPHILS ABSOLUTE: 2 K/UL (ref 1.5–7.5)
NEUTROPHILS RELATIVE PERCENT: 40 % (ref 50–65)
PDW BLD-RTO: 14.6 % (ref 11.5–14.5)
PERFORMED ON: ABNORMAL
PLATELET # BLD: 300 K/UL (ref 130–400)
PMV BLD AUTO: 11 FL (ref 9.4–12.3)
POTASSIUM SERPL-SCNC: 4.4 MMOL/L (ref 3.5–5)
RBC # BLD: 2.91 M/UL (ref 4.2–5.4)
SODIUM BLD-SCNC: 135 MMOL/L (ref 136–145)
TOTAL PROTEIN: 6 G/DL (ref 6.6–8.7)
WBC # BLD: 5.1 K/UL (ref 4.8–10.8)

## 2018-06-25 PROCEDURE — 85025 COMPLETE CBC W/AUTO DIFF WBC: CPT

## 2018-06-25 PROCEDURE — 36415 COLL VENOUS BLD VENIPUNCTURE: CPT

## 2018-06-25 PROCEDURE — 97110 THERAPEUTIC EXERCISES: CPT

## 2018-06-25 PROCEDURE — 6370000000 HC RX 637 (ALT 250 FOR IP): Performed by: ORTHOPAEDIC SURGERY

## 2018-06-25 PROCEDURE — 6360000002 HC RX W HCPCS: Performed by: ORTHOPAEDIC SURGERY

## 2018-06-25 PROCEDURE — 99232 SBSQ HOSP IP/OBS MODERATE 35: CPT | Performed by: PSYCHIATRY & NEUROLOGY

## 2018-06-25 PROCEDURE — 6370000000 HC RX 637 (ALT 250 FOR IP): Performed by: PSYCHIATRY & NEUROLOGY

## 2018-06-25 PROCEDURE — 97535 SELF CARE MNGMENT TRAINING: CPT

## 2018-06-25 PROCEDURE — 6370000000 HC RX 637 (ALT 250 FOR IP): Performed by: INTERNAL MEDICINE

## 2018-06-25 PROCEDURE — 82948 REAGENT STRIP/BLOOD GLUCOSE: CPT

## 2018-06-25 PROCEDURE — 97530 THERAPEUTIC ACTIVITIES: CPT

## 2018-06-25 PROCEDURE — 80053 COMPREHEN METABOLIC PANEL: CPT

## 2018-06-25 PROCEDURE — 1180000000 HC REHAB R&B

## 2018-06-25 RX ORDER — DIAZEPAM 2 MG/1
2 TABLET ORAL EVERY 6 HOURS PRN
Status: DISCONTINUED | OUTPATIENT
Start: 2018-06-25 | End: 2018-06-28 | Stop reason: HOSPADM

## 2018-06-25 RX ADMIN — FLUTICASONE PROPIONATE 1 SPRAY: 50 SPRAY, METERED NASAL at 08:48

## 2018-06-25 RX ADMIN — SODIUM BICARBONATE 1300 MG: 650 TABLET ORAL at 19:30

## 2018-06-25 RX ADMIN — LEVOTHYROXINE SODIUM 88 MCG: 88 TABLET ORAL at 05:50

## 2018-06-25 RX ADMIN — LAMOTRIGINE 50 MG: 25 TABLET ORAL at 19:30

## 2018-06-25 RX ADMIN — QUETIAPINE FUMARATE 200 MG: 100 TABLET ORAL at 19:29

## 2018-06-25 RX ADMIN — PANTOPRAZOLE SODIUM 40 MG: 40 TABLET, DELAYED RELEASE ORAL at 08:47

## 2018-06-25 RX ADMIN — INSULIN LISPRO 4 UNITS: 100 INJECTION, SOLUTION INTRAVENOUS; SUBCUTANEOUS at 12:25

## 2018-06-25 RX ADMIN — LAMOTRIGINE 50 MG: 25 TABLET ORAL at 08:46

## 2018-06-25 RX ADMIN — ASPIRIN 325 MG: 325 TABLET, DELAYED RELEASE ORAL at 08:47

## 2018-06-25 RX ADMIN — INSULIN LISPRO 4 UNITS: 100 INJECTION, SOLUTION INTRAVENOUS; SUBCUTANEOUS at 17:17

## 2018-06-25 RX ADMIN — ASPIRIN 325 MG: 325 TABLET, DELAYED RELEASE ORAL at 19:29

## 2018-06-25 RX ADMIN — PREGABALIN 150 MG: 150 CAPSULE ORAL at 19:29

## 2018-06-25 RX ADMIN — HYDROCODONE BITARTRATE AND ACETAMINOPHEN 1 TABLET: 10; 325 TABLET ORAL at 19:29

## 2018-06-25 RX ADMIN — HYDROCODONE BITARTRATE AND ACETAMINOPHEN 1 TABLET: 10; 325 TABLET ORAL at 08:46

## 2018-06-25 RX ADMIN — SODIUM BICARBONATE 1300 MG: 650 TABLET ORAL at 15:15

## 2018-06-25 RX ADMIN — DOCUSATE SODIUM 100 MG: 100 CAPSULE, LIQUID FILLED ORAL at 19:29

## 2018-06-25 RX ADMIN — DULOXETINE HYDROCHLORIDE 60 MG: 60 CAPSULE, DELAYED RELEASE ORAL at 08:48

## 2018-06-25 RX ADMIN — HYDROCODONE BITARTRATE AND ACETAMINOPHEN 1 TABLET: 10; 325 TABLET ORAL at 14:46

## 2018-06-25 RX ADMIN — ATORVASTATIN CALCIUM 40 MG: 40 TABLET, FILM COATED ORAL at 08:47

## 2018-06-25 RX ADMIN — ENOXAPARIN SODIUM 40 MG: 40 INJECTION SUBCUTANEOUS at 08:47

## 2018-06-25 RX ADMIN — INSULIN GLARGINE 10 UNITS: 100 INJECTION, SOLUTION SUBCUTANEOUS at 21:18

## 2018-06-25 RX ADMIN — AMITRIPTYLINE HYDROCHLORIDE 150 MG: 75 TABLET, FILM COATED ORAL at 19:30

## 2018-06-25 RX ADMIN — INSULIN LISPRO 2 UNITS: 100 INJECTION, SOLUTION INTRAVENOUS; SUBCUTANEOUS at 21:18

## 2018-06-25 RX ADMIN — DIAZEPAM 2 MG: 2 TABLET ORAL at 11:16

## 2018-06-25 RX ADMIN — PREGABALIN 150 MG: 150 CAPSULE ORAL at 08:48

## 2018-06-25 RX ADMIN — METOPROLOL SUCCINATE 50 MG: 50 TABLET, EXTENDED RELEASE ORAL at 08:47

## 2018-06-25 RX ADMIN — SODIUM BICARBONATE 1300 MG: 650 TABLET ORAL at 08:47

## 2018-06-25 RX ADMIN — HYDROCODONE BITARTRATE AND ACETAMINOPHEN 1 TABLET: 10; 325 TABLET ORAL at 01:58

## 2018-06-25 RX ADMIN — VALSARTAN 80 MG: 80 TABLET ORAL at 19:30

## 2018-06-25 RX ADMIN — FUROSEMIDE 20 MG: 20 TABLET ORAL at 08:47

## 2018-06-25 ASSESSMENT — PAIN DESCRIPTION - PAIN TYPE
TYPE: ACUTE PAIN
TYPE: ACUTE PAIN

## 2018-06-25 ASSESSMENT — PAIN SCALES - GENERAL
PAINLEVEL_OUTOF10: 8
PAINLEVEL_OUTOF10: 9
PAINLEVEL_OUTOF10: 4
PAINLEVEL_OUTOF10: 8
PAINLEVEL_OUTOF10: 9
PAINLEVEL_OUTOF10: 8
PAINLEVEL_OUTOF10: 0
PAINLEVEL_OUTOF10: 9

## 2018-06-25 ASSESSMENT — PAIN DESCRIPTION - LOCATION
LOCATION: ANKLE
LOCATION: ANKLE

## 2018-06-25 ASSESSMENT — PAIN DESCRIPTION - DESCRIPTORS
DESCRIPTORS: BURNING;DISCOMFORT
DESCRIPTORS: BURNING;SHARP

## 2018-06-25 ASSESSMENT — PAIN DESCRIPTION - FREQUENCY
FREQUENCY: CONTINUOUS
FREQUENCY: CONTINUOUS

## 2018-06-25 ASSESSMENT — PAIN DESCRIPTION - ORIENTATION
ORIENTATION: RIGHT
ORIENTATION: RIGHT

## 2018-06-25 ASSESSMENT — PAIN DESCRIPTION - PROGRESSION: CLINICAL_PROGRESSION: GRADUALLY WORSENING

## 2018-06-25 NOTE — PROGRESS NOTES
appears well-developed and well-nourished. Eyes  conjunctiva normal.   Ear, nose, throat - No scars, masses, or lesions over external nose or ears, no atrophy of tongue  Neck-symmetric, no masses noted, no jugular vein distension  Respiration- chest wall appears symmetric, good expansion,   normal effort without use of accessory muscles  Musculoskeletal  no significant wasting of muscles noted, no bony deformities Extremities-no clubbing, cyanosis or edema  Skin  warm, dry, and intact. No rash, erythema, or pallor. Psychiatric  mood, affect, and behavior appear normal.      Neurology  NEUROLOGICAL EXAM:      Mental status   Somnolent this am but responds appropriately       Cranial Nerves     CN III, IV,VI-EOMI, No nystagmus, conjugate eye movements, no ptosis    CN VII-no facial assymetry         Motor function  Antigravity x 4 limited RLE     Sensory function      Cerebellar      Tremor None present     Gait                  Not tested           Nursing/pcp notes, imaging,labs and vitals reviewed.      PT,OT and/or speech notes reviewed    Lab Results   Component Value Date    WBC 5.1 06/25/2018    HGB 8.6 (L) 06/25/2018    HCT 27.7 (L) 06/25/2018    MCV 95.2 06/25/2018     06/25/2018     Lab Results   Component Value Date     (L) 06/25/2018    K 4.4 06/25/2018    CL 96 (L) 06/25/2018    CO2 25 06/25/2018    BUN 31 (H) 06/25/2018    CREATININE 1.4 (H) 06/25/2018    GLUCOSE 132 (H) 06/25/2018    CALCIUM 8.6 06/25/2018    PROT 6.0 (L) 06/25/2018    LABALBU 2.9 (L) 06/25/2018    BILITOT 0.3 06/25/2018    ALKPHOS 144 (H) 06/25/2018    AST 37 (H) 06/25/2018    ALT 25 06/25/2018    LABGLOM 39 (A) 06/25/2018    GLOB 3.4 04/25/2017     Lab Results   Component Value Date    INR 1.00 06/12/2018    INR 1.03 06/23/2017    INR 1.04 06/05/2017    PROTIME 13.1 06/12/2018    PROTIME 13.4 06/23/2017    PROTIME 13.5 06/05/2017         06/15/18 1300   Transfers   Sit to Stand Moderate Assistance  (PULL TO STAND )

## 2018-06-25 NOTE — PATIENT CARE CONFERENCE
[facility] ACUTE INPATIENT REHABILITATION  TEAM CONFERENCE NOTE    Date: 2018  Patient Name: Eneida Pfeiffer        MRN: [id number]    : 1960  (63 y.o.)  Gender: female      Diagnosis:  RIGHT BIMALLEOLAR ANKLE FX ORIF; RIGHT DISTAL RADIUS FX      PHYSICAL THERAPY  STRENGTH  Strength RLE  Strength RLE: Exception  Comment: GROSSLY 3+/5; ANKLE N/T  Strength LLE  Strength LLE: WFL  ROM  AROM RLE (degrees)  RLE AROM: Exceptions  RLE General AROM: ANKLE SPLINT, SOFT TISSUE APPROXIMATION HIP FLEXION  AROM LLE (degrees)  LLE AROM : WFL  LLE General AROM: SOFT TISSUE APPROXIMATION  BED MOBILITY  Rolling: Independent  Supine to Sit: Independent  Sit to Supine: Independent  TRANSFERS  Sit to Stand: Minimal Assistance, Contact guard assistance  Bed to Chair: Minimal assistance, Contact guard assistance     WHEELCHAIR PROPULSION  Level of Assistance: Stand by assistance  Distance: 100'  Description/ Details: Pt propelled W/C well w/ minimal cues for techniques.   AMBULATION              STAIRS              FIM SCORES  Bed, Chair, Wheel Chair: 4 - Requires steadying assistance only <25% assist  and/or requires assist with one leg only  Walk: 1 - Total Assistance Walks/operates wheelchair < 50 feet OR requires two or more people OR patient performs < 25% of locomotion effort  Wheel Chair: 2 - Maximal Assistance Requires up to [address] requires assistance of one person to walk/operate wheelchair between  feet  Stairs: 1- Total Assistance perfoms less than 25% of the effort, or requirs the assistance of two people, or goes up and down fewer than 4 stairs  GOALS:  Short term goals  Time Frame for Short term goals: 1 WEEK  Short term goal 1: TF FIM 4  Short term goal 2: WC FIM 2  Short term goal 3: HEP SBA    Long term goals  Time Frame for Long term goals : 2-3 WEEKS  Long term goal 1: TF FIM 6  Long term goal 2: AMB FIM 1  Long term goal 3: WC FIM 6  Long term goal 4: STEP FIM 1  Long term week:  1. Min A/CGA now for LB dressing and toileting   2. supervision for w/c propulsion  3.  4.  5.      Goals for following week:  1. S only for ADL completion  2. Supervision for transfers  3.   4.   5.     Factors facilitating achievement of predicted outcomes: Cooperative    Barriers to the achievement of predicted outcomes: multiple medications-home list, anxiety, support limited by cophabitant    Team Members Present at Conference:  : [facility]   Occupational Therapist: Sis Solano, OTR/L  Physical Therapist: Jamin Leo PT,DPT  Speech Therapist: N/A  Nurse: Jhonny Buck, RN   Nurse Manager:    Dietitian:  Neda Morocho MS, RD, LD  Rehab Director:  Clint Miller approve the established interdisciplinary plan of care as documented within the medical record of Jennie Peñaloza.

## 2018-06-25 NOTE — PLAN OF CARE
Problem: Falls - Risk of:  Goal: Will remain free from falls  Will remain free from falls   Outcome: Ongoing  Bed alarm set, call light within reach, side rails up times two  Goal: Absence of physical injury  Absence of physical injury   Outcome: Ongoing  Bed alarm set, call light within reach, side rails up times two    Problem: Risk for Impaired Skin Integrity  Goal: Tissue integrity - skin and mucous membranes  Structural intactness and normal physiological function of skin and  mucous membranes. Outcome: Ongoing  Turn and reposition every 2 hours    Problem: Pain:  Goal: Pain level will decrease  Pain level will decrease   Outcome: Ongoing  Patient encouraged to ask for pain med as needed to control acute pain  Goal: Control of acute pain  Control of acute pain   Outcome: Ongoing  Patient encouraged to ask for pain med as needed to control acute pain  Goal: Control of chronic pain  Control of chronic pain   Outcome: Completed Date Met: 06/25/18      Problem: Mood - Altered:  Goal: Mood stable  Mood stable   Outcome: Ongoing  Patients mood stable this shift    Problem: Serum Glucose Level - Abnormal:  Goal: Ability to maintain appropriate glucose levels has stabilized  Ability to maintain appropriate glucose levels has stabilized   Outcome: Ongoing      Problem: Anxiety:  Goal: Level of anxiety will decrease  Level of anxiety will decrease   Outcome: Ongoing  No signs of anxiety this shift.

## 2018-06-25 NOTE — PLAN OF CARE
Problem: Falls - Risk of:  Goal: Will remain free from falls  Will remain free from falls   Outcome: Ongoing  Patient has Personal and Bed Alarm in place for safety. Problem: Risk for Impaired Skin Integrity  Goal: Tissue integrity - skin and mucous membranes  Structural intactness and normal physiological function of skin and  mucous membranes. Outcome: Ongoing  Skin remains intact with no skin breakdown noted. Problem: Pain:  Goal: Pain level will decrease  Pain level will decrease   Outcome: Ongoing  Pain medications given as ordered PRN. Will continue to monitor pain levels.

## 2018-06-25 NOTE — PROGRESS NOTES
Nephrology ([address]) Progress Note    Patient:  Natalya Milton  YOB: 1960  Date of Service: 6/25/2018  MRN: [id number]   Acct: [de-identified]   Primary Care Physician: Antonia Trejo DO  Advance Directive: Full Code  Admit Date: 6/14/2018       Hospital Day: 11  Referring Provider: Juanpablo Cueto MD    Patient independently seen and examined, Chart, Consults, Notes, Operative notes, Labs, Cardiology, and Radiology studies reviewed as able. Subjective:  Natalya Milton is a 63 y.o. female  whom we were consulted for hyponatremia/acute kidney injury/chronic kidney disease. Patient has stage IV chronic kidney disease and sees Dr. Benito Cavazos in the office. Her baseline serum creatinine is 2.1 mg/dL or eGFR 25. She has recently fallen and has bimalleolar fracture of right ankle. Patient underwent internal fixation. She is currently at rehab, receiving physical therapy and occupational therapy. Incidental finding on the lab her serum sodium was 127 mmol. Hospital course remarkable for fluid restriction, serum sodium has been improving slowly. Her renal function has improved to baseline also. Today, questions about left ankle cast.  No n/v/d.   Tolerating diet.         Allergies:  Dilaudid [hydromorphone hcl]    Medicines:  Current Facility-Administered Medications   Medication Dose Route Frequency Provider Last Rate Last Dose    diazepam (VALIUM) tablet 2 mg  2 mg Oral Q6H PRN Juanpablo Cueto MD   2 mg at 06/25/18 1116    metoprolol succinate (TOPROL XL) extended release tablet 50 mg  50 mg Oral Daily Juanpablo Cueto MD   50 mg at 06/25/18 0847    sodium bicarbonate tablet 1,300 mg  1,300 mg Oral TID Edi Smith MD   1,300 mg at 06/25/18 1515    furosemide (LASIX) tablet 20 mg  20 mg Oral Daily Edi Smith MD   20 mg at 06/25/18 0847    valsartan (DIOVAN) tablet 80 mg  80 mg Oral Nightly Edi Smith MD   80 mg at 06/24/18 2031    insulin glargine (LANTUS) injection vial 10 Units  10 Units Subcutaneous Nightly Tony Cortez MD   10 Units at 06/24/18 2032    ondansetron (ZOFRAN) tablet 4 mg  4 mg Oral Q6H PRN Tony Cortez MD   4 mg at 06/18/18 2056    polyethylene glycol (GLYCOLAX) packet 17 g  17 g Oral Daily Tony Cortez MD   17 g at 06/22/18 0844    acetaminophen (TYLENOL) tablet 650 mg  650 mg Oral Q4H PRN Angel Pat MD   650 mg at 06/18/18 1009    aspirin EC tablet 325 mg  325 mg Oral BID Angel Pat MD   325 mg at 06/25/18 0847    docusate sodium (COLACE) capsule 100 mg  100 mg Oral BID Angel Pat MD   100 mg at 06/24/18 2032    amitriptyline (ELAVIL) tablet 150 mg  150 mg Oral Nightly Angel Pat MD   150 mg at 06/24/18 2032    QUEtiapine (SEROQUEL) tablet 200 mg  200 mg Oral Nightly Angel Pat MD   200 mg at 06/24/18 2031    tiZANidine (ZANAFLEX) tablet 4 mg  4 mg Oral Q6H PRN Angel Pat MD   4 mg at 06/22/18 0603    enoxaparin (LOVENOX) injection 40 mg  40 mg Subcutaneous Daily Angel Pat MD   40 mg at 06/25/18 0847    atorvastatin (LIPITOR) tablet 40 mg  40 mg Oral Daily Angel Pat MD   40 mg at 06/25/18 0847    dextrose 5 % solution  100 mL/hr Intravenous PRN Angel Pat MD        diazepam (VALIUM) tablet 5 mg  5 mg Oral Nightly PRN Angel Pat MD   5 mg at 06/19/18 1954    DULoxetine (CYMBALTA) extended release capsule 60 mg  60 mg Oral Daily Angel Pat MD   60 mg at 06/25/18 0848    fluticasone (FLONASE) 50 MCG/ACT nasal spray 1 spray  1 spray Each Nare Daily Angel Pat MD   1 spray at 06/25/18 0848    glucagon (rDNA) injection 1 mg  1 mg Intramuscular PRN Angel Pat MD        glucose (GLUTOSE) 40 % oral gel 15 g  15 g Oral PRN Angel Pat MD        insulin lispro (HUMALOG) injection vial 0-12 Units  0-12 Units Subcutaneous TID WC Angel Pat MD   4 Units at 06/25/18 1717    insulin lispro (HUMALOG) injection vial 0-6 Units  0-6 Units Subcutaneous Nightly Angel Pat MD   4 Units at 06/21/18 7595    lamoTRIgine (LAMICTAL) tablet 50 mg  50 mg Oral BID Sandy MD Jordan   50 mg at 06/25/18 5773    levothyroxine (SYNTHROID) tablet 88 mcg  88 mcg Oral Daily Sandy MD Jordan   88 mcg at 06/25/18 0550    linaclotide (LINZESS) capsule 290 mcg  290 mcg Oral PRN Sandy Ortega MD   290 mcg at 06/16/18 0805    pantoprazole (PROTONIX) tablet 40 mg  40 mg Oral Daily Sandy MD Jordan   40 mg at 06/25/18 0847    pregabalin (LYRICA) capsule 150 mg  150 mg Oral BID Snady Ortega MD   150 mg at 06/25/18 0848    magnesium hydroxide (MILK OF MAGNESIA) 400 MG/5ML suspension 30 mL  30 mL Oral Daily PRN Lynne Rodrigues MD        docusate sodium (COLACE) capsule 100 mg  100 mg Oral BID PRN Lynne Rodrigues MD        bisacodyl (DULCOLAX) suppository 10 mg  10 mg Rectal Daily PRN Lynne Rodrigues MD   10 mg at 06/18/18 1323    HYDROcodone-acetaminophen ([place])  MG per tablet 1 tablet  1 tablet Oral Q4H PRN Lynne Rodrigues MD   1 tablet at 06/25/18 1446       Past Medical History:  Past Medical History:   Diagnosis Date    Anxiety     Arthritis     [facility]     CAD (coronary artery disease)     CHF (congestive heart failure) ([facility]     Chronic kidney disease (CKD) stage G3b/A2, moderately decreased glomerular filtration rate (GFR) between 30-44 mL/min/1.73 square meter and albuminuria creatinine ratio between  mg/g 11/21/2016    Depression     DM (diabetes mellitus) ([facility]     GERD (gastroesophageal reflux disease)     History of blood transfusion     History of suicidal ideation     Hyperlipidemia     Hypertension     Hypothyroidism     Insomnia     Kidney disease     stage 3 failure    MI, 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    Obesity     Polyarticular arthritis     Type II or unspecified type diabetes mellitus without mention of complication, not stated as uncontrolled        Past Surgical History:  Past Surgical History:   Procedure Laterality Date    ABDOMINOPLASTY      ANGIOPLASTY  1/20/05    stent placement to LAD and diagonal with normal left vent function    BREAST REDUCTION SURGERY      CARDIAC CATHETERIZATION  05, 05    see report  Stents x3    CARDIAC CATHETERIZATION  3/23/15  JDT    EF 50%    CARPAL TUNNEL RELEASE       SECTION      x2    CHOLECYSTECTOMY      GASTRIC BYPASS SURGERY      HERNIA REPAIR      umbilical with mesh    INTRACAPSULAR CATARACT EXTRACTION Left 2016    LT EYE CATARACT EMULSIFICATION IOL IMPLANT performed by Zoya Aceves MD at [facility] OPEN TREATMENT BIMALLEOLAR ANKLE FRACTURE Right 2018    ORIF RIGHT BIMALLEOLAR ANKLE FRACTURE, RIGHT WRIST CAST REMOVAL performed by Lisa Fried MD at [address] [place] [address] OPEN TREATMENT BIMALLEOLAR ANKLE FRACTURE Right 2018    ANKLE OPEN REDUCTION INTERNAL FIXATION performed by Lisa Fried MD at [place] [address] Left 2017    KNEE TOTAL ARTHROPLASTY performed by Reyna Burgos DO at [facility] OR       Family History  Family History   Problem Relation Age of Onset    Depression Mother     Heart Attack Mother     High Blood Pressure Mother     Kidney Disease Father     Alcohol Abuse Father     Depression Father     Heart Attack Father     High Blood Pressure Father     Kidney Disease Brother     Heart Disease Other     Depression Sister        Social History  Social History     Social History    Marital status:      Spouse name: N/A    Number of children: N/A    Years of education: N/A     Occupational History    Not on file.      Social History Main Topics    Smoking status: Never Smoker    Smokeless tobacco: Former User     Types: Snuff    Alcohol use No    Drug use: No    Sexual activity: No     Other Topics Concern    Not on file     Social History Narrative    No narrative on file         Review of Systems:  History obtained from chart review and the patient  General ROS: No fever or chills  Respiratory ROS: No cough, shortness of breath, wheezing  Cardiovascular ROS: No chest pain or palpitations  Gastrointestinal ROS: No abdominal pain or melena  Genito-Urinary ROS: No dysuria or hematuria  Musculoskeletal ROS: No joint pain or swelling         Objective:  Blood pressure (!) 158/86, pulse 79, temperature 98.7 °F (37.1 °C), temperature source Temporal, resp. rate 16, height 5' 1\" (1.549 m), weight 240 lb (108.9 kg), SpO2 98 %, not currently breastfeeding. Intake/Output Summary (Last 24 hours) at 06/25/18 1722  Last data filed at 06/25/18 1342   Gross per 24 hour   Intake             1680 ml   Output                0 ml   Net             1680 ml     General: awake/alert   Chest:  clear to auscultation bilaterally without respiratory distress  CVS: regular rate and rhythm  Abdominal: soft, nontender, normal bowel sounds  Extremities: no cyanosis or edema  Skin: warm and dry without rash    Labs:  BMP: Recent Labs      06/23/18   0421  06/24/18   0336  06/25/18   0504   NA  127*  130*  135*   K  4.6  4.0  4.4   CL  92*  95*  96*   CO2  19*  25  25   BUN  54*  49*  31*   CREATININE  2.4*  2.0*  1.4*   CALCIUM  8.7  8.3*  8.6     CBC: Recent Labs      06/24/18   0336  06/25/18   0504   WBC  5.8  5.1   HGB  7.7*  8.6*   HCT  24.4*  27.7*   MCV  94.9  95.2   PLT  247  300     LIVER PROFILE:   Recent Labs      06/23/18   0421  06/24/18   0336  06/25/18   0504   AST  64*  42*  37*   ALT  28  25  25   BILITOT  0.6  0.4  0.3   ALKPHOS  165*  147*  144*     PT/INR: No results for input(s): PROTIME, INR in the last 72 hours. APTT: No results for input(s): APTT in the last 72 hours. BNP:  No results for input(s): BNP in the last 72 hours. Ionized Calcium:No results for input(s): IONCA in the last 72 hours. Magnesium:No results for input(s): MG in the last 72 hours. Phosphorus:No results for input(s): PHOS in the last 72 hours. HgbA1C: No results for input(s): LABA1C in the last 72 hours.   Hepatic: Recent Labs      06/23/18   0421  06/24/18   0336  06/25/18   0504   ALKPHOS  165*  147*  144*   ALT  28 25  25   AST  64*  42*  37*   PROT  6.5*  5.5*  6.0*   BILITOT  0.6  0.4  0.3   LABALBU  2.7*  2.7*  2.9*     Lactic Acid: No results for input(s): LACTA in the last 72 hours. Troponin: No results for input(s): CKTOTAL, CKMB, TROPONINT in the last 72 hours. ABGs: No results found for: PHART, PO2ART, HFU9DGO  CRP:  No results for input(s): CRP in the last 72 hours. Sed Rate:  No results for input(s): SEDRATE in the last 72 hours. Culture Results:   Blood Culture Recent: No results for input(s): BC in the last 720 hours. Urine Culture Recent : Recent Labs      06/14/18   [address]  <10,000 CFU/ml*  Light growth       Radiology reports as per the Radiologist  Radiology: Xr Wrist Right (min 3 Views)    Result Date: 6/12/2018  EXAMINATION: XR WRIST RIGHT (MIN 3 VIEWS) 6/12/2018 7:07 AM HISTORY: Patient fell 3 views the right wrist are obtained. There is an impacted fracture of the distal right radius. Nondisplaced avulsion styloid process ulna is noted. Carpal bones are intact. Impacted fracture distal right radius. 2. Nondisplaced avulsion styloid process ulna Signed by Dr Lisset Carroll on 6/12/2018 7:07 AM    Xr Ankle Right (min 3 Views)    Result Date: 6/16/2018  History: 50-year-old with increased pain. Reference: Right ankle radiographs 4 days prior. FINDINGS: Frontal, oblique, and lateral radiographs of the right ankle. Interval revision ORIF repairing bilateral malleolar fractures. Alignment appears anatomic. No hardware fracture is seen. No new fracture visualized. Cast present. Repaired bimalleolar fractures with now anatomic alignment. Signed by Dr Nga Draper on 6/16/2018 11:32 AM    Xr Ankle Right (min 3 Views)    Result Date: 6/12/2018  XR ANKLE RIGHT (MIN 3 VIEWS) 6/12/2018 4:15 PM History: Open reduction internal fixation Comparison: None Fluoroscopy time: 1 minute 18 seconds.  Number of images: 7    Impression: Intraoperative fluoroscopic images obtained during open reduction internal fixation at the ankle Please refer to the operative note for more details. Signed by Dr Nirmal Pleitez on 6/12/2018 7:22 PM    Xr Ankle Right (min 3 Views)    Result Date: 6/12/2018  EXAMINATION: XR ANKLE RIGHT (MIN 3 VIEWS) 6/12/2018 7:12 AM HISTORY: Reduction 3 views right ankle are obtained. Medial dislocation of the dome of the talus is again noted. Medial malleolus is visualized with fixation screws present avulsion of the tip of the fibula is noted. Surgical plate and fixation screws are present in the distal fibula. Impression although there is a medial dislocation of the talus alignments improved from prior exam of the same date Signed by Dr Brian Leahy on 6/12/2018 7:13 AM    Xr Ankle Right (min 3 Views)    Result Date: 6/12/2018  EXAMINATION: XR ANKLE RIGHT (MIN 3 VIEWS) 6/12/2018 7:08 AM HISTORY: Patient fell Fracture or dislocation is present. Surgical plate and fixation screws are noted in the fibula and the fibula appears intact. 2 screws were recently placed in the medial malleolus. There is displacement of the screws in the medial malleolus. The entire dome of the talus is dislocated medially. The posterior malleolus may be fractured. Fracture dislocation right ankle. There is displacement of hardware screws. Signed by Dr Brian Leahy on 6/12/2018 7:10 AM    Xr Ankle Right (min 3 Views)    Result Date: 6/11/2018  EXAMINATION: XR ANKLE RIGHT (MIN 3 VIEWS) 6/11/2018 11:02 AM HISTORY: Ankle fracture 3 views of the right ankle are obtained. Surgical plate and fixation screws are present. The fibula and tibia are relatively aligned with the dome of the talus. 40 seconds fluoroscopic time was utilized during this procedure. Impression status post ORIF right ankle. Signed by Dr Brian Leahy on 6/11/2018 11:02 AM    Xr Ankle Right (min 3 Views)    Result Date: 6/5/2018  EXAMINATION: Right ankle 3 views 6/5/2018 HISTORY: Pain and swelling LUNGS: Today's exam is compared to previous study of 6/14/2014. Three-view exam of the right ankle demonstrate a bimalleolar fracture of the ankle with a fracture obliquely through the lateral malleolus extending to the ankle mortise. There is also a fracture of the medial malleolus. There is spurring of the medial malleolus as well as some spurring of the anterior tibial plafond which are chronic in nature. There is a plantar spur of the calcaneus. No other fractures are present. . Acute bimalleolar fracture. There is associated soft tissue swelling. Signed by Dr Brett Pittman on 6/5/2018 9:07 PM    Us Renal Complete    Result Date: 6/22/2018  US RENAL COMPLETE 6/22/2018 7:15 AM HISTORY: Acute renal failure COMPARISON: None  TECHNIQUE: Multiple longitudinal and transverse realtime sonographic images of the kidneys and urinary bladder are obtained. FINDINGS: The right kidney measures 4.4 x 4.4 x 10.3 cm. The right kidney is normal in size, shape, contour and position. The cortical thickness is normal, with preservation of the corticomedullary differentiation. The central echo complex is compact with no evidence for hydronephrosis. No nephrolithiasis or abnormal perinephric fluid collections are seen. No hydroureter. The left kidney measures 5.2 x 3.9 x 9.6 cm. The left kidney is normal in size, shape, contour and position. The cortical thickness is normal, with preservation of the corticomedullary differentiation. The central echo complex is compact with no evidence for hydronephrosis. No nephrolithiasis or abnormal perinephric fluid collections are seen. No hydroureter. Scanning through the pelvis reveals the bladder to be moderately distended with anechoic urine. The wall thickness and contour are normal. There is no surrounding ascites. 1. Unremarkable renal ultrasound. Signed by Dr Sally Reese on 6/22/2018 5:13 PM    Xr Knee 2 Vw Right    Result Date: 6/6/2018  Examination. XR KNEE 2 VW RIGHT History: The patient complains of right knee pain. No trauma.  The frontal and crosstable lateral views of the right knee are obtained. There is no previous study for comparison. There is no evidence of recent osseous or articular abnormality. There is moderate asymmetrical narrowing of the medial tibiofemoral compartment. There are small marginal osteophytes from the tibia and femoral condyle and the patella. No joint effusion. No focal bony erosion. No soft tissue swelling or a mass. There is moderate diffuse demineralization the bony structures. No recent fracture or dislocation. Moderate chronic osteoarthritis predominantly involving the medial tibiofemoral compartment. The above finding are recorded on a digital voice clip in PACS.  Signed by Dr Isabell Sanchez on 6/6/2018 7:19 AM       Assessment   Hyponatremia - multifactorial  MANSOOR/ATN  CKD 4  DM2 with nephropathy  Metabolic acidosis  morbid obesity  Anemia CKD  Right ankle fracture    Plan:  Creat better than reported baseline  Continue low dose diuretics  Monitor bicarb and bp trends as bp increasing    Mohsen Briggs MD  06/25/18  5:22 PM

## 2018-06-26 LAB
ALBUMIN SERPL-MCNC: 2.9 G/DL (ref 3.5–5.2)
ALP BLD-CCNC: 161 U/L (ref 35–104)
ALT SERPL-CCNC: 21 U/L (ref 5–33)
ANION GAP SERPL CALCULATED.3IONS-SCNC: 13 MMOL/L (ref 7–19)
AST SERPL-CCNC: 23 U/L (ref 5–32)
BILIRUB SERPL-MCNC: <0.2 MG/DL (ref 0.2–1.2)
BUN BLDV-MCNC: 25 MG/DL (ref 6–20)
CALCIUM SERPL-MCNC: 9 MG/DL (ref 8.6–10)
CHLORIDE BLD-SCNC: 99 MMOL/L (ref 98–111)
CO2: 27 MMOL/L (ref 22–29)
CREAT SERPL-MCNC: 1.4 MG/DL (ref 0.5–0.9)
GFR NON-AFRICAN AMERICAN: 39
GLUCOSE BLD-MCNC: 138 MG/DL (ref 70–99)
GLUCOSE BLD-MCNC: 150 MG/DL (ref 74–109)
GLUCOSE BLD-MCNC: 181 MG/DL (ref 70–99)
GLUCOSE BLD-MCNC: 191 MG/DL (ref 70–99)
GLUCOSE BLD-MCNC: 222 MG/DL (ref 70–99)
PERFORMED ON: ABNORMAL
POTASSIUM SERPL-SCNC: 4.7 MMOL/L (ref 3.5–5)
SODIUM BLD-SCNC: 139 MMOL/L (ref 136–145)
TOTAL PROTEIN: 6.2 G/DL (ref 6.6–8.7)

## 2018-06-26 PROCEDURE — 82948 REAGENT STRIP/BLOOD GLUCOSE: CPT

## 2018-06-26 PROCEDURE — 36415 COLL VENOUS BLD VENIPUNCTURE: CPT

## 2018-06-26 PROCEDURE — 97110 THERAPEUTIC EXERCISES: CPT

## 2018-06-26 PROCEDURE — 97530 THERAPEUTIC ACTIVITIES: CPT

## 2018-06-26 PROCEDURE — 6370000000 HC RX 637 (ALT 250 FOR IP): Performed by: PSYCHIATRY & NEUROLOGY

## 2018-06-26 PROCEDURE — 1180000000 HC REHAB R&B

## 2018-06-26 PROCEDURE — 97535 SELF CARE MNGMENT TRAINING: CPT

## 2018-06-26 PROCEDURE — 6360000002 HC RX W HCPCS: Performed by: ORTHOPAEDIC SURGERY

## 2018-06-26 PROCEDURE — 6370000000 HC RX 637 (ALT 250 FOR IP): Performed by: INTERNAL MEDICINE

## 2018-06-26 PROCEDURE — 80053 COMPREHEN METABOLIC PANEL: CPT

## 2018-06-26 PROCEDURE — 99233 SBSQ HOSP IP/OBS HIGH 50: CPT | Performed by: PSYCHIATRY & NEUROLOGY

## 2018-06-26 PROCEDURE — 6370000000 HC RX 637 (ALT 250 FOR IP): Performed by: ORTHOPAEDIC SURGERY

## 2018-06-26 RX ORDER — SODIUM BICARBONATE 650 MG/1
325 TABLET ORAL 2 TIMES DAILY
Status: DISCONTINUED | OUTPATIENT
Start: 2018-06-27 | End: 2018-06-27

## 2018-06-26 RX ADMIN — ATORVASTATIN CALCIUM 40 MG: 40 TABLET, FILM COATED ORAL at 09:00

## 2018-06-26 RX ADMIN — QUETIAPINE FUMARATE 200 MG: 100 TABLET ORAL at 21:06

## 2018-06-26 RX ADMIN — INSULIN GLARGINE 10 UNITS: 100 INJECTION, SOLUTION SUBCUTANEOUS at 21:06

## 2018-06-26 RX ADMIN — DOCUSATE SODIUM 100 MG: 100 CAPSULE, LIQUID FILLED ORAL at 21:05

## 2018-06-26 RX ADMIN — SODIUM BICARBONATE 1300 MG: 650 TABLET ORAL at 21:05

## 2018-06-26 RX ADMIN — DOCUSATE SODIUM 100 MG: 100 CAPSULE, LIQUID FILLED ORAL at 09:00

## 2018-06-26 RX ADMIN — HYDROCODONE BITARTRATE AND ACETAMINOPHEN 1 TABLET: 10; 325 TABLET ORAL at 21:05

## 2018-06-26 RX ADMIN — FLUTICASONE PROPIONATE 1 SPRAY: 50 SPRAY, METERED NASAL at 08:59

## 2018-06-26 RX ADMIN — LAMOTRIGINE 50 MG: 25 TABLET ORAL at 21:05

## 2018-06-26 RX ADMIN — PREGABALIN 150 MG: 150 CAPSULE ORAL at 08:59

## 2018-06-26 RX ADMIN — INSULIN LISPRO 2 UNITS: 100 INJECTION, SOLUTION INTRAVENOUS; SUBCUTANEOUS at 17:44

## 2018-06-26 RX ADMIN — ENOXAPARIN SODIUM 40 MG: 40 INJECTION SUBCUTANEOUS at 08:59

## 2018-06-26 RX ADMIN — PREGABALIN 150 MG: 150 CAPSULE ORAL at 21:05

## 2018-06-26 RX ADMIN — SODIUM BICARBONATE 1300 MG: 650 TABLET ORAL at 08:59

## 2018-06-26 RX ADMIN — INSULIN LISPRO 4 UNITS: 100 INJECTION, SOLUTION INTRAVENOUS; SUBCUTANEOUS at 12:00

## 2018-06-26 RX ADMIN — HYDROCODONE BITARTRATE AND ACETAMINOPHEN 1 TABLET: 10; 325 TABLET ORAL at 14:52

## 2018-06-26 RX ADMIN — PANTOPRAZOLE SODIUM 40 MG: 40 TABLET, DELAYED RELEASE ORAL at 09:00

## 2018-06-26 RX ADMIN — LEVOTHYROXINE SODIUM 88 MCG: 88 TABLET ORAL at 05:49

## 2018-06-26 RX ADMIN — ASPIRIN 325 MG: 325 TABLET, DELAYED RELEASE ORAL at 21:05

## 2018-06-26 RX ADMIN — SODIUM BICARBONATE 1300 MG: 650 TABLET ORAL at 13:41

## 2018-06-26 RX ADMIN — METOPROLOL SUCCINATE 50 MG: 50 TABLET, EXTENDED RELEASE ORAL at 09:00

## 2018-06-26 RX ADMIN — VALSARTAN 80 MG: 80 TABLET ORAL at 21:05

## 2018-06-26 RX ADMIN — HYDROCODONE BITARTRATE AND ACETAMINOPHEN 1 TABLET: 10; 325 TABLET ORAL at 08:59

## 2018-06-26 RX ADMIN — TIZANIDINE 4 MG: 4 TABLET ORAL at 08:59

## 2018-06-26 RX ADMIN — DULOXETINE HYDROCHLORIDE 60 MG: 60 CAPSULE, DELAYED RELEASE ORAL at 08:59

## 2018-06-26 RX ADMIN — AMITRIPTYLINE HYDROCHLORIDE 150 MG: 75 TABLET, FILM COATED ORAL at 21:05

## 2018-06-26 RX ADMIN — LAMOTRIGINE 50 MG: 25 TABLET ORAL at 09:00

## 2018-06-26 RX ADMIN — ASPIRIN 325 MG: 325 TABLET, DELAYED RELEASE ORAL at 08:59

## 2018-06-26 RX ADMIN — FUROSEMIDE 20 MG: 20 TABLET ORAL at 09:00

## 2018-06-26 ASSESSMENT — PAIN SCALES - GENERAL
PAINLEVEL_OUTOF10: 8
PAINLEVEL_OUTOF10: 8
PAINLEVEL_OUTOF10: 7
PAINLEVEL_OUTOF10: 7
PAINLEVEL_OUTOF10: 8
PAINLEVEL_OUTOF10: 0
PAINLEVEL_OUTOF10: 5
PAINLEVEL_OUTOF10: 0
PAINLEVEL_OUTOF10: 7
PAINLEVEL_OUTOF10: 8
PAINLEVEL_OUTOF10: 8

## 2018-06-26 ASSESSMENT — PAIN DESCRIPTION - FREQUENCY
FREQUENCY: CONTINUOUS

## 2018-06-26 ASSESSMENT — PAIN DESCRIPTION - LOCATION
LOCATION: FOOT;ANKLE
LOCATION: ANKLE
LOCATION: LEG;FOOT
LOCATION: ANKLE;FOOT
LOCATION: ANKLE;FOOT
LOCATION: LEG;FOOT

## 2018-06-26 ASSESSMENT — PAIN DESCRIPTION - PROGRESSION
CLINICAL_PROGRESSION: NOT CHANGED

## 2018-06-26 ASSESSMENT — PAIN DESCRIPTION - ORIENTATION
ORIENTATION: RIGHT
ORIENTATION: RIGHT;LEFT
ORIENTATION: RIGHT
ORIENTATION: RIGHT

## 2018-06-26 ASSESSMENT — PAIN DESCRIPTION - DESCRIPTORS
DESCRIPTORS: ACHING;BURNING
DESCRIPTORS: ACHING

## 2018-06-26 ASSESSMENT — PAIN DESCRIPTION - PAIN TYPE
TYPE: ACUTE PAIN

## 2018-06-26 ASSESSMENT — PAIN DESCRIPTION - ONSET
ONSET: ON-GOING

## 2018-06-26 NOTE — CARE COORDINATION
Discharge Planning discussion occurred with Ms. Cecily Johnson, her significant other, sister and two other close friends in presence of OTAlycia  Ms. Cecily Johnson has expressed that she is \"ready\" to go home. For the past two days, she has become very anxious about discomfort she feels at the ankle \"Feels like brillo pad\" and is awaiting response from Orthopedic Dr/idania. The office has been called and requested. Not having come today, she was telling her sister she wanted to leave AMA. We discussed best solution- have requested office appointment tomorrow for her to go to [facility]. [place] for them to evaluate after her discharge. Also suggested she consider short term   SNF placement, given the complexity of medication and the recent acute renal issue. She admits she tend rely on medication when anxious and understands risks for falling and reinjury. She adamantly refuses to consider short term placement, therefore identified most likely caregiver- her son and significant other agrees upon this and home health care services. [employer] already has orders, notified them to start services quickly on 6/28/18. She is followed by [facility] case management- notifying that coordinator as well.

## 2018-06-26 NOTE — PROGRESS NOTES
bicarbonate tablet 1,300 mg, 1,300 mg, Oral, TID, Luz Marina Persaud MD, 1,300 mg at 06/25/18 1930    furosemide (LASIX) tablet 20 mg, 20 mg, Oral, Daily, Luz Marina Persaud MD, 20 mg at 06/25/18 0847    valsartan (DIOVAN) tablet 80 mg, 80 mg, Oral, Nightly, Luz Marina Persaud MD, 80 mg at 06/25/18 1930    insulin glargine (LANTUS) injection vial 10 Units, 10 Units, Subcutaneous, Nightly, Gonzalo Hawkins MD, 10 Units at 06/25/18 2118    ondansetron (ZOFRAN) tablet 4 mg, 4 mg, Oral, Q6H PRN, Gonzalo Hawkins MD, 4 mg at 06/18/18 2056    polyethylene glycol (GLYCOLAX) packet 17 g, 17 g, Oral, Daily, Gonzalo Hawkins MD, 17 g at 06/22/18 0844    acetaminophen (TYLENOL) tablet 650 mg, 650 mg, Oral, Q4H PRN, Roberta Nguyen MD, 650 mg at 06/18/18 1009    aspirin EC tablet 325 mg, 325 mg, Oral, BID, Roberta Nguyen MD, 325 mg at 06/25/18 1929    docusate sodium (COLACE) capsule 100 mg, 100 mg, Oral, BID, Roberta Nguyen MD, 100 mg at 06/25/18 1929    amitriptyline (ELAVIL) tablet 150 mg, 150 mg, Oral, Nightly, Roberta Nugyen MD, 150 mg at 06/25/18 1930    QUEtiapine (SEROQUEL) tablet 200 mg, 200 mg, Oral, Nightly, Roberta Nguyen MD, 200 mg at 06/25/18 1929    tiZANidine (ZANAFLEX) tablet 4 mg, 4 mg, Oral, Q6H PRN, Roberta Nguyen MD, 4 mg at 06/22/18 0603    enoxaparin (LOVENOX) injection 40 mg, 40 mg, Subcutaneous, Daily, Roberta Nguyen MD, 40 mg at 06/25/18 0847    atorvastatin (LIPITOR) tablet 40 mg, 40 mg, Oral, Daily, Roberta Nguyen MD, 40 mg at 06/25/18 0847    dextrose 5 % solution, 100 mL/hr, Intravenous, PRN, Roberta Nguyen MD    diazepam (VALIUM) tablet 5 mg, 5 mg, Oral, Nightly PRN, Roberta Nguyen MD, 5 mg at 06/19/18 1954    DULoxetine (CYMBALTA) extended release capsule 60 mg, 60 mg, Oral, Daily, Roberta Nguyen MD, 60 mg at 06/25/18 0848    fluticasone (FLONASE) 50 MCG/ACT nasal spray 1 spray, 1 spray, Each Nare, Daily, Roberta Nguyen MD, 1 spray at 06/25/18 0848    glucagon (rDNA) injection 1 mg, 1 mg, Intramuscular, PRN, Roberta Nguyen MD    glucose °C) (Temporal)   Resp 18   Ht 5' 1\" (1.549 m)   Wt 240 lb (108.9 kg)   SpO2 96%   BMI 45.35 kg/m²     Constitutional: she appears well-developed and well-nourished. Eyes  conjunctiva normal.   Ear, nose, throat - No scars, masses, or lesions over external nose or ears, no atrophy of tongue  Neck-symmetric, no masses noted, no jugular vein distension  Respiration- chest wall appears symmetric, good expansion,   normal effort without use of accessory muscles  Musculoskeletal  no significant wasting of muscles noted, no bony deformities Extremities-no clubbing, cyanosis or edema  Skin  warm, dry, and intact. No rash, erythema, or pallor. Psychiatric  mood, affect, and behavior appear normal.      Neurology  NEUROLOGICAL EXAM:      Mental status   Somnolent this am but responds appropriately       Cranial Nerves     CN III, IV,VI-EOMI, No nystagmus, conjugate eye movements, no ptosis    CN VII-no facial assymetry         Motor function  Antigravity x 4 limited RLE     Sensory function      Cerebellar      Tremor None present     Gait                  Not tested           Nursing/pcp notes, imaging,labs and vitals reviewed.      PT,OT and/or speech notes reviewed    Lab Results   Component Value Date    WBC 5.1 06/25/2018    HGB 8.6 (L) 06/25/2018    HCT 27.7 (L) 06/25/2018    MCV 95.2 06/25/2018     06/25/2018     Lab Results   Component Value Date     06/26/2018    K 4.7 06/26/2018    CL 99 06/26/2018    CO2 27 06/26/2018    BUN 25 (H) 06/26/2018    CREATININE 1.4 (H) 06/26/2018    GLUCOSE 150 (H) 06/26/2018    CALCIUM 9.0 06/26/2018    PROT 6.2 (L) 06/26/2018    LABALBU 2.9 (L) 06/26/2018    BILITOT <0.2 06/26/2018    ALKPHOS 161 (H) 06/26/2018    AST 23 06/26/2018    ALT 21 06/26/2018    LABGLOM 39 (A) 06/26/2018    GLOB 3.4 04/25/2017     Lab Results   Component Value Date    INR 1.00 06/12/2018    INR 1.03 06/23/2017    INR 1.04 06/05/2017    PROTIME 13.1 06/12/2018    PROTIME 13.4 06/23/2017    PROTIME 13.5 06/05/2017         06/15/18 1300   Transfers   Sit to Stand Moderate Assistance  (PULL TO STAND )   Bed to Chair Maximum assistance; Moderate assistance   Other exercises   Other exercises? Yes   Other exercises 1 SITTING BILAT LE HIP FLEX/EXT, ABD/ADD; KNEE EXT; PF/DF X 10 EACH WITH MANUAL RESISTANCE. Other exercises 2 STATIC STANCE VERTICAL BAR X3 MIN   Conditions Requiring Skilled Therapeutic Intervention   Body structures, Functions, Activity limitations Decreased functional mobility ; Decreased ADL status; Decreased ROM; Decreased strength;Decreased endurance;Decreased balance   Assessment IMPROVED BILAT LE ROM THIS PM.  TRANSFER TRAINING.   (TRANSFERED PLINTH TO CHAIR BEFORE THERAPIST READY. MAX A)            RECORD REVIEW: Previous medical records, medications were reviewed at today's visit    IMPRESSION:     1. Status post fracture of the right lower leg/right radius-pain control/nonweightbearing/DVT prophylaxis  2. Insomnia/mood disorderon medications  3. Coronary artery disease -on aspirin/statin  4. GIbowel regimen/proton pump inhibitor  5. Neuropathy-Cymbalta/Lyrica  6. Diabetesmonitor blood sugar  7. Hypertensionon medications continue to monitor  8. Hypothyroidism test on Synthroid  9. Pain controlNorco as needed  10.  PT/OT    Appreciate renal input  Fluid restriction  Continue  Current care    Team conference with PT/OT/speech/nursing/Care coordinator to review in depth patients care and discharge planning     ft SBA  0 ambulation  0 steps    ELOS Thursday    Expected duration and frequency therapy: 180 minutes per day, 5 days per week    Maddy Solis  [phone] CELL  Dr Lynne Rodrigues

## 2018-06-26 NOTE — PROGRESS NOTES
Occupational Therapy     06/26/18 1430   General   Family / Caregiver Present (Pt. spouse.)   Diagnosis R ankle ORIF revision from fall ( initial fx 6/11), Distal right radial fx   Subjective   Subjective Pt. became emotional during session after d/c discussion with Social Work and family, required encouragement to complete transfers for FTD. Pain Assessment   Patient Currently in Pain Yes   Pain Assessment 0-10   Pain Level 8   Pain Type Acute pain   Pain Location Ankle; Foot   Pain Orientation Right   Pain Descriptors Aching   Pain Frequency Continuous   Clinical Progression Not changed   Pain Intervention(s) Medication (see eMar);Repositioned   Response to Pain Intervention Patient Satisfied   ADL   Toileting Contact guard assistance   Balance   Standing Balance Contact guard assistance   Standing Balance   Sit to stand Contact guard assistance   Stand to sit Contact guard assistance   Functional Mobility   Functional - Mobility Device Wheelchair   Activity Other   Assist Level Supervision   Functional Mobility Comments Room 815<>OT gym. Transfers   Stand Pivot Transfers Contact guard assistance   Toilet Transfers   Toilet - Technique Stand pivot   Equipment Used Standard bedside commode   Toilet Transfer Contact guard assistance   Tub Transfers   Tub - Transfer From Wheelchair   Tub - Transfer Type To and From   Tub - Transfer To Transfer tub bench   Tub - Technique Sit pivot   Tub Transfers Contact guard   Short term goals   Short term goal 5 MET   Long term goals   Long term goal 3 MET   Assessment   Performance deficits / Impairments Decreased functional mobility ; Decreased ADL status; Decreased balance;Decreased endurance;Decreased high-level IADLs;Decreased safe awareness   Assessment FTD with pt. spouse, spouse demonstrated good understanding of safe home setup but demonstrated only fair understanding of transfer techs. pt. reports that her son will be available to assist her after d/c home.

## 2018-06-26 NOTE — PROGRESS NOTES
Other exercises 1 --  --    Conditions Requiring Skilled Therapeutic Intervention   Body structures, Functions, Activity limitations Decreased functional mobility ; Decreased ADL status; Decreased ROM; Decreased strength;Decreased endurance --    Treatment Diagnosis INTERFERENCE WITH ACTIVITY --    Timed Code Treatment Minutes --  60 Minutes   Activity Tolerance   Activity Tolerance Patient Tolerated treatment well --    Electronically signed by Dell Cedeño PTA on 6/26/2018 at 11:03 AM

## 2018-06-26 NOTE — PROGRESS NOTES
Occupational Therapy  Durable Medical Equipment   Physician Order     Patient Name Jennie Peñaloza   Address  [address]  [phone] (Home)  [phone] (Mobile)     Patient Height Height: 5' 1\" (154.9 cm)  Patient Weight 240 lb (108.9 kg)   1960     1. [facility] PART A AND B   F/O Payor/Plan Precert #   MEDICARE/MEDICARE PART A AND B    Subscriber Subscriber #   Akosua Butterfielddaryn [id number]   Address Phone   [address] [address] [address] [facility] [phone]     2. [place]   F/O Payor/Plan Precert #   [place]    Subscriber Subscriber #   Akosua Rinaldi [id number]   Address Phone   [address]  [place]        DME NEEDS:  **20\" SADE-HEIGHT MANUAL WHEELCHAIR WITH ANTI-TIPPERS, WHEELCHAIR CUSHION, RIGHT EXTENDED BRAKE HANDLE, RIGHT ELEVATING SWING-AWAY LEG REST, AND LEFT SWING-AWAY FOOT REST. DIAGNOSIS:  Ankle fracture, right, open type I or II, initial encounter ICD-10-CM: S82.891B  ICD-9-CM: 824.9     Open right ankle fracture, sequela ICD-10-CM: S82.891S  ICD-9-CM: 905. 4     Closed fracture of distal end of left radius with routine healing ICD-10-CM: S52.502D  ICD-9-CM: V54.12     Primary osteoarthritis of left knee ICD-10-CM: M17.12  ICD-9-CM: 715.16     Obesity ICD-10-CM: E66.9  ICD-9-CM: 278.00       ____________________ _____________________ ________________   Physician Signature      Physician Name (print)   Physician NPI          Date

## 2018-06-26 NOTE — PROGRESS NOTES
complaints from previous session. Family / Caregiver Present --  --  No   Subjective   Subjective --  --  Pt agreed to therapy this morning. General Comment   Comments --  --  --    Pain Screening   Patient Currently in Pain --  --  Yes   Pain Assessment   Pain Assessment --  --  0-10   Pain Level --  --  7   Pain Type --  --  Acute pain   Pain Location --  --  Leg;Foot   Pain Orientation --  --  Right   Pain Descriptors --  --  Aching   Pain Frequency --  --  Continuous   Pain Onset --  --  On-going   Clinical Progression --  --  Not changed   Patient's Stated Pain Goal --  --  No pain   Pain Intervention(s) --  --  Medication (see eMar)   Response to Pain Intervention --  --  Patient Satisfied   Multiple Pain Sites --  --  Yes   Pre Treatment Pain Screening   Pain at present --  --  7   Scale Used --  --  Numeric Score   Intervention List --  --  Patient able to continue with treatment   Comments / Details --  --  --    Orientation   Overall Orientation Status --  --  --    Transfers   Stand Pivot Transfers --  --  --    Car Transfer --  --  --    Comment --  --  --    Exercises   Comments --  --  --    Other exercises   Other exercises 1 --  --  --    Other Activities   Comment INSTRUCTED PATIENT ON HOW TO USE W/C TO GO UP AHND DOWN A CURB.  --  --    Conditions Requiring Skilled Therapeutic Intervention   Body structures, Functions, Activity limitations --  --  --    Assessment --  --  --    Treatment Diagnosis --  INTERFERENCE WITH ACTIVITY --    Prognosis --  --  --    Activity Tolerance   Activity Tolerance --  --  --        06/26/18 1124   General   Chart Reviewed --    Additional Pertinent Hx --    Response To Previous Treatment --    Family / Caregiver Present --    Subjective   Subjective --    General Comment   Comments --    Pain Screening   Patient Currently in Pain --    Pain Assessment   Pain Assessment --    Pain Level --    Pain Type --    Pain Location --    Pain Orientation --    Pain Descriptors --    Pain Frequency --    Pain Onset --    Clinical Progression --    Patient's Stated Pain Goal --    Pain Intervention(s) --    Response to Pain Intervention --    Multiple Pain Sites --    Pre Treatment Pain Screening   Pain at present --    Scale Used --    Intervention List --    Comments / Details --    Orientation   Overall Orientation Status WNL   Transfers   Stand Pivot Transfers --    Car Transfer --    Comment --    Exercises   Comments SItting Vasquez LE ther ex x 15 reps   Other exercises   Other exercises 1 --    Other Activities   Comment --    Conditions Requiring Skilled Therapeutic Intervention   Body structures, Functions, Activity limitations Decreased functional mobility ; Decreased ADL status; Decreased strength;Decreased endurance   Assessment Pt tolerated sitting ther ex well w/ minimal increase in pain.    Treatment Diagnosis --    Prognosis Good   Activity Tolerance   Activity Tolerance Patient Tolerated treatment well   Electronically signed by Sarthak Sewell PTA on 6/26/2018 at 11:25 AM

## 2018-06-26 NOTE — PROGRESS NOTES
Ari Ryan  [id number]     06/26/18 1623 06/26/18 1625 06/26/18 1627   General   Chart Reviewed Yes --  --    Additional Pertinent Hx OUTPATIENT RIGHT BIMALLEOLAR ANKLE FX ORIF, RIGHT RADIAL FX ORIF. FALL AT HOME. READMIT FOR I&D, ORIF REVISION --  --    General Comment   Comments FTD WITH PATIENT AND CAREGIVER --  --    Subjective   Subjective PATIENT AGREED TO THERAPY --  --    Pain Screening   Patient Currently in Pain Yes --  --    Pain Assessment   Pain Assessment 0-10 --  --    Pain Level 8 --  --    Pain Type Acute pain --  --    Pain Location Ankle; Foot --  --    Pain Orientation Right --  --    Pain Frequency Continuous --  --    Clinical Progression Not changed --  --    Pain Onset On-going --  --    Patient's Stated Pain Goal No pain --  --    Transfers   Sit to Stand --  Contact guard assistance --    Stand to sit --  Contact guard assistance --    Stand Pivot Transfers --  Contact guard assistance --    Squat Pivot Transfers --  Contact guard assistance --    Car Transfer --  Contact guard assistance --    Comment --  VC'S FOR PROPER TRANSFER TECHNIQUES --    Ambulation   Ambulation? --  Yes --    Wheelchair Activities   Propulsion --  Yes --    Propulsion 1   Propulsion --  Manual --    Level --  Level Tile --    Method --  LUE;LLE --    Level of Assistance --  Stand by assistance --    Description/ Details --  Pt propelled W/C well  --    Distance --  50 --    Conditions Requiring Skilled Therapeutic Intervention   Body structures, Functions, Activity limitations --  --  Decreased functional mobility ; Decreased ADL status; Decreased strength;Decreased endurance   Assessment --  --  PATIENT TOLERATED ALL THERAPY WELL AND ABLE TO PERFORM ALL TRANSFER CGA   Timed Code Treatment Minutes --  --  --    Activity Tolerance   Activity Tolerance --  --  Patient Tolerated treatment well       06/26/18 1628   General   Chart Reviewed --    Additional Pertinent Hx --    General Comment   Comments -- Subjective   Subjective --    Pain Screening   Patient Currently in Pain --    Pain Assessment   Pain Assessment --    Pain Level --    Pain Type --    Pain Location --    Pain Orientation --    Pain Frequency --    Clinical Progression --    Pain Onset --    Patient's Stated Pain Goal --    Transfers   Sit to Stand --    Stand to sit --    Stand Pivot Transfers --    Squat Pivot Transfers --    Car Transfer --    Comment --    Ambulation   Ambulation? --    Wheelchair Activities   Propulsion --    Propulsion 1   Propulsion --    Level --    Method --    Level of Assistance --    Description/ Details --    Distance --    Conditions Requiring Skilled Therapeutic Intervention   Body structures, Functions, Activity limitations --    Assessment --    Timed Code Treatment Minutes 45 Minutes   Activity Tolerance   Activity Tolerance --    Electronically signed by Lnida Carrillo PTA on 6/26/2018 at 4:31 PM

## 2018-06-27 LAB
ALBUMIN SERPL-MCNC: 3.3 G/DL (ref 3.5–5.2)
ALP BLD-CCNC: 172 U/L (ref 35–104)
ALT SERPL-CCNC: 21 U/L (ref 5–33)
ANION GAP SERPL CALCULATED.3IONS-SCNC: 10 MMOL/L (ref 7–19)
AST SERPL-CCNC: 27 U/L (ref 5–32)
BILIRUB SERPL-MCNC: 0.3 MG/DL (ref 0.2–1.2)
BUN BLDV-MCNC: 28 MG/DL (ref 6–20)
CALCIUM SERPL-MCNC: 9.4 MG/DL (ref 8.6–10)
CHLORIDE BLD-SCNC: 100 MMOL/L (ref 98–111)
CO2: 31 MMOL/L (ref 22–29)
CREAT SERPL-MCNC: 1.4 MG/DL (ref 0.5–0.9)
GFR NON-AFRICAN AMERICAN: 39
GLUCOSE BLD-MCNC: 110 MG/DL (ref 70–99)
GLUCOSE BLD-MCNC: 150 MG/DL (ref 70–99)
GLUCOSE BLD-MCNC: 152 MG/DL (ref 70–99)
GLUCOSE BLD-MCNC: 153 MG/DL (ref 74–109)
GLUCOSE BLD-MCNC: 265 MG/DL (ref 70–99)
PERFORMED ON: ABNORMAL
POTASSIUM SERPL-SCNC: 4.8 MMOL/L (ref 3.5–5)
SODIUM BLD-SCNC: 141 MMOL/L (ref 136–145)
TOTAL PROTEIN: 6.6 G/DL (ref 6.6–8.7)

## 2018-06-27 PROCEDURE — 6370000000 HC RX 637 (ALT 250 FOR IP): Performed by: PSYCHIATRY & NEUROLOGY

## 2018-06-27 PROCEDURE — 1180000000 HC REHAB R&B

## 2018-06-27 PROCEDURE — 6370000000 HC RX 637 (ALT 250 FOR IP): Performed by: INTERNAL MEDICINE

## 2018-06-27 PROCEDURE — 97110 THERAPEUTIC EXERCISES: CPT

## 2018-06-27 PROCEDURE — 6370000000 HC RX 637 (ALT 250 FOR IP): Performed by: ORTHOPAEDIC SURGERY

## 2018-06-27 PROCEDURE — 36415 COLL VENOUS BLD VENIPUNCTURE: CPT

## 2018-06-27 PROCEDURE — 99232 SBSQ HOSP IP/OBS MODERATE 35: CPT | Performed by: PSYCHIATRY & NEUROLOGY

## 2018-06-27 PROCEDURE — 80053 COMPREHEN METABOLIC PANEL: CPT

## 2018-06-27 PROCEDURE — 97530 THERAPEUTIC ACTIVITIES: CPT

## 2018-06-27 PROCEDURE — 82948 REAGENT STRIP/BLOOD GLUCOSE: CPT

## 2018-06-27 PROCEDURE — 6360000002 HC RX W HCPCS: Performed by: ORTHOPAEDIC SURGERY

## 2018-06-27 RX ADMIN — DULOXETINE HYDROCHLORIDE 60 MG: 60 CAPSULE, DELAYED RELEASE ORAL at 07:49

## 2018-06-27 RX ADMIN — QUETIAPINE FUMARATE 200 MG: 100 TABLET ORAL at 21:14

## 2018-06-27 RX ADMIN — HYDROCODONE BITARTRATE AND ACETAMINOPHEN 1 TABLET: 10; 325 TABLET ORAL at 13:38

## 2018-06-27 RX ADMIN — AMITRIPTYLINE HYDROCHLORIDE 150 MG: 75 TABLET, FILM COATED ORAL at 21:14

## 2018-06-27 RX ADMIN — DOCUSATE SODIUM 100 MG: 100 CAPSULE, LIQUID FILLED ORAL at 21:14

## 2018-06-27 RX ADMIN — INSULIN LISPRO 2 UNITS: 100 INJECTION, SOLUTION INTRAVENOUS; SUBCUTANEOUS at 07:51

## 2018-06-27 RX ADMIN — TIZANIDINE 4 MG: 4 TABLET ORAL at 07:48

## 2018-06-27 RX ADMIN — SODIUM BICARBONATE 325 MG: 650 TABLET ORAL at 07:51

## 2018-06-27 RX ADMIN — ASPIRIN 325 MG: 325 TABLET, DELAYED RELEASE ORAL at 07:50

## 2018-06-27 RX ADMIN — ASPIRIN 325 MG: 325 TABLET, DELAYED RELEASE ORAL at 21:14

## 2018-06-27 RX ADMIN — METOPROLOL SUCCINATE 50 MG: 50 TABLET, EXTENDED RELEASE ORAL at 07:48

## 2018-06-27 RX ADMIN — FUROSEMIDE 20 MG: 20 TABLET ORAL at 07:50

## 2018-06-27 RX ADMIN — INSULIN LISPRO 2 UNITS: 100 INJECTION, SOLUTION INTRAVENOUS; SUBCUTANEOUS at 12:16

## 2018-06-27 RX ADMIN — ENOXAPARIN SODIUM 40 MG: 40 INJECTION SUBCUTANEOUS at 07:50

## 2018-06-27 RX ADMIN — LAMOTRIGINE 50 MG: 25 TABLET ORAL at 21:14

## 2018-06-27 RX ADMIN — POLYETHYLENE GLYCOL 3350 17 G: 17 POWDER, FOR SOLUTION ORAL at 07:49

## 2018-06-27 RX ADMIN — ATORVASTATIN CALCIUM 40 MG: 40 TABLET, FILM COATED ORAL at 07:49

## 2018-06-27 RX ADMIN — PREGABALIN 150 MG: 150 CAPSULE ORAL at 07:49

## 2018-06-27 RX ADMIN — PANTOPRAZOLE SODIUM 40 MG: 40 TABLET, DELAYED RELEASE ORAL at 07:50

## 2018-06-27 RX ADMIN — INSULIN LISPRO 6 UNITS: 100 INJECTION, SOLUTION INTRAVENOUS; SUBCUTANEOUS at 17:31

## 2018-06-27 RX ADMIN — VALSARTAN 80 MG: 80 TABLET ORAL at 21:14

## 2018-06-27 RX ADMIN — FLUTICASONE PROPIONATE 1 SPRAY: 50 SPRAY, METERED NASAL at 07:47

## 2018-06-27 RX ADMIN — PREGABALIN 150 MG: 150 CAPSULE ORAL at 21:14

## 2018-06-27 RX ADMIN — DOCUSATE SODIUM 100 MG: 100 CAPSULE, LIQUID FILLED ORAL at 07:49

## 2018-06-27 RX ADMIN — LEVOTHYROXINE SODIUM 88 MCG: 88 TABLET ORAL at 05:54

## 2018-06-27 RX ADMIN — HYDROCODONE BITARTRATE AND ACETAMINOPHEN 1 TABLET: 10; 325 TABLET ORAL at 07:48

## 2018-06-27 RX ADMIN — HYDROCODONE BITARTRATE AND ACETAMINOPHEN 1 TABLET: 10; 325 TABLET ORAL at 18:08

## 2018-06-27 RX ADMIN — LAMOTRIGINE 50 MG: 25 TABLET ORAL at 07:48

## 2018-06-27 ASSESSMENT — PAIN SCALES - GENERAL
PAINLEVEL_OUTOF10: 0
PAINLEVEL_OUTOF10: 9
PAINLEVEL_OUTOF10: 8
PAINLEVEL_OUTOF10: 0
PAINLEVEL_OUTOF10: 8
PAINLEVEL_OUTOF10: 0

## 2018-06-27 ASSESSMENT — PAIN DESCRIPTION - LOCATION
LOCATION: ANKLE;WRIST
LOCATION: ANKLE
LOCATION: ANKLE

## 2018-06-27 ASSESSMENT — PAIN DESCRIPTION - PAIN TYPE
TYPE: ACUTE PAIN
TYPE: ACUTE PAIN

## 2018-06-27 ASSESSMENT — PAIN DESCRIPTION - DESCRIPTORS: DESCRIPTORS: ACHING

## 2018-06-27 ASSESSMENT — PAIN DESCRIPTION - ORIENTATION
ORIENTATION: RIGHT

## 2018-06-27 ASSESSMENT — PAIN DESCRIPTION - FREQUENCY: FREQUENCY: CONTINUOUS

## 2018-06-27 ASSESSMENT — PAIN DESCRIPTION - PROGRESSION: CLINICAL_PROGRESSION: NOT CHANGED

## 2018-06-27 NOTE — PROGRESS NOTES
Occupational Therapy     06/27/18 1000   General Comment   Comments Pt. co-tx due to afternoon ortho procedure. Pain Assessment   Patient Currently in Pain Yes   Pain Assessment 0-10   Pain Level 8   Pain Type Acute pain   Pain Location Ankle   Pain Orientation Right   Pain Descriptors Aching   Pain Frequency Continuous   Clinical Progression Not changed   Pain Intervention(s) Medication (see eMar);Repositioned; Rest   Response to Pain Intervention Patient Satisfied   Vital Signs   Level of Consciousness 0   Standing Balance   Sit to stand Contact guard assistance   Stand to sit Contact guard assistance   Functional Mobility   Functional - Mobility Device Wheelchair   Activity Retrieve items; Other;Transport items   Assist Level Supervision   Functional Mobility Comments Light homemaking task in OT apartment, able to use reacher for item retrieval and placement. Type of ROM/Therapeutic Exercise   Type of ROM/Therapeutic Exercise Free weights;AROM   Comment 2# LUE, AROM on RUE, 1 set x 10 reps, all planes. Independent with HEP. Short term goals   Short term goal 6 MET   Long term goals   Long term goal 4 MET   Assessment   Performance deficits / Impairments Decreased functional mobility ; Decreased ADL status; Decreased balance;Decreased endurance;Decreased high-level IADLs;Decreased safe awareness   Assessment Pt. discharging home tomorrow. Treatment Diagnosis R ankle revision, R wrist fx   Prognosis Good   Patient Education w/c setup. Timed Code Treatment Minutes 60 Minutes   Activity Tolerance   Activity Tolerance Patient Tolerated treatment well   Safety Devices   Safety Devices in place Yes  (Given to PT. )   Type of devices Gait belt   Plan   Current Treatment Recommendations Patient/Caregiver Education & Training;Home Management Training;Equipment Evaluation, Education, & procurement;Self-Care / ADL; Safety Education & Training; Endurance Training;Functional Mobility Training;Balance Training

## 2018-06-27 NOTE — PROGRESS NOTES
glargine (LANTUS) injection vial 10 Units  10 Units Subcutaneous Nightly Gonzalo Hawkins MD   10 Units at 06/25/18 2118    ondansetron (ZOFRAN) tablet 4 mg  4 mg Oral Q6H PRN Gonzalo Hawkins MD   4 mg at 06/18/18 2056    polyethylene glycol (GLYCOLAX) packet 17 g  17 g Oral Daily Gonzalo Hawkins MD   17 g at 06/22/18 0844    acetaminophen (TYLENOL) tablet 650 mg  650 mg Oral Q4H PRN Roberta Nguyen MD   650 mg at 06/18/18 1009    aspirin EC tablet 325 mg  325 mg Oral BID Roberta Nguyen MD   325 mg at 06/26/18 0859    docusate sodium (COLACE) capsule 100 mg  100 mg Oral BID Roberta Nguyen MD   100 mg at 06/26/18 0900    amitriptyline (ELAVIL) tablet 150 mg  150 mg Oral Nightly Roberta Nguyen MD   150 mg at 06/25/18 1930    QUEtiapine (SEROQUEL) tablet 200 mg  200 mg Oral Nightly Roberta Nguyen MD   200 mg at 06/25/18 1929    tiZANidine (ZANAFLEX) tablet 4 mg  4 mg Oral Q6H PRN Roberta Nguyen MD   4 mg at 06/26/18 0859    enoxaparin (LOVENOX) injection 40 mg  40 mg Subcutaneous Daily Roberta Nguyen MD   40 mg at 06/26/18 0859    atorvastatin (LIPITOR) tablet 40 mg  40 mg Oral Daily Roberta Nguyen MD   40 mg at 06/26/18 0900    dextrose 5 % solution  100 mL/hr Intravenous PRN Roberta Nguyen MD        diazepam (VALIUM) tablet 5 mg  5 mg Oral Nightly PRN Roberta Nguyen MD   5 mg at 06/19/18 1954    DULoxetine (CYMBALTA) extended release capsule 60 mg  60 mg Oral Daily Roberta Nguyen MD   60 mg at 06/26/18 0859    fluticasone (FLONASE) 50 MCG/ACT nasal spray 1 spray  1 spray Each Nare Daily Roberta Nguyen MD   1 spray at 06/26/18 0859    glucagon (rDNA) injection 1 mg  1 mg Intramuscular PRN Roberta Nguyen MD        glucose (GLUTOSE) 40 % oral gel 15 g  15 g Oral PRN Roberta Nguyen MD        insulin lispro (HUMALOG) injection vial 0-12 Units  0-12 Units Subcutaneous TID WC Roberta Nguyen MD   2 Units at 06/26/18 3574    insulin lispro (HUMALOG) injection vial 0-6 Units  0-6 Units Subcutaneous Nightly Roberta Nguyen MD   2 Units at 06/25/18 5941    lamoTRIgine (LAMICTAL) tablet 50 mg  50 mg Oral BID Christopher Ellison MD   50 mg at 06/26/18 0900    levothyroxine (SYNTHROID) tablet 88 mcg  88 mcg Oral Daily Christopher Ellison MD   88 mcg at 06/26/18 0549    linaclotide (LINZESS) capsule 290 mcg  290 mcg Oral PRN Christopher Ellison MD   290 mcg at 06/16/18 0805    pantoprazole (PROTONIX) tablet 40 mg  40 mg Oral Daily Christopher Ellison MD   40 mg at 06/26/18 0900    pregabalin (LYRICA) capsule 150 mg  150 mg Oral BID Christopher Ellison MD   150 mg at 06/26/18 0859    magnesium hydroxide (MILK OF MAGNESIA) 400 MG/5ML suspension 30 mL  30 mL Oral Daily PRN Christopher Barnhart MD        docusate sodium (COLACE) capsule 100 mg  100 mg Oral BID PRN Christopher Barnhart MD        bisacodyl (DULCOLAX) suppository 10 mg  10 mg Rectal Daily PRN Christopher Barnhart MD   10 mg at 06/18/18 1323    HYDROcodone-acetaminophen (Sharlotte Eis)  MG per tablet 1 tablet  1 tablet Oral Q4H PRN Christopher Barnhart MD   1 tablet at 06/26/18 1452       Past Medical History:  Past Medical History:   Diagnosis Date    Anxiety     Arthritis     [facility]     CAD (coronary artery disease)     [facility]congestive heart failure) ([facility]     Chronic kidney disease (CKD) stage G3b/A2, moderately decreased glomerular filtration rate (GFR) between 30-44 mL/min/1.73 square meter and albuminuria creatinine ratio between  mg/g 11/21/2016    Depression     DM [facility]diabetes mellitus[facility]     GERD (gastroesophageal reflux disease)     History of blood transfusion     History of suicidal ideation     Hyperlipidemia     Hypertension     Hypothyroidism     Insomnia     Kidney disease     stage 3 failure    MI, 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    Obesity     Polyarticular arthritis     Type II or unspecified type diabetes mellitus without mention of complication, not stated as uncontrolled        Past Surgical History:  Past Surgical History:   Procedure Laterality Date    ABDOMINOPLASTY      ANGIOPLASTY  1/20/05    stent placement to wheezing  Cardiovascular ROS: No chest pain or palpitations  Gastrointestinal ROS: No abdominal pain or melena  Genito-Urinary ROS: No dysuria or hematuria  Musculoskeletal ROS: No joint pain or swelling         Objective:  Blood pressure (!) 160/89, pulse 81, temperature 98.5 °F (36.9 °C), temperature source Temporal, resp. rate 18, height 5' 1\" (1.549 m), weight 240 lb (108.9 kg), SpO2 99 %, not currently breastfeeding. Intake/Output Summary (Last 24 hours) at 06/26/18 2105  Last data filed at 06/26/18 1834   Gross per 24 hour   Intake             1200 ml   Output                0 ml   Net             1200 ml     General: awake/alert   Chest:  clear to auscultation bilaterally without respiratory distress  CVS: regular rate and rhythm  Abdominal: soft, nontender, normal bowel sounds  Extremities: no cyanosis or edema  Skin: warm and dry without rash    Labs:  BMP:   Recent Labs      06/24/18   0336  06/25/18   0504  06/26/18   0430   NA  130*  135*  139   K  4.0  4.4  4.7   CL  95*  96*  99   CO2  25  25  27   BUN  49*  31*  25*   CREATININE  2.0*  1.4*  1.4*   CALCIUM  8.3*  8.6  9.0     CBC:   Recent Labs      06/24/18   0336  06/25/18   0504   WBC  5.8  5.1   HGB  7.7*  8.6*   HCT  24.4*  27.7*   MCV  94.9  95.2   PLT  247  300     LIVER PROFILE:   Recent Labs      06/24/18   0336  06/25/18   0504  06/26/18   0430   AST  42*  37*  23   ALT  25  25  21   BILITOT  0.4  0.3  <0.2   ALKPHOS  147*  144*  161*     PT/INR: No results for input(s): PROTIME, INR in the last 72 hours. APTT: No results for input(s): APTT in the last 72 hours. BNP:  No results for input(s): BNP in the last 72 hours. Ionized Calcium:No results for input(s): IONCA in the last 72 hours. Magnesium:No results for input(s): MG in the last 72 hours. Phosphorus:No results for input(s): PHOS in the last 72 hours. HgbA1C: No results for input(s): LABA1C in the last 72 hours.   Hepatic:   Recent Labs      06/24/18   0336  06/25/18   [id number]

## 2018-06-27 NOTE — PROGRESS NOTES
Nephrology ([address] [facility]) Progress Note    Patient:  Jl Palacios  YOB: 1960  Date of Service: 6/27/2018  MRN: [id number]   Acct: [de-identified]   Primary Care Physician: Sascha Friday, DO  Advance Directive: Full Code  Admit Date: 6/14/2018       Hospital Day: 15  Referring Provider: Marcus Menezes MD    Patient independently seen and examined, Chart, Consults, Notes, Operative notes, Labs, Cardiology, and Radiology studies reviewed as able. Subjective:  Jl Palacios is a 63 y.o. female  whom we were consulted for hyponatremia/acute kidney injury/chronic kidney disease. Patient has stage IV chronic kidney disease and sees Dr. Sheeba Bardales in the office. Her baseline serum creatinine is 2.1 mg/dL or eGFR 25. She has recently fallen and has bimalleolar fracture of right ankle. Patient underwent internal fixation. She is currently at rehab, receiving physical therapy and occupational therapy. Incidental finding on the lab her serum sodium was 127 mmol. Hospital course remarkable for fluid restriction, serum sodium has been improving slowly. Her renal function has improved to baseline also. Today, no new events. No n/v/d. Tolerating diet.   Hoping for d/c 6/28.       Allergies:  Dilaudid [hydromorphone hcl]    Medicines:  Current Facility-Administered Medications   Medication Dose Route Frequency Provider Last Rate Last Dose    sodium bicarbonate tablet 325 mg  325 mg Oral BID Stepan Yap MD   325 mg at 06/27/18 0751    diazepam (VALIUM) tablet 2 mg  2 mg Oral Q6H PRN Marcus Menezes MD   2 mg at 06/25/18 1116    metoprolol succinate (TOPROL XL) extended release tablet 50 mg  50 mg Oral Daily Marcus Menezes MD   50 mg at 06/27/18 0748    furosemide (LASIX) tablet 20 mg  20 mg Oral Daily Max He MD   20 mg at 06/27/18 0750    valsartan (DIOVAN) tablet 80 mg  80 mg Oral Nightly Max He MD   80 mg at 06/26/18 2105    insulin glargine (LANTUS) tablet 50 mg  50 mg Oral BID Clayton Neff MD   50 mg at 06/27/18 5529    levothyroxine (SYNTHROID) tablet 88 mcg  88 mcg Oral Daily Clayton Neff MD   88 mcg at 06/27/18 0554    linaclotide (LINZESS) capsule 290 mcg  290 mcg Oral PRN Clayton Neff MD   290 mcg at 06/16/18 0805    pantoprazole (PROTONIX) tablet 40 mg  40 mg Oral Daily Clayton Neff MD   40 mg at 06/27/18 0750    pregabalin (LYRICA) capsule 150 mg  150 mg Oral BID Clayton Neff MD   150 mg at 06/27/18 0749    magnesium hydroxide (MILK OF MAGNESIA) 400 MG/5ML suspension 30 mL  30 mL Oral Daily PRN Noah Victoria MD        docusate sodium (COLACE) capsule 100 mg  100 mg Oral BID PRN Noah Victoria MD        bisacodyl (DULCOLAX) suppository 10 mg  10 mg Rectal Daily PRN Noah Victoria MD   10 mg at 06/18/18 1323    HYDROcodone-acetaminophen (Kimberly Alder)  MG per tablet 1 tablet  1 tablet Oral Q4H PRN Noah Victoria MD   1 tablet at 06/27/18 1338       Past Medical History:  Past Medical History:   Diagnosis Date    Anxiety     Arthritis     [facility]     CAD (coronary artery disease)     [facility]congest[facility] failure) ([facility]     Chronic kidney disease (CKD) stage G3b/A2, moderately decreased glomerular filtration rate (GFR) between 30-44 mL/min/1.73 square meter and albuminuria creatinine ratio between  mg/g 11/21/2016    Depression     DM [facility]diabetes mellitus) [facility]     GERD (gastroesophageal reflux disease)     History of blood transfusion     History of suicidal ideation     Hyperlipidemia     Hypertension     Hypothyroidism     Insomnia     Kidney disease     stage 3 failure    MI, 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    Obesity     Polyarticular arthritis     Type II or unspecified type diabetes mellitus without mention of complication, not stated as uncontrolled        Past Surgical History:  Past Surgical History:   Procedure Laterality Date    ABDOMINOPLASTY      ANGIOPLASTY  1/20/05    stent placement to LAD and diagonal with normal left vent function    BREAST REDUCTION SURGERY      CARDIAC CATHETERIZATION  05, 05    see report  Stents x3    CARDIAC CATHETERIZATION  3/23/15  JDT    EF 50%    CARPAL TUNNEL RELEASE       SECTION      x2    CHOLECYSTECTOMY      GASTRIC BYPASS SURGERY      HERNIA REPAIR      umbilical with mesh    INTRACAPSULAR CATARACT EXTRACTION Left 2016    LT EYE CATARACT EMULSIFICATION IOL IMPLANT performed by Gamaliel Andrews MD at [address] [place] [address] OPEN TREATMENT BIMALLEOLAR ANKLE FRACTURE Right 2018    ORIF RIGHT BIMALLEOLAR ANKLE FRACTURE, RIGHT WRIST CAST REMOVAL performed by Karene Gottron, MD at [address] [place] [address] OPEN TREATMENT BIMALLEOLAR ANKLE FRACTURE Right 2018    ANKLE OPEN REDUCTION INTERNAL FIXATION performed by Karene Gottron, MD at [place] [address] Left 2017    KNEE TOTAL ARTHROPLASTY performed by Ligia Martinez DO at [facility] OR       Family History  Family History   Problem Relation Age of Onset    Depression Mother     Heart Attack Mother     High Blood Pressure Mother     Kidney Disease Father     Alcohol Abuse Father     Depression Father     Heart Attack Father     High Blood Pressure Father     Kidney Disease Brother     Heart Disease Other     Depression Sister        Social History  Social History     Social History    Marital status:      Spouse name: N/A    Number of children: N/A    Years of education: N/A     Occupational History    Not on file.      Social History Main Topics    Smoking status: Never Smoker    Smokeless tobacco: Former User     Types: Snuff    Alcohol use No    Drug use: No    Sexual activity: No     Other Topics Concern    Not on file     Social History Narrative    No narrative on file         Review of Systems:  History obtained from chart review and the patient  General ROS: No fever or chills  Respiratory ROS: No cough, shortness of breath, wheezing  Cardiovascular ROS: No 6. 0*  6.2*  6.6   BILITOT  0.3  <0.2  0.3   LABALBU  2.9*  2.9*  3.3*     Lactic Acid: No results for input(s): LACTA in the last 72 hours. Troponin: No results for input(s): CKTOTAL, CKMB, TROPONINT in the last 72 hours. ABGs: No results found for: PHART, PO2ART, DOK0QFF  CRP:  No results for input(s): CRP in the last 72 hours. Sed Rate:  No results for input(s): SEDRATE in the last 72 hours. Culture Results:   Blood Culture Recent: No results for input(s): BC in the last 720 hours. Urine Culture Recent :   Recent Labs      06/14/18   [address]  <10,000 CFU/ml*  Light growth       Radiology reports as per the Radiologist  Radiology: Xr Wrist Right (min 3 Views)    Result Date: 6/12/2018  EXAMINATION: XR WRIST RIGHT (MIN 3 VIEWS) 6/12/2018 7:07 AM HISTORY: Patient fell 3 views the right wrist are obtained. There is an impacted fracture of the distal right radius. Nondisplaced avulsion styloid process ulna is noted. Carpal bones are intact. Impacted fracture distal right radius. 2. Nondisplaced avulsion styloid process ulna Signed by Dr Gloria Suazo on 6/12/2018 7:07 AM    Xr Ankle Right (min 3 Views)    Result Date: 6/16/2018  History: 81-year-old with increased pain. Reference: Right ankle radiographs 4 days prior. FINDINGS: Frontal, oblique, and lateral radiographs of the right ankle. Interval revision ORIF repairing bilateral malleolar fractures. Alignment appears anatomic. No hardware fracture is seen. No new fracture visualized. Cast present. Repaired bimalleolar fractures with now anatomic alignment. Signed by Dr Nik Person on 6/16/2018 11:32 AM    Xr Ankle Right (min 3 Views)    Result Date: 6/12/2018  XR ANKLE RIGHT (MIN 3 VIEWS) 6/12/2018 4:15 PM History: Open reduction internal fixation Comparison: None Fluoroscopy time: 1 minute 18 seconds.  Number of images: 7    Impression: Intraoperative fluoroscopic images obtained during open reduction internal fixation at the ankle Please refer demonstrate a bimalleolar fracture of the ankle with a fracture obliquely through the lateral malleolus extending to the ankle mortise. There is also a fracture of the medial malleolus. There is spurring of the medial malleolus as well as some spurring of the anterior tibial plafond which are chronic in nature. There is a plantar spur of the calcaneus. No other fractures are present. . Acute bimalleolar fracture. There is associated soft tissue swelling. Signed by Dr Ashleigh Munguia on 6/5/2018 9:07 PM    Us Renal Complete    Result Date: 6/22/2018  US RENAL COMPLETE 6/22/2018 7:15 AM HISTORY: Acute renal failure COMPARISON: None  TECHNIQUE: Multiple longitudinal and transverse realtime sonographic images of the kidneys and urinary bladder are obtained. FINDINGS: The right kidney measures 4.4 x 4.4 x 10.3 cm. The right kidney is normal in size, shape, contour and position. The cortical thickness is normal, with preservation of the corticomedullary differentiation. The central echo complex is compact with no evidence for hydronephrosis. No nephrolithiasis or abnormal perinephric fluid collections are seen. No hydroureter. The left kidney measures 5.2 x 3.9 x 9.6 cm. The left kidney is normal in size, shape, contour and position. The cortical thickness is normal, with preservation of the corticomedullary differentiation. The central echo complex is compact with no evidence for hydronephrosis. No nephrolithiasis or abnormal perinephric fluid collections are seen. No hydroureter. Scanning through the pelvis reveals the bladder to be moderately distended with anechoic urine. The wall thickness and contour are normal. There is no surrounding ascites. 1. Unremarkable renal ultrasound. Signed by Dr Lashawn Simpson on 6/22/2018 5:13 PM    Xr Knee 2 Vw Right    Result Date: 6/6/2018  Examination. XR KNEE 2 VW RIGHT History: The patient complains of right knee pain. No trauma.  The frontal and crosstable lateral views of

## 2018-06-27 NOTE — FLOWSHEET NOTE
Hiren Wolff  [id number]     06/27/18 1000 06/27/18 1009 06/27/18 1040   General   Chart Reviewed --  Yes --    Subjective   Subjective --  agreed to therapy  --    General Comment   Comments --  co treat with OT --    Pain Screening   Patient Currently in Pain --  Yes --    Pain Assessment   Pain Assessment --  0-10 --    Pain Level --  8 --    Pain Type --  Acute pain --    Pain Location --  Ankle --    Pain Orientation --  Right --    Pain Descriptors Aching --  --    Pain Frequency Continuous --  --    Clinical Progression Not changed --  --    Patient's Stated Pain Goal --  No pain --    Pain Intervention(s) Medication (see eMar);Repositioned; Rest --  --    Response to Pain Intervention Patient Satisfied --  --    Vital Signs   Level of Consciousness 0 --  --    Pre Treatment Pain Screening   Pain at present --  8 --    Scale Used --  Numeric Score --    Intervention List --  Patient able to continue with treatment --    Comments / Details --  R ANKLE --    Other exercises   Other exercises 1 --  --  SItting Vasquez LE ther ex. hip abd/add, flex/ext. knee ext.  ankle PF/DF x15 reps   Conditions Requiring Skilled Therapeutic Intervention   Assessment --  --  --    Activity Tolerance   Activity Tolerance --  --  --        06/27/18 1055   General   Chart Reviewed --    Subjective   Subjective --    General Comment   Comments --    Pain Screening   Patient Currently in Pain --    Pain Assessment   Pain Assessment --    Pain Level --    Pain Type --    Pain Location --    Pain Orientation --    Pain Descriptors --    Pain Frequency --    Clinical Progression --    Patient's Stated Pain Goal --    Pain Intervention(s) --    Response to Pain Intervention --    Vital Signs   Level of Consciousness --    Pre Treatment Pain Screening   Pain at present --    Scale Used --    Intervention List --    Comments / Details --    Other exercises   Other exercises 1 --    Conditions Requiring Skilled Therapeutic Intervention Assessment patient tolerated ther ex well    Activity Tolerance   Activity Tolerance Patient Tolerated treatment well   Electronically signed by Navdeep Luna on 6/27/2018 at 10:58 AM

## 2018-06-27 NOTE — PLAN OF CARE
Problem: Risk for Impaired Skin Integrity  Goal: Tissue integrity - skin and mucous membranes  Structural intactness and normal physiological function of skin and  mucous membranes. Outcome: Ongoing  Skin remains intact with no skin breakdown noted. Problem: Pain:  Goal: Pain level will decrease  Pain level will decrease   Outcome: Ongoing  Pain medications given as ordered PRN. Will continue to monitor pain levels.

## 2018-06-27 NOTE — PROGRESS NOTES
Patient:   Kyle Estrella  MR#:    [id number]   Room:    [facility]/815-   YOB: 1960  Date of Progress Note: 6/27/2018  Time of Note                           8:59 AM  Consulting Physician:   Nadja Edgar M.D. Attending Physician:  Nadja Edgar MD     Chief complaint Status post fracture of the right lower leg and right radius     S:This 63 y.o. female  who had an ORIF of a right closed displaced bimalleolar ankle fracture, as well as closed treatment of a two-part extra-articular distal radius fracture by Dr. Bailee Otero as outpatient on 6/11/18. She was discharged home. On 6/12/18 she presented to [facility] ER after having a fall at home while trying to do a pivot transfer. She had immediate onset of pain and bleeding from her surgical site on her ankle. X-rays revealed her to have an open fracture dislocation of her right ankle with displacment of hardware screws. She was admitted by Dr. Bailee Otero for I&D of her open wound and revision ORIF. She tolerated the procedure well. She will be non-weight bearing on her right LE, as well as her right UE. She has had post op anemia but hasn't yet required transfusion. She continues to have signifigant pain rating 8/10. She is participating in both PT/OT. She is felt to need a stay on Rehab to work on safe technique for transfers and wheelchair level ADLs.  She is now felt ready to start the Rehab program. No new issues overnight    REVIEW OF SYSTEMS:  Constitutional: No fevers No chills  Neck:No stiffness  Respiratory: No shortness of breath  Cardiovascular: No chest pain No palpitations  Gastrointestinal: No abdominal pain    Genitourinary: No Dysuria  Neurological: No headache, no confusion    Past Medical History:      Diagnosis Date    Anxiety     Arthritis     [facility]     CAD (coronary artery disease)     CHF (congestive heart failure) ([facility])     Chronic kidney disease (CKD) stage G3b/A2, moderately decreased glomerular filtration rate (GFR) between 30-44 mL/min/1.73 square meter and albuminuria creatinine ratio between  mg/g 2016    Depression     DM (diabetes mellitus) ([facility]     GERD (gastroesophageal reflux disease)     History of blood transfusion     History of suicidal ideation     Hyperlipidemia     Hypertension     Hypothyroidism     Insomnia     Kidney disease     stage 3 failure    MI, old 2005    Obesity     Polyarticular arthritis     Type II or unspecified type diabetes mellitus without mention of complication, not stated as uncontrolled        Past Surgical History:      Procedure Laterality Date    ABDOMINOPLASTY      ANGIOPLASTY  05    stent placement to LAD and diagonal with normal left vent function    BREAST REDUCTION SURGERY      CARDIAC CATHETERIZATION  05, 05    see report  Stents x3    CARDIAC CATHETERIZATION  3/23/15  JDT    EF 50%    CARPAL TUNNEL RELEASE       SECTION      x2    CHOLECYSTECTOMY      GASTRIC BYPASS SURGERY      HERNIA REPAIR      umbilical with mesh    INTRACAPSULAR CATARACT EXTRACTION Left 2016    LT EYE CATARACT EMULSIFICATION IOL IMPLANT performed by Alicia Arteaga MD at [address] [place] [address] Right 2018    ORIF RIGHT BIMALLEOLAR ANKLE FRACTURE, RIGHT WRIST CAST REMOVAL performed by Farhat Palacios MD at [address] [place] [address] Right 2018    ANKLE OPEN REDUCTION INTERNAL FIXATION performed by Farhat Palacios MD at [place] [address] Left 2017    KNEE TOTAL ARTHROPLASTY performed by Enrique Almonte DO at [address] [place] [address] OR       Medications in Hospital:      Current Facility-Administered Medications:     sodium bicarbonate tablet 325 mg, 325 mg, Oral, BID, Martha Carney MD, 325 mg at 18 0751    diazepam (VALIUM) tablet 2 mg, 2 mg, Oral, Q6H PRN, Charly Brandon MD, 2 mg at 18 1116    metoprolol succinate (TOPROL XL) (36.7 °C)   Resp 18   Ht 5' 1\" (1.549 m)   Wt 240 lb (108.9 kg)   SpO2 98%   BMI 45.35 kg/m²     Constitutional: she appears well-developed and well-nourished. Eyes  conjunctiva normal.   Ear, nose, throat - No scars, masses, or lesions over external nose or ears, no atrophy of tongue  Neck-symmetric, no masses noted, no jugular vein distension  Respiration- chest wall appears symmetric, good expansion,   normal effort without use of accessory muscles  Musculoskeletal  no significant wasting of muscles noted, no bony deformities Extremities-no clubbing, cyanosis or edema  Skin  warm, dry, and intact. No rash, erythema, or pallor. Psychiatric  mood, affect, and behavior appear normal.      Neurology  NEUROLOGICAL EXAM:      Mental status   Somnolent this am but responds appropriately       Cranial Nerves     CN III, IV,VI-EOMI, No nystagmus, conjugate eye movements, no ptosis    CN VII-no facial assymetry         Motor function  Antigravity x 4 limited RLE     Sensory function      Cerebellar      Tremor None present     Gait                  Not tested           Nursing/pcp notes, imaging,labs and vitals reviewed.      PT,OT and/or speech notes reviewed    Lab Results   Component Value Date    WBC 5.1 06/25/2018    HGB 8.6 (L) 06/25/2018    HCT 27.7 (L) 06/25/2018    MCV 95.2 06/25/2018     06/25/2018     Lab Results   Component Value Date     06/27/2018    K 4.8 06/27/2018     06/27/2018    CO2 31 (H) 06/27/2018    BUN 28 (H) 06/27/2018    CREATININE 1.4 (H) 06/27/2018    GLUCOSE 153 (H) 06/27/2018    CALCIUM 9.4 06/27/2018    PROT 6.6 06/27/2018    LABALBU 3.3 (L) 06/27/2018    BILITOT 0.3 06/27/2018    ALKPHOS 172 (H) 06/27/2018    AST 27 06/27/2018    ALT 21 06/27/2018    LABGLOM 39 (A) 06/27/2018    GLOB 3.4 04/25/2017     Lab Results   Component Value Date    INR 1.00 06/12/2018    INR 1.03 06/23/2017    INR 1.04 06/05/2017    PROTIME 13.1 06/12/2018    PROTIME 13.4 06/23/2017

## 2018-06-28 VITALS
WEIGHT: 240 LBS | BODY MASS INDEX: 45.31 KG/M2 | SYSTOLIC BLOOD PRESSURE: 129 MMHG | TEMPERATURE: 96.7 F | HEIGHT: 61 IN | HEART RATE: 80 BPM | OXYGEN SATURATION: 98 % | DIASTOLIC BLOOD PRESSURE: 79 MMHG | RESPIRATION RATE: 18 BRPM

## 2018-06-28 LAB
ANION GAP SERPL CALCULATED.3IONS-SCNC: 10 MMOL/L (ref 7–19)
BASOPHILS ABSOLUTE: 0.1 K/UL (ref 0–0.2)
BASOPHILS RELATIVE PERCENT: 0.9 % (ref 0–1)
BUN BLDV-MCNC: 25 MG/DL (ref 6–20)
CALCIUM SERPL-MCNC: 9 MG/DL (ref 8.6–10)
CHLORIDE BLD-SCNC: 102 MMOL/L (ref 98–111)
CO2: 28 MMOL/L (ref 22–29)
CREAT SERPL-MCNC: 1.3 MG/DL (ref 0.5–0.9)
EOSINOPHILS ABSOLUTE: 0.3 K/UL (ref 0–0.6)
EOSINOPHILS RELATIVE PERCENT: 4.3 % (ref 0–5)
GFR NON-AFRICAN AMERICAN: 42
GLUCOSE BLD-MCNC: 134 MG/DL (ref 70–99)
GLUCOSE BLD-MCNC: 148 MG/DL (ref 74–109)
HCT VFR BLD CALC: 30.9 % (ref 37–47)
HEMOGLOBIN: 9.6 G/DL (ref 12–16)
LYMPHOCYTES ABSOLUTE: 2.2 K/UL (ref 1.1–4.5)
LYMPHOCYTES RELATIVE PERCENT: 31.1 % (ref 20–40)
MCH RBC QN AUTO: 29.8 PG (ref 27–31)
MCHC RBC AUTO-ENTMCNC: 31.1 G/DL (ref 33–37)
MCV RBC AUTO: 96 FL (ref 81–99)
MONOCYTES ABSOLUTE: 0.7 K/UL (ref 0–0.9)
MONOCYTES RELATIVE PERCENT: 10.1 % (ref 0–10)
NEUTROPHILS ABSOLUTE: 3.5 K/UL (ref 1.5–7.5)
NEUTROPHILS RELATIVE PERCENT: 50.5 % (ref 50–65)
PDW BLD-RTO: 14.7 % (ref 11.5–14.5)
PERFORMED ON: ABNORMAL
PLATELET # BLD: 362 K/UL (ref 130–400)
PMV BLD AUTO: 10.3 FL (ref 9.4–12.3)
POTASSIUM SERPL-SCNC: 4.5 MMOL/L (ref 3.5–5)
RBC # BLD: 3.22 M/UL (ref 4.2–5.4)
SODIUM BLD-SCNC: 140 MMOL/L (ref 136–145)
WBC # BLD: 7 K/UL (ref 4.8–10.8)

## 2018-06-28 PROCEDURE — 6370000000 HC RX 637 (ALT 250 FOR IP): Performed by: INTERNAL MEDICINE

## 2018-06-28 PROCEDURE — 80048 BASIC METABOLIC PNL TOTAL CA: CPT

## 2018-06-28 PROCEDURE — 6370000000 HC RX 637 (ALT 250 FOR IP): Performed by: PSYCHIATRY & NEUROLOGY

## 2018-06-28 PROCEDURE — 82948 REAGENT STRIP/BLOOD GLUCOSE: CPT

## 2018-06-28 PROCEDURE — 6360000002 HC RX W HCPCS: Performed by: ORTHOPAEDIC SURGERY

## 2018-06-28 PROCEDURE — 99239 HOSP IP/OBS DSCHRG MGMT >30: CPT | Performed by: PSYCHIATRY & NEUROLOGY

## 2018-06-28 PROCEDURE — 36415 COLL VENOUS BLD VENIPUNCTURE: CPT

## 2018-06-28 PROCEDURE — 85025 COMPLETE CBC W/AUTO DIFF WBC: CPT

## 2018-06-28 PROCEDURE — 6370000000 HC RX 637 (ALT 250 FOR IP): Performed by: ORTHOPAEDIC SURGERY

## 2018-06-28 RX ORDER — VALSARTAN 80 MG/1
80 TABLET ORAL NIGHTLY
Qty: 30 TABLET | Refills: 0 | Status: SHIPPED | OUTPATIENT
Start: 2018-06-28 | End: 2018-09-11 | Stop reason: SDUPTHER

## 2018-06-28 RX ORDER — HYDROCODONE BITARTRATE AND ACETAMINOPHEN 10; 325 MG/1; MG/1
1 TABLET ORAL EVERY 6 HOURS PRN
Qty: 120 TABLET | Refills: 0 | Status: SHIPPED | OUTPATIENT
Start: 2018-06-28 | End: 2018-08-09

## 2018-06-28 RX ORDER — FUROSEMIDE 20 MG/1
20 TABLET ORAL DAILY
Qty: 30 TABLET | Refills: 0 | Status: SHIPPED | OUTPATIENT
Start: 2018-06-28 | End: 2018-09-11 | Stop reason: DRUGHIGH

## 2018-06-28 RX ADMIN — FLUTICASONE PROPIONATE 1 SPRAY: 50 SPRAY, METERED NASAL at 08:39

## 2018-06-28 RX ADMIN — LAMOTRIGINE 50 MG: 25 TABLET ORAL at 08:40

## 2018-06-28 RX ADMIN — ATORVASTATIN CALCIUM 40 MG: 40 TABLET, FILM COATED ORAL at 08:40

## 2018-06-28 RX ADMIN — HYDROCODONE BITARTRATE AND ACETAMINOPHEN 1 TABLET: 10; 325 TABLET ORAL at 00:44

## 2018-06-28 RX ADMIN — FUROSEMIDE 20 MG: 20 TABLET ORAL at 08:40

## 2018-06-28 RX ADMIN — METOPROLOL SUCCINATE 50 MG: 50 TABLET, EXTENDED RELEASE ORAL at 08:40

## 2018-06-28 RX ADMIN — TIZANIDINE 4 MG: 4 TABLET ORAL at 08:46

## 2018-06-28 RX ADMIN — ASPIRIN 325 MG: 325 TABLET, DELAYED RELEASE ORAL at 08:40

## 2018-06-28 RX ADMIN — PREGABALIN 150 MG: 150 CAPSULE ORAL at 08:40

## 2018-06-28 RX ADMIN — DULOXETINE HYDROCHLORIDE 60 MG: 60 CAPSULE, DELAYED RELEASE ORAL at 08:40

## 2018-06-28 RX ADMIN — PANTOPRAZOLE SODIUM 40 MG: 40 TABLET, DELAYED RELEASE ORAL at 08:40

## 2018-06-28 RX ADMIN — LEVOTHYROXINE SODIUM 88 MCG: 88 TABLET ORAL at 06:07

## 2018-06-28 RX ADMIN — HYDROCODONE BITARTRATE AND ACETAMINOPHEN 1 TABLET: 10; 325 TABLET ORAL at 06:18

## 2018-06-28 RX ADMIN — ENOXAPARIN SODIUM 40 MG: 40 INJECTION SUBCUTANEOUS at 08:39

## 2018-06-28 ASSESSMENT — PAIN DESCRIPTION - PAIN TYPE
TYPE: ACUTE PAIN
TYPE: ACUTE PAIN

## 2018-06-28 ASSESSMENT — PAIN SCALES - GENERAL
PAINLEVEL_OUTOF10: 8
PAINLEVEL_OUTOF10: 5
PAINLEVEL_OUTOF10: 5
PAINLEVEL_OUTOF10: 3

## 2018-06-28 ASSESSMENT — PAIN DESCRIPTION - LOCATION
LOCATION: LEG
LOCATION: ANKLE

## 2018-06-28 ASSESSMENT — PAIN DESCRIPTION - ORIENTATION
ORIENTATION: RIGHT
ORIENTATION: RIGHT

## 2018-06-28 NOTE — PROGRESS NOTES
Discharge instructions and med info given and explained to patient. Verbalized understanding. To be discharged into care of spouse.

## 2018-06-28 NOTE — DISCHARGE SUMMARY
Occupational Therapy Discharge Summary         Date: 2018  Patient Name: Dina Gabriel        MRN: [id number]    : 1960  (63 y.o.)  Gender: female      Diagnosis:  RIGHT BIMALLEOLAR ANKLE FX ORIF; RIGHT DISTAL RADIUS FX  Restrictions/Precautions  Restrictions/Precautions: Fall Risk, Weight Bearing  Required Braces or Orthoses?: Yes      Discharge Date: 18    ADL Discharge FIM Scores:  Eatin - Patient feeds self  Groomin - Patient independent with all grooming tasks  Bathin - Able to bathe all 10 areas with setup/sup/cues (weight shift tech EOB)  Dressing-Upper: 7 - Patient independently dresses upper body  Dressing-Lower: 6 - Independent with device/prosthesis (supine technique)  Toiletin - Requires steadying assistance only (CGA)  Toilet Transfer: 4 - Requires steadying assistance only < 25% assist  Tub Transfer: 4 - Minimal contact assistance, pt. expends 75% or more effort (CGA, w/c<>TTB.)        Comprehension: 7 - Patient understands complex ideas (math/planning)  Expression: 7 - Patient expresses complex ideas/needs  Social Interaction: 6 - Patient requires medication for mood and/or effect  Problem Solvin - Patient independent with complex tasks  Memory: 7 - Patient independent with meds/people/schedule    UE Functioning:  WFLs in available ranges, NWB distal R UE    Home Management:  Functional Mobility  Functional - Mobility Device: Wheelchair  Activity: Retrieve items, Other, Transport items  Assist Level: Mod I at w/c level  Functional Mobility Comments: Light homemaking task in OT apartment, able to use reacher for item retrieval and placement. Adaptive Equipment/DME Status:  Bathroom Equipment: 3-in-1 commode  Home Equipment:  (spouse returned w/c)      Pain Assessment:  Pain Level: 8  Pain Location: Ankle    Remaining Problems:  Decreased functional mobility ;  Decreased balance; Decreased high-level IADLs; Decreased safe awareness    STGs:  Short term goals  Time Frame for Short term goals: 1 week  Short term goal 1: don/doff pullover shirt with setup  Short term goal 2: complete LB dressing with min A and AE prn using recommended tech  Short term goal 3: complete toilet transfers with supervision using recommended transfer tech without cues for WB  Short term goal 4: complete toilet hygiene/clothing mgmt with min A  Short term goal 5: complete bathing transfer with min A  Short term goal 6: complete simple w/c level home making act with supervision  Met 5/6 STGs  LTGs:  Long term goals  Time Frame for Long term goals : 2 weeks  Long term goal 1: complete overall toileting with modified independence  Long term goal 2: complete overall dressing with modifed independence  Long term goal 3: verbalize DME  Long term goal 4: complete HEP with independence  Long term goal 5: complete overall bathing with supervision  Met 4/6 LTGs    Still required CGA for all aspects of toileting    Discharge Setting and Recommendations:  Home with family to assist as needed. Home Health Occupational Therapy to address safety and [place] in the home.      Discharge Care Scores  Eating: CARE Score: 6  Oral Hygiene: CARE Score: 6  Toileting: CARE Score: 4  Shower/Bathe: CARE Score: 4  Upper Body Dressing: CARE Score: 6  Lower Body Dressing: CARE Score: 6  Footwear: CARE Score: 6  Toilet Transfers: CARE Score: 4      Electronically signed by PHYLICIA Rubio on 6/28/18 at 10:39 AM

## 2018-06-28 NOTE — PROGRESS NOTES
Patient:   Anna Worrell  MR#:    [id number]   Room:    0815/815-01   YOB: 1960  Date of Progress Note: 6/28/2018  Time of Note                           7:34 AM  Consulting Physician:   King Bell M.D. Attending Physician:  King Bell MD     Chief complaint Status post fracture of the right lower leg and right radius     S:This 63 y.o. female  who had an ORIF of a right closed displaced bimalleolar ankle fracture, as well as closed treatment of a two-part extra-articular distal radius fracture by Dr. Zion Hurt as outpatient on 6/11/18. She was discharged home. On 6/12/18 she presented to [facility] ER after having a fall at home while trying to do a pivot transfer. She had immediate onset of pain and bleeding from her surgical site on her ankle. X-rays revealed her to have an open fracture dislocation of her right ankle with displacment of hardware screws. She was admitted by Dr. Zion Hurt for I&D of her open wound and revision ORIF. She tolerated the procedure well. She will be non-weight bearing on her right LE, as well as her right UE. She has had post op anemia but hasn't yet required transfusion. She continues to have signifigant pain rating 8/10. She is participating in both PT/OT. She is felt to need a stay on Rehab to work on safe technique for transfers and wheelchair level ADLs.  She is now felt ready to start the Rehab program. No acute overnight    REVIEW OF SYSTEMS:  Constitutional: No fevers No chills  Neck:No stiffness  Respiratory: No shortness of breath  Cardiovascular: No chest pain No palpitations  Gastrointestinal: No abdominal pain    Genitourinary: No Dysuria  Neurological: No headache, no confusion    Past Medical History:      Diagnosis Date    Anxiety     Arthritis     Bipolar 1 disorder ([facility] [address]     CAD (coronary artery disease)     CHF (congestive heart failure) (HCC)     Chronic kidney disease (CKD) stage G3b/A2, moderately decreased glomerular filtration rate (GFR) Units, 0-12 Units, Subcutaneous, TID WC, Rockey Schilder, MD, 6 Units at 06/27/18 1731    insulin lispro (HUMALOG) injection vial 0-6 Units, 0-6 Units, Subcutaneous, Nightly, Rockey Schilder, MD, 2 Units at 06/25/18 2118    lamoTRIgine (LAMICTAL) tablet 50 mg, 50 mg, Oral, BID, Rockey Schilder, MD, 50 mg at 06/27/18 2114    levothyroxine (SYNTHROID) tablet 88 mcg, 88 mcg, Oral, Daily, Rockey Schilder, MD, 88 mcg at 06/28/18 0607    linaclotide (LINZESS) capsule 290 mcg, 290 mcg, Oral, PRN, Rockey Schilder, MD, 290 mcg at 06/16/18 0805    pantoprazole (PROTONIX) tablet 40 mg, 40 mg, Oral, Daily, Rockey Schilder, MD, 40 mg at 06/27/18 0750    pregabalin (LYRICA) capsule 150 mg, 150 mg, Oral, BID, Rockey Schilder, MD, 150 mg at 06/27/18 2114    magnesium hydroxide (MILK OF MAGNESIA) 400 MG/5ML suspension 30 mL, 30 mL, Oral, Daily PRN, Yue Burns MD    docusate sodium (COLACE) capsule 100 mg, 100 mg, Oral, BID PRN, Yue Burns MD    bisacodyl (DULCOLAX) suppository 10 mg, 10 mg, Rectal, Daily PRN, Yue Burns MD, 10 mg at 06/18/18 1323    HYDROcodone-acetaminophen (NORCO)  MG per tablet 1 tablet, 1 tablet, Oral, Q4H PRN, Yue Burns MD, 1 tablet at 06/28/18 0618    Allergies:  Dilaudid [hydromorphone hcl]    Social History:   TOBACCO:   reports that she has never smoked. She has quit using smokeless tobacco. Her smokeless tobacco use included Snuff. ETOH:   reports that she does not drink alcohol.     Family History:   Family History   Problem Relation Age of Onset    Depression Mother     Heart Attack Mother     High Blood Pressure Mother     Kidney Disease Father     Alcohol Abuse Father     Depression Father     Heart Attack Father     High Blood Pressure Father     Kidney Disease Brother     Heart Disease Other     Depression Sister          PHYSICAL EXAM:  /79   Pulse 80   Temp 96.7 °F (35.9 °C)   Resp 18   Ht 5' 1\" (1.549 m)   Wt 240 lb (108.9 kg)   SpO2 98%   BMI 45.35 kg/m²     Constitutional: PM         []Manual[]Template  []Copied  Maria Luisa Muñoz  [id number]       06/26/18 1623 06/26/18 1625 06/26/18 1627   General   Chart Reviewed Yes --  --    Additional Pertinent Hx OUTPATIENT RIGHT BIMALLEOLAR ANKLE FX ORIF, RIGHT RADIAL FX ORIF. FALL AT HOME. READMIT FOR I&D, ORIF REVISION --  --    General Comment   Comments FTD WITH PATIENT AND CAREGIVER --  --    Subjective   Subjective PATIENT AGREED TO THERAPY --  --    Pain Screening   Patient Currently in Pain Yes --  --    Pain Assessment   Pain Assessment 0-10 --  --    Pain Level 8 --  --    Pain Type Acute pain --  --    Pain Location Ankle; Foot --  --    Pain Orientation Right --  --    Pain Frequency Continuous --  --    Clinical Progression Not changed --  --    Pain Onset On-going --  --    Patient's Stated Pain Goal No pain --  --    Transfers   Sit to Stand --  Contact guard assistance --    Stand to sit --  Contact guard assistance --    Stand Pivot Transfers --  Contact guard assistance --    Squat Pivot Transfers --  Contact guard assistance --    Car Transfer --  Contact guard assistance --    Comment --  VC'S FOR PROPER TRANSFER TECHNIQUES --    Ambulation   Ambulation? --  Yes --    Wheelchair Activities   Propulsion --  Yes --    Propulsion 1   Propulsion --  Manual --    Level --  Level Tile --    Method --  LUE;LLE --    Level of Assistance --  Stand by assistance --    Description/ Details --  Pt propelled W/C well  --    Distance --  50 --    Conditions Requiring Skilled Therapeutic Intervention   Body structures, Functions, Activity limitations --  --  Decreased functional mobility ; Decreased ADL status; Decreased strength;Decreased endurance   Assessment --  --  PATIENT TOLERATED ALL THERAPY WELL AND ABLE TO PERFORM ALL TRANSFER CGA   Timed Code Treatment Minutes --  --  --    Activity Tolerance   Activity Tolerance --  --  Patient Tolerated treatment well        06/26/18 1629   General   Chart Reviewed --    Additional Pertinent Hx

## 2018-06-28 NOTE — DISCHARGE SUMMARY
Neurology Discharge Summary     Patient Identification:  Hiren Pérez is a 63 y.o. female. :  1960  Admit Date:  2018  Discharge date : 2018   Attending Provider: Raffaele Lama MD     Account Number: [de-identified]                                   Admission Diagnoses:   Open right ankle fracture, sequela [S82.891S]  Open right ankle fracture, sequela [S82.891S]    Discharge Diagnoses:  Principal Problem:    Ankle fracture, right, open type I or II, initial encounter  Active Problems:    CAD (coronary artery disease)    DM (diabetes mellitus)    Hypertension    Hyperlipidemia    Obesity    Hypothyroidism    GERD (gastroesophageal reflux disease)    Anxiety    Bipolar disorder (HCC)    Primary osteoarthritis of left knee    Hyperkalemia    Closed fracture of distal end of left radius with routine healing    Open right ankle fracture, sequela    Gait abnormality  Resolved Problems:    * No resolved hospital problems. *      Discharge Medications:    Current Discharge Medication List           Details   aspirin 325 MG EC tablet Take 1 tablet by mouth 2 times daily  Qty: 60 tablet, Refills: 0    Associated Diagnoses: Essential hypertension; Anxiety; Primary osteoarthritis of left knee; Closed fracture of distal end of left radius with routine healing, unspecified fracture morphology, subsequent encounter; Gait abnormality; Polyarticular arthritis              Details   HYDROcodone-acetaminophen (NORCO)  MG per tablet Take 1 tablet by mouth every 6 hours as needed for Pain for up to 30 days. Tuan Asha: 120 tablet, Refills: 0    Associated Diagnoses: Essential hypertension;  Anxiety; Primary osteoarthritis of left knee; Closed fracture of distal end of left radius with routine healing, unspecified fracture morphology, subsequent encounter; Gait abnormality; Polyarticular arthritis      valsartan (DIOVAN) 80 MG tablet Take 1 tablet by mouth nightly  Qty: 30 tablet, Refills: 0    Associated Diagnoses: Essential hypertension; Anxiety; Primary osteoarthritis of left knee; Closed fracture of distal end of left radius with routine healing, unspecified fracture morphology, subsequent encounter; Gait abnormality; Polyarticular arthritis      furosemide (LASIX) 20 MG tablet Take 1 tablet by mouth daily  Qty: 30 tablet, Refills: 0    Associated Diagnoses: Essential hypertension; Anxiety; Primary osteoarthritis of left knee; Closed fracture of distal end of left radius with routine healing, unspecified fracture morphology, subsequent encounter; Gait abnormality; Polyarticular arthritis              Details   pantoprazole (PROTONIX) 40 MG tablet TAKE ONE TABLET BY MOUTH ONCE DAILY  Qty: 90 tablet, Refills: 3    Associated Diagnoses: Gastroesophageal reflux disease without esophagitis      ondansetron (ZOFRAN) 4 MG tablet Take 1 tablet by mouth daily as needed for Nausea or Vomiting  Qty: 20 tablet, Refills: 0    Associated Diagnoses: Closed bimalleolar fracture of right ankle, initial encounter      QUEtiapine (SEROQUEL) 200 MG tablet Take 1 tablet by mouth nightly  Qty: 30 tablet, Refills: 5    Associated Diagnoses: Recurrent major depressive disorder, in partial remission (HCC)      lamoTRIgine (LAMICTAL) 25 MG tablet Take 50 mg by mouth 2 times daily       diazepam (VALIUM) 5 MG tablet Take by mouth nightly as needed. Brody Counter LYRICA 150 MG capsule TAKE ONE CAPSULE BY MOUTH TWICE DAILY  Qty: 60 capsule, Refills: 5    Associated Diagnoses: Diabetic polyneuropathy associated with type 2 diabetes mellitus (HCC)      amitriptyline (ELAVIL) 150 MG tablet Take 1 tablet by mouth nightly  Qty: 30 tablet, Refills: 11    Associated Diagnoses: Situational anxiety;  Insomnia due to other mental disorder; Bipolar disorder, in full remission, most recent episode mixed (HCC)      atorvastatin (LIPITOR) 40 MG tablet TAKE ONE TABLET BY MOUTH ONCE DAILY  Qty: 90 tablet, Refills: 3    Comments: Please consider 90 day supplies to prazosin (MINIPRESS) 2 MG capsule Comments:   Reason for Stopping:                 Consults:   nephrology    Hospital Course: This 63 y.o. female  who had an ORIF of a right closed displaced bimalleolar ankle fracture, as well as closed treatment of a two-part extra-articular distal radius fracture by Dr. Farrah Kessler as outpatient on 6/11/18. She was discharged home. On 6/12/18 she presented to [facility] ER after having a fall at home while trying to do a pivot transfer. She had immediate onset of pain and bleeding from her surgical site on her ankle. X-rays revealed her to have an open fracture dislocation of her right ankle with displacment of hardware screws. She was admitted by Dr. Farrah Kessler for I&D of her open wound and revision ORIF. She tolerated the procedure well. She will be non-weight bearing on her right LE, as well as her right UE. She has had post op anemia but hasn't yet required transfusion. She continues to have signifigant pain rating 8/10. She is participating in both PT/OT. She is felt to need a stay on Rehab to work on safe technique for transfers and wheelchair level ADLs. She was admitted to inpatient rehab. Clinically improved. Did have some issues with hyponatremia and renal dysfunction. Improved. Plan DC home with home health. Discharge Instructions     Patient Instructions:   Home with [address] [employer] orders: PT, OT and nursing   Discharge lab work: none  Code status: Full Code   Activity: activity as tolerated  Diet: DIET CARB CONTROL;     Wound Care: as directed  Equipment: as per therapy      Dontrell Peres MD    At least 35 minutes were spent in discharging the patient

## 2018-06-29 ENCOUNTER — CARE COORDINATION (OUTPATIENT)
Dept: CARE COORDINATION | Age: 58
End: 2018-06-29

## 2018-06-29 ENCOUNTER — CARE COORDINATION (OUTPATIENT)
Dept: CASE MANAGEMENT | Age: 58
End: 2018-06-29

## 2018-06-29 NOTE — DISCHARGE SUMMARY
Physical Therapy Discharge Note    DATE:  2018  NAME:  Ness Watters  :  1960  (63 y.o.,female)  MRN:  [id number]    HEIGHT:  Height: 5' 1\" (154.9 cm)  WEIGHT:  Weight: 240 lb (108.9 kg)    PATIENT DIAGNOSIS(ES):    Diagnosis:  RIGHT BIMALLEOLAR ANKLE FX ORIF; RIGHT DISTAL RADIUS FX     RESTRICTIONS/PRECAUTIONS:    Restrictions/Precautions: Fall Risk, Weight Bearing  Right Lower Extremity Weight Bearing: Non Weight Bearing  Left Lower Extremity Weight Bearing: Weight Bearing As Tolerated  Required Braces or Orthoses?: Yes     OVERALL  ORIENTATION STATUS:  Overall Orientation Status: Within Normal Limits     NEUROLOGICAL   Overall Sensation Status: Impaired (RIGHT LE PARASTHESIA)     POSTURE  Posture: Fair     STRENGTH  Strength RLE  Strength RLE: Exception  Comment: GROSSLY 3+/5; ANKLE N/T  Strength LLE  Strength LLE: WFL     ROM  AROM RLE (degrees)  RLE AROM: Exceptions  RLE General AROM: ANKLE SPLINT, SOFT TISSUE APPROXIMATION HIP FLEXION  AROM LLE (degrees)  LLE AROM : WFL  LLE General AROM: SOFT TISSUE APPROXIMATION     BALANCE  Sitting - Static: Good        ACTIVITY TOLERANCE  Activity Tolerance: Patient Tolerated treatment well  Activity Tolerance: tolerated well but supine exercises due to pain     BED MOBILITY  Rolling: Independent  Supine to Sit: Independent  Sit to Supine: Independent     TRANSFERS  Sit to Stand: Contact guard assistance  Bed to Chair: Contact guard assistance  Car Transfer: Contact guard assistance     WHEELCHAIR PROPULSION  Level of Assistance: Stand by assistance  Distance: 50  Description/ Details: Pt propelled W/C well      AMBULATION  Patient was not able to ambulate             STAIRS  Patient did not perform.             FIM SCORES    CURRENT  Bed, Chair, Wheel Chair: 4 - Requires steadying assistance only <25% assist  and/or requires assist with one leg only  Walk: 1 - Total Assistance Walks/operates wheelchair < 50 feet OR requires two or more people OR patient performs < 25% of locomotion effort  Wheel Chair: 2 - Maximal Assistance Requires up to Maximal Assistance AND requires assistance of one person to walk/operate wheelchair between  feet  Stairs: 1- Total Assistance perfoms less than 25% of the effort, or requirs the assistance of two people, or goes up and down fewer than 4 stairs     ADMIT  Bed, Chair, Wheel Chair: 2 - Requires 50-74% assistance to transfer   Walk: 0 - Activity Not Assessed/Does Not Occur   Wheel Chair: 1 - Total Assistance Walks/operates wheelchair < 50 feet OR requires two or more people OR patient performs < 25% of locomotion effort   Stairs: 0 - Activity Does not Occur ( 0 only for the admission assessment)      GOALS  GOAL: Bed, Chair, Wheel Chair: 6  GOAL: Walk/Wheel Chair: 6  GOAL: Stairs: 1     GOALS:  Short term goals  Time Frame for Short term goals: 1 WEEK  Short term goal 1: TF FIM 4  Short term goal 2: WC FIM 2  Short term goal 3: HEP SBA  STG's Met:  Goals # 1 and 2. Long term goals  Time Frame for Long term goals : 2-3 WEEKS  Long term goal 1: TF FIM 6  Long term goal 2: AMB FIM 1  Long term goal 3: WC FIM 6  Long term goal 4: STEP FIM 1  Long term goal 5: HEP INDEP   LTG's Met:  No LTG's were met. HOME LIVING:  Lives With: Spouse  Type of Home: House  Home Layout: One level           ASSESSMENT (IMPAIRMENTS/BARRIERS):  Patient's remaining impairments are as follows:  Decreased strength, endurance, and balance. Decreased transfers, gait, and ability with steps. PATIENT GOAL FOR REHAB:  RETURN TO PRIOR LEVEL OF FUNCTION     DISCHARGE PLANS:  Patient was discharged home to receive additional therapy.         IRF/TRACY    ROLLING  Roll Left and Right  Assistance Needed: Independent  Physical Assistance Level: No physical assistance  CARE Score: 6     SIT TO SUPINE  Sit to Lying  Assistance Needed: Independent  Physical Assistance Level: No physical assistance  CARE Score: 6     SUPINE TO SIT  Lying to Sitting on Side of Bed  Assistance Needed: Independent  Physical Assistance Level: No physical assistance  CARE Score: 6     SIT TO STAND  Sit to Stand  Assistance Needed: Incidental touching  Physical Assistance Level: No physical assistance  CARE Score: 4     TRANSFERS  Chair/Bed-to-Chair Transfer  Assistance Needed: Incidental touching  Physical Assistance Level: No physical assistance  CARE Score: 4  Discharge Goal: Independent     CAR TRANSFERS  Car Transfer  Assistance Needed: Incidental touching  Physical Assistance Level: No physical assistance  Reason if not Attempted: Safety concerns  CARE Score: 4     WALK 10 FT  Walk 10 Feet  Reason if not Attempted: Safety concerns  CARE Score: 88     WALK 50 FT  Walk 50 Feet with Two Turns  Reason if not Attempted: Safety concerns  CARE Score: 88     WALK 150 FT  Walk 150 Feet  Reason if not Attempted: Safety concerns  CARE Score: 88     WALK 10 FT UNEVEN SURFACES  Walking 10 Feet on Uneven Surfaces  Reason if not Attempted: Safety concerns  CARE Score: 88     1 STEP  1 Step (Curb)  Reason if not Attempted: Safety concerns  CARE Score: 88     4 STEPS  4 Steps  Reason if not Attempted: Safety concerns  CARE Score: 88     12 STEPS  12 Steps  Reason if not Attempted: Safety concerns  CARE Score: 88     PICKING UP OBJECT  Picking Up Object  Reason if not Attempted: Safety concerns  CARE Score: 88     WHEELCHAIR 50 FT  Wheel 50 Feet with Two Turns  Assistance Needed: Verbal cues  Physical Assistance Level: No physical assistance  Comment: 5 FT  Reason if not Attempted: Safety concerns  CARE Score: 4     WHEELCHAIR 150 FT  Wheel 150 Feet  Comment: 5 FT  Reason if not Attempted: Safety concerns  CARE Score: 88      LAST TREATMENT TIME  PT Individual Minutes  Time In: 1530  Time Out: [facility]  Minutes: 45      Electronically signed by Hermann Kaiser PTA on 6/29/2018 at 8:22 AM

## 2018-07-02 ENCOUNTER — CARE COORDINATION (OUTPATIENT)
Dept: CARE COORDINATION | Age: 58
End: 2018-07-02

## 2018-07-02 DIAGNOSIS — K59.04 CHRONIC IDIOPATHIC CONSTIPATION: ICD-10-CM

## 2018-07-02 NOTE — PROGRESS NOTES
06/27/18 1110 06/27/18 1153   Pain Screening   Patient Currently in Pain Yes --    Pain Assessment   Pain Assessment 0-10 --    Pain Level 8 --    Pain Location Ankle; Wrist --    Pain Orientation Right --    Conditions Requiring Skilled Therapeutic Intervention   Assessment --  Angel exercises well.    Activity Tolerance   Activity Tolerance --  Patient Tolerated treatment well   Electronically signed by Estrella Rubalcava PTA on 7/2/2018 at 9:51 AM

## 2018-07-02 NOTE — CARE COORDINATION
ACC spoke to pt today. Discharged from [facility] after second fall with surgical repair of fx. Pt reports she is admitted to [facility] for SN only right now. OT and PT on hold until pt's cast is off. Pt was very complimentary of PT and OT while she was at the hospital.   BS checks show: highest 230 this am. This afternoon is 125  AM: 162 / 178 / 220 / 200 / 132 / 230  PM = 190 / 70 / 168 / 150 / 125  HS = 103 / 169 / 182 / 209    Using a wheelchair for mobility. No weight bearing to broken ankle. Pt reports she is being very careful. Her significant other, Nery Summers, is being very helpful.   Future Appointments  Date Time Provider Sita Tong   7/5/2018 9:00 AM Richie Prieto, [facility]-NP

## 2018-07-03 ENCOUNTER — CARE COORDINATION (OUTPATIENT)
Dept: CASE MANAGEMENT | Age: 58
End: 2018-07-03

## 2018-07-03 NOTE — CARE COORDINATION
Jenna 45 Transitions Initial Follow Up Call    Call within 2 business days of discharge: No    Patient: Natalya Milton Patient : 1960   MRN: [id number]  Reason for Admission: There are no discharge diagnoses documented for the most recent discharge. Discharge Date: 18 RARS: Readmission Risk Score: 39     Spoke with: N/A    Facility: [facility]      Non-face-to-face services provided:  Reviewed chart notes, AVS, med list, other information prior to call. Care Transitions 24 Hour Call    Do you have all of your prescriptions and are they filled?:  Yes  Patient DME:  Unknown Footman, Crutches, Wheelchair, Other, Tub transfer bench, Shower chair  Other Patient DME:  Surinder Dawson brought a leanne lift over for her to use but she does not want to use it because she does not feel safe with it. Do you have support at home?:  Partner/Spouse/SO  Are you an active caregiver in your home?:  No  Care Transitions Interventions         Follow Up: Attempted to make contact with patient for a follow up call without success. Noted that patient has been contacted by the [facility] x 2. Will transition to [facility] in PCP office.         Future Appointments  Date Time Provider Sita Tong   2018 9:00 AM Lynne Harrison, [address]       Tez Alcaraz RN

## 2018-07-05 ENCOUNTER — OFFICE VISIT (OUTPATIENT)
Dept: PRIMARY CARE CLINIC | Age: 58
End: 2018-07-05
Payer: MEDICARE

## 2018-07-05 ENCOUNTER — CARE COORDINATION (OUTPATIENT)
Dept: CARE COORDINATION | Age: 58
End: 2018-07-05

## 2018-07-05 VITALS
SYSTOLIC BLOOD PRESSURE: 126 MMHG | OXYGEN SATURATION: 96 % | HEART RATE: 85 BPM | TEMPERATURE: 97.6 F | DIASTOLIC BLOOD PRESSURE: 88 MMHG | HEIGHT: 61 IN
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
## 2018-07-05 DIAGNOSIS — S82.891B ANKLE FRACTURE, RIGHT, OPEN TYPE I OR II, INITIAL ENCOUNTER: ICD-10-CM

## 2018-07-05 DIAGNOSIS — E11.42 TYPE 2 DIABETES MELLITUS WITH DIABETIC POLYNEUROPATHY, WITH LONG-TERM CURRENT USE OF INSULIN (HCC): ICD-10-CM

## 2018-07-05 DIAGNOSIS — R26.9 GAIT ABNORMALITY: ICD-10-CM

## 2018-07-05 DIAGNOSIS — S82.891S OPEN RIGHT ANKLE FRACTURE, SEQUELA: ICD-10-CM

## 2018-07-05 DIAGNOSIS — Z09 HOSPITAL DISCHARGE FOLLOW-UP: Primary | ICD-10-CM

## 2018-07-05 DIAGNOSIS — Z79.4 TYPE 2 DIABETES MELLITUS WITH DIABETIC POLYNEUROPATHY, WITH LONG-TERM CURRENT USE OF INSULIN (HCC): ICD-10-CM

## 2018-07-05 DIAGNOSIS — G47.01 INSOMNIA DUE TO MEDICAL CONDITION: ICD-10-CM

## 2018-07-05 PROCEDURE — 1111F DSCHRG MED/CURRENT MED MERGE: CPT | Performed by: NURSE PRACTITIONER

## 2018-07-05 PROCEDURE — 99495 TRANSJ CARE MGMT MOD F2F 14D: CPT | Performed by: NURSE PRACTITIONER

## 2018-07-05 RX ORDER — BLOOD-GLUCOSE METER
1 KIT MISCELLANEOUS 4 TIMES DAILY
Qty: 1 DEVICE | Refills: 0 | Status: SHIPPED | OUTPATIENT
Start: 2018-07-05 | End: 2021-02-08

## 2018-07-05 RX ORDER — DIAZEPAM 5 MG/1
5 TABLET ORAL NIGHTLY PRN
Qty: 14 TABLET | Refills: 0 | Status: SHIPPED | OUTPATIENT
Start: 2018-07-05 | End: 2018-08-09

## 2018-07-05 ASSESSMENT — ENCOUNTER SYMPTOMS
CONSTIPATION: 1
SHORTNESS OF BREATH: 0
COUGH: 0
VOMITING: 0
DIARRHEA: 0
ABDOMINAL PAIN: 0
WHEEZING: 0
NAUSEA: 0

## 2018-07-05 NOTE — PATIENT INSTRUCTIONS
Information box to learn more about \"Learning About the 0-to-10 Pain Scale. \"     If you do not have an account, please click on the \"Sign Up Now\" link. Current as of: October 9, 2017  Content Version: 11.6  © 4099-9258 [employer]. Care instructions adapted under license by [facility] ([facility]). If you have questions about a medical condition or this instruction, always ask your healthcare professional. [address] any warranty or liability for your use of this information. Patient Education        Learning About Diabetes Food Guidelines  Your Care Instructions    Meal planning is important to manage diabetes. It helps keep your blood sugar at a target level (which you set with your doctor). You don't have to eat special foods. You can eat what your family eats, including sweets once in a while. But you do have to pay attention to how often you eat and how much you eat of certain foods. You may want to work with a dietitian or a certified diabetes educator (CDE) to help you plan meals and snacks. A dietitian or CDE can also help you lose weight if that is one of your goals. What should you know about eating carbs? Managing the amount of carbohydrate (carbs) you eat is an important part of healthy meals when you have diabetes. Carbohydrate is found in many foods. · Learn which foods have carbs. And learn the amounts of carbs in different foods. ¨ Bread, cereal, pasta, and rice have about 15 grams of carbs in a serving. A serving is 1 slice of bread (1 ounce), ½ cup of cooked cereal, or 1/3 cup of cooked pasta or rice. ¨ Fruits have 15 grams of carbs in a serving. A serving is 1 small fresh fruit, such as an apple or orange; ½ of a banana; ½ cup of cooked or canned fruit; ½ cup of fruit juice; 1 cup of melon or raspberries; or 2 tablespoons of dried fruit. ¨ Milk and no-sugar-added yogurt have 15 grams of carbs in a serving.  A serving is 1 cup of milk or 2/3 cup of no-sugar-added yogurt. ¨ Starchy vegetables have 15 grams of carbs in a serving. A serving is ½ cup of mashed potatoes or sweet potato; 1 cup winter squash; ½ of a small baked potato; ½ cup of cooked beans; or ½ cup cooked corn or green peas. · Learn how much carbs to eat each day and at each meal. A dietitian or CDE can teach you how to keep track of the amount of carbs you eat. This is called carbohydrate counting. · If you are not sure how to count carbohydrate grams, use the Plate Method to plan meals. It is a good, quick way to make sure that you have a balanced meal. It also helps you spread carbs throughout the day. ¨ Divide your plate by types of foods. Put non-starchy vegetables on half the plate, meat or other protein food on one-quarter of the plate, and a grain or starchy vegetable in the final quarter of the plate. To this you can add a small piece of fruit and 1 cup of milk or yogurt, depending on how many carbs you are supposed to eat at a meal.  · Try to eat about the same amount of carbs at each meal. Do not \"save up\" your daily allowance of carbs to eat at one meal.  · Proteins have very little or no carbs per serving. Examples of proteins are beef, chicken, turkey, fish, eggs, tofu, cheese, cottage cheese, and peanut butter. A serving size of meat is 3 ounces, which is about the size of a deck of cards. Examples of meat substitute serving sizes (equal to 1 ounce of meat) are 1/4 cup of cottage cheese, 1 egg, 1 tablespoon of peanut butter, and ½ cup of tofu. How can you eat out and still eat healthy? · Learn to estimate the serving sizes of foods that have carbohydrate. If you measure food at home, it will be easier to estimate the amount in a serving of restaurant food. · If the meal you order has too much carbohydrate (such as potatoes, corn, or baked beans), ask to have a low-carbohydrate food instead. Ask for a salad or green vegetables.   · If you use insulin, check your blood sugar before and after eating out to help you plan how much to eat in the future. · If you eat more carbohydrate at a meal than you had planned, take a walk or do other exercise. This will help lower your blood sugar. What else should you know? · Limit saturated fat, such as the fat from meat and dairy products. This is a healthy choice because people who have diabetes are at higher risk of heart disease. So choose lean cuts of meat and nonfat or low-fat dairy products. Use olive or canola oil instead of butter or shortening when cooking. · Don't skip meals. Your blood sugar may drop too low if you skip meals and take insulin or certain medicines for diabetes. · Check with your doctor before you drink alcohol. Alcohol can cause your blood sugar to drop too low. Alcohol can also cause a bad reaction if you take certain diabetes medicines. Follow-up care is a key part of your treatment and safety. Be sure to make and go to all appointments, and call your doctor if you are having problems. It's also a good idea to know your test results and keep a list of the medicines you take. Where can you learn more? Go to [email or web address].[email or web address] org and sign in to your [employer] account. Enter X152 in the [employer] box to learn more about \"Learning About Diabetes Food Guidelines. \"     If you do not have an account, please click on the \"Sign Up Now\" link. Current as of: December 7, 2017  Content Version: 11.6  © 7144-3952 [employer]. Care instructions adapted under license by [employer]. If you have questions about a medical condition or this instruction, always ask your healthcare professional. [place] [address] any warranty or liability for your use of this information.

## 2018-07-05 NOTE — PROGRESS NOTES
Post-Discharge Transitional Care Management Services      Ari Ryan   YOB: 1960    Date of Office Visit:  7/5/2018  Date of Hospital Admission: 6/14/18- 6/15/2018 6/15-6/28 for inpatient physical rehab    Date of Hospital Discharge: 6/28/18  Readmission Risk Score [risk of hospital readmission >=10  medium risk (chance of readmission ~ 12%) >14  high risk (chance of readmission ~18%)]:Readmission Risk Score: 36    Care management risk score Rising risk (score 2-5) and Complex Care (Scores >=6): 7     Patient had fall at home on 6/5  She was having issues get to the room  She \"collapsed\" she was dozing off and tired  She went to ER   Fracture right arm and foot  And had surgery to stabilize 6/11  She was sent home after  She still had nerve block  And went to use bedside commode and fell at home  With open fracture and went back hospital  For second surgery for hospital and then rehab      Patient Active Problem List   Diagnosis    CAD (coronary artery disease)    Obesity    DM (diabetes mellitus)    Hypertension    Polyarticular arthritis    Hypothyroidism    GERD (gastroesophageal reflux disease)    Hyperlipidemia    Insomnia    Anxiety    Insomnia    Bipolar disorder ([facility] [address]    Cataract    Thyroid nodule    Personal history of diabetic foot ulcer    B12 deficiency    Chronic kidney disease (CKD) stage G3b/A2, moderately decreased glomerular filtration rate (GFR) between 30-44 mL/min/1.73 square meter and albuminuria creatinine ratio between  mg/g    Primary osteoarthritis of left knee    Iron deficiency anemia    Type 2 diabetes mellitus with diabetic polyneuropathy, with long-term current use of insulin ([facility])    Ankle fracture, right, open type I or II, initial encounter    Hyperkalemia    Closed fracture of distal end of left radius with routine healing    Open right ankle fracture, sequela    Gait abnormality       Allergies   Allergen Reactions    (SEROQUEL) 200 MG tablet Take 1 tablet by mouth nightly 30 tablet 5    lamoTRIgine (LAMICTAL) 25 MG tablet Take 50 mg by mouth 2 times daily       LYRICA 150 MG capsule TAKE ONE CAPSULE BY MOUTH TWICE DAILY 60 capsule 5    amitriptyline (ELAVIL) 150 MG tablet Take 1 tablet by mouth nightly 30 tablet 11    atorvastatin (LIPITOR) 40 MG tablet TAKE ONE TABLET BY MOUTH ONCE DAILY 90 tablet 3    tiZANidine (ZANAFLEX) 4 MG tablet TAKE ONE TABLET BY MOUTH EVERY 8 HOURS AS NEEDED FOR MUSCLE SPASMS 90 tablet 3    fluticasone (FLONASE) 50 MCG/ACT nasal spray USE TWO SPRAY(S) IN EACH NOSTRIL ONCE DAILY 1 Bottle 11    insulin glargine (LANTUS SOLOSTAR) 100 UNIT/ML injection pen Inject 22 Units into the skin nightly (Patient taking differently: Inject 14 Units into the skin nightly ) 5 Pen 11    levothyroxine (SYNTHROID) 88 MCG tablet TAKE ONE TABLET BY MOUTH ONCE DAILY 90 tablet 3    DULoxetine (CYMBALTA) 60 MG extended release capsule Take 1 capsule by mouth 2 times daily (Patient taking differently: Take 60 mg by mouth daily ) 60 capsule 11    insulin aspart (NOVOLOG FLEXPEN) 100 UNIT/ML injection pen Inject 20 Units into the skin 3 times daily (before meals) INJECT THREE TIMES DAILY WITH MEALS BASED UPON RATIO AND CORRECTIVE FACTOR. AVERAGE 60 UNITS DAILY (Patient taking differently: Inject 14 Units into the skin 3 times daily (before meals) INJECT THREE TIMES DAILY WITH MEALS BASED UPON RATIO AND CORRECTIVE FACTOR.  AVERAGE 60 UNITS DAILY) 3 Pen 5    metoprolol succinate (TOPROL XL) 50 MG extended release tablet Take 1 tablet by mouth daily 30 tablet 11        Medications patient taking as of now reconciled against medications ordered at time of hospital discharge: Yes    Vitals:    07/05/18 0943   BP: 126/88   Site: Left Arm   Position: Sitting   Cuff Size: Large Adult   Pulse: 85   Temp: 97.6 °F (36.4 °C)   TempSrc: Temporal   SpO2: 96%   Height: 5' 1\" (1.549 m)     There is no height or weight on file to distal pulses. No murmur heard. Pulmonary/Chest: Effort normal and breath sounds normal. No respiratory distress. She has no wheezes. Abdominal: Soft. Bowel sounds are normal. She exhibits no distension. Musculoskeletal: Normal range of motion. Lymphadenopathy:     She has no cervical adenopathy. Neurological: She is alert and oriented to person, place, and time. No cranial nerve deficit. Skin: Skin is warm and dry. No rash noted. She is not diaphoretic. Psychiatric: She has a normal mood and affect. Her behavior is normal.   Vitals reviewed. Assessment/Plan:  Keane Galeazzi was seen today for follow-up from hospital and discuss medications. Diagnoses and all orders for this visit:    Hospital discharge follow-up  Discussed valium at bedtime  Will need to be caution with norco for pain  Has narcan kit:    Fall, initial encounter  Non weight bearing  -     OR DISCHARGE MEDS RECONCILED W/ CURRENT OUTPATIENT MED LIST    Open right ankle fracture, sequela  Following OI    Gait abnormality  Will monitor blood pressure    Ankle fracture, right, open type I or II, initial encounter    Type 2 diabetes mellitus with diabetic polyneuropathy, with long-term current use of insulin ([facility])  -     Blood Glucose Monitoring Suppl (FREESTYLE LITE) JEZ; 1 Device by Does not apply route 4 times daily  -     blood glucose test strips (FREESTYLE LITE) strip; 1 each by In Vitro route 4 times daily As needed. Insomnia due to medical condition  -     diazepam (VALIUM) 5 MG tablet; Take 1 tablet by mouth nightly as needed for Sleep for up to 14 days. .          Medical Decision Making: moderate complexity

## 2018-07-05 NOTE — CARE COORDINATION
Ambulatory Care Coordination Note  7/5/2018  CM Risk Score: 7  Hector Mortality Risk Score:      ACC: Chip Dockery RN    Summary Note: Jazmín is in for hospital follow up today. She is doing well at home and stated being careful with her transfers and non weight bearing of right [place] brought her BS/BP/Wgt log in today. To be reviewed by her provider. Discussed need for great care at home in light of recent fall during transfer at home with subsequent additional injury to her RLE. Jazmín stated she is being very careful at home, also doing some chair exercises with hand weights and using ball for BLE exercises. Her BP has been high at times and WNL others. Pt relates this to her pain w RLE fracture. Care Coordination Interventions    Program Enrollment:  Complex Care  Referral from Primary Care Provider:  No  Suggested Interventions and Community Resources  Zone Management Tools: In Process (Comment: 7/5/18: Pt here in [facility] today, non wgt bearing to right casted foot r/t fracture w cast. Returns to ortho 7/26 for evaluation of healing and no wgt bearing. She is checking her BS, they have been higher recently, brought log.)  Other Services or Interventions:  7/5/18: Discussed safety with mobility at home, transfers and non wgt bearing to right LE. Encouraged use of grab bars to aid pt with mobility at home. Goals Addressed             Most Recent     Self Monitoring   On track (7/5/2018)             Blood Pressure - I will take my blood pressure as directed - Daily and Other: If sx of elev BP - headache, vision changes, fatigue. I will notify my provider of any trends of increasing or decreasing blood pressures over a month period of time. I will notify my provider of any changes in blood pressure associated with symptoms of dizziness, falls, passing out, headache, confusion/change in mental status.     Patient Reported Blood Pressure   Patient Reported Blood Pressure 2/28/2018   Home Blood Pressure 160/93       Barriers: none  Plan for overcoming my barriers: N/A  Confidence: 10/10  Anticipated Goal Completion Date: 6/4/18              Prior to Admission medications    Medication Sig Start Date End Date Taking? Authorizing Provider   Blood Glucose Monitoring Suppl (FREESTYLE LITE) JEZ 1 Device by Does not apply route 4 times daily 7/5/18   TRESSA Olson   blood glucose test strips (FREESTYLE LITE) strip 1 each by In Vitro route 4 times daily As needed. 7/5/18   TRESSA Olson   diazepam (VALIUM) 5 MG tablet Take 1 tablet by mouth nightly as needed for Sleep for up to 14 days. . 7/5/18 7/19/18  TRESSA Olson   linaclotide [facility]) 290 MCG CAPS capsule Take 1 capsule by mouth every morning (before breakfast) 7/3/18   LAZARO Deleon DO   HYDROcodone-acetaminophen (NORCO)  MG per tablet Take 1 tablet by mouth every 6 hours as needed for Pain for up to 30 days. . 6/28/18 7/28/18  Lala Telles MD   aspirin 325 MG EC tablet Take 1 tablet by mouth 2 times daily 6/28/18   Lala Telles MD   valsartan (DIOVAN) 80 MG tablet Take 1 tablet by mouth nightly 6/28/18   Lala Telles MD   furosemide (LASIX) 20 MG tablet Take 1 tablet by mouth daily 6/28/18   Llaa Telles MD   pantoprazole (PROTONIX) 40 MG tablet TAKE ONE TABLET BY MOUTH ONCE DAILY 6/12/18   TRESSA Olson   ondansetron [facility]) 4 MG tablet Take 1 tablet by mouth daily as needed for Nausea or Vomiting 6/11/18   Kanwal Singer MD   QUEtiapine (SEROQUEL) 200 MG tablet Take 1 tablet by mouth nightly 6/5/18   TRESSA Olson   lamoTRIgine (LAMICTAL) 25 MG tablet Take 50 mg by mouth 2 times daily  5/11/18   Historical Provider, MD   LYRICA 150 MG capsule TAKE ONE CAPSULE BY MOUTH TWICE DAILY 5/8/18 11/4/18  TRESSA Olson   amitriptyline (ELAVIL) 150 MG tablet Take 1 tablet by mouth nightly 4/5/18   TRESSA Olson   atorvastatin (LIPITOR) 40 MG tablet TAKE ONE TABLET BY MOUTH ONCE DAILY 4/4/18   TRESSA Anderson   tiZANidine (ZANAFLEX) 4 MG tablet TAKE ONE TABLET BY MOUTH EVERY 8 HOURS AS NEEDED FOR MUSCLE SPASMS 3/5/18   TRESSA Anderson   fluticasone [facility]) 50 MCG/ACT nasal spray USE TWO SPRAY(S) IN EACH NOSTRIL ONCE DAILY 11/3/17   TRESSA Anderson   insulin glargine (LANTUS SOLOSTAR) 100 UNIT/ML injection pen Inject 22 Units into the skin nightly  Patient taking differently: Inject 14 Units into the skin nightly  9/14/17   TRESSA Anderson   levothyroxine (SYNTHROID) 88 MCG tablet TAKE ONE TABLET BY MOUTH ONCE DAILY 8/29/17   TRESSA Anderson   DULoxetine (CYMBALTA) 60 MG extended release capsule Take 1 capsule by mouth 2 times daily  Patient taking differently: Take 60 mg by mouth daily  8/29/17   TRESSA Anderson   insulin aspart (NOVOLOG FLEXPEN) 100 UNIT/ML injection pen Inject 20 Units into the skin 3 times daily (before meals) INJECT THREE TIMES DAILY WITH MEALS BASED UPON RATIO AND CORRECTIVE FACTOR. AVERAGE 60 UNITS DAILY  Patient taking differently: Inject 14 Units into the skin 3 times daily (before meals) INJECT THREE TIMES DAILY WITH MEALS BASED UPON RATIO AND CORRECTIVE FACTOR.  AVERAGE 60 UNITS DAILY 8/29/17   TRESAS Anderson   metoprolol succinate (TOPROL XL) 50 MG extended release tablet Take 1 tablet by mouth daily 5/22/17   LAZARO Platt, DO       Future Appointments  Date Time Provider Sita Tong   8/6/2018 10:00 AM Yao Oden, 9 [facility]

## 2018-07-17 ENCOUNTER — LAB REQUISITION (OUTPATIENT)
Dept: LAB | Facility: HOSPITAL | Age: 58
End: 2018-07-17

## 2018-07-17 DIAGNOSIS — Z00.00 ENCOUNTER FOR GENERAL ADULT MEDICAL EXAMINATION WITHOUT ABNORMAL FINDINGS: ICD-10-CM

## 2018-07-17 PROCEDURE — 87186 SC STD MICRODIL/AGAR DIL: CPT | Performed by: PHYSICIAN ASSISTANT

## 2018-07-17 PROCEDURE — 87070 CULTURE OTHR SPECIMN AEROBIC: CPT | Performed by: PHYSICIAN ASSISTANT

## 2018-07-17 PROCEDURE — 87147 CULTURE TYPE IMMUNOLOGIC: CPT | Performed by: PHYSICIAN ASSISTANT

## 2018-07-17 PROCEDURE — 87185 SC STD ENZYME DETCJ PER NZM: CPT | Performed by: PHYSICIAN ASSISTANT

## 2018-07-17 PROCEDURE — 87205 SMEAR GRAM STAIN: CPT | Performed by: PHYSICIAN ASSISTANT

## 2018-07-17 PROCEDURE — 87077 CULTURE AEROBIC IDENTIFY: CPT | Performed by: PHYSICIAN ASSISTANT

## 2018-07-19 LAB
B-LACTAMASE USUAL SUSC ISLT: POSITIVE
BACTERIA SPEC AEROBE CULT: ABNORMAL
GRAM STN SPEC: ABNORMAL
GRAM STN SPEC: ABNORMAL

## 2018-07-25 DIAGNOSIS — M62.838 MUSCLE SPASM: ICD-10-CM

## 2018-07-25 RX ORDER — TIZANIDINE 4 MG/1
TABLET ORAL
Qty: 90 TABLET | Refills: 3 | Status: SHIPPED | OUTPATIENT
Start: 2018-07-25 | End: 2018-11-21 | Stop reason: SDUPTHER

## 2018-08-06 ENCOUNTER — TELEPHONE (OUTPATIENT)
Dept: PRIMARY CARE CLINIC | Age: 58
End: 2018-08-06

## 2018-08-06 RX ORDER — FLUCONAZOLE 150 MG/1
150 TABLET ORAL ONCE
Qty: 2 TABLET | Refills: 0 | Status: SHIPPED | OUTPATIENT
Start: 2018-08-06 | End: 2018-08-06

## 2018-08-06 NOTE — TELEPHONE ENCOUNTER
Pt called, she just finished an abx and has a vaginal yeast infection. Would like med to [facility].

## 2018-08-09 ENCOUNTER — OFFICE VISIT (OUTPATIENT)
Dept: PRIMARY CARE CLINIC | Age: 58
End: 2018-08-09
Payer: MEDICARE

## 2018-08-09 ENCOUNTER — CARE COORDINATION (OUTPATIENT)
Dept: CARE COORDINATION | Age: 58
End: 2018-08-09

## 2018-08-09 VITALS
OXYGEN SATURATION: 98 % | HEART RATE: 96 BPM | HEIGHT: 61 IN | BODY MASS INDEX: 42.01 KG/M2 | SYSTOLIC BLOOD PRESSURE: 102 MMHG | TEMPERATURE: 98.5 F | WEIGHT: 222.5 LBS | DIASTOLIC BLOOD PRESSURE: 71 MMHG

## 2018-08-09 DIAGNOSIS — R26.9 GAIT ABNORMALITY: ICD-10-CM

## 2018-08-09 DIAGNOSIS — G47.01 INSOMNIA DUE TO MEDICAL CONDITION: ICD-10-CM

## 2018-08-09 DIAGNOSIS — F41.9 ANXIETY: ICD-10-CM

## 2018-08-09 DIAGNOSIS — S52.502D CLOSED FRACTURE OF DISTAL END OF LEFT RADIUS WITH ROUTINE HEALING, UNSPECIFIED FRACTURE MORPHOLOGY, SUBSEQUENT ENCOUNTER: ICD-10-CM

## 2018-08-09 DIAGNOSIS — I10 ESSENTIAL HYPERTENSION: Primary | ICD-10-CM

## 2018-08-09 DIAGNOSIS — E11.42 TYPE 2 DIABETES MELLITUS WITH DIABETIC POLYNEUROPATHY, WITH LONG-TERM CURRENT USE OF INSULIN (HCC): ICD-10-CM

## 2018-08-09 DIAGNOSIS — Z79.4 TYPE 2 DIABETES MELLITUS WITH DIABETIC POLYNEUROPATHY, WITH LONG-TERM CURRENT USE OF INSULIN (HCC): ICD-10-CM

## 2018-08-09 LAB — HBA1C MFR BLD: 5.9 %

## 2018-08-09 PROCEDURE — 2022F DILAT RTA XM EVC RTNOPTHY: CPT | Performed by: NURSE PRACTITIONER

## 2018-08-09 PROCEDURE — 3017F COLORECTAL CA SCREEN DOC REV: CPT | Performed by: NURSE PRACTITIONER

## 2018-08-09 PROCEDURE — G8427 DOCREV CUR MEDS BY ELIG CLIN: HCPCS | Performed by: NURSE PRACTITIONER

## 2018-08-09 PROCEDURE — G8598 ASA/ANTIPLAT THER USED: HCPCS | Performed by: NURSE PRACTITIONER

## 2018-08-09 PROCEDURE — 99214 OFFICE O/P EST MOD 30 MIN: CPT | Performed by: NURSE PRACTITIONER

## 2018-08-09 PROCEDURE — 1036F TOBACCO NON-USER: CPT | Performed by: NURSE PRACTITIONER

## 2018-08-09 PROCEDURE — 3044F HG A1C LEVEL LT 7.0%: CPT | Performed by: NURSE PRACTITIONER

## 2018-08-09 PROCEDURE — G8417 CALC BMI ABV UP PARAM F/U: HCPCS | Performed by: NURSE PRACTITIONER

## 2018-08-09 PROCEDURE — 83036 HEMOGLOBIN GLYCOSYLATED A1C: CPT | Performed by: NURSE PRACTITIONER

## 2018-08-09 RX ORDER — HYDROCODONE BITARTRATE AND ACETAMINOPHEN 10; 325 MG/1; MG/1
1 TABLET ORAL EVERY 6 HOURS PRN
Qty: 120 TABLET | Refills: 0 | Status: CANCELLED | OUTPATIENT
Start: 2018-08-09 | End: 2018-09-08

## 2018-08-09 RX ORDER — DIAZEPAM 5 MG/1
5 TABLET ORAL NIGHTLY PRN
Qty: 14 TABLET | Refills: 0 | Status: CANCELLED | OUTPATIENT
Start: 2018-08-09 | End: 2018-08-23

## 2018-08-09 ASSESSMENT — ENCOUNTER SYMPTOMS
DIARRHEA: 0
SORE THROAT: 0
SHORTNESS OF BREATH: 0
CONSTIPATION: 0
EYE REDNESS: 0
VOMITING: 0
RHINORRHEA: 0
COUGH: 0

## 2018-08-09 NOTE — PROGRESS NOTES
0.6 0.0 - 1.0 %    Neutrophils # 5.4 1.5 - 7.5 K/uL    Lymphocytes # 1.7 1.1 - 4.5 K/uL    Monocytes # 1.10 (H) 0.00 - 0.90 K/uL    Eosinophils # 0.40 0.00 - 0.60 K/uL    Basophils # 0.10 0.00 - 0.20 K/uL   Basic Metabolic Panel   Result Value Ref Range    Sodium 138 136 - 145 mmol/L    Potassium 4.8 3.5 - 5.0 mmol/L    Chloride 98 98 - 111 mmol/L    CO2 27 22 - 29 mmol/L    Anion Gap 13 7 - 19 mmol/L    Glucose 228 (H) 74 - 109 mg/dL    BUN 25 (H) 6 - 20 mg/dL    CREATININE 1.4 (H) 0.5 - 0.9 mg/dL    GFR Non-African American 39 (A) >60    Calcium 9.0 8.6 - 10.0 mg/dL   Basic Metabolic Panel   Result Value Ref Range    Sodium 133 (L) 136 - 145 mmol/L    Potassium 4.4 3.5 - 5.0 mmol/L    Chloride 94 (L) 98 - 111 mmol/L    CO2 25 22 - 29 mmol/L    Anion Gap 14 7 - 19 mmol/L    Glucose 228 (H) 74 - 109 mg/dL    BUN 28 (H) 6 - 20 mg/dL    CREATININE 1.6 (H) 0.5 - 0.9 mg/dL    GFR Non- 33 (A) >60    Calcium 9.0 8.6 - 10.0 mg/dL   CBC auto differential   Result Value Ref Range    WBC 13.5 (H) 4.8 - 10.8 K/uL    RBC 3.36 (L) 4.20 - 5.40 M/uL    Hemoglobin 10.1 (L) 12.0 - 16.0 g/dL    Hematocrit 32.8 (L) 37.0 - 47.0 %    MCV 97.6 81.0 - 99.0 fL    MCH 30.1 27.0 - 31.0 pg    MCHC 30.8 (L) 33.0 - 37.0 g/dL    RDW 15.1 (H) 11.5 - 14.5 %    Platelets 960 438 - 707 K/uL    MPV 10.8 9.4 - 12.3 fL    Neutrophils % 80.9 (H) 50.0 - 65.0 %    Lymphocytes % 8.3 (L) 20.0 - 40.0 %    Monocytes % 7.5 0.0 - 10.0 %    Eosinophils % 1.6 0.0 - 5.0 %    Basophils % 0.4 0.0 - 1.0 %    Neutrophils # 11.0 (H) 1.5 - 7.5 K/uL    Lymphocytes # 1.1 1.1 - 4.5 K/uL    Monocytes # 1.00 (H) 0.00 - 0.90 K/uL    Eosinophils # 0.20 0.00 - 0.60 K/uL    Basophils # 0.10 0.00 - 0.20 K/uL   Comprehensive Metabolic Panel   Result Value Ref Range    Sodium 132 (L) 136 - 145 mmol/L    Potassium 5.2 (H) 3.5 - 5.0 mmol/L    Chloride 92 (L) 98 - 111 mmol/L    CO2 22 22 - 29 mmol/L    Anion Gap 18 7 - 19 mmol/L    Glucose 240 (H) 74 - 109 mg/dL    BUN Performed on [facility]    POCT Glucose   Result Value Ref Range    POC Glucose 205 (H) 70 - 99 mg/dl    Performed on [facility]    POCT Glucose   Result Value Ref Range    POC Glucose 335 (H) 70 - 99 mg/dl    Performed on [facility]    POCT Glucose   Result Value Ref Range    POC Glucose 223 (H) 70 - 99 mg/dl    Performed on [facility]    POCT Glucose   Result Value Ref Range    POC Glucose 245 (H) 70 - 99 mg/dl    Performed on [facility]    POCT Glucose   Result Value Ref Range    POC Glucose 163 (H) 70 - 99 mg/dl    Performed on [facility]    POCT Glucose   Result Value Ref Range    POC Glucose 263 (H) 70 - 99 mg/dl    Performed on [facility]    POCT Glucose   Result Value Ref Range    POC Glucose 235 (H) 70 - 99 mg/dl    Performed on [facility]    POCT Glucose   Result Value Ref Range    POC Glucose 256 (H) 70 - 99 mg/dl    Performed on [facility]    POCT Glucose   Result Value Ref Range    POC Glucose 213 (H) 70 - 99 mg/dl    Performed on [facility]    POCT Glucose   Result Value Ref Range    POC Glucose 216 (H) 70 - 99 mg/dl    Performed on [facility]    POCT Glucose   Result Value Ref Range    POC Glucose 198 (H) 70 - 99 mg/dl    Performed on [facility]    POCT Glucose   Result Value Ref Range    POC Glucose 368 (H) 70 - 99 mg/dl    Performed on [facility]    POCT Glucose   Result Value Ref Range    POC Glucose 168 (H) 70 - 99 mg/dl    Performed on [facility]    POCT Glucose   Result Value Ref Range    POC Glucose 311 (H) 70 - 99 mg/dl    Performed on [facility]    POCT Glucose   Result Value Ref Range    POC Glucose 205 (H) 70 - 99 mg/dl    Performed on [facility]    POCT Glucose   Result Value Ref Range    POC Glucose 278 (H) 70 - 99 mg/dl    Performed on [facility]    POCT Glucose   Result Value Ref Range    POC Glucose 251 (H) 70 - 99 mg/dl    Performed on [facility]    POCT Glucose   Result Value Ref Range    POC Glucose 148 (H) 70 - 99 mg/dl    Performed on [facility]    POCT Glucose   Result Value Ref Range    POC Glucose 226 (H) 70 - 99 99 mg/dl    Performed on [facility]    POCT Glucose   Result Value Ref Range    POC Glucose 110 (H) 70 - 99 mg/dl    Performed on [facility]    POCT Glucose   Result Value Ref Range    POC Glucose 134 (H) 70 - 99 mg/dl    Performed on [facility]        I have reviewed the following with the Ms. Berry Snell   Lab Review   Admission on 06/14/2018, Discharged on 06/28/2018   No results displayed because visit has over 200 results.       Admission on 06/12/2018, Discharged on 06/14/2018   Component Date Value    POC Glucose 06/12/2018 221*    Performed on 06/12/2018 [facility]     WBC 06/12/2018 8.4     RBC 06/12/2018 3.44*    Hemoglobin 06/12/2018 10.5*    Hematocrit 06/12/2018 31.9*    MCV 06/12/2018 92.7     MCH 06/12/2018 30.5     MCHC 06/12/2018 32.9*    RDW 06/12/2018 13.9     Platelets 74/01/7944 256     MPV 06/12/2018 10.5     Sodium 06/12/2018 138     Potassium 06/12/2018 4.6     Chloride 06/12/2018 102     CO2 06/12/2018 25     Anion Gap 06/12/2018 11     Glucose 06/12/2018 221*    BUN 06/12/2018 28*    CREATININE 06/12/2018 1.3*    GFR Non- 06/12/2018 42*    Calcium 06/12/2018 9.0     Total Protein 06/12/2018 6.5*    Alb 06/12/2018 3.4*    Total Bilirubin 06/12/2018 <0.2     Alkaline Phosphatase 06/12/2018 128*    ALT 06/12/2018 13     AST 06/12/2018 20     Protime 06/12/2018 13.1     INR 06/12/2018 1.00     Sodium 06/13/2018 133*    Potassium reflex Magnesi* 06/13/2018 6.0*    Chloride 06/13/2018 100     CO2 06/13/2018 22     Anion Gap 06/13/2018 11     Glucose 06/13/2018 326*    BUN 06/13/2018 26*    CREATININE 06/13/2018 1.5*    GFR Non- 06/13/2018 36*    Calcium 06/13/2018 8.5*    WBC 06/13/2018 9.3     RBC 06/13/2018 3.16*    Hemoglobin 06/13/2018 9.4*    Hematocrit 06/13/2018 29.9*    MCV 06/13/2018 94.6     MCH 06/13/2018 29.7     MCHC 06/13/2018 31.4*    RDW 06/13/2018 14.1     Platelets 33/55/2264 260     MPV 06/13/2018 11.2     POC Glucose 06/12/2018 300*    Performed on 06/12/2018 [facility]     Potassium 06/13/2018 4.0     POC Glucose 06/13/2018 174*    Performed on 06/13/2018 [facility]k     POC Glucose 06/13/2018 257*    Performed on 06/13/2018 [facility]uChek     POC Glucose 06/13/2018 232*    Performed on 06/13/2018 [facility]k     Sodium 06/13/2018 140     Potassium 06/13/2018 4.2     Chloride 06/13/2018 103     CO2 06/13/2018 23     Anion Gap 06/13/2018 14     Glucose 06/13/2018 232*    BUN 06/13/2018 24*    CREATININE 06/13/2018 1.5*    GFR Non- 06/13/2018 36*    Calcium 06/13/2018 7.9*    Sodium 06/14/2018 140     Potassium 06/14/2018 4.5     Chloride 06/14/2018 104     CO2 06/14/2018 23     Anion Gap 06/14/2018 13     Glucose 06/14/2018 243*    BUN 06/14/2018 21*    CREATININE 06/14/2018 1.4*    GFR Non- 06/14/2018 39*    Calcium 06/14/2018 7.9*    POC Glucose 06/13/2018 264*    Performed on 06/13/2018 [facility]k     POC Glucose 06/14/2018 186*    Performed on 06/14/2018 [facility]k     POC Glucose 06/14/2018 195*    Performed on 06/14/2018 [facility]k    Admission on 06/11/2018, Discharged on 06/11/2018   Component Date Value    Sodium 06/11/2018 133*    Potassium 06/11/2018 5.8*    Chloride 06/11/2018 97*    CO2 06/11/2018 24     Anion Gap 06/11/2018 12     Glucose 06/11/2018 196*    BUN 06/11/2018 35*    CREATININE 06/11/2018 1.6*    GFR Non- 06/11/2018 33*    Calcium 06/11/2018 8.6    Orders Only on 06/05/2018   Component Date Value    Sodium 06/05/2018 135*    Potassium 06/05/2018 5.4*    Chloride 06/05/2018 96*    CO2 06/05/2018 24     Anion Gap 06/05/2018 15     Glucose 06/05/2018 128*    BUN 06/05/2018 30*    CREATININE 06/05/2018 1.7*    GFR Non- 06/05/2018 31*    Calcium 06/05/2018 9.3     Total Protein 06/05/2018 8.0     Alb 06/05/2018 4.3     Total Bilirubin 06/05/2018 0.4     Alkaline Phosphatase 06/05/2018 160*    ALT 06/05/2018 23     AST 06/05/2018 27     WBC 06/05/2018 10.1     RBC 06/05/2018 4.49     Hemoglobin 06/05/2018 13.7     Hematocrit 06/05/2018 43.0     MCV 06/05/2018 95.8     MCH 06/05/2018 30.5     MCHC 06/05/2018 31.9*    RDW 06/05/2018 13.7     Platelets 84/23/0383 334     MPV 06/05/2018 11.6     Neutrophils % 06/05/2018 67.9*    Lymphocytes % 06/05/2018 21.2     Monocytes % 06/05/2018 7.6     Eosinophils % 06/05/2018 2.2     Basophils % 06/05/2018 0.7     Neutrophils # 06/05/2018 6.9     Lymphocytes # 06/05/2018 2.1     Monocytes # 06/05/2018 0.80     Eosinophils # 06/05/2018 0.20     Basophils # 06/05/2018 0.10     T4 Free 06/05/2018 1.0     TSH 06/05/2018 1.630     Color, UA 06/05/2018 YELLOW     Clarity, UA 06/05/2018 Clear     Glucose, Ur 06/05/2018 Negative     Bilirubin Urine 06/05/2018 Negative     Ketones, Urine 06/05/2018 Negative     Specific Gravity, UA 06/05/2018 1.011     Blood, Urine 06/05/2018 Negative     pH, UA 06/05/2018 5.5     Protein, UA 06/05/2018 Negative     Urobilinogen, Urine 06/05/2018 0.2     Nitrite, Urine 06/05/2018 Negative     Leukocyte Esterase, Urine 06/05/2018 Negative    Admission on 05/22/2018, Discharged on 05/22/2018   Component Date Value    Sodium 05/22/2018 137     Potassium 05/22/2018 4.5     Chloride 05/22/2018 98     CO2 05/22/2018 26     Anion Gap 05/22/2018 13     Glucose 05/22/2018 107     BUN 05/22/2018 25*    CREATININE 05/22/2018 1.7*    GFR Non- 05/22/2018 31*    Calcium 05/22/2018 8.9     P-R Interval 05/22/2018 182     QRS Duration 05/22/2018 86     Q-T Interval 05/22/2018 394     QTc Calculation (Bazett) 05/22/2018 416     P Axis 05/22/2018 34     T Axis 05/22/2018 32    Orders Only on 05/22/2018   Component Date Value    Sodium 05/22/2018 139     Potassium 05/22/2018 6.0*    Chloride 05/22/2018 102     CO2 05/22/2018 25     Anion Gap 05/22/2018 12     Glucose 05/22/2018 105     BUN 03/05/2018 1.1     Basophils % 03/05/2018 0.4     Neutrophils # 03/05/2018 13.0*    Lymphocytes # 03/05/2018 1.5     Monocytes # 03/05/2018 0.90     Eosinophils # 03/05/2018 0.20     Basophils # 03/05/2018 0.10     Sodium 03/05/2018 136     Potassium 03/05/2018 4.6     Chloride 03/05/2018 96*    CO2 03/05/2018 21*    Anion Gap 03/05/2018 19     Glucose 03/05/2018 193*    BUN 03/05/2018 44*    CREATININE 03/05/2018 2.5*    GFR Non- 03/05/2018 20*    Calcium 03/05/2018 9.0     Total Protein 03/05/2018 7.7     Alb 03/05/2018 3.6     Total Bilirubin 03/05/2018 0.5     Alkaline Phosphatase 03/05/2018 154*    ALT 03/05/2018 19     AST 03/05/2018 26     Hemoglobin A1C 03/05/2018 6.9*    Microalbumin, Random Uri* 03/05/2018 <1.20     Creatinine, Ur 03/05/2018 16.8     Microalbumin Creatinine * 03/05/2018 see below      Copies of these are in the chart. Prior to Visit Medications    Medication Sig Taking? Authorizing Provider   tiZANidine (ZANAFLEX) 4 MG tablet TAKE 1 TABLET BY MOUTH EVERY 8 HOURS AS NEEDED FOR MUSCLE SPASMS Yes TRESSA Alexander   Blood Glucose Monitoring Suppl (FREESTYLE LITE) JEZ 1 Device by Does not apply route 4 times daily Yes TRESSA Rodríguez   blood glucose test strips (FREESTYLE LITE) strip 1 each by In Vitro route 4 times daily As needed. Yes TRESSA Rodríguez   linaclotide (LINZESS) 290 MCG CAPS capsule Take 1 capsule by mouth every morning (before breakfast) Yes LAZARO Guillermo DO   HYDROcodone-acetaminophen (NORCO)  MG per tablet Take 1 tablet by mouth every 6 hours as needed for Pain for up to 30 days. Silver Lopez MD   aspirin 325 MG EC tablet Take 1 tablet by mouth 2 times daily Yes Lynne Rodrigues MD   valsartan (DIOVAN) 80 MG tablet Take 1 tablet by mouth nightly Yes Lynne Rodrigues MD   furosemide (LASIX) 20 MG tablet Take 1 tablet by mouth daily Yes Lynne Rodrigues MD   pantoprazole (PROTONIX) 40 MG tablet TAKE ONE TABLET BY MOUTH ONCE DAILY Yes TRESSA Shaw   ondansetron (ZOFRAN) 4 MG tablet Take 1 tablet by mouth daily as needed for Nausea or Vomiting Yes Norbert Burgess MD   QUEtiapine (SEROQUEL) 200 MG tablet Take 1 tablet by mouth nightly Yes TRESSA Shaw   LYRICA 150 MG capsule TAKE ONE CAPSULE BY MOUTH TWICE DAILY Yes TRESSA Shaw   amitriptyline (ELAVIL) 150 MG tablet Take 1 tablet by mouth nightly Yes TRESSA Shaw   atorvastatin (LIPITOR) 40 MG tablet TAKE ONE TABLET BY MOUTH ONCE DAILY Yes TRESSA Shaw   fluticasone (FLONASE) 50 MCG/ACT nasal spray USE TWO SPRAY(S) IN EACH NOSTRIL ONCE DAILY Yes TRESSA Shaw   insulin glargine (LANTUS SOLOSTAR) 100 UNIT/ML injection pen Inject 22 Units into the skin nightly  Patient taking differently: Inject 14 Units into the skin nightly  Yes TRESSA Shaw   levothyroxine (SYNTHROID) 88 MCG tablet TAKE ONE TABLET BY MOUTH ONCE DAILY Yes TRESSA Shaw   DULoxetine (CYMBALTA) 60 MG extended release capsule Take 1 capsule by mouth 2 times daily  Patient taking differently: Take 60 mg by mouth daily  Yes TRESSA Shaw   insulin aspart (NOVOLOG FLEXPEN) 100 UNIT/ML injection pen Inject 20 Units into the skin 3 times daily (before meals) INJECT THREE TIMES DAILY WITH MEALS BASED UPON RATIO AND CORRECTIVE FACTOR. AVERAGE 60 UNITS DAILY  Patient taking differently: Inject 14 Units into the skin 3 times daily (before meals) INJECT THREE TIMES DAILY WITH MEALS BASED UPON RATIO AND CORRECTIVE FACTOR.  AVERAGE 60 UNITS DAILY Yes TRESSA Shaw   metoprolol succinate (TOPROL XL) 50 MG extended release tablet Take 1 tablet by mouth daily Yes LAZARO Perkins,        Allergies: Dilaudid [hydromorphone hcl]    Past Medical History:   Diagnosis Date    Anxiety     Arthritis     Bipolar 1 disorder ([facility] [address]     CAD (coronary artery disease)     CHF (congestive heart failure) (starting to increase). Negative for chills, fatigue and fever. HENT: Negative for congestion, ear pain, rhinorrhea and sore throat. Eyes: Negative for redness. Respiratory: Negative for cough and shortness of breath. Cardiovascular: Negative for chest pain. Gastrointestinal: Negative for constipation, diarrhea and vomiting. Musculoskeletal: Positive for arthralgias (ankle and wrist) and gait problem (due to pain). Skin: Negative for rash. Neurological: Negative for dizziness and headaches. Physical Exam   Constitutional: She appears well-developed. Overweight   HENT:   Head: Normocephalic. Right Ear: Tympanic membrane and external ear normal.   Left Ear: Tympanic membrane and external ear normal.   Nose: Nose normal.   Mouth/Throat: Oropharynx is clear and moist.   Neck: Normal range of motion. Cardiovascular: Normal rate and regular rhythm. Pulmonary/Chest: Effort normal and breath sounds normal. No respiratory distress. She has no wheezes. She has no rales. Abdominal: Soft. Bowel sounds are normal. She exhibits no distension. There is no tenderness. Musculoskeletal:        Right ankle: She exhibits decreased range of motion and swelling (mild). Tenderness (improving, ankle incision is well healed). Neurological: She is alert. Skin: Skin is dry. Psychiatric: She has a normal mood and affect. Her behavior is normal. Judgment and thought content normal.   Vitals reviewed. ASSESSMENT      ICD-10-CM ICD-9-CM    1. Essential hypertension I10 401.9 The current medical regimen is effective;  continue present plan and medications. Patient is asked to monitor BP at home or work, several times per month and return with written values at next office visit. 2. Anxiety F41.9 300.00 The current medical regimen is effective;  continue present plan and medications. Continue with counseling   3.  Closed fracture of distal end of left radius with routine healing, unspecified in a serving of restaurant food. · If the meal you order has too much carbohydrate (such as potatoes, corn, or baked beans), ask to have a low-carbohydrate food instead. Ask for a salad or green vegetables. · If you use insulin, check your blood sugar before and after eating out to help you plan how much to eat in the future. · If you eat more carbohydrate at a meal than you had planned, take a walk or do other exercise. This will help lower your blood sugar. What else should you know? · Limit saturated fat, such as the fat from meat and dairy products. This is a healthy choice because people who have diabetes are at higher risk of heart disease. So choose lean cuts of meat and nonfat or low-fat dairy products. Use olive or canola oil instead of butter or shortening when cooking. · Don't skip meals. Your blood sugar may drop too low if you skip meals and take insulin or certain medicines for diabetes. · Check with your doctor before you drink alcohol. Alcohol can cause your blood sugar to drop too low. Alcohol can also cause a bad reaction if you take certain diabetes medicines. Follow-up care is a key part of your treatment and safety. Be sure to make and go to all appointments, and call your doctor if you are having problems. It's also a good idea to know your test results and keep a list of the medicines you take. Where can you learn more? Go to [email or web address] and sign in to your [email or web address] account. Enter P627 in the [facility] box to learn more about \"Learning About Diabetes Food Guidelines. \"     If you do not have an account, please click on the \"Sign Up Now\" link. Current as of: December 7, 2017  Content Version: 11.7  © 4591-0854 [employer]. Care instructions adapted under license by [facility] ([facility]).  If you have questions about a medical condition or this instruction, always ask your healthcare professional. [address] any warranty or liability for your use of this information. Patient Education        Learning About Meal Planning for Diabetes  Why plan your meals? Meal planning can be a key part of managing diabetes. Planning meals and snacks with the right balance of carbohydrate, protein, and fat can help you keep your blood sugar at the target level you set with your doctor. You don't have to eat special foods. You can eat what your family eats, including sweets once in a while. But you do have to pay attention to how often you eat and how much you eat of certain foods. You may want to work with a dietitian or a certified diabetes educator. He or she can give you tips and meal ideas and can answer your questions about meal planning. This health professional can also help you reach a healthy weight if that is one of your goals. What plan is right for you? Your dietitian or diabetes educator may suggest that you start with the plate format or carbohydrate counting. The plate format  The plate format is a simple way to help you manage how you eat. You plan meals by learning how much space each food should take on a plate. Using the plate format helps you spread carbohydrate throughout the day. It can make it easier to keep your blood sugar level within your target range. It also helps you see if you're eating healthy portion sizes. To use the plate format, you put non-starchy vegetables on half your plate. Add meat or meat substitutes on one-quarter of the plate. Put a grain or starchy vegetable (such as brown rice or a potato) on the final quarter of the plate. You can add a small piece of fruit and some low-fat or fat-free milk or yogurt, depending on your carbohydrate goal for each meal.  Here are some tips for using the plate format:  · Make sure that you are not using an oversized plate. A 9-inch plate is best. Many restaurants use larger plates. · Get used to using the plate format at home.  Then you can use it carbohydrate are in a meal. This lets you know how much rapid-acting insulin to take before you eat. If you use an insulin pump, you get a constant rate of insulin during the day. So the pump must be programmed at meals to give you extra insulin to cover the rise in blood sugar after meals. When you know how much carbohydrate you will eat, you can take the right amount of insulin. Or, if you always use the same amount of insulin, you need to make sure that you eat the same amount of carbohydrate at meals. If you need more help to understand carbohydrate counting and food labels, ask your doctor, dietitian, or diabetes educator. How do you get started with meal planning? Here are some tips to get started:  · Plan your meals a week at a time. Don't forget to include snacks too. · Use cookbooks or online recipes to plan several main meals. Plan some quick meals for busy nights. You also can double some recipes that freeze well. Then you can save half for other busy nights when you don't have time to cook. · Make sure you have the ingredients you need for your recipes. If you're running low on basic items, put these items on your shopping list too. · List foods that you use to make breakfasts, lunches, and snacks. List plenty of fruits and vegetables. · Post this list on the refrigerator. Add to it as you think of more things you need. · Take the list to the store to do your weekly shopping. Follow-up care is a key part of your treatment and safety. Be sure to make and go to all appointments, and call your doctor if you are having problems. It's also a good idea to know your test results and keep a list of the medicines you take. Where can you learn more? Go to [email or web address] and sign in to your [employer] account. Enter C473 in the [employer] box to learn more about \"Learning About Meal Planning for Diabetes. \"     If you do not have an account, please click on the \"Sign

## 2018-08-09 NOTE — CARE COORDINATION
Ambulatory Care Coordination Note  8/9/2018  CM Risk Score: 7  Hector Mortality Risk Score:      ACC: Grupo Hankins, RN    Summary Note: ACC met with pt during her appt today. She is doing PT now for rehab at home with [facility]. Her mobility has improved greatly with therapy, pt is using walker today and feels very optimistic about her recovery. Vickie Palacio is monitoring her BP as she has gone back to Diovan at 160 mg. She will also continue to monitor her BS before meals and get her labs next month. Pt is doing well. Care Coordination Interventions    Program Enrollment:  Complex Care  Referral from Primary Care Provider:  No  Suggested Interventions and Community Resources  Zone Management Tools: In Process (Comment: 8/9/18: BS fluctuating from 98 - above 200. Varies a lot. Pt is due for her A1c lab next mo. Provided new log sheets for BS today.)  Other Services or Interventions:  8/9/18: Pt is now working with [address] [place] [address] PT for stengthening of her ankle/leg post fx and repair. Pt using walker today! Goals Addressed             Most Recent     Self Monitoring   On track (8/9/2018)             Blood Pressure - I will take my blood pressure as directed - Daily and Other: If sx of elev BP - headache, vision changes, fatigue. I will notify my provider of any trends of increasing or decreasing blood pressures over a month period of time. I will notify my provider of any changes in blood pressure associated with symptoms of dizziness, falls, passing out, headache, confusion/change in mental status. Patient Reported Blood Pressure   Patient Reported Blood Pressure 2/28/2018   Home Blood Pressure 160/93       Barriers: none  Plan for overcoming my barriers: N/A  Confidence: 10/10  Anticipated Goal Completion Date: 6/4/18              Prior to Admission medications    Medication Sig Start Date End Date Taking?  Authorizing Provider   tiZANidine (ZANAFLEX) 4 MG tablet TAKE 1 TABLET BY MOUTH EVERY 8 HOURS AS NEEDED FOR MUSCLE SPASMS 7/25/18   Gutierrez Salt, APRN   Blood Glucose Monitoring Suppl (FREESTYLE LITE) JEZ 1 Device by Does not apply route 4 times daily 7/5/18   TRESSA Feliciano   blood glucose test strips (FREESTYLE LITE) strip 1 each by In Vitro route 4 times daily As needed. 7/5/18   TRESSA Feliciano   linaclotide Louetta Javier) 290 MCG CAPS capsule Take 1 capsule by mouth every morning (before breakfast) 7/3/18   B Niyah Strong DO   HYDROcodone-acetaminophen (NORCO)  MG per tablet Take 1 tablet by mouth every 6 hours as needed for Pain for up to 30 days. . 6/28/18 8/9/18  King Bell MD   aspirin 325 MG EC tablet Take 1 tablet by mouth 2 times daily 6/28/18   King Bell MD   valsartan (DIOVAN) 80 MG tablet Take 1 tablet by mouth nightly 6/28/18   King Bell MD   furosemide (LASIX) 20 MG tablet Take 1 tablet by mouth daily 6/28/18   King Bell MD   pantoprazole (PROTONIX) 40 MG tablet TAKE ONE TABLET BY MOUTH ONCE DAILY 6/12/18   TRESSA Feliciano   ondansetron [facility]) 4 MG tablet Take 1 tablet by mouth daily as needed for Nausea or Vomiting 6/11/18   Lisa Fried MD   QUEtiapine (SEROQUEL) 200 MG tablet Take 1 tablet by mouth nightly 6/5/18   TRESSA Feliciano   LYRICA 150 MG capsule TAKE ONE CAPSULE BY MOUTH TWICE DAILY 5/8/18 11/4/18  TRESSA Feliciano   amitriptyline (ELAVIL) 150 MG tablet Take 1 tablet by mouth nightly 4/5/18   TRESSA Feliciano   atorvastatin (LIPITOR) 40 MG tablet TAKE ONE TABLET BY MOUTH ONCE DAILY 4/4/18   TRESSA Feliciano   fluticasone (FLONASE) 50 MCG/ACT nasal spray USE TWO SPRAY(S) IN EACH NOSTRIL ONCE DAILY 11/3/17   TRESSA Feliciano   insulin glargine (LANTUS SOLOSTAR) 100 UNIT/ML injection pen Inject 22 Units into the skin nightly  Patient taking differently: Inject 14 Units into the skin nightly  9/14/17   TRESSA Feliciano   levothyroxine (SYNTHROID) 88 MCG tablet TAKE ONE TABLET BY MOUTH ONCE DAILY 8/29/17   TRESSA Loya   DULoxetine (CYMBALTA) 60 MG extended release capsule Take 1 capsule by mouth 2 times daily  Patient taking differently: Take 60 mg by mouth daily  8/29/17   TRESSA Loya   insulin aspart (NOVOLOG FLEXPEN) 100 UNIT/ML injection pen Inject 20 Units into the skin 3 times daily (before meals) INJECT THREE TIMES DAILY WITH MEALS BASED UPON RATIO AND CORRECTIVE FACTOR. AVERAGE 60 UNITS DAILY  Patient taking differently: Inject 14 Units into the skin 3 times daily (before meals) INJECT THREE TIMES DAILY WITH MEALS BASED UPON RATIO AND CORRECTIVE FACTOR.  AVERAGE 60 UNITS DAILY 8/29/17   TRESSA Loya   metoprolol succinate (TOPROL XL) 50 MG extended release tablet Take 1 tablet by mouth daily 5/22/17   B Jewelene Osler, DO       Future Appointments  Date Time Provider Sita Tong   9/11/2018 10:30 AM Mary Ellen Carlton, [address]

## 2018-08-09 NOTE — PATIENT INSTRUCTIONS
Patient Education        Learning About Diabetes Food Guidelines  Your Care Instructions    Meal planning is important to manage diabetes. It helps keep your blood sugar at a target level (which you set with your doctor). You don't have to eat special foods. You can eat what your family eats, including sweets once in a while. But you do have to pay attention to how often you eat and how much you eat of certain foods. You may want to work with a dietitian or a certified diabetes educator (CDE) to help you plan meals and snacks. A dietitian or CDE can also help you lose weight if that is one of your goals. What should you know about eating carbs? Managing the amount of carbohydrate (carbs) you eat is an important part of healthy meals when you have diabetes. Carbohydrate is found in many foods. · Learn which foods have carbs. And learn the amounts of carbs in different foods. ¨ Bread, cereal, pasta, and rice have about 15 grams of carbs in a serving. A serving is 1 slice of bread (1 ounce), ½ cup of cooked cereal, or 1/3 cup of cooked pasta or rice. ¨ Fruits have 15 grams of carbs in a serving. A serving is 1 small fresh fruit, such as an apple or orange; ½ of a banana; ½ cup of cooked or canned fruit; ½ cup of fruit juice; 1 cup of melon or raspberries; or 2 tablespoons of dried fruit. ¨ Milk and no-sugar-added yogurt have 15 grams of carbs in a serving. A serving is 1 cup of milk or 2/3 cup of no-sugar-added yogurt. ¨ Starchy vegetables have 15 grams of carbs in a serving. A serving is ½ cup of mashed potatoes or sweet potato; 1 cup winter squash; ½ of a small baked potato; ½ cup of cooked beans; or ½ cup cooked corn or green peas. · Learn how much carbs to eat each day and at each meal. A dietitian or CDE can teach you how to keep track of the amount of carbs you eat. This is called carbohydrate counting. · If you are not sure how to count carbohydrate grams, use the Plate Method to plan meals.  It is a a healthy weight if that is one of your goals. What plan is right for you? Your dietitian or diabetes educator may suggest that you start with the plate format or carbohydrate counting. The plate format  The plate format is a simple way to help you manage how you eat. You plan meals by learning how much space each food should take on a plate. Using the plate format helps you spread carbohydrate throughout the day. It can make it easier to keep your blood sugar level within your target range. It also helps you see if you're eating healthy portion sizes. To use the plate format, you put non-starchy vegetables on half your plate. Add meat or meat substitutes on one-quarter of the plate. Put a grain or starchy vegetable (such as brown rice or a potato) on the final quarter of the plate. You can add a small piece of fruit and some low-fat or fat-free milk or yogurt, depending on your carbohydrate goal for each meal.  Here are some tips for using the plate format:  · Make sure that you are not using an oversized plate. A 9-inch plate is best. Many restaurants use larger plates. · Get used to using the plate format at home. Then you can use it when you eat out. · Write down your questions about using the plate format. Talk to your doctor, a dietitian, or a diabetes educator about your concerns. Carbohydrate counting  With carbohydrate counting, you plan meals based on the amount of carbohydrate in each food. Carbohydrate raises blood sugar higher and more quickly than any other nutrient. It is found in desserts, breads and cereals, and fruit. It's also found in starchy vegetables such as potatoes and corn, grains such as rice and pasta, and milk and yogurt. Spreading carbohydrate throughout the day helps keep your blood sugar levels within your target range.   Your daily amount depends on several things, including your weight, how active you are, which diabetes medicines you take, and what your goals are for your blood sugar levels. A registered dietitian or diabetes educator can help you plan how much carbohydrate to include in each meal and snack. A guideline for your daily amount of carbohydrate is:  · 45 to 60 grams at each meal. That's about the same as 3 to 4 carbohydrate servings. · 15 to 20 grams at each snack. That's about the same as 1 carbohydrate serving. The Nutrition Facts label on packaged foods tells you how much carbohydrate is in a serving of the food. First, look at the serving size on the food label. Is that the amount you eat in a serving? All of the nutrition information on a food label is based on that serving size. So if you eat more or less than that, you'll need to adjust the other numbers. Total carbohydrate is the next thing you need to look for on the label. If you count carbohydrate servings, one serving of carbohydrate is 15 grams. For foods that don't come with labels, such as fresh fruits and vegetables, you'll need a guide that lists carbohydrate in these foods. Ask your doctor, dietitian, or diabetes educator about books or other nutrition guides you can use. If you take insulin, you need to know how many grams of carbohydrate are in a meal. This lets you know how much rapid-acting insulin to take before you eat. If you use an insulin pump, you get a constant rate of insulin during the day. So the pump must be programmed at meals to give you extra insulin to cover the rise in blood sugar after meals. When you know how much carbohydrate you will eat, you can take the right amount of insulin. Or, if you always use the same amount of insulin, you need to make sure that you eat the same amount of carbohydrate at meals. If you need more help to understand carbohydrate counting and food labels, ask your doctor, dietitian, or diabetes educator. How do you get started with meal planning? Here are some tips to get started:  · Plan your meals a week at a time.  Don't forget to include snacks

## 2018-08-20 ENCOUNTER — CARE COORDINATION (OUTPATIENT)
Dept: CARE COORDINATION | Age: 58
End: 2018-08-20

## 2018-09-01 DIAGNOSIS — E11.65 TYPE 2 DIABETES MELLITUS WITH HYPERGLYCEMIA, WITH LONG-TERM CURRENT USE OF INSULIN (HCC): ICD-10-CM

## 2018-09-01 DIAGNOSIS — Z79.4 TYPE 2 DIABETES MELLITUS WITH HYPERGLYCEMIA, WITH LONG-TERM CURRENT USE OF INSULIN (HCC): ICD-10-CM

## 2018-09-04 RX ORDER — INSULIN ASPART 100 [IU]/ML
INJECTION, SOLUTION INTRAVENOUS; SUBCUTANEOUS
Qty: 18 PEN | Refills: 3 | Status: SHIPPED | OUTPATIENT
Start: 2018-09-04 | End: 2019-02-10 | Stop reason: SDUPTHER

## 2018-09-07 DIAGNOSIS — S82.841A CLOSED BIMALLEOLAR FRACTURE OF RIGHT ANKLE, INITIAL ENCOUNTER: ICD-10-CM

## 2018-09-07 RX ORDER — ONDANSETRON 4 MG/1
4 TABLET, FILM COATED ORAL DAILY PRN
Qty: 20 TABLET | Refills: 0 | Status: SHIPPED | OUTPATIENT
Start: 2018-09-07 | End: 2018-12-11

## 2018-09-11 ENCOUNTER — OFFICE VISIT (OUTPATIENT)
Dept: PRIMARY CARE CLINIC | Age: 58
End: 2018-09-11
Payer: MEDICARE

## 2018-09-11 ENCOUNTER — CARE COORDINATION (OUTPATIENT)
Dept: CARE COORDINATION | Age: 58
End: 2018-09-11

## 2018-09-11 VITALS
HEIGHT: 61 IN | OXYGEN SATURATION: 98 % | WEIGHT: 220.38 LBS | BODY MASS INDEX: 41.61 KG/M2 | SYSTOLIC BLOOD PRESSURE: 114 MMHG | DIASTOLIC BLOOD PRESSURE: 71 MMHG | TEMPERATURE: 97.6 F | HEART RATE: 84 BPM

## 2018-09-11 DIAGNOSIS — G89.29 CHRONIC PAIN OF RIGHT ANKLE: ICD-10-CM

## 2018-09-11 DIAGNOSIS — Z79.4 TYPE 2 DIABETES MELLITUS WITH DIABETIC POLYNEUROPATHY, WITH LONG-TERM CURRENT USE OF INSULIN (HCC): ICD-10-CM

## 2018-09-11 DIAGNOSIS — Z00.00 ROUTINE GENERAL MEDICAL EXAMINATION AT A HEALTH CARE FACILITY: Primary | ICD-10-CM

## 2018-09-11 DIAGNOSIS — F41.9 ANXIETY: ICD-10-CM

## 2018-09-11 DIAGNOSIS — Z23 NEED FOR PROPHYLACTIC VACCINATION AND INOCULATION AGAINST VARICELLA: ICD-10-CM

## 2018-09-11 DIAGNOSIS — S82.891B ANKLE FRACTURE, RIGHT, OPEN TYPE I OR II, INITIAL ENCOUNTER: ICD-10-CM

## 2018-09-11 DIAGNOSIS — Z72.89 OTHER PROBLEMS RELATED TO LIFESTYLE: ICD-10-CM

## 2018-09-11 DIAGNOSIS — Z23 NEED FOR INFLUENZA VACCINATION: ICD-10-CM

## 2018-09-11 DIAGNOSIS — M25.571 CHRONIC PAIN OF RIGHT ANKLE: ICD-10-CM

## 2018-09-11 DIAGNOSIS — M54.50 LUMBAR BACK PAIN: ICD-10-CM

## 2018-09-11 DIAGNOSIS — E11.42 TYPE 2 DIABETES MELLITUS WITH DIABETIC POLYNEUROPATHY, WITH LONG-TERM CURRENT USE OF INSULIN (HCC): ICD-10-CM

## 2018-09-11 DIAGNOSIS — H91.90 HEARING LOSS, UNSPECIFIED HEARING LOSS TYPE, UNSPECIFIED LATERALITY: ICD-10-CM

## 2018-09-11 DIAGNOSIS — M17.12 PRIMARY OSTEOARTHRITIS OF LEFT KNEE: ICD-10-CM

## 2018-09-11 DIAGNOSIS — I10 ESSENTIAL HYPERTENSION: ICD-10-CM

## 2018-09-11 DIAGNOSIS — R26.9 GAIT ABNORMALITY: ICD-10-CM

## 2018-09-11 PROCEDURE — 99214 OFFICE O/P EST MOD 30 MIN: CPT | Performed by: NURSE PRACTITIONER

## 2018-09-11 PROCEDURE — G0008 ADMIN INFLUENZA VIRUS VAC: HCPCS | Performed by: NURSE PRACTITIONER

## 2018-09-11 PROCEDURE — 2022F DILAT RTA XM EVC RTNOPTHY: CPT | Performed by: NURSE PRACTITIONER

## 2018-09-11 PROCEDURE — 3044F HG A1C LEVEL LT 7.0%: CPT | Performed by: NURSE PRACTITIONER

## 2018-09-11 PROCEDURE — G8427 DOCREV CUR MEDS BY ELIG CLIN: HCPCS | Performed by: NURSE PRACTITIONER

## 2018-09-11 PROCEDURE — G0439 PPPS, SUBSEQ VISIT: HCPCS | Performed by: NURSE PRACTITIONER

## 2018-09-11 PROCEDURE — G8598 ASA/ANTIPLAT THER USED: HCPCS | Performed by: NURSE PRACTITIONER

## 2018-09-11 PROCEDURE — 3017F COLORECTAL CA SCREEN DOC REV: CPT | Performed by: NURSE PRACTITIONER

## 2018-09-11 PROCEDURE — G8417 CALC BMI ABV UP PARAM F/U: HCPCS | Performed by: NURSE PRACTITIONER

## 2018-09-11 PROCEDURE — 90686 IIV4 VACC NO PRSV 0.5 ML IM: CPT | Performed by: NURSE PRACTITIONER

## 2018-09-11 PROCEDURE — 1036F TOBACCO NON-USER: CPT | Performed by: NURSE PRACTITIONER

## 2018-09-11 RX ORDER — FUROSEMIDE 40 MG/1
40 TABLET ORAL DAILY
COMMUNITY
End: 2019-10-03 | Stop reason: SDUPTHER

## 2018-09-11 RX ORDER — VALSARTAN 80 MG/1
80 TABLET ORAL NIGHTLY
Qty: 30 TABLET | Refills: 11 | Status: SHIPPED | OUTPATIENT
Start: 2018-09-11 | End: 2019-09-06 | Stop reason: SDUPTHER

## 2018-09-11 ASSESSMENT — ENCOUNTER SYMPTOMS
EYE REDNESS: 0
SHORTNESS OF BREATH: 0
DIARRHEA: 0
CONSTIPATION: 0
NAUSEA: 0
ABDOMINAL PAIN: 0
VOMITING: 0
COUGH: 0
RHINORRHEA: 0
SORE THROAT: 0
BACK PAIN: 1

## 2018-09-11 ASSESSMENT — PATIENT HEALTH QUESTIONNAIRE - PHQ9
SUM OF ALL RESPONSES TO PHQ QUESTIONS 1-9: 0
SUM OF ALL RESPONSES TO PHQ QUESTIONS 1-9: 0

## 2018-09-11 ASSESSMENT — LIFESTYLE VARIABLES: HOW OFTEN DO YOU HAVE A DRINK CONTAINING ALCOHOL: 0

## 2018-09-11 NOTE — PROGRESS NOTES
capsule by mouth every morning (before breakfast)  Patient taking differently: Take 290 mcg by mouth every other day  Yes LAZARO Barrera DO   aspirin 325 MG EC tablet Take 1 tablet by mouth 2 times daily Yes Nadja Edgar MD   valsartan (DIOVAN) 80 MG tablet Take 1 tablet by mouth nightly Yes Nadja Edgar MD   pantoprazole (PROTONIX) 40 MG tablet TAKE ONE TABLET BY MOUTH ONCE DAILY Yes TRESSA Barrow   QUEtiapine (SEROQUEL) 200 MG tablet Take 1 tablet by mouth nightly Yes TRESSA Barrow   LYRICA 150 MG capsule TAKE ONE CAPSULE BY MOUTH TWICE DAILY Yes TRESSA Barrow   amitriptyline (ELAVIL) 150 MG tablet Take 1 tablet by mouth nightly Yes TRESSA Barrow   atorvastatin (LIPITOR) 40 MG tablet TAKE ONE TABLET BY MOUTH ONCE DAILY Yes TRESSA Barrow   fluticasone (FLONASE) 50 MCG/ACT nasal spray USE TWO SPRAY(S) IN EACH NOSTRIL ONCE DAILY Yes TRESSA Barrow   insulin glargine (LANTUS SOLOSTAR) 100 UNIT/ML injection pen Inject 22 Units into the skin nightly  Patient taking differently: Inject 14 Units into the skin nightly  Yes TRESSA Barrow   levothyroxine (SYNTHROID) 88 MCG tablet TAKE ONE TABLET BY MOUTH ONCE DAILY Yes TRESSA Barrow   DULoxetine (CYMBALTA) 60 MG extended release capsule Take 1 capsule by mouth 2 times daily  Patient taking differently: Take 60 mg by mouth daily  Yes TRESSA Barrow   metoprolol succinate (TOPROL XL) 50 MG extended release tablet Take 1 tablet by mouth daily Yes LAZARO Barrera DO     Past Medical History:   Diagnosis Date    Anxiety     Arthritis     [facility] [address]     CAD (coronary artery disease)     [facility]est[facility] failure[facility]     Chronic kidney disease (CKD) stage G3b/A2, moderately decreased glomerular filtration rate (GFR) between 30-44 mL/min/1.73 square meter and albuminuria creatinine ratio between  mg/g 11/21/2016    Depression     DM (diabetes mellitus) ([facility] [address]     GERD (gastroesophageal reflux disease)     History of blood transfusion     History of suicidal ideation     Hyperlipidemia     Hypertension     Hypothyroidism     Insomnia     Kidney disease     stage 3 failure    MI, old 2005    Obesity     Polyarticular arthritis     Type II or unspecified type diabetes mellitus without mention of complication, not stated as uncontrolled      Past Surgical History:   Procedure Laterality Date    ABDOMINOPLASTY      ANGIOPLASTY  05    stent placement to LAD and diagonal with normal left vent function    BREAST REDUCTION SURGERY      CARDIAC CATHETERIZATION  05, 05    see report  Stents x3    CARDIAC CATHETERIZATION  3/23/15  JDT    EF 50%    CARPAL TUNNEL RELEASE       SECTION      x2    CHOLECYSTECTOMY      GASTRIC BYPASS SURGERY      HERNIA REPAIR      umbilical with mesh    INTRACAPSULAR CATARACT EXTRACTION Left 2016    LT EYE CATARACT EMULSIFICATION IOL IMPLANT performed by Sakshi Chaney MD at [address] [place] [address] Right 2018    ORIF RIGHT BIMALLEOLAR ANKLE FRACTURE, RIGHT WRIST CAST REMOVAL performed by Emil Meraz MD at [address] [place] [address] Right 2018    ANKLE OPEN REDUCTION INTERNAL FIXATION performed by Emil Meraz MD at [place] [address] Left 2017    KNEE TOTAL ARTHROPLASTY performed by Eduar Butts DO at [facility] OR     Family History   Problem Relation Age of Onset    Depression Mother     Heart Attack Mother     High Blood Pressure Mother     Kidney Disease Father     Alcohol Abuse Father     Depression Father     Heart Attack Father     High Blood Pressure Father     Kidney Disease Brother     Heart Disease Other     Depression Sister        CareTeam (Including outside providers/suppliers regularly involved in providing care):   Patient Care Team:  Nevaeh Byrd Jong Harrell as PCP - General (Pediatrics)  Trudi Yang MD as PCP - Orthopaedics (Neurosurgery)  TRESSA Bellamy as PCP - S Attributed Provider  Harvey Garcia MD (Cardiology)  Aly Beltrán MD as Consulting Physician (Nephrology)  Angel Dueñas RN as Care Coordinator  Hannah Flores (Optometry)  Carolynn Garnica (Clinical Psychologist)    Wt Readings from Last 3 Encounters:   09/11/18 220 lb 6 oz (100 kg)   08/09/18 222 lb 8 oz (100.9 kg)   06/23/18 240 lb (108.9 kg)     Vitals:    09/11/18 1023   BP: 114/71   Site: Right Upper Arm   Position: Sitting   Cuff Size: Large Adult   Pulse: 84   Temp: 97.6 °F (36.4 °C)   TempSrc: Temporal   SpO2: 98%   Weight: 220 lb 6 oz (100 kg)   Height: 5' 0.63\" (1.54 m)     Body mass index is 42.15 kg/m². Patient's complete Health Risk Assessment and screening values have been reviewed and are found in Flowsheets. The following problems were reviewed today and where indicated follow up appointments were made and/or referrals ordered.     Positive Risk Factor Screenings with Interventions:     Fall Risk:  Timed Up and Go Test > 12 seconds?: no  2 or more falls in past year?: (!) yes  Fall with injury in past year?: (!) yes  Fall Risk Assessment[de-identified] (!) Positive Screening for Falls  Fall Risk Interventions:    · Home safety tips provided  · Home exercises provided to promote strength and balance    General Health:  General  In general, how would you say your health is?: (!) Poor  In the past 7 days, have you experienced any of the following?: (!) New or Increased Pain, New or Increased Fatigue, Social Isolation, Stress  Do you get the social and emotional support that you need?: Yes  Do you have a Living Will?: Yes  General Health Risk Interventions:  · Pain issues: pain management referral ordered    Health Habits/Nutrition:  Health Habits/Nutrition  Do you exercise for at least 20 minutes 2-3 times per week?: (!) No  Have you lost any weight without trying in 08/09/2019    Pneumococcal highest risk (3 of 3 - PPSV23) 10/15/2020    HIV screen  Completed     Recommendations for Preventive Services Due: see orders and patient instructions/AVS.  .   Recommended screening schedule for the next 5-10 years is provided to the patient in written form: see Patient Instructions/AVS.

## 2018-09-11 NOTE — PATIENT INSTRUCTIONS
blood sugar levels. A registered dietitian or diabetes educator can help you plan how much carbohydrate to include in each meal and snack. A guideline for your daily amount of carbohydrate is:  · 45 to 60 grams at each meal. That's about the same as 3 to 4 carbohydrate servings. · 15 to 20 grams at each snack. That's about the same as 1 carbohydrate serving. The Nutrition Facts label on packaged foods tells you how much carbohydrate is in a serving of the food. First, look at the serving size on the food label. Is that the amount you eat in a serving? All of the nutrition information on a food label is based on that serving size. So if you eat more or less than that, you'll need to adjust the other numbers. Total carbohydrate is the next thing you need to look for on the label. If you count carbohydrate servings, one serving of carbohydrate is 15 grams. For foods that don't come with labels, such as fresh fruits and vegetables, you'll need a guide that lists carbohydrate in these foods. Ask your doctor, dietitian, or diabetes educator about books or other nutrition guides you can use. If you take insulin, you need to know how many grams of carbohydrate are in a meal. This lets you know how much rapid-acting insulin to take before you eat. If you use an insulin pump, you get a constant rate of insulin during the day. So the pump must be programmed at meals to give you extra insulin to cover the rise in blood sugar after meals. When you know how much carbohydrate you will eat, you can take the right amount of insulin. Or, if you always use the same amount of insulin, you need to make sure that you eat the same amount of carbohydrate at meals. If you need more help to understand carbohydrate counting and food labels, ask your doctor, dietitian, or diabetes educator. How do you get started with meal planning? Here are some tips to get started:  · Plan your meals a week at a time.  Don't forget to include snacks too.  · Use cookbooks or online recipes to plan several main meals. Plan some quick meals for busy nights. You also can double some recipes that freeze well. Then you can save half for other busy nights when you don't have time to cook. · Make sure you have the ingredients you need for your recipes. If you're running low on basic items, put these items on your shopping list too. · List foods that you use to make breakfasts, lunches, and snacks. List plenty of fruits and vegetables. · Post this list on the refrigerator. Add to it as you think of more things you need. · Take the list to the store to do your weekly shopping. Follow-up care is a key part of your treatment and safety. Be sure to make and go to all appointments, and call your doctor if you are having problems. It's also a good idea to know your test results and keep a list of the medicines you take. Where can you learn more? Go to https://PropagenixpeReqSpot.com.Translimit. org and sign in to your [employer] account. Enter N943 in the PAYMEY box to learn more about \"Learning About Meal Planning for Diabetes. \"     If you do not have an account, please click on the \"Sign Up Now\" link. Current as of: December 7, 2017  Content Version: 11.7  © 1001-8045 [employer]. Care instructions adapted under license by [facility] ([facility]). If you have questions about a medical condition or this instruction, always ask your healthcare professional. [place] [address] any warranty or liability for your use of this information. Personalized Preventive Plan for Pramod Escalante - 9/11/2018  Medicare offers a range of preventive health benefits. Some of the tests and screenings are paid in full while other may be subject to a deductible, co-insurance, and/or copay.     Some of these benefits include a comprehensive review of your medical history including lifestyle, illnesses that may run in your family, and various assessments

## 2018-09-11 NOTE — PROGRESS NOTES
Rachell [address]  [place], [address]  Phone [phone]   Fax [phone]      OFFICE VISIT: 2018    Scott Dacostaman- : 1960    Chief Complaint:  Louis Mcdonald is a 63 y.o. female who is here for Medicare AWV (Patient presents for a one month follow up for her Medicare Annual Wellness Visit and her diabetes.); Diabetes (Patient brought a log of her blood sugars. ); and Health Maintenance (Patient would like a flu shot.)       HPI  The patient presents today for a Medicare Annual Wellness exam with the following complaints. RIGHT ANKLE PAIN:  She fell and was followed by the [address] [place] [address]. She had surgical intervention. Then she was discharged home & fell again at home trying to get to the bedside commode. She had a second surgical intervention and spent 17 days in the hospital.  Reports continued right ankle pain. Pain is worse with walking. Pain is worse if her foot is down for an extended period of time. BACK PAIN:  Reports chronic low back pain. She previously followed with pain mgt. She would like to consult with pain mgt again. She is currently taking Tylenol without control of her pain. BP:  Log reviewed. 115/80, 109/73, 129/88, 130/76, 108/61, 128/82, 140/76  She is taking Valsartan 80 mg daily and Toprol XL 50 daily. DM:  She is taking Lantus 14 units nightly. She is taking Novolog with her meals. She uses sliding scale. 134, 143, 118 (she is not checking blood sugar before bed)  178, 149, 97  131, 88, 124  254, 170, 166  134, 117, 148  Due a diabetic foot exam  She had an eye exam this year at [facility] in [place]. height is 5' 0.63\" (1.54 m) and weight is 220 lb 6 oz (100 kg). Her temporal temperature is 97.6 °F (36.4 °C). Her blood pressure is 114/71 and her pulse is 84. Her oxygen saturation is 98%. Body mass index is 42.15 kg/m².     Results for orders placed or performed in visit on 18   POCT glycosylated hemoglobin (Hb A1C)   Result Value Ref Range    Hemoglobin A1C 06/13/2018 AccuChek     POC Glucose 06/13/2018 257*    Performed on 06/13/2018 AccuChek     POC Glucose 06/13/2018 232*    Performed on 06/13/2018 AccuChek     Sodium 06/13/2018 140     Potassium 06/13/2018 4.2     Chloride 06/13/2018 103     CO2 06/13/2018 23     Anion Gap 06/13/2018 14     Glucose 06/13/2018 232*    BUN 06/13/2018 24*    CREATININE 06/13/2018 1.5*    GFR Non- 06/13/2018 36*    Calcium 06/13/2018 7.9*    Sodium 06/14/2018 140     Potassium 06/14/2018 4.5     Chloride 06/14/2018 104     CO2 06/14/2018 23     Anion Gap 06/14/2018 13     Glucose 06/14/2018 243*    BUN 06/14/2018 21*    CREATININE 06/14/2018 1.4*    GFR Non- 06/14/2018 39*    Calcium 06/14/2018 7.9*    POC Glucose 06/13/2018 264*    Performed on 06/13/2018 AccuChek     POC Glucose 06/14/2018 186*    Performed on 06/14/2018 AccuChek     POC Glucose 06/14/2018 195*    Performed on 06/14/2018 AccuChek    Admission on 06/11/2018, Discharged on 06/11/2018   Component Date Value    Sodium 06/11/2018 133*    Potassium 06/11/2018 5.8*    Chloride 06/11/2018 97*    CO2 06/11/2018 24     Anion Gap 06/11/2018 12     Glucose 06/11/2018 196*    BUN 06/11/2018 35*    CREATININE 06/11/2018 1.6*    GFR Non- 06/11/2018 33*    Calcium 06/11/2018 8.6    Orders Only on 06/05/2018   Component Date Value    Sodium 06/05/2018 135*    Potassium 06/05/2018 5.4*    Chloride 06/05/2018 96*    CO2 06/05/2018 24     Anion Gap 06/05/2018 15     Glucose 06/05/2018 128*    BUN 06/05/2018 30*    CREATININE 06/05/2018 1.7*    GFR Non- 06/05/2018 31*    Calcium 06/05/2018 9.3     Total Protein 06/05/2018 8.0     Alb 06/05/2018 4.3     Total Bilirubin 06/05/2018 0.4     Alkaline Phosphatase 06/05/2018 160*    ALT 06/05/2018 23     AST 06/05/2018 27     WBC 06/05/2018 10.1     RBC 06/05/2018 4.49     Hemoglobin 06/05/2018 13.7     Hematocrit 06/05/2018 43.0     MCV 06/05/2018 95.8     MCH 06/05/2018 30.5     MCHC 06/05/2018 31.9*    RDW 06/05/2018 13.7     Platelets 30/68/5005 334     MPV 06/05/2018 11.6     Neutrophils % 06/05/2018 67.9*    Lymphocytes % 06/05/2018 21.2     Monocytes % 06/05/2018 7.6     Eosinophils % 06/05/2018 2.2     Basophils % 06/05/2018 0.7     Neutrophils # 06/05/2018 6.9     Lymphocytes # 06/05/2018 2.1     Monocytes # 06/05/2018 0.80     Eosinophils # 06/05/2018 0.20     Basophils # 06/05/2018 0.10     T4 Free 06/05/2018 1.0     TSH 06/05/2018 1.630     Color, UA 06/05/2018 YELLOW     Clarity, UA 06/05/2018 Clear     Glucose, Ur 06/05/2018 Negative     Bilirubin Urine 06/05/2018 Negative     Ketones, Urine 06/05/2018 Negative     Specific Gravity, UA 06/05/2018 1.011     Blood, Urine 06/05/2018 Negative     pH, UA 06/05/2018 5.5     Protein, UA 06/05/2018 Negative     Urobilinogen, Urine 06/05/2018 0.2     Nitrite, Urine 06/05/2018 Negative     Leukocyte Esterase, Urine 06/05/2018 Negative    Admission on 05/22/2018, Discharged on 05/22/2018   Component Date Value    Sodium 05/22/2018 137     Potassium 05/22/2018 4.5     Chloride 05/22/2018 98     CO2 05/22/2018 26     Anion Gap 05/22/2018 13     Glucose 05/22/2018 107     BUN 05/22/2018 25*    CREATININE 05/22/2018 1.7*    GFR Non- 05/22/2018 31*    Calcium 05/22/2018 8.9     P-R Interval 05/22/2018 182     QRS Duration 05/22/2018 86     Q-T Interval 05/22/2018 394     QTc Calculation (Bazett) 05/22/2018 416     P Axis 05/22/2018 34     T Axis 05/22/2018 32    Orders Only on 05/22/2018   Component Date Value    Sodium 05/22/2018 139     Potassium 05/22/2018 6.0*    Chloride 05/22/2018 102     CO2 05/22/2018 25     Anion Gap 05/22/2018 12     Glucose 05/22/2018 105     BUN 05/22/2018 26*    CREATININE 05/22/2018 1.6*    GFR Non- 05/22/2018 33*    Calcium 05/22/2018 9.5     Total Protein [facility]    KNEE TOTAL ARTHROPLASTY performed by Jamilah Chiang DO at [place] [address] History   Substance Use Topics    Smoking status: Never Smoker    Smokeless tobacco: Former User     Types: Snuff    Alcohol use No       Review of Systems   Constitutional: Negative for chills, fatigue and fever. HENT: Negative for congestion, ear pain, rhinorrhea and sore throat. Eyes: Negative for redness. Respiratory: Negative for cough and shortness of breath. Cardiovascular: Negative for chest pain. Gastrointestinal: Negative for abdominal pain, constipation, diarrhea, nausea and vomiting. Genitourinary: Negative for dysuria, frequency and urgency. Musculoskeletal: Positive for arthralgias (ankle, knee), back pain, gait problem (due to ankle pain) and neck pain. Skin: Negative for rash. Neurological: Negative for dizziness and headaches. Psychiatric/Behavioral: Positive for sleep disturbance (awakens due to ankle pain). Physical Exam   Constitutional: She appears well-developed. Overweight   HENT:   Head: Normocephalic. Right Ear: Tympanic membrane and external ear normal.   Left Ear: Tympanic membrane and external ear normal.   Nose: Nose normal.   Mouth/Throat: Oropharynx is clear and moist. Abnormal dentition. Eyes: Right eye exhibits no discharge. Left eye exhibits no discharge. Neck: Normal range of motion. Carotid bruit is not present. Cardiovascular: Normal rate and regular rhythm. Pulmonary/Chest: Effort normal and breath sounds normal. No respiratory distress. She has no wheezes. She has no rales. Abdominal: Soft. Bowel sounds are normal. She exhibits no distension. There is no tenderness. Musculoskeletal:        Right ankle: She exhibits decreased range of motion and swelling (mild). Tenderness (continued, ankle incision is well healed).         Lumbar back: She exhibits tenderness and pain. Neurological: She is alert. Skin: Skin is dry. No rash noted. Psychiatric: She has a normal mood and affect. Her behavior is normal. Judgment and thought content normal.   Vitals reviewed. Diabetic Foot Exam:  Visual inspection:  Deformity/amputation: absent  Skin lesions/pre-ulcerative calluses: absent  Edema: right- trace, left- negative    Sensory exam:  Monofilament sensation: normal  (minimum of 5 random plantar locations tested, avoiding callused areas - > 1 area with absence of sensation is + for neuropathy)                  ASSESSMENT      ICD-10-CM ICD-9-CM    1. Routine general medical examination at a health care facility Z00.00 V70.0    2. Chronic pain of right ankle M25.571 719.47 External Referral To Pain Clinic    G89.29 338.29    3. Type 2 diabetes mellitus with diabetic polyneuropathy, with long-term current use of insulin ([facility]) E11.42 250.60 CBC Auto Differential    Z79.4 357.2 Comprehensive Metabolic Panel     O44.57 Hemoglobin A1C      Lipid Panel      T4, Free      TSH without Reflex      Microalbumin / Creatinine Urine Ratio      Diabetic Foot Exam  Increase Lantus to 16 units nightly  Check blood sugar 4x daily (currently not checking before bed)   4. Essential hypertension I10 401.9 CBC Auto Differential      Comprehensive Metabolic Panel      Hemoglobin A1C      Lipid Panel      T4, Free      Microalbumin / Creatinine Urine Ratio      valsartan (DIOVAN) 80 MG tablet   5. Anxiety F41.9 300.00    6. Primary osteoarthritis of left knee M17.12 715.16 External Referral To Pain Clinic   7. Gait abnormality R26.9 781.2 External Referral To Pain Clinic   8. Ankle fracture, right, open type I or II, subsequent encounter S82.891B 824.9 External Referral To Pain Clinic   9. Lumbar back pain M54.5 724.2 External Referral To Pain Clinic   10.  Hearing loss, unspecified hearing loss type, unspecified laterality H91.90 389.9 Joana AVILES - Nessa Cullen MD 11. Need for prophylactic vaccination and inoculation against varicella Z23 V05.4 zoster recombinant adjuvanted vaccine (SHINGRIX) 50 MCG SUSR injection   12. Other problems related to lifestyle Z72.89 V69.8 Hepatitis C Antibody   13. Need for influenza vaccination Z23 V04.81 INFLUENZA, QUADV, 3 YRS AND OLDER, IM, PF, PREFILL SYR OR SDV, 0.5ML (FLUZONE QUADV, PF)         PLAN    Orders Placed This Encounter   Procedures    INFLUENZA, QUADV, 3 YRS AND OLDER, IM, PF, PREFILL SYR OR SDV, 0.5ML (FLUZONE QUADV, PF)    CBC Auto Differential    Comprehensive Metabolic Panel    Hemoglobin A1C    Lipid Panel    T4, Free    TSH without Reflex    Microalbumin / Creatinine Urine Ratio    Hepatitis C Antibody    External Referral To [address] [place] [address],[address] ENT - Monique Cerda MD    Diabetic Foot Exam        Return in about 3 months (around 12/11/2018), or if symptoms worsen or fail to improve, for Medicare Annual Wellness Visit in 1 year, Diabetic follow-up. Patient Instructions     Patient Education        Learning About Diabetes Food Guidelines  Your Care Instructions    Meal planning is important to manage diabetes. It helps keep your blood sugar at a target level (which you set with your doctor). You don't have to eat special foods. You can eat what your family eats, including sweets once in a while. But you do have to pay attention to how often you eat and how much you eat of certain foods. You may want to work with a dietitian or a certified diabetes educator (CDE) to help you plan meals and snacks. A dietitian or CDE can also help you lose weight if that is one of your goals. What should you know about eating carbs? Managing the amount of carbohydrate (carbs) you eat is an important part of healthy meals when you have diabetes. Carbohydrate is found in many foods. · Learn which foods have carbs. And learn the amounts of carbs in different foods.   ¨ Bread, cereal, pasta, and rice have about 15 grams of carbs in a serving. A serving is 1 slice of bread (1 ounce), ½ cup of cooked cereal, or 1/3 cup of cooked pasta or rice. ¨ Fruits have 15 grams of carbs in a serving. A serving is 1 small fresh fruit, such as an apple or orange; ½ of a banana; ½ cup of cooked or canned fruit; ½ cup of fruit juice; 1 cup of melon or raspberries; or 2 tablespoons of dried fruit. ¨ Milk and no-sugar-added yogurt have 15 grams of carbs in a serving. A serving is 1 cup of milk or 2/3 cup of no-sugar-added yogurt. ¨ Starchy vegetables have 15 grams of carbs in a serving. A serving is ½ cup of mashed potatoes or sweet potato; 1 cup winter squash; ½ of a small baked potato; ½ cup of cooked beans; or ½ cup cooked corn or green peas. · Learn how much carbs to eat each day and at each meal. A dietitian or CDE can teach you how to keep track of the amount of carbs you eat. This is called carbohydrate counting. · If you are not sure how to count carbohydrate grams, use the Plate Method to plan meals. It is a good, quick way to make sure that you have a balanced meal. It also helps you spread carbs throughout the day. ¨ Divide your plate by types of foods. Put non-starchy vegetables on half the plate, meat or other protein food on one-quarter of the plate, and a grain or starchy vegetable in the final quarter of the plate. To this you can add a small piece of fruit and 1 cup of milk or yogurt, depending on how many carbs you are supposed to eat at a meal.  · Try to eat about the same amount of carbs at each meal. Do not \"save up\" your daily allowance of carbs to eat at one meal.  · Proteins have very little or no carbs per serving. Examples of proteins are beef, chicken, turkey, fish, eggs, tofu, cheese, cottage cheese, and peanut butter. A serving size of meat is 3 ounces, which is about the size of a deck of cards.  Examples of meat substitute serving sizes (equal to 1 ounce of meat) are 1/4 cup of cottage cheese, 1 egg, 1 tablespoon of tips for using the plate format:  · Make sure that you are not using an oversized plate. A 9-inch plate is best. Many restaurants use larger plates. · Get used to using the plate format at home. Then you can use it when you eat out. · Write down your questions about using the plate format. Talk to your doctor, a dietitian, or a diabetes educator about your concerns. Carbohydrate counting  With carbohydrate counting, you plan meals based on the amount of carbohydrate in each food. Carbohydrate raises blood sugar higher and more quickly than any other nutrient. It is found in desserts, breads and cereals, and fruit. It's also found in starchy vegetables such as potatoes and corn, grains such as rice and pasta, and milk and yogurt. Spreading carbohydrate throughout the day helps keep your blood sugar levels within your target range. Your daily amount depends on several things, including your weight, how active you are, which diabetes medicines you take, and what your goals are for your blood sugar levels. A registered dietitian or diabetes educator can help you plan how much carbohydrate to include in each meal and snack. A guideline for your daily amount of carbohydrate is:  · 45 to 60 grams at each meal. That's about the same as 3 to 4 carbohydrate servings. · 15 to 20 grams at each snack. That's about the same as 1 carbohydrate serving. The Nutrition Facts label on packaged foods tells you how much carbohydrate is in a serving of the food. First, look at the serving size on the food label. Is that the amount you eat in a serving? All of the nutrition information on a food label is based on that serving size. So if you eat more or less than that, you'll need to adjust the other numbers. Total carbohydrate is the next thing you need to look for on the label. If you count carbohydrate servings, one serving of carbohydrate is 15 grams.   For foods that don't come with labels, such as fresh fruits and vegetables,

## 2018-10-16 ENCOUNTER — CARE COORDINATION (OUTPATIENT)
Dept: CARE COORDINATION | Age: 58
End: 2018-10-16

## 2018-10-16 NOTE — CARE COORDINATION
TRESSA Hardwick   ondansetron (ZOFRAN) 4 MG tablet Take 1 tablet by mouth daily as needed for Nausea or Vomiting 9/7/18   LAZARO Jacobs DO   NOVOLOG FLEXPEN 100 UNIT/ML injection pen INJECT 20 UNITS SUB-Q THREE TIMES DAILY WITH MEALS BASED UPON RATIO AND CORRECTIVE FACTOR. AVERAGE 60 UNITS DAILY. 9/4/18   TRESSA Zapata   tiZANidine (ZANAFLEX) 4 MG tablet TAKE 1 TABLET BY MOUTH EVERY 8 HOURS AS NEEDED FOR MUSCLE SPASMS 7/25/18   TRESSA Zapata   Blood Glucose Monitoring Suppl (FREESTYLE LITE) JEZ 1 Device by Does not apply route 4 times daily 7/5/18   TRESSA Copeland   blood glucose test strips (FREESTYLE LITE) strip 1 each by In Vitro route 4 times daily As needed.  7/5/18   TRESSA Copeland   linaclotide Ginger [place]) 290 MCG CAPS capsule Take 1 capsule by mouth every morning (before breakfast)  Patient taking differently: Take 290 mcg by mouth every other day  7/3/18   LAZARO Jacobs DO   aspirin 325 MG EC tablet Take 1 tablet by mouth 2 times daily 6/28/18   Fermín Vásquez MD   pantoprazole (PROTONIX) 40 MG tablet TAKE ONE TABLET BY MOUTH ONCE DAILY 6/12/18   TRESSA Copeland   QUEtiapine (SEROQUEL) 200 MG tablet Take 1 tablet by mouth nightly 6/5/18   TRESSA Copeland   LYRICA 150 MG capsule TAKE ONE CAPSULE BY MOUTH TWICE DAILY 5/8/18 11/4/18  TRESSA Copeland   amitriptyline (ELAVIL) 150 MG tablet Take 1 tablet by mouth nightly 4/5/18   TRESSA Copeland   atorvastatin (LIPITOR) 40 MG tablet TAKE ONE TABLET BY MOUTH ONCE DAILY 4/4/18   TRESSA Copeland   fluticasone (FLONASE) 50 MCG/ACT nasal spray USE TWO SPRAY(S) IN EACH NOSTRIL ONCE DAILY 11/3/17   TRESSA Copeland   insulin glargine (LANTUS SOLOSTAR) 100 UNIT/ML injection pen Inject 22 Units into the skin nightly  Patient taking differently: Inject 14 Units into the skin nightly  9/14/17   TRESSA Copeland   levothyroxine (SYNTHROID) 88 MCG tablet

## 2018-10-18 ENCOUNTER — CARE COORDINATION (OUTPATIENT)
Dept: CARE COORDINATION | Age: 58
End: 2018-10-18

## 2018-10-18 DIAGNOSIS — E03.9 ACQUIRED HYPOTHYROIDISM: ICD-10-CM

## 2018-10-18 RX ORDER — LEVOTHYROXINE SODIUM 88 UG/1
88 TABLET ORAL DAILY
Qty: 90 TABLET | Refills: 3 | Status: SHIPPED | OUTPATIENT
Start: 2018-10-18 | End: 2019-12-26

## 2018-10-18 NOTE — CARE COORDINATION
Ambulatory Care Coordination Note  10/18/2018  CM Risk Score: 14  Hector Mortality Risk Score:      ACC: Renetta Child RN    Summary Note: Shawn Jeronimo stated her foot really bothers her when ambulating. She tries to prop it up when she is sitting down as it will start to throb. She stated she has an appt w external pain mgmt provider tomorrow. Shawn Jeronimo said that her BS has been high but she could not find her log to review today. Pt said her BP has been normal, then said around 130/80. Shawn Jeronimo is due for labs and said she will come in tomorrow to do that. ACC encouraged pt to bring her vital sign log for review. She voiced understanding. Care Coordination Interventions    Program Enrollment:  Complex Care  Referral from Primary Care Provider:  No  Suggested Interventions and Community Resources  Specialty Services Referral:  Completed (Comment: 10/18/18: Pt reported she has an appt w external pain mgmt clinic tomorrow.)  Zone Management Tools: In Process (Comment: 10/18/18: Pt could not find her log to review numbers today. Urged pt to bring this when she comes in for her labs tomorrow.)         Goals Addressed             Most Recent     Self Monitoring   On track (10/18/2018)             Blood Pressure - I will take my blood pressure as directed - Daily and Other: If sx of elev BP - headache, vision changes, fatigue. I will notify my provider of any trends of increasing or decreasing blood pressures over a month period of time. I will notify my provider of any changes in blood pressure associated with symptoms of dizziness, falls, passing out, headache, confusion/change in mental status.     Blood Glucose Tracker 9/10/2018 9/9/2018 9/8/2018 9/7/2018 9/6/2018   Before breakfast blood glucose 131 109 160 127 151   Before lunch blood glucose 166 132 112 149 133   Before dinner blood glucose 93 209 128 110 116           Barriers: none  Plan for overcoming my barriers: N/A  Confidence: 10/10  Anticipated Goal Completion Date: 6/4/18              Prior to Admission medications    Medication Sig Start Date End Date Taking? Authorizing Provider   levothyroxine (SYNTHROID) 88 MCG tablet Take 1 tablet by mouth Daily 10/18/18  Yes LAZARO Lema DO   furosemide (LASIX) 40 MG tablet Take 40 mg by mouth 2 times daily Patient takes 40 mg every other day and take 20 mg on the days in between. Yes Historical Provider, MD   valsartan (DIOVAN) 80 MG tablet Take 1 tablet by mouth nightly 9/11/18  Yes TRESSA Hardwick   ondansetron (ZOFRAN) 4 MG tablet Take 1 tablet by mouth daily as needed for Nausea or Vomiting 9/7/18  Yes LAZARO Lema DO   NOVOLOG FLEXPEN 100 UNIT/ML injection pen INJECT 20 UNITS SUB-Q THREE TIMES DAILY WITH MEALS BASED UPON RATIO AND CORRECTIVE FACTOR. AVERAGE 60 UNITS DAILY. 9/4/18  Yes TRESSA Moore   tiZANidine (ZANAFLEX) 4 MG tablet TAKE 1 TABLET BY MOUTH EVERY 8 HOURS AS NEEDED FOR MUSCLE SPASMS 7/25/18  Yes TRESSA Moore   Blood Glucose Monitoring Suppl (FREESTYLE LITE) JEZ 1 Device by Does not apply route 4 times daily 7/5/18  Yes TRESSA Henderson   blood glucose test strips (FREESTYLE LITE) strip 1 each by In Vitro route 4 times daily As needed.  7/5/18  Yes TRESSA Henderson   linaclotide Josephus Cathy) 290 MCG CAPS capsule Take 1 capsule by mouth every morning (before breakfast)  Patient taking differently: Take 290 mcg by mouth every other day  7/3/18  Yes LAZARO Lema DO   aspirin 325 MG EC tablet Take 1 tablet by mouth 2 times daily 6/28/18  Yes Alva Sylvester MD   pantoprazole (PROTONIX) 40 MG tablet TAKE ONE TABLET BY MOUTH ONCE DAILY 6/12/18  Yes TRESSA Henderson   QUEtiapine (SEROQUEL) 200 MG tablet Take 1 tablet by mouth nightly 6/5/18  Yes TRESSA Henderson   LYRICA 150 MG capsule TAKE ONE CAPSULE BY MOUTH TWICE DAILY 5/8/18 11/4/18 Yes TRESSA Henderson   amitriptyline (ELAVIL) 150 MG tablet Take 1 tablet by mouth

## 2018-10-25 ENCOUNTER — CARE COORDINATION (OUTPATIENT)
Dept: CARE COORDINATION | Age: 58
End: 2018-10-25

## 2018-11-01 ENCOUNTER — CARE COORDINATION (OUTPATIENT)
Dept: CARE COORDINATION | Age: 58
End: 2018-11-01

## 2018-11-05 ENCOUNTER — HOSPITAL ENCOUNTER (OUTPATIENT)
Dept: MRI IMAGING | Age: 58
Discharge: HOME | End: 2018-11-05
Payer: MEDICARE

## 2018-11-05 DIAGNOSIS — M47.816 SPONDYLOSIS WITHOUT MYELOPATHY OR RADICULOPATHY, LUMBAR REGION: ICD-10-CM

## 2018-11-05 DIAGNOSIS — M51.36 OTHER INTERVERTEBRAL DISC DEGENERATION, LUMBAR REGION: ICD-10-CM

## 2018-11-05 DIAGNOSIS — M47.817 SPONDYLOSIS WITHOUT MYELOPATHY OR RADICULOPATHY, LUMBOSACRAL REGION: ICD-10-CM

## 2018-11-05 DIAGNOSIS — M45.8 ANKYLOSING SPONDYLITIS SACRAL AND SACROCOCCYGEAL REGION (HCC): ICD-10-CM

## 2018-11-05 PROCEDURE — 6360000004 HC RX CONTRAST MEDICATION: Performed by: PAIN MEDICINE

## 2018-11-05 PROCEDURE — A9577 INJ MULTIHANCE: HCPCS | Performed by: PAIN MEDICINE

## 2018-11-05 PROCEDURE — 72158 MRI LUMBAR SPINE W/O & W/DYE: CPT

## 2018-11-05 RX ADMIN — GADOBENATE DIMEGLUMINE 20 ML: 529 INJECTION, SOLUTION INTRAVENOUS at 16:44

## 2018-11-19 DIAGNOSIS — F33.41 RECURRENT MAJOR DEPRESSIVE DISORDER, IN PARTIAL REMISSION (HCC): ICD-10-CM

## 2018-11-19 DIAGNOSIS — Z79.4 TYPE 2 DIABETES MELLITUS WITH DIABETIC POLYNEUROPATHY, WITH LONG-TERM CURRENT USE OF INSULIN (HCC): ICD-10-CM

## 2018-11-19 DIAGNOSIS — E11.42 TYPE 2 DIABETES MELLITUS WITH DIABETIC POLYNEUROPATHY, WITH LONG-TERM CURRENT USE OF INSULIN (HCC): ICD-10-CM

## 2018-11-20 RX ORDER — QUETIAPINE FUMARATE 200 MG/1
200 TABLET, FILM COATED ORAL NIGHTLY
Qty: 30 TABLET | Refills: 5 | Status: SHIPPED | OUTPATIENT
Start: 2018-11-20 | End: 2019-05-01 | Stop reason: SDUPTHER

## 2018-11-20 RX ORDER — DULOXETIN HYDROCHLORIDE 60 MG/1
60 CAPSULE, DELAYED RELEASE ORAL 2 TIMES DAILY
Qty: 60 CAPSULE | Refills: 11 | Status: SHIPPED | OUTPATIENT
Start: 2018-11-20 | End: 2020-03-11 | Stop reason: SDUPTHER

## 2018-11-21 DIAGNOSIS — M62.838 MUSCLE SPASM: ICD-10-CM

## 2018-11-21 RX ORDER — TIZANIDINE 4 MG/1
4 TABLET ORAL EVERY 8 HOURS PRN
Qty: 90 TABLET | Refills: 3 | Status: SHIPPED | OUTPATIENT
Start: 2018-11-21 | End: 2019-03-14 | Stop reason: SDUPTHER

## 2018-11-29 DIAGNOSIS — E11.42 DIABETIC POLYNEUROPATHY ASSOCIATED WITH TYPE 2 DIABETES MELLITUS (HCC): ICD-10-CM

## 2018-11-29 RX ORDER — PREGABALIN 150 MG/1
150 CAPSULE ORAL 2 TIMES DAILY
Qty: 60 CAPSULE | Refills: 2 | Status: SHIPPED | OUTPATIENT
Start: 2018-11-29 | End: 2019-02-19 | Stop reason: SDUPTHER

## 2018-12-06 ENCOUNTER — CARE COORDINATION (OUTPATIENT)
Dept: CARE COORDINATION | Age: 58
End: 2018-12-06

## 2018-12-06 NOTE — TELEPHONE ENCOUNTER
Pt seen 9/11/18 with a follow up on 12/11/18.       Requested Prescriptions     Pending Prescriptions Disp Refills    fluticasone (FLONASE) 50 MCG/ACT nasal spray [Pharmacy Med Name: FLUTICASONE 50MCG   [facility]]  11     Sig: USE TWO SPRAY(S) IN EACH NOSTRIL ONCE DAILY

## 2018-12-07 RX ORDER — FLUTICASONE PROPIONATE 50 MCG
2 SPRAY, SUSPENSION (ML) NASAL DAILY
Qty: 1 BOTTLE | Refills: 11 | Status: SHIPPED | OUTPATIENT
Start: 2018-12-07 | End: 2020-01-31

## 2018-12-11 ENCOUNTER — OFFICE VISIT (OUTPATIENT)
Dept: PRIMARY CARE CLINIC | Age: 58
End: 2018-12-11
Payer: MEDICARE

## 2018-12-11 ENCOUNTER — CARE COORDINATION (OUTPATIENT)
Dept: CARE COORDINATION | Age: 58
End: 2018-12-11

## 2018-12-11 ENCOUNTER — TELEPHONE (OUTPATIENT)
Dept: PRIMARY CARE CLINIC | Age: 58
End: 2018-12-11

## 2018-12-11 VITALS
TEMPERATURE: 97.8 F | HEIGHT: 61 IN | BODY MASS INDEX: 44.6 KG/M2 | OXYGEN SATURATION: 95 % | SYSTOLIC BLOOD PRESSURE: 132 MMHG | DIASTOLIC BLOOD PRESSURE: 88 MMHG | HEART RATE: 81 BPM | WEIGHT: 236.25 LBS

## 2018-12-11 DIAGNOSIS — S82.891S OPEN RIGHT ANKLE FRACTURE, SEQUELA: ICD-10-CM

## 2018-12-11 DIAGNOSIS — F51.05 INSOMNIA DUE TO OTHER MENTAL DISORDER: ICD-10-CM

## 2018-12-11 DIAGNOSIS — E11.42 TYPE 2 DIABETES MELLITUS WITH DIABETIC POLYNEUROPATHY, WITH LONG-TERM CURRENT USE OF INSULIN (HCC): ICD-10-CM

## 2018-12-11 DIAGNOSIS — E11.42 TYPE 2 DIABETES MELLITUS WITH DIABETIC POLYNEUROPATHY, WITH LONG-TERM CURRENT USE OF INSULIN (HCC): Primary | ICD-10-CM

## 2018-12-11 DIAGNOSIS — I10 ESSENTIAL HYPERTENSION: ICD-10-CM

## 2018-12-11 DIAGNOSIS — F99 INSOMNIA DUE TO OTHER MENTAL DISORDER: ICD-10-CM

## 2018-12-11 DIAGNOSIS — F51.02 ADJUSTMENT INSOMNIA: ICD-10-CM

## 2018-12-11 DIAGNOSIS — F41.8 SITUATIONAL ANXIETY: ICD-10-CM

## 2018-12-11 DIAGNOSIS — Z79.4 TYPE 2 DIABETES MELLITUS WITH DIABETIC POLYNEUROPATHY, WITH LONG-TERM CURRENT USE OF INSULIN (HCC): ICD-10-CM

## 2018-12-11 DIAGNOSIS — F31.78 BIPOLAR DISORDER, IN FULL REMISSION, MOST RECENT EPISODE MIXED (HCC): ICD-10-CM

## 2018-12-11 DIAGNOSIS — E55.9 VITAMIN D DEFICIENCY: ICD-10-CM

## 2018-12-11 DIAGNOSIS — E78.2 MIXED HYPERLIPIDEMIA: ICD-10-CM

## 2018-12-11 DIAGNOSIS — M13.0 POLYARTICULAR ARTHRITIS: ICD-10-CM

## 2018-12-11 DIAGNOSIS — Z79.4 TYPE 2 DIABETES MELLITUS WITH DIABETIC POLYNEUROPATHY, WITH LONG-TERM CURRENT USE OF INSULIN (HCC): Primary | ICD-10-CM

## 2018-12-11 DIAGNOSIS — N18.32 CHRONIC KIDNEY DISEASE (CKD) STAGE G3B/A2, MODERATELY DECREASED GLOMERULAR FILTRATION RATE (GFR) BETWEEN 30-44 ML/MIN/1.73 SQUARE METER AND ALBUMINURIA CREATININE RATIO BETWEEN 30-299 MG/G (HCC): ICD-10-CM

## 2018-12-11 DIAGNOSIS — E03.9 ACQUIRED HYPOTHYROIDISM: ICD-10-CM

## 2018-12-11 LAB
ALBUMIN SERPL-MCNC: 4 G/DL (ref 3.5–5.2)
ALP BLD-CCNC: 153 U/L (ref 35–104)
ALT SERPL-CCNC: 19 U/L (ref 5–33)
ANION GAP SERPL CALCULATED.3IONS-SCNC: 21 MMOL/L (ref 7–19)
AST SERPL-CCNC: 30 U/L (ref 5–32)
BASOPHILS ABSOLUTE: 0 K/UL (ref 0–0.2)
BASOPHILS RELATIVE PERCENT: 0.4 % (ref 0–1)
BILIRUB SERPL-MCNC: 0.4 MG/DL (ref 0.2–1.2)
BUN BLDV-MCNC: 33 MG/DL (ref 6–20)
CALCIUM SERPL-MCNC: 9.3 MG/DL (ref 8.6–10)
CHLORIDE BLD-SCNC: 94 MMOL/L (ref 98–111)
CHOLESTEROL, TOTAL: 164 MG/DL (ref 160–199)
CO2: 22 MMOL/L (ref 22–29)
CREAT SERPL-MCNC: 1.7 MG/DL (ref 0.5–0.9)
CREATININE URINE: 16.3 MG/DL (ref 4.2–622)
EOSINOPHILS ABSOLUTE: 0.2 K/UL (ref 0–0.6)
EOSINOPHILS RELATIVE PERCENT: 2.5 % (ref 0–5)
GFR NON-AFRICAN AMERICAN: 31
GLUCOSE BLD-MCNC: 172 MG/DL (ref 74–109)
HBA1C MFR BLD: 6.9 % (ref 4–6)
HCT VFR BLD CALC: 42 % (ref 37–47)
HDLC SERPL-MCNC: 52 MG/DL (ref 65–121)
HEMOGLOBIN: 13.3 G/DL (ref 12–16)
LDL CHOLESTEROL CALCULATED: 63 MG/DL
LYMPHOCYTES ABSOLUTE: 1.5 K/UL (ref 1.1–4.5)
LYMPHOCYTES RELATIVE PERCENT: 16.7 % (ref 20–40)
MCH RBC QN AUTO: 30.2 PG (ref 27–31)
MCHC RBC AUTO-ENTMCNC: 31.7 G/DL (ref 33–37)
MCV RBC AUTO: 95.2 FL (ref 81–99)
MICROALBUMIN UR-MCNC: <1.2 MG/DL (ref 0–19)
MICROALBUMIN/CREAT UR-RTO: NORMAL MG/G
MONOCYTES ABSOLUTE: 0.7 K/UL (ref 0–0.9)
MONOCYTES RELATIVE PERCENT: 7.7 % (ref 0–10)
NEUTROPHILS ABSOLUTE: 6.6 K/UL (ref 1.5–7.5)
NEUTROPHILS RELATIVE PERCENT: 72.4 % (ref 50–65)
PDW BLD-RTO: 14.3 % (ref 11.5–14.5)
PLATELET # BLD: 234 K/UL (ref 130–400)
PMV BLD AUTO: 11.9 FL (ref 9.4–12.3)
POTASSIUM SERPL-SCNC: 4.9 MMOL/L (ref 3.5–5)
RBC # BLD: 4.41 M/UL (ref 4.2–5.4)
SODIUM BLD-SCNC: 137 MMOL/L (ref 136–145)
T4 FREE: 1.1 NG/DL (ref 0.9–1.7)
TOTAL PROTEIN: 7.8 G/DL (ref 6.6–8.7)
TRIGL SERPL-MCNC: 243 MG/DL (ref 0–149)
TSH SERPL DL<=0.05 MIU/L-ACNC: 1.34 UIU/ML (ref 0.27–4.2)
VITAMIN D 25-HYDROXY: 86.8 NG/ML
WBC # BLD: 9.1 K/UL (ref 4.8–10.8)

## 2018-12-11 PROCEDURE — 1036F TOBACCO NON-USER: CPT | Performed by: NURSE PRACTITIONER

## 2018-12-11 PROCEDURE — 99214 OFFICE O/P EST MOD 30 MIN: CPT | Performed by: NURSE PRACTITIONER

## 2018-12-11 PROCEDURE — 3017F COLORECTAL CA SCREEN DOC REV: CPT | Performed by: NURSE PRACTITIONER

## 2018-12-11 PROCEDURE — G8482 FLU IMMUNIZE ORDER/ADMIN: HCPCS | Performed by: NURSE PRACTITIONER

## 2018-12-11 PROCEDURE — G8598 ASA/ANTIPLAT THER USED: HCPCS | Performed by: NURSE PRACTITIONER

## 2018-12-11 PROCEDURE — 3044F HG A1C LEVEL LT 7.0%: CPT | Performed by: NURSE PRACTITIONER

## 2018-12-11 PROCEDURE — 2022F DILAT RTA XM EVC RTNOPTHY: CPT | Performed by: NURSE PRACTITIONER

## 2018-12-11 PROCEDURE — G8427 DOCREV CUR MEDS BY ELIG CLIN: HCPCS | Performed by: NURSE PRACTITIONER

## 2018-12-11 PROCEDURE — G8417 CALC BMI ABV UP PARAM F/U: HCPCS | Performed by: NURSE PRACTITIONER

## 2018-12-11 RX ORDER — AMITRIPTYLINE HYDROCHLORIDE 100 MG/1
200 TABLET, FILM COATED ORAL NIGHTLY
Qty: 60 TABLET | Refills: 5 | Status: SHIPPED | OUTPATIENT
Start: 2018-12-11 | End: 2019-05-01 | Stop reason: SDUPTHER

## 2018-12-11 ASSESSMENT — ENCOUNTER SYMPTOMS
SHORTNESS OF BREATH: 0
COUGH: 0
VOMITING: 0
WHEEZING: 0
NAUSEA: 0
DIARRHEA: 0
CONSTIPATION: 0
ABDOMINAL PAIN: 0
BACK PAIN: 1

## 2018-12-11 NOTE — PROGRESS NOTES
 POC Glucose 06/13/2018 257*    Performed on 06/13/2018 AccuChek     POC Glucose 06/13/2018 232*    Performed on 06/13/2018 AccuChek     Sodium 06/13/2018 140     Potassium 06/13/2018 4.2     Chloride 06/13/2018 103     CO2 06/13/2018 23     Anion Gap 06/13/2018 14     Glucose 06/13/2018 232*    BUN 06/13/2018 24*    CREATININE 06/13/2018 1.5*    GFR Non- 06/13/2018 36*    Calcium 06/13/2018 7.9*    Sodium 06/14/2018 140     Potassium 06/14/2018 4.5     Chloride 06/14/2018 104     CO2 06/14/2018 23     Anion Gap 06/14/2018 13     Glucose 06/14/2018 243*    BUN 06/14/2018 21*    CREATININE 06/14/2018 1.4*    GFR Non- 06/14/2018 39*    Calcium 06/14/2018 7.9*    POC Glucose 06/13/2018 264*    Performed on 06/13/2018 AccuChek     POC Glucose 06/14/2018 186*    Performed on 06/14/2018 AccuChek     POC Glucose 06/14/2018 195*    Performed on 06/14/2018 AccuChek      Copies of these are in the chart. Prior to Visit Medications    Medication Sig Taking? Authorizing Provider   amitriptyline (ELAVIL) 100 MG tablet Take 2 tablets by mouth nightly Yes TRESSA Dalton   fluticasone [facility]) 50 MCG/ACT nasal spray 2 sprays by Nasal route daily Yes TRESSA Dalton   pregabalin (LYRICA) 150 MG capsule Take 1 capsule by mouth 2 times daily for 90 days. TRESSA Richards   tiZANidine (ZANAFLEX) 4 MG tablet Take 1 tablet by mouth every 8 hours as needed (spasms) Yes TRESSA Hardwick   DULoxetine (CYMBALTA) 60 MG extended release capsule Take 1 capsule by mouth 2 times daily Yes TRESSA Dalton   QUEtiapine (SEROQUEL) 200 MG tablet Take 1 tablet by mouth nightly Yes TRESSA Dalton   levothyroxine (SYNTHROID) 88 MCG tablet Take 1 tablet by mouth Daily Yes LAZARO Hobson,    furosemide (LASIX) 40 MG tablet Take 40 mg by mouth 2 times daily Patient takes 40 mg every other day and take 20 mg on the days in between.  Yes Musculoskeletal: Positive for arthralgias (ankle and back) and back pain. Slow with ankle pain       Skin: Negative for rash and wound. Psychiatric/Behavioral: Negative for behavioral problems, self-injury and sleep disturbance. The patient is not nervous/anxious and is not hyperactive. Physical Exam   Constitutional: She is oriented to person, place, and time. She appears well-developed and well-nourished. No distress. HENT:   Head: Normocephalic. Right Ear: External ear normal.   Left Ear: External ear normal.   Mouth/Throat: No oropharyngeal exudate. Eyes: Pupils are equal, round, and reactive to light. Right eye exhibits no discharge. Left eye exhibits no discharge. Neck: Normal range of motion. Cardiovascular: Normal rate, regular rhythm, normal heart sounds and intact distal pulses. No murmur heard. Pulmonary/Chest: Effort normal and breath sounds normal. No respiratory distress. She has no wheezes. Abdominal: Soft. Bowel sounds are normal. She exhibits no distension. Musculoskeletal: Normal range of motion. Lymphadenopathy:     She has no cervical adenopathy. Neurological: She is alert and oriented to person, place, and time. No cranial nerve deficit. Skin: Skin is warm and dry. No rash noted. She is not diaphoretic. Psychiatric: She has a normal mood and affect. Her behavior is normal.   Laughing and talking     Vitals reviewed. ASSESSMENT/ PLAN    1. Type 2 diabetes mellitus with diabetic polyneuropathy, with long-term current use of insulin (HCC)  Cont lantus nightly  And novolog with meals  Will check labs  Denies any hypgylcemai    2. Situational anxiety  Will increase to 200mg  She is doing \"good\"  Long history of depression  - amitriptyline (ELAVIL) 100 MG tablet; Take 2 tablets by mouth nightly  Dispense: 60 tablet; Refill: 5    3.  Insomnia due to other mental disorder  Better on elavil and seroquel cont present  - amitriptyline (ELAVIL) 100 MG medicines, vitamins, herbal products, or supplements without talking to your doctor first. Some medicines for type 2 diabetes can cause problems with other medicines or supplements. · Tell your doctor if you plan to get pregnant. Some of these drugs are not safe for pregnant women. · Be safe with medicines. Take your medicines exactly as prescribed. Meglitinides and sulfonylureas can cause your blood sugar to drop very low. Call your doctor if you think you are having a problem with your medicine. · Check your blood sugar often. You can use a glucose monitor. Keeping track can help you know how certain foods, activities, and medicines affect your blood sugar. And it can help you keep your blood sugar from getting so low that it's not safe. When should you call for help? Call 911 anytime you think you may need emergency care. For example, call if:    · You passed out (lost consciousness).     · You are confused or cannot think clearly.     · Your blood sugar is very high or very low.    Watch closely for changes in your health, and be sure to contact your doctor if:    · Your blood sugar stays outside the level your doctor set for you.     · You have any problems. Where can you learn more? Go to https://[email or web address]. org and sign in to your "[employer] account. Enter H153 in the [employer] box to learn more about \"Noninsulin Medicines for Type 2 Diabetes: Care Instructions. \"     If you do not have an account, please click on the \"Sign Up Now\" link. Current as of: December 7, 2017  Content Version: 11.8  © 9282-1398 Healthwise, Incorporated. Care instructions adapted under license by [facility] ([facility]). If you have questions about a medical condition or this instruction, always ask your healthcare professional. [address] any warranty or liability for your use of this information. Controlled Substances Monitoring:                   Additional

## 2018-12-11 NOTE — CARE COORDINATION
Ambulatory Care Coordination Note  12/11/2018  CM Risk Score: 14  Hector Mortality Risk Score:      ACC: Kavon Jarvis RN    Summary Note: I met with Enedelia Sullivan after her appt today. Enedelia Sullivan is monitoring her vitals signs at home daily. Her BS have been up and down as has her BP. She reviewed her log with PCP today. We discussed her wgt gain on log. Reminded Enedelia Sullivan that she needs to take her extra 1/2 dose of Lasix when she gains 3 lb overnight or 5 lbs in a week as we reviewed on her log. Enedelia Sullivan gave teachback on this. Encouraged her to get some upper body exercise as she continues to have pain in her right ankle after healed fracture. Urged that the exercise will help her heart and her blood sugar. Enedelia Sullivan will try to get 15 minutes of upper body exercise twice a day. She will continue to log her daily BP/wgt/BS. ACC will f/u with patient for review of her log and efforts to improve physical activity - 2 weeks. Care Coordination Interventions    Program Enrollment:  Complex Care  Referral from Primary Care Provider:  No  Suggested Interventions and Community Resources  Specialty Services Referral:  Completed (Comment: Pain mgmt)  Zone Management Tools: In Process (Comment: 12/11/18: Pt brought log to appt today. Discussed exercise and impact on BS.)         Goals Addressed             Most Recent     Self Monitoring   On track (12/11/2018)             Blood Pressure - I will take my blood pressure as directed - Daily and Other: If sx of elev BP - headache, vision changes, fatigue. I will notify my provider of any trends of increasing or decreasing blood pressures over a month period of time. I will notify my provider of any changes in blood pressure associated with symptoms of dizziness, falls, passing out, headache, confusion/change in mental status.     Blood Glucose Tracker 12/11/2018 12/10/2018 12/9/2018 12/8/2018 12/7/2018 12/6/2018 12/5/2018   Before breakfast blood glucose 169 147 130 178 169 148 204   Before

## 2018-12-12 ENCOUNTER — CARE COORDINATION (OUTPATIENT)
Dept: CARE COORDINATION | Age: 58
End: 2018-12-12

## 2018-12-26 ENCOUNTER — CARE COORDINATION (OUTPATIENT)
Dept: CARE COORDINATION | Age: 58
End: 2018-12-26

## 2019-01-02 DIAGNOSIS — Z79.4 TYPE 2 DIABETES MELLITUS WITH DIABETIC POLYNEUROPATHY, WITH LONG-TERM CURRENT USE OF INSULIN (HCC): ICD-10-CM

## 2019-01-02 DIAGNOSIS — E11.65 TYPE 2 DIABETES MELLITUS WITH HYPERGLYCEMIA, WITH LONG-TERM CURRENT USE OF INSULIN (HCC): ICD-10-CM

## 2019-01-02 DIAGNOSIS — Z79.4 TYPE 2 DIABETES MELLITUS WITH HYPERGLYCEMIA, WITH LONG-TERM CURRENT USE OF INSULIN (HCC): ICD-10-CM

## 2019-01-02 DIAGNOSIS — E11.42 TYPE 2 DIABETES MELLITUS WITH DIABETIC POLYNEUROPATHY, WITH LONG-TERM CURRENT USE OF INSULIN (HCC): ICD-10-CM

## 2019-02-10 DIAGNOSIS — Z79.4 TYPE 2 DIABETES MELLITUS WITH HYPERGLYCEMIA, WITH LONG-TERM CURRENT USE OF INSULIN (HCC): ICD-10-CM

## 2019-02-10 DIAGNOSIS — E11.65 TYPE 2 DIABETES MELLITUS WITH HYPERGLYCEMIA, WITH LONG-TERM CURRENT USE OF INSULIN (HCC): ICD-10-CM

## 2019-02-11 RX ORDER — INSULIN ASPART 100 [IU]/ML
INJECTION, SOLUTION INTRAVENOUS; SUBCUTANEOUS
Qty: 18 PEN | Refills: 3 | Status: SHIPPED | OUTPATIENT
Start: 2019-02-11 | End: 2019-07-24 | Stop reason: SDUPTHER

## 2019-02-19 DIAGNOSIS — E11.42 DIABETIC POLYNEUROPATHY ASSOCIATED WITH TYPE 2 DIABETES MELLITUS (HCC): ICD-10-CM

## 2019-02-21 ENCOUNTER — CARE COORDINATION (OUTPATIENT)
Dept: CARE COORDINATION | Age: 59
End: 2019-02-21

## 2019-03-04 DIAGNOSIS — E11.42 TYPE 2 DIABETES MELLITUS WITH DIABETIC POLYNEUROPATHY, WITH LONG-TERM CURRENT USE OF INSULIN (HCC): ICD-10-CM

## 2019-03-04 DIAGNOSIS — Z79.4 TYPE 2 DIABETES MELLITUS WITH DIABETIC POLYNEUROPATHY, WITH LONG-TERM CURRENT USE OF INSULIN (HCC): ICD-10-CM

## 2019-03-14 ENCOUNTER — TELEPHONE (OUTPATIENT)
Dept: PRIMARY CARE CLINIC | Age: 59
End: 2019-03-14

## 2019-03-14 DIAGNOSIS — M62.838 MUSCLE SPASM: ICD-10-CM

## 2019-03-14 RX ORDER — TIZANIDINE 4 MG/1
4 TABLET ORAL EVERY 8 HOURS PRN
Qty: 90 TABLET | Refills: 3 | Status: SHIPPED | OUTPATIENT
Start: 2019-03-14 | End: 2019-08-11 | Stop reason: SDUPTHER

## 2019-03-18 ENCOUNTER — OFFICE VISIT (OUTPATIENT)
Dept: PRIMARY CARE CLINIC | Age: 59
End: 2019-03-18
Payer: MEDICARE

## 2019-03-18 VITALS
WEIGHT: 240.12 LBS | RESPIRATION RATE: 16 BRPM | OXYGEN SATURATION: 97 % | HEART RATE: 80 BPM | BODY MASS INDEX: 45.34 KG/M2 | TEMPERATURE: 98.2 F | HEIGHT: 61 IN | SYSTOLIC BLOOD PRESSURE: 120 MMHG | DIASTOLIC BLOOD PRESSURE: 72 MMHG

## 2019-03-18 DIAGNOSIS — I10 ESSENTIAL HYPERTENSION: ICD-10-CM

## 2019-03-18 DIAGNOSIS — E66.01 MORBID OBESITY WITH BMI OF 45.0-49.9, ADULT (HCC): ICD-10-CM

## 2019-03-18 DIAGNOSIS — F31.76 BIPOLAR DISORDER, IN FULL REMISSION, MOST RECENT EPISODE DEPRESSED (HCC): ICD-10-CM

## 2019-03-18 DIAGNOSIS — F31.78 BIPOLAR DISORDER, IN FULL REMISSION, MOST RECENT EPISODE MIXED (HCC): ICD-10-CM

## 2019-03-18 DIAGNOSIS — Z79.4 TYPE 2 DIABETES MELLITUS WITH DIABETIC POLYNEUROPATHY, WITH LONG-TERM CURRENT USE OF INSULIN (HCC): Primary | ICD-10-CM

## 2019-03-18 DIAGNOSIS — E11.42 TYPE 2 DIABETES MELLITUS WITH DIABETIC POLYNEUROPATHY, WITH LONG-TERM CURRENT USE OF INSULIN (HCC): Primary | ICD-10-CM

## 2019-03-18 DIAGNOSIS — E11.42 DIABETIC POLYNEUROPATHY ASSOCIATED WITH TYPE 2 DIABETES MELLITUS (HCC): ICD-10-CM

## 2019-03-18 DIAGNOSIS — E78.2 MIXED HYPERLIPIDEMIA: ICD-10-CM

## 2019-03-18 DIAGNOSIS — N18.30 CHRONIC KIDNEY DISEASE, STAGE III (MODERATE) (HCC): ICD-10-CM

## 2019-03-18 DIAGNOSIS — E03.9 ACQUIRED HYPOTHYROIDISM: ICD-10-CM

## 2019-03-18 DIAGNOSIS — K59.04 CHRONIC IDIOPATHIC CONSTIPATION: ICD-10-CM

## 2019-03-18 PROCEDURE — 99214 OFFICE O/P EST MOD 30 MIN: CPT | Performed by: PEDIATRICS

## 2019-03-18 ASSESSMENT — ENCOUNTER SYMPTOMS
CHEST TIGHTNESS: 0
ABDOMINAL PAIN: 1
COUGH: 0
BACK PAIN: 0
TROUBLE SWALLOWING: 0
ABDOMINAL DISTENTION: 0
VOICE CHANGE: 0
DIARRHEA: 0
SHORTNESS OF BREATH: 0
CONSTIPATION: 1
NAUSEA: 0
SINUS PRESSURE: 0
SORE THROAT: 0
WHEEZING: 0
CHOKING: 0
VOMITING: 0

## 2019-03-27 ENCOUNTER — TELEPHONE (OUTPATIENT)
Dept: PRIMARY CARE CLINIC | Age: 59
End: 2019-03-27

## 2019-03-27 DIAGNOSIS — R07.9 CHEST PAIN, UNSPECIFIED TYPE: Primary | ICD-10-CM

## 2019-03-27 DIAGNOSIS — I10 ESSENTIAL HYPERTENSION: ICD-10-CM

## 2019-04-02 ENCOUNTER — HOSPITAL ENCOUNTER (EMERGENCY)
Age: 59
Discharge: HOME | End: 2019-04-02
Payer: MEDICARE

## 2019-04-02 ENCOUNTER — APPOINTMENT (OUTPATIENT)
Dept: GENERAL RADIOLOGY | Age: 59
End: 2019-04-02
Payer: MEDICARE

## 2019-04-02 VITALS
HEART RATE: 81 BPM | TEMPERATURE: 98.8 F | HEIGHT: 62 IN | WEIGHT: 240 LBS | BODY MASS INDEX: 44.16 KG/M2 | DIASTOLIC BLOOD PRESSURE: 92 MMHG | SYSTOLIC BLOOD PRESSURE: 139 MMHG | RESPIRATION RATE: 18 BRPM | OXYGEN SATURATION: 94 %

## 2019-04-02 DIAGNOSIS — E11.42 TYPE 2 DIABETES MELLITUS WITH DIABETIC POLYNEUROPATHY, WITH LONG-TERM CURRENT USE OF INSULIN (HCC): ICD-10-CM

## 2019-04-02 DIAGNOSIS — Z79.4 TYPE 2 DIABETES MELLITUS WITH DIABETIC POLYNEUROPATHY, WITH LONG-TERM CURRENT USE OF INSULIN (HCC): ICD-10-CM

## 2019-04-02 DIAGNOSIS — S82.891S OPEN RIGHT ANKLE FRACTURE, SEQUELA: ICD-10-CM

## 2019-04-02 DIAGNOSIS — M25.471 ANKLE SWELLING, RIGHT: Primary | ICD-10-CM

## 2019-04-02 PROCEDURE — 99283 EMERGENCY DEPT VISIT LOW MDM: CPT | Performed by: NURSE PRACTITIONER

## 2019-04-02 PROCEDURE — 99283 EMERGENCY DEPT VISIT LOW MDM: CPT

## 2019-04-02 PROCEDURE — 73610 X-RAY EXAM OF ANKLE: CPT

## 2019-04-02 RX ORDER — FLUCONAZOLE 150 MG/1
150 TABLET ORAL ONCE
Qty: 1 TABLET | Refills: 0 | Status: SHIPPED | OUTPATIENT
Start: 2019-04-02 | End: 2019-04-02

## 2019-04-02 RX ORDER — HYDROCODONE BITARTRATE AND ACETAMINOPHEN 5; 325 MG/1; MG/1
1 TABLET ORAL 2 TIMES DAILY
Refills: 0 | COMMUNITY
Start: 2019-03-25 | End: 2019-10-16 | Stop reason: SDUPTHER

## 2019-04-02 RX ORDER — SULFAMETHOXAZOLE AND TRIMETHOPRIM 800; 160 MG/1; MG/1
1 TABLET ORAL 2 TIMES DAILY
Qty: 10 TABLET | Refills: 0 | Status: SHIPPED | OUTPATIENT
Start: 2019-04-02 | End: 2019-04-07

## 2019-04-02 NOTE — ED NOTES
Patient is alert and oriented x3  Respirations not labored  No apparent distress noted  Skin is WNL  Pt monitored in place  Pt c/o right foot pain no abnormalities noted     Loura Sacks, RN  04/02/19 6156

## 2019-04-02 NOTE — ED PROVIDER NOTES
disease)     History of blood transfusion     History of suicidal ideation     Hyperlipidemia     Hypertension     Hypothyroidism     Insomnia     Kidney disease     stage 3 failure    MI, old 2005    Obesity     Polyarticular arthritis     Type II or unspecified type diabetes mellitus without mention of complication, not stated as uncontrolled          SURGICALHISTORY       Past Surgical History:   Procedure Laterality Date    ABDOMINOPLASTY      ANGIOPLASTY  05    stent placement to LAD and diagonal with normal left vent function    BREAST REDUCTION SURGERY      CARDIAC CATHETERIZATION  05, 05    see report  Stents x3    CARDIAC CATHETERIZATION  3/23/15  JDT    EF 50%    CARPAL TUNNEL RELEASE       SECTION      x2    CHOLECYSTECTOMY      GASTRIC BYPASS SURGERY      HERNIA REPAIR      umbilical with mesh    INTRACAPSULAR CATARACT EXTRACTION Left 2016    LT EYE CATARACT EMULSIFICATION IOL IMPLANT performed by Radha Garcia MD at [address] [place] [address] Right 2018    ORIF RIGHT BIMALLEOLAR ANKLE FRACTURE, RIGHT WRIST CAST REMOVAL performed by Murphy Opitz, MD at [address] [place] [address] Right 2018    ANKLE OPEN REDUCTION INTERNAL FIXATION performed by Murphy Opitz, MD at [place] [address] Left 2017    KNEE TOTAL ARTHROPLASTY performed by Mateus Lemons DO at [facility]       Discharge Medication List as of 2019  6:15 PM      CONTINUE these medications which have NOT CHANGED    Details   HYDROcodone-acetaminophen (NORCO) 5-325 MG per tablet R-0Historical Med      aspirin 81 MG tablet Take 81 mg by mouth dailyHistorical Med      tiZANidine (ZANAFLEX) 4 MG tablet Take 1 tablet by mouth every 8 hours as needed (muscle spasms), Disp-90 tablet, R-3Please consider 90 day supplies to promote better adherenceNormal      Insulin Pen Needle 31G X 8 MM MISC 4 TIMES DAILY Starting Mon 3/4/2019, Disp-200 each, R-11, Normal      LYRICA 150 MG capsule TAKE 1 CAPSULE BY MOUTH TWICE DAILY, Disp-60 capsule, R-3Normal      NOVOLOG FLEXPEN 100 UNIT/ML injection pen INJECT 20 UNITS SUB Q THREE TIMES DAILY WITH MEALS BASED UPON RATIO AND CORRECTIVE FACTOR. AVERAGE 60 UNITS DAILY. , Disp-18 pen, R-3Please consider 90 day supplies to promote better adherenceNormal      insulin glargine (LANTUS SOLOSTAR) 100 UNIT/ML injection pen Inject 22 Units into the skin nightly, Disp-5 pen, R-11Please consider 90 day supplies to promote better adherenceNormal      amitriptyline (ELAVIL) 100 MG tablet Take 2 tablets by mouth nightly, Disp-60 tablet, R-5Normal      fluticasone (FLONASE) 50 MCG/ACT nasal spray 2 sprays by Nasal route daily, Disp-1 Bottle, R-11Please consider 90 day supplies to promote better adherenceNormal      DULoxetine (CYMBALTA) 60 MG extended release capsule Take 1 capsule by mouth 2 times daily, Disp-60 capsule, R-11Please consider 90 day supplies to promote better adherenceNormal      QUEtiapine (SEROQUEL) 200 MG tablet Take 1 tablet by mouth nightly, Disp-30 tablet, R-5Please consider 90 day supplies to promote better adherenceNormal      levothyroxine (SYNTHROID) 88 MCG tablet Take 1 tablet by mouth Daily, Disp-90 tablet, R-3Normal      furosemide (LASIX) 40 MG tablet Take 40 mg by mouth 2 times daily Patient takes 40 mg every other day and take 20 mg on the days in between. Historical Med      valsartan (DIOVAN) 80 MG tablet Take 1 tablet by mouth nightly, Disp-30 tablet, R-11Normal      Blood Glucose Monitoring Suppl (FREESTYLE LITE) JEZ 4 TIMES DAILY Starting Thu 7/5/2018, Disp-1 Device, R-0, Normal      blood glucose test strips (FREESTYLE LITE) strip 4 TIMES DAILY Starting Thu 7/5/2018, Disp-100 each, R-3, NormalAs needed.       linaclotide (LINZESS) 290 MCG CAPS capsule Take 1 capsule by mouth every morning (before breakfast), Disp-30 capsule, R-11Please consider 90 day supplies to promote better adherenceNormal      pantoprazole (PROTONIX) 40 MG tablet TAKE ONE TABLET BY MOUTH ONCE DAILY, Disp-90 tablet, R-3Normal      atorvastatin (LIPITOR) 40 MG tablet TAKE ONE TABLET BY MOUTH ONCE DAILY, Disp-90 tablet, R-3Please consider 90 day supplies to promote better adherenceNormal      metoprolol succinate (TOPROL XL) 50 MG extended release tablet Take 1 tablet by mouth daily, Disp-30 tablet, R-11Adjust Sig             ALLERGIES     Dilaudid [hydromorphone hcl]    FAMILY HISTORY       Family History   Problem Relation Age of Onset    Depression Mother     Heart Attack Mother     High Blood Pressure Mother     Kidney Disease Father     Alcohol Abuse Father     Depression Father     Heart Attack Father     High Blood Pressure Father     Kidney Disease Brother     Heart Disease Other     Depression Sister           SOCIAL HISTORY       Social History     Socioeconomic History    Marital status:      Spouse name: None    Number of children: None    Years of education: None    Highest education level: None   Occupational History    None   Social Needs    Financial resource strain: None    Food insecurity:     Worry: None     Inability: None    Transportation needs:     Medical: None     Non-medical: None   Tobacco Use    Smoking status: Never Smoker    Smokeless tobacco: Former User     Types: Snuff   Substance and Sexual Activity    Alcohol use: No    Drug use: No    Sexual activity: Never   Lifestyle    Physical activity:     Days per week: None     Minutes per session: None    Stress: None   Relationships    Social connections:     Talks on phone: None     Gets together: None     Attends Anglican service: None     Active member of club or organization: None     Attends meetings of clubs or organizations: None     Relationship status: None    Intimate partner violence:     Fear of current or ex partner: None Emotionally abused: None     Physically abused: None     Forced sexual activity: None   Other Topics Concern    None   Social History Narrative    None       SCREENINGS      @FLOW([id number])@      PHYSICAL EXAM    (up to 7 for level 4, 8 or more for level 5)     ED Triage Vitals [04/02/19 1656]   BP Temp Temp Source Pulse Resp SpO2 Height Weight   (!) 139/92 98.8 °F (37.1 °C) Temporal 81 18 94 % 5' 2\" (1.575 m) 240 lb (108.9 kg)       Physical Exam   Constitutional: She is oriented to person, place, and time. She appears well-developed and well-nourished. HENT:   Head: Normocephalic and atraumatic. Eyes: Right eye exhibits no discharge. Left eye exhibits no discharge. No scleral icterus. Neck: Normal range of motion. Neck supple. Cardiovascular: Normal rate. Pulmonary/Chest: No respiratory distress. Musculoskeletal:        Right ankle: She exhibits decreased range of motion and swelling. She exhibits no ecchymosis, no deformity, no laceration and normal pulse. Tenderness. Lateral malleolus tenderness found. Feet:    Neurological: She is alert and oriented to person, place, and time. Psychiatric: She has a normal mood and affect. Her behavior is normal.   Nursing note and vitals reviewed. DIAGNOSTIC RESULTS     EKG: All EKG's are interpreted by the Emergency Department Physician who either signs or Co-signsthis chart in the absence of a cardiologist.        RADIOLOGY:   [place] such as CT, Ultrasound and MRI are read by the radiologist. Joby Gunn radiographic images are visualized and preliminarily interpreted by the emergency physician with the below findings:      Interpretation per the Radiologist below, if available at the time of this note:    XR ANKLE RIGHT (MIN 3 VIEWS)   Final Result   1.. Stable appearance of the fixation and hardware of the right ankle   from the previous study of 6/16/2018. No evidence of loosening or   discrete fracture line.    2. Soft tissue swelling were completed with a voice recognitionprogram.  Efforts were made to edit the dictations but occasionally words are mis-transcribed.)    TRESSA Wisdom (electronically signed)          TRESSA Wisdom  04/05/19 0586

## 2019-04-03 ENCOUNTER — OFFICE VISIT (OUTPATIENT)
Dept: CARDIOLOGY | Age: 59
End: 2019-04-03
Payer: MEDICARE

## 2019-04-03 ENCOUNTER — TELEPHONE (OUTPATIENT)
Dept: CARDIOLOGY | Age: 59
End: 2019-04-03

## 2019-04-03 ENCOUNTER — CARE COORDINATION (OUTPATIENT)
Dept: CARE COORDINATION | Age: 59
End: 2019-04-03

## 2019-04-03 VITALS
WEIGHT: 241 LBS | SYSTOLIC BLOOD PRESSURE: 132 MMHG | HEART RATE: 86 BPM | BODY MASS INDEX: 47.32 KG/M2 | HEIGHT: 60 IN | DIASTOLIC BLOOD PRESSURE: 82 MMHG

## 2019-04-03 DIAGNOSIS — R07.9 CHEST PAIN, UNSPECIFIED TYPE: Primary | ICD-10-CM

## 2019-04-03 DIAGNOSIS — I25.10 CORONARY ARTERY DISEASE INVOLVING NATIVE CORONARY ARTERY OF NATIVE HEART WITHOUT ANGINA PECTORIS: ICD-10-CM

## 2019-04-03 DIAGNOSIS — I10 ESSENTIAL HYPERTENSION: ICD-10-CM

## 2019-04-03 PROCEDURE — 99213 OFFICE O/P EST LOW 20 MIN: CPT | Performed by: NURSE PRACTITIONER

## 2019-04-03 PROCEDURE — 93000 ELECTROCARDIOGRAM COMPLETE: CPT | Performed by: NURSE PRACTITIONER

## 2019-04-03 NOTE — PROGRESS NOTES
(SEROQUEL) 200 MG tablet Take 1 tablet by mouth nightly 30 tablet 5    levothyroxine (SYNTHROID) 88 MCG tablet Take 1 tablet by mouth Daily 90 tablet 3    furosemide (LASIX) 40 MG tablet Take 40 mg by mouth daily       valsartan (DIOVAN) 80 MG tablet Take 1 tablet by mouth nightly 30 tablet 11    Blood Glucose Monitoring Suppl (FREESTYLE LITE) JEZ 1 Device by Does not apply route 4 times daily 1 Device 0    blood glucose test strips (FREESTYLE LITE) strip 1 each by In Vitro route 4 times daily As needed. 100 each 3    linaclotide (LINZESS) 290 MCG CAPS capsule Take 1 capsule by mouth every morning (before breakfast) (Patient taking differently: Take 290 mcg by mouth every other day ) 30 capsule 11    pantoprazole (PROTONIX) 40 MG tablet TAKE ONE TABLET BY MOUTH ONCE DAILY 90 tablet 3    atorvastatin (LIPITOR) 40 MG tablet TAKE ONE TABLET BY MOUTH ONCE DAILY (Patient taking differently: TAKE ONE TABLET BY MOUTH ONCE NIGHTLY) 90 tablet 3    metoprolol succinate (TOPROL XL) 50 MG extended release tablet Take 1 tablet by mouth daily 30 tablet 11     No current facility-administered medications for this visit.       Allergies: Dilaudid [hydromorphone hcl]  Past Medical History:   Diagnosis Date    Anxiety     Arthritis     [facility]     CAD (coronary artery disease)     [facility]congest[facility] failure) [facility]     Chronic kidney disease (CKD) stage G3b/A2, moderately decreased glomerular filtration rate (GFR) between 30-44 mL/min/1.73 square meter and albuminuria creatinine ratio between  mg/g ([facility] 11/21/2016    Depression     DM (diabetes mellitus) ([facility])     GERD (gastroesophageal reflux disease)     History of blood transfusion     History of suicidal ideation     Hyperlipidemia     Hypertension     Hypothyroidism     Insomnia     Kidney disease     stage 3 failure    MI, 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    Obesity     Polyarticular arthritis     Type II or unspecified type diabetes mellitus without mention of complication, not stated as uncontrolled      Past Surgical History:   Procedure Laterality Date    ABDOMINOPLASTY      ANGIOPLASTY  05    stent placement to LAD and diagonal with normal left vent function    BREAST REDUCTION SURGERY      CARDIAC CATHETERIZATION  05, 05    see report  Stents x3    CARDIAC CATHETERIZATION  3/23/15  JDT    EF 50%    CARPAL TUNNEL RELEASE       SECTION      x2    CHOLECYSTECTOMY      GASTRIC BYPASS SURGERY      HERNIA REPAIR      umbilical with mesh    INTRACAPSULAR CATARACT EXTRACTION Left 2016    LT EYE CATARACT EMULSIFICATION IOL IMPLANT performed by Alicia Doherty MD at [address] [place] [address] Right 2018    ORIF RIGHT BIMALLEOLAR ANKLE FRACTURE, RIGHT WRIST CAST REMOVAL performed by Fady Inman MD at [address] [place] [address] Right 2018    ANKLE OPEN REDUCTION INTERNAL FIXATION performed by Fady Inman MD at [place] [address] Left 2017    KNEE TOTAL ARTHROPLASTY performed by Esther Good DO at [facility]     Family History   Problem Relation Age of Onset    Depression Mother     Heart Attack Mother     High Blood Pressure Mother     Kidney Disease Father     Alcohol Abuse Father     Depression Father     Heart Attack Father     High Blood Pressure Father     Kidney Disease Brother     Heart Disease Other     Depression Sister      Social History     Tobacco Use    Smoking status: Never Smoker    Smokeless tobacco: Former User     Types: Snuff   Substance Use Topics    Alcohol use: No          Review of System:      Except as noted in HPI, cardiovascular and respiratory systems are otherwise negative.       Objective:    /82 (Site: Right Upper Arm, Position: Sitting, Cuff Size: Medium Adult)   Pulse 86   Ht 5' (1.524 m)   Wt 241 lb (109.3 kg)   BMI 47.07 kg/m²     GENERAL - well developed and well nourished    HEENT -  PERRLA, Hearing appears normal  NECK - no thyromegaly, no JVD, trachea is in the midline  CARDIOVASCULAR - PMI is in the mid line clavicular position, Normal S1 and S2 with no systolic murmur. No S3 or S4    PULMONARY - no respiratory distress. No wheezes or rales. Lungs are clear to ausculation   ABDOMEN  - soft, non tender, no rebound  MUSCULOSKELETAL  - range of motion of the upper and lower extermites appears normal and equal and is without pain   EXTREMITIES - no significant edema   NEUROLOGIC - gait and station are normal  SKIN - turgor is normal  PSYCHIATRIC - normal mood and affect, alert and orientated x 3,      ASSESSMENT:    ALL THE CARDIOLOGY PROBLEMS ARE LISTED ABOVE; HOWEVER, THE FOLLOWING SPECIFIC CARDIAC PROBLEMS / CONDITIONS WERE ADDRESSED AND TREATED DURING THE OFFICE VISIT TODAY:       Cardiac Specific Problem  Discussion and Plan         1. Unstable angina   Initial Encounter    EKG in the office today sinus rhythm with a heart rate of 86 bpm with ST T wave abnormalities. Given patient's complaints of chest pain and history of coronary artery disease with this being her anginal equivalent will schedule cardiac catheterization with Dr. Elyse Rinne. Patient was instructed to report to the ER should symptoms worsen before cardiac catheterization. Patient taking baby aspirin daily          2. CAD  Initial Encounter   Last cath 3/2015 Patent stent to LAD, Patent stent to Diagonal         3. Hypertension   Initial Encounter   Blood pressure in the office today 132/82    4. Hyperlipidemia - Followed by PCP, patient is on Lipitor  5. Diabetes - Followed by PCP  6. CKD     PLAN:    Orders Placed This Encounter   Procedures    EKG 12 lead     Order Specific Question:   Reason for Exam?     Answer: Other     No orders of the defined types were placed in this encounter. Return in about 1 month (around 5/1/2019) for post cath with me . Discussed with patient.     I greatly appreciate the opportunity to meet Edson Liz and your confidence in allowing me to participate in her cardiovascular care.       TRESSA Ames NP 4/3/2019 11:01 AM

## 2019-04-03 NOTE — TELEPHONE ENCOUNTER
----- Message from TRESSA Richter NP sent at 4/3/2019 11:27 AM CDT -----  Please schedule cath with Dr. Leopoldo Patch ASAP. Patient instructed to present to ER if symptoms worsen.

## 2019-04-04 ENCOUNTER — SURG/PROC ORDERS (OUTPATIENT)
Dept: CARDIOLOGY | Age: 59
End: 2019-04-04

## 2019-04-04 DIAGNOSIS — I20.0 UNSTABLE ANGINA (HCC): Primary | ICD-10-CM

## 2019-04-04 DIAGNOSIS — R94.31 EKG, ABNORMAL: ICD-10-CM

## 2019-04-04 RX ORDER — SODIUM CHLORIDE 9 MG/ML
INJECTION, SOLUTION INTRAVENOUS CONTINUOUS
Status: CANCELLED | OUTPATIENT
Start: 2019-04-12

## 2019-04-04 NOTE — TELEPHONE ENCOUNTER
Called and spoke with patient, have cath scheduled for 4/12/19 @ 300 with arrival of 100. Advised patient NPO after 800, may take morning medications with sip of water. Advised to check in at [facility] registration. Patient does not have allergy to IV dye or Iodine. Patient instructed to have someone to drive them home as well.

## 2019-04-12 ENCOUNTER — APPOINTMENT (OUTPATIENT)
Dept: GENERAL RADIOLOGY | Age: 59
End: 2019-04-12
Attending: INTERNAL MEDICINE
Payer: MEDICARE

## 2019-04-12 ENCOUNTER — HOSPITAL ENCOUNTER (OUTPATIENT)
Dept: CARDIAC CATH/INVASIVE PROCEDURES | Age: 59
Setting detail: OBSERVATION
Discharge: HOME | End: 2019-04-13
Attending: INTERNAL MEDICINE | Admitting: INTERNAL MEDICINE
Payer: MEDICARE

## 2019-04-12 DIAGNOSIS — R94.31 EKG, ABNORMAL: ICD-10-CM

## 2019-04-12 DIAGNOSIS — I20.0 UNSTABLE ANGINA (HCC): ICD-10-CM

## 2019-04-12 PROBLEM — I20.8 ANGINAL EQUIVALENT (HCC): Status: ACTIVE | Noted: 2019-04-12

## 2019-04-12 LAB
ANION GAP SERPL CALCULATED.3IONS-SCNC: 14 MMOL/L (ref 7–19)
BUN BLDV-MCNC: 27 MG/DL (ref 6–20)
CALCIUM SERPL-MCNC: 9.3 MG/DL (ref 8.6–10)
CHLORIDE BLD-SCNC: 92 MMOL/L (ref 98–111)
CO2: 24 MMOL/L (ref 22–29)
CREAT SERPL-MCNC: 1.8 MG/DL (ref 0.5–0.9)
GFR NON-AFRICAN AMERICAN: 29
GLUCOSE BLD-MCNC: 396 MG/DL (ref 74–109)
HCT VFR BLD CALC: 33.7 % (ref 37–47)
HEMOGLOBIN: 11 G/DL (ref 12–16)
MCH RBC QN AUTO: 30.4 PG (ref 27–31)
MCHC RBC AUTO-ENTMCNC: 32.6 G/DL (ref 33–37)
MCV RBC AUTO: 93.1 FL (ref 81–99)
PDW BLD-RTO: 14.4 % (ref 11.5–14.5)
PLATELET # BLD: 251 K/UL (ref 130–400)
PMV BLD AUTO: 10.9 FL (ref 9.4–12.3)
POTASSIUM SERPL-SCNC: 4.8 MMOL/L (ref 3.5–5)
RBC # BLD: 3.62 M/UL (ref 4.2–5.4)
SODIUM BLD-SCNC: 130 MMOL/L (ref 136–145)
WBC # BLD: 9.5 K/UL (ref 4.8–10.8)

## 2019-04-12 PROCEDURE — 93458 L HRT ARTERY/VENTRICLE ANGIO: CPT | Performed by: INTERNAL MEDICINE

## 2019-04-12 PROCEDURE — 2709999900 HC NON-CHARGEABLE SUPPLY

## 2019-04-12 PROCEDURE — 2500000003 HC RX 250 WO HCPCS

## 2019-04-12 PROCEDURE — 2580000003 HC RX 258: Performed by: INTERNAL MEDICINE

## 2019-04-12 PROCEDURE — C1894 INTRO/SHEATH, NON-LASER: HCPCS

## 2019-04-12 PROCEDURE — 6360000004 HC RX CONTRAST MEDICATION: Performed by: INTERNAL MEDICINE

## 2019-04-12 PROCEDURE — C1760 CLOSURE DEV, VASC: HCPCS

## 2019-04-12 PROCEDURE — 85027 COMPLETE CBC AUTOMATED: CPT

## 2019-04-12 PROCEDURE — 6360000002 HC RX W HCPCS

## 2019-04-12 PROCEDURE — G0378 HOSPITAL OBSERVATION PER HR: HCPCS

## 2019-04-12 PROCEDURE — 36415 COLL VENOUS BLD VENIPUNCTURE: CPT

## 2019-04-12 PROCEDURE — 80048 BASIC METABOLIC PNL TOTAL CA: CPT

## 2019-04-12 PROCEDURE — 99152 MOD SED SAME PHYS/QHP 5/>YRS: CPT | Performed by: INTERNAL MEDICINE

## 2019-04-12 PROCEDURE — 71046 X-RAY EXAM CHEST 2 VIEWS: CPT

## 2019-04-12 RX ORDER — INSULIN GLARGINE 100 [IU]/ML
22 INJECTION, SOLUTION SUBCUTANEOUS NIGHTLY
Status: DISCONTINUED | OUTPATIENT
Start: 2019-04-12 | End: 2019-04-13 | Stop reason: HOSPADM

## 2019-04-12 RX ORDER — DEXTROSE MONOHYDRATE 25 G/50ML
12.5 INJECTION, SOLUTION INTRAVENOUS PRN
Status: DISCONTINUED | OUTPATIENT
Start: 2019-04-12 | End: 2019-04-13 | Stop reason: HOSPADM

## 2019-04-12 RX ORDER — FUROSEMIDE 40 MG/1
40 TABLET ORAL DAILY
Status: DISCONTINUED | OUTPATIENT
Start: 2019-04-13 | End: 2019-04-13 | Stop reason: HOSPADM

## 2019-04-12 RX ORDER — SODIUM CHLORIDE 0.9 % (FLUSH) 0.9 %
10 SYRINGE (ML) INJECTION EVERY 12 HOURS SCHEDULED
Status: DISCONTINUED | OUTPATIENT
Start: 2019-04-12 | End: 2019-04-13 | Stop reason: HOSPADM

## 2019-04-12 RX ORDER — DEXTROSE MONOHYDRATE 50 MG/ML
100 INJECTION, SOLUTION INTRAVENOUS PRN
Status: DISCONTINUED | OUTPATIENT
Start: 2019-04-12 | End: 2019-04-13 | Stop reason: HOSPADM

## 2019-04-12 RX ORDER — SODIUM CHLORIDE 0.9 % (FLUSH) 0.9 %
10 SYRINGE (ML) INJECTION PRN
Status: DISCONTINUED | OUTPATIENT
Start: 2019-04-12 | End: 2019-04-13 | Stop reason: HOSPADM

## 2019-04-12 RX ORDER — IODIXANOL 320 MG/ML
150 INJECTION, SOLUTION INTRAVASCULAR
Status: COMPLETED | OUTPATIENT
Start: 2019-04-12 | End: 2019-04-12

## 2019-04-12 RX ORDER — VALSARTAN 80 MG/1
80 TABLET ORAL NIGHTLY
Status: DISCONTINUED | OUTPATIENT
Start: 2019-04-12 | End: 2019-04-13 | Stop reason: HOSPADM

## 2019-04-12 RX ORDER — ASPIRIN 81 MG/1
81 TABLET, CHEWABLE ORAL DAILY
Status: DISCONTINUED | OUTPATIENT
Start: 2019-04-13 | End: 2019-04-13 | Stop reason: HOSPADM

## 2019-04-12 RX ORDER — LEVOTHYROXINE SODIUM 88 UG/1
88 TABLET ORAL DAILY
Status: DISCONTINUED | OUTPATIENT
Start: 2019-04-13 | End: 2019-04-13 | Stop reason: HOSPADM

## 2019-04-12 RX ORDER — DULOXETIN HYDROCHLORIDE 60 MG/1
60 CAPSULE, DELAYED RELEASE ORAL DAILY
Status: DISCONTINUED | OUTPATIENT
Start: 2019-04-13 | End: 2019-04-13 | Stop reason: HOSPADM

## 2019-04-12 RX ORDER — QUETIAPINE FUMARATE 200 MG/1
200 TABLET, FILM COATED ORAL NIGHTLY
Status: DISCONTINUED | OUTPATIENT
Start: 2019-04-12 | End: 2019-04-13 | Stop reason: HOSPADM

## 2019-04-12 RX ORDER — FLUTICASONE PROPIONATE 50 MCG
2 SPRAY, SUSPENSION (ML) NASAL DAILY
Status: DISCONTINUED | OUTPATIENT
Start: 2019-04-13 | End: 2019-04-13 | Stop reason: HOSPADM

## 2019-04-12 RX ORDER — HYDROCODONE BITARTRATE AND ACETAMINOPHEN 5; 325 MG/1; MG/1
1 TABLET ORAL EVERY 4 HOURS PRN
Status: DISCONTINUED | OUTPATIENT
Start: 2019-04-12 | End: 2019-04-13 | Stop reason: HOSPADM

## 2019-04-12 RX ORDER — BLOOD-GLUCOSE METER
1 KIT MISCELLANEOUS 4 TIMES DAILY
Status: DISCONTINUED | OUTPATIENT
Start: 2019-04-12 | End: 2019-04-12 | Stop reason: CLARIF

## 2019-04-12 RX ORDER — PANTOPRAZOLE SODIUM 40 MG/1
40 TABLET, DELAYED RELEASE ORAL DAILY
Status: DISCONTINUED | OUTPATIENT
Start: 2019-04-13 | End: 2019-04-13 | Stop reason: HOSPADM

## 2019-04-12 RX ORDER — METOPROLOL SUCCINATE 50 MG/1
50 TABLET, EXTENDED RELEASE ORAL DAILY
Status: DISCONTINUED | OUTPATIENT
Start: 2019-04-13 | End: 2019-04-13 | Stop reason: HOSPADM

## 2019-04-12 RX ORDER — TIZANIDINE 4 MG/1
4 TABLET ORAL EVERY 8 HOURS PRN
Status: DISCONTINUED | OUTPATIENT
Start: 2019-04-12 | End: 2019-04-13 | Stop reason: HOSPADM

## 2019-04-12 RX ORDER — NICOTINE POLACRILEX 4 MG
15 LOZENGE BUCCAL PRN
Status: DISCONTINUED | OUTPATIENT
Start: 2019-04-12 | End: 2019-04-13 | Stop reason: HOSPADM

## 2019-04-12 RX ORDER — ATORVASTATIN CALCIUM 40 MG/1
40 TABLET, FILM COATED ORAL DAILY
Status: DISCONTINUED | OUTPATIENT
Start: 2019-04-13 | End: 2019-04-13 | Stop reason: HOSPADM

## 2019-04-12 RX ORDER — SODIUM CHLORIDE 9 MG/ML
INJECTION, SOLUTION INTRAVENOUS CONTINUOUS
Status: DISCONTINUED | OUTPATIENT
Start: 2019-04-12 | End: 2019-04-13 | Stop reason: HOSPADM

## 2019-04-12 RX ADMIN — SODIUM CHLORIDE: 9 INJECTION, SOLUTION INTRAVENOUS at 14:27

## 2019-04-12 RX ADMIN — IODIXANOL 101 ML: 320 INJECTION, SOLUTION INTRAVASCULAR at 20:37

## 2019-04-12 ASSESSMENT — PAIN SCALES - GENERAL
PAINLEVEL_OUTOF10: 0
PAINLEVEL_OUTOF10: 0

## 2019-04-13 VITALS
SYSTOLIC BLOOD PRESSURE: 103 MMHG | RESPIRATION RATE: 20 BRPM | HEIGHT: 60 IN | DIASTOLIC BLOOD PRESSURE: 71 MMHG | TEMPERATURE: 97.5 F | BODY MASS INDEX: 46.06 KG/M2 | WEIGHT: 234.6 LBS | HEART RATE: 80 BPM | OXYGEN SATURATION: 98 %

## 2019-04-13 LAB
ANION GAP SERPL CALCULATED.3IONS-SCNC: 10 MMOL/L (ref 7–19)
BUN BLDV-MCNC: 23 MG/DL (ref 6–20)
CALCIUM SERPL-MCNC: 8.2 MG/DL (ref 8.6–10)
CHLORIDE BLD-SCNC: 103 MMOL/L (ref 98–111)
CO2: 20 MMOL/L (ref 22–29)
CREAT SERPL-MCNC: 1.5 MG/DL (ref 0.5–0.9)
GFR NON-AFRICAN AMERICAN: 36
GLUCOSE BLD-MCNC: 424 MG/DL (ref 74–109)
POTASSIUM SERPL-SCNC: 4.5 MMOL/L (ref 3.5–5)
SODIUM BLD-SCNC: 133 MMOL/L (ref 136–145)

## 2019-04-13 PROCEDURE — 6370000000 HC RX 637 (ALT 250 FOR IP): Performed by: INTERNAL MEDICINE

## 2019-04-13 PROCEDURE — 2580000003 HC RX 258: Performed by: INTERNAL MEDICINE

## 2019-04-13 PROCEDURE — 80048 BASIC METABOLIC PNL TOTAL CA: CPT

## 2019-04-13 PROCEDURE — 36415 COLL VENOUS BLD VENIPUNCTURE: CPT

## 2019-04-13 PROCEDURE — 99238 HOSP IP/OBS DSCHRG MGMT 30/<: CPT | Performed by: INTERNAL MEDICINE

## 2019-04-13 PROCEDURE — G0378 HOSPITAL OBSERVATION PER HR: HCPCS

## 2019-04-13 RX ORDER — PREGABALIN 150 MG/1
150 CAPSULE ORAL 2 TIMES DAILY
Status: DISCONTINUED | OUTPATIENT
Start: 2019-04-13 | End: 2019-04-13 | Stop reason: HOSPADM

## 2019-04-13 RX ADMIN — VALSARTAN 80 MG: 80 TABLET, FILM COATED ORAL at 01:14

## 2019-04-13 RX ADMIN — METOPROLOL SUCCINATE 50 MG: 50 TABLET, EXTENDED RELEASE ORAL at 08:24

## 2019-04-13 RX ADMIN — PREGABALIN 150 MG: 150 CAPSULE ORAL at 01:15

## 2019-04-13 RX ADMIN — DULOXETINE HYDROCHLORIDE 60 MG: 60 CAPSULE, DELAYED RELEASE ORAL at 08:25

## 2019-04-13 RX ADMIN — PANTOPRAZOLE SODIUM 40 MG: 40 TABLET, DELAYED RELEASE ORAL at 08:25

## 2019-04-13 RX ADMIN — FUROSEMIDE 40 MG: 40 TABLET ORAL at 08:25

## 2019-04-13 RX ADMIN — ASPIRIN 81 MG 81 MG: 81 TABLET ORAL at 08:24

## 2019-04-13 RX ADMIN — SODIUM CHLORIDE: 9 INJECTION, SOLUTION INTRAVENOUS at 01:15

## 2019-04-13 RX ADMIN — QUETIAPINE FUMARATE 200 MG: 200 TABLET ORAL at 00:43

## 2019-04-13 RX ADMIN — AMITRIPTYLINE HYDROCHLORIDE 200 MG: 50 TABLET, FILM COATED ORAL at 00:43

## 2019-04-13 RX ADMIN — SODIUM CHLORIDE: 9 INJECTION, SOLUTION INTRAVENOUS at 00:44

## 2019-04-13 RX ADMIN — PREGABALIN 150 MG: 150 CAPSULE ORAL at 08:24

## 2019-04-13 RX ADMIN — Medication 10 ML: at 08:24

## 2019-04-13 RX ADMIN — FLUTICASONE PROPIONATE 2 SPRAY: 50 SPRAY, METERED NASAL at 08:24

## 2019-04-13 RX ADMIN — ATORVASTATIN CALCIUM 40 MG: 40 TABLET, FILM COATED ORAL at 08:25

## 2019-04-13 RX ADMIN — INSULIN GLARGINE 22 UNITS: 100 INJECTION, SOLUTION SUBCUTANEOUS at 00:40

## 2019-04-13 RX ADMIN — INSULIN LISPRO 6 UNITS: 100 INJECTION, SOLUTION INTRAVENOUS; SUBCUTANEOUS at 09:32

## 2019-04-13 RX ADMIN — LEVOTHYROXINE SODIUM 88 MCG: 88 TABLET ORAL at 05:50

## 2019-04-13 ASSESSMENT — PAIN SCALES - GENERAL
PAINLEVEL_OUTOF10: 0

## 2019-04-13 NOTE — DISCHARGE INSTR - DIET

## 2019-04-13 NOTE — PROGRESS NOTES
4 Eyes Skin Assessment    Mitch Villagran is being assessed upon: Admission    I agree that I, Marlena Yañez, along with Rena Calle RN have performed a thorough Head to Toe Skin Assessment on the patient. ALL assessment sites listed below have been assessed. Areas assessed by both nurses:     [x]   Head, Face, and Ears   [x]   Shoulders, Back, and Chest  [x]   Arms, Elbows, and Hands   [x]   Coccyx, Sacrum, and Ischium  [x]   Legs, Feet, and Heels    Does the Patient have Skin Breakdown? No    Armani Prevention initiated: Yes  Wound Care Orders initiated: NA    [facility] nurse consulted for Pressure Injury (Stage 3,4, Unstageable, DTI, NWPT, and Complex wounds) and New or Established Ostomies: NA        Primary Nurse eSignature:  Marlena Yañez RN on 4/13/2019 at 4:00 AM      Co-Signer eSignature: Electronically signed by Richi Dockery RN on 4/13/2019 at 4:05 AM

## 2019-04-13 NOTE — DISCHARGE SUMMARY
discharge summary is in conjunction with any daily progress note from day of discharge. History of Present Illness: The patient was seen in the office on 4/3/2019 by Isaura BUSTILLOS and the following was noted:    \"Referral back to Dr Sharon Jernigan for unstable angina.      1. Chest pain, unspecified type    2. Coronary artery disease involving native coronary artery of native heart without angina pectoris    3. Essential hypertension          Subjective:  Patient presents to clinic today to reestablish cardiac care with Dr. Sharon Jernigan. Patient has a history of coronary artery disease, hypertension, hyperlipidemia, and diabetes. Patient states that about 12-13 years ago she did undergo a cardiac catheterization for chest pain with Dr. Sharon Jernigan and did have 3 stents placed. Patient states she has noted the last couple months chest pain with exertion, described as a crushing sensation in her chest. There is no radiation, diaphoresis nor nausea. She does get short of breath with the chest pain. Chest pain is relieved with rest. Last episode was a couple days ago where she felt that she might pass out from the chest discomfort, she took one sublingual nitro and obtained relief. Patient states this is her same anginal equivalent where she had the 3 stents placed years ago. \"     Additionally,  It was noted by the same observer at that time:    \"  1. Unstable angina   Initial Encounter     EKG in the office today sinus rhythm with a heart rate of 86 bpm with ST T wave abnormalities. Given patient's complaints of chest pain and history of coronary artery disease with this being her anginal equivalent will schedule cardiac catheterization with Dr. Sharon Jernigan. Patient was instructed to report to the ER should symptoms worsen before cardiac catheterization. Patient taking baby aspirin daily              2.  CAD  Initial Encounter    Last cath 3/2015 Patent stent to LAD, Patent stent to Diagonal         \"    She was electively admitted for cardiac catheterization. Hospital Course: The patient underwent cardiac catheterization according to the following criteria:  4/3/2019  Office visit \"Given patient's complaints of chest pain and history of coronary artery disease with this being her anginal equivalent. [place] Broach Mary Coulter \" Pankaj TRESSA Briggs), AUC indication 55, AUC score 8 , Diagnostic Catheterization Appropriate Use Criteria Description, [facility] 2012;59:[id number]      . Selective left heart and coronary arteriography was preformed, which revealed:    1. Successful femoral artery ultrasound  2. Successful femoral artery arteriogram  3. Supervision of the administration of moderate conscious sedation  4. Mild coronary artery disease  5. Left ventricular function is normal  6. Patent bifurcating stents in the proximal LAD and diagonal.      There were no apparent complications. In view of the stable coronary anatomy, I felt that an attempt of intense risk factor modification and medical therapy would be the initial course of therapy. The rest of the patient's hospitalization was uneventful. She was discharged home on medical therapy with outpatient follow up. Consults:     None      Significant Diagnostic Studies:    1. Selective left heart and coronary arteriography, (femoral approach), 4/13/2019       Significant Therapeutic Endeavors:      1. none      Activity: activity as directed per discharge instructions. Diet:  Cardiac diet    Labs:  For convenience and continuity at follow-up the following most recent labs are provided:    CBC:    Lab Results   Component Value Date    WBC 9.5 04/12/2019    HGB 11.0 04/12/2019    HCT 33.7 04/12/2019    HCT 40.0 01/21/2011     04/12/2019     01/21/2011       Renal:    Lab Results   Component Value Date     04/13/2019    K 4.5 04/13/2019    K 4.5 06/18/2018     04/13/2019    CO2 20 04/13/2019    BUN 23 04/13/2019    CREATININE 1.5 04/13/2019    CALCIUM 8.2 04/13/2019         Discharge Medications:     Current Discharge Medication List           Details   HYDROcodone-acetaminophen (NORCO) 5-325 MG per tablet Refills: 0      aspirin 81 MG tablet Take 81 mg by mouth daily      tiZANidine (ZANAFLEX) 4 MG tablet Take 1 tablet by mouth every 8 hours as needed (muscle spasms)  Qty: 90 tablet, Refills: 3    Comments: Please consider 90 day supplies to promote better adherence  Associated Diagnoses: Muscle spasm      LYRICA 150 MG capsule TAKE 1 CAPSULE BY MOUTH TWICE DAILY  Qty: 60 capsule, Refills: 3    Associated Diagnoses: Diabetic polyneuropathy associated with type 2 diabetes mellitus ([facility])      NOVOLOG FLEXPEN 100 UNIT/ML injection pen INJECT 20 UNITS SUB Q THREE TIMES DAILY WITH MEALS BASED UPON RATIO AND CORRECTIVE FACTOR. AVERAGE 60 UNITS DAILY. Qty: 18 pen, Refills: 3    Comments: Please consider 90 day supplies to promote better adherence  Associated Diagnoses: Type 2 diabetes mellitus with hyperglycemia, with long-term current use of insulin ([facility])      insulin glargine (LANTUS SOLOSTAR) 100 UNIT/ML injection pen Inject 22 Units into the skin nightly  Qty: 5 pen, Refills: 11    Comments: Please consider 90 day supplies to promote better adherence  Associated Diagnoses: Type 2 diabetes mellitus with diabetic polyneuropathy, with long-term current use of insulin ([facility] Type 2 diabetes mellitus with hyperglycemia, with long-term current use of insulin ([facility])      amitriptyline (ELAVIL) 100 MG tablet Take 2 tablets by mouth nightly  Qty: 60 tablet, Refills: 5    Associated Diagnoses: Situational anxiety; Insomnia due to other mental disorder; Bipolar disorder, in full remission, most recent episode mixed ([facility] [address] [facility]  Adjustment insomnia      fluticasone (FLONASE) 50 MCG/ACT nasal spray 2 sprays by Nasal route daily  Qty: 1 Bottle, Refills: 11    Comments: Please consider 90 day supplies to promote better adherence      DULoxetine (CYMBALTA) 60 MG extended release capsule long-term current use of insulin ([facility])      blood glucose test strips (FREESTYLE LITE) strip 1 each by In Vitro route 4 times daily As needed. Qty: 100 each, Refills: 3    Associated Diagnoses: Type 2 diabetes mellitus with diabetic polyneuropathy, with long-term current use of insulin ([facility])      linaclotide (LINZESS) 290 MCG CAPS capsule Take 1 capsule by mouth every morning (before breakfast)  Qty: 30 capsule, Refills: 11    Comments: Please consider 90 day supplies to promote better adherence  Associated Diagnoses: Chronic idiopathic constipation               Condition At Discharge:  Improved    Disposition:      1. Home  2. Follow up with cardiology as arranged  3. Follow up with primary care provider as arranged      [id number] Cherie Del Rosario with me to discharge after the bedrest is complete, hemostatis is achieved, the patient is hemodynamically stable and comfortable. Please remind the patient that driving is absolutely prohibited for the next day (24 hours) after this procedure. Additionally, caution should be exercised to avoid heights, swimming, tub baths, open flames, or heavy machinery.         Electronically signed by Saleem Gregory MD on 4/13/19

## 2019-04-13 NOTE — PLAN OF CARE
Problem: Bleeding:  Goal: Will show no signs and symptoms of excessive bleeding  Description  Will show no signs and symptoms of excessive bleeding  4/13/2019 1232 by Radha Ayala RN  Outcome: Ongoing  4/13/2019 0147 by Desmond Zurita RN  Outcome: Ongoing     Problem: Discharge Planning:  Goal: Discharged to appropriate level of care  Description  Discharged to appropriate level of care  Outcome: Ongoing

## 2019-04-13 NOTE — DISCHARGE INSTR - ACTIVITY
No heavy lifting greater than five pounds. No pushing or pulling. No vigorous activity or straining. Avoid stairs unless necessary.

## 2019-04-13 NOTE — PROCEDURES
Cardiac Risk Profile -         Risk Factor Yes / No / Unknown       Gender Female   Cigarette Use No   Family History of Cardiovascular Disease Yes: heart attack, high blood pressure, heart disease   Diabetes Mellitus yes   Hypercholesteremia yes   Hypertension yes        Prior Cardiac History -    1/25/2005  Cath  stent to LAD and diag, normal LVFX  2/7/2005  Cath  Patent stent in the LAD and diag, normal LVFX  9/17/2005  Cath  80% LAD, mild anterior and lateral hypokinesis, EF 45%, with stent to the LAD  5/9/2007  cardiolite  negative for myocardial ischemia, EF 96%  7/6/2007  SE  negative for myocardial ischemia  3/21/2008  cardiolite  negative for myocardial ischemia, EF 48%  11/15/2008  cardiolite  negative for myocardial ischemia, EF 88%  8/11/2009  SE  negative for myocardial ischemia  2/12/2012  cardiolite negative for myocardial ischemia, EF 65%  03/16/2015  lexiscan  Positive for inferior posterior lateral myocardial ischemia, EF 73%, 5/0% ischemic myocardium on stress, intermediate risk findings, AUC indication 16, AUC score 7  7/29/2016  lexiscan negative for myocardial ischemia, EF 79  %   4/3/2019  Office visit \"Given patient's complaints of chest pain and history of coronary artery disease with this being her anginal equivalent. Claudette Carlos Claudette Carlos \" ANNIE Ventura, AUC indication 55, AUC score 8    4/12/19  Cath  Patent stents in the LAD and diag, o/w luminals, normal LVFX    Indications for Cardiac Catheterization -  4/3/2019  Office visit \"Given patient's complaints of chest pain and history of coronary artery disease with this being her anginal equivalent. Claudette Carlos Claudette Carlos \" Tita Moore, TRESSA), AUC indication 55, AUC score 8  , Diagnostic Catheterization Appropriate Use Criteria Description, [facility] 2012;59:[id number]      Conscious Sedation Protocol Used During this Procedure -          Anesthesia: Moderate   Sedation: 1 mg Midazolam (Versed)  50 mcg Fentanyl   Start time: 20:21   Stop time: 20:33   ASA Class:  III       mmHg   Left ventricular end-diastolic pressure (LVEDP) 17 mmHg   Aortic pressure 121/68 mmHg   Mean aortic pressure 93 mmHg       Angiography:    Coronary Arteries:    Left Main Coronary Artery:  A large vessel which arises from the left sinus of Valsalva. It divides into the left anterior descending coronary artery and the left circumflex. There is mild diffuse disease throughout the entire length of the vessel. Left Anterior Coronary Artery:  The LAD is a moderate sized vessel with several diagonal branches. There is mild diffuse disease throughout the entire length of the vessel. The bifurcating stents in the proximal LAD and diagonal are widely patent. Left Circumflex Coronary Artery:  The LCx is a moderate sized dominant vessel with several marginal branches. There is mild diffuse disease throughout the entire length of the vessel. Right Coronary Artery:  The RCA is a small non dominant vessel which arises from the right sinus of Valsalva. There is mild diffuse disease throughout the entire length of the vessel. Left Ventriculogram:  The left ventriculogram is obtained in the right oblique projection. All regional wall segments move appropriately. The estimated visual ejection fraction is > 60%. There is no mitral regurgitation nor is there a pull back gradient seen across the aortic valve. Summary:      1. Successful femoral artery ultrasound  2. Successful femoral artery arteriogram  3. Supervision of the administration of moderate conscious sedation  4. Mild coronary artery disease  5. Left ventricular function is normal  6. Patent bifurcating stents in the proximal LAD and diagonal      RECOMMENDATIONS:    1.  Reassurance  2. Risk factor modification  3. Evaluation of etiologies of non cardiac chest discomfort should symptoms persist or progress  4. Follow  Up with Primary care provider as arranged  5.   Continue IVF overnight        Electronically signed by Malissa Skelton Mae Mustafa MD on 4/12/19

## 2019-04-15 ENCOUNTER — TELEPHONE (OUTPATIENT)
Dept: PRIMARY CARE CLINIC | Age: 59
End: 2019-04-15

## 2019-04-15 LAB
LV EF: 79 %
LVEF MODALITY: NORMAL

## 2019-04-15 NOTE — TELEPHONE ENCOUNTER
[facility] Transitions Initial Follow Up Call    Outreach made within 2 business days of discharge: Yes    Patient: Nikkie Tapia Patient : 1960   MRN: [id number]  Reason for Admission: There are no discharge diagnoses documented for the most recent discharge. Discharge Date: 19       Spoke with: patient    Discharge department/facility:  to [facility]     TCM Interactive Patient Contact:  Was patient able to fill all prescriptions: Yes  Was patient instructed to bring all medications to the follow-up visit: Yes  Is patient taking all medications as directed in the discharge summary? Yes  Does patient understand their discharge instructions: Yes  Does patient have questions or concerns that need addressed prior to 7-14 day follow up office visit: yes     Scheduled appointment with PCP within 7-14 days    Follow Up  Future Appointments   Date Time Provider Sita Tong   2019 10:00 AM [address]   2019 10:30 AM MAXIMUS Yi [facility] [place]   2019 11:00 AM Macrina Loja MD [facility] [place]   5/3/2019  2:00 PM TRESSA Richter NP [facility] [place]   2019  9:00 AM DO Jaona Carias [place]       Jennifer Sena

## 2019-04-16 ENCOUNTER — OFFICE VISIT (OUTPATIENT)
Dept: PRIMARY CARE CLINIC | Age: 59
End: 2019-04-16
Payer: MEDICARE

## 2019-04-16 VITALS
OXYGEN SATURATION: 99 % | WEIGHT: 236.5 LBS | HEIGHT: 60 IN | SYSTOLIC BLOOD PRESSURE: 116 MMHG | DIASTOLIC BLOOD PRESSURE: 90 MMHG | BODY MASS INDEX: 46.43 KG/M2 | HEART RATE: 77 BPM | TEMPERATURE: 98.8 F

## 2019-04-16 DIAGNOSIS — E11.42 TYPE 2 DIABETES MELLITUS WITH DIABETIC POLYNEUROPATHY, WITH LONG-TERM CURRENT USE OF INSULIN (HCC): ICD-10-CM

## 2019-04-16 DIAGNOSIS — I10 ESSENTIAL HYPERTENSION: ICD-10-CM

## 2019-04-16 DIAGNOSIS — I25.118 CORONARY ARTERY DISEASE INVOLVING NATIVE HEART WITH OTHER FORM OF ANGINA PECTORIS, UNSPECIFIED VESSEL OR LESION TYPE (HCC): ICD-10-CM

## 2019-04-16 DIAGNOSIS — N18.32 CHRONIC KIDNEY DISEASE (CKD) STAGE G3B/A2, MODERATELY DECREASED GLOMERULAR FILTRATION RATE (GFR) BETWEEN 30-44 ML/MIN/1.73 SQUARE METER AND ALBUMINURIA CREATININE RATIO BETWEEN 30-299 MG/G (HCC): ICD-10-CM

## 2019-04-16 DIAGNOSIS — E03.9 ACQUIRED HYPOTHYROIDISM: ICD-10-CM

## 2019-04-16 DIAGNOSIS — Z09 HOSPITAL DISCHARGE FOLLOW-UP: Primary | ICD-10-CM

## 2019-04-16 DIAGNOSIS — Z79.4 TYPE 2 DIABETES MELLITUS WITH DIABETIC POLYNEUROPATHY, WITH LONG-TERM CURRENT USE OF INSULIN (HCC): ICD-10-CM

## 2019-04-16 DIAGNOSIS — I20.8 ANGINAL EQUIVALENT (HCC): ICD-10-CM

## 2019-04-16 DIAGNOSIS — E78.2 MIXED HYPERLIPIDEMIA: ICD-10-CM

## 2019-04-16 PROCEDURE — 1111F DSCHRG MED/CURRENT MED MERGE: CPT | Performed by: PEDIATRICS

## 2019-04-16 PROCEDURE — 99495 TRANSJ CARE MGMT MOD F2F 14D: CPT | Performed by: PEDIATRICS

## 2019-04-16 ASSESSMENT — ENCOUNTER SYMPTOMS
GASTROINTESTINAL NEGATIVE: 1
RESPIRATORY NEGATIVE: 1
ALLERGIC/IMMUNOLOGIC NEGATIVE: 1
EYES NEGATIVE: 1

## 2019-04-16 NOTE — PROGRESS NOTES
Post-Discharge Transitional Care Management Services or Hospital Follow Up      Edmund Caballero   YOB: 1960    Date of Office Visit:  4/16/2019  Date of Hospital Admission: 4/12/19  Date of Hospital Discharge: 4/13/19  Readmission Risk Score(high >=14%. Medium >=10%):Readmission Risk Score: 0      Care management risk score Rising risk (score 2-5) and Complex Care (Scores >=6): 14     Non face to face  following discharge, date last encounter closed (first attempt may have been earlier): 4/15/2019  9:11 AM 4/15/2019  9:11 AM    Call initiated 2 business days of discharge: Yes     Patient Active Problem List   Diagnosis    CAD (coronary artery disease)    Obesity    DM (diabetes mellitus)    Hypertension    Polyarticular arthritis    Hypothyroidism    GERD (gastroesophageal reflux disease)    Hyperlipidemia    Insomnia    Anxiety    Insomnia    Bipolar disorder ([facility] [address]    Cataract    Thyroid nodule    Personal history of diabetic foot ulcer    B12 deficiency    Chronic kidney disease (CKD) stage G3b/A2, moderately decreased glomerular filtration rate (GFR) between 30-44 mL/min/1.73 square meter and albuminuria creatinine ratio between  mg/g (HCC)    Primary osteoarthritis of left knee    Iron deficiency anemia    Type 2 diabetes mellitus with diabetic polyneuropathy, with long-term current use of insulin (HCC)    Ankle fracture, right, open type I or II, initial encounter    Hyperkalemia    Closed fracture of distal end of left radius with routine healing    Open right ankle fracture, sequela    Gait abnormality    Anginal equivalent (HCC)       Allergies   Allergen Reactions    Dilaudid [Hydromorphone Hcl] Other (See Comments)     Takes pt out of her mind. hallunications.  Pt stateshas taken this hospitalization and has not had any problems       Medications listed as ordered at the time of discharge from hospital   Andrew HIGGINS \"Grey\"   Home Medication Instructions ENA:    Printed on:04/16/19 3074   Medication Information                      amitriptyline (ELAVIL) 100 MG tablet  Take 2 tablets by mouth nightly             aspirin 81 MG tablet  Take 81 mg by mouth daily             atorvastatin (LIPITOR) 40 MG tablet  TAKE ONE TABLET BY MOUTH ONCE DAILY             Blood Glucose Monitoring Suppl (FREESTYLE LITE) JEZ  1 Device by Does not apply route 4 times daily             blood glucose test strips (FREESTYLE LITE) strip  1 each by In Vitro route 4 times daily As needed. DULoxetine (CYMBALTA) 60 MG extended release capsule  Take 1 capsule by mouth 2 times daily             fluticasone (FLONASE) 50 MCG/ACT nasal spray  2 sprays by Nasal route daily             furosemide (LASIX) 40 MG tablet  Take 40 mg by mouth daily              HYDROcodone-acetaminophen (NORCO) 5-325 MG per tablet  Take 1 tablet by mouth 2 times daily. insulin glargine (LANTUS SOLOSTAR) 100 UNIT/ML injection pen  Inject 22 Units into the skin nightly             Insulin Pen Needle 31G X 8 MM MISC  1 each by Does not apply route 4 times daily             levothyroxine (SYNTHROID) 88 MCG tablet  Take 1 tablet by mouth Daily             linaclotide (LINZESS) 290 MCG CAPS capsule  Take 1 capsule by mouth every morning (before breakfast)             LYRICA 150 MG capsule  TAKE 1 CAPSULE BY MOUTH TWICE DAILY             metoprolol succinate (TOPROL XL) 50 MG extended release tablet  Take 1 tablet by mouth daily             NOVOLOG FLEXPEN 100 UNIT/ML injection pen  INJECT 20 UNITS SUB Q THREE TIMES DAILY WITH MEALS BASED UPON RATIO AND CORRECTIVE FACTOR. AVERAGE 60 UNITS DAILY.              pantoprazole (PROTONIX) 40 MG tablet  TAKE ONE TABLET BY MOUTH ONCE DAILY             QUEtiapine (SEROQUEL) 200 MG tablet  Take 1 tablet by mouth nightly             tiZANidine (ZANAFLEX) 4 MG tablet  Take 1 tablet by mouth every 8 hours as needed (muscle spasms)             valsartan (DIOVAN) 80 MG tablet  Take 1 tablet by mouth nightly                   Medications marked \"taking\" at this time  Outpatient Medications Marked as Taking for the 4/16/19 encounter (Office Visit) with LAZARO Corrales DO   Medication Sig Dispense Refill    HYDROcodone-acetaminophen (NORCO) 5-325 MG per tablet Take 1 tablet by mouth 2 times daily. 0    aspirin 81 MG tablet Take 81 mg by mouth daily      tiZANidine (ZANAFLEX) 4 MG tablet Take 1 tablet by mouth every 8 hours as needed (muscle spasms) 90 tablet 3    Insulin Pen Needle 31G X 8 MM MISC 1 each by Does not apply route 4 times daily 200 each 11    LYRICA 150 MG capsule TAKE 1 CAPSULE BY MOUTH TWICE DAILY 60 capsule 3    NOVOLOG FLEXPEN 100 UNIT/ML injection pen INJECT 20 UNITS SUB Q THREE TIMES DAILY WITH MEALS BASED UPON RATIO AND CORRECTIVE FACTOR. AVERAGE 60 UNITS DAILY. (Patient taking differently: INJECT 20 UNITS SUB Q THREE TIMES DAILY WITH MEALS BASED UPON RATIO AND CORRECTIVE FACTOR. AVERAGE 45 - 60 UNITS DAILY. ) 18 pen 3    insulin glargine (LANTUS SOLOSTAR) 100 UNIT/ML injection pen Inject 22 Units into the skin nightly 5 pen 11    amitriptyline (ELAVIL) 100 MG tablet Take 2 tablets by mouth nightly 60 tablet 5    fluticasone (FLONASE) 50 MCG/ACT nasal spray 2 sprays by Nasal route daily 1 Bottle 11    DULoxetine (CYMBALTA) 60 MG extended release capsule Take 1 capsule by mouth 2 times daily (Patient taking differently: Take 60 mg by mouth daily ) 60 capsule 11    QUEtiapine (SEROQUEL) 200 MG tablet Take 1 tablet by mouth nightly 30 tablet 5    levothyroxine (SYNTHROID) 88 MCG tablet Take 1 tablet by mouth Daily 90 tablet 3    furosemide (LASIX) 40 MG tablet Take 40 mg by mouth daily       valsartan (DIOVAN) 80 MG tablet Take 1 tablet by mouth nightly 30 tablet 11    Blood Glucose Monitoring Suppl (FREESTYLE LITE) JEZ 1 Device by Does not apply route 4 times daily 1 Device 0    blood glucose test strips (FREESTYLE LITE) strip 1 each by In Vitro route 4 times daily As needed. 100 each 3    linaclotide (LINZESS) 290 MCG CAPS capsule Take 1 capsule by mouth every morning (before breakfast) (Patient taking differently: Take 290 mcg by mouth every other day ) 30 capsule 11    pantoprazole (PROTONIX) 40 MG tablet TAKE ONE TABLET BY MOUTH ONCE DAILY 90 tablet 3    atorvastatin (LIPITOR) 40 MG tablet TAKE ONE TABLET BY MOUTH ONCE DAILY (Patient taking differently: TAKE ONE TABLET BY MOUTH ONCE NIGHTLY) 90 tablet 3    metoprolol succinate (TOPROL XL) 50 MG extended release tablet Take 1 tablet by mouth daily 30 tablet 11        Medications patient taking as of now reconciled against medications ordered at time of hospital discharge: Yes    Chief Complaint   Patient presents with    Follow-Up from Hospital     Pt seen at [facility] ER on 4/2/19 for r ankle pain and swelling. Had surgery on this in the past with extensive hardware placement. No recent injury. Xrays normal. Abx given for possible staph due to pt being high risk pt. Pt was also seen by [facility] on 4/3/19 and scheduled for Heart Cath on 4/12/19. She was dc'd on 4/13/19 after stent placement. Pt states she was kept overnight because of her kidneys. Pt reports she is feeling tired and a little tender in the groin.  Health Maintenance     Hep C screen, Shingles shot, Diabetic eye exam(due)     Diabetes     Pt reports her blood sugar has been okay. Didn't check it this morning. Not dreadfully high.  Memory Loss     can't remember anything and gets side tracked easy. HPI  Follow-Up from Hospital (Pt seen at [facility] ER on 4/2/19 for r ankle pain and swelling. Had surgery on this in the past with extensive hardware placement. No recent injury. Xrays normal. Abx given for possible staph due to pt being high risk pt. Pt was also seen by [facility] on 4/3/19 and scheduled for Heart Cath on 4/12/19.  She was dc'd on 4/13/19 after stent placement. Pt states she was kept overnight because of her kidneys. Pt reports she is feeling tired and a little tender in the groin. ); Health Maintenance (Hep C screen, Shingles shot, Diabetic eye exam(due) ); Diabetes (Pt reports her blood sugar has been okay. Didn't check it this morning. Not dreadfully high. ); and Memory Loss (can't remember anything and gets side tracked easy. )    Patient presents on follow-up from multiple visits to the emergency department the recent past.  Initially he was secondary to her ankle which is minimal long-term problem for her. Her diagnosis was swelling of the right ankle  She was put on Bactrim as prophylaxis for staphylococcal infection according to the note. As also given prophylactic Diflucan. She was seen in the office by cardiology and was noted to have symptoms of unstable angina. This was on April 3, 2019. She was set up for heart cath which was performed on 4/12/2019  This showed diffuse disease but no significant occlusions. She had 3 previously placed stents that are all patent. Was kept overnight secondary to a creatinine of 1.8 on the day of her heart cath. On the 13th creatinine had come down to 1.5. Most likely is secondary to dye load as well as her treatment from Bactrim which was still ongoing from the emergency department as noted above    Inpatient course: Discharge summary reviewed- see chart. As above. Interval history/Current status:   Since discharge from the hospital:  She has been doing well. Her ankle is stable. She has not had any chest pain. She has been taking it easy. She is trying to get some weight off. Weight is up 2lb in the past 3 days. She is still a little tender in R groin. She is voiding normally  No problems there. She is asymptomatic for any renal issues. She has not gotten her lab work done. She is not fasting today. Review of Systems   Constitutional: Negative. HENT: Negative.     Eyes: Negative. Respiratory: Negative. Cardiovascular: Negative. Gastrointestinal: Negative. Endocrine: Negative. Genitourinary: Negative. No symptoms of yeast infection. Musculoskeletal: Positive for arthralgias (R ankle pain). Skin: Negative. Allergic/Immunologic: Negative. Neurological: Negative. Hematological: Negative. Psychiatric/Behavioral: Negative. Vitals:    04/16/19 1053   BP: (!) 116/90   Site: Right Upper Arm   Position: Sitting   Cuff Size: Thigh   Pulse: 77   Temp: 98.8 °F (37.1 °C)   TempSrc: Temporal   SpO2: 99%   Weight: 236 lb 8 oz (107.3 kg)   Height: 5' (1.524 m)     Body mass index is 46.19 kg/m². Wt Readings from Last 3 Encounters:   04/16/19 236 lb 8 oz (107.3 kg)   04/13/19 234 lb 9.6 oz (106.4 kg)   04/03/19 241 lb (109.3 kg)     BP Readings from Last 3 Encounters:   04/16/19 (!) 116/90   04/13/19 103/71   04/03/19 132/82       Physical Exam   Constitutional: She is oriented to person, place, and time. She appears well-developed and well-nourished. She is cooperative. Non-toxic appearance. No distress. Body habitus is ob   HENT:   Head: Normocephalic and atraumatic. Right Ear: Hearing, tympanic membrane, external ear and ear canal normal.   Left Ear: Hearing, tympanic membrane, external ear and ear canal normal.   Nose: Nose normal.   Mouth/Throat: Oropharynx is clear and moist and mucous membranes are normal. No oropharyngeal exudate. Eyes: Pupils are equal, round, and reactive to light. Conjunctivae, EOM and lids are normal. Right eye exhibits no discharge. Left eye exhibits no discharge. Neck: Normal range of motion and phonation normal. Neck supple. No JVD present. Carotid bruit is not present. No thyromegaly present. Cardiovascular: Normal rate, regular rhythm, normal heart sounds and intact distal pulses. No extrasystoles are present. PMI is not displaced. Exam reveals no gallop and no friction rub.    No murmur maintain excellent control.     7. Acquired hypothyroidism  Normalized as per 12/11/18        Medical Decision Making: moderate complexity

## 2019-04-30 DIAGNOSIS — N18.32 CHRONIC KIDNEY DISEASE (CKD) STAGE G3B/A2, MODERATELY DECREASED GLOMERULAR FILTRATION RATE (GFR) BETWEEN 30-44 ML/MIN/1.73 SQUARE METER AND ALBUMINURIA CREATININE RATIO BETWEEN 30-299 MG/G (HCC): ICD-10-CM

## 2019-04-30 DIAGNOSIS — E78.2 MIXED HYPERLIPIDEMIA: ICD-10-CM

## 2019-04-30 DIAGNOSIS — E03.9 ACQUIRED HYPOTHYROIDISM: ICD-10-CM

## 2019-04-30 DIAGNOSIS — E11.42 TYPE 2 DIABETES MELLITUS WITH DIABETIC POLYNEUROPATHY, WITH LONG-TERM CURRENT USE OF INSULIN (HCC): ICD-10-CM

## 2019-04-30 DIAGNOSIS — Z79.4 TYPE 2 DIABETES MELLITUS WITH DIABETIC POLYNEUROPATHY, WITH LONG-TERM CURRENT USE OF INSULIN (HCC): ICD-10-CM

## 2019-04-30 DIAGNOSIS — I10 ESSENTIAL HYPERTENSION: ICD-10-CM

## 2019-04-30 LAB
ALBUMIN SERPL-MCNC: 3.8 G/DL (ref 3.5–5.2)
ALP BLD-CCNC: 173 U/L (ref 35–104)
ALT SERPL-CCNC: 16 U/L (ref 5–33)
ANION GAP SERPL CALCULATED.3IONS-SCNC: 21 MMOL/L (ref 7–19)
AST SERPL-CCNC: 19 U/L (ref 5–32)
BASOPHILS ABSOLUTE: 0.1 K/UL (ref 0–0.2)
BASOPHILS RELATIVE PERCENT: 0.7 % (ref 0–1)
BILIRUB SERPL-MCNC: 0.4 MG/DL (ref 0.2–1.2)
BUN BLDV-MCNC: 23 MG/DL (ref 6–20)
CALCIUM SERPL-MCNC: 9.4 MG/DL (ref 8.6–10)
CHLORIDE BLD-SCNC: 93 MMOL/L (ref 98–111)
CHOLESTEROL, TOTAL: 155 MG/DL (ref 160–199)
CO2: 22 MMOL/L (ref 22–29)
CREAT SERPL-MCNC: 1.7 MG/DL (ref 0.5–0.9)
CREATININE URINE: 56.3 MG/DL (ref 4.2–622)
EOSINOPHILS ABSOLUTE: 0.2 K/UL (ref 0–0.6)
EOSINOPHILS RELATIVE PERCENT: 2.5 % (ref 0–5)
GFR NON-AFRICAN AMERICAN: 31
GLUCOSE BLD-MCNC: 235 MG/DL (ref 74–109)
HBA1C MFR BLD: 7.7 % (ref 4–6)
HCT VFR BLD CALC: 41 % (ref 37–47)
HDLC SERPL-MCNC: 42 MG/DL (ref 65–121)
HEMOGLOBIN: 12.9 G/DL (ref 12–16)
LDL CHOLESTEROL CALCULATED: 71 MG/DL
LYMPHOCYTES ABSOLUTE: 1.8 K/UL (ref 1.1–4.5)
LYMPHOCYTES RELATIVE PERCENT: 20.7 % (ref 20–40)
MCH RBC QN AUTO: 29.9 PG (ref 27–31)
MCHC RBC AUTO-ENTMCNC: 31.5 G/DL (ref 33–37)
MCV RBC AUTO: 94.9 FL (ref 81–99)
MICROALBUMIN UR-MCNC: <1.2 MG/DL (ref 0–19)
MICROALBUMIN/CREAT UR-RTO: NORMAL MG/G
MONOCYTES ABSOLUTE: 0.9 K/UL (ref 0–0.9)
MONOCYTES RELATIVE PERCENT: 9.9 % (ref 0–10)
NEUTROPHILS ABSOLUTE: 5.8 K/UL (ref 1.5–7.5)
NEUTROPHILS RELATIVE PERCENT: 65.7 % (ref 50–65)
PDW BLD-RTO: 14.6 % (ref 11.5–14.5)
PLATELET # BLD: 296 K/UL (ref 130–400)
PMV BLD AUTO: 11.7 FL (ref 9.4–12.3)
POTASSIUM SERPL-SCNC: 4.8 MMOL/L (ref 3.5–5)
RBC # BLD: 4.32 M/UL (ref 4.2–5.4)
SODIUM BLD-SCNC: 136 MMOL/L (ref 136–145)
T4 FREE: 1.1 NG/DL (ref 0.9–1.7)
TOTAL PROTEIN: 7.5 G/DL (ref 6.6–8.7)
TRIGL SERPL-MCNC: 210 MG/DL (ref 0–149)
TSH SERPL DL<=0.05 MIU/L-ACNC: 1.57 UIU/ML (ref 0.27–4.2)
WBC # BLD: 8.9 K/UL (ref 4.8–10.8)

## 2019-05-01 DIAGNOSIS — F41.8 SITUATIONAL ANXIETY: ICD-10-CM

## 2019-05-01 DIAGNOSIS — F51.02 ADJUSTMENT INSOMNIA: ICD-10-CM

## 2019-05-01 DIAGNOSIS — F31.78 BIPOLAR DISORDER, IN FULL REMISSION, MOST RECENT EPISODE MIXED (HCC): ICD-10-CM

## 2019-05-01 DIAGNOSIS — F33.41 RECURRENT MAJOR DEPRESSIVE DISORDER, IN PARTIAL REMISSION (HCC): ICD-10-CM

## 2019-05-01 DIAGNOSIS — F51.05 INSOMNIA DUE TO OTHER MENTAL DISORDER: ICD-10-CM

## 2019-05-01 DIAGNOSIS — F99 INSOMNIA DUE TO OTHER MENTAL DISORDER: ICD-10-CM

## 2019-05-01 RX ORDER — AMITRIPTYLINE HYDROCHLORIDE 100 MG/1
200 TABLET, FILM COATED ORAL NIGHTLY
Qty: 180 TABLET | Refills: 3 | Status: SHIPPED | OUTPATIENT
Start: 2019-05-01 | End: 2019-05-03 | Stop reason: SDUPTHER

## 2019-05-01 RX ORDER — QUETIAPINE FUMARATE 200 MG/1
200 TABLET, FILM COATED ORAL NIGHTLY
Qty: 90 TABLET | Refills: 3 | Status: SHIPPED | OUTPATIENT
Start: 2019-05-01 | End: 2020-03-12 | Stop reason: SDUPTHER

## 2019-05-01 RX ORDER — ATORVASTATIN CALCIUM 40 MG/1
TABLET, FILM COATED ORAL
Qty: 90 TABLET | Refills: 3 | Status: SHIPPED | OUTPATIENT
Start: 2019-05-01 | End: 2020-03-12 | Stop reason: SDUPTHER

## 2019-05-03 ENCOUNTER — OFFICE VISIT (OUTPATIENT)
Dept: PRIMARY CARE CLINIC | Age: 59
End: 2019-05-03
Payer: MEDICARE

## 2019-05-03 VITALS
OXYGEN SATURATION: 97 % | HEIGHT: 60 IN | WEIGHT: 240.75 LBS | SYSTOLIC BLOOD PRESSURE: 124 MMHG | DIASTOLIC BLOOD PRESSURE: 80 MMHG | HEART RATE: 68 BPM | BODY MASS INDEX: 47.26 KG/M2 | TEMPERATURE: 97.4 F

## 2019-05-03 DIAGNOSIS — F51.02 ADJUSTMENT INSOMNIA: ICD-10-CM

## 2019-05-03 DIAGNOSIS — N18.32 CHRONIC KIDNEY DISEASE (CKD) STAGE G3B/A2, MODERATELY DECREASED GLOMERULAR FILTRATION RATE (GFR) BETWEEN 30-44 ML/MIN/1.73 SQUARE METER AND ALBUMINURIA CREATININE RATIO BETWEEN 30-299 MG/G (HCC): ICD-10-CM

## 2019-05-03 DIAGNOSIS — E78.2 MIXED HYPERLIPIDEMIA: ICD-10-CM

## 2019-05-03 DIAGNOSIS — E11.42 TYPE 2 DIABETES MELLITUS WITH DIABETIC POLYNEUROPATHY, WITH LONG-TERM CURRENT USE OF INSULIN (HCC): Primary | ICD-10-CM

## 2019-05-03 DIAGNOSIS — F31.78 BIPOLAR DISORDER, IN FULL REMISSION, MOST RECENT EPISODE MIXED (HCC): ICD-10-CM

## 2019-05-03 DIAGNOSIS — F99 INSOMNIA DUE TO OTHER MENTAL DISORDER: ICD-10-CM

## 2019-05-03 DIAGNOSIS — F51.05 INSOMNIA DUE TO OTHER MENTAL DISORDER: ICD-10-CM

## 2019-05-03 DIAGNOSIS — Z79.4 TYPE 2 DIABETES MELLITUS WITH DIABETIC POLYNEUROPATHY, WITH LONG-TERM CURRENT USE OF INSULIN (HCC): Primary | ICD-10-CM

## 2019-05-03 DIAGNOSIS — F41.8 SITUATIONAL ANXIETY: ICD-10-CM

## 2019-05-03 PROCEDURE — G8427 DOCREV CUR MEDS BY ELIG CLIN: HCPCS | Performed by: NURSE PRACTITIONER

## 2019-05-03 PROCEDURE — 2022F DILAT RTA XM EVC RTNOPTHY: CPT | Performed by: NURSE PRACTITIONER

## 2019-05-03 PROCEDURE — 1036F TOBACCO NON-USER: CPT | Performed by: NURSE PRACTITIONER

## 2019-05-03 PROCEDURE — G8417 CALC BMI ABV UP PARAM F/U: HCPCS | Performed by: NURSE PRACTITIONER

## 2019-05-03 PROCEDURE — 3017F COLORECTAL CA SCREEN DOC REV: CPT | Performed by: NURSE PRACTITIONER

## 2019-05-03 PROCEDURE — 99214 OFFICE O/P EST MOD 30 MIN: CPT | Performed by: NURSE PRACTITIONER

## 2019-05-03 PROCEDURE — G8598 ASA/ANTIPLAT THER USED: HCPCS | Performed by: NURSE PRACTITIONER

## 2019-05-03 PROCEDURE — 3045F PR MOST RECENT HEMOGLOBIN A1C LEVEL 7.0-9.0%: CPT | Performed by: NURSE PRACTITIONER

## 2019-05-03 RX ORDER — AMITRIPTYLINE HYDROCHLORIDE 100 MG/1
200 TABLET, FILM COATED ORAL NIGHTLY
Qty: 180 TABLET | Refills: 3 | Status: SHIPPED | OUTPATIENT
Start: 2019-05-03 | End: 2020-03-12 | Stop reason: SDUPTHER

## 2019-05-03 RX ORDER — FLASH GLUCOSE SENSOR
1 KIT MISCELLANEOUS CONTINUOUS
Qty: 1 EACH | Refills: 11 | Status: SHIPPED | OUTPATIENT
Start: 2019-05-03 | End: 2019-07-18

## 2019-05-03 ASSESSMENT — ENCOUNTER SYMPTOMS
BACK PAIN: 1
VOMITING: 0
ABDOMINAL PAIN: 0
COUGH: 0
DIARRHEA: 0
SHORTNESS OF BREATH: 0
CONSTIPATION: 0
WHEEZING: 0
NAUSEA: 0

## 2019-05-03 NOTE — PATIENT INSTRUCTIONS
Patient Education        Learning About Diabetes Food Guidelines  Your Care Instructions    Meal planning is important to manage diabetes. It helps keep your blood sugar at a target level (which you set with your doctor). You don't have to eat special foods. You can eat what your family eats, including sweets once in a while. But you do have to pay attention to how often you eat and how much you eat of certain foods. You may want to work with a dietitian or a certified diabetes educator (CDE) to help you plan meals and snacks. A dietitian or CDE can also help you lose weight if that is one of your goals. What should you know about eating carbs? Managing the amount of carbohydrate (carbs) you eat is an important part of healthy meals when you have diabetes. Carbohydrate is found in many foods. · Learn which foods have carbs. And learn the amounts of carbs in different foods. ? Bread, cereal, pasta, and rice have about 15 grams of carbs in a serving. A serving is 1 slice of bread (1 ounce), ½ cup of cooked cereal, or 1/3 cup of cooked pasta or rice. ? Fruits have 15 grams of carbs in a serving. A serving is 1 small fresh fruit, such as an apple or orange; ½ of a banana; ½ cup of cooked or canned fruit; ½ cup of fruit juice; 1 cup of melon or raspberries; or 2 tablespoons of dried fruit. ? Milk and no-sugar-added yogurt have 15 grams of carbs in a serving. A serving is 1 cup of milk or 2/3 cup of no-sugar-added yogurt. ? Starchy vegetables have 15 grams of carbs in a serving. A serving is ½ cup of mashed potatoes or sweet potato; 1 cup winter squash; ½ of a small baked potato; ½ cup of cooked beans; or ½ cup cooked corn or green peas. · Learn how much carbs to eat each day and at each meal. A dietitian or CDE can teach you how to keep track of the amount of carbs you eat. This is called carbohydrate counting. · If you are not sure how to count carbohydrate grams, use the Plate Method to plan meals.  It is a good, quick way to make sure that you have a balanced meal. It also helps you spread carbs throughout the day. ? Divide your plate by types of foods. Put non-starchy vegetables on half the plate, meat or other protein food on one-quarter of the plate, and a grain or starchy vegetable in the final quarter of the plate. To this you can add a small piece of fruit and 1 cup of milk or yogurt, depending on how many carbs you are supposed to eat at a meal.  · Try to eat about the same amount of carbs at each meal. Do not \"save up\" your daily allowance of carbs to eat at one meal.  · Proteins have very little or no carbs per serving. Examples of proteins are beef, chicken, turkey, fish, eggs, tofu, cheese, cottage cheese, and peanut butter. A serving size of meat is 3 ounces, which is about the size of a deck of cards. Examples of meat substitute serving sizes (equal to 1 ounce of meat) are 1/4 cup of cottage cheese, 1 egg, 1 tablespoon of peanut butter, and ½ cup of tofu. How can you eat out and still eat healthy? · Learn to estimate the serving sizes of foods that have carbohydrate. If you measure food at home, it will be easier to estimate the amount in a serving of restaurant food. · If the meal you order has too much carbohydrate (such as potatoes, corn, or baked beans), ask to have a low-carbohydrate food instead. Ask for a salad or green vegetables. · If you use insulin, check your blood sugar before and after eating out to help you plan how much to eat in the future. · If you eat more carbohydrate at a meal than you had planned, take a walk or do other exercise. This will help lower your blood sugar. What else should you know? · Limit saturated fat, such as the fat from meat and dairy products. This is a healthy choice because people who have diabetes are at higher risk of heart disease. So choose lean cuts of meat and nonfat or low-fat dairy products.  Use olive or canola oil instead of butter or shortening when cooking. · Don't skip meals. Your blood sugar may drop too low if you skip meals and take insulin or certain medicines for diabetes. · Check with your doctor before you drink alcohol. Alcohol can cause your blood sugar to drop too low. Alcohol can also cause a bad reaction if you take certain diabetes medicines. Follow-up care is a key part of your treatment and safety. Be sure to make and go to all appointments, and call your doctor if you are having problems. It's also a good idea to know your test results and keep a list of the medicines you take. Where can you learn more? Go to https://chpepiceweb.[employer]. org and sign in to your [employer] account. Enter Q190 in the SubtleData box to learn more about \"Learning About Diabetes Food Guidelines. \"     If you do not have an account, please click on the \"Sign Up Now\" link. Current as of: July 25, 2018  Content Version: 11.9  © 7051-1431 [employer]. Care instructions adapted under license by [facility] ([facility]). If you have questions about a medical condition or this instruction, always ask your healthcare professional. [address] any warranty or liability for your use of this information. Patient Education        Learning About Meal Planning for Diabetes  Why plan your meals? Meal planning can be a key part of managing diabetes. Planning meals and snacks with the right balance of carbohydrate, protein, and fat can help you keep your blood sugar at the target level you set with your doctor. You don't have to eat special foods. You can eat what your family eats, including sweets once in a while. But you do have to pay attention to how often you eat and how much you eat of certain foods. You may want to work with a dietitian or a certified diabetes educator. He or she can give you tips and meal ideas and can answer your questions about meal planning.  This health professional can also help you reach a healthy weight if that is one of your goals. What plan is right for you? Your dietitian or diabetes educator may suggest that you start with the plate format or carbohydrate counting. The plate format  The plate format is a simple way to help you manage how you eat. You plan meals by learning how much space each food should take on a plate. Using the plate format helps you spread carbohydrate throughout the day. It can make it easier to keep your blood sugar level within your target range. It also helps you see if you're eating healthy portion sizes. To use the plate format, you put non-starchy vegetables on half your plate. Add meat or meat substitutes on one-quarter of the plate. Put a grain or starchy vegetable (such as brown rice or a potato) on the final quarter of the plate. You can add a small piece of fruit and some low-fat or fat-free milk or yogurt, depending on your carbohydrate goal for each meal.  Here are some tips for using the plate format:  · Make sure that you are not using an oversized plate. A 9-inch plate is best. Many restaurants use larger plates. · Get used to using the plate format at home. Then you can use it when you eat out. · Write down your questions about using the plate format. Talk to your doctor, a dietitian, or a diabetes educator about your concerns. Carbohydrate counting  With carbohydrate counting, you plan meals based on the amount of carbohydrate in each food. Carbohydrate raises blood sugar higher and more quickly than any other nutrient. It is found in desserts, breads and cereals, and fruit. It's also found in starchy vegetables such as potatoes and corn, grains such as rice and pasta, and milk and yogurt. Spreading carbohydrate throughout the day helps keep your blood sugar levels within your target range.   Your daily amount depends on several things, including your weight, how active you are, which diabetes medicines you take, and what your goals are for your blood sugar levels. A registered dietitian or diabetes educator can help you plan how much carbohydrate to include in each meal and snack. A guideline for your daily amount of carbohydrate is:  · 45 to 60 grams at each meal. That's about the same as 3 to 4 carbohydrate servings. · 15 to 20 grams at each snack. That's about the same as 1 carbohydrate serving. The Nutrition Facts label on packaged foods tells you how much carbohydrate is in a serving of the food. First, look at the serving size on the food label. Is that the amount you eat in a serving? All of the nutrition information on a food label is based on that serving size. So if you eat more or less than that, you'll need to adjust the other numbers. Total carbohydrate is the next thing you need to look for on the label. If you count carbohydrate servings, one serving of carbohydrate is 15 grams. For foods that don't come with labels, such as fresh fruits and vegetables, you'll need a guide that lists carbohydrate in these foods. Ask your doctor, dietitian, or diabetes educator about books or other nutrition guides you can use. If you take insulin, you need to know how many grams of carbohydrate are in a meal. This lets you know how much rapid-acting insulin to take before you eat. If you use an insulin pump, you get a constant rate of insulin during the day. So the pump must be programmed at meals to give you extra insulin to cover the rise in blood sugar after meals. When you know how much carbohydrate you will eat, you can take the right amount of insulin. Or, if you always use the same amount of insulin, you need to make sure that you eat the same amount of carbohydrate at meals. If you need more help to understand carbohydrate counting and food labels, ask your doctor, dietitian, or diabetes educator. How do you get started with meal planning? Here are some tips to get started:  · Plan your meals a week at a time.  Don't forget to include snacks too.  · Use cookbooks or online recipes to plan several main meals. Plan some quick meals for busy nights. You also can double some recipes that freeze well. Then you can save half for other busy nights when you don't have time to cook. · Make sure you have the ingredients you need for your recipes. If you're running low on basic items, put these items on your shopping list too. · List foods that you use to make breakfasts, lunches, and snacks. List plenty of fruits and vegetables. · Post this list on the refrigerator. Add to it as you think of more things you need. · Take the list to the store to do your weekly shopping. Follow-up care is a key part of your treatment and safety. Be sure to make and go to all appointments, and call your doctor if you are having problems. It's also a good idea to know your test results and keep a list of the medicines you take. Where can you learn more? Go to https://thePlatformpeEXPO Communications.Paymetric. org and sign in to your [employer] Digital account. Enter K431 in the Synchronized box to learn more about \"Learning About Meal Planning for Diabetes. \"     If you do not have an account, please click on the \"Sign Up Now\" link. Current as of: July 25, 2018  Content Version: 11.9  © 5427-5367 [employer]. Care instructions adapted under license by [facility] ([facility]). If you have questions about a medical condition or this instruction, always ask your healthcare professional. [place] [address] any warranty or liability for your use of this information.

## 2019-05-03 NOTE — PROGRESS NOTES
[address]  [place], [address]  Phone [phone]   Fax [phone]      OFFICE VISIT: 5/3/2019    Janie Orozco- : 1960      Reason For Visit:  Brian Martini is a 63 y.o. femalewho is here for Follow-up (from labs drawn, and her skin is peeling on pads of finger. Started out from checking her blood sugar)         Health Maintenance      HPI      Patient is here for follow up on diabetes    Reports that she has noticed lots of peeling of her hands  She reports that it it new    She is wanting to try and get the [employer] monitor  Since she is insulin dependent  She reports she hasn't checked them in a month    She reports her fingers are so and bothersome  Only change is the creamer in coffee    Insomnia  Reports she needs a refill on her elavil  She takes 2 at bedtime  And it is helpful  As long as no caffeine system      CKD  She reports she hasn't went for follow up  She reports when in hospital it \"improved\"    Labs 2019 noted glucose 235   And kidneys GFR 31 creat 1.7   a1c 7.7   She reports she has been taking her medication but hasn't been checking it    Hyperlipidemia  Stable  LDL 71  lipitor 40mg nightly    Constipation  She reports she has issues still  Not gone in 5 days         height is 5' (1.524 m) and weight is 240 lb 12 oz (109.2 kg). Her temporal temperature is 97.4 °F (36.3 °C). Her blood pressure is 124/80 and her pulse is 68. Her oxygen saturation is 97%. Body mass index is 47.02 kg/m².     Results for orders placed or performed in visit on 19   T4, Free   Result Value Ref Range    T4 Free 1.1 0.9 - 1.7 ng/dL   TSH without Reflex   Result Value Ref Range    TSH 1.570 0.270 - 4.200 uIU/mL   Microalbumin / Creatinine Urine Ratio   Result Value Ref Range    Microalbumin, Random Urine <1.20 0.00 - 19.00 mg/dL    Creatinine, Ur 56.3 4.2 - 622.0 mg/dL    Microalbumin Creatinine Ratio see below mg/g   Lipid Panel   Result Value Ref Range    Cholesterol, Total 155 (L) 160 - WBC 04/30/2019 8.9     RBC 04/30/2019 4.32     Hemoglobin 04/30/2019 12.9     Hematocrit 04/30/2019 41.0     MCV 04/30/2019 94.9     MCH 04/30/2019 29.9     MCHC 04/30/2019 31.5*    RDW 04/30/2019 14.6*    Platelets 96/92/5352 296     MPV 04/30/2019 11.7     Neutrophils % 04/30/2019 65.7*    Lymphocytes % 04/30/2019 20.7     Monocytes % 04/30/2019 9.9     Eosinophils % 04/30/2019 2.5     Basophils % 04/30/2019 0.7     Neutrophils # 04/30/2019 5.8     Lymphocytes # 04/30/2019 1.8     Monocytes # 04/30/2019 0.90     Eosinophils # 04/30/2019 0.20     Basophils # 04/30/2019 0.10     Sodium 04/30/2019 136     Potassium 04/30/2019 4.8     Chloride 04/30/2019 93*    CO2 04/30/2019 22     Anion Gap 04/30/2019 21*    Glucose 04/30/2019 235*    BUN 04/30/2019 23*    CREATININE 04/30/2019 1.7*    GFR Non- 04/30/2019 31*    Calcium 04/30/2019 9.4     Total Protein 04/30/2019 7.5     Alb 04/30/2019 3.8     Total Bilirubin 04/30/2019 0.4     Alkaline Phosphatase 04/30/2019 173*    ALT 04/30/2019 16     AST 04/30/2019 19    Admission on 04/12/2019, Discharged on 04/13/2019   Component Date Value    Sodium 04/12/2019 130*    Potassium 04/12/2019 4.8     Chloride 04/12/2019 92*    CO2 04/12/2019 24     Anion Gap 04/12/2019 14     Glucose 04/12/2019 396*    BUN 04/12/2019 27*    CREATININE 04/12/2019 1.8*    GFR Non- 04/12/2019 29*    Calcium 04/12/2019 9.3     WBC 04/12/2019 9.5     RBC 04/12/2019 3.62*    Hemoglobin 04/12/2019 11.0*    Hematocrit 04/12/2019 33.7*    MCV 04/12/2019 93.1     MCH 04/12/2019 30.4     MCHC 04/12/2019 32.6*    RDW 04/12/2019 14.4     Platelets 70/79/2724 251     MPV 04/12/2019 10.9     Sodium 04/13/2019 133*    Potassium 04/13/2019 4.5     Chloride 04/13/2019 103     CO2 04/13/2019 20*    Anion Gap 04/13/2019 10     Glucose 04/13/2019 424*    BUN 04/13/2019 23*    CREATININE 04/13/2019 1.5*    GFR Non- American 04/13/2019 36*    Calcium 04/13/2019 8.2*   Orders Only on 12/11/2018   Component Date Value    WBC 12/11/2018 9.1     RBC 12/11/2018 4.41     Hemoglobin 12/11/2018 13.3     Hematocrit 12/11/2018 42.0     MCV 12/11/2018 95.2     MCH 12/11/2018 30.2     MCHC 12/11/2018 31.7*    RDW 12/11/2018 14.3     Platelets 04/44/3098 234     MPV 12/11/2018 11.9     Neutrophils % 12/11/2018 72.4*    Lymphocytes % 12/11/2018 16.7*    Monocytes % 12/11/2018 7.7     Eosinophils % 12/11/2018 2.5     Basophils % 12/11/2018 0.4     Neutrophils # 12/11/2018 6.6     Lymphocytes # 12/11/2018 1.5     Monocytes # 12/11/2018 0.70     Eosinophils # 12/11/2018 0.20     Basophils # 12/11/2018 0.00     Sodium 12/11/2018 137     Potassium 12/11/2018 4.9     Chloride 12/11/2018 94*    CO2 12/11/2018 22     Anion Gap 12/11/2018 21*    Glucose 12/11/2018 172*    BUN 12/11/2018 33*    CREATININE 12/11/2018 1.7*    GFR Non- 12/11/2018 31*    Calcium 12/11/2018 9.3     Total Protein 12/11/2018 7.8     Alb 12/11/2018 4.0     Total Bilirubin 12/11/2018 0.4     Alkaline Phosphatase 12/11/2018 153*    ALT 12/11/2018 19     AST 12/11/2018 30     Hemoglobin A1C 12/11/2018 6.9*    Cholesterol, Total 12/11/2018 164     Triglycerides 12/11/2018 243*    HDL 12/11/2018 52*    LDL Calculated 12/11/2018 63     T4 Free 12/11/2018 1.1     TSH 12/11/2018 1.340     Vit D, 25-Hydroxy 12/11/2018 86.8     Microalbumin, Random Uri* 12/11/2018 <1.20     Creatinine, Ur 12/11/2018 16.3     Microalbumin Creatinine * 12/11/2018 see below      Copies of these are in the chart. Prior to Visit Medications    Medication Sig Taking?  Authorizing Provider   amitriptyline (ELAVIL) 100 MG tablet Take 2 tablets by mouth nightly Yes TRESSA Nguyen   Continuous Blood Gluc Sensor ([address] [place] [address]) MISC 1 applicator by Does not apply route continuous Yes TRESSA Nguyen   atorvastatin (LIPITOR) 40 MG tablet TAKE ONE TABLET BY MOUTH ONCE NIGHTLY Yes LAZARO Ruiz DO   QUEtiapine (SEROQUEL) 200 MG tablet Take 1 tablet by mouth nightly Yes LAZARO Ruiz DO   HYDROcodone-acetaminophen (NORCO) 5-325 MG per tablet Take 1 tablet by mouth 2 times daily. Yes Historical Provider, MD   aspirin 81 MG tablet Take 81 mg by mouth daily Yes Historical Provider, MD   tiZANidine (ZANAFLEX) 4 MG tablet Take 1 tablet by mouth every 8 hours as needed (muscle spasms) Yes TRESSA Cruz   Insulin Pen Needle 31G X 8 MM MISC 1 each by Does not apply route 4 times daily Yes TRESSA Cruz   LYRICA 150 MG capsule TAKE 1 CAPSULE BY MOUTH TWICE DAILY Yes TRESSA Cruz   NOVOLOG FLEXPEN 100 UNIT/ML injection pen INJECT 20 UNITS SUB Q THREE TIMES DAILY WITH MEALS BASED UPON RATIO AND CORRECTIVE FACTOR. AVERAGE 60 UNITS DAILY. Patient taking differently: INJECT 20 UNITS SUB Q THREE TIMES DAILY WITH MEALS BASED UPON RATIO AND CORRECTIVE FACTOR. AVERAGE 45 - 60 UNITS DAILY.  Yes LAZARO Ruiz,    insulin glargine (LANTUS SOLOSTAR) 100 UNIT/ML injection pen Inject 22 Units into the skin nightly Yes TRESSA Hardwick   fluticasone (FLONASE) 50 MCG/ACT nasal spray 2 sprays by Nasal route daily Yes TRESSA Cruz   DULoxetine (CYMBALTA) 60 MG extended release capsule Take 1 capsule by mouth 2 times daily  Patient taking differently: Take 60 mg by mouth daily  Yes TRESSA Cruz   levothyroxine (SYNTHROID) 88 MCG tablet Take 1 tablet by mouth Daily Yes LAZARO Ruiz DO   furosemide (LASIX) 40 MG tablet Take 40 mg by mouth daily  Yes Historical Provider, MD   valsartan (DIOVAN) 80 MG tablet Take 1 tablet by mouth nightly Yes TRESSA Hardwick   Blood Glucose Monitoring Suppl (FREESTYLE LITE) JEZ 1 Device by Does not apply route 4 times daily Yes TRESSA Cruz   blood glucose test strips (FREESTYLE LITE) strip 1 each by In Vitro route 4 times daily MD at [address] [place] [address] OPEN TREATMENT BIMALLEOLAR ANKLE FRACTURE Right 6/11/2018    ORIF RIGHT BIMALLEOLAR ANKLE FRACTURE, RIGHT WRIST CAST REMOVAL performed by Efrain Freeman MD at [address] [place] [address] Right 6/12/2018    ANKLE OPEN REDUCTION INTERNAL FIXATION performed by Efrain Freeman MD at [place] [id number] Left 6/16/2017    KNEE TOTAL ARTHROPLASTY performed by Norma Adams DO at [facility] OR       Social History     Tobacco Use    Smoking status: Never Smoker    Smokeless tobacco: Former User     Types: Snuff   Substance Use Topics    Alcohol use: No       Review of Systems   Constitutional: Positive for activity change (with pain in ankle continues). Negative for appetite change, fatigue and fever. HENT: Negative. Respiratory: Negative for cough, shortness of breath and wheezing. Cardiovascular: Negative for chest pain, palpitations and leg swelling. Gastrointestinal: Negative for abdominal pain, constipation, diarrhea, nausea and vomiting. Endocrine: Negative for polydipsia, polyphagia and polyuria. Musculoskeletal: Positive for arthralgias (ankle and back) and back pain. Slow with ankle pain       Skin: Negative for rash and wound. Neurological: Negative for dizziness and headaches. Psychiatric/Behavioral: Negative for behavioral problems, self-injury and sleep disturbance. The patient is not nervous/anxious and is not hyperactive. Physical Exam   Constitutional: She is oriented to person, place, and time. She appears well-developed and well-nourished. No distress. HENT:   Head: Normocephalic. Right Ear: External ear normal.   Left Ear: External ear normal.   Mouth/Throat: No oropharyngeal exudate. Eyes: Pupils are equal, round, and reactive to light. Right eye exhibits no discharge. Left eye exhibits no discharge. Neck: Normal range of motion.    Cardiovascular: Normal rate, regular rhythm, normal heart sounds and intact distal pulses. No murmur heard. Pulmonary/Chest: Effort normal and breath sounds normal. No respiratory distress. She has no wheezes. Abdominal: Soft. Bowel sounds are normal. She exhibits no distension. Musculoskeletal: Normal range of motion. Lymphadenopathy:     She has no cervical adenopathy. Neurological: She is alert and oriented to person, place, and time. No cranial nerve deficit. Skin: Skin is warm and dry. No rash noted. She is not diaphoretic. Psychiatric: She has a normal mood and affect. Her behavior is normal.   Laughing and talking     Vitals reviewed. ASSESSMENT/ PLAN    1. Situational anxiety  Cont   With seroquel doing well  No isuses  - amitriptyline (ELAVIL) 100 MG tablet; Take 2 tablets by mouth nightly  Dispense: 180 tablet; Refill: 3    2. Insomnia due to other mental disorder  Cont present  Sleeping well  - amitriptyline (ELAVIL) 100 MG tablet; Take 2 tablets by mouth nightly  Dispense: 180 tablet; Refill: 3    3. Bipolar disorder, in full remission, most recent episode mixed (HCC)    - amitriptyline (ELAVIL) 100 MG tablet; Take 2 tablets by mouth nightly  Dispense: 180 tablet; Refill: 3    4. Adjustment insomnia  stable  - amitriptyline (ELAVIL) 100 MG tablet; Take 2 tablets by mouth nightly  Dispense: 180 tablet; Refill: 3    5. Type 2 diabetes mellitus with diabetic polyneuropathy, with long-term current use of insulin ([facility])  Will try and check  Limit her creamer and gram crackers  - Continuous Blood Gluc Sensor ([address] [place] [address]) MISC; 1 applicator by Does not apply route continuous  Dispense: 1 each; Refill: 11    6. Chronic kidney disease (CKD) stage G3b/A2, moderately decreased glomerular filtration rate (GFR) between 30-44 mL/min/1.73 square meter and albuminuria creatinine ratio between  mg/g ([facility])  Will cont to monitor  No nsaids   Increase fluids    7.  Mixed hyperlipidemia  Stable cont present      No orders of the defined types were placed in this encounter. Return in about 1 month (around 6/3/2019) for diabetes adjustment follow up. Patient Instructions     Patient Education        Learning About Diabetes Food Guidelines  Your Care Instructions    Meal planning is important to manage diabetes. It helps keep your blood sugar at a target level (which you set with your doctor). You don't have to eat special foods. You can eat what your family eats, including sweets once in a while. But you do have to pay attention to how often you eat and how much you eat of certain foods. You may want to work with a dietitian or a certified diabetes educator (CDE) to help you plan meals and snacks. A dietitian or CDE can also help you lose weight if that is one of your goals. What should you know about eating carbs? Managing the amount of carbohydrate (carbs) you eat is an important part of healthy meals when you have diabetes. Carbohydrate is found in many foods. · Learn which foods have carbs. And learn the amounts of carbs in different foods. ? Bread, cereal, pasta, and rice have about 15 grams of carbs in a serving. A serving is 1 slice of bread (1 ounce), ½ cup of cooked cereal, or 1/3 cup of cooked pasta or rice. ? Fruits have 15 grams of carbs in a serving. A serving is 1 small fresh fruit, such as an apple or orange; ½ of a banana; ½ cup of cooked or canned fruit; ½ cup of fruit juice; 1 cup of melon or raspberries; or 2 tablespoons of dried fruit. ? Milk and no-sugar-added yogurt have 15 grams of carbs in a serving. A serving is 1 cup of milk or 2/3 cup of no-sugar-added yogurt. ? Starchy vegetables have 15 grams of carbs in a serving. A serving is ½ cup of mashed potatoes or sweet potato; 1 cup winter squash; ½ of a small baked potato; ½ cup of cooked beans; or ½ cup cooked corn or green peas.   · Learn how much carbs to eat each day and at each meal. A dietitian or CDE can teach you how to keep track of the amount of carbs you eat. This is called carbohydrate counting. · If you are not sure how to count carbohydrate grams, use the Plate Method to plan meals. It is a good, quick way to make sure that you have a balanced meal. It also helps you spread carbs throughout the day. ? Divide your plate by types of foods. Put non-starchy vegetables on half the plate, meat or other protein food on one-quarter of the plate, and a grain or starchy vegetable in the final quarter of the plate. To this you can add a small piece of fruit and 1 cup of milk or yogurt, depending on how many carbs you are supposed to eat at a meal.  · Try to eat about the same amount of carbs at each meal. Do not \"save up\" your daily allowance of carbs to eat at one meal.  · Proteins have very little or no carbs per serving. Examples of proteins are beef, chicken, turkey, fish, eggs, tofu, cheese, cottage cheese, and peanut butter. A serving size of meat is 3 ounces, which is about the size of a deck of cards. Examples of meat substitute serving sizes (equal to 1 ounce of meat) are 1/4 cup of cottage cheese, 1 egg, 1 tablespoon of peanut butter, and ½ cup of tofu. How can you eat out and still eat healthy? · Learn to estimate the serving sizes of foods that have carbohydrate. If you measure food at home, it will be easier to estimate the amount in a serving of restaurant food. · If the meal you order has too much carbohydrate (such as potatoes, corn, or baked beans), ask to have a low-carbohydrate food instead. Ask for a salad or green vegetables. · If you use insulin, check your blood sugar before and after eating out to help you plan how much to eat in the future. · If you eat more carbohydrate at a meal than you had planned, take a walk or do other exercise. This will help lower your blood sugar. What else should you know? · Limit saturated fat, such as the fat from meat and dairy products.  This is a healthy choice because people who have diabetes are at higher risk of heart disease. So choose lean cuts of meat and nonfat or low-fat dairy products. Use olive or canola oil instead of butter or shortening when cooking. · Don't skip meals. Your blood sugar may drop too low if you skip meals and take insulin or certain medicines for diabetes. · Check with your doctor before you drink alcohol. Alcohol can cause your blood sugar to drop too low. Alcohol can also cause a bad reaction if you take certain diabetes medicines. Follow-up care is a key part of your treatment and safety. Be sure to make and go to all appointments, and call your doctor if you are having problems. It's also a good idea to know your test results and keep a list of the medicines you take. Where can you learn more? Go to https://Buysight.eBaoTech. org and sign in to your [employer] account. Enter V000 in the [place] box to learn more about \"Learning About Diabetes Food Guidelines. \"     If you do not have an account, please click on the \"Sign Up Now\" link. Current as of: July 25, 2018  Content Version: 11.9  © 5960-1181 [employer]. Care instructions adapted under license by [facility] ([facility]). If you have questions about a medical condition or this instruction, always ask your healthcare professional. [place] [address] any warranty or liability for your use of this information. Patient Education        Learning About Meal Planning for Diabetes  Why plan your meals? Meal planning can be a key part of managing diabetes. Planning meals and snacks with the right balance of carbohydrate, protein, and fat can help you keep your blood sugar at the target level you set with your doctor. You don't have to eat special foods. You can eat what your family eats, including sweets once in a while. But you do have to pay attention to how often you eat and how much you eat of certain foods.   You may want to work with a dietitian or a certified diabetes educator. He or she can give you tips and meal ideas and can answer your questions about meal planning. This health professional can also help you reach a healthy weight if that is one of your goals. What plan is right for you? Your dietitian or diabetes educator may suggest that you start with the plate format or carbohydrate counting. The plate format  The plate format is a simple way to help you manage how you eat. You plan meals by learning how much space each food should take on a plate. Using the plate format helps you spread carbohydrate throughout the day. It can make it easier to keep your blood sugar level within your target range. It also helps you see if you're eating healthy portion sizes. To use the plate format, you put non-starchy vegetables on half your plate. Add meat or meat substitutes on one-quarter of the plate. Put a grain or starchy vegetable (such as brown rice or a potato) on the final quarter of the plate. You can add a small piece of fruit and some low-fat or fat-free milk or yogurt, depending on your carbohydrate goal for each meal.  Here are some tips for using the plate format:  · Make sure that you are not using an oversized plate. A 9-inch plate is best. Many restaurants use larger plates. · Get used to using the plate format at home. Then you can use it when you eat out. · Write down your questions about using the plate format. Talk to your doctor, a dietitian, or a diabetes educator about your concerns. Carbohydrate counting  With carbohydrate counting, you plan meals based on the amount of carbohydrate in each food. Carbohydrate raises blood sugar higher and more quickly than any other nutrient. It is found in desserts, breads and cereals, and fruit. It's also found in starchy vegetables such as potatoes and corn, grains such as rice and pasta, and milk and yogurt. Spreading carbohydrate throughout the day helps keep your blood sugar levels within your target range.   Your daily amount depends on several things, including your weight, how active you are, which diabetes medicines you take, and what your goals are for your blood sugar levels. A registered dietitian or diabetes educator can help you plan how much carbohydrate to include in each meal and snack. A guideline for your daily amount of carbohydrate is:  · 45 to 60 grams at each meal. That's about the same as 3 to 4 carbohydrate servings. · 15 to 20 grams at each snack. That's about the same as 1 carbohydrate serving. The Nutrition Facts label on packaged foods tells you how much carbohydrate is in a serving of the food. First, look at the serving size on the food label. Is that the amount you eat in a serving? All of the nutrition information on a food label is based on that serving size. So if you eat more or less than that, you'll need to adjust the other numbers. Total carbohydrate is the next thing you need to look for on the label. If you count carbohydrate servings, one serving of carbohydrate is 15 grams. For foods that don't come with labels, such as fresh fruits and vegetables, you'll need a guide that lists carbohydrate in these foods. Ask your doctor, dietitian, or diabetes educator about books or other nutrition guides you can use. If you take insulin, you need to know how many grams of carbohydrate are in a meal. This lets you know how much rapid-acting insulin to take before you eat. If you use an insulin pump, you get a constant rate of insulin during the day. So the pump must be programmed at meals to give you extra insulin to cover the rise in blood sugar after meals. When you know how much carbohydrate you will eat, you can take the right amount of insulin. Or, if you always use the same amount of insulin, you need to make sure that you eat the same amount of carbohydrate at meals.   If you need more help to understand carbohydrate counting and food labels, ask your doctor, dietitian, or diabetes educator. How do you get started with meal planning? Here are some tips to get started:  · Plan your meals a week at a time. Don't forget to include snacks too. · Use cookbooks or online recipes to plan several main meals. Plan some quick meals for busy nights. You also can double some recipes that freeze well. Then you can save half for other busy nights when you don't have time to cook. · Make sure you have the ingredients you need for your recipes. If you're running low on basic items, put these items on your shopping list too. · List foods that you use to make breakfasts, lunches, and snacks. List plenty of fruits and vegetables. · Post this list on the refrigerator. Add to it as you think of more things you need. · Take the list to the store to do your weekly shopping. Follow-up care is a key part of your treatment and safety. Be sure to make and go to all appointments, and call your doctor if you are having problems. It's also a good idea to know your test results and keep a list of the medicines you take. Where can you learn more? Go to https://OssDsign ABpepicewKeynoir.Axonify. org and sign in to your [employer] account. Enter S487 in the 24/7 Card box to learn more about \"Learning About Meal Planning for Diabetes. \"     If you do not have an account, please click on the \"Sign Up Now\" link. Current as of: July 25, 2018  Content Version: 11.9  © 3915-7605 [employer]. Care instructions adapted under license by [facility] ([facility]). If you have questions about a medical condition or this instruction, always ask your healthcare professional. [place] [address] any warranty or liability for your use of this information. Controlled Substances Monitoring:                   Additional Instructions: As always, patient is advisedto bring in medication bottles in order to correctly reconcile with our current list.      Julio Cesar Last received counseling on the following healthy behaviors: none    Patient giveneducational materials on plan of care    I have instructed Mitch to complete a self tracking handout on blood sugar and weight and instructed them to bring it with them to her next appointment. Discussed use, benefit, and side effects of prescribed medications. Barriers to medication compliance addressed. All patient questions answered. Pt voiced understanding.      Caryle Lope, APRN

## 2019-05-14 DIAGNOSIS — I10 HYPERTENSION: ICD-10-CM

## 2019-05-14 DIAGNOSIS — I25.10 CAD (CORONARY ARTERY DISEASE): ICD-10-CM

## 2019-05-14 RX ORDER — METOPROLOL SUCCINATE 50 MG/1
50 TABLET, EXTENDED RELEASE ORAL DAILY
Qty: 90 TABLET | Refills: 3 | Status: SHIPPED | OUTPATIENT
Start: 2019-05-14 | End: 2020-03-12 | Stop reason: SDUPTHER

## 2019-05-22 ENCOUNTER — CARE COORDINATION (OUTPATIENT)
Dept: CARE COORDINATION | Age: 59
End: 2019-05-22

## 2019-05-22 NOTE — CARE COORDINATION
Ambulatory Care Coordination Note  5/22/2019  CM Risk Score: 14  Hector Mortality Risk Score:      ACC: Bonita Peck, RN    Summary Note: ACC returned call to [facility] today. Review:  [facility] relayed she has a new continuous meter but does not know how to use it. She still has her regular glucometer and is using it to check her BS. She gave BS numbers past 5 days. BS last 4-5 days:  5/17/19: wgt 235 /92 HR 88  BS = 354 fasting / 178 before supper / 122 at bedtime - pt ate part of apple and pineapple  5/18/19: 143 fasting / 322 before supper (took 17 units Novolog)/ 169 at HS, then recheck at 124 (pt took 12 units Novolog). Pt ate something as she felt bad.  5/19/19: 231 fasting / 146 before supper / 109 at hs (Novolog 12 units). Pt ate something  5/20/19: 259 (pt ate yogurt during night) / 149 before supper / 155 at HS (took 13 units Novolog)  5/21/19: 240 / 108 at supper / 161 at bedtime (Took 13 units Novolog) - then pt ate  5/22/19: 166 fasting  Mihai eats usually in the morning and late in the afternoon. She will often eat just before bed and if she gets up during the night. Her common choices include Special K cereal, yogurt, PB sandwich  29 g carb per 1 1/4 C of Special K cereal. Pt eats about 1 1/2 C cereal  Milk - 12 g per 1 C  Plan:  We discussed how Mihai doses her Novolog and Lantus. She takes Lantus in divided dose: 11 u bid. [facility] takes 12 units base of her Novolog and uses a formula to calculate any added units when she eats. She has not been using any Novolog if she eats a snack during the night. ACC noted to pt that her highest numbers are most often in the morning - this may reflect that she is not covering her snack w any Novolog. [facility] will start writing down what she is eating during the night - keep a notepad on her kitchen table. [facility] is willing to see a dietician or CDE for guidance with her insulin dosing and her carb intake.   ACC will f/u with PCP and contact pt with provider's recommendations. Care Coordination Interventions    Program Enrollment:  Complex Care  Referral from Primary Care Provider:  No  Suggested Interventions and Community Resources  Specialty Services Referral:  Completed (Comment: Pain mgmt)  Other Therapy Services:  Not Started (Comment: 5/22/19: Will discuss CDE referral with PCP, follow up w pt)  Zone Management Tools: In Process (Comment: 5/22/19: Pt requested help to learn use of new continuous glucometer. She reviewed some BS, noted persistently elevated morning BS - pt snacks freq during night, does not dose w Novolog. Discussed refer to CDE)         Goals Addressed                 This Visit's Progress     Self Monitoring   Improving     Blood Pressure - I will take my blood pressure as directed - Daily and Other: If sx of elev BP - headache, vision changes, fatigue. I will notify my provider of any trends of increasing or decreasing blood pressures over a month period of time. I will notify my provider of any changes in blood pressure associated with symptoms of dizziness, falls, passing out, headache, confusion/change in mental status. Blood Glucose Tracker 5/22/2019 5/21/2019 5/20/2019 5/19/2019 5/18/2019 5/17/2019 12/11/2018   Before breakfast blood glucose 166 240 259 231 143 354 169   Before breakfast blood glucose - - Pt remembers ate yogurt during the night - - - -   Before dinner blood glucose - 108 149 146 322 178 -   Before bed blood glucose - 161 155 109 169 122 -   Before bed blood glucose - - - Pt ate snack before bed Recheck later in evening was 124. Pt ate snack Pt ate snack before bed: some apple slices and pineapple -     Barriers: none  Plan for overcoming my barriers: N/A  Confidence: 7/10  Anticipated Goal Completion Date: 6/10/19(will follow for engagement w dietician or CDE, then DC due to poor contact w pt)              Prior to Admission medications    Medication Sig Start Date End Date Taking?  Authorizing Provider metoprolol succinate (TOPROL XL) 50 MG extended release tablet Take 1 tablet by mouth daily 5/14/19  Yes TRESSA Ivan   amitriptyline (ELAVIL) 100 MG tablet Take 2 tablets by mouth nightly 5/3/19  Yes TRESSA Ivan   Continuous Blood Gluc Sensor ([address] [place] [address]) MISC 1 applicator by Does not apply route continuous 5/3/19  Yes TRESSA Ivan   atorvastatin (LIPITOR) 40 MG tablet TAKE ONE TABLET BY MOUTH ONCE NIGHTLY 5/1/19  Yes LAZARO Scott DO   QUEtiapine (SEROQUEL) 200 MG tablet Take 1 tablet by mouth nightly 5/1/19  Yes LAZARO Scott DO   HYDROcodone-acetaminophen (NORCO) 5-325 MG per tablet Take 1 tablet by mouth 2 times daily. 3/25/19  Yes Historical Provider, MD   aspirin 81 MG tablet Take 81 mg by mouth daily   Yes Historical Provider, MD   tiZANidine (ZANAFLEX) 4 MG tablet Take 1 tablet by mouth every 8 hours as needed (muscle spasms) 3/14/19  Yes TRESSA Ivan   Insulin Pen Needle 31G X 8 MM MISC 1 each by Does not apply route 4 times daily 3/4/19  Yes TRESSA Ivan   LYRICA 150 MG capsule TAKE 1 CAPSULE BY MOUTH TWICE DAILY 2/19/19 6/19/19 Yes TRESSA Ivan   NOVOLOG FLEXPEN 100 UNIT/ML injection pen INJECT 20 UNITS SUB Q THREE TIMES DAILY WITH MEALS BASED UPON RATIO AND CORRECTIVE FACTOR. AVERAGE 60 UNITS DAILY. Patient taking differently: INJECT 20 UNITS SUB Q THREE TIMES DAILY WITH MEALS BASED UPON RATIO AND CORRECTIVE FACTOR. AVERAGE 45 - 60 UNITS DAILY.  2/11/19  Yes LAZARO Scott DO   insulin glargine (LANTUS SOLOSTAR) 100 UNIT/ML injection pen Inject 22 Units into the skin nightly 1/3/19  Yes TRESSA Hardwick   fluticasone (FLONASE) 50 MCG/ACT nasal spray 2 sprays by Nasal route daily 12/7/18  Yes TRESSA Ivan   DULoxetine (CYMBALTA) 60 MG extended release capsule Take 1 capsule by mouth 2 times daily  Patient taking differently: Take 60 mg by mouth daily  11/20/18  Yes Antionette Shields TRESSA   levothyroxine (SYNTHROID) 88 MCG tablet Take 1 tablet by mouth Daily 10/18/18  Yes LAZARO Hawley DO   furosemide (LASIX) 40 MG tablet Take 40 mg by mouth daily    Yes Historical Provider, MD   valsartan (DIOVAN) 80 MG tablet Take 1 tablet by mouth nightly 9/11/18  Yes TRESSA Hardwick   Blood Glucose Monitoring Suppl (FREESTYLE LITE) JEZ 1 Device by Does not apply route 4 times daily 7/5/18  Yes TRESSA Taylor   blood glucose test strips (FREESTYLE LITE) strip 1 each by In Vitro route 4 times daily As needed. 7/5/18  Yes TRESSA Taylor   linaclotide Francetta Cane) 290 MCG CAPS capsule Take 1 capsule by mouth every morning (before breakfast)  Patient taking differently: Take 290 mcg by mouth every other day  7/3/18  Yes LAZARO Hawley DO   pantoprazole (PROTONIX) 40 MG tablet TAKE ONE TABLET BY MOUTH ONCE DAILY 6/12/18  Yes TRESSA Taylor   metoprolol succinate (TOPROL XL) 50 MG extended release tablet Take 1 tablet by mouth daily 5/22/17  Yes LAZARO Hawley DO       Future Appointments   Date Time Provider Sita Tong   6/3/2019  2:00 PM TRESSA Taylorjasper Castilloon [facility]   6/18/2019  9:00 AM LAZARO Hawley DO [id number] [facility]      and   Diabetes Assessment    Medic Alert ID:  No  Meal Planning:  Avoidance of concentrated sweets, Carb counting   How often do you test your blood sugar?:  Other (Comment: bid)   Do you have barriers with adherence to non-pharmacologic self-management interventions?  (Nutrition/Exercise/Self-Monitoring):  Yes (Comment: Ana Zarate loves carbs but does not eat sweets much)   Have you ever had to go to the ED for symptoms of low blood sugar?:  No       Do you have hyperglycemia symptoms?:  No   Do you have hypoglycemia symptoms?:  Yes   Frequency of Episodes:  1 Per:  Week   Last Blood Sugar Value:  166   Blood Sugar Monitoring Regimen:  Before Meals   Blood Sugar Trends:  Fluctuating

## 2019-05-24 ENCOUNTER — CARE COORDINATION (OUTPATIENT)
Dept: CARE COORDINATION | Age: 59
End: 2019-05-24

## 2019-05-24 DIAGNOSIS — Z79.4 TYPE 2 DIABETES MELLITUS WITH DIABETIC POLYNEUROPATHY, WITH LONG-TERM CURRENT USE OF INSULIN (HCC): Primary | ICD-10-CM

## 2019-05-24 DIAGNOSIS — E11.42 TYPE 2 DIABETES MELLITUS WITH DIABETIC POLYNEUROPATHY, WITH LONG-TERM CURRENT USE OF INSULIN (HCC): Primary | ICD-10-CM

## 2019-05-24 NOTE — CARE COORDINATION
Ambulatory Care Coordination Note  5/24/2019  CM Risk Score: 14  Hector Mortality Risk Score:      ACC: Annetta Lamar, RN    Summary Note: ACC spoke to Linwood again today by phone. Review:  Linwood is eager to improve her carb intake mgmt. She is concerned about getting her BS more controlled and lowering her A1c. Linwood has not consistently connected with [facility] for good follow up between appts, yet has recently contacted [facility] for support to educate herself on her diet. Linwood has a good understanding of the importance of consistent blood sugar control but has had difficulty maintaining her commitment to healthier habits. Even so, her A1c is only up to 7.7 in April. Plan:  ACC will continue with pt for another 30 days. Obtained verbal referral approval for dietician consult with local HD nutritionist/dietician. [facility] encouraged pt that she is close to optimal and with some increased effort, she can get her A1c back down. [facility] informed Linwood of referral and she is eager to do this. Faxed form to [address] [place] [address], instructed pt to call the HD by next Wednesday(5/29) and schedule appt - instructed Audrey to take her BS log, her meter and her food diary, medication dosing as well. She voiced understanding. [facility] will f/u 2 weeks for compliance w referral appt and status of pt's need for further support. Care Coordination Interventions    Program Enrollment:  Complex Care  Referral from Primary Care Provider:  No  Suggested Interventions and Community Resources  Specialty Services Referral:  Completed (Comment: Pain mgmt)  Other Therapy Services:  Completed (Comment: 5/24/19: PCP gave verbal for referral to nutrition services, will fax referral to [address] [place] [address], inst pt to call for appt by next Wed(5/29))  Zone Management Tools: In Process (Comment: 5/24/19: Discussed nutrition/dietician referral with PCP, pt inst that [facility] will fax referral to [address] [place] [address]. Pt is to call [phone] if not contacted by 5/29.  She voiced understanding.)         Goals Addressed                 This Visit's Progress     Self Monitoring   No change     Blood Pressure - I will take my blood pressure as directed - Daily and Other: If sx of elev BP - headache, vision changes, fatigue. I will notify my provider of any trends of increasing or decreasing blood pressures over a month period of time. I will notify my provider of any changes in blood pressure associated with symptoms of dizziness, falls, passing out, headache, confusion/change in mental status. Blood Glucose Tracker 5/22/2019 5/21/2019 5/20/2019 5/19/2019 5/18/2019 5/17/2019 12/11/2018   Before breakfast blood glucose 166 240 259 (Pt remembers ate yogurt during the night) 231 143 354 169   Before dinner blood glucose - 108 149 146 322 178 -   Before bed blood glucose - 161 155 109 169 122 -   Before bed blood glucose - - - Pt ate snack before bed Recheck later in evening was 124. Pt ate snack Pt ate snack before bed: some apple slices and pineapple -     Barriers: none  Plan for overcoming my barriers: N/A  Confidence: 7/10  Anticipated Goal Completion Date: 6/10/19(will follow for engagement w dietician or CDE, then DC due to poor contact w pt)              Prior to Admission medications    Medication Sig Start Date End Date Taking?  Authorizing Provider   metoprolol succinate (TOPROL XL) 50 MG extended release tablet Take 1 tablet by mouth daily 5/14/19   TRESSA Richardson   amitriptyline (ELAVIL) 100 MG tablet Take 2 tablets by mouth nightly 5/3/19   TRESSA Richardson   Continuous Blood Gluc Sensor ([address] [place] [address]) MIS 1 applicator by Does not apply route continuous 5/3/19   TRESSA Richardson   atorvastatin (LIPITOR) 40 MG tablet TAKE ONE TABLET BY MOUTH ONCE NIGHTLY 5/1/19   B Mira Ape, DO   QUEtiapine (SEROQUEL) 200 MG tablet Take 1 tablet by mouth nightly 5/1/19   B Mira Ape, DO   HYDROcodone-acetaminophen (NORCO) 5-325 MG per tablet

## 2019-06-06 ENCOUNTER — CARE COORDINATION (OUTPATIENT)
Dept: CARE COORDINATION | Age: 59
End: 2019-06-06

## 2019-06-13 ENCOUNTER — CARE COORDINATION (OUTPATIENT)
Dept: CARE COORDINATION | Age: 59
End: 2019-06-13

## 2019-06-13 NOTE — CARE COORDINATION
Ambulatory Care Coordination Note  6/13/2019  CM Risk Score: 10  Hector Mortality Risk Score:      ACC: Carmelo Santillan, RN    Summary Note: ACC called pt today. Zohreh Cornell has appt at end of June w HD dietician. Her hands burning more. She reported her pain mgmt provider has told her she has trigger finger of both hands. She wants to see an orthopedic provider for this but needs a referral from her PCP. Zohreh Cornell now has BCBS instead of Medicaid. She has remarried her , Peter Ibarra.    BP and BS monitoring? Pt stated she craves salt, adds it to her food. Review:  6/10:  . Terra 137. . /90  Start on 6/11:  BP = 140/93. HR 78. . Lunch 214. Bedtime 170   6/12:  . Terra 163. . /69  Plan:  ACC discussed that pt's BS overall are good, but needs to try and reduce her FBS in am. This is not true fasting as pt eats during the night. Discussed that pt may try to reduce her portion size when she snacks or purchase a low carb yogurt, like [employer] at [facility]. This is 4G per serving container. Pt willing to try for the next 5 days and will bring her log to next appt. Further discussed that Blancos BP needs to come down and if she will eliminate added salt when she eats, this may help pull her BP back down. Urged pt to use other spices available, like Lemon Pepper and Mrs. Eulalio. Zohreh Cornell will try. Keep log and bring it to appt next week. Will review and discuss if provider recommends other changes or would consider small amount of Novolog with pt's night time snack. Keep appt with [facility] HD dietician. Care Coordination Interventions    Program Enrollment:  Complex Care  Referral from Primary Care Provider:  No  Suggested Interventions and Community Resources  Registered Dietician:  Completed (Comment: 6/13/19: Pt reported has appt end of June w [facility] dietician)  Specialty Services Referral:  Completed (Comment: Pain mgmt)  Zone Management Tools:   In Process (Comment: 6/13/19: Pt has dietician appt at end of June. She is willing to decrease night time snack portion or reduce the carb content - to get FBS closer to 130. Urged pt to stop adding salt to her food as her BP readings are elevated. She will try. )         Goals Addressed                 This Visit's Progress     Patient Stated (pt-stated)        Get fasting blood sugar closer to 130    Barriers: lack of motivation and lack of education  Plan for overcoming my barriers:   Pt will learn to count carbs of her night time snack  Pt will portion her snack  Pt will choose lower carb content options for snack  Consider low dose meal time insulin coverage for snack  Confidence: 8/10  Anticipated Goal Completion Date: 8/13/19       COMPLETED: Self Monitoring        Blood Pressure - I will take my blood pressure as directed - Daily and Other: If sx of elev BP - headache, vision changes, fatigue. I will notify my provider of any trends of increasing or decreasing blood pressures over a month period of time. I will notify my provider of any changes in blood pressure associated with symptoms of dizziness, falls, passing out, headache, confusion/change in mental status. Blood Glucose Tracker 5/22/2019 5/21/2019 5/20/2019 5/19/2019 5/18/2019 5/17/2019 12/11/2018   Before breakfast blood glucose 166 240 259 231 143 354 169   Before breakfast blood glucose - - Pt remembers ate yogurt during the night - - - -   Before dinner blood glucose - 108 149 146 322 178 -   Before bed blood glucose - 161 155 109 169 122 -   Before bed blood glucose - - - Pt ate snack before bed Recheck later in evening was 124.  Pt ate snack Pt ate snack before bed: some apple slices and pineapple -     Barriers: none  Plan for overcoming my barriers: N/A  Confidence: 7/10  Anticipated Goal Completion Date: 6/10/19(will follow for engagement w dietician or CDE, then DC due to poor contact w pt)              Prior to Admission medications    Medication Sig Start Date End Date Taking? Authorizing Provider   metoprolol succinate (TOPROL XL) 50 MG extended release tablet Take 1 tablet by mouth daily 5/14/19   TRESSA Jolly   amitriptyline (ELAVIL) 100 MG tablet Take 2 tablets by mouth nightly 5/3/19   TRESSA Jolly   Continuous Blood Gluc Sensor ([address] [place] [address]) MISC 1 applicator by Does not apply route continuous 5/3/19   TRESSA Jolly   atorvastatin (LIPITOR) 40 MG tablet TAKE ONE TABLET BY MOUTH ONCE NIGHTLY 5/1/19   LAZARO Law DO   QUEtiapine (SEROQUEL) 200 MG tablet Take 1 tablet by mouth nightly 5/1/19   LAZARO Law DO   HYDROcodone-acetaminophen (NORCO) 5-325 MG per tablet Take 1 tablet by mouth 2 times daily. 3/25/19   Historical Provider, MD   aspirin 81 MG tablet Take 81 mg by mouth daily    Historical Provider, MD   tiZANidine (ZANAFLEX) 4 MG tablet Take 1 tablet by mouth every 8 hours as needed (muscle spasms) 3/14/19   TRESSA Jolly   Insulin Pen Needle 31G X 8 MM MISC 1 each by Does not apply route 4 times daily 3/4/19   TRESSA Jolly   LYRICA 150 MG capsule TAKE 1 CAPSULE BY MOUTH TWICE DAILY 2/19/19 6/19/19  TRESSA Jolly   NOVOLOG FLEXPEN 100 UNIT/ML injection pen INJECT 20 UNITS SUB Q THREE TIMES DAILY WITH MEALS BASED UPON RATIO AND CORRECTIVE FACTOR. AVERAGE 60 UNITS DAILY. Patient taking differently: INJECT 20 UNITS SUB Q THREE TIMES DAILY WITH MEALS BASED UPON RATIO AND CORRECTIVE FACTOR. AVERAGE 45 - 60 UNITS DAILY.  2/11/19   LAZARO Law DO   insulin glargine (LANTUS SOLOSTAR) 100 UNIT/ML injection pen Inject 22 Units into the skin nightly 1/3/19   TRESSA Hardwick   fluticasone (FLONASE) 50 MCG/ACT nasal spray 2 sprays by Nasal route daily 12/7/18   TRESSA Jolly   DULoxetine (CYMBALTA) 60 MG extended release capsule Take 1 capsule by mouth 2 times daily  Patient taking differently: Take 60 mg by mouth daily  11/20/18   TRESSA Jolly levothyroxine (SYNTHROID) 88 MCG tablet Take 1 tablet by mouth Daily 10/18/18   LAZARO Burnett DO   furosemide (LASIX) 40 MG tablet Take 40 mg by mouth daily     Historical Provider, MD   valsartan (DIOVAN) 80 MG tablet Take 1 tablet by mouth nightly 9/11/18   TRESSA Hardwick   Blood Glucose Monitoring Suppl (FREESTYLE LITE) JEZ 1 Device by Does not apply route 4 times daily 7/5/18   TRESSA Mosqueda   blood glucose test strips (FREESTYLE LITE) strip 1 each by In Vitro route 4 times daily As needed. 7/5/18   TRESSA Mosqueda   linaclotide [facility]) 290 MCG CAPS capsule Take 1 capsule by mouth every morning (before breakfast)  Patient taking differently: Take 290 mcg by mouth every other day  7/3/18   LAZARO Burnett DO   pantoprazole (PROTONIX) 40 MG tablet TAKE ONE TABLET BY MOUTH ONCE DAILY 6/12/18   TRESSA Mosqueda   metoprolol succinate (TOPROL XL) 50 MG extended release tablet Take 1 tablet by mouth daily 5/22/17   LAZARO Burnett DO       Future Appointments   Date Time Provider Sita Tong   6/18/2019  9:00 AM LAZARO Burnett DO [address] [facility]      and   Diabetes Assessment    Medic Alert ID:  No  Meal Planning:  Avoidance of concentrated sweets, Carb counting   How often do you test your blood sugar?:  Other (Comment: bid)   Do you have barriers with adherence to non-pharmacologic self-management interventions?  (Nutrition/Exercise/Self-Monitoring):  Yes (Comment: Tone Ospina loves carbs but does not eat sweets much)   Have you ever had to go to the ED for symptoms of low blood sugar?:  No       Do you have hyperglycemia symptoms?:  No   Do you have hypoglycemia symptoms?:  No   Last Blood Sugar Value:  104   Blood Sugar Monitoring Regimen:  Morning Fasting, Before Meals, At Bedtime   Blood Sugar Trends:  Fluctuating

## 2019-06-18 ENCOUNTER — OFFICE VISIT (OUTPATIENT)
Dept: PRIMARY CARE CLINIC | Age: 59
End: 2019-06-18
Payer: MEDICARE

## 2019-06-18 ENCOUNTER — CARE COORDINATION (OUTPATIENT)
Dept: CARE COORDINATION | Age: 59
End: 2019-06-18

## 2019-06-18 VITALS
WEIGHT: 237.8 LBS | OXYGEN SATURATION: 96 % | BODY MASS INDEX: 46.68 KG/M2 | TEMPERATURE: 97.9 F | HEART RATE: 78 BPM | HEIGHT: 60 IN | DIASTOLIC BLOOD PRESSURE: 72 MMHG | SYSTOLIC BLOOD PRESSURE: 120 MMHG

## 2019-06-18 DIAGNOSIS — E11.42 TYPE 2 DIABETES MELLITUS WITH DIABETIC POLYNEUROPATHY, WITH LONG-TERM CURRENT USE OF INSULIN (HCC): Primary | ICD-10-CM

## 2019-06-18 DIAGNOSIS — I10 ESSENTIAL HYPERTENSION: ICD-10-CM

## 2019-06-18 DIAGNOSIS — N18.32 CHRONIC KIDNEY DISEASE (CKD) STAGE G3B/A2, MODERATELY DECREASED GLOMERULAR FILTRATION RATE (GFR) BETWEEN 30-44 ML/MIN/1.73 SQUARE METER AND ALBUMINURIA CREATININE RATIO BETWEEN 30-299 MG/G (HCC): ICD-10-CM

## 2019-06-18 DIAGNOSIS — Z79.4 TYPE 2 DIABETES MELLITUS WITH DIABETIC POLYNEUROPATHY, WITH LONG-TERM CURRENT USE OF INSULIN (HCC): Primary | ICD-10-CM

## 2019-06-18 DIAGNOSIS — E78.2 MIXED HYPERLIPIDEMIA: ICD-10-CM

## 2019-06-18 DIAGNOSIS — E03.9 ACQUIRED HYPOTHYROIDISM: ICD-10-CM

## 2019-06-18 DIAGNOSIS — G47.33 OSA (OBSTRUCTIVE SLEEP APNEA): ICD-10-CM

## 2019-06-18 DIAGNOSIS — M72.0 DUPUYTREN'S CONTRACTURE OF BOTH HANDS: ICD-10-CM

## 2019-06-18 DIAGNOSIS — E11.42 DIABETIC POLYNEUROPATHY ASSOCIATED WITH TYPE 2 DIABETES MELLITUS (HCC): ICD-10-CM

## 2019-06-18 PROCEDURE — G8598 ASA/ANTIPLAT THER USED: HCPCS | Performed by: PEDIATRICS

## 2019-06-18 PROCEDURE — 99214 OFFICE O/P EST MOD 30 MIN: CPT | Performed by: PEDIATRICS

## 2019-06-18 PROCEDURE — 2022F DILAT RTA XM EVC RTNOPTHY: CPT | Performed by: PEDIATRICS

## 2019-06-18 PROCEDURE — 3045F PR MOST RECENT HEMOGLOBIN A1C LEVEL 7.0-9.0%: CPT | Performed by: PEDIATRICS

## 2019-06-18 PROCEDURE — 1036F TOBACCO NON-USER: CPT | Performed by: PEDIATRICS

## 2019-06-18 PROCEDURE — G8427 DOCREV CUR MEDS BY ELIG CLIN: HCPCS | Performed by: PEDIATRICS

## 2019-06-18 PROCEDURE — 3017F COLORECTAL CA SCREEN DOC REV: CPT | Performed by: PEDIATRICS

## 2019-06-18 PROCEDURE — G8417 CALC BMI ABV UP PARAM F/U: HCPCS | Performed by: PEDIATRICS

## 2019-06-18 RX ORDER — PREGABALIN 150 MG/1
150 CAPSULE ORAL 2 TIMES DAILY
Qty: 60 CAPSULE | Refills: 3 | Status: SHIPPED | OUTPATIENT
Start: 2019-06-18 | End: 2019-10-03 | Stop reason: SDUPTHER

## 2019-06-18 ASSESSMENT — ENCOUNTER SYMPTOMS
VOICE CHANGE: 0
BACK PAIN: 0
WHEEZING: 0
SINUS PRESSURE: 0
DIARRHEA: 0
ABDOMINAL PAIN: 1
CHOKING: 0
COUGH: 0
TROUBLE SWALLOWING: 0
NAUSEA: 0
SORE THROAT: 0
VOMITING: 0
SHORTNESS OF BREATH: 0
CHEST TIGHTNESS: 0
CONSTIPATION: 1
ABDOMINAL DISTENTION: 0

## 2019-06-18 NOTE — PROGRESS NOTES
[facility] [facility], [address]  Phone [phone]   Fax [phone]      OFFICE VISIT: 2019    Mitch HIGGINS Destiney-: 1960      HPI  Reason For Visit:  Ginna Jimenez is a 63 y.o. Health Maintenance    3 Month Follow-Up (Patient is here for 3 month follow up); Diabetes (Patient states her blood sugar 130); and Medication Refill (Lyrica)    Patient presents on 3-month follow-up for multiple health issues. She is requiring a refill of her Lyrica. She takes this for diabetic neuropathy. Present dose of Lyrica is 150 mg twice daily. She also takes Cymbalta 60 mg twice daily. She states that she needs a referral to [facility] AT [facility]. She is having problems with dupuytren's contractures on bilateral hands. Diabetes Mellitus Type 2  Diet compliance:  compliant most of the time  Nutrition Consultation Needed:  no  Medication:              NovoLog insulin: she is using sliding scale insulin. Lantus insulin she is dosing 11 units bid. Medication compliance:  compliant most of the time  Weight trend: increasing, weight is down 3 pounds from 1 months ago  Current exercise: not really much at all. Checking: 3 times daily  Home blood sugar records: fasting range: some variable  Low BG:  no  Eye exam current (within one year): yes  Checking Feet regularly:  yes - no sores  ACE/ARB:  yes - Diovan 80 mg nightly  Aspirin: No:  Tobacco history: She  reports that she has never smoked. She has quit using smokeless tobacco. Her smokeless tobacco use included snuff. Lab Results   Component Value Date    LABA1C 7.7 (H) 2019    LABA1C 6.9 (H) 2018    LABA1C 5.9 2018     Lab Results   Component Value Date    LABMICR <1.20 2019    CREATININE 1.7 (H) 2019   she has not started using CGM  This is not recommended.         Hypertension:   BP today was   BP Readings from Last 1 Encounters:   19 120/72      Recent BP readings:    BP Readings from Last 3 Encounters:   06/18/19 120/72   05/03/19 124/80   04/16/19 (!) 116/90     Medication   Toprol-XL 50 mg daily   Valsartan 80 mg nightly   Medication compliance:  compliant most of the time  Home blood pressure monitoring: No.    She is not adherent to a low sodium diet. Symptoms: none  Laboratory:  Lab Results   Component Value Date    BUN 23 (H) 04/30/2019    CREATININE 1.7 (H) 04/30/2019       Hyperlipidemia:   Medication:   atorvastatin (Lipitor)  Low Fat, Low Choleterol Diet:  yes - to some degree  Myalgias or GI upset: no  The patient exercises rarely. Laboratory:    Lab Results   Component Value Date    CHOL 155 (L) 04/30/2019    TRIG 210 (H) 04/30/2019    HDL 42 (L) 04/30/2019    LDLCALC 71 04/30/2019    LDLDIRECT 48 (L) 10/09/2015      Lab Results   Component Value Date    ALT 16 04/30/2019    AST 19 04/30/2019       Hypothyroidism:  Medication:   Synthroid 88 mcg daily  Medication compliance:  compliant most of the time  Patient is  taking her medication consistently on an empty stomach. Symptoms: none. Laboratory:  Lab Results   Component Value Date    TSH 1.570 04/30/2019    TSH 1.340 12/11/2018    TSH 3.580 06/14/2018     Lab Results   Component Value Date    T4FREE 1.1 04/30/2019    T4FREE 1.1 12/11/2018    T4FREE 1.5 06/14/2018       CKD stage III  She is on appropriate R AAS therapy. No significant microalbuminuria  Renal function is stable      She is drinking caffeinated beverages all day and all night. She is up until 04:00 AM playing on internet. Recommended against this if possible    We discussed sleep apnea. She has been diagnosed with sleep apnea but she did not like the machine, so she stopped using it. Barriers To Success: impairment:  cognitive       height is 5' (1.524 m) and weight is 237 lb 12.8 oz (107.9 kg). Her temporal temperature is 97.9 °F (36.6 °C). Her blood pressure is 120/72 and her pulse is 78. Her oxygen saturation is 96%.       Body mass index is 46.44 kg/m². I have reviewed the following with the Ms. Cabello   Lab Review  Orders Only on 04/30/2019   Component Date Value    T4 Free 04/30/2019 1.1     TSH 04/30/2019 1.570     Microalbumin, Random Uri* 04/30/2019 <1.20     Creatinine, Ur 04/30/2019 56.3     Microalbumin Creatinine * 04/30/2019 see below     Cholesterol, Total 04/30/2019 155*    Triglycerides 04/30/2019 210*    HDL 04/30/2019 42*    LDL Calculated 04/30/2019 71     Hemoglobin A1C 04/30/2019 7.7*    WBC 04/30/2019 8.9     RBC 04/30/2019 4.32     Hemoglobin 04/30/2019 12.9     Hematocrit 04/30/2019 41.0     MCV 04/30/2019 94.9     MCH 04/30/2019 29.9     MCHC 04/30/2019 31.5*    RDW 04/30/2019 14.6*    Platelets 88/09/4692 296     MPV 04/30/2019 11.7     Neutrophils % 04/30/2019 65.7*    Lymphocytes % 04/30/2019 20.7     Monocytes % 04/30/2019 9.9     Eosinophils % 04/30/2019 2.5     Basophils % 04/30/2019 0.7     Neutrophils # 04/30/2019 5.8     Lymphocytes # 04/30/2019 1.8     Monocytes # 04/30/2019 0.90     Eosinophils # 04/30/2019 0.20     Basophils # 04/30/2019 0.10     Sodium 04/30/2019 136     Potassium 04/30/2019 4.8     Chloride 04/30/2019 93*    CO2 04/30/2019 22     Anion Gap 04/30/2019 21*    Glucose 04/30/2019 235*    BUN 04/30/2019 23*    CREATININE 04/30/2019 1.7*    GFR Non- 04/30/2019 31*    Calcium 04/30/2019 9.4     Total Protein 04/30/2019 7.5     Alb 04/30/2019 3.8     Total Bilirubin 04/30/2019 0.4     Alkaline Phosphatase 04/30/2019 173*    ALT 04/30/2019 16     AST 04/30/2019 19    Admission on 04/12/2019, Discharged on 04/13/2019   Component Date Value    Sodium 04/12/2019 130*    Potassium 04/12/2019 4.8     Chloride 04/12/2019 92*    CO2 04/12/2019 24     Anion Gap 04/12/2019 14     Glucose 04/12/2019 396*    BUN 04/12/2019 27*    CREATININE 04/12/2019 1.8*    GFR Non- 04/12/2019 29*    Calcium 04/12/2019 9.3     WBC DAILY. (Patient taking differently: INJECT 20 UNITS SUB Q THREE TIMES DAILY WITH MEALS BASED UPON RATIO AND CORRECTIVE FACTOR. AVERAGE 45 - 60 UNITS DAILY. ) 18 pen 3    insulin glargine (LANTUS SOLOSTAR) 100 UNIT/ML injection pen Inject 22 Units into the skin nightly 5 pen 11    fluticasone (FLONASE) 50 MCG/ACT nasal spray 2 sprays by Nasal route daily 1 Bottle 11    DULoxetine (CYMBALTA) 60 MG extended release capsule Take 1 capsule by mouth 2 times daily (Patient taking differently: Take 60 mg by mouth daily ) 60 capsule 11    levothyroxine (SYNTHROID) 88 MCG tablet Take 1 tablet by mouth Daily 90 tablet 3    furosemide (LASIX) 40 MG tablet Take 40 mg by mouth daily       Blood Glucose Monitoring Suppl (FREESTYLE LITE) JEZ 1 Device by Does not apply route 4 times daily 1 Device 0    blood glucose test strips (FREESTYLE LITE) strip 1 each by In Vitro route 4 times daily As needed. 100 each 3    linaclotide (LINZESS) 290 MCG CAPS capsule Take 1 capsule by mouth every morning (before breakfast) (Patient taking differently: Take 290 mcg by mouth every other day ) 30 capsule 11    pantoprazole (PROTONIX) 40 MG tablet TAKE ONE TABLET BY MOUTH ONCE DAILY 90 tablet 3    valsartan (DIOVAN) 80 MG tablet Take 1 tablet by mouth nightly 30 tablet 11     No current facility-administered medications for this visit.         Allergies: Dilaudid [hydromorphone hcl]     Past Medical History:   Diagnosis Date    Anxiety     Arthritis     Bipolar 1 disorder ([email or web address]     CAD (coronary artery disease)     CHF (congestive heart failure) ([facility])     Chronic kidney disease (CKD) stage G3b/A2, moderately decreased glomerular filtration rate (GFR) between 30-44 mL/min/1.73 square meter and albuminuria creatinine ratio between  mg/g ([email or web address] 75.) 11/21/2016    Depression     DM (diabetes mellitus) ([facility])     GERD (gastroesophageal reflux disease)     History of blood transfusion     History of suicidal ideation     Hyperlipidemia     Hypertension     Hypothyroidism     Insomnia     Kidney disease     stage 3 failure    MI, old 2005    Obesity     Polyarticular arthritis     Type II or unspecified type diabetes mellitus without mention of complication, not stated as uncontrolled        Family History   Problem Relation Age of Onset    Depression Mother     Heart Attack Mother     High Blood Pressure Mother     Kidney Disease Father     Alcohol Abuse Father     Depression Father     Heart Attack Father     High Blood Pressure Father     Kidney Disease Brother     Heart Disease Other     Depression Sister        Past Surgical History:   Procedure Laterality Date    ABDOMINOPLASTY      ANGIOPLASTY  05    stent placement to LAD and diagonal with normal left vent function    BREAST REDUCTION SURGERY      CARDIAC CATHETERIZATION  05, 05    see report  Stents x3    CARDIAC CATHETERIZATION  3/23/15  JDT    EF 50%    CARPAL TUNNEL RELEASE       SECTION      x2    CHOLECYSTECTOMY      GASTRIC BYPASS SURGERY      HERNIA REPAIR      umbilical with mesh    INTRACAPSULAR CATARACT EXTRACTION Left 2016    LT EYE CATARACT EMULSIFICATION IOL IMPLANT performed by Ale Bishop MD at [address] [place] [address] Right 2018    ORIF RIGHT BIMALLEOLAR ANKLE FRACTURE, RIGHT WRIST CAST REMOVAL performed by Donna Berger MD at [address] [place] [address] Right 2018    ANKLE OPEN REDUCTION INTERNAL FIXATION performed by Donna Berger MD at [facility] Left 2017    KNEE TOTAL ARTHROPLASTY performed by Karon Amezcua DO at [facility] [place]       Social History     Tobacco Use    Smoking status: Never Smoker    Smokeless tobacco: Former User     Types: Snuff   Substance Use Topics    Alcohol use: No        Review of Systems   Constitutional: Negative for activity change, appetite change, chills, diaphoresis, fatigue, fever and unexpected weight change. HENT: Negative for congestion, ear pain, postnasal drip, sinus pressure, sneezing, sore throat, tinnitus, trouble swallowing and voice change. Eyes: Negative for visual disturbance. Respiratory: Negative for cough, choking, chest tightness, shortness of breath and wheezing. Cardiovascular: Negative for chest pain, palpitations and leg swelling. Gastrointestinal: Positive for abdominal pain (Right upper quadrant. she does not have a GB any more) and constipation (she will go 4-5 days between stools). Negative for abdominal distention, diarrhea, nausea and vomiting. Genitourinary: Negative for decreased urine volume, difficulty urinating, dysuria, frequency, hematuria and urgency. Musculoskeletal: Positive for arthralgias (hands and knees). Negative for back pain, gait problem, joint swelling, myalgias, neck pain and neck stiffness. Skin: Negative for rash and wound. Neurological: Negative for dizziness, seizures, weakness, light-headedness, numbness and headaches. Hematological: Does not bruise/bleed easily. Psychiatric/Behavioral: Positive for dysphoric mood (on occasion). Negative for agitation, confusion, decreased concentration, self-injury and sleep disturbance. The patient is nervous/anxious (on occasion). The patient is not hyperactive. Physical Exam   Constitutional: She is oriented to person, place, and time. She appears well-developed and well-nourished. She is cooperative. Non-toxic appearance. No distress. Body habitus is ob   HENT:   Head: Normocephalic and atraumatic. Right Ear: Hearing, tympanic membrane, external ear and ear canal normal.   Left Ear: Hearing, tympanic membrane, external ear and ear canal normal.   Nose: Nose normal.   Mouth/Throat: Oropharynx is clear and moist and mucous membranes are normal. No oropharyngeal exudate. Eyes: Pupils are equal, round, and reactive to light. Conjunctivae, EOM and lids are normal. Right eye exhibits no discharge. Left eye exhibits no discharge. Neck: Normal range of motion and phonation normal. Neck supple. No JVD present. Carotid bruit is not present. No thyromegaly present. Cardiovascular: Normal rate, regular rhythm, normal heart sounds and intact distal pulses. No extrasystoles are present. PMI is not displaced. Exam reveals no gallop and no friction rub. No murmur heard. Pulmonary/Chest: Effort normal and breath sounds normal. No respiratory distress. She has no wheezes. She has no rhonchi. She has no rales. Abdominal: Soft. Bowel sounds are normal. She exhibits no distension and no mass. There is no hepatosplenomegaly. There is no tenderness. There is no CVA tenderness. Genitourinary:   Genitourinary Comments: Examination deferred   Musculoskeletal: She exhibits no edema. Lymphadenopathy:     She has no cervical adenopathy. Neurological: She is alert and oriented to person, place, and time. She has normal strength. She displays no atrophy, no tremor and normal reflexes. No cranial nerve deficit or sensory deficit. Coordination and gait normal.   No focal deficits appreciated   Skin: Skin is warm and dry. No rash noted. She is not diaphoretic. Psychiatric: She has a normal mood and affect. Her speech is normal and behavior is normal.        Vitals reviewed. ASSESSMENT      ICD-10-CM    1. Type 2 diabetes mellitus with diabetic polyneuropathy, with long-term current use of insulin ([facility]) E11.42 Semaglutide 0.25 or 0.5 MG/DOSE SOPN    Z79.4    2. Diabetic polyneuropathy associated with type 2 diabetes mellitus ([facility]) E11.42 pregabalin (LYRICA) 150 MG capsule   3. Essential hypertension I10    4. Mixed hyperlipidemia E78.2    5. Acquired hypothyroidism E03.9    6.  Chronic kidney disease (CKD) stage G3b/A2, moderately decreased glomerular filtration rate (GFR) between 30-44 mL/min/1.73 square meter and albuminuria creatinine ratio between  mg/g ([facility]) N18.3    7. Dupuytren's contracture of both hands M72.0 External Referral To Orthopedic Surgery   8. ROSY (obstructive sleep apnea) G47.33        PLAN      ICD-10-CM    1. Type 2 diabetes mellitus with diabetic polyneuropathy, with long-term current use of insulin ([facility]) E11.42 Semaglutide 0.25 or 0.5 MG/DOSE SOPN    I did give her a sample of Trulicity 6.83 mg injectables. We reviewed the proper technique for injecting. We will follow-up in 1 month's time to evaluate blood sugars again. I recommended against the radha continuous glucose monitor as we have had many many problems with this device. I do not recommend it at all    Z79.4    2. Diabetic polyneuropathy associated with type 2 diabetes mellitus ([facility]) E11.42 pregabalin (LYRICA) 150 MG capsule   3. Essential hypertension I10  this is not well controlled either. She is consuming salty foods. She is also consuming high amounts of caffeine. We discussed the importance of blood pressure regulation. She will try lifestyle modification beginning today. 4. Mixed hyperlipidemia E78.2  we are close to where we need to be from a control standpoint. Hopefully with diet and exercise we can improve this even further   5. Acquired hypothyroidism E03.9 The current medical regimen is effective;  continue present plan and medications. 6. Chronic kidney disease (CKD) stage G3b/A2, moderately decreased glomerular filtration rate (GFR) between 30-44 mL/min/1.73 square meter and albuminuria creatinine ratio between  mg/g ([facility]) N18.3  we did discuss the importance of glycemic control as well as hypertension control in order to preserve renal function. 7. Dupuytren's contracture of both hands M72.0 External Referral To Orthopedic Surgery   8. ROSY (obstructive sleep apnea) G47.33 Strongly encouraged her to use her PAP device.        Orders Placed This Encounter   Procedures    External Referral To Orthopedic Surgery        Return in about 1 month (around 7/18/2019) for 30.

## 2019-06-18 NOTE — CARE COORDINATION
Ambulatory Care Coordination Note  6/18/2019  CM Risk Score: 10  Hector Mortality Risk Score:      ACC: Suzanne Allen, RN    Summary Note: ACM met with Wei Aguilar and her , Blaine Carrillo, during her appt today. Wei Aguilar has been reluctant to give up salt in her diet. Blaine Carrillo stated they have looked at labels more lately but he also said he is using pink [address] salt when he cooks because it has other minerals in it. Wei Aguilar brought her vitals log today and her 5 day average BS = 155. She continues to fluctuate with her FBS and other numbers - sometimes she forgets to check her BS before she starts eating. She does get up most nights and will eat a snack during that time. Her FBS yesterday was 174 and today it was 130. Wei Aguilar has Novolog and Lantus insulin. Her BP remains elevated as well. She talked about her new Syeda continuous glucometer - she has not started using it yet. Plan:  Wei Aguilar will keep her scheduled dietician appointment at the end of the month w [facility]. ACC reinforced the importance of reading labels for actual salt content and aim for 15% or less DV, including the \"pink\" salt that Blaine Carrillo is using for cooking/eating. Urged that these specialty salts may have as much or more sodium per serving than table salt so please read labels. It is important for Audrey to reduce her salt intake. Wei Aguilar will continue to check a daily BP/wgt/HR and her before meal BS log. Provided pt with blank log sheets to continue monitoring and review with ACM and provider. PCP referred pt to [address] [place] [address] at her request today.       Care Coordination Interventions    Program Enrollment:  Complex Care  Referral from Primary Care Provider:  No  Suggested Interventions and Community Resources  Registered Dietician:  Completed (Comment: 6/13/19: Pt reported has appt end of June w [facility] HAYES dietician)  Specialty Services Referral:  Completed (Comment: Pain mgmt)  Other Therapy Services:  Completed (Comment: 6/18/19: PCP ordered referral to [address] [place] [address] per pt request)  Zone Management Tools: In Process (Comment: 6/18/19: Saw pt at appt, discussed reducing salt at length w Tanya Rodriguez and her . Urged reading labels, aim for 15% DV per serving or less. Do not add salt when cooking or eating, use other no salt spices. Reluctant to give up salt. Stated watching carbs.)         Goals Addressed                 This Visit's Progress     Patient Stated (pt-stated)   No change     Get fasting blood sugar closer to 130    Barriers: lack of motivation and lack of education  Plan for overcoming my barriers:   Pt will learn to count carbs of her night time snack  Pt will portion her snack  Pt will choose lower carb content options for snack  Consider low dose meal time insulin coverage for snack  Confidence: 8/10  Anticipated Goal Completion Date: 8/13/19            Prior to Admission medications    Medication Sig Start Date End Date Taking? Authorizing Provider   metoprolol succinate (TOPROL XL) 50 MG extended release tablet Take 1 tablet by mouth daily 5/14/19   TRESSA Lakhani   amitriptyline (ELAVIL) 100 MG tablet Take 2 tablets by mouth nightly 5/3/19   TRESSA Lakhani   Continuous Blood Gluc Sensor ([address] [place] [address]) MISC 1 applicator by Does not apply route continuous 5/3/19   TRESSA Lakhani   atorvastatin (LIPITOR) 40 MG tablet TAKE ONE TABLET BY MOUTH ONCE NIGHTLY 5/1/19   B Earline Nageotte, DO   QUEtiapine (SEROQUEL) 200 MG tablet Take 1 tablet by mouth nightly 5/1/19   B Earline Nageotte, DO   HYDROcodone-acetaminophen (NORCO) 5-325 MG per tablet Take 1 tablet by mouth 2 times daily.   3/25/19   Historical Provider, MD   aspirin 81 MG tablet Take 81 mg by mouth daily    Historical Provider, MD   tiZANidine (ZANAFLEX) 4 MG tablet Take 1 tablet by mouth every 8 hours as needed (muscle spasms) 3/14/19   TRESSA Lakhani   Insulin Pen Needle 31G X 8 MM MISC 1 each by Does not apply route 4 times daily 3/4/19   Anibal Jean TRESSA Duran   LYRICA 150 MG capsule TAKE 1 CAPSULE BY MOUTH TWICE DAILY 2/19/19 6/19/19  TRESSA Granados   NOVOLOG FLEXPEN 100 UNIT/ML injection pen INJECT 20 UNITS SUB Q THREE TIMES DAILY WITH MEALS BASED UPON RATIO AND CORRECTIVE FACTOR. AVERAGE 60 UNITS DAILY. Patient taking differently: INJECT 20 UNITS SUB Q THREE TIMES DAILY WITH MEALS BASED UPON RATIO AND CORRECTIVE FACTOR. AVERAGE 45 - 60 UNITS DAILY. 2/11/19   LAZARO Garcia DO   insulin glargine (LANTUS SOLOSTAR) 100 UNIT/ML injection pen Inject 22 Units into the skin nightly 1/3/19   TRESSA Hardwick   fluticasone (FLONASE) 50 MCG/ACT nasal spray 2 sprays by Nasal route daily 12/7/18   TRESSA Granados   DULoxetine (CYMBALTA) 60 MG extended release capsule Take 1 capsule by mouth 2 times daily  Patient taking differently: Take 60 mg by mouth daily  11/20/18   TRESSA Granados   levothyroxine (SYNTHROID) 88 MCG tablet Take 1 tablet by mouth Daily 10/18/18   LAZARO Garcia DO   furosemide (LASIX) 40 MG tablet Take 40 mg by mouth daily     Historical Provider, MD   valsartan (DIOVAN) 80 MG tablet Take 1 tablet by mouth nightly 9/11/18   TRESSA Hardwick   Blood Glucose Monitoring Suppl (FREESTYLE LITE) JEZ 1 Device by Does not apply route 4 times daily 7/5/18   TRESSA Granados   blood glucose test strips (FREESTYLE LITE) strip 1 each by In Vitro route 4 times daily As needed. 7/5/18   TRESSA Granados   linaclotide [facility]) 290 MCG CAPS capsule Take 1 capsule by mouth every morning (before breakfast)  Patient taking differently: Take 290 mcg by mouth every other day  7/3/18   LAZARO Garcia DO   pantoprazole (PROTONIX) 40 MG tablet TAKE ONE TABLET BY MOUTH ONCE DAILY 6/12/18   TRESSA Granados       No future appointments.    and   Diabetes Assessment    Medic Alert ID:  No  Meal Planning:  Avoidance of concentrated sweets, Carb counting   How often do you test your blood sugar?:  Other (Comment: bid)   Do you have barriers with adherence to non-pharmacologic self-management interventions?  (Nutrition/Exercise/Self-Monitoring):  Yes (Comment: Elizabeth Dee loves carbs but does not eat sweets much)   Have you ever had to go to the ED for symptoms of low blood sugar?:  No       Do you have hyperglycemia symptoms?:  Yes    Per:  Week   Do you have hypoglycemia symptoms?:  No   Last Blood Sugar Value:  130   Blood Sugar Monitoring Regimen:  Before Meals   Blood Sugar Trends:  Fluctuating

## 2019-06-25 ENCOUNTER — TELEPHONE (OUTPATIENT)
Dept: PRIMARY CARE CLINIC | Age: 59
End: 2019-06-25

## 2019-07-02 ENCOUNTER — CARE COORDINATION (OUTPATIENT)
Dept: CARE COORDINATION | Age: 59
End: 2019-07-02

## 2019-07-18 ENCOUNTER — OFFICE VISIT (OUTPATIENT)
Dept: PRIMARY CARE CLINIC | Age: 59
End: 2019-07-18
Payer: MEDICARE

## 2019-07-18 VITALS
HEART RATE: 68 BPM | BODY MASS INDEX: 46.97 KG/M2 | WEIGHT: 239.25 LBS | DIASTOLIC BLOOD PRESSURE: 78 MMHG | HEIGHT: 60 IN | TEMPERATURE: 97.9 F | SYSTOLIC BLOOD PRESSURE: 128 MMHG | OXYGEN SATURATION: 98 %

## 2019-07-18 DIAGNOSIS — F31.76 BIPOLAR DISORDER, IN FULL REMISSION, MOST RECENT EPISODE DEPRESSED (HCC): ICD-10-CM

## 2019-07-18 DIAGNOSIS — Z12.11 SCREEN FOR COLON CANCER: ICD-10-CM

## 2019-07-18 DIAGNOSIS — E11.42 TYPE 2 DIABETES MELLITUS WITH DIABETIC POLYNEUROPATHY, WITH LONG-TERM CURRENT USE OF INSULIN (HCC): ICD-10-CM

## 2019-07-18 DIAGNOSIS — Z79.4 TYPE 2 DIABETES MELLITUS WITH DIABETIC POLYNEUROPATHY, WITH LONG-TERM CURRENT USE OF INSULIN (HCC): Primary | ICD-10-CM

## 2019-07-18 DIAGNOSIS — E03.9 ACQUIRED HYPOTHYROIDISM: ICD-10-CM

## 2019-07-18 DIAGNOSIS — N18.32 CHRONIC KIDNEY DISEASE (CKD) STAGE G3B/A2, MODERATELY DECREASED GLOMERULAR FILTRATION RATE (GFR) BETWEEN 30-44 ML/MIN/1.73 SQUARE METER AND ALBUMINURIA CREATININE RATIO BETWEEN 30-299 MG/G (HCC): ICD-10-CM

## 2019-07-18 DIAGNOSIS — E11.42 TYPE 2 DIABETES MELLITUS WITH DIABETIC POLYNEUROPATHY, WITH LONG-TERM CURRENT USE OF INSULIN (HCC): Primary | ICD-10-CM

## 2019-07-18 DIAGNOSIS — G47.01 INSOMNIA DUE TO MEDICAL CONDITION: ICD-10-CM

## 2019-07-18 DIAGNOSIS — Z79.4 TYPE 2 DIABETES MELLITUS WITH DIABETIC POLYNEUROPATHY, WITH LONG-TERM CURRENT USE OF INSULIN (HCC): ICD-10-CM

## 2019-07-18 DIAGNOSIS — Z98.890 STATUS POST ORIF OF FRACTURE OF ANKLE: ICD-10-CM

## 2019-07-18 DIAGNOSIS — I10 ESSENTIAL HYPERTENSION: ICD-10-CM

## 2019-07-18 DIAGNOSIS — Z87.81 STATUS POST ORIF OF FRACTURE OF ANKLE: ICD-10-CM

## 2019-07-18 DIAGNOSIS — Z12.39 SCREENING FOR BREAST CANCER: ICD-10-CM

## 2019-07-18 LAB
ALBUMIN SERPL-MCNC: 3.8 G/DL (ref 3.5–5.2)
ALP BLD-CCNC: 172 U/L (ref 35–104)
ALT SERPL-CCNC: <5 U/L (ref 5–33)
ANION GAP SERPL CALCULATED.3IONS-SCNC: 15 MMOL/L (ref 7–19)
AST SERPL-CCNC: <5 U/L (ref 5–32)
BASOPHILS ABSOLUTE: 0.1 K/UL (ref 0–0.2)
BASOPHILS RELATIVE PERCENT: 0.8 % (ref 0–1)
BILIRUB SERPL-MCNC: 0.3 MG/DL (ref 0.2–1.2)
BUN BLDV-MCNC: 23 MG/DL (ref 6–20)
CALCIUM SERPL-MCNC: 9.2 MG/DL (ref 8.6–10)
CHLORIDE BLD-SCNC: 95 MMOL/L (ref 98–111)
CO2: 23 MMOL/L (ref 22–29)
CREAT SERPL-MCNC: 1.7 MG/DL (ref 0.5–0.9)
CREATININE URINE: 25.2 MG/DL (ref 4.2–622)
EOSINOPHILS ABSOLUTE: 0.3 K/UL (ref 0–0.6)
EOSINOPHILS RELATIVE PERCENT: 2.5 % (ref 0–5)
GFR NON-AFRICAN AMERICAN: 31
GLUCOSE BLD-MCNC: 200 MG/DL (ref 74–109)
HBA1C MFR BLD: 7.6 % (ref 4–6)
HCT VFR BLD CALC: 40.4 % (ref 37–47)
HEMOGLOBIN: 12.8 G/DL (ref 12–16)
HEPATITIS C ANTIBODY INTERPRETATION: NORMAL
LYMPHOCYTES ABSOLUTE: 1.9 K/UL (ref 1.1–4.5)
LYMPHOCYTES RELATIVE PERCENT: 17.6 % (ref 20–40)
MCH RBC QN AUTO: 29.1 PG (ref 27–31)
MCHC RBC AUTO-ENTMCNC: 31.7 G/DL (ref 33–37)
MCV RBC AUTO: 91.8 FL (ref 81–99)
MICROALBUMIN UR-MCNC: <1.2 MG/DL (ref 0–19)
MICROALBUMIN/CREAT UR-RTO: NORMAL MG/G
MONOCYTES ABSOLUTE: 0.8 K/UL (ref 0–0.9)
MONOCYTES RELATIVE PERCENT: 7.5 % (ref 0–10)
NEUTROPHILS ABSOLUTE: 7.5 K/UL (ref 1.5–7.5)
NEUTROPHILS RELATIVE PERCENT: 71.1 % (ref 50–65)
PDW BLD-RTO: 14.4 % (ref 11.5–14.5)
PLATELET # BLD: 342 K/UL (ref 130–400)
PMV BLD AUTO: 12 FL (ref 9.4–12.3)
POTASSIUM SERPL-SCNC: 5.6 MMOL/L (ref 3.5–5)
RBC # BLD: 4.4 M/UL (ref 4.2–5.4)
SODIUM BLD-SCNC: 133 MMOL/L (ref 136–145)
T4 FREE: 0.9 NG/DL (ref 0.9–1.7)
TOTAL PROTEIN: 7.8 G/DL (ref 6.6–8.7)
TSH SERPL DL<=0.05 MIU/L-ACNC: 1.78 UIU/ML (ref 0.27–4.2)
WBC # BLD: 10.6 K/UL (ref 4.8–10.8)

## 2019-07-18 PROCEDURE — 99214 OFFICE O/P EST MOD 30 MIN: CPT | Performed by: NURSE PRACTITIONER

## 2019-07-18 PROCEDURE — G8598 ASA/ANTIPLAT THER USED: HCPCS | Performed by: NURSE PRACTITIONER

## 2019-07-18 PROCEDURE — G8428 CUR MEDS NOT DOCUMENT: HCPCS | Performed by: NURSE PRACTITIONER

## 2019-07-18 PROCEDURE — 2022F DILAT RTA XM EVC RTNOPTHY: CPT | Performed by: NURSE PRACTITIONER

## 2019-07-18 PROCEDURE — G8417 CALC BMI ABV UP PARAM F/U: HCPCS | Performed by: NURSE PRACTITIONER

## 2019-07-18 PROCEDURE — 3017F COLORECTAL CA SCREEN DOC REV: CPT | Performed by: NURSE PRACTITIONER

## 2019-07-18 PROCEDURE — 3045F PR MOST RECENT HEMOGLOBIN A1C LEVEL 7.0-9.0%: CPT | Performed by: NURSE PRACTITIONER

## 2019-07-18 PROCEDURE — 1036F TOBACCO NON-USER: CPT | Performed by: NURSE PRACTITIONER

## 2019-07-18 RX ORDER — ZOLPIDEM TARTRATE 5 MG/1
5 TABLET ORAL NIGHTLY PRN
Qty: 30 TABLET | Refills: 0 | Status: SHIPPED | OUTPATIENT
Start: 2019-07-18 | End: 2019-08-20 | Stop reason: SDUPTHER

## 2019-07-18 ASSESSMENT — ENCOUNTER SYMPTOMS
NAUSEA: 0
VOMITING: 0
CONSTIPATION: 0
ABDOMINAL PAIN: 0
BACK PAIN: 1
WHEEZING: 0
COUGH: 0
DIARRHEA: 0
SHORTNESS OF BREATH: 0

## 2019-07-18 NOTE — PROGRESS NOTES
metoprolol succinate (TOPROL XL) 50 MG extended release tablet Take 1 tablet by mouth daily Yes TRESSA Ruggiero   amitriptyline (ELAVIL) 100 MG tablet Take 2 tablets by mouth nightly Yes TRESSA Ruggiero   atorvastatin (LIPITOR) 40 MG tablet TAKE ONE TABLET BY MOUTH ONCE NIGHTLY Yes ALZARO White DO   QUEtiapine (SEROQUEL) 200 MG tablet Take 1 tablet by mouth nightly Yes LAZARO White DO   HYDROcodone-acetaminophen (NORCO) 5-325 MG per tablet Take 1 tablet by mouth 2 times daily. Yes Historical Provider, MD   aspirin 81 MG tablet Take 81 mg by mouth daily Yes Historical Provider, MD   tiZANidine (ZANAFLEX) 4 MG tablet Take 1 tablet by mouth every 8 hours as needed (muscle spasms) Yes TRESSA Ruggiero   Insulin Pen Needle 31G X 8 MM MISC 1 each by Does not apply route 4 times daily Yes TRESSA Ruggiero   NOVOLOG FLEXPEN 100 UNIT/ML injection pen INJECT 20 UNITS SUB Q THREE TIMES DAILY WITH MEALS BASED UPON RATIO AND CORRECTIVE FACTOR. AVERAGE 60 UNITS DAILY. Patient taking differently: INJECT 20 UNITS SUB Q THREE TIMES DAILY WITH MEALS BASED UPON RATIO AND CORRECTIVE FACTOR. AVERAGE 45 - 60 UNITS DAILY.  Yes LAZARO White DO   insulin glargine (LANTUS SOLOSTAR) 100 UNIT/ML injection pen Inject 22 Units into the skin nightly Yes TRESSA Hardwick   fluticasone (FLONASE) 50 MCG/ACT nasal spray 2 sprays by Nasal route daily Yes TRESSA Ruggiero   DULoxetine (CYMBALTA) 60 MG extended release capsule Take 1 capsule by mouth 2 times daily  Patient taking differently: Take 60 mg by mouth daily  Yes TRESSA Ruggiero   levothyroxine (SYNTHROID) 88 MCG tablet Take 1 tablet by mouth Daily Yes LAZARO White DO   furosemide (LASIX) 40 MG tablet Take 40 mg by mouth daily  Yes Historical Provider, MD   valsartan (DIOVAN) 80 MG tablet Take 1 tablet by mouth nightly Yes TRESSA Hardwick   Blood Glucose Monitoring Suppl (FREESTYLE LITE) JEZ 1 Device by Does performed by Nereyda Santos MD at [address] [place] [address] OPEN TREATMENT BIMALLEOLAR ANKLE FRACTURE Right 6/11/2018    ORIF RIGHT BIMALLEOLAR ANKLE FRACTURE, RIGHT WRIST CAST REMOVAL performed by Gerry Chung MD at [address] [place] [address] Right 6/12/2018    ANKLE OPEN REDUCTION INTERNAL FIXATION performed by Gerry Chung MD at [place] [address] Left 6/16/2017    KNEE TOTAL ARTHROPLASTY performed by Krishna Barboza DO at [facility] OR       Social History     Tobacco Use    Smoking status: Never Smoker    Smokeless tobacco: Former User     Types: Snuff   Substance Use Topics    Alcohol use: No       Review of Systems   Constitutional: Positive for activity change (with pain in ankle continues). Negative for appetite change, fatigue and fever. HENT: Negative. Respiratory: Negative for cough, shortness of breath and wheezing. Cardiovascular: Negative for chest pain, palpitations and leg swelling (off and on using lasix). Gastrointestinal: Negative for abdominal pain, constipation, diarrhea, nausea and vomiting. Endocrine: Negative for polydipsia, polyphagia and polyuria. Musculoskeletal: Positive for arthralgias (ankle and back) and back pain. Slow with ankle pain       Skin: Negative for rash and wound. Neurological: Negative for dizziness and headaches. Psychiatric/Behavioral: Negative for behavioral problems, self-injury and sleep disturbance (still off and on off ambien). The patient is not nervous/anxious and is not hyperactive. Physical Exam   Constitutional: She is oriented to person, place, and time. She appears well-developed and well-nourished. No distress. Obese     HENT:   Head: Normocephalic. Right Ear: External ear normal.   Left Ear: External ear normal.   Mouth/Throat: No oropharyngeal exudate. Eyes: Pupils are equal, round, and reactive to light. Right eye exhibits no discharge.  Left eye exhibits no discharge. Neck: Normal range of motion. Cardiovascular: Normal rate, regular rhythm, normal heart sounds and intact distal pulses. No murmur heard. Pulmonary/Chest: Effort normal and breath sounds normal. No respiratory distress. She has no wheezes. Abdominal: Soft. Bowel sounds are normal. She exhibits no distension. Musculoskeletal: Normal range of motion. Lymphadenopathy:     She has no cervical adenopathy. Neurological: She is alert and oriented to person, place, and time. No cranial nerve deficit. Skin: Skin is warm and dry. No rash noted. She is not diaphoretic. Psychiatric: She has a normal mood and affect. Her behavior is normal.   Laughing and talking     Vitals reviewed. ASSESSMENT/ PLAN    1. Screening for breast cancer  Will set up   Discussed importance  She wants at [address] CAD BILATERAL; Future    2. Screen for colon cancer  Fit test to them    3. Type 2 diabetes mellitus with diabetic polyneuropathy, with long-term current use of insulin ([facility])  Plan of care  Cont and log it  - CBC Auto Differential; Future  - Comprehensive Metabolic Panel; Future  - Hemoglobin A1C; Future  - Microalbumin / Creatinine Urine Ratio; Future  - TSH without Reflex; Future  - T4, Free; Future  - Hepatitis C Antibody; Future    4. Essential hypertension  Stable cont to log    5. Chronic kidney disease (CKD) stage G3b/A2, moderately decreased glomerular filtration rate (GFR) between 30-44 mL/min/1.73 square meter and albuminuria creatinine ratio between  mg/g ([facility])  Monitor weight and low sodium   - Comprehensive Metabolic Panel; Future  - Hemoglobin A1C; Future  - Microalbumin / Creatinine Urine Ratio; Future    6. Acquired hypothyroidism  Thyroid stable  Will check  - TSH without Reflex; Future  - T4, Free; Future    7.  Bipolar disorder, in full remission, most recent episode depressed ([facility] [address]  Cont to monitor  Use only as needed not nightly  - zolpidem (AMBIEN) 5 MG tablet; Take 1 tablet by mouth nightly as needed for Sleep for up to 30 days. Dispense: 30 tablet; Refill: 0    8. Insomnia due to medical condition    - zolpidem (AMBIEN) 5 MG tablet; Take 1 tablet by mouth nightly as needed for Sleep for up to 30 days. Dispense: 30 tablet; Refill: 0      Orders Placed This Encounter   Procedures    SHAHANA DIGITAL SCREEN W CAD BILATERAL    DEXA Bone Density Axial Skeleton    CBC Auto Differential    Comprehensive Metabolic Panel    Hemoglobin A1C    Microalbumin / Creatinine Urine Ratio    TSH without Reflex    T4, Free    Hepatitis C Antibody        Return in about 29 days (around 8/16/2019) for diabetes bone density mammo insomnia. Patient Instructions       Patient Education        Insomnia: Care Instructions  Your Care Instructions    Insomnia is the inability to sleep well. It is a common problem for most people at some time. Insomnia may make it hard for you to get to sleep, stay asleep, or sleep as long as you need to. This can make you tired and grouchy during the day. It can also make you forgetful, less effective at work, and unhappy. Insomnia can be caused by conditions such as depression or anxiety. Pain can also affect your ability to sleep. When these problems are solved, the insomnia usually clears up. But sometimes bad sleep habits can cause insomnia. If insomnia is affecting your work or your enjoyment of life, you can take steps to improve your sleep. Follow-up care is a key part of your treatment and safety. Be sure to make and go to all appointments, and call your doctor if you are having problems. It's also a good idea to know your test results and keep a list of the medicines you take. How can you care for yourself at home? What to avoid  · Do not have drinks with caffeine, such as coffee or black tea, for 8 hours before bed. · Do not smoke or use other types of tobacco near bedtime. Nicotine is a stimulant and can keep you awake.   · Avoid drinking

## 2019-07-19 ENCOUNTER — TELEPHONE (OUTPATIENT)
Dept: PRIMARY CARE CLINIC | Age: 59
End: 2019-07-19

## 2019-07-24 DIAGNOSIS — Z79.4 TYPE 2 DIABETES MELLITUS WITH HYPERGLYCEMIA, WITH LONG-TERM CURRENT USE OF INSULIN (HCC): ICD-10-CM

## 2019-07-24 DIAGNOSIS — E11.65 TYPE 2 DIABETES MELLITUS WITH HYPERGLYCEMIA, WITH LONG-TERM CURRENT USE OF INSULIN (HCC): ICD-10-CM

## 2019-07-24 RX ORDER — INSULIN ASPART 100 [IU]/ML
INJECTION, SOLUTION INTRAVENOUS; SUBCUTANEOUS
Qty: 18 PEN | Refills: 3 | Status: SHIPPED | OUTPATIENT
Start: 2019-07-24 | End: 2019-08-20 | Stop reason: SDUPTHER

## 2019-07-25 ENCOUNTER — PROCEDURE VISIT (OUTPATIENT)
Dept: PRIMARY CARE CLINIC | Age: 59
End: 2019-07-25
Payer: MEDICARE

## 2019-07-25 DIAGNOSIS — Z12.11 COLON CANCER SCREENING: Primary | ICD-10-CM

## 2019-07-25 LAB
CONTROL: NORMAL
HEMOCCULT STL QL: POSITIVE

## 2019-07-25 PROCEDURE — 82274 ASSAY TEST FOR BLOOD FECAL: CPT | Performed by: NURSE PRACTITIONER

## 2019-07-26 ENCOUNTER — TELEPHONE (OUTPATIENT)
Dept: PRIMARY CARE CLINIC | Age: 59
End: 2019-07-26

## 2019-07-29 ENCOUNTER — CARE COORDINATION (OUTPATIENT)
Dept: CARE COORDINATION | Age: 59
End: 2019-07-29

## 2019-07-29 ENCOUNTER — OFFICE VISIT (OUTPATIENT)
Dept: PRIMARY CARE CLINIC | Age: 59
End: 2019-07-29
Payer: MEDICARE

## 2019-07-29 VITALS
HEART RATE: 84 BPM | SYSTOLIC BLOOD PRESSURE: 126 MMHG | WEIGHT: 240 LBS | DIASTOLIC BLOOD PRESSURE: 84 MMHG | OXYGEN SATURATION: 99 % | HEIGHT: 60 IN | BODY MASS INDEX: 47.12 KG/M2 | TEMPERATURE: 97.8 F

## 2019-07-29 DIAGNOSIS — Z79.4 TYPE 2 DIABETES MELLITUS WITH HYPERGLYCEMIA, WITH LONG-TERM CURRENT USE OF INSULIN (HCC): Primary | ICD-10-CM

## 2019-07-29 DIAGNOSIS — E87.5 HYPERKALEMIA: ICD-10-CM

## 2019-07-29 DIAGNOSIS — N18.32 CHRONIC KIDNEY DISEASE (CKD) STAGE G3B/A2, MODERATELY DECREASED GLOMERULAR FILTRATION RATE (GFR) BETWEEN 30-44 ML/MIN/1.73 SQUARE METER AND ALBUMINURIA CREATININE RATIO BETWEEN 30-299 MG/G (HCC): ICD-10-CM

## 2019-07-29 DIAGNOSIS — R19.5 POSITIVE FIT (FECAL IMMUNOCHEMICAL TEST): ICD-10-CM

## 2019-07-29 DIAGNOSIS — E11.65 TYPE 2 DIABETES MELLITUS WITH HYPERGLYCEMIA, WITH LONG-TERM CURRENT USE OF INSULIN (HCC): Primary | ICD-10-CM

## 2019-07-29 LAB
ALBUMIN SERPL-MCNC: 4.3 G/DL (ref 3.5–5.2)
ALP BLD-CCNC: 200 U/L (ref 35–104)
ALT SERPL-CCNC: 21 U/L (ref 5–33)
ANION GAP SERPL CALCULATED.3IONS-SCNC: 20 MMOL/L (ref 7–19)
AST SERPL-CCNC: 25 U/L (ref 5–32)
BILIRUB SERPL-MCNC: 0.3 MG/DL (ref 0.2–1.2)
BUN BLDV-MCNC: 32 MG/DL (ref 6–20)
CALCIUM SERPL-MCNC: 9.5 MG/DL (ref 8.6–10)
CHLORIDE BLD-SCNC: 97 MMOL/L (ref 98–111)
CO2: 22 MMOL/L (ref 22–29)
CREAT SERPL-MCNC: 1.6 MG/DL (ref 0.5–0.9)
GFR NON-AFRICAN AMERICAN: 33
GLUCOSE BLD-MCNC: 203 MG/DL (ref 74–109)
POTASSIUM SERPL-SCNC: 5.2 MMOL/L (ref 3.5–5)
SODIUM BLD-SCNC: 139 MMOL/L (ref 136–145)
TOTAL PROTEIN: 8 G/DL (ref 6.6–8.7)

## 2019-07-29 PROCEDURE — G8598 ASA/ANTIPLAT THER USED: HCPCS | Performed by: NURSE PRACTITIONER

## 2019-07-29 PROCEDURE — 2022F DILAT RTA XM EVC RTNOPTHY: CPT | Performed by: NURSE PRACTITIONER

## 2019-07-29 PROCEDURE — G8417 CALC BMI ABV UP PARAM F/U: HCPCS | Performed by: NURSE PRACTITIONER

## 2019-07-29 PROCEDURE — 3017F COLORECTAL CA SCREEN DOC REV: CPT | Performed by: NURSE PRACTITIONER

## 2019-07-29 PROCEDURE — 99214 OFFICE O/P EST MOD 30 MIN: CPT | Performed by: NURSE PRACTITIONER

## 2019-07-29 PROCEDURE — G8427 DOCREV CUR MEDS BY ELIG CLIN: HCPCS | Performed by: NURSE PRACTITIONER

## 2019-07-29 PROCEDURE — 3045F PR MOST RECENT HEMOGLOBIN A1C LEVEL 7.0-9.0%: CPT | Performed by: NURSE PRACTITIONER

## 2019-07-29 PROCEDURE — 1036F TOBACCO NON-USER: CPT | Performed by: NURSE PRACTITIONER

## 2019-07-29 ASSESSMENT — ENCOUNTER SYMPTOMS
WHEEZING: 0
DIARRHEA: 0
SHORTNESS OF BREATH: 0
BACK PAIN: 1
ABDOMINAL PAIN: 0
NAUSEA: 0
VOMITING: 0
CONSTIPATION: 0
COUGH: 0

## 2019-07-29 NOTE — CARE COORDINATION
Provider Sita Tong   8/12/2019  2:45 PM TRESSA Zaragoza [id number]      and   Diabetes Assessment    Medic Alert ID:  No  Meal Planning:  Avoidance of concentrated sweets, Carb counting   How often do you test your blood sugar?:  Other (Comment: bid)   Do you have barriers with adherence to non-pharmacologic self-management interventions?  (Nutrition/Exercise/Self-Monitoring):  Yes (Comment: Hamlet Barakat loves carbs but does not eat sweets much)   Have you ever had to go to the ED for symptoms of low blood sugar?:  No       Do you have hyperglycemia symptoms?:  No   Do you have hypoglycemia symptoms?:  No (Comment: Last BS unknown, did not bring log)   Blood Sugar Monitoring Regimen:  Before Meals   Blood Sugar Trends:  No Change (Comment: Based on Hba1c 7.6)

## 2019-07-29 NOTE — PROGRESS NOTES
[address]  [place], [address]  Phone [phone]   Fax [phone]      OFFICE VISIT: 2019    Sung Bachelor- : 1960      Reason For Visit:  Leah Iqbal is a 63 y.o. Fidelia Jones is here for Follow-up (labs)         Health Maintenance     HPI    Patient is here for diabetes follow up  With labs  She had bring logs and had issues with cheese crackers  And lays chips \"cant leave alone\"  But is treating with the insulin  She it past tried a injectable weekly  She reports she tried and she never tried it  She forgets and she is wanting to take it  a1c 2019 7.6   Sugar reported well    cmp was off  K 5.6 she has had this before  And she reports that she has been pooping well  She has been using Mrs Rosibel Cap  And kidney cont same  She is following nephrology      Fit test positive  She reports someone has been calling her  But she aint gone yet    Mammogram  She did set up but didn't go do it  But hasn't heard about her bone density     height is 5' (1.524 m) and weight is 240 lb (108.9 kg). Her temporal temperature is 97.8 °F (36.6 °C). Her blood pressure is 126/84 and her pulse is 84. Her oxygen saturation is 99%. Body mass index is 46.87 kg/m². Results for orders placed or performed in visit on 19   POCT Fecal Immunochemical Test (FIT)   Result Value Ref Range    OCCULT BLOOD FECAL POSITIVE     Control         I have reviewed the following with the Ms. Olivarez   Lab Review   Procedure visit on 2019   Component Date Value    OCCULT BLOOD FECAL 2019 POSITIVE    Orders Only on 2019   Component Date Value    Hep C Ab Interp 2019 Non-Reactive     T4 Free 2019 0.9     TSH 2019 1.780     Microalbumin, Random Uri* 2019 <1.20     Creatinine, Ur 2019 25.2     Microalbumin Creatinine * 2019 see below     Hemoglobin A1C 2019 7.6*    Sodium 2019 133*    Potassium 2019 5.6*    Chloride 2019 95*    CO2 2019 zolpidem (AMBIEN) 5 MG tablet Take 1 tablet by mouth nightly as needed for Sleep for up to 30 days. Yes TRESSA Bishop   pregabalin (LYRICA) 150 MG capsule Take 1 capsule by mouth 2 times daily for 120 days. Yes LAZARO Thomas, DO   Semaglutide 0.25 or 0.5 MG/DOSE SOPN Inject 0.25 mg into the skin every 7 days Yes LAZARO Thomas, DO   metoprolol succinate (TOPROL XL) 50 MG extended release tablet Take 1 tablet by mouth daily Yes TRESSA Bishop   amitriptyline (ELAVIL) 100 MG tablet Take 2 tablets by mouth nightly Yes TRESSA Bishop   atorvastatin (LIPITOR) 40 MG tablet TAKE ONE TABLET BY MOUTH ONCE NIGHTLY Yes LAZARO Thomas DO   QUEtiapine (SEROQUEL) 200 MG tablet Take 1 tablet by mouth nightly Yes LAZARO Thomas, DO   HYDROcodone-acetaminophen (NORCO) 5-325 MG per tablet Take 1 tablet by mouth 2 times daily.   Yes Historical Provider, MD   aspirin 81 MG tablet Take 81 mg by mouth daily Yes Historical Provider, MD   tiZANidine (ZANAFLEX) 4 MG tablet Take 1 tablet by mouth every 8 hours as needed (muscle spasms) Yes TRESSA Bishop   Insulin Pen Needle 31G X 8 MM MISC 1 each by Does not apply route 4 times daily Yes TRESSA Bishop   fluticasone (FLONASE) 50 MCG/ACT nasal spray 2 sprays by Nasal route daily Yes TRESSA Bishop   DULoxetine (CYMBALTA) 60 MG extended release capsule Take 1 capsule by mouth 2 times daily  Patient taking differently: Take 60 mg by mouth daily  Yes TRESSA Bishop   levothyroxine (SYNTHROID) 88 MCG tablet Take 1 tablet by mouth Daily Yes LAZARO Thomas,    furosemide (LASIX) 40 MG tablet Take 40 mg by mouth daily  Yes Historical Provider, MD   valsartan (DIOVAN) 80 MG tablet Take 1 tablet by mouth nightly Yes TRESSA Hardwick   Blood Glucose Monitoring Suppl (FREESTYLE LITE) JEZ 1 Device by Does not apply route 4 times daily Yes TRESSA Bishop   blood glucose test strips (FREESTYLE LITE) including prescription and over-the-counter medicines, vitamins, and herbal products. Not all possible interactions are listed in this medication guide. Where can I get more information? Your pharmacist can provide more information about dulaglutide. Remember, keep this and all other medicines out of the reach of children, never share your medicines with others, and use this medication only for the indication prescribed. Every effort has been made to ensure that the information provided by [address] is accurate, up-to-date, and complete, but no guarantee is made to that effect. Drug information contained herein may be time sensitive. [facility] information has been compiled for use by healthcare practitioners and consumers in the [place] and therefore [facility] does not warrant that uses outside of the [place] are appropriate, unless specifically indicated otherwise. [facility]'s drug information does not endorse drugs, diagnose patients or recommend therapy. [facility]'s drug information is an informational resource designed to assist licensed healthcare practitioners in caring for their patients and/or to serve consumers viewing this service as a supplement to, and not a substitute for, the expertise, skill, knowledge and judgment of healthcare practitioners. The absence of a warning for a given drug or drug combination in no way should be construed to indicate that the drug or drug combination is safe, effective or appropriate for any given patient. [facility] does not assume any responsibility for any aspect of healthcare administered with the aid of information [facility] provides. The information contained herein is not intended to cover all possible uses, directions, precautions, warnings, drug interactions, allergic reactions, or adverse effects.  If you have questions about the drugs you are taking, check with your doctor, nurse or pharmacist.  Copyright 7287-4954 [address]:

## 2019-07-29 NOTE — PATIENT INSTRUCTIONS
Patient Education        Learning About Meal Planning for Diabetes  Why plan your meals? Meal planning can be a key part of managing diabetes. Planning meals and snacks with the right balance of carbohydrate, protein, and fat can help you keep your blood sugar at the target level you set with your doctor. You don't have to eat special foods. You can eat what your family eats, including sweets once in a while. But you do have to pay attention to how often you eat and how much you eat of certain foods. You may want to work with a dietitian or a certified diabetes educator. He or she can give you tips and meal ideas and can answer your questions about meal planning. This health professional can also help you reach a healthy weight if that is one of your goals. What plan is right for you? Your dietitian or diabetes educator may suggest that you start with the plate format or carbohydrate counting. The plate format  The plate format is a simple way to help you manage how you eat. You plan meals by learning how much space each food should take on a plate. Using the plate format helps you spread carbohydrate throughout the day. It can make it easier to keep your blood sugar level within your target range. It also helps you see if you're eating healthy portion sizes. To use the plate format, you put non-starchy vegetables on half your plate. Add meat or meat substitutes on one-quarter of the plate. Put a grain or starchy vegetable (such as brown rice or a potato) on the final quarter of the plate. You can add a small piece of fruit and some low-fat or fat-free milk or yogurt, depending on your carbohydrate goal for each meal.  Here are some tips for using the plate format:  · Make sure that you are not using an oversized plate. A 9-inch plate is best. Many restaurants use larger plates. · Get used to using the plate format at home. Then you can use it when you eat out.   · Write down your questions about using the plate format. Talk to your doctor, a dietitian, or a diabetes educator about your concerns. Carbohydrate counting  With carbohydrate counting, you plan meals based on the amount of carbohydrate in each food. Carbohydrate raises blood sugar higher and more quickly than any other nutrient. It is found in desserts, breads and cereals, and fruit. It's also found in starchy vegetables such as potatoes and corn, grains such as rice and pasta, and milk and yogurt. Spreading carbohydrate throughout the day helps keep your blood sugar levels within your target range. Your daily amount depends on several things, including your weight, how active you are, which diabetes medicines you take, and what your goals are for your blood sugar levels. A registered dietitian or diabetes educator can help you plan how much carbohydrate to include in each meal and snack. A guideline for your daily amount of carbohydrate is:  · 45 to 60 grams at each meal. That's about the same as 3 to 4 carbohydrate servings. · 15 to 20 grams at each snack. That's about the same as 1 carbohydrate serving. The Nutrition Facts label on packaged foods tells you how much carbohydrate is in a serving of the food. First, look at the serving size on the food label. Is that the amount you eat in a serving? All of the nutrition information on a food label is based on that serving size. So if you eat more or less than that, you'll need to adjust the other numbers. Total carbohydrate is the next thing you need to look for on the label. If you count carbohydrate servings, one serving of carbohydrate is 15 grams. For foods that don't come with labels, such as fresh fruits and vegetables, you'll need a guide that lists carbohydrate in these foods. Ask your doctor, dietitian, or diabetes educator about books or other nutrition guides you can use.   If you take insulin, you need to know how many grams of carbohydrate are in a meal. This lets you know how much drugs work. Tell your doctor about all your current medicines and any you start or stop using, especially:  · all diabetes medications you use; and  · all medicines you take by mouth. This list is not complete. Other drugs may interact with dulaglutide, including prescription and over-the-counter medicines, vitamins, and herbal products. Not all possible interactions are listed in this medication guide. Where can I get more information? Your pharmacist can provide more information about dulaglutide. Remember, keep this and all other medicines out of the reach of children, never share your medicines with others, and use this medication only for the indication prescribed. Every effort has been made to ensure that the information provided by [facility] is accurate, up-to-date, and complete, but no guarantee is made to that effect. Drug information contained herein may be time sensitive. [facility] information has been compiled for use by healthcare practitioners and consumers in the [place] and therefore [employer] does not warrant that uses outside of the [place] are appropriate, unless specifically indicated otherwise. [facility]'s drug information does not endorse drugs, diagnose patients or recommend therapy. [facility] drug information is an informational resource designed to assist licensed healthcare practitioners in caring for their patients and/or to serve consumers viewing this service as a supplement to, and not a substitute for, the expertise, skill, knowledge and judgment of healthcare practitioners. The absence of a warning for a given drug or drug combination in no way should be construed to indicate that the drug or drug combination is safe, effective or appropriate for any given patient. [facility] does not assume any responsibility for any aspect of healthcare administered with the aid of information [facility] provides.  The information contained herein is not intended to cover all

## 2019-07-30 ENCOUNTER — TELEPHONE (OUTPATIENT)
Dept: PRIMARY CARE CLINIC | Age: 59
End: 2019-07-30

## 2019-07-30 NOTE — TELEPHONE ENCOUNTER
----- Message from TRESSA Teran sent at 7/29/2019  4:05 PM CDT -----  cmp   K is improving avoid \"mrs dash\"  Kidneys stable  Glucose at 203  This should improve with the trulicity  Make sure covering high sugars with sliding scale

## 2019-08-02 DIAGNOSIS — E78.2 MIXED HYPERLIPIDEMIA: ICD-10-CM

## 2019-08-02 DIAGNOSIS — I10 ESSENTIAL HYPERTENSION: ICD-10-CM

## 2019-08-02 DIAGNOSIS — E11.42 TYPE 2 DIABETES MELLITUS WITH DIABETIC POLYNEUROPATHY, WITH LONG-TERM CURRENT USE OF INSULIN (HCC): ICD-10-CM

## 2019-08-02 DIAGNOSIS — E03.9 ACQUIRED HYPOTHYROIDISM: ICD-10-CM

## 2019-08-02 DIAGNOSIS — Z98.890 STATUS POST ORIF OF FRACTURE OF ANKLE: ICD-10-CM

## 2019-08-02 DIAGNOSIS — Z79.4 TYPE 2 DIABETES MELLITUS WITH DIABETIC POLYNEUROPATHY, WITH LONG-TERM CURRENT USE OF INSULIN (HCC): ICD-10-CM

## 2019-08-02 DIAGNOSIS — Z87.81 STATUS POST ORIF OF FRACTURE OF ANKLE: ICD-10-CM

## 2019-08-02 LAB
ALBUMIN SERPL-MCNC: 3.9 G/DL (ref 3.5–5.2)
ALP BLD-CCNC: 187 U/L (ref 35–104)
ALT SERPL-CCNC: 19 U/L (ref 5–33)
ANION GAP SERPL CALCULATED.3IONS-SCNC: 21 MMOL/L (ref 7–19)
AST SERPL-CCNC: 22 U/L (ref 5–32)
BASOPHILS ABSOLUTE: 0.1 K/UL (ref 0–0.2)
BASOPHILS RELATIVE PERCENT: 0.7 % (ref 0–1)
BILIRUB SERPL-MCNC: 0.5 MG/DL (ref 0.2–1.2)
BUN BLDV-MCNC: 24 MG/DL (ref 6–20)
CALCIUM SERPL-MCNC: 9.5 MG/DL (ref 8.6–10)
CHLORIDE BLD-SCNC: 92 MMOL/L (ref 98–111)
CHOLESTEROL, TOTAL: 148 MG/DL (ref 160–199)
CO2: 21 MMOL/L (ref 22–29)
CREAT SERPL-MCNC: 1.5 MG/DL (ref 0.5–0.9)
CREATININE URINE: 28.3 MG/DL (ref 4.2–622)
EOSINOPHILS ABSOLUTE: 0.2 K/UL (ref 0–0.6)
EOSINOPHILS RELATIVE PERCENT: 1.8 % (ref 0–5)
GFR NON-AFRICAN AMERICAN: 36
GLUCOSE BLD-MCNC: 147 MG/DL (ref 74–109)
HBA1C MFR BLD: 7.9 % (ref 4–6)
HCT VFR BLD CALC: 42.5 % (ref 37–47)
HDLC SERPL-MCNC: 46 MG/DL (ref 65–121)
HEMOGLOBIN: 13.3 G/DL (ref 12–16)
LDL CHOLESTEROL CALCULATED: 61 MG/DL
LYMPHOCYTES ABSOLUTE: 2 K/UL (ref 1.1–4.5)
LYMPHOCYTES RELATIVE PERCENT: 19.6 % (ref 20–40)
MCH RBC QN AUTO: 28.5 PG (ref 27–31)
MCHC RBC AUTO-ENTMCNC: 31.3 G/DL (ref 33–37)
MCV RBC AUTO: 91 FL (ref 81–99)
MICROALBUMIN UR-MCNC: <1.2 MG/DL (ref 0–19)
MICROALBUMIN/CREAT UR-RTO: NORMAL MG/G
MONOCYTES ABSOLUTE: 0.8 K/UL (ref 0–0.9)
MONOCYTES RELATIVE PERCENT: 7.5 % (ref 0–10)
NEUTROPHILS ABSOLUTE: 7.2 K/UL (ref 1.5–7.5)
NEUTROPHILS RELATIVE PERCENT: 69.9 % (ref 50–65)
PDW BLD-RTO: 14.6 % (ref 11.5–14.5)
PLATELET # BLD: 329 K/UL (ref 130–400)
PMV BLD AUTO: 12.2 FL (ref 9.4–12.3)
POTASSIUM SERPL-SCNC: 4.7 MMOL/L (ref 3.5–5)
RBC # BLD: 4.67 M/UL (ref 4.2–5.4)
SODIUM BLD-SCNC: 134 MMOL/L (ref 136–145)
T4 FREE: 1.3 NG/DL (ref 0.9–1.7)
TOTAL PROTEIN: 8.4 G/DL (ref 6.6–8.7)
TRIGL SERPL-MCNC: 207 MG/DL (ref 0–149)
TSH SERPL DL<=0.05 MIU/L-ACNC: 1.29 UIU/ML (ref 0.27–4.2)
WBC # BLD: 10.3 K/UL (ref 4.8–10.8)

## 2019-08-05 ENCOUNTER — TELEPHONE (OUTPATIENT)
Dept: PRIMARY CARE CLINIC | Age: 59
End: 2019-08-05

## 2019-08-05 ENCOUNTER — TELEPHONE (OUTPATIENT)
Dept: ADMINISTRATIVE | Age: 59
End: 2019-08-05

## 2019-08-05 NOTE — TELEPHONE ENCOUNTER
Lázaro Burger from [employer] called to advise that insurance denied the bone density test due to medical necessity. She asked if we can resend order w/different codes.

## 2019-08-06 ENCOUNTER — CARE COORDINATION (OUTPATIENT)
Dept: CARE COORDINATION | Age: 59
End: 2019-08-06

## 2019-08-06 NOTE — CARE COORDINATION
Ambulatory Care Coordination Note  8/6/2019  CM Risk Score: 10  Charlson 10 Year Mortality Risk Score: 98%     ACC: Chung David, NICOLETTE    Summary Note: Pt called ACM today. Ry Encarnacion has started eating \"better\". Because her BS are lower she is now taking 15 units of Novolog w meals. Pt will eat a meat in afternoon/evening. Pt has given up eating chips. Pt is trying rice cakes - plain. Bhavna Ajays has to have a PA on pt's Novolog now. Pt also reported another letter on Lyrica received but insurance will cover generic for 30 days and sent pt \"complimentary\" 30 day supply. She took Trulicity dose yesterday - her second wkly dose at 0.75 mg. Her Part D is paying for Trulicity. BS: 8/1 start:  FBS = 147 / PM BS(before supper) = 175 / Bedtime = 127  122 / 228 / 118  112 / 83 / 127  115 / 189 / 139  124 / 150 / 109  151 this morning  Plan:  ACM praised pt for making dietary changes! This along with her Trulicity injections has significantly impacted her blood sugar. Encouraged pt to continue great effort. ACM to f/u with office staff and pharmacy on PA request for Lyrica(Pregabalin) and Novolog. Ry Encarnacion provided her Part D card info- [employer] processes. Member ID: [id number]  Ph#: [phone] (provider ph#)    Pt called [facility] and  re: her insurance. Pt verified she still has her Medicare A&B coverage. Pt is waiting for application from [employer] (based on [place]). She plans to apply as soon as possible. Pharmacy call: AC spoke to [place] and she confirmed pt's Lyrica went through for 0$, generic version. Novolog went thru for $8.50/90 day supply.     Care Coordination Interventions    Program Enrollment:  Complex Care  Referral from Primary Care Provider:  No  Suggested Interventions and Community Resources  Medication Assistance Program:  Not Started (Comment: 2/5/33: Rommie Starch is covered on pt's BC Part D plan)  Pharmacist:  In Process (Comment: 8/6/19: Jagdish Alves will f/u with [address]

## 2019-08-09 ENCOUNTER — TELEPHONE (OUTPATIENT)
Dept: PRIMARY CARE CLINIC | Age: 59
End: 2019-08-09

## 2019-08-09 ENCOUNTER — CARE COORDINATION (OUTPATIENT)
Dept: CARE COORDINATION | Age: 59
End: 2019-08-09

## 2019-08-11 DIAGNOSIS — M62.838 MUSCLE SPASM: ICD-10-CM

## 2019-08-13 RX ORDER — TIZANIDINE 4 MG/1
4 TABLET ORAL EVERY 8 HOURS PRN
Qty: 270 TABLET | Refills: 1 | Status: SHIPPED | OUTPATIENT
Start: 2019-08-13 | End: 2020-03-02

## 2019-08-13 NOTE — TELEPHONE ENCOUNTER
Received fax from pharmacy requesting refill on pts medication(s). Pt was last seen in office on 7/29/2019  and has a follow up scheduled for 8/20/2019. Will send request to  Reatha Najjar  for patient.      Requested Prescriptions     Pending Prescriptions Disp Refills    tiZANidine (ZANAFLEX) 4 MG tablet [Pharmacy Med Name: TiZANidine 4MG      TAB] 90 tablet 3     Sig: TAKE 1 TABLET BY MOUTH EVERY 8 HOURS AS NEEDED FOR MUSCLE SPASMS

## 2019-08-20 ENCOUNTER — OFFICE VISIT (OUTPATIENT)
Dept: PRIMARY CARE CLINIC | Age: 59
End: 2019-08-20
Payer: MEDICARE

## 2019-08-20 VITALS
HEIGHT: 60 IN | TEMPERATURE: 98.5 F | HEART RATE: 78 BPM | DIASTOLIC BLOOD PRESSURE: 82 MMHG | BODY MASS INDEX: 45.75 KG/M2 | SYSTOLIC BLOOD PRESSURE: 124 MMHG | OXYGEN SATURATION: 98 % | WEIGHT: 233 LBS

## 2019-08-20 DIAGNOSIS — E87.5 HYPERKALEMIA: ICD-10-CM

## 2019-08-20 DIAGNOSIS — F31.76 BIPOLAR DISORDER, IN FULL REMISSION, MOST RECENT EPISODE DEPRESSED (HCC): ICD-10-CM

## 2019-08-20 DIAGNOSIS — N18.32 CHRONIC KIDNEY DISEASE (CKD) STAGE G3B/A2, MODERATELY DECREASED GLOMERULAR FILTRATION RATE (GFR) BETWEEN 30-44 ML/MIN/1.73 SQUARE METER AND ALBUMINURIA CREATININE RATIO BETWEEN 30-299 MG/G (HCC): ICD-10-CM

## 2019-08-20 DIAGNOSIS — E11.65 TYPE 2 DIABETES MELLITUS WITH HYPERGLYCEMIA, WITH LONG-TERM CURRENT USE OF INSULIN (HCC): Primary | ICD-10-CM

## 2019-08-20 DIAGNOSIS — Z79.4 TYPE 2 DIABETES MELLITUS WITH HYPERGLYCEMIA, WITH LONG-TERM CURRENT USE OF INSULIN (HCC): Primary | ICD-10-CM

## 2019-08-20 DIAGNOSIS — G47.01 INSOMNIA DUE TO MEDICAL CONDITION: ICD-10-CM

## 2019-08-20 PROCEDURE — 99213 OFFICE O/P EST LOW 20 MIN: CPT | Performed by: NURSE PRACTITIONER

## 2019-08-20 RX ORDER — ZOLPIDEM TARTRATE 10 MG/1
10 TABLET ORAL NIGHTLY PRN
Qty: 30 TABLET | Refills: 0 | Status: SHIPPED | OUTPATIENT
Start: 2019-08-20 | End: 2019-09-20 | Stop reason: SDUPTHER

## 2019-08-20 ASSESSMENT — ENCOUNTER SYMPTOMS
ABDOMINAL PAIN: 0
COUGH: 0
VOMITING: 0
WHEEZING: 0
SHORTNESS OF BREATH: 0
BACK PAIN: 1
DIARRHEA: 0
NAUSEA: 0
CONSTIPATION: 0

## 2019-08-20 NOTE — PROGRESS NOTES
04/30/2019 1.8     Monocytes # 04/30/2019 0.90     Eosinophils # 04/30/2019 0.20     Basophils # 04/30/2019 0.10     Sodium 04/30/2019 136     Potassium 04/30/2019 4.8     Chloride 04/30/2019 93*    CO2 04/30/2019 22     Anion Gap 04/30/2019 21*    Glucose 04/30/2019 235*    BUN 04/30/2019 23*    CREATININE 04/30/2019 1.7*    GFR Non- 04/30/2019 31*    Calcium 04/30/2019 9.4     Total Protein 04/30/2019 7.5     Alb 04/30/2019 3.8     Total Bilirubin 04/30/2019 0.4     Alkaline Phosphatase 04/30/2019 173*    ALT 04/30/2019 16     AST 04/30/2019 19    Admission on 04/12/2019, Discharged on 04/13/2019   Component Date Value    Sodium 04/12/2019 130*    Potassium 04/12/2019 4.8     Chloride 04/12/2019 92*    CO2 04/12/2019 24     Anion Gap 04/12/2019 14     Glucose 04/12/2019 396*    BUN 04/12/2019 27*    CREATININE 04/12/2019 1.8*    GFR Non- 04/12/2019 29*    Calcium 04/12/2019 9.3     WBC 04/12/2019 9.5     RBC 04/12/2019 3.62*    Hemoglobin 04/12/2019 11.0*    Hematocrit 04/12/2019 33.7*    MCV 04/12/2019 93.1     MCH 04/12/2019 30.4     MCHC 04/12/2019 32.6*    RDW 04/12/2019 14.4     Platelets 84/97/3230 251     MPV 04/12/2019 10.9     Left Ventricular Ejectio* 04/15/2019 79     LVEF MODALITY 04/15/2019 CATH     Sodium 04/13/2019 133*    Potassium 04/13/2019 4.5     Chloride 04/13/2019 103     CO2 04/13/2019 20*    Anion Gap 04/13/2019 10     Glucose 04/13/2019 424*    BUN 04/13/2019 23*    CREATININE 04/13/2019 1.5*    GFR Non- 04/13/2019 36*    Calcium 04/13/2019 8.2*     Copies of these are in the chart. Prior to Visit Medications    Medication Sig Taking? Authorizing Provider   zolpidem (AMBIEN) 10 MG tablet Take 1 tablet by mouth nightly as needed for Sleep for up to 30 days.  Yes Deldiane Adriel, APRN   insulin aspart (NOVOLOG FLEXPEN) 100 UNIT/ML injection pen Inject 10 Units into the skin 3 times daily as needed.  Yes TRESSA Carroll   linaclotide [facility]) 290 MCG CAPS capsule Take 1 capsule by mouth every morning (before breakfast)  Patient taking differently: Take 290 mcg by mouth every other day  Yes LAZARO Rodrigez,    pantoprazole (PROTONIX) 40 MG tablet TAKE ONE TABLET BY MOUTH ONCE DAILY Yes TRESSA Carroll       Allergies: Dilaudid [hydromorphone hcl]    Past Medical History:   Diagnosis Date    Anxiety     Arthritis     Bipolar 1 disorder ([facility]     CAD (coronary artery disease)     CHF (congestive heart failure) ([facility]     Chronic kidney disease (CKD) stage G3b/A2, moderately decreased glomerular filtration rate (GFR) between 30-44 mL/min/1.73 square meter and albuminuria creatinine ratio between  mg/g ([facility] 2016    Depression     DM (diabetes mellitus) ([facility]     GERD (gastroesophageal reflux disease)     History of blood transfusion     History of suicidal ideation     Hyperlipidemia     Hypertension     Hypothyroidism     Insomnia     Kidney disease     stage 3 failure    MI, old 2005    Obesity     Polyarticular arthritis     Type II or unspecified type diabetes mellitus without mention of complication, not stated as uncontrolled        Past Surgical History:   Procedure Laterality Date    ABDOMINOPLASTY      ANGIOPLASTY  05    stent placement to LAD and diagonal with normal left vent function    BREAST REDUCTION SURGERY      CARDIAC CATHETERIZATION  05, 05    see report  Stents x3    CARDIAC CATHETERIZATION  3/23/15  JDT    EF 50%    CARPAL TUNNEL RELEASE       SECTION      x2    CHOLECYSTECTOMY      GASTRIC BYPASS SURGERY      HERNIA REPAIR      umbilical with mesh    INTRACAPSULAR CATARACT EXTRACTION Left 2016    LT EYE CATARACT EMULSIFICATION IOL IMPLANT performed by Gen Neil MD at [address] [place] [address] Right 2018    ORIF RIGHT BIMALLEOLAR ANKLE FRACTURE, RIGHT WRIST CAST REMOVAL performed by Jesús Moya MD at [address] [facility] OPEN TREATMENT BIMALLEOLAR ANKLE FRACTURE Right 6/12/2018    ANKLE OPEN REDUCTION INTERNAL FIXATION performed by Jesús Moya MD at [facility] ARTHROPLASTY Left 6/16/2017    KNEE TOTAL ARTHROPLASTY performed by Nataliia Melissa DO at [address] [facility] OR       Social History     Tobacco Use    Smoking status: Never Smoker    Smokeless tobacco: Former User     Types: Snuff   Substance Use Topics    Alcohol use: No       Review of Systems   Constitutional: Positive for unexpected weight change (down 7 lbs). Negative for activity change, appetite change, fatigue and fever. HENT: Negative. Respiratory: Negative for cough, shortness of breath and wheezing. Cardiovascular: Negative for chest pain, palpitations and leg swelling (off and on using lasix). Gastrointestinal: Negative for abdominal pain, constipation, diarrhea, nausea and vomiting. Endocrine: Negative for polydipsia, polyphagia and polyuria. Musculoskeletal: Positive for arthralgias (ankle and back) and back pain. Skin: Negative for rash and wound. Neurological: Negative for dizziness and headaches. Psychiatric/Behavioral: Negative for behavioral problems, self-injury and sleep disturbance (still off and on off ambien). The patient is not nervous/anxious and is not hyperactive. Physical Exam   Constitutional: She is oriented to person, place, and time. She appears well-developed and well-nourished. No distress. Obese     HENT:   Head: Normocephalic. Right Ear: External ear normal.   Left Ear: External ear normal.   Mouth/Throat: No oropharyngeal exudate. Eyes: Pupils are equal, round, and reactive to light. Right eye exhibits no discharge. Left eye exhibits no discharge. Neck: Normal range of motion. Cardiovascular: Normal rate, regular rhythm, normal heart sounds and intact distal pulses.    No murmur about books or other nutrition guides you can use. If you take insulin, you need to know how many grams of carbohydrate are in a meal. This lets you know how much rapid-acting insulin to take before you eat. If you use an insulin pump, you get a constant rate of insulin during the day. So the pump must be programmed at meals to give you extra insulin to cover the rise in blood sugar after meals. When you know how much carbohydrate you will eat, you can take the right amount of insulin. Or, if you always use the same amount of insulin, you need to make sure that you eat the same amount of carbohydrate at meals. If you need more help to understand carbohydrate counting and food labels, ask your doctor, dietitian, or diabetes educator. How do you get started with meal planning? Here are some tips to get started:  · Plan your meals a week at a time. Don't forget to include snacks too. · Use cookbooks or online recipes to plan several main meals. Plan some quick meals for busy nights. You also can double some recipes that freeze well. Then you can save half for other busy nights when you don't have time to cook. · Make sure you have the ingredients you need for your recipes. If you're running low on basic items, put these items on your shopping list too. · List foods that you use to make breakfasts, lunches, and snacks. List plenty of fruits and vegetables. · Post this list on the refrigerator. Add to it as you think of more things you need. · Take the list to the store to do your weekly shopping. Follow-up care is a key part of your treatment and safety. Be sure to make and go to all appointments, and call your doctor if you are having problems. It's also a good idea to know your test results and keep a list of the medicines you take. Where can you learn more? Go to [email or web address] org and sign in to your [employer] account.  Enter U621 in the [employer] box to learn more about \"Learning About Meal Planning for Diabetes. \"     If you do not have an account, please click on the \"Sign Up Now\" link. Current as of: July 25, 2018  Content Version: 12.1  © 4587-9123 [employer]. Care instructions adapted under license by [facility] ([facility]). If you have questions about a medical condition or this instruction, always ask your healthcare professional. [place] [address] any warranty or liability for your use of this information. Patient Education        dulaglutide  Pronunciation:  DOO la FADIA tide  Brand:  Trulicity Pen  What is the most important information I should know about dulaglutide? You should not use dulaglutide if you have Multiple Endocrine Neoplasia syndrome type 2 (MEN 2), or a personal or family history of medullary thyroid carcinoma (a type of thyroid cancer). Do not use dulaglutide if you are in a state of diabetic ketoacidosis (call your doctor for treatment). In animal studies, dulaglutide caused thyroid tumors or thyroid cancer. It is not known whether these effects would occur in people using regular doses. Ask your doctor about your risk. Call your doctor at once if you have signs of a thyroid tumor, such as swelling or a lump in your neck, trouble swallowing, a hoarse voice, or shortness of breath. What is dulaglutide? Dulaglutide is an injectable diabetes medicine that helps control blood sugar levels. Dulaglutide is used together with diet and exercise to improve blood sugar control in adults with type 2 diabetes mellitus. Dulaglutide is usually given after other diabetes medicines have been tried without success. This medicine is not for treating type 1 diabetes. Dulaglutide may also be used for purposes not listed in this medication guide. What should I discuss with my healthcare provider before using dulaglutide?   You should not use dulaglutide if you are allergic to it, or if you have:  · multiple endocrine neoplasia type feeling anxious or shaky. To quickly treat low blood sugar, always keep a fast-acting source of sugar with you such as fruit juice, hard candy, crackers, raisins, or non-diet soda. Your doctor can prescribe a glucagon emergency injection kit to use in case you have severe hypoglycemia and cannot eat or drink. Be sure your family and close friends know how to give you this injection in an emergency. Also watch for signs of high blood sugar (hyperglycemia) such as increased thirst or urination, blurred vision, headache, and tiredness. Blood sugar levels can be affected by stress, illness, surgery, exercise, alcohol use, or skipping meals. Ask your doctor before changing your dose or medication schedule. Each injection pen or prefilled syringe is for one use only. Throw away after one use, even if there is still medicine left inside. Use a puncture-proof \"sharps\" container. Follow state or local laws about how to dispose of this container. Keep it out of the reach of children and pets. Store dulaglutide in the refrigerator, protected from light. Do not use past the expiration date on the medicine label. Do not freeze dulaglutide, and throw away the medicine if it has become frozen. You may also store dulaglutide at room temperature for up to 14 days before use. What happens if I miss a dose? Use the medicine as soon as you can, but skip the missed dose if your next dose is due in less than 3 days. Do not use two doses at one time. Do not use this medicine twice within a 72-hour period. What happens if I overdose? Seek emergency medical attention or call the Poison Help line at 1-154.320.6539. What should I avoid while using dulaglutide? Never share an injection pen or prefilled syringe with another person, even if the needle has been changed. Sharing these devices can allow infections or disease to pass from one person to another. What are the possible side effects of dulaglutide?   Stop using dulaglutide

## 2019-08-20 NOTE — PATIENT INSTRUCTIONS
Version: 12.1  © 4083-8208 [employer], [employer]. Care instructions adapted under license by [facility] ([facility]). If you have questions about a medical condition or this instruction, always ask your healthcare professional. [address] any warranty or liability for your use of this information. Patient Education        dulaglutide  Pronunciation:  DOO la GLOO tide  Brand:  Trulicity Pen  What is the most important information I should know about dulaglutide? You should not use dulaglutide if you have Multiple Endocrine Neoplasia syndrome type 2 (MEN 2), or a personal or family history of medullary thyroid carcinoma (a type of thyroid cancer). Do not use dulaglutide if you are in a state of diabetic ketoacidosis (call your doctor for treatment). In animal studies, dulaglutide caused thyroid tumors or thyroid cancer. It is not known whether these effects would occur in people using regular doses. Ask your doctor about your risk. Call your doctor at once if you have signs of a thyroid tumor, such as swelling or a lump in your neck, trouble swallowing, a hoarse voice, or shortness of breath. What is dulaglutide? Dulaglutide is an injectable diabetes medicine that helps control blood sugar levels. Dulaglutide is used together with diet and exercise to improve blood sugar control in adults with type 2 diabetes mellitus. Dulaglutide is usually given after other diabetes medicines have been tried without success. This medicine is not for treating type 1 diabetes. Dulaglutide may also be used for purposes not listed in this medication guide. What should I discuss with my healthcare provider before using dulaglutide?   You should not use dulaglutide if you are allergic to it, or if you have:  · multiple endocrine neoplasia type 2 (tumors in your glands);  · a personal or family history of medullary thyroid carcinoma (a type of thyroid cancer); or  · diabetic ketoacidosis (call your doctor

## 2019-09-06 ENCOUNTER — CARE COORDINATION (OUTPATIENT)
Dept: CARE COORDINATION | Age: 59
End: 2019-09-06

## 2019-09-06 ENCOUNTER — OFFICE VISIT (OUTPATIENT)
Dept: PRIMARY CARE CLINIC | Age: 59
End: 2019-09-06
Payer: MEDICARE

## 2019-09-06 VITALS
HEART RATE: 76 BPM | DIASTOLIC BLOOD PRESSURE: 98 MMHG | BODY MASS INDEX: 46.33 KG/M2 | HEIGHT: 60 IN | TEMPERATURE: 97.8 F | SYSTOLIC BLOOD PRESSURE: 138 MMHG | OXYGEN SATURATION: 99 % | WEIGHT: 236 LBS

## 2019-09-06 DIAGNOSIS — Z79.4 TYPE 2 DIABETES MELLITUS WITH HYPERGLYCEMIA, WITH LONG-TERM CURRENT USE OF INSULIN (HCC): Primary | ICD-10-CM

## 2019-09-06 DIAGNOSIS — Z79.4 TYPE 2 DIABETES MELLITUS WITH HYPERGLYCEMIA, WITH LONG-TERM CURRENT USE OF INSULIN (HCC): ICD-10-CM

## 2019-09-06 DIAGNOSIS — E16.2 HYPOGLYCEMIA: ICD-10-CM

## 2019-09-06 DIAGNOSIS — E11.65 TYPE 2 DIABETES MELLITUS WITH HYPERGLYCEMIA, WITH LONG-TERM CURRENT USE OF INSULIN (HCC): Primary | ICD-10-CM

## 2019-09-06 DIAGNOSIS — E11.65 TYPE 2 DIABETES MELLITUS WITH HYPERGLYCEMIA, WITH LONG-TERM CURRENT USE OF INSULIN (HCC): ICD-10-CM

## 2019-09-06 DIAGNOSIS — I10 ESSENTIAL HYPERTENSION: ICD-10-CM

## 2019-09-06 DIAGNOSIS — N18.32 CHRONIC KIDNEY DISEASE (CKD) STAGE G3B/A2, MODERATELY DECREASED GLOMERULAR FILTRATION RATE (GFR) BETWEEN 30-44 ML/MIN/1.73 SQUARE METER AND ALBUMINURIA CREATININE RATIO BETWEEN 30-299 MG/G (HCC): ICD-10-CM

## 2019-09-06 LAB
ALBUMIN SERPL-MCNC: 3.5 G/DL (ref 3.5–5.2)
ALP BLD-CCNC: 149 U/L (ref 35–104)
ALT SERPL-CCNC: 21 U/L (ref 5–33)
ANION GAP SERPL CALCULATED.3IONS-SCNC: 12 MMOL/L (ref 7–19)
AST SERPL-CCNC: 40 U/L (ref 5–32)
BILIRUB SERPL-MCNC: 0.3 MG/DL (ref 0.2–1.2)
BUN BLDV-MCNC: 22 MG/DL (ref 6–20)
CALCIUM SERPL-MCNC: 8.8 MG/DL (ref 8.6–10)
CHLORIDE BLD-SCNC: 108 MMOL/L (ref 98–111)
CO2: 20 MMOL/L (ref 22–29)
CREAT SERPL-MCNC: 1.4 MG/DL (ref 0.5–0.9)
GFR NON-AFRICAN AMERICAN: 39
GLUCOSE BLD-MCNC: 112 MG/DL (ref 74–109)
HBA1C MFR BLD: 6.8 % (ref 4–6)
POTASSIUM SERPL-SCNC: 5.1 MMOL/L (ref 3.5–5)
SODIUM BLD-SCNC: 140 MMOL/L (ref 136–145)
TOTAL PROTEIN: 7.3 G/DL (ref 6.6–8.7)

## 2019-09-06 PROCEDURE — 99214 OFFICE O/P EST MOD 30 MIN: CPT | Performed by: NURSE PRACTITIONER

## 2019-09-06 RX ORDER — VALSARTAN 160 MG/1
160 TABLET ORAL NIGHTLY
Qty: 30 TABLET | Refills: 11 | Status: SHIPPED | OUTPATIENT
Start: 2019-09-06 | End: 2019-10-03 | Stop reason: SDUPTHER

## 2019-09-06 ASSESSMENT — ENCOUNTER SYMPTOMS
VOMITING: 0
BACK PAIN: 1
WHEEZING: 0
COUGH: 0
ABDOMINAL PAIN: 0
SHORTNESS OF BREATH: 0
NAUSEA: 0
DIARRHEA: 0
CONSTIPATION: 0

## 2019-09-06 NOTE — PATIENT INSTRUCTIONS
the plate format. Talk to your doctor, a dietitian, or a diabetes educator about your concerns. Carbohydrate counting  With carbohydrate counting, you plan meals based on the amount of carbohydrate in each food. Carbohydrate raises blood sugar higher and more quickly than any other nutrient. It is found in desserts, breads and cereals, and fruit. It's also found in starchy vegetables such as potatoes and corn, grains such as rice and pasta, and milk and yogurt. Spreading carbohydrate throughout the day helps keep your blood sugar levels within your target range. Your daily amount depends on several things, including your weight, how active you are, which diabetes medicines you take, and what your goals are for your blood sugar levels. A registered dietitian or diabetes educator can help you plan how much carbohydrate to include in each meal and snack. A guideline for your daily amount of carbohydrate is:  · 45 to 60 grams at each meal. That's about the same as 3 to 4 carbohydrate servings. · 15 to 20 grams at each snack. That's about the same as 1 carbohydrate serving. The Nutrition Facts label on packaged foods tells you how much carbohydrate is in a serving of the food. First, look at the serving size on the food label. Is that the amount you eat in a serving? All of the nutrition information on a food label is based on that serving size. So if you eat more or less than that, you'll need to adjust the other numbers. Total carbohydrate is the next thing you need to look for on the label. If you count carbohydrate servings, one serving of carbohydrate is 15 grams. For foods that don't come with labels, such as fresh fruits and vegetables, you'll need a guide that lists carbohydrate in these foods. Ask your doctor, dietitian, or diabetes educator about books or other nutrition guides you can use.   If you take insulin, you need to know how many grams of carbohydrate are in a meal. This lets you know how much rapid-acting insulin to take before you eat. If you use an insulin pump, you get a constant rate of insulin during the day. So the pump must be programmed at meals to give you extra insulin to cover the rise in blood sugar after meals. When you know how much carbohydrate you will eat, you can take the right amount of insulin. Or, if you always use the same amount of insulin, you need to make sure that you eat the same amount of carbohydrate at meals. If you need more help to understand carbohydrate counting and food labels, ask your doctor, dietitian, or diabetes educator. How do you get started with meal planning? Here are some tips to get started:  · Plan your meals a week at a time. Don't forget to include snacks too. · Use cookbooks or online recipes to plan several main meals. Plan some quick meals for busy nights. You also can double some recipes that freeze well. Then you can save half for other busy nights when you don't have time to cook. · Make sure you have the ingredients you need for your recipes. If you're running low on basic items, put these items on your shopping list too. · List foods that you use to make breakfasts, lunches, and snacks. List plenty of fruits and vegetables. · Post this list on the refrigerator. Add to it as you think of more things you need. · Take the list to the store to do your weekly shopping. Follow-up care is a key part of your treatment and safety. Be sure to make and go to all appointments, and call your doctor if you are having problems. It's also a good idea to know your test results and keep a list of the medicines you take. Where can you learn more? Go to [email or web address]://[email or web address].[email or web address] org and sign in to your [employer] account. Enter O528 in the [employer] box to learn more about \"Learning About Meal Planning for Diabetes. \"     If you do not have an account, please click on the \"Sign Up Now\" link.   Current as of: July 25, 2018  Content your pajamas or sheets are damp when you wake up. How is low blood sugar treated? You can treat low blood sugar by eating or drinking something that has 15 grams of carbohydrate. These should be quick-sugar foods. Check your blood sugar level again 15 minutes after having a quick-sugar food to make sure your level is getting back to your target range. Here are examples of quick-sugar foods that have 15 grams of carbohydrate:  · 3 to 4 glucose tablets  · 1 tube of glucose gel  · Hard candy (such as 3 Jolly Ranchers or 5 to 7 Life Savers)  · 1 tablespoon honey  · 2 tablespoons of raisins  · ½ cup to ¾ cup (4 to 6 ounces) of fruit juice or regular (not diet) soda  · 1 tablespoon of sugar  · 1 cup of fat-free milk  If you have problems with severe low blood sugar, someone else may have to give you a shot of glucagon. This is a hormone that raises blood sugar levels quickly. How can you prevent low blood sugar? You can take steps to prevent low blood sugar. · Follow your treatment plan. Take your insulin or other diabetes medicine exactly as your doctor prescribed it. Talk with your doctor if you're having low blood sugar often. Your medicine may need to be adjusted if it's causing your low blood sugar. · Check your blood sugar levels often. This helps you find early changes before an emergency happens. · Keep a quick-sugar food with you in case your blood sugar level drops low. · Eat small meals more often so that you don't get too hungry between meals. Don't skip meals. · Balance extra exercise with eating more. Check your blood sugar and learn how it changes after exercise. If your blood sugar stays at a normal level, you may not need to eat after you exercise. · Limit how much alcohol you drink. Alcohol can make low blood sugar go even lower. Don't drink alcohol if you have problems recognizing the early signs of low blood sugar. · Keep a diary of your symptoms.  This helps you learn when changes in your

## 2019-09-06 NOTE — CARE COORDINATION
Program Enrollment:  Complex Care  Referral from Primary Care Provider:  No  Suggested Interventions and Community Resources  Pharmacist:  Completed  Specialty Services Referral:  Completed (Comment: Pain mgmt)  Zone Management Tools:  Completed (Comment: 9/6/19: Audrey's avg FBS is 109, much improved since starting Trulicity. She is using meal time insulin and had a few lows. PCP has reduced dose to avoid this. George Greene has done very well.)         Goals Addressed                 This Visit's Progress     COMPLETED: Patient Stated (pt-stated)        Get fasting blood sugar closer to 130    Barriers: lack of motivation and lack of education  Plan for overcoming my barriers:   Pt will learn to count carbs of her night time snack  Pt will portion her snack  Pt will choose lower carb content options for snack  Consider low dose meal time insulin coverage for snack  Confidence: 8/10  Anticipated Goal Completion Date: 8/13/19            Prior to Admission medications    Medication Sig Start Date End Date Taking? Authorizing Provider   insulin aspart (NOVOLOG FLEXPEN) 100 UNIT/ML injection pen Inject 8 Units into the skin 3 times daily as needed for High Blood Sugar (sliding coverage) 9/6/19   TRESSA Husain   valsartan (DIOVAN) 160 MG tablet Take 1 tablet by mouth nightly 9/6/19   Anali Vinay APRN   zolpidem (AMBIEN) 10 MG tablet Take 1 tablet by mouth nightly as needed for Sleep for up to 30 days. 8/20/19 9/19/19  Anali Vinay APRN   Dulaglutide (TRULICITY) 1.5 UQ/1.2SL SOPN Inject 1.5 mg into the skin once a week 8/20/19   Anali Mclean APRN   tiZANidine (ZANAFLEX) 4 MG tablet Take 1 tablet by mouth every 8 hours as needed (muscle spasms) 8/13/19   Anali Vinay APRN   pregabalin (LYRICA) 150 MG capsule Take 1 capsule by mouth 2 times daily for 120 days.  6/18/19 10/16/19  B Christiana Aguilar,    metoprolol succinate (TOPROL XL) 50 MG extended release tablet Take 1 tablet by mouth daily 5/14/19   TRESSA Melara   amitriptyline (ELAVIL) 100 MG tablet Take 2 tablets by mouth nightly 5/3/19   TRESSA Melara   atorvastatin (LIPITOR) 40 MG tablet TAKE ONE TABLET BY MOUTH ONCE NIGHTLY 5/1/19   B Eulah Fragmin, DO   QUEtiapine (SEROQUEL) 200 MG tablet Take 1 tablet by mouth nightly 5/1/19   B Eulah Fragmin, DO   HYDROcodone-acetaminophen (NORCO) 5-325 MG per tablet Take 1 tablet by mouth 2 times daily. 3/25/19   Historical Provider, MD   aspirin 81 MG tablet Take 81 mg by mouth daily    Historical Provider, MD   Insulin Pen Needle 31G X 8 MM MISC 1 each by Does not apply route 4 times daily 3/4/19   TRESSA Melara   fluticasone [facility]) 50 MCG/ACT nasal spray 2 sprays by Nasal route daily 12/7/18   TRESSA Melara   DULoxetine (CYMBALTA) 60 MG extended release capsule Take 1 capsule by mouth 2 times daily  Patient taking differently: Take 60 mg by mouth daily  11/20/18   TRESSA Melara   levothyroxine (SYNTHROID) 88 MCG tablet Take 1 tablet by mouth Daily 10/18/18   B Eustacie Antunez, DO   furosemide (LASIX) 40 MG tablet Take 40 mg by mouth daily     Historical Provider, MD   Blood Glucose Monitoring Suppl (FREESTYLE LITE) JEZ 1 Device by Does not apply route 4 times daily 7/5/18   TRESSA Melara   blood glucose test strips (FREESTYLE LITE) strip 1 each by In Vitro route 4 times daily As needed.  7/5/18   TRESSA Melara   linaclotide Marrianne Leyden) 290 MCG CAPS capsule Take 1 capsule by mouth every morning (before breakfast)  Patient taking differently: Take 290 mcg by mouth every other day  7/3/18   B Eulah Fragmin, DO   pantoprazole (PROTONIX) 40 MG tablet TAKE ONE TABLET BY MOUTH ONCE DAILY 6/12/18   TRESSA Melara       Future Appointments   Date Time Provider Sita Tong   9/19/2019  1:00 PM TRESSA Melara [id number]      and   Diabetes Assessment    Medic Alert ID:  No  Meal Planning:  Avoidance

## 2019-09-06 NOTE — PROGRESS NOTES
Chloride 92 (L) 98 - 111 mmol/L    CO2 21 (L) 22 - 29 mmol/L    Anion Gap 21 (H) 7 - 19 mmol/L    Glucose 147 (H) 74 - 109 mg/dL    BUN 24 (H) 6 - 20 mg/dL    CREATININE 1.5 (H) 0.5 - 0.9 mg/dL    GFR Non- 36 (A) >60    Calcium 9.5 8.6 - 10.0 mg/dL    Total Protein 8.4 6.6 - 8.7 g/dL    Alb 3.9 3.5 - 5.2 g/dL    Total Bilirubin 0.5 0.2 - 1.2 mg/dL    Alkaline Phosphatase 187 (H) 35 - 104 U/L    ALT 19 5 - 33 U/L    AST 22 5 - 32 U/L   CBC Auto Differential   Result Value Ref Range    WBC 10.3 4.8 - 10.8 K/uL    RBC 4.67 4.20 - 5.40 M/uL    Hemoglobin 13.3 12.0 - 16.0 g/dL    Hematocrit 42.5 37.0 - 47.0 %    MCV 91.0 81.0 - 99.0 fL    MCH 28.5 27.0 - 31.0 pg    MCHC 31.3 (L) 33.0 - 37.0 g/dL    RDW 14.6 (H) 11.5 - 14.5 %    Platelets 580 590 - 783 K/uL    MPV 12.2 9.4 - 12.3 fL    Neutrophils % 69.9 (H) 50.0 - 65.0 %    Lymphocytes % 19.6 (L) 20.0 - 40.0 %    Monocytes % 7.5 0.0 - 10.0 %    Eosinophils % 1.8 0.0 - 5.0 %    Basophils % 0.7 0.0 - 1.0 %    Neutrophils Absolute 7.2 1.5 - 7.5 K/uL    Lymphocytes Absolute 2.0 1.1 - 4.5 K/uL    Monocytes Absolute 0.80 0.00 - 0.90 K/uL    Eosinophils Absolute 0.20 0.00 - 0.60 K/uL    Basophils Absolute 0.10 0.00 - 0.20 K/uL       I have reviewed the following with the Ms. Olivarez   Lab Review   Orders Only on 08/02/2019   Component Date Value    Cholesterol, Total 08/02/2019 148*    Triglycerides 08/02/2019 207*    HDL 08/02/2019 46*    LDL Calculated 08/02/2019 61     Hemoglobin A1C 08/02/2019 7.9*    TSH 08/02/2019 1.290     T4 Free 08/02/2019 1.3     Microalbumin, Random Uri* 08/02/2019 <1.20     Creatinine, Ur 08/02/2019 28.3     Microalbumin Creatinine * 08/02/2019 see below     Sodium 08/02/2019 134*    Potassium 08/02/2019 4.7     Chloride 08/02/2019 92*    CO2 08/02/2019 21*    Anion Gap 08/02/2019 21*    Glucose 08/02/2019 147*    BUN 08/02/2019 24*    CREATININE 08/02/2019 1.5*    GFR Non- 08/02/2019 36*    Calcium  BUN 07/18/2019 23*    CREATININE 07/18/2019 1.7*    GFR Non- 07/18/2019 31*    Calcium 07/18/2019 9.2     Total Protein 07/18/2019 7.8     Alb 07/18/2019 3.8     Total Bilirubin 07/18/2019 0.3     Alkaline Phosphatase 07/18/2019 172*    ALT 07/18/2019 <5*    AST 07/18/2019 <5     WBC 07/18/2019 10.6     RBC 07/18/2019 4.40     Hemoglobin 07/18/2019 12.8     Hematocrit 07/18/2019 40.4     MCV 07/18/2019 91.8     MCH 07/18/2019 29.1     MCHC 07/18/2019 31.7*    RDW 07/18/2019 14.4     Platelets 12/46/5415 342     MPV 07/18/2019 12.0     Neutrophils % 07/18/2019 71.1*    Lymphocytes % 07/18/2019 17.6*    Monocytes % 07/18/2019 7.5     Eosinophils % 07/18/2019 2.5     Basophils % 07/18/2019 0.8     Neutrophils Absolute 07/18/2019 7.5     Lymphocytes Absolute 07/18/2019 1.9     Monocytes Absolute 07/18/2019 0.80     Eosinophils Absolute 07/18/2019 0.30     Basophils Absolute 07/18/2019 0.10    Orders Only on 04/30/2019   Component Date Value    T4 Free 04/30/2019 1.1     TSH 04/30/2019 1.570     Microalbumin, Random Uri* 04/30/2019 <1.20     Creatinine, Ur 04/30/2019 56.3     Microalbumin Creatinine * 04/30/2019 see below     Cholesterol, Total 04/30/2019 155*    Triglycerides 04/30/2019 210*    HDL 04/30/2019 42*    LDL Calculated 04/30/2019 71     Hemoglobin A1C 04/30/2019 7.7*    WBC 04/30/2019 8.9     RBC 04/30/2019 4.32     Hemoglobin 04/30/2019 12.9     Hematocrit 04/30/2019 41.0     MCV 04/30/2019 94.9     MCH 04/30/2019 29.9     MCHC 04/30/2019 31.5*    RDW 04/30/2019 14.6*    Platelets 48/73/6142 296     MPV 04/30/2019 11.7     Neutrophils % 04/30/2019 65.7*    Lymphocytes % 04/30/2019 20.7     Monocytes % 04/30/2019 9.9     Eosinophils % 04/30/2019 2.5     Basophils % 04/30/2019 0.7     Neutrophils Absolute 04/30/2019 5.8     Lymphocytes Absolute 04/30/2019 1.8     Monocytes Absolute 04/30/2019 0.90     Eosinophils Absolute 04/30/2019 TREATMENT BIMALLEOLAR ANKLE FRACTURE Right 6/12/2018    ANKLE OPEN REDUCTION INTERNAL FIXATION performed by Jeniffer Husain MD [facility]  Left 6/16/2017    KNEE TOTAL ARTHROPLASTY performed by Ava Miller DO at [address] [place] [address] [place]       Social History     Tobacco Use    Smoking status: Never Smoker    Smokeless tobacco: Former User     Types: Snuff   Substance Use Topics    Alcohol use: No       Review of Systems   Constitutional: Positive for unexpected weight change (up 3 lbs). Negative for activity change, appetite change, fatigue and fever. HENT: Negative. Respiratory: Negative for cough, shortness of breath and wheezing. Cardiovascular: Negative for chest pain, palpitations and leg swelling (off and on using lasix). Gastrointestinal: Negative for abdominal pain, constipation, diarrhea, nausea and vomiting. Endocrine: Negative for polydipsia, polyphagia and polyuria. Reports a few lows     Musculoskeletal: Positive for arthralgias (ankle and back) and back pain. Skin: Negative for rash and wound. Neurological: Negative for dizziness and headaches. Psychiatric/Behavioral: Negative for behavioral problems, self-injury and sleep disturbance (still off and on off ambien). The patient is not nervous/anxious and is not hyperactive. Physical Exam   Constitutional: She is oriented to person, place, and time. She appears well-developed and well-nourished. No distress. Obese     HENT:   Head: Normocephalic. Right Ear: External ear normal.   Left Ear: External ear normal.   Mouth/Throat: No oropharyngeal exudate. Eyes: Pupils are equal, round, and reactive to light. Right eye exhibits no discharge. Left eye exhibits no discharge. Neck: Normal range of motion. Cardiovascular: Normal rate, regular rhythm, normal heart sounds and intact distal pulses. No murmur heard.   Pulmonary/Chest: Effort normal and breath sounds normal. No sugar levels within your target range. Your daily amount depends on several things, including your weight, how active you are, which diabetes medicines you take, and what your goals are for your blood sugar levels. A registered dietitian or diabetes educator can help you plan how much carbohydrate to include in each meal and snack. A guideline for your daily amount of carbohydrate is:  · 45 to 60 grams at each meal. That's about the same as 3 to 4 carbohydrate servings. · 15 to 20 grams at each snack. That's about the same as 1 carbohydrate serving. The Nutrition Facts label on packaged foods tells you how much carbohydrate is in a serving of the food. First, look at the serving size on the food label. Is that the amount you eat in a serving? All of the nutrition information on a food label is based on that serving size. So if you eat more or less than that, you'll need to adjust the other numbers. Total carbohydrate is the next thing you need to look for on the label. If you count carbohydrate servings, one serving of carbohydrate is 15 grams. For foods that don't come with labels, such as fresh fruits and vegetables, you'll need a guide that lists carbohydrate in these foods. Ask your doctor, dietitian, or diabetes educator about books or other nutrition guides you can use. If you take insulin, you need to know how many grams of carbohydrate are in a meal. This lets you know how much rapid-acting insulin to take before you eat. If you use an insulin pump, you get a constant rate of insulin during the day. So the pump must be programmed at meals to give you extra insulin to cover the rise in blood sugar after meals. When you know how much carbohydrate you will eat, you can take the right amount of insulin. Or, if you always use the same amount of insulin, you need to make sure that you eat the same amount of carbohydrate at meals.   If you need more help to understand carbohydrate counting and food labels,

## 2019-09-10 ENCOUNTER — TELEPHONE (OUTPATIENT)
Dept: PRIMARY CARE CLINIC | Age: 59
End: 2019-09-10

## 2019-09-20 ENCOUNTER — CARE COORDINATION (OUTPATIENT)
Dept: CARE COORDINATION | Age: 59
End: 2019-09-20

## 2019-09-20 DIAGNOSIS — F31.76 BIPOLAR DISORDER, IN FULL REMISSION, MOST RECENT EPISODE DEPRESSED (HCC): ICD-10-CM

## 2019-09-20 DIAGNOSIS — G47.01 INSOMNIA DUE TO MEDICAL CONDITION: ICD-10-CM

## 2019-09-20 RX ORDER — ZOLPIDEM TARTRATE 10 MG/1
10 TABLET ORAL NIGHTLY PRN
Qty: 30 TABLET | Refills: 0 | Status: SHIPPED | OUTPATIENT
Start: 2019-09-20 | End: 2019-10-03 | Stop reason: SDUPTHER

## 2019-09-20 NOTE — CARE COORDINATION
Pt called to report her BS and her BP. Headache has been persistent. She has had some high BP readings. She is sleeping well. September 15th:  /101 HR 83. Recheck later 138/92 HR 91  FBS .  after eating egg sandwich. She took Novolog 10 units and rechecked BS = 103 (2 hrs later). BS before supper 137. 16th:  /90. Recheck 143/91 HR 77    . Evening BS before supper 143. Pt did not take Novolog. 4 hours later, her BS was 82    17th:  /96 HR 77    . Before lunch . Pt did not take Novolog. Bedtime BS was 91.    18th:  /96 HR 69. Renu Hernandez took a 5 mg Lisinopril. Recheck 126/86. Later again /82 HR 74    . Before lunch . Pt did not take Novolog. Before supper .    19th:  Weight = 231 lb  /96 HR 75. Renu Hernandez again took a 5 mg Lisinopril. Recheck 2 hrs later 147/83     after breakfast.   Lunch . Supper .   2 hrs later . Pt took no Novolog. ACM will forward pt's vitals to her PCP for review and any recommendations.   Electronically signed by Lucien Harkins RN on 9/20/2019 at 12:47 PM

## 2019-10-03 ENCOUNTER — OFFICE VISIT (OUTPATIENT)
Dept: PRIMARY CARE CLINIC | Age: 59
End: 2019-10-03
Payer: MEDICARE

## 2019-10-03 VITALS
DIASTOLIC BLOOD PRESSURE: 78 MMHG | SYSTOLIC BLOOD PRESSURE: 126 MMHG | OXYGEN SATURATION: 97 % | BODY MASS INDEX: 45.06 KG/M2 | TEMPERATURE: 97.3 F | HEART RATE: 78 BPM | WEIGHT: 229.5 LBS | HEIGHT: 60 IN

## 2019-10-03 DIAGNOSIS — I10 ESSENTIAL HYPERTENSION: ICD-10-CM

## 2019-10-03 DIAGNOSIS — F31.76 BIPOLAR DISORDER, IN FULL REMISSION, MOST RECENT EPISODE DEPRESSED (HCC): ICD-10-CM

## 2019-10-03 DIAGNOSIS — Z79.4 TYPE 2 DIABETES MELLITUS WITH HYPERGLYCEMIA, WITH LONG-TERM CURRENT USE OF INSULIN (HCC): Primary | ICD-10-CM

## 2019-10-03 DIAGNOSIS — G47.01 INSOMNIA DUE TO MEDICAL CONDITION: ICD-10-CM

## 2019-10-03 DIAGNOSIS — E11.42 DIABETIC POLYNEUROPATHY ASSOCIATED WITH TYPE 2 DIABETES MELLITUS (HCC): ICD-10-CM

## 2019-10-03 DIAGNOSIS — E11.65 TYPE 2 DIABETES MELLITUS WITH HYPERGLYCEMIA, WITH LONG-TERM CURRENT USE OF INSULIN (HCC): Primary | ICD-10-CM

## 2019-10-03 PROCEDURE — 99214 OFFICE O/P EST MOD 30 MIN: CPT | Performed by: NURSE PRACTITIONER

## 2019-10-03 RX ORDER — VALSARTAN 320 MG/1
320 TABLET ORAL NIGHTLY
Qty: 30 TABLET | Refills: 11 | Status: SHIPPED | OUTPATIENT
Start: 2019-10-03 | End: 2020-03-12 | Stop reason: SDUPTHER

## 2019-10-03 RX ORDER — ZOLPIDEM TARTRATE 10 MG/1
10 TABLET ORAL NIGHTLY PRN
Qty: 30 TABLET | Refills: 0 | Status: SHIPPED | OUTPATIENT
Start: 2019-10-03 | End: 2019-11-21 | Stop reason: SDUPTHER

## 2019-10-03 RX ORDER — FUROSEMIDE 40 MG/1
40 TABLET ORAL DAILY
Qty: 30 TABLET | Refills: 11 | Status: SHIPPED | OUTPATIENT
Start: 2019-10-03 | End: 2020-03-12 | Stop reason: SDUPTHER

## 2019-10-03 RX ORDER — PREGABALIN 150 MG/1
150 CAPSULE ORAL 2 TIMES DAILY
Qty: 60 CAPSULE | Refills: 3 | Status: SHIPPED | OUTPATIENT
Start: 2019-10-03 | End: 2019-12-20 | Stop reason: SDUPTHER

## 2019-10-03 ASSESSMENT — ENCOUNTER SYMPTOMS
CONSTIPATION: 0
ABDOMINAL PAIN: 0
VOMITING: 0
WHEEZING: 0
COUGH: 0
DIARRHEA: 0
BACK PAIN: 1
NAUSEA: 0
SHORTNESS OF BREATH: 0

## 2019-10-04 ENCOUNTER — CARE COORDINATION (OUTPATIENT)
Dept: CARE COORDINATION | Age: 59
End: 2019-10-04

## 2019-10-07 ENCOUNTER — TELEPHONE (OUTPATIENT)
Dept: PRIMARY CARE CLINIC | Age: 59
End: 2019-10-07

## 2019-10-07 ENCOUNTER — HOSPITAL ENCOUNTER (OUTPATIENT)
Dept: WOMENS IMAGING | Age: 59
Discharge: HOME | End: 2019-10-07
Payer: MEDICARE

## 2019-10-07 DIAGNOSIS — Z87.81 STATUS POST ORIF OF FRACTURE OF ANKLE: ICD-10-CM

## 2019-10-07 DIAGNOSIS — Z98.890 STATUS POST ORIF OF FRACTURE OF ANKLE: ICD-10-CM

## 2019-10-07 PROCEDURE — 77080 DXA BONE DENSITY AXIAL: CPT

## 2019-10-14 ENCOUNTER — TELEPHONE (OUTPATIENT)
Dept: PRIMARY CARE CLINIC | Age: 59
End: 2019-10-14

## 2019-10-16 ENCOUNTER — OFFICE VISIT (OUTPATIENT)
Dept: PRIMARY CARE CLINIC | Age: 59
End: 2019-10-16
Payer: MEDICARE

## 2019-10-16 ENCOUNTER — HOSPITAL ENCOUNTER (OUTPATIENT)
Dept: GENERAL RADIOLOGY | Age: 59
Discharge: HOME | End: 2019-10-16
Payer: MEDICARE

## 2019-10-16 ENCOUNTER — TELEPHONE (OUTPATIENT)
Dept: PRIMARY CARE CLINIC | Age: 59
End: 2019-10-16

## 2019-10-16 VITALS
TEMPERATURE: 98.4 F | SYSTOLIC BLOOD PRESSURE: 136 MMHG | HEIGHT: 60 IN | HEART RATE: 90 BPM | DIASTOLIC BLOOD PRESSURE: 84 MMHG | OXYGEN SATURATION: 99 % | BODY MASS INDEX: 46.92 KG/M2 | WEIGHT: 239 LBS

## 2019-10-16 DIAGNOSIS — R07.89 LEFT-SIDED CHEST WALL PAIN: ICD-10-CM
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
## 2019-10-16 PROCEDURE — 99213 OFFICE O/P EST LOW 20 MIN: CPT | Performed by: NURSE PRACTITIONER

## 2019-10-16 PROCEDURE — 71101 X-RAY EXAM UNILAT RIBS/CHEST: CPT

## 2019-10-16 RX ORDER — HYDROCODONE BITARTRATE AND ACETAMINOPHEN 5; 325 MG/1; MG/1
1 TABLET ORAL EVERY 6 HOURS PRN
Qty: 12 TABLET | Refills: 0 | Status: SHIPPED | OUTPATIENT
Start: 2019-10-16 | End: 2019-10-19

## 2019-10-16 ASSESSMENT — ENCOUNTER SYMPTOMS
NAUSEA: 0
DIARRHEA: 0
TROUBLE SWALLOWING: 0
ABDOMINAL PAIN: 0
SHORTNESS OF BREATH: 0
COUGH: 0
RHINORRHEA: 0
SORE THROAT: 0
CONSTIPATION: 0
VOMITING: 0
SINUS PRESSURE: 0

## 2019-10-18 ENCOUNTER — TELEPHONE (OUTPATIENT)
Dept: PRIMARY CARE CLINIC | Age: 59
End: 2019-10-18

## 2019-11-14 ENCOUNTER — TELEPHONE (OUTPATIENT)
Dept: PRIMARY CARE CLINIC | Age: 59
End: 2019-11-14

## 2019-11-21 ENCOUNTER — OFFICE VISIT (OUTPATIENT)
Dept: PRIMARY CARE CLINIC | Age: 59
End: 2019-11-21
Payer: MEDICARE

## 2019-11-21 VITALS
DIASTOLIC BLOOD PRESSURE: 84 MMHG | HEIGHT: 60 IN | OXYGEN SATURATION: 96 % | TEMPERATURE: 97.8 F | SYSTOLIC BLOOD PRESSURE: 132 MMHG | BODY MASS INDEX: 45.55 KG/M2 | WEIGHT: 232 LBS | HEART RATE: 74 BPM

## 2019-11-21 DIAGNOSIS — E11.65 TYPE 2 DIABETES MELLITUS WITH HYPERGLYCEMIA, WITH LONG-TERM CURRENT USE OF INSULIN (HCC): ICD-10-CM

## 2019-11-21 DIAGNOSIS — E11.42 DIABETIC POLYNEUROPATHY ASSOCIATED WITH TYPE 2 DIABETES MELLITUS (HCC): ICD-10-CM

## 2019-11-21 DIAGNOSIS — Z98.890 STATUS POST ORIF OF FRACTURE OF ANKLE: ICD-10-CM

## 2019-11-21 DIAGNOSIS — Z00.00 MEDICARE ANNUAL WELLNESS VISIT, SUBSEQUENT: Primary | ICD-10-CM

## 2019-11-21 DIAGNOSIS — N18.32 CHRONIC KIDNEY DISEASE (CKD) STAGE G3B/A2, MODERATELY DECREASED GLOMERULAR FILTRATION RATE (GFR) BETWEEN 30-44 ML/MIN/1.73 SQUARE METER AND ALBUMINURIA CREATININE RATIO BETWEEN 30-299 MG/G (HCC): ICD-10-CM

## 2019-11-21 DIAGNOSIS — F31.76 BIPOLAR DISORDER, IN FULL REMISSION, MOST RECENT EPISODE DEPRESSED (HCC): ICD-10-CM

## 2019-11-21 DIAGNOSIS — I10 ESSENTIAL HYPERTENSION: ICD-10-CM

## 2019-11-21 DIAGNOSIS — G47.01 INSOMNIA DUE TO MEDICAL CONDITION: ICD-10-CM

## 2019-11-21 DIAGNOSIS — Z79.4 TYPE 2 DIABETES MELLITUS WITH HYPERGLYCEMIA, WITH LONG-TERM CURRENT USE OF INSULIN (HCC): ICD-10-CM

## 2019-11-21 DIAGNOSIS — Z87.81 STATUS POST ORIF OF FRACTURE OF ANKLE: ICD-10-CM

## 2019-11-21 DIAGNOSIS — Z00.00 ROUTINE GENERAL MEDICAL EXAMINATION AT A HEALTH CARE FACILITY: ICD-10-CM

## 2019-11-21 PROCEDURE — G0439 PPPS, SUBSEQ VISIT: HCPCS | Performed by: NURSE PRACTITIONER

## 2019-11-21 PROCEDURE — 99497 ADVNCD CARE PLAN 30 MIN: CPT | Performed by: NURSE PRACTITIONER

## 2019-11-21 PROCEDURE — 99214 OFFICE O/P EST MOD 30 MIN: CPT | Performed by: NURSE PRACTITIONER

## 2019-11-21 RX ORDER — ZOLPIDEM TARTRATE 10 MG/1
10 TABLET ORAL NIGHTLY PRN
Qty: 30 TABLET | Refills: 0 | Status: SHIPPED | OUTPATIENT
Start: 2019-11-21 | End: 2019-12-20 | Stop reason: SDUPTHER

## 2019-11-21 ASSESSMENT — ENCOUNTER SYMPTOMS
SHORTNESS OF BREATH: 0
COUGH: 0
ABDOMINAL PAIN: 0
VOMITING: 0
NAUSEA: 0
BACK PAIN: 1
CONSTIPATION: 0
DIARRHEA: 0
WHEEZING: 0

## 2019-11-21 ASSESSMENT — LIFESTYLE VARIABLES: HOW OFTEN DO YOU HAVE A DRINK CONTAINING ALCOHOL: 0

## 2019-11-21 ASSESSMENT — PATIENT HEALTH QUESTIONNAIRE - PHQ9
SUM OF ALL RESPONSES TO PHQ QUESTIONS 1-9: 0
SUM OF ALL RESPONSES TO PHQ QUESTIONS 1-9: 0

## 2019-11-22 ENCOUNTER — TELEPHONE (OUTPATIENT)
Dept: PRIMARY CARE CLINIC | Age: 59
End: 2019-11-22

## 2019-12-03 ENCOUNTER — TELEPHONE (OUTPATIENT)
Dept: PRIMARY CARE CLINIC | Age: 59
End: 2019-12-03

## 2019-12-03 DIAGNOSIS — Z00.00 MEDICARE ANNUAL WELLNESS VISIT, SUBSEQUENT: ICD-10-CM

## 2019-12-03 LAB
ALBUMIN SERPL-MCNC: 4 G/DL (ref 3.5–5.2)
ALP BLD-CCNC: 196 U/L (ref 35–104)
ALT SERPL-CCNC: 14 U/L (ref 5–33)
ANION GAP SERPL CALCULATED.3IONS-SCNC: 23 MMOL/L (ref 7–19)
AST SERPL-CCNC: 23 U/L (ref 5–32)
BASOPHILS ABSOLUTE: 0.1 K/UL (ref 0–0.2)
BASOPHILS RELATIVE PERCENT: 0.5 % (ref 0–1)
BILIRUB SERPL-MCNC: 0.4 MG/DL (ref 0.2–1.2)
BUN BLDV-MCNC: 29 MG/DL (ref 6–20)
CALCIUM SERPL-MCNC: 9.9 MG/DL (ref 8.6–10)
CHLORIDE BLD-SCNC: 98 MMOL/L (ref 98–111)
CHOLESTEROL, TOTAL: 153 MG/DL (ref 160–199)
CO2: 15 MMOL/L (ref 22–29)
CREAT SERPL-MCNC: 1.7 MG/DL (ref 0.5–0.9)
CREATININE URINE: 21.2 MG/DL (ref 4.2–622)
EOSINOPHILS ABSOLUTE: 0.2 K/UL (ref 0–0.6)
EOSINOPHILS RELATIVE PERCENT: 1.8 % (ref 0–5)
GFR NON-AFRICAN AMERICAN: 31
GLUCOSE BLD-MCNC: 178 MG/DL (ref 74–109)
HBA1C MFR BLD: 6.6 % (ref 4–6)
HCT VFR BLD CALC: 42.9 % (ref 37–47)
HDLC SERPL-MCNC: 50 MG/DL (ref 65–121)
HEMOGLOBIN: 13.5 G/DL (ref 12–16)
IMMATURE GRANULOCYTES #: 0.1 K/UL
LDL CHOLESTEROL CALCULATED: 67 MG/DL
LYMPHOCYTES ABSOLUTE: 1.5 K/UL (ref 1.1–4.5)
LYMPHOCYTES RELATIVE PERCENT: 13.2 % (ref 20–40)
MCH RBC QN AUTO: 29.6 PG (ref 27–31)
MCHC RBC AUTO-ENTMCNC: 31.5 G/DL (ref 33–37)
MCV RBC AUTO: 94.1 FL (ref 81–99)
MICROALBUMIN UR-MCNC: <1.2 MG/DL (ref 0–19)
MICROALBUMIN/CREAT UR-RTO: NORMAL MG/G
MONOCYTES ABSOLUTE: 1 K/UL (ref 0–0.9)
MONOCYTES RELATIVE PERCENT: 8.2 % (ref 0–10)
NEUTROPHILS ABSOLUTE: 8.8 K/UL (ref 1.5–7.5)
NEUTROPHILS RELATIVE PERCENT: 75.8 % (ref 50–65)
PDW BLD-RTO: 14.3 % (ref 11.5–14.5)
PLATELET # BLD: 284 K/UL (ref 130–400)
PMV BLD AUTO: 12 FL (ref 9.4–12.3)
POTASSIUM SERPL-SCNC: 4.5 MMOL/L (ref 3.5–5)
RBC # BLD: 4.56 M/UL (ref 4.2–5.4)
SODIUM BLD-SCNC: 136 MMOL/L (ref 136–145)
T4 FREE: 1.11 NG/DL (ref 0.93–1.7)
TOTAL PROTEIN: 8.1 G/DL (ref 6.6–8.7)
TRIGL SERPL-MCNC: 182 MG/DL (ref 0–149)
TSH SERPL DL<=0.05 MIU/L-ACNC: 1.97 UIU/ML (ref 0.27–4.2)
WBC # BLD: 11.7 K/UL (ref 4.8–10.8)

## 2019-12-04 DIAGNOSIS — K21.9 GASTROESOPHAGEAL REFLUX DISEASE WITHOUT ESOPHAGITIS: ICD-10-CM

## 2019-12-05 RX ORDER — PANTOPRAZOLE SODIUM 40 MG/1
40 TABLET, DELAYED RELEASE ORAL DAILY
Qty: 90 TABLET | Refills: 3 | Status: SHIPPED | OUTPATIENT
Start: 2019-12-05 | End: 2020-03-12 | Stop reason: SDUPTHER

## 2019-12-06 ENCOUNTER — OFFICE VISIT (OUTPATIENT)
Dept: PRIMARY CARE CLINIC | Age: 59
End: 2019-12-06
Payer: MEDICARE

## 2019-12-06 VITALS
HEART RATE: 86 BPM | DIASTOLIC BLOOD PRESSURE: 84 MMHG | WEIGHT: 233.5 LBS | BODY MASS INDEX: 45.84 KG/M2 | SYSTOLIC BLOOD PRESSURE: 122 MMHG | HEIGHT: 60 IN | OXYGEN SATURATION: 96 % | TEMPERATURE: 97.9 F

## 2019-12-06 DIAGNOSIS — F51.05 INSOMNIA DUE TO OTHER MENTAL DISORDER: ICD-10-CM

## 2019-12-06 DIAGNOSIS — F99 INSOMNIA DUE TO OTHER MENTAL DISORDER: ICD-10-CM

## 2019-12-06 DIAGNOSIS — F31.78 BIPOLAR DISORDER, IN FULL REMISSION, MOST RECENT EPISODE MIXED (HCC): ICD-10-CM

## 2019-12-06 DIAGNOSIS — F41.8 SITUATIONAL ANXIETY: ICD-10-CM

## 2019-12-06 PROCEDURE — 99213 OFFICE O/P EST LOW 20 MIN: CPT | Performed by: NURSE PRACTITIONER

## 2019-12-06 RX ORDER — DIAZEPAM 5 MG/1
5 TABLET ORAL EVERY EVENING
Qty: 30 TABLET | Refills: 0 | Status: SHIPPED | OUTPATIENT
Start: 2019-12-06 | End: 2019-12-20 | Stop reason: SDUPTHER

## 2019-12-06 ASSESSMENT — ENCOUNTER SYMPTOMS
VOMITING: 0
BACK PAIN: 1
WHEEZING: 0
DIARRHEA: 0
NAUSEA: 0
CONSTIPATION: 0
SHORTNESS OF BREATH: 0
ABDOMINAL PAIN: 0
COUGH: 0

## 2019-12-20 ENCOUNTER — OFFICE VISIT (OUTPATIENT)
Dept: PRIMARY CARE CLINIC | Age: 59
End: 2019-12-20
Payer: MEDICARE

## 2019-12-20 VITALS
SYSTOLIC BLOOD PRESSURE: 134 MMHG | BODY MASS INDEX: 46.63 KG/M2 | WEIGHT: 237.5 LBS | HEIGHT: 60 IN | TEMPERATURE: 97.7 F | HEART RATE: 86 BPM | DIASTOLIC BLOOD PRESSURE: 86 MMHG | OXYGEN SATURATION: 98 %

## 2019-12-20 DIAGNOSIS — F41.8 SITUATIONAL ANXIETY: ICD-10-CM

## 2019-12-20 DIAGNOSIS — E11.42 DIABETIC POLYNEUROPATHY ASSOCIATED WITH TYPE 2 DIABETES MELLITUS (HCC): ICD-10-CM

## 2019-12-20 DIAGNOSIS — F31.76 BIPOLAR DISORDER, IN FULL REMISSION, MOST RECENT EPISODE DEPRESSED (HCC): ICD-10-CM

## 2019-12-20 DIAGNOSIS — F99 INSOMNIA DUE TO OTHER MENTAL DISORDER: ICD-10-CM

## 2019-12-20 DIAGNOSIS — F31.78 BIPOLAR DISORDER, IN FULL REMISSION, MOST RECENT EPISODE MIXED (HCC): ICD-10-CM

## 2019-12-20 DIAGNOSIS — G47.01 INSOMNIA DUE TO MEDICAL CONDITION: ICD-10-CM

## 2019-12-20 DIAGNOSIS — F51.05 INSOMNIA DUE TO OTHER MENTAL DISORDER: ICD-10-CM

## 2019-12-20 PROCEDURE — 99214 OFFICE O/P EST MOD 30 MIN: CPT | Performed by: NURSE PRACTITIONER

## 2019-12-20 RX ORDER — DIAZEPAM 10 MG/1
10 TABLET ORAL EVERY EVENING
Qty: 30 TABLET | Refills: 0 | Status: SHIPPED | OUTPATIENT
Start: 2019-12-20 | End: 2020-01-20 | Stop reason: SDUPTHER

## 2019-12-20 RX ORDER — ZOLPIDEM TARTRATE 10 MG/1
10 TABLET ORAL NIGHTLY PRN
Qty: 30 TABLET | Refills: 0 | Status: SHIPPED | OUTPATIENT
Start: 2019-12-20 | End: 2020-01-20 | Stop reason: SDUPTHER

## 2019-12-20 RX ORDER — PREGABALIN 150 MG/1
150 CAPSULE ORAL 2 TIMES DAILY
Qty: 60 CAPSULE | Refills: 3 | Status: SHIPPED | OUTPATIENT
Start: 2019-12-20 | End: 2020-03-12 | Stop reason: SDUPTHER

## 2019-12-20 ASSESSMENT — ENCOUNTER SYMPTOMS
DIARRHEA: 0
WHEEZING: 0
NAUSEA: 0
ABDOMINAL PAIN: 0
CONSTIPATION: 0
SHORTNESS OF BREATH: 0
BACK PAIN: 1
COUGH: 0
VOMITING: 0

## 2019-12-26 DIAGNOSIS — E03.9 ACQUIRED HYPOTHYROIDISM: ICD-10-CM

## 2019-12-26 RX ORDER — LEVOTHYROXINE SODIUM 88 UG/1
88 TABLET ORAL DAILY
Qty: 90 TABLET | Refills: 3 | Status: SHIPPED | OUTPATIENT
Start: 2019-12-26 | End: 2020-03-12 | Stop reason: SDUPTHER

## 2020-01-20 ENCOUNTER — OFFICE VISIT (OUTPATIENT)
Dept: PRIMARY CARE CLINIC | Age: 60
End: 2020-01-20
Payer: MEDICARE

## 2020-01-20 VITALS
OXYGEN SATURATION: 98 % | DIASTOLIC BLOOD PRESSURE: 88 MMHG | HEART RATE: 86 BPM | WEIGHT: 242 LBS | BODY MASS INDEX: 47.26 KG/M2 | TEMPERATURE: 98 F | SYSTOLIC BLOOD PRESSURE: 134 MMHG

## 2020-01-20 PROBLEM — S82.891S OPEN RIGHT ANKLE FRACTURE, SEQUELA: Status: RESOLVED | Noted: 2018-06-14 | Resolved: 2020-01-20

## 2020-01-20 PROBLEM — S82.891B ANKLE FRACTURE, RIGHT, OPEN TYPE I OR II, INITIAL ENCOUNTER: Status: RESOLVED | Noted: 2018-06-12 | Resolved: 2020-01-20

## 2020-01-20 PROBLEM — S52.502D CLOSED FRACTURE OF DISTAL END OF LEFT RADIUS WITH ROUTINE HEALING: Status: RESOLVED | Noted: 2018-06-14 | Resolved: 2020-01-20

## 2020-01-20 LAB
AMPHETAMINE SCREEN, URINE: NORMAL
BARBITURATE SCREEN, URINE: NORMAL
BENZODIAZEPINE SCREEN, URINE: NORMAL
BUPRENORPHINE URINE: NORMAL
COCAINE METABOLITE SCREEN URINE: NORMAL
GABAPENTIN SCREEN, URINE: NORMAL
MDMA URINE: NORMAL
METHADONE SCREEN, URINE: NORMAL
METHAMPHETAMINE, URINE: NORMAL
OPIATE SCREEN URINE: NORMAL
OXYCODONE SCREEN URINE: NORMAL
PHENCYCLIDINE SCREEN URINE: NORMAL
PROPOXYPHENE SCREEN, URINE: NORMAL
THC SCREEN, URINE: NORMAL
TRICYCLIC ANTIDEPRESSANTS, UR: NORMAL

## 2020-01-20 PROCEDURE — 99214 OFFICE O/P EST MOD 30 MIN: CPT | Performed by: NURSE PRACTITIONER

## 2020-01-20 PROCEDURE — 80305 DRUG TEST PRSMV DIR OPT OBS: CPT | Performed by: NURSE PRACTITIONER

## 2020-01-20 RX ORDER — DIAZEPAM 10 MG/1
10 TABLET ORAL EVERY EVENING
Qty: 30 TABLET | Refills: 0 | Status: SHIPPED | OUTPATIENT
Start: 2020-01-20 | End: 2020-02-20 | Stop reason: SDUPTHER

## 2020-01-20 RX ORDER — ZOLPIDEM TARTRATE 10 MG/1
10 TABLET ORAL NIGHTLY PRN
Qty: 30 TABLET | Refills: 0 | Status: SHIPPED | OUTPATIENT
Start: 2020-01-20 | End: 2020-02-20 | Stop reason: SDUPTHER

## 2020-01-20 ASSESSMENT — ENCOUNTER SYMPTOMS
SHORTNESS OF BREATH: 0
BACK PAIN: 1
CONSTIPATION: 0
NAUSEA: 0
VOMITING: 0
ABDOMINAL PAIN: 0
COUGH: 0
WHEEZING: 0
DIARRHEA: 0

## 2020-01-20 NOTE — PROGRESS NOTES
[address]  [place], [address] [place] [address] Rd  Phone [phone]   Fax [phone]      OFFICE VISIT: 2020    Ambar Chung Destiney- : 1960      Reason For Visit:  Beulah Saini is a 63 y.o. femalewho is here for Follow-up (for insomnia, anxiety, and diabetes)         Health Maintenance     HPI    Patient is here for insomnia issue     She was having issues sleeping  And was waking up at night  She was given 2 weeks ago a valium to try in conjuct  With the [place]  As she will wake up and not be able to return to sleep     Insomnia  Patient reports chronic insomnia  Has failed seroquel and trazadone medication in past elavil 100mg  (2)  Sleeps 6 hours on medication with some relief of fatigue  Sleep study in past none  Typically goes to bed at 10 awakens at 6  But recently  Is waking up and not able to go back to sleep  She has had this issue a week and half ago   Reports no change in medications  She has not seen any changes  She will take 2 elavil seroquel 200mg and ambien to go to sleep  And she will be awake within three days  In past took valium      She isn't napping   And goes back to bed around 6 am and up a hour or so later  She hasn't tried increasing the medication     Takes medication ambient 10mg nightly  Last filled   # 30  Without symptoms include not able to rest and up and down  Benefits outweigh risks  Low risk of diversion with no complaints of abnormal side effects  But wasn't staying asleep    Valium 10mg in evening #30 12/  And was awake a few hours  But when she took valium 10mg plus ambien   Will sleep all night from 11pm and up around 8-10 am  And feels \"much rested\"     She takes her pain medication in afternoon  Away from medication  And knows the risks       TOMMY was reviewed today per office protocol.  Report shows No discrepancies.  Fill pattern is consistent from single provider(s) at single pharmacy(s).     Controlled Substance Monitoring:    Acute and Chronic Pain Monitoring:   RX Monitoring 1/20/2020   Attestation The Prescription Monitoring Report for this patient was reviewed today. Periodic Controlled Substance Monitoring Possible medication side effects, risk of tolerance/dependence & alternative treatments discussed. ;No signs of potential drug abuse or diversion identified.;Obtaining appropriate analgesic effect of treatment. ;Assessed functional status. ;Random urine drug screen sent today. Chronic Pain > 50 MEDD (No Data)     Patient is here for follow up on diabetes  Is doing better with trulicity 1.5 weekly  And doing well  Weight stable but up 5 lbs today  12/3 6.6   Glucose 178  Trying to exercise  Doing well overall  Along with novolog with meals        Osteoporosis : In process of trying to get an injectable  As had open fracture of leg  Last year  Along with fall with mult ribs last month  She has started the prolia  In December  And doing well  No side effects that she recalls   She noticed some bumps on sides   But isn't sure when occurred       Hypothyroid  Synthroid 88mcg daily  8/2019  And stable    Depression  Much improved as resting  And doing well at present       weight is 242 lb (109.8 kg). Her temporal temperature is 98 °F (36.7 °C). Her blood pressure is 134/88 and her pulse is 86. Her oxygen saturation is 98%. Body mass index is 47.26 kg/m².     Results for orders placed or performed in visit on 01/20/20   POCT Rapid Drug Screen   Result Value Ref Range    Amphetamine Screen, Urine neg     Barbiturate Screen, Urine      Benzodiazepine Screen, Urine pos     Buprenorphine Urine      Cocaine Metabolite Screen, Urine neg     Gabapentin Screen, Urine      MDMA, Urine neg     Methamphetamine, Urine neg     Methadone Screen, Urine neg     Opiate Scrn, Ur pos     Oxycodone Screen, Ur neg     PCP Screen, Urine      Propoxyphene Screen, Urine      THC Screen, Urine neg     Tricyclic Antidepressants, Urine         I have reviewed the following with the Ms. Sher Cabello   Lab Review   Orders Only on 12/03/2019   Component Date Value    T4 Free 12/03/2019 1.11     TSH 12/03/2019 1.970     WBC 12/03/2019 11.7*    RBC 12/03/2019 4.56     Hemoglobin 12/03/2019 13.5     Hematocrit 12/03/2019 42.9     MCV 12/03/2019 94.1     MCH 12/03/2019 29.6     MCHC 12/03/2019 31.5*    RDW 12/03/2019 14.3     Platelets 81/33/8240 284     MPV 12/03/2019 12.0     Neutrophils % 12/03/2019 75.8*    Lymphocytes % 12/03/2019 13.2*    Monocytes % 12/03/2019 8.2     Eosinophils % 12/03/2019 1.8     Basophils % 12/03/2019 0.5     Neutrophils Absolute 12/03/2019 8.8*    Immature Granulocytes # 12/03/2019 0.1     Lymphocytes Absolute 12/03/2019 1.5     Monocytes Absolute 12/03/2019 1.00*    Eosinophils Absolute 12/03/2019 0.20     Basophils Absolute 12/03/2019 0.10     Sodium 12/03/2019 136     Potassium 12/03/2019 4.5     Chloride 12/03/2019 98     CO2 12/03/2019 15*    Anion Gap 12/03/2019 23*    Glucose 12/03/2019 178*    BUN 12/03/2019 29*    CREATININE 12/03/2019 1.7*    GFR Non- 12/03/2019 31*    Calcium 12/03/2019 9.9     Total Protein 12/03/2019 8.1     Alb 12/03/2019 4.0     Total Bilirubin 12/03/2019 0.4     Alkaline Phosphatase 12/03/2019 196*    ALT 12/03/2019 14     AST 12/03/2019 23     Hemoglobin A1C 12/03/2019 6.6*    Cholesterol, Total 12/03/2019 153*    Triglycerides 12/03/2019 182*    HDL 12/03/2019 50*    LDL Calculated 12/03/2019 67     Microalbumin, Random Uri* 12/03/2019 <1.20     Creatinine, Ur 12/03/2019 21.2     Microalbumin Creatinine * 12/03/2019 see below    Orders Only on 09/06/2019   Component Date Value    Hemoglobin A1C 09/06/2019 6.8*    Sodium 09/06/2019 140     Potassium 09/06/2019 5.1*    Chloride 09/06/2019 108     CO2 09/06/2019 20*    Anion Gap 09/06/2019 12     Glucose 09/06/2019 112*    BUN 09/06/2019 22*    CREATININE 09/06/2019 1.4*    GFR Non- 09/06/2019 39*    Calcium 09/06/2019 8.8     Total Protein 09/06/2019 7.3     Alb 09/06/2019 3.5     Total Bilirubin 09/06/2019 0.3     Alkaline Phosphatase 09/06/2019 149*    ALT 09/06/2019 21     AST 09/06/2019 40*   Orders Only on 08/02/2019   Component Date Value    Cholesterol, Total 08/02/2019 148*    Triglycerides 08/02/2019 207*    HDL 08/02/2019 46*    LDL Calculated 08/02/2019 61     Hemoglobin A1C 08/02/2019 7.9*    TSH 08/02/2019 1.290     T4 Free 08/02/2019 1.3     Microalbumin, Random Uri* 08/02/2019 <1.20     Creatinine, Ur 08/02/2019 28.3     Microalbumin Creatinine * 08/02/2019 see below     Sodium 08/02/2019 134*    Potassium 08/02/2019 4.7     Chloride 08/02/2019 92*    CO2 08/02/2019 21*    Anion Gap 08/02/2019 21*    Glucose 08/02/2019 147*    BUN 08/02/2019 24*    CREATININE 08/02/2019 1.5*    GFR Non- 08/02/2019 36*    Calcium 08/02/2019 9.5     Total Protein 08/02/2019 8.4     Alb 08/02/2019 3.9     Total Bilirubin 08/02/2019 0.5     Alkaline Phosphatase 08/02/2019 187*    ALT 08/02/2019 19     AST 08/02/2019 22     WBC 08/02/2019 10.3     RBC 08/02/2019 4.67     Hemoglobin 08/02/2019 13.3     Hematocrit 08/02/2019 42.5     MCV 08/02/2019 91.0     MCH 08/02/2019 28.5     MCHC 08/02/2019 31.3*    RDW 08/02/2019 14.6*    Platelets 99/14/4160 329     MPV 08/02/2019 12.2     Neutrophils % 08/02/2019 69.9*    Lymphocytes % 08/02/2019 19.6*    Monocytes % 08/02/2019 7.5     Eosinophils % 08/02/2019 1.8     Basophils % 08/02/2019 0.7     Neutrophils Absolute 08/02/2019 7.2     Lymphocytes Absolute 08/02/2019 2.0     Monocytes Absolute 08/02/2019 0.80     Eosinophils Absolute 08/02/2019 0.20     Basophils Absolute 08/02/2019 0.10    Orders Only on 07/29/2019   Component Date Value    Sodium 07/29/2019 139     Potassium 07/29/2019 5.2*    Chloride 07/29/2019 97*    CO2 07/29/2019 22     Anion Gap 07/29/2019 20*    Glucose (ZANAFLEX) 4 MG tablet Take 1 tablet by mouth every 8 hours as needed (muscle spasms) Yes Kyle Layer, APRN   metoprolol succinate (TOPROL XL) 50 MG extended release tablet Take 1 tablet by mouth daily Yes Kyle Layer, APRN   amitriptyline (ELAVIL) 100 MG tablet Take 2 tablets by mouth nightly Yes Kyle Layer, APRN   atorvastatin (LIPITOR) 40 MG tablet TAKE ONE TABLET BY MOUTH ONCE NIGHTLY Yes LAZARO Jones, DO   QUEtiapine (SEROQUEL) 200 MG tablet Take 1 tablet by mouth nightly Yes LAZARO Jones, DO   aspirin 81 MG tablet Take 81 mg by mouth daily Yes Historical Provider, MD   Insulin Pen Needle 31G X 8 MM MISC 1 each by Does not apply route 4 times daily Yes Kyle Layer, APRN   fluticasone (FLONASE) 50 MCG/ACT nasal spray 2 sprays by Nasal route daily Yes Kyle Layer, APRN   DULoxetine (CYMBALTA) 60 MG extended release capsule Take 1 capsule by mouth 2 times daily  Patient taking differently: Take 60 mg by mouth daily  Yes Kyle Layer, APRN   Blood Glucose Monitoring Suppl (FREESTYLE LITE) JEZ 1 Device by Does not apply route 4 times daily Yes Kyle Layer, APRN   blood glucose test strips (FREESTYLE LITE) strip 1 each by In Vitro route 4 times daily As needed.  Yes Kyle Layer, APRN   linaclotide Ulysees Ken) 290 MCG CAPS capsule Take 1 capsule by mouth every morning (before breakfast)  Patient taking differently: Take 290 mcg by mouth every other day  Yes LAZARO Janantony Jones, DO   pantoprazole (PROTONIX) 40 MG tablet TAKE ONE TABLET BY MOUTH ONCE DAILY Yes Kyle Layer, APRN       Allergies: Dilaudid [hydromorphone hcl]    Past Medical History:   Diagnosis Date    Anxiety     Arthritis     B[facility] 1 disorder ([facility] [address]     CAD (coronary artery disease)     [facility]congestive heart failure) ([facility])     Chronic kidney disease (CKD) stage G3b/A2, moderately decreased glomerular filtration rate (GFR) between 30-44 mL/min/1.73 square meter and albuminuria doing well  Away from pain meds  Consent signed  - diazepam (VALIUM) 10 MG tablet; Take 1 tablet by mouth every evening for 30 days. Dispense: 30 tablet; Refill: 0    2. Insomnia due to other mental disorder  Improved with multiple medications  To sleep full night  - diazepam (VALIUM) 10 MG tablet; Take 1 tablet by mouth every evening for 30 days. Dispense: 30 tablet; Refill: 0    3. Bipolar disorder, in full remission, most recent episode mixed (HCC)  Stable   Improved with sleep  - diazepam (VALIUM) 10 MG tablet; Take 1 tablet by mouth every evening for 30 days. Dispense: 30 tablet; Refill: 0    4. Bipolar disorder, in full remission, most recent episode depressed ([facility] [address]  resolved  - zolpidem (AMBIEN) 10 MG tablet; Take 1 tablet by mouth nightly as needed for Sleep for up to 30 days. Dispense: 30 tablet; Refill: 0    5. Insomnia due to medical condition  Stable  With valium and ambien  - zolpidem (AMBIEN) 10 MG tablet; Take 1 tablet by mouth nightly as needed for Sleep for up to 30 days. Dispense: 30 tablet; Refill: 0    6. Medication management  Consistent at goal  - POCT Rapid Drug Screen    7. Status post ORIF of fracture of ankle  Cont prolia every 6 weeks    8. Age-related osteoporosis with current pathological fracture with routine healing, subsequent encounter  On prolia  Cont to monitor    6. Diabetes  Cont tight control        Orders Placed This Encounter   Procedures    POCT Rapid Drug Screen        Return in about 1 month (around 2/20/2020) for diabetes a1c medication and thyroid labs weight. Patient Instructions       Patient Education        Insomnia: Care Instructions  Your Care Instructions    Insomnia is the inability to sleep well. It is a common problem for most people at some time. Insomnia may make it hard for you to get to sleep, stay asleep, or sleep as long as you need to. This can make you tired and grouchy during the day.  It can also make you forgetful, less effective at work, and unhappy. Insomnia can be caused by conditions such as depression or anxiety. Pain can also affect your ability to sleep. When these problems are solved, the insomnia usually clears up. But sometimes bad sleep habits can cause insomnia. If insomnia is affecting your work or your enjoyment of life, you can take steps to improve your sleep. Follow-up care is a key part of your treatment and safety. Be sure to make and go to all appointments, and call your doctor if you are having problems. It's also a good idea to know your test results and keep a list of the medicines you take. How can you care for yourself at home? What to avoid  · Do not have drinks with caffeine, such as coffee or black tea, for 8 hours before bed. · Do not smoke or use other types of tobacco near bedtime. Nicotine is a stimulant and can keep you awake. · Avoid drinking alcohol late in the evening, because it can cause you to wake in the middle of the night. · Do not eat a big meal close to bedtime. If you are hungry, eat a light snack. · Do not drink a lot of water close to bedtime, because the need to urinate may wake you up during the night. · Do not read or watch TV in bed. Use the bed only for sleeping and sexual activity. What to try  · Go to bed at the same time every night, and wake up at the same time every morning. Do not take naps during the day. · Keep your bedroom quiet, dark, and cool. · Sleep on a comfortable pillow and mattress. · If watching the clock makes you anxious, turn it facing away from you so you cannot see the time. · If you worry when you lie down, start a worry book. Well before bedtime, write down your worries, and then set the book and your concerns aside. · Try meditation or other relaxation techniques before you go to bed. · If you cannot fall asleep, get up and go to another room until you feel sleepy. Do something relaxing. Repeat your bedtime routine before you go to bed again.   · Make your house level that feels right for you. Add a little at a time until you can do the following:  ? Do 30 minutes of weight-bearing exercise on most days of the week. Walking, jogging, stair climbing, and dancing are good choices. ? Do resistance exercises with weights or elastic bands 2 to 3 days a week. · Reduce your risk of falls:  ? Wear supportive shoes with low heels and nonslip soles. ? Use a cane or walker, if you need it. Use shower chairs and bath benches. Put in handrails on stairways, around your shower or tub area, and near the toilet. ? Keep stairs, porches, and walkways well lit. Use night-lights. ? Remove throw rugs and other objects that are in the way. ? Avoid icy, wet, or slippery surfaces. ? Keep a cordless phone and a flashlight with new batteries by your bed. When should you call for help? Watch closely for changes in your health, and be sure to contact your doctor if you have any problems. Where can you learn more? Go to https://1000museums.com.CallsFreeCalls. org and sign in to your ParaEngine account. Enter K100 in the KylesAviary box to learn more about \"Osteoporosis: Care Instructions. \"     If you do not have an account, please click on the \"Sign Up Now\" link. Current as of: August 6, 2019  Content Version: 12.3  © 1260-8845 [employer]. Care instructions adapted under license by [facility] ([facility]). If you have questions about a medical condition or this instruction, always ask your healthcare professional. [place] [address] any warranty or liability for your use of this information. Controlled Substances Monitoring:     Attestation: The Prescription Monitoring Report for this patient was reviewed today. ([id number]) Ole TRESSA Ruiz)  Periodic Controlled Substance Monitoring: Possible medication side effects, risk of tolerance/dependence & alternative treatments discussed., No signs of potential drug abuse or diversion identified. , Obtaining appropriate analgesic effect of treatment., Assessed functional status. , Random urine drug screen sent today. (12/20 valium 10mg #30 ambien 10mg #30 lyrica 150mg #60) TRESSA Raymond)            Additional Instructions: As always, patient is advisedto bring in medication bottles in order to correctly reconcile with our current list.      Grabiel Mendoza received counseling on the following healthy behaviors: none    Patient giveneducational materials on plan of care    I have instructed Grabiel Mendoza to complete a self tracking handout on weight glucose and blood pressure   and instructed them to bring it with them to her next appointment. Discussed use, benefit, and side effects of prescribed medications. Barriers to medication compliance addressed. All patient questions answered. Pt voiced understanding.      TRESSA Raymond

## 2020-01-20 NOTE — PATIENT INSTRUCTIONS
worry when you lie down, start a worry book. Well before bedtime, write down your worries, and then set the book and your concerns aside. · Try meditation or other relaxation techniques before you go to bed. · If you cannot fall asleep, get up and go to another room until you feel sleepy. Do something relaxing. Repeat your bedtime routine before you go to bed again. · Make your house quiet and calm about an hour before bedtime. Turn down the lights, turn off the TV, log off the computer, and turn down the volume on music. This can help you relax after a busy day. When should you call for help? Watch closely for changes in your health, and be sure to contact your doctor if:    · Your efforts to improve your sleep do not work.     · Your insomnia gets worse.     · You have been feeling down, depressed, or hopeless or have lost interest in things that you usually enjoy. Where can you learn more? Go to https://Page Foundrypeulike.Digital Legends. org and sign in to your Shizzlr account. Enter P513 in the Bildero box to learn more about \"Insomnia: Care Instructions. \"     If you do not have an account, please click on the \"Sign Up Now\" link. Current as of: April 7, 2019  Content Version: 12.3  © 1656-4600 [employer]. Care instructions adapted under license by [facility] ([facility]). If you have questions about a medical condition or this instruction, always ask your healthcare professional. [place] [address] any warranty or liability for your use of this information. Patient Education        Osteoporosis: Care Instructions  Your Care Instructions    Osteoporosis causes bones to become thin and weak. It is much more common in women than in men. Osteoporosis may be very advanced before you know you have it. Sometimes the first sign is a broken bone in the hip, spine, or wrist or sudden pain in your middle or lower back.   Follow-up care is a key part of your treatment and safety. Be sure to make and go to all appointments, and call your doctor if you are having problems. It's also a good idea to know your test results and keep a list of the medicines you take. How can you care for yourself at home? · Your doctor may prescribe a bisphosphonate, such as risedronate (Actonel) or alendronate (Fosamax), for osteoporosis. If you are taking one of these medicines by mouth:  ? Take your medicine with a full glass of water when you first get up in the morning. ? Do not lie down, eat, drink a beverage, or take any other medicine for at least 30 minutes after taking the drug. This helps prevent stomach problems. ? Do not take your medicine late in the day if you forgot to take it in the morning. Skip it, and take the usual dose the next morning. ? If you have side effects, tell your doctor. He or she may prescribe another medicine. · Get enough calcium and vitamin D. The [place] [employer] recommends adults younger than age 46 need 1,000 mg of calcium and 600 IU of vitamin D each day. Women ages 46 to 79 need 1,200 mg of calcium and 600 IU of vitamin D each day. Men ages 46 to 79 need 1,000 mg of calcium and 600 IU of vitamin D each day. Adults 71 and older need 1,200 mg of calcium and 800 IU of vitamin D each day. ? Eat foods rich in calcium, like yogurt, cheese, milk, and dark green vegetables. This is a good way to get the calcium you need. You can get vitamin D from eggs, fatty fish, cereal, and milk. ? Talk to your doctor about taking a calcium plus vitamin D supplement. Be careful, though. Adults ages 23 to 48 should not get more than 2,500 mg of calcium and 4,000 IU of vitamin D each day, whether it is from supplements and/or food. Adults ages 46 and older should not get more than 2,000 mg of calcium and 4,000 IU of vitamin D each day from supplements and/or food. · Limit alcohol to 2 drinks a day for men and 1 drink a day for women.  Too much alcohol can cause health of this information.

## 2020-01-31 RX ORDER — FLUTICASONE PROPIONATE 50 MCG
1 SPRAY, SUSPENSION (ML) NASAL DAILY
Qty: 3 BOTTLE | Refills: 3 | Status: SHIPPED | OUTPATIENT
Start: 2020-01-31 | End: 2020-03-12 | Stop reason: SDUPTHER

## 2020-01-31 NOTE — TELEPHONE ENCOUNTER
Received fax from pharmacy requesting refill on pts medication(s). Pt was last seen in office on 1/20/2020  and has a follow up scheduled for 2/20/2020. Will send request to  Neno Pedroza  for patient.      Requested Prescriptions     Pending Prescriptions Disp Refills    fluticasone (FLONASE) 50 MCG/ACT nasal spray [Pharmacy Med Name: Fluticasone Propionate 50 MCG/ACT Nasal Suspension] 16 g 0     Sig: USE 2 SPRAY(S) IN EACH NOSTRIL ONCE DAILY

## 2020-02-20 ENCOUNTER — OFFICE VISIT (OUTPATIENT)
Dept: PRIMARY CARE CLINIC | Age: 60
End: 2020-02-20
Payer: MEDICARE

## 2020-02-20 VITALS
WEIGHT: 234.5 LBS | TEMPERATURE: 97.8 F | OXYGEN SATURATION: 98 % | BODY MASS INDEX: 46.04 KG/M2 | DIASTOLIC BLOOD PRESSURE: 82 MMHG | SYSTOLIC BLOOD PRESSURE: 122 MMHG | HEART RATE: 88 BPM | HEIGHT: 60 IN

## 2020-02-20 PROCEDURE — 99214 OFFICE O/P EST MOD 30 MIN: CPT | Performed by: NURSE PRACTITIONER

## 2020-02-20 RX ORDER — DIAZEPAM 10 MG/1
10 TABLET ORAL EVERY EVENING
Qty: 30 TABLET | Refills: 0 | Status: SHIPPED | OUTPATIENT
Start: 2020-02-20 | End: 2020-03-23

## 2020-02-20 RX ORDER — ZOLPIDEM TARTRATE 10 MG/1
10 TABLET ORAL NIGHTLY PRN
Qty: 30 TABLET | Refills: 0 | Status: SHIPPED | OUTPATIENT
Start: 2020-02-20 | End: 2020-03-23

## 2020-02-20 ASSESSMENT — ENCOUNTER SYMPTOMS
DIARRHEA: 0
SHORTNESS OF BREATH: 0
COUGH: 0
BACK PAIN: 1
NAUSEA: 0
VOMITING: 0
ABDOMINAL PAIN: 0
CONSTIPATION: 0
WHEEZING: 0

## 2020-02-20 NOTE — PROGRESS NOTES
risk of tolerance/dependence & alternative treatments discussed. ;No signs of potential drug abuse or diversion identified.;Obtaining appropriate analgesic effect of treatment. Chronic Pain > 50 MEDD (No Data)           Patient is here for follow up on diabetes  Is doing better with trulicity 1.5 weekly  And doing well  Weight stable but down 9 lbs today  12/3 6.6   Glucose 178  Trying to exercise  Doing well overall  Along with novolog with meals   On trulicity weekly on mondays  And continues novolog with meals  No low sugars            Osteoporosis : In process of trying to get an injectable  As had open fracture of leg  Last year  Along with fall with mult ribs last month  She has started the prolia  In December  And doing well  No side effects that she recalls   She noticed some bumps on sides   But isn't sure when occurred         Hypothyroid  Synthroid 88mcg daily  8/2019  And stable     Depression  Much improved as resting  And doing well at present     height is 5' (1.524 m) and weight is 234 lb 8 oz (106.4 kg). Her temporal temperature is 97.8 °F (36.6 °C). Her blood pressure is 122/82 and her pulse is 88. Her oxygen saturation is 98%. Body mass index is 45.8 kg/m². Results for orders placed or performed in visit on 01/20/20   POCT Rapid Drug Screen   Result Value Ref Range    Amphetamine Screen, Urine neg     Barbiturate Screen, Urine      Benzodiazepine Screen, Urine pos     Buprenorphine Urine      Cocaine Metabolite Screen, Urine neg     Gabapentin Screen, Urine      MDMA, Urine neg     Methamphetamine, Urine neg     Methadone Screen, Urine neg     Opiate Scrn, Ur pos     Oxycodone Screen, Ur neg     PCP Screen, Urine      Propoxyphene Screen, Urine      THC Screen, Urine neg     Tricyclic Antidepressants, Urine         I have reviewed the following with the Ms.  Aubrey Griffin   Lab Review   Office Visit on 01/20/2020   Component Date Value    Amphetamine Screen, Urine 01/20/2020 neg     Benzodiazepine Screen, U* 01/20/2020 pos     Cocaine Metabolite Scree* 01/20/2020 neg     MDMA, Urine 01/20/2020 neg     Methamphetamine, Urine 01/20/2020 neg     Methadone Screen, Urine 01/20/2020 neg     Opiate Scrn, Ur 01/20/2020 pos     Oxycodone Screen, Ur 01/20/2020 neg     THC Screen, Urine 01/20/2020 neg    Orders Only on 12/03/2019   Component Date Value    T4 Free 12/03/2019 1.11     TSH 12/03/2019 1.970     WBC 12/03/2019 11.7*    RBC 12/03/2019 4.56     Hemoglobin 12/03/2019 13.5     Hematocrit 12/03/2019 42.9     MCV 12/03/2019 94.1     MCH 12/03/2019 29.6     MCHC 12/03/2019 31.5*    RDW 12/03/2019 14.3     Platelets 78/32/8308 284     MPV 12/03/2019 12.0     Neutrophils % 12/03/2019 75.8*    Lymphocytes % 12/03/2019 13.2*    Monocytes % 12/03/2019 8.2     Eosinophils % 12/03/2019 1.8     Basophils % 12/03/2019 0.5     Neutrophils Absolute 12/03/2019 8.8*    Immature Granulocytes # 12/03/2019 0.1     Lymphocytes Absolute 12/03/2019 1.5     Monocytes Absolute 12/03/2019 1.00*    Eosinophils Absolute 12/03/2019 0.20     Basophils Absolute 12/03/2019 0.10     Sodium 12/03/2019 136     Potassium 12/03/2019 4.5     Chloride 12/03/2019 98     CO2 12/03/2019 15*    Anion Gap 12/03/2019 23*    Glucose 12/03/2019 178*    BUN 12/03/2019 29*    CREATININE 12/03/2019 1.7*    GFR Non- 12/03/2019 31*    Calcium 12/03/2019 9.9     Total Protein 12/03/2019 8.1     Alb 12/03/2019 4.0     Total Bilirubin 12/03/2019 0.4     Alkaline Phosphatase 12/03/2019 196*    ALT 12/03/2019 14     AST 12/03/2019 23     Hemoglobin A1C 12/03/2019 6.6*    Cholesterol, Total 12/03/2019 153*    Triglycerides 12/03/2019 182*    HDL 12/03/2019 50*    LDL Calculated 12/03/2019 67     Microalbumin, Random Uri* 12/03/2019 <1.20     Creatinine, Ur 12/03/2019 21.2     Microalbumin Creatinine * 12/03/2019 see below    Orders Only on 09/06/2019   Component Date Value    mg into the skin once a week Yes Tania Abarca APRN   tiZANidine (ZANAFLEX) 4 MG tablet Take 1 tablet by mouth every 8 hours as needed (muscle spasms) Yes Tania Abarca APRN   metoprolol succinate (TOPROL XL) 50 MG extended release tablet Take 1 tablet by mouth daily Yes Tania Abarca APRN   amitriptyline (ELAVIL) 100 MG tablet Take 2 tablets by mouth nightly Yes TRESSA Amado   atorvastatin (LIPITOR) 40 MG tablet TAKE ONE TABLET BY MOUTH ONCE NIGHTLY Yes B Lindajo Sever, DO   QUEtiapine (SEROQUEL) 200 MG tablet Take 1 tablet by mouth nightly Yes B Lindajo Sever, DO   aspirin 81 MG tablet Take 81 mg by mouth daily Yes Historical Provider, MD   Insulin Pen Needle 31G X 8 MM MISC 1 each by Does not apply route 4 times daily Yes TRESSA Amado   DULoxetine (CYMBALTA) 60 MG extended release capsule Take 1 capsule by mouth 2 times daily  Patient taking differently: Take 60 mg by mouth daily  Yes TRESSA Amado   Blood Glucose Monitoring Suppl (FREESTYLE LITE) JEZ 1 Device by Does not apply route 4 times daily Yes TRESSA Amado   blood glucose test strips (FREESTYLE LITE) strip 1 each by In Vitro route 4 times daily As needed.  Yes TRESSA Amado   linaclotide [facility]) 290 MCG CAPS capsule Take 1 capsule by mouth every morning (before breakfast)  Patient taking differently: Take 290 mcg by mouth every other day  Yes B Lindajo Sever, DO   pantoprazole (PROTONIX) 40 MG tablet TAKE ONE TABLET BY MOUTH ONCE DAILY Yes Tania Abarca APRN       Allergies: Dilaudid [hydromorphone hcl]    Past Medical History:   Diagnosis Date    Anxiety     Arthritis     Bipolar 1 disorder ([facility] [address]     CAD (coronary artery disease)     [facility]est[facility])     Chronic kidney disease (CKD) stage G3b/A2, moderately decreased glomerular filtration rate (GFR) between 30-44 mL/min/1.73 square meter and albuminuria creatinine ratio between  mg/g for chest pain, palpitations and leg swelling (off and on using lasix). Gastrointestinal: Negative for abdominal pain, constipation, diarrhea, nausea and vomiting. Endocrine: Negative for polydipsia, polyphagia and polyuria. Reports wax and wane       Musculoskeletal: Positive for arthralgias (ankle and back) and back pain. Skin: Negative for rash and wound. Neurological: Negative for dizziness and headaches. Psychiatric/Behavioral: Negative for behavioral problems, self-injury and sleep disturbance (improved on regimen). The patient is not nervous/anxious (better with sleep) and is not hyperactive. Physical Exam  Vitals signs reviewed. Constitutional:       General: She is not in acute distress. Appearance: Normal appearance. She is obese. She is not ill-appearing. HENT:      Head: Normocephalic. Right Ear: Tympanic membrane normal. There is no impacted cerumen. Left Ear: Tympanic membrane normal. There is no impacted cerumen. Nose: No congestion. Mouth/Throat:      Pharynx: No oropharyngeal exudate or posterior oropharyngeal erythema. Eyes:      General:         Right eye: No discharge. Left eye: No discharge. Neck:      Musculoskeletal: Normal range of motion. Cardiovascular:      Rate and Rhythm: Normal rate and regular rhythm. Pulses: Normal pulses. Heart sounds: No murmur. Pulmonary:      Effort: No respiratory distress. Breath sounds: Normal breath sounds. No rhonchi. Abdominal:      Palpations: There is no mass. Skin:     General: Skin is warm and dry. Capillary Refill: Capillary refill takes less than 2 seconds. Neurological:      General: No focal deficit present. Mental Status: She is alert. Psychiatric:         Mood and Affect: Mood normal.         Behavior: Behavior normal.         ASSESSMENT/ PLAN    1.  Type 2 diabetes mellitus with diabetic polyneuropathy, with long-term current use of help.  Follow-up care is a key part of your treatment and safety. Be sure to make and go to all appointments, and call your doctor if you are having problems. It's also a good idea to know your test results and keep a list of the medicines you take. How can you care for yourself at home? · Take medicines exactly as directed. Call your doctor if you think you are having a problem with your medicine. · Go to your counseling sessions and follow-up appointments. · Recognize and accept your anxiety. Then, when you are in a situation that makes you anxious, say to yourself, \"This is not an emergency. I feel uncomfortable, but I am not in danger. I can keep going even if I feel anxious. \"  · Be kind to your body:  ? Relieve tension with exercise or a massage. ? Get enough rest.  ? Avoid alcohol, caffeine, nicotine, and illegal drugs. They can increase your anxiety level and cause sleep problems. ? Learn and do relaxation techniques. See below for more about these techniques. · Engage your mind. Get out and do something you enjoy. Go to a [employer] movie, or take a walk or hike. Plan your day. Having too much or too little to do can make you anxious. · Keep a record of your symptoms. Discuss your fears with a good friend or family member, or join a support group for people with similar problems. Talking to others sometimes relieves stress. · Get involved in social groups, or volunteer to help others. Being alone sometimes makes things seem worse than they are. · Get at least 30 minutes of exercise on most days of the week to relieve stress. Walking is a good choice. You also may want to do other activities, such as running, swimming, cycling, or playing tennis or team sports. Relaxation techniques  Do relaxation exercises 10 to 20 minutes a day. You can play soothing, relaxing music while you do them, if you wish. · Tell others in your house that you are going to do your relaxation exercises.  Ask them not to disturb you.  · Find a comfortable place, away from all distractions and noise. · Lie down on your back, or sit with your back straight. · Focus on your breathing. Make it slow and steady. · Breathe in through your nose. Breathe out through either your nose or mouth. · Breathe deeply, filling up the area between your navel and your rib cage. Breathe so that your belly goes up and down. · Do not hold your breath. · Breathe like this for 5 to 10 minutes. Notice the feeling of calmness throughout your whole body. As you continue to breathe slowly and deeply, relax by doing the following for another 5 to 10 minutes:  · Tighten and relax each muscle group in your body. You can begin at your toes and work your way up to your head. · Imagine your muscle groups relaxing and becoming heavy. · Empty your mind of all thoughts. · Let yourself relax more and more deeply. · Become aware of the state of calmness that surrounds you. · When your relaxation time is over, you can bring yourself back to alertness by moving your fingers and toes and then your hands and feet and then stretching and moving your entire body. Sometimes people fall asleep during relaxation, but they usually wake up shortly afterward. · Always give yourself time to return to full alertness before you drive a car or do anything that might cause an accident if you are not fully alert. Never play a relaxation tape while you drive a car. When should you call for help? Call 911 anytime you think you may need emergency care. For example, call if:    · You feel you cannot stop from hurting yourself or someone else.   Chris Grigsby the numbers for these national suicide hotlines: 7-282-598-TALK (3-283-736-6044) and 9-915-YSWLRQL (9-612.622.4897). If you or someone you know talks about suicide or feeling hopeless, get help right away.   Watch closely for changes in your health, and be sure to contact your doctor if:    · You have anxiety or fear that affects your life. your target range. It also helps you see if you're eating healthy portion sizes. To use the plate format, you put non-starchy vegetables on half your plate. Add meat or meat substitutes on one-quarter of the plate. Put a grain or starchy vegetable (such as brown rice or a potato) on the final quarter of the plate. You can add a small piece of fruit and some low-fat or fat-free milk or yogurt, depending on your carbohydrate goal for each meal.  Here are some tips for using the plate format:  · Make sure that you are not using an oversized plate. A 9-inch plate is best. Many restaurants use larger plates. · Get used to using the plate format at home. Then you can use it when you eat out. · Write down your questions about using the plate format. Talk to your doctor, a dietitian, or a diabetes educator about your concerns. Carbohydrate counting  With carbohydrate counting, you plan meals based on the amount of carbohydrate in each food. Carbohydrate raises blood sugar higher and more quickly than any other nutrient. It is found in desserts, breads and cereals, and fruit. It's also found in starchy vegetables such as potatoes and corn, grains such as rice and pasta, and milk and yogurt. Spreading carbohydrate throughout the day helps keep your blood sugar levels within your target range. Your daily amount depends on several things, including your weight, how active you are, which diabetes medicines you take, and what your goals are for your blood sugar levels. A registered dietitian or diabetes educator can help you plan how much carbohydrate to include in each meal and snack. A guideline for your daily amount of carbohydrate is:  · 45 to 60 grams at each meal. That's about the same as 3 to 4 carbohydrate servings. · 15 to 20 grams at each snack. That's about the same as 1 carbohydrate serving. The Nutrition Facts label on packaged foods tells you how much carbohydrate is in a serving of the food.  First, look at also a good idea to know your test results and keep a list of the medicines you take. How can you care for yourself at home? What to avoid  · Do not have drinks with caffeine, such as coffee or black tea, for 8 hours before bed. · Do not smoke or use other types of tobacco near bedtime. Nicotine is a stimulant and can keep you awake. · Avoid drinking alcohol late in the evening, because it can cause you to wake in the middle of the night. · Do not eat a big meal close to bedtime. If you are hungry, eat a light snack. · Do not drink a lot of water close to bedtime, because the need to urinate may wake you up during the night. · Do not read or watch TV in bed. Use the bed only for sleeping and sexual activity. What to try  · Go to bed at the same time every night, and wake up at the same time every morning. Do not take naps during the day. · Keep your bedroom quiet, dark, and cool. · Sleep on a comfortable pillow and mattress. · If watching the clock makes you anxious, turn it facing away from you so you cannot see the time. · If you worry when you lie down, start a worry book. Well before bedtime, write down your worries, and then set the book and your concerns aside. · Try meditation or other relaxation techniques before you go to bed. · If you cannot fall asleep, get up and go to another room until you feel sleepy. Do something relaxing. Repeat your bedtime routine before you go to bed again. · Make your house quiet and calm about an hour before bedtime. Turn down the lights, turn off the TV, log off the computer, and turn down the volume on music. This can help you relax after a busy day. When should you call for help? Watch closely for changes in your health, and be sure to contact your doctor if:    · Your efforts to improve your sleep do not work.     · Your insomnia gets worse.     · You have been feeling down, depressed, or hopeless or have lost interest in things that you usually enjoy. Where can you learn more? Go to [email or web address]://[email or web address] org and sign in to your [employer] account. Enter P513 in the [employer] box to learn more about \"Insomnia: Care Instructions. \"     If you do not have an account, please click on the \"Sign Up Now\" link. Current as of: April 7, 2019  Content Version: 12.3  © 7692-8370 [employer]. Care instructions adapted under license by [facility] ([place], [place]) [employer] [facility] ([facility]). If you have questions about a medical condition or this instruction, always ask your healthcare professional. [place] [address] any warranty or liability for your use of this information. Controlled Substances Monitoring:     Attestation: The Prescription Monitoring Report for this patient was reviewed today. ([id number]) TRESSA Pak)  Periodic Controlled Substance Monitoring: Possible medication side effects, risk of tolerance/dependence & alternative treatments discussed., No signs of potential drug abuse or diversion identified., Obtaining appropriate analgesic effect of treatment.(1/20 ambine 10mg and valuim 10mg #30) TRESSA Pak)            Additional Instructions: As always, patient is advisedto bring in medication bottles in order to correctly reconcile with our current list.      Louellen Primrose received counseling on the following healthy behaviors: none    Patient giveneducational materials on plan of care    I have instructed Louellen Primrose to complete a self tracking handout on blood sugar and instructed them to bring it with them to her next appointment. Discussed use, benefit, and side effects of prescribed medications. Barriers to medication compliance addressed. All patient questions answered. Pt voiced understanding.      TRESSA Pak

## 2020-02-20 NOTE — PATIENT INSTRUCTIONS
play a relaxation tape while you drive a car. When should you call for help? Call 911 anytime you think you may need emergency care. For example, call if:    · You feel you cannot stop from hurting yourself or someone else.   Mike Vo the numbers for these national suicide hotlines: 0-927-504-TALK (1-684.363.4696) and 1-164-DXIZUQS (1-552.124.7920). If you or someone you know talks about suicide or feeling hopeless, get help right away.   Watch closely for changes in your health, and be sure to contact your doctor if:    · You have anxiety or fear that affects your life.     · You have symptoms of anxiety that are new or different from those you had before. Where can you learn more? Go to https://[employer] Media[employer]soft Groupeb.Panl. org and sign in to your [employer] account. Enter P754 in the [employer] box to learn more about \"Anxiety Disorder: Care Instructions. \"     If you do not have an account, please click on the \"Sign Up Now\" link. Current as of: May 28, 2019  Content Version: 12.3  © 3328-4336 Healthwise, [employer]. Care instructions adapted under license by [facility] ([facility]). If you have questions about a medical condition or this instruction, always ask your healthcare professional. [address] any warranty or liability for your use of this information. Patient Education        Learning About Meal Planning for Diabetes  Why plan your meals? Meal planning can be a key part of managing diabetes. Planning meals and snacks with the right balance of carbohydrate, protein, and fat can help you keep your blood sugar at the target level you set with your doctor. You don't have to eat special foods. You can eat what your family eats, including sweets once in a while. But you do have to pay attention to how often you eat and how much you eat of certain foods. You may want to work with a dietitian or a certified diabetes educator.  He or she can give you tips and meal ideas and

## 2020-03-02 RX ORDER — TIZANIDINE 4 MG/1
4 TABLET ORAL EVERY 8 HOURS PRN
Qty: 270 TABLET | Refills: 1 | Status: SHIPPED | OUTPATIENT
Start: 2020-03-02 | End: 2020-03-12 | Stop reason: SDUPTHER

## 2020-03-02 NOTE — TELEPHONE ENCOUNTER
Received fax from pharmacy requesting refill on pts medication(s). Pt was last seen in office on 2/20/2020  and has a follow up scheduled for 3/20/2020. Will send request to  Neno Pedroza  for patient.      Requested Prescriptions     Pending Prescriptions Disp Refills    tiZANidine (ZANAFLEX) 4 MG tablet [Pharmacy Med Name: tiZANidine HCl 4 MG Oral Tablet] 270 tablet 0     Sig: TAKE 1 TABLET BY MOUTH EVERY 8 HOURS AS NEEDED FOR MUSCLE SPASM

## 2020-03-11 RX ORDER — INSULIN ASPART 100 [IU]/ML
8 INJECTION, SOLUTION INTRAVENOUS; SUBCUTANEOUS 3 TIMES DAILY PRN
Qty: 3 PEN | Refills: 11 | Status: SHIPPED | OUTPATIENT
Start: 2020-03-11 | End: 2020-03-12 | Stop reason: SDUPTHER

## 2020-03-11 RX ORDER — DULOXETIN HYDROCHLORIDE 60 MG/1
60 CAPSULE, DELAYED RELEASE ORAL 2 TIMES DAILY
Qty: 60 CAPSULE | Refills: 11 | Status: SHIPPED | OUTPATIENT
Start: 2020-03-11 | End: 2020-03-12 | Stop reason: SDUPTHER

## 2020-03-12 RX ORDER — PANTOPRAZOLE SODIUM 40 MG/1
40 TABLET, DELAYED RELEASE ORAL DAILY
Qty: 90 TABLET | Refills: 3 | Status: SHIPPED | OUTPATIENT
Start: 2020-03-12 | End: 2021-01-28 | Stop reason: SDUPTHER

## 2020-03-12 RX ORDER — LINACLOTIDE 290 UG/1
290 CAPSULE, GELATIN COATED ORAL
Qty: 90 CAPSULE | Refills: 3 | Status: SHIPPED | OUTPATIENT
Start: 2020-03-12 | End: 2020-03-20

## 2020-03-12 RX ORDER — INSULIN ASPART 100 [IU]/ML
8 INJECTION, SOLUTION INTRAVENOUS; SUBCUTANEOUS 3 TIMES DAILY PRN
Qty: 9 PEN | Refills: 3 | Status: SHIPPED | OUTPATIENT
Start: 2020-03-12 | End: 2020-05-12 | Stop reason: SDUPTHER

## 2020-03-12 RX ORDER — PANTOPRAZOLE SODIUM 40 MG/1
TABLET, DELAYED RELEASE ORAL
Qty: 90 TABLET | Refills: 3 | Status: ON HOLD | OUTPATIENT
Start: 2020-03-12 | End: 2020-06-17

## 2020-03-12 RX ORDER — FLUTICASONE PROPIONATE 50 MCG
1 SPRAY, SUSPENSION (ML) NASAL DAILY
Qty: 3 BOTTLE | Refills: 3 | Status: SHIPPED | OUTPATIENT
Start: 2020-03-12

## 2020-03-12 RX ORDER — LEVOTHYROXINE SODIUM 88 UG/1
88 TABLET ORAL DAILY
Qty: 90 TABLET | Refills: 3 | Status: SHIPPED | OUTPATIENT
Start: 2020-03-12 | End: 2021-03-04 | Stop reason: SDUPTHER

## 2020-03-12 RX ORDER — QUETIAPINE FUMARATE 200 MG/1
200 TABLET, FILM COATED ORAL NIGHTLY
Qty: 90 TABLET | Refills: 3 | Status: SHIPPED | OUTPATIENT
Start: 2020-03-12 | End: 2020-03-26 | Stop reason: SDUPTHER

## 2020-03-12 RX ORDER — TIZANIDINE 4 MG/1
4 TABLET ORAL EVERY 8 HOURS PRN
Qty: 270 TABLET | Refills: 1 | Status: SHIPPED | OUTPATIENT
Start: 2020-03-12 | End: 2020-03-31 | Stop reason: SDUPTHER

## 2020-03-12 RX ORDER — AMITRIPTYLINE HYDROCHLORIDE 100 MG/1
200 TABLET, FILM COATED ORAL NIGHTLY
Qty: 180 TABLET | Refills: 3 | Status: SHIPPED | OUTPATIENT
Start: 2020-03-12 | End: 2020-04-06

## 2020-03-12 RX ORDER — ATORVASTATIN CALCIUM 40 MG/1
TABLET, FILM COATED ORAL
Qty: 90 TABLET | Refills: 3 | Status: SHIPPED | OUTPATIENT
Start: 2020-03-12 | End: 2020-03-26

## 2020-03-12 RX ORDER — DULOXETIN HYDROCHLORIDE 60 MG/1
60 CAPSULE, DELAYED RELEASE ORAL 2 TIMES DAILY
Qty: 180 CAPSULE | Refills: 3 | Status: SHIPPED | OUTPATIENT
Start: 2020-03-12 | End: 2020-04-02 | Stop reason: SDUPTHER

## 2020-03-12 RX ORDER — METOPROLOL SUCCINATE 50 MG/1
50 TABLET, EXTENDED RELEASE ORAL DAILY
Qty: 90 TABLET | Refills: 3 | Status: SHIPPED | OUTPATIENT
Start: 2020-03-12 | End: 2020-04-02 | Stop reason: SDUPTHER

## 2020-03-12 RX ORDER — FUROSEMIDE 40 MG/1
40 TABLET ORAL DAILY
Qty: 90 TABLET | Refills: 3 | Status: ON HOLD | OUTPATIENT
Start: 2020-03-12 | End: 2020-06-18 | Stop reason: HOSPADM

## 2020-03-12 RX ORDER — DULAGLUTIDE 1.5 MG/.5ML
1.5 INJECTION, SOLUTION SUBCUTANEOUS WEEKLY
Qty: 12 PEN | Refills: 3 | Status: SHIPPED | OUTPATIENT
Start: 2020-03-12 | End: 2020-09-01

## 2020-03-12 RX ORDER — VALSARTAN 320 MG/1
320 TABLET ORAL NIGHTLY
Qty: 90 TABLET | Refills: 3 | Status: ON HOLD | OUTPATIENT
Start: 2020-03-12 | End: 2020-06-18 | Stop reason: HOSPADM

## 2020-03-12 RX ORDER — PREGABALIN 150 MG/1
150 CAPSULE ORAL 2 TIMES DAILY
Qty: 180 CAPSULE | Refills: 1 | Status: SHIPPED | OUTPATIENT
Start: 2020-03-12 | End: 2020-07-06

## 2020-03-12 RX ORDER — PEN NEEDLE, DIABETIC 31 GX5/16"
1 NEEDLE, DISPOSABLE MISCELLANEOUS DAILY
Qty: 100 EACH | Refills: 3 | Status: SHIPPED | OUTPATIENT
Start: 2020-03-12 | End: 2021-02-08

## 2020-03-20 ENCOUNTER — OFFICE VISIT (OUTPATIENT)
Dept: PRIMARY CARE CLINIC | Age: 60
End: 2020-03-20
Payer: MEDICARE

## 2020-03-20 VITALS
WEIGHT: 234.25 LBS | HEART RATE: 102 BPM | OXYGEN SATURATION: 96 % | TEMPERATURE: 98.3 F | DIASTOLIC BLOOD PRESSURE: 86 MMHG | HEIGHT: 60 IN | SYSTOLIC BLOOD PRESSURE: 130 MMHG | BODY MASS INDEX: 45.99 KG/M2

## 2020-03-20 DIAGNOSIS — E11.42 TYPE 2 DIABETES MELLITUS WITH DIABETIC POLYNEUROPATHY, WITH LONG-TERM CURRENT USE OF INSULIN (HCC): ICD-10-CM

## 2020-03-20 DIAGNOSIS — E03.9 ACQUIRED HYPOTHYROIDISM: ICD-10-CM

## 2020-03-20 DIAGNOSIS — N18.32 CHRONIC KIDNEY DISEASE (CKD) STAGE G3B/A2, MODERATELY DECREASED GLOMERULAR FILTRATION RATE (GFR) BETWEEN 30-44 ML/MIN/1.73 SQUARE METER AND ALBUMINURIA CREATININE RATIO BETWEEN 30-299 MG/G (HCC): ICD-10-CM

## 2020-03-20 DIAGNOSIS — Z79.4 TYPE 2 DIABETES MELLITUS WITH DIABETIC POLYNEUROPATHY, WITH LONG-TERM CURRENT USE OF INSULIN (HCC): ICD-10-CM

## 2020-03-20 LAB
ALBUMIN SERPL-MCNC: 3.9 G/DL (ref 3.5–5.2)
ALP BLD-CCNC: 100 U/L (ref 35–104)
ALT SERPL-CCNC: 185 U/L (ref 5–33)
ANION GAP SERPL CALCULATED.3IONS-SCNC: 18 MMOL/L (ref 7–19)
AST SERPL-CCNC: 772 U/L (ref 5–32)
BILIRUB SERPL-MCNC: 0.6 MG/DL (ref 0.2–1.2)
BUN BLDV-MCNC: 19 MG/DL (ref 6–20)
CALCIUM SERPL-MCNC: 8.2 MG/DL (ref 8.6–10)
CHLORIDE BLD-SCNC: 87 MMOL/L (ref 98–111)
CO2: 18 MMOL/L (ref 22–29)
CREAT SERPL-MCNC: 1.2 MG/DL (ref 0.5–0.9)
CREATININE URINE: 17.3 MG/DL (ref 4.2–622)
GFR NON-AFRICAN AMERICAN: 46
GLUCOSE BLD-MCNC: 132 MG/DL (ref 74–109)
HBA1C MFR BLD: 6.1 % (ref 4–6)
MICROALBUMIN UR-MCNC: 2.9 MG/DL (ref 0–19)
MICROALBUMIN/CREAT UR-RTO: 167.6 MG/G
POTASSIUM SERPL-SCNC: 4.5 MMOL/L (ref 3.5–5)
SODIUM BLD-SCNC: 123 MMOL/L (ref 136–145)
T4 FREE: 1.44 NG/DL (ref 0.93–1.7)
TOTAL PROTEIN: 7.1 G/DL (ref 6.6–8.7)
TSH SERPL DL<=0.05 MIU/L-ACNC: 3.63 UIU/ML (ref 0.27–4.2)

## 2020-03-20 PROCEDURE — 99214 OFFICE O/P EST MOD 30 MIN: CPT | Performed by: NURSE PRACTITIONER

## 2020-03-20 RX ORDER — ZOLPIDEM TARTRATE 10 MG/1
10 TABLET ORAL NIGHTLY PRN
Qty: 30 TABLET | Refills: 0 | Status: SHIPPED | OUTPATIENT
Start: 2020-03-20 | End: 2020-03-23

## 2020-03-20 RX ORDER — DIAZEPAM 10 MG/1
10 TABLET ORAL EVERY EVENING
Qty: 30 TABLET | Refills: 0 | Status: SHIPPED | OUTPATIENT
Start: 2020-03-20 | End: 2020-04-19

## 2020-03-20 ASSESSMENT — ENCOUNTER SYMPTOMS
WHEEZING: 0
NAUSEA: 0
COUGH: 0
BACK PAIN: 1
SHORTNESS OF BREATH: 0
DIARRHEA: 0
VOMITING: 0
ABDOMINAL PAIN: 0
CONSTIPATION: 1

## 2020-03-20 NOTE — PROGRESS NOTES
Value Ref Range    Amphetamine Screen, Urine neg     Barbiturate Screen, Urine      Benzodiazepine Screen, Urine pos     Buprenorphine Urine      Cocaine Metabolite Screen, Urine neg     Gabapentin Screen, Urine      MDMA, Urine neg     Methamphetamine, Urine neg     Methadone Screen, Urine neg     Opiate Scrn, Ur pos     Oxycodone Screen, Ur neg     PCP Screen, Urine      Propoxyphene Screen, Urine      THC Screen, Urine neg     Tricyclic Antidepressants, Urine         I have reviewed the following with the Ms. Yoav Munoz   Lab Review   Office Visit on 01/20/2020   Component Date Value    Amphetamine Screen, Urine 01/20/2020 neg     Benzodiazepine Screen, U* 01/20/2020 pos     Cocaine Metabolite Scree* 01/20/2020 neg     MDMA, Urine 01/20/2020 neg     Methamphetamine, Urine 01/20/2020 neg     Methadone Screen, Urine 01/20/2020 neg     Opiate Scrn, Ur 01/20/2020 pos     Oxycodone Screen, Ur 01/20/2020 neg     THC Screen, Urine 01/20/2020 neg    Orders Only on 12/03/2019   Component Date Value    T4 Free 12/03/2019 1.11     TSH 12/03/2019 1.970     WBC 12/03/2019 11.7*    RBC 12/03/2019 4.56     Hemoglobin 12/03/2019 13.5     Hematocrit 12/03/2019 42.9     MCV 12/03/2019 94.1     MCH 12/03/2019 29.6     MCHC 12/03/2019 31.5*    RDW 12/03/2019 14.3     Platelets 72/59/5242 284     MPV 12/03/2019 12.0     Neutrophils % 12/03/2019 75.8*    Lymphocytes % 12/03/2019 13.2*    Monocytes % 12/03/2019 8.2     Eosinophils % 12/03/2019 1.8     Basophils % 12/03/2019 0.5     Neutrophils Absolute 12/03/2019 8.8*    Immature Granulocytes # 12/03/2019 0.1     Lymphocytes Absolute 12/03/2019 1.5     Monocytes Absolute 12/03/2019 1.00*    Eosinophils Absolute 12/03/2019 0.20     Basophils Absolute 12/03/2019 0.10     Sodium 12/03/2019 136     Potassium 12/03/2019 4.5     Chloride 12/03/2019 98     CO2 12/03/2019 15*    Anion Gap 12/03/2019 23*    Glucose 12/03/2019 178*    BUN 12/03/2019 29*    CREATININE 12/03/2019 1.7*    GFR Non- 12/03/2019 31*    Calcium 12/03/2019 9.9     Total Protein 12/03/2019 8.1     Alb 12/03/2019 4.0     Total Bilirubin 12/03/2019 0.4     Alkaline Phosphatase 12/03/2019 196*    ALT 12/03/2019 14     AST 12/03/2019 23     Hemoglobin A1C 12/03/2019 6.6*    Cholesterol, Total 12/03/2019 153*    Triglycerides 12/03/2019 182*    HDL 12/03/2019 50*    LDL Calculated 12/03/2019 67     Microalbumin, Random Uri* 12/03/2019 <1.20     Creatinine, Ur 12/03/2019 21.2     Microalbumin Creatinine * 12/03/2019 see below      Copies of these are in the chart. Prior to Visit Medications    Medication Sig Taking? Authorizing Provider   diazePAM (VALIUM) 10 MG tablet Take 1 tablet by mouth every evening for 30 days. Yes TRESSA Escobar   zolpidem (AMBIEN) 10 MG tablet Take 1 tablet by mouth nightly as needed for Sleep for up to 30 days. Yes TRESSA Escobar   insulin aspart (NOVOLOG FLEXPEN) 100 UNIT/ML injection pen Inject 8 Units into the skin 3 times daily as needed for High Blood Sugar (sliding coverage) Yes TRESSA Escobar   DULoxetine (CYMBALTA) 60 MG extended release capsule Take 1 capsule by mouth 2 times daily Yes TRESSA Escobar   tiZANidine (ZANAFLEX) 4 MG tablet Take 1 tablet by mouth every 8 hours as needed (spasms) Yes TRESSA Escobar   fluticasone (FLONASE) 50 MCG/ACT nasal spray 1 spray by Nasal route daily Yes TRESSA Escobar   levothyroxine (SYNTHROID) 88 MCG tablet Take 1 tablet by mouth Daily Tube feeding (TF) interaction, obtain physician order to manage, recommend holding TF for 30 minutes before and after dose. Yes TRESSA Escobar   pregabalin (LYRICA) 150 MG capsule Take 1 capsule by mouth 2 times daily for 180 days.  Yes TRESSA Escobar   pantoprazole (PROTONIX) 40 MG tablet Take 1 tablet by mouth daily Yes TRESSA Escobar   Insulin Pen Needle (Kendall Cooper 31G) 31G X 8 MM MISC 1 each by Does not apply route daily Yes TRESSA Rolon   valsartan (DIOVAN) 320 MG tablet Take 1 tablet by mouth nightly Yes TRESSA Rolon   furosemide (LASIX) 40 MG tablet Take 1 tablet by mouth daily Yes TRESSA Rolon   Dulaglutide (TRULICITY) 1.5 EO/8.4IY SOPN Inject 1.5 mg into the skin once a week Yes TRESSA Rolon   metoprolol succinate (TOPROL XL) 50 MG extended release tablet Take 1 tablet by mouth daily Yes TRESSA Rolon   amitriptyline (ELAVIL) 100 MG tablet Take 2 tablets by mouth nightly Yes TRESSA Rolon   atorvastatin (LIPITOR) 40 MG tablet TAKE ONE TABLET BY MOUTH ONCE NIGHTLY Yes TRESSA Rolon   QUEtiapine (SEROQUEL) 200 MG tablet Take 1 tablet by mouth nightly Yes TRESSA Rolon   aspirin 81 MG tablet Take 1 tablet by mouth daily Yes TRESSA Rolon   pantoprazole (PROTONIX) 40 MG tablet TAKE ONE TABLET BY MOUTH ONCE DAILY Yes TRESSA Rolon   diazePAM (VALIUM) 10 MG tablet Take 1 tablet by mouth every evening for 30 days. Yes TRESSA Rolon   zolpidem (AMBIEN) 10 MG tablet Take 1 tablet by mouth nightly as needed for Sleep for up to 30 days. Yes TRESSA Rolon   Insulin Pen Needle 31G X 8 MM MISC 1 each by Does not apply route 4 times daily Yes TRESSA Rolon   Blood Glucose Monitoring Suppl (FREESTYLE LITE) JEZ 1 Device by Does not apply route 4 times daily Yes TRESSA Rolon   blood glucose test strips (FREESTYLE LITE) strip 1 each by In Vitro route 4 times daily As needed.  Yes TRESSA Rolon       Allergies: Dilaudid [hydromorphone hcl]    Past Medical History:   Diagnosis Date    Anxiety     Arthritis     Bipolar 1 disorder ([facility] [address]     CAD (coronary artery disease)     CHF (congestive heart failure) (HCC)     Chronic kidney disease (CKD) stage G3b/A2, moderately decreased glomerular filtration rate (GFR) between 30-44 mL/min/1.73 cough, shortness of breath and wheezing. Cardiovascular: Negative for chest pain, palpitations and leg swelling (off and on using lasix). Gastrointestinal: Positive for constipation (resolved). Negative for abdominal pain, diarrhea, nausea and vomiting. Endocrine: Negative for polydipsia, polyphagia and polyuria. Reports wax and wane       Musculoskeletal: Positive for arthralgias (ankle and back) and back pain. Skin: Negative for rash and wound. Neurological: Negative for dizziness and headaches. Psychiatric/Behavioral: Negative for behavioral problems, self-injury and sleep disturbance (improved on regimen). The patient is not nervous/anxious (better with sleep) and is not hyperactive. Physical Exam  Vitals signs reviewed. Constitutional:       General: She is not in acute distress. Appearance: Normal appearance. She is obese. She is not ill-appearing. HENT:      Head: Normocephalic. Right Ear: Tympanic membrane normal. There is no impacted cerumen. Left Ear: Tympanic membrane normal. There is no impacted cerumen. Nose: No congestion. Mouth/Throat:      Pharynx: No oropharyngeal exudate or posterior oropharyngeal erythema. Eyes:      General:         Right eye: No discharge. Left eye: No discharge. Neck:      Musculoskeletal: Normal range of motion. Cardiovascular:      Rate and Rhythm: Normal rate and regular rhythm. Pulses: Normal pulses. Heart sounds: No murmur. Pulmonary:      Effort: No respiratory distress. Breath sounds: Normal breath sounds. No rhonchi. Abdominal:      Palpations: There is no mass. Skin:     General: Skin is warm and dry. Capillary Refill: Capillary refill takes less than 2 seconds. Neurological:      General: No focal deficit present. Mental Status: She is alert.    Psychiatric:         Mood and Affect: Mood normal.         Behavior: Behavior normal.         ASSESSMENT/ PLAN    1. Type 2 diabetes mellitus with diabetic polyneuropathy, with long-term current use of insulin (HCC)  Cont medication  And insulin with meals if high carbs  Cont present  - Comprehensive Metabolic Panel; Future  - Hemoglobin A1C; Future  - Microalbumin / Creatinine Urine Ratio; Future    2. Acquired hypothyroidism  Will check today  - TSH without Reflex; Future  - T4, Free; Future    3. Chronic kidney disease (CKD) stage G3b/A2, moderately decreased glomerular filtration rate (GFR) between 30-44 mL/min/1.73 square meter and albuminuria creatinine ratio between  mg/g (HCC)  Increase fluids  Supportive care  - Comprehensive Metabolic Panel; Future  - Hemoglobin A1C; Future  - Microalbumin / Creatinine Urine Ratio; Future    4. Situational anxiety  Cont present  Doing well  Denies issues  - diazePAM (VALIUM) 10 MG tablet; Take 1 tablet by mouth every evening for 30 days. Dispense: 30 tablet; Refill: 0    5. Insomnia due to other mental disorder  stable  - diazePAM (VALIUM) 10 MG tablet; Take 1 tablet by mouth every evening for 30 days. Dispense: 30 tablet; Refill: 0    6. Bipolar disorder, in full remission, most recent episode mixed (HCC)  stable  - diazePAM (VALIUM) 10 MG tablet; Take 1 tablet by mouth every evening for 30 days. Dispense: 30 tablet; Refill: 0    7. Insomnia due to medical condition  Doing well  With valium and ambien together  - zolpidem (AMBIEN) 10 MG tablet; Take 1 tablet by mouth nightly as needed for Sleep for up to 30 days. Dispense: 30 tablet; Refill: 0    8. Drug-induced constipation  Cont to use as needed  1-2 bowel movement daily    9. Chronic pain syndrome  Cont pain undercontrol  Doing well at present      Orders Placed This Encounter   Procedures    Comprehensive Metabolic Panel    Hemoglobin A1C    TSH without Reflex    T4, Free    Microalbumin / Creatinine Urine Ratio        Return in about 1 month (around 4/20/2020) for medication follow up and diabetes . Patient Instructions       Patient Education         Anxiety: What Is It? (02:05)  Your health professional recommends that you watch this short online health video. Learn how anxiety is different from stress and how it can affect your life. How to watch the video    Scan the QR code   OR Visit the website    https://hwi. se/r/Eanacpldqzitj   Current as of: May 28, 2019Content Version: 12.4  © 9012-4190 Healthwise, Incorporated. Care instructions adapted under license by [facility] ([facility]). If you have questions about a medical condition or this instruction, always ask your healthcare professional. [place] [address] any warranty or liability for your use of this information. Patient Education        Insomnia: Care Instructions  Your Care Instructions    Insomnia is the inability to sleep well. It is a common problem for most people at some time. Insomnia may make it hard for you to get to sleep, stay asleep, or sleep as long as you need to. This can make you tired and grouchy during the day. It can also make you forgetful, less effective at work, and unhappy. Insomnia can be caused by conditions such as depression or anxiety. Pain can also affect your ability to sleep. When these problems are solved, the insomnia usually clears up. But sometimes bad sleep habits can cause insomnia. If insomnia is affecting your work or your enjoyment of life, you can take steps to improve your sleep. Follow-up care is a key part of your treatment and safety. Be sure to make and go to all appointments, and call your doctor if you are having problems. It's also a good idea to know your test results and keep a list of the medicines you take. How can you care for yourself at home? What to avoid  · Do not have drinks with caffeine, such as coffee or black tea, for 8 hours before bed. · Do not smoke or use other types of tobacco near bedtime. Nicotine is a stimulant and can keep you awake.   · Avoid drinking alcohol late in the evening, because it can cause you to wake in the middle of the night. · Do not eat a big meal close to bedtime. If you are hungry, eat a light snack. · Do not drink a lot of water close to bedtime, because the need to urinate may wake you up during the night. · Do not read or watch TV in bed. Use the bed only for sleeping and sexual activity. What to try  · Go to bed at the same time every night, and wake up at the same time every morning. Do not take naps during the day. · Keep your bedroom quiet, dark, and cool. · Sleep on a comfortable pillow and mattress. · If watching the clock makes you anxious, turn it facing away from you so you cannot see the time. · If you worry when you lie down, start a worry book. Well before bedtime, write down your worries, and then set the book and your concerns aside. · Try meditation or other relaxation techniques before you go to bed. · If you cannot fall asleep, get up and go to another room until you feel sleepy. Do something relaxing. Repeat your bedtime routine before you go to bed again. · Make your house quiet and calm about an hour before bedtime. Turn down the lights, turn off the TV, log off the computer, and turn down the volume on music. This can help you relax after a busy day. When should you call for help? Watch closely for changes in your health, and be sure to contact your doctor if:    · Your efforts to improve your sleep do not work.     · Your insomnia gets worse.     · You have been feeling down, depressed, or hopeless or have lost interest in things that you usually enjoy. Where can you learn more? Go to https://AGELON ?peHexadite.Seedrs. org and sign in to your [employer] account. Enter P513 in the [employer] box to learn more about \"Insomnia: Care Instructions. \"     If you do not have an account, please click on the \"Sign Up Now\" link.   Current as of: December 15, 2019Content Version: 12.4  © 6064-3266

## 2020-03-20 NOTE — PATIENT INSTRUCTIONS
because it can cause you to wake in the middle of the night. · Do not eat a big meal close to bedtime. If you are hungry, eat a light snack. · Do not drink a lot of water close to bedtime, because the need to urinate may wake you up during the night. · Do not read or watch TV in bed. Use the bed only for sleeping and sexual activity. What to try  · Go to bed at the same time every night, and wake up at the same time every morning. Do not take naps during the day. · Keep your bedroom quiet, dark, and cool. · Sleep on a comfortable pillow and mattress. · If watching the clock makes you anxious, turn it facing away from you so you cannot see the time. · If you worry when you lie down, start a worry book. Well before bedtime, write down your worries, and then set the book and your concerns aside. · Try meditation or other relaxation techniques before you go to bed. · If you cannot fall asleep, get up and go to another room until you feel sleepy. Do something relaxing. Repeat your bedtime routine before you go to bed again. · Make your house quiet and calm about an hour before bedtime. Turn down the lights, turn off the TV, log off the computer, and turn down the volume on music. This can help you relax after a busy day. When should you call for help? Watch closely for changes in your health, and be sure to contact your doctor if:    · Your efforts to improve your sleep do not work.     · Your insomnia gets worse.     · You have been feeling down, depressed, or hopeless or have lost interest in things that you usually enjoy. Where can you learn more? Go to https://Public SolutionpeConyacewCasabu.healthNimblefish Technologies. org and sign in to your [facility] account. Enter P513 in the [employer] box to learn more about \"Insomnia: Care Instructions. \"     If you do not have an account, please click on the \"Sign Up Now\" link. Current as of: December 15, 2019Content Version: 12.4  © 4089-6669 Healthwise, Incorporated.   Care instructions adapted under license by [facility] ([facility]). If you have questions about a medical condition or this instruction, always ask your healthcare professional. [address] any warranty or liability for your use of this information.

## 2020-03-23 RX ORDER — ZOLPIDEM TARTRATE 10 MG/1
10 TABLET ORAL NIGHTLY PRN
Qty: 30 TABLET | Refills: 0 | Status: SHIPPED | OUTPATIENT
Start: 2020-03-23 | End: 2020-04-20 | Stop reason: SDUPTHER

## 2020-03-23 RX ORDER — DIAZEPAM 10 MG/1
10 TABLET ORAL NIGHTLY
Qty: 30 TABLET | Refills: 0 | Status: SHIPPED | OUTPATIENT
Start: 2020-03-23 | End: 2020-03-23

## 2020-03-24 ENCOUNTER — TELEPHONE (OUTPATIENT)
Dept: PRIMARY CARE CLINIC | Age: 60
End: 2020-03-24

## 2020-03-24 NOTE — TELEPHONE ENCOUNTER
[facility] Transitions Initial Follow Up Call    Outreach made within 2 business days of discharge: Yes    Patient: Justino Gibson Patient : 1960   MRN: [id number]  Reason for Admission: There are no discharge diagnoses documented for the most recent discharge. Discharge Date: 19       Spoke with: patient    Discharge department/facility: [facility] Interactive Patient Contact:  Was patient able to fill all prescriptions: Yes  Was patient instructed to bring all medications to the follow-up visit: Yes  Is patient taking all medications as directed in the discharge summary? Yes  Does patient understand their discharge instructions: Yes  Does patient have questions or concerns that need addressed prior to 7-14 day follow up office visit: yes     Scheduled appointment with PCP within 7-14 days    Follow Up  No future appointments.     Latonia Alejandre

## 2020-03-25 ENCOUNTER — TELEPHONE (OUTPATIENT)
Dept: PRIMARY CARE CLINIC | Age: 60
End: 2020-03-25

## 2020-03-25 DIAGNOSIS — E11.42 TYPE 2 DIABETES MELLITUS WITH DIABETIC POLYNEUROPATHY, WITH LONG-TERM CURRENT USE OF INSULIN (HCC): ICD-10-CM

## 2020-03-25 DIAGNOSIS — R74.8 ELEVATED LIVER ENZYMES: ICD-10-CM

## 2020-03-25 DIAGNOSIS — N18.32 CHRONIC KIDNEY DISEASE (CKD) STAGE G3B/A2, MODERATELY DECREASED GLOMERULAR FILTRATION RATE (GFR) BETWEEN 30-44 ML/MIN/1.73 SQUARE METER AND ALBUMINURIA CREATININE RATIO BETWEEN 30-299 MG/G (HCC): ICD-10-CM

## 2020-03-25 DIAGNOSIS — Z79.4 TYPE 2 DIABETES MELLITUS WITH DIABETIC POLYNEUROPATHY, WITH LONG-TERM CURRENT USE OF INSULIN (HCC): ICD-10-CM

## 2020-03-25 PROBLEM — G47.00 INSOMNIA: Status: RESOLVED | Noted: 2020-03-25 | Resolved: 2020-03-24

## 2020-03-25 LAB
ALBUMIN SERPL-MCNC: 3.8 G/DL (ref 3.5–5.2)
ALP BLD-CCNC: 90 U/L (ref 35–104)
ALT SERPL-CCNC: 110 U/L (ref 5–33)
AMYLASE: 112 U/L (ref 28–100)
ANION GAP SERPL CALCULATED.3IONS-SCNC: 16 MMOL/L (ref 7–19)
AST SERPL-CCNC: 95 U/L (ref 5–32)
BILIRUB SERPL-MCNC: 0.4 MG/DL (ref 0.2–1.2)
BUN BLDV-MCNC: 18 MG/DL (ref 6–20)
CALCIUM SERPL-MCNC: 9.7 MG/DL (ref 8.6–10)
CHLORIDE BLD-SCNC: 101 MMOL/L (ref 98–111)
CO2: 21 MMOL/L (ref 22–29)
CREAT SERPL-MCNC: 1.1 MG/DL (ref 0.5–0.9)
ETHANOL: <10 MG/DL (ref 0–0.08)
GFR NON-AFRICAN AMERICAN: 51
GLUCOSE BLD-MCNC: 127 MG/DL (ref 74–109)
HBA1C MFR BLD: 6.1 % (ref 4–6)
LIPASE: 225 U/L (ref 13–60)
POTASSIUM SERPL-SCNC: 3.8 MMOL/L (ref 3.5–5)
SODIUM BLD-SCNC: 138 MMOL/L (ref 136–145)
TOTAL PROTEIN: 7.1 G/DL (ref 6.6–8.7)

## 2020-03-25 NOTE — TELEPHONE ENCOUNTER
----- Message from TRESSA Singh sent at 3/25/2020  3:26 PM CDT -----  CMP: Normal sodium, potassium and calcium. Blood sugar is 127. Kidney function is improving. Liver enzymes are improving as well. Alcohol level is negative  Lipase is elevated at 225. Amylase is also elevated.   A1c is great at 6.1

## 2020-03-26 ENCOUNTER — TELEPHONE (OUTPATIENT)
Dept: PRIMARY CARE CLINIC | Age: 60
End: 2020-03-26

## 2020-03-26 ENCOUNTER — TELEMEDICINE (OUTPATIENT)
Dept: PRIMARY CARE CLINIC | Age: 60
End: 2020-03-26
Payer: MEDICARE

## 2020-03-26 PROCEDURE — 99496 TRANSJ CARE MGMT HIGH F2F 7D: CPT | Performed by: NURSE PRACTITIONER

## 2020-03-26 PROCEDURE — 1111F DSCHRG MED/CURRENT MED MERGE: CPT | Performed by: NURSE PRACTITIONER

## 2020-03-26 RX ORDER — QUETIAPINE FUMARATE 200 MG/1
200 TABLET, FILM COATED ORAL NIGHTLY
Qty: 90 TABLET | Refills: 3 | Status: SHIPPED | OUTPATIENT
Start: 2020-03-26 | End: 2020-03-26 | Stop reason: SDUPTHER

## 2020-03-26 RX ORDER — FLUCONAZOLE 150 MG/1
150 TABLET ORAL DAILY
Qty: 3 TABLET | Refills: 0 | Status: SHIPPED | OUTPATIENT
Start: 2020-03-26 | End: 2020-03-26 | Stop reason: SDUPTHER

## 2020-03-26 RX ORDER — CEPHALEXIN 500 MG/1
500 CAPSULE ORAL 3 TIMES DAILY
Qty: 21 CAPSULE | Refills: 0 | Status: SHIPPED | OUTPATIENT
Start: 2020-03-26 | End: 2020-03-26 | Stop reason: SDUPTHER

## 2020-03-26 RX ORDER — CEPHALEXIN 500 MG/1
500 CAPSULE ORAL 3 TIMES DAILY
Qty: 21 CAPSULE | Refills: 0 | Status: SHIPPED | OUTPATIENT
Start: 2020-03-26 | End: 2020-04-02

## 2020-03-26 RX ORDER — QUETIAPINE FUMARATE 200 MG/1
200 TABLET, FILM COATED ORAL NIGHTLY
Qty: 90 TABLET | Refills: 3 | Status: SHIPPED | OUTPATIENT
Start: 2020-03-26 | End: 2020-09-04 | Stop reason: SDUPTHER

## 2020-03-26 RX ORDER — FLUCONAZOLE 150 MG/1
150 TABLET ORAL DAILY
Qty: 3 TABLET | Refills: 0 | Status: SHIPPED | OUTPATIENT
Start: 2020-03-26 | End: 2020-03-29

## 2020-03-26 ASSESSMENT — ENCOUNTER SYMPTOMS
WHEEZING: 0
VOMITING: 0
EYES NEGATIVE: 1
CONSTIPATION: 0
ABDOMINAL PAIN: 0
SORE THROAT: 0
DIARRHEA: 0
TROUBLE SWALLOWING: 0
NAUSEA: 0
COUGH: 0
SHORTNESS OF BREATH: 1

## 2020-03-26 NOTE — TELEPHONE ENCOUNTER
----- Message from TRESSA Stallworth sent at 3/26/2020  8:10 AM CDT -----  Please get her imaging from [address] for me to review

## 2020-03-26 NOTE — PROGRESS NOTES
Post-Discharge Transitional Care Management Services or Hospital Follow Up      Lauren Morales   YOB: 1960    Date of Office Visit:  3/26/2020  Date of Hospital Admission: 3/20/2020  Date of Hospital Discharge: 3/22/2020    Patient has been admitted to [facility] on 3/20-3/22 for was SOA and had went to [facility]  And was found in front lobby with some confusion and found to have an E coli UTI  And low sodium with elevated enzymes    Was treated with fluids  And also had viral panel  Negative  Kidney function return to baseline along with sodium level  It is unknown if she took extra \"fluid pills etc\"  She was seen early that day by me  And noted normal  Other than complained of SOA  But had been eating crackers    Per her review   No weight changes  And glucose was well      E coli  Noted today break out in rash  Reports she had diarrhea   But resolving   Since changing would like keflex        Care management risk score Rising risk (score 2-5) and Complex Care (Scores >=6): 6     Non face to face  following discharge, date last encounter closed (first attempt may have been earlier): 3/24/2020  4:30 PM     Call initiated 2 business days of discharge: Yes    Patient Active Problem List   Diagnosis    CAD (coronary artery disease)    Obesity    DM (diabetes mellitus)    Hypertension    Polyarticular arthritis    Hypothyroidism    GERD (gastroesophageal reflux disease)    Hyperlipidemia    Anxiety    Insomnia    Bipolar disorder ([address]    Cataract    Thyroid nodule    Personal history of diabetic foot ulcer    B12 deficiency    Chronic kidney disease (CKD) stage G3b/A2, moderately decreased glomerular filtration rate (GFR) between 30-44 mL/min/1.73 square meter and albuminuria creatinine ratio between  mg/g ([address]    Primary osteoarthritis of left knee    Iron deficiency anemia    Type 2 diabetes mellitus with diabetic polyneuropathy, with long-term current use of insulin ([address]    Hyperkalemia    Gait abnormality    Anginal equivalent (HCC)       Allergies   Allergen Reactions    Dilaudid [Hydromorphone Hcl] Other (See Comments)     Takes pt out of her mind. hallunications. Pt stateshas taken this hospitalization and has not had any problems       Medications listed as ordered at the time of discharge from hospital  yes     Medications marked \"taking\" at this time  Outpatient Medications Marked as Taking for the 3/26/20 encounter (Telemedicine) with TRESSA Salcedo   Medication Sig Dispense Refill    cephALEXin (KEFLEX) 500 MG capsule Take 1 capsule by mouth 3 times daily for 7 days 21 capsule 0    fluconazole (DIFLUCAN) 150 MG tablet Take 1 tablet by mouth daily for 3 days 3 tablet 0    zolpidem (AMBIEN) 10 MG tablet Take 1 tablet by mouth nightly as needed for Sleep for up to 30 days. 30 tablet 0    diazePAM (VALIUM) 10 MG tablet Take 1 tablet by mouth every evening for 30 days. 30 tablet 0    insulin aspart (NOVOLOG FLEXPEN) 100 UNIT/ML injection pen Inject 8 Units into the skin 3 times daily as needed for High Blood Sugar (sliding coverage) 9 pen 3    DULoxetine (CYMBALTA) 60 MG extended release capsule Take 1 capsule by mouth 2 times daily 180 capsule 3    tiZANidine (ZANAFLEX) 4 MG tablet Take 1 tablet by mouth every 8 hours as needed (spasms) 270 tablet 1    fluticasone (FLONASE) 50 MCG/ACT nasal spray 1 spray by Nasal route daily 3 Bottle 3    levothyroxine (SYNTHROID) 88 MCG tablet Take 1 tablet by mouth Daily Tube feeding (TF) interaction, obtain physician order to manage, recommend holding TF for 30 minutes before and after dose. 90 tablet 3    pregabalin (LYRICA) 150 MG capsule Take 1 capsule by mouth 2 times daily for 180 days.  180 capsule 1    pantoprazole (PROTONIX) 40 MG tablet Take 1 tablet by mouth daily 90 tablet 3    Insulin Pen Needle ([employer] PEN NEEDLES 31G) 31G X 8 MM MISC 1 each by Does not apply route daily 100 each 3    valsartan (DIOVAN) 320

## 2020-03-27 RX ORDER — CIPROFLOXACIN 500 MG/1
500 TABLET, FILM COATED ORAL 2 TIMES DAILY
Qty: 20 TABLET | Refills: 0 | Status: SHIPPED | OUTPATIENT
Start: 2020-03-27 | End: 2020-04-02

## 2020-03-27 NOTE — TELEPHONE ENCOUNTER
It looks like was cefdinir    Not keflex  As that what filled  Can try cipro 250mg 1 po bid for 10 #20 no refill

## 2020-03-27 NOTE — TELEPHONE ENCOUNTER
Pt called, she said the keflex is what she had been taking. It made her so sick. She would like something else.

## 2020-03-30 ENCOUNTER — TELEPHONE (OUTPATIENT)
Dept: PRIMARY CARE CLINIC | Age: 60
End: 2020-03-30

## 2020-03-30 NOTE — TELEPHONE ENCOUNTER
Pt calls and leave two messages for you to call her back. She said that she is sorry and that something went wrong with her phone. She said that she needs an antibiotics that isnt as strong and she wants he Tizanadine back.

## 2020-03-31 ENCOUNTER — TELEPHONE (OUTPATIENT)
Dept: PRIMARY CARE CLINIC | Age: 60
End: 2020-03-31

## 2020-03-31 DIAGNOSIS — R74.8 ELEVATED PANCREATIC ENZYME: ICD-10-CM

## 2020-03-31 DIAGNOSIS — K59.03 DRUG-INDUCED CONSTIPATION: ICD-10-CM

## 2020-03-31 DIAGNOSIS — N18.32 CHRONIC KIDNEY DISEASE (CKD) STAGE G3B/A2, MODERATELY DECREASED GLOMERULAR FILTRATION RATE (GFR) BETWEEN 30-44 ML/MIN/1.73 SQUARE METER AND ALBUMINURIA CREATININE RATIO BETWEEN 30-299 MG/G (HCC): ICD-10-CM

## 2020-03-31 LAB
ALBUMIN SERPL-MCNC: 3.8 G/DL (ref 3.5–5.2)
ALP BLD-CCNC: 100 U/L (ref 35–104)
ALT SERPL-CCNC: 60 U/L (ref 5–33)
AMYLASE: 64 U/L (ref 28–100)
ANION GAP SERPL CALCULATED.3IONS-SCNC: 15 MMOL/L (ref 7–19)
AST SERPL-CCNC: 56 U/L (ref 5–32)
BASOPHILS ABSOLUTE: 0.1 K/UL (ref 0–0.2)
BASOPHILS RELATIVE PERCENT: 0.6 % (ref 0–1)
BILIRUB SERPL-MCNC: 0.4 MG/DL (ref 0.2–1.2)
BUN BLDV-MCNC: 16 MG/DL (ref 6–20)
CALCIUM SERPL-MCNC: 8.9 MG/DL (ref 8.6–10)
CHLORIDE BLD-SCNC: 93 MMOL/L (ref 98–111)
CO2: 24 MMOL/L (ref 22–29)
CREAT SERPL-MCNC: 1.2 MG/DL (ref 0.5–0.9)
EOSINOPHILS ABSOLUTE: 0.1 K/UL (ref 0–0.6)
EOSINOPHILS RELATIVE PERCENT: 1.4 % (ref 0–5)
GFR NON-AFRICAN AMERICAN: 46
GLUCOSE BLD-MCNC: 157 MG/DL (ref 74–109)
HCT VFR BLD CALC: 40.2 % (ref 37–47)
HEMOGLOBIN: 12.5 G/DL (ref 12–16)
IMMATURE GRANULOCYTES #: 0 K/UL
LIPASE: 61 U/L (ref 13–60)
LYMPHOCYTES ABSOLUTE: 1 K/UL (ref 1.1–4.5)
LYMPHOCYTES RELATIVE PERCENT: 10.2 % (ref 20–40)
MCH RBC QN AUTO: 28.2 PG (ref 27–31)
MCHC RBC AUTO-ENTMCNC: 31.1 G/DL (ref 33–37)
MCV RBC AUTO: 90.5 FL (ref 81–99)
MONOCYTES ABSOLUTE: 0.8 K/UL (ref 0–0.9)
MONOCYTES RELATIVE PERCENT: 7.9 % (ref 0–10)
NEUTROPHILS ABSOLUTE: 8.1 K/UL (ref 1.5–7.5)
NEUTROPHILS RELATIVE PERCENT: 79.5 % (ref 50–65)
PDW BLD-RTO: 16.7 % (ref 11.5–14.5)
PLATELET # BLD: 352 K/UL (ref 130–400)
PMV BLD AUTO: 11.6 FL (ref 9.4–12.3)
POTASSIUM SERPL-SCNC: 3.6 MMOL/L (ref 3.5–5)
RBC # BLD: 4.44 M/UL (ref 4.2–5.4)
SODIUM BLD-SCNC: 132 MMOL/L (ref 136–145)
TOTAL PROTEIN: 7.2 G/DL (ref 6.6–8.7)
WBC # BLD: 10.2 K/UL (ref 4.8–10.8)

## 2020-03-31 RX ORDER — TIZANIDINE 4 MG/1
4 TABLET ORAL EVERY 8 HOURS PRN
Qty: 60 TABLET | Refills: 0 | Status: SHIPPED | OUTPATIENT
Start: 2020-03-31 | End: 2020-05-07 | Stop reason: SDUPTHER

## 2020-03-31 NOTE — TELEPHONE ENCOUNTER
----- Message from [address] [facility], DO sent at 3/31/2020  1:51 PM CDT -----  Lipase is at the upper end of normal.  Amylase is normal.  No evidence of acute pancreatitis. Your metabolic profile is normal.  This includes electrolytes. Kidney function is stable. Glucose is elevated at 157. Liver enzymes are mildly elevated but better than they were previously. Your WBC, (infection fighting ability) Hgb and Hct, (oxygen carrying cells) are normal; as is your percentage of each cell type.

## 2020-04-01 ENCOUNTER — TELEPHONE (OUTPATIENT)
Dept: PRIMARY CARE CLINIC | Age: 60
End: 2020-04-01

## 2020-04-01 LAB
BILIRUBIN, URINE: NEGATIVE
BLOOD, URINE: NEGATIVE
CLARITY: CLEAR
COLOR: YELLOW
GLUCOSE URINE: NORMAL
KETONES, URINE: NEGATIVE
LEUKOCYTE ESTERASE, URINE: NEGATIVE
NITRITE, URINE: NEGATIVE
PH UA: 5.5 (ref 4.5–8)
PROTEIN UA: NEGATIVE
SPECIFIC GRAVITY, URINE: 1.01
UROBILINOGEN, URINE: NORMAL

## 2020-04-02 ENCOUNTER — TELEMEDICINE (OUTPATIENT)
Dept: PRIMARY CARE CLINIC | Age: 60
End: 2020-04-02
Payer: MEDICARE

## 2020-04-02 PROCEDURE — 99213 OFFICE O/P EST LOW 20 MIN: CPT | Performed by: NURSE PRACTITIONER

## 2020-04-02 RX ORDER — DULOXETIN HYDROCHLORIDE 60 MG/1
60 CAPSULE, DELAYED RELEASE ORAL 2 TIMES DAILY
Qty: 180 CAPSULE | Refills: 3 | Status: SHIPPED | OUTPATIENT
Start: 2020-04-02 | End: 2022-04-04 | Stop reason: SDUPTHER

## 2020-04-02 RX ORDER — NITROGLYCERIN 0.4 MG/1
0.4 TABLET SUBLINGUAL EVERY 5 MIN PRN
Qty: 25 TABLET | Refills: 3 | Status: SHIPPED | OUTPATIENT
Start: 2020-04-02 | End: 2021-08-04

## 2020-04-02 RX ORDER — NITROFURANTOIN 25; 75 MG/1; MG/1
100 CAPSULE ORAL 2 TIMES DAILY
Qty: 20 CAPSULE | Refills: 0 | Status: SHIPPED | OUTPATIENT
Start: 2020-04-02 | End: 2020-04-12

## 2020-04-02 RX ORDER — METOPROLOL SUCCINATE 50 MG/1
50 TABLET, EXTENDED RELEASE ORAL DAILY
Qty: 90 TABLET | Refills: 3 | Status: SHIPPED | OUTPATIENT
Start: 2020-04-02 | End: 2021-04-28

## 2020-04-02 ASSESSMENT — ENCOUNTER SYMPTOMS
ABDOMINAL PAIN: 0
COUGH: 0
TROUBLE SWALLOWING: 0
NAUSEA: 0
DIARRHEA: 0
CONSTIPATION: 0
SHORTNESS OF BREATH: 0
EYES NEGATIVE: 1
SORE THROAT: 0
WHEEZING: 0
VOMITING: 0

## 2020-04-02 NOTE — PROGRESS NOTES
03/25/2020 101     CO2 03/25/2020 21*    Anion Gap 03/25/2020 16     Glucose 03/25/2020 127*    BUN 03/25/2020 18     CREATININE 03/25/2020 1.1*    GFR Non- 03/25/2020 51*    Calcium 03/25/2020 9.7     Total Protein 03/25/2020 7.1     Alb 03/25/2020 3.8     Total Bilirubin 03/25/2020 0.4     Alkaline Phosphatase 03/25/2020 90     ALT 03/25/2020 110*    AST 03/25/2020 95*    Lipase 03/25/2020 225*    Amylase 03/25/2020 112*   Orders Only on 03/20/2020   Component Date Value    Sodium 03/20/2020 123*    Potassium 03/20/2020 4.5     Chloride 03/20/2020 87*    CO2 03/20/2020 18*    Anion Gap 03/20/2020 18     Glucose 03/20/2020 132*    BUN 03/20/2020 19     CREATININE 03/20/2020 1.2*    GFR Non- 03/20/2020 46*    Calcium 03/20/2020 8.2*    Total Protein 03/20/2020 7.1     Alb 03/20/2020 3.9     Total Bilirubin 03/20/2020 0.6     Alkaline Phosphatase 03/20/2020 100     ALT 03/20/2020 185*    AST 03/20/2020 772*    Hemoglobin A1C 03/20/2020 6.1*    TSH 03/20/2020 3.630     T4 Free 03/20/2020 1.44     Microalbumin, Random Uri* 03/20/2020 2.90     Creatinine, Ur 03/20/2020 17.3     Microalbumin Creatinine * 03/20/2020 167.6    Office Visit on 01/20/2020   Component Date Value    Amphetamine Screen, Urine 01/20/2020 neg     Benzodiazepine Screen, U* 01/20/2020 pos     Cocaine Metabolite Scree* 01/20/2020 neg     MDMA, Urine 01/20/2020 neg     Methamphetamine, Urine 01/20/2020 neg     Methadone Screen, Urine 01/20/2020 neg     Opiate Scrn, Ur 01/20/2020 pos     Oxycodone Screen, Ur 01/20/2020 neg     THC Screen, Urine 01/20/2020 neg    Orders Only on 12/03/2019   Component Date Value    T4 Free 12/03/2019 1.11     TSH 12/03/2019 1.970     WBC 12/03/2019 11.7*    RBC 12/03/2019 4.56     Hemoglobin 12/03/2019 13.5     Hematocrit 12/03/2019 42.9     MCV 12/03/2019 94.1     MCH 12/03/2019 29.6     MCHC 12/03/2019 31.5*    RDW 12/03/2019 14.3  Platelets 36/37/6740 284     MPV 12/03/2019 12.0     Neutrophils % 12/03/2019 75.8*    Lymphocytes % 12/03/2019 13.2*    Monocytes % 12/03/2019 8.2     Eosinophils % 12/03/2019 1.8     Basophils % 12/03/2019 0.5     Neutrophils Absolute 12/03/2019 8.8*    Immature Granulocytes # 12/03/2019 0.1     Lymphocytes Absolute 12/03/2019 1.5     Monocytes Absolute 12/03/2019 1.00*    Eosinophils Absolute 12/03/2019 0.20     Basophils Absolute 12/03/2019 0.10     Sodium 12/03/2019 136     Potassium 12/03/2019 4.5     Chloride 12/03/2019 98     CO2 12/03/2019 15*    Anion Gap 12/03/2019 23*    Glucose 12/03/2019 178*    BUN 12/03/2019 29*    CREATININE 12/03/2019 1.7*    GFR Non- 12/03/2019 31*    Calcium 12/03/2019 9.9     Total Protein 12/03/2019 8.1     Alb 12/03/2019 4.0     Total Bilirubin 12/03/2019 0.4     Alkaline Phosphatase 12/03/2019 196*    ALT 12/03/2019 14     AST 12/03/2019 23     Hemoglobin A1C 12/03/2019 6.6*    Cholesterol, Total 12/03/2019 153*    Triglycerides 12/03/2019 182*    HDL 12/03/2019 50*    LDL Calculated 12/03/2019 67     Microalbumin, Random Uri* 12/03/2019 <1.20     Creatinine, Ur 12/03/2019 21.2     Microalbumin Creatinine * 12/03/2019 see below      Copies of these are in the chart. Prior to Visit Medications    Medication Sig Taking? Authorizing Provider   metoprolol succinate (TOPROL XL) 50 MG extended release tablet Take 1 tablet by mouth daily Yes TRESSA Martinez   DULoxetine (CYMBALTA) 60 MG extended release capsule Take 1 capsule by mouth 2 times daily Yes TRESSA Martinez   nitrofurantoin, macrocrystal-monohydrate, (MACROBID) 100 MG capsule Take 1 capsule by mouth 2 times daily for 10 days Yes TRESSA Martinez   nitroGLYCERIN (NITROSTAT) 0.4 MG SL tablet Place 1 tablet under the tongue every 5 minutes as needed for Chest pain up to max of 3 total doses. If no relief after 1 dose, call 911.  Yes [place] Box TRESSA Robertson   tiZANidine (ZANAFLEX) 4 MG tablet Take 1 tablet by mouth every 8 hours as needed (spasms)  TRESSA Calderon   QUEtiapine (SEROQUEL) 200 MG tablet Take 1 tablet by mouth nightly  TRESSA Calderon   zolpidem (AMBIEN) 10 MG tablet Take 1 tablet by mouth nightly as needed for Sleep for up to 30 days. TRESSA Calderon   diazePAM (VALIUM) 10 MG tablet Take 1 tablet by mouth every evening for 30 days. TRESSA Calderon   insulin aspart (NOVOLOG FLEXPEN) 100 UNIT/ML injection pen Inject 8 Units into the skin 3 times daily as needed for High Blood Sugar (sliding coverage)  TRESSA Calderon   fluticasone (FLONASE) 50 MCG/ACT nasal spray 1 spray by Nasal route daily  TRESSA Calderon   levothyroxine (SYNTHROID) 88 MCG tablet Take 1 tablet by mouth Daily Tube feeding (TF) interaction, obtain physician order to manage, recommend holding TF for 30 minutes before and after dose. TRESSA Calderon   pregabalin (LYRICA) 150 MG capsule Take 1 capsule by mouth 2 times daily for 180 days.   TRESSA Calderon   pantoprazole (PROTONIX) 40 MG tablet Take 1 tablet by mouth daily  TRESSA Calderon   Insulin Pen Needle ([employer] PEN NEEDLES 31G) 31G X 8 MM MISC 1 each by Does not apply route daily  TRESSA Calderon   valsartan (DIOVAN) 320 MG tablet Take 1 tablet by mouth nightly  TRESSA Calderon   furosemide (LASIX) 40 MG tablet Take 1 tablet by mouth daily  TRESSA Calderon   Dulaglutide (TRULICITY) 1.5 BB/8.8GN SOPN Inject 1.5 mg into the skin once a week  TRESSA Calderon   amitriptyline (ELAVIL) 100 MG tablet Take 2 tablets by mouth nightly  TRESSA Calderon   aspirin 81 MG tablet Take 1 tablet by mouth daily  TRESSA Calderon   pantoprazole (PROTONIX) 40 MG tablet TAKE ONE TABLET BY MOUTH ONCE DAILY  TRESSA Calderon   Insulin Pen Needle 31G X 8 MM MISC 1 each by Does not apply route 4 times daily  Bart Fraire (coronary artery disease)  Cont presenty  - metoprolol succinate (TOPROL XL) 50 MG extended release tablet; Take 1 tablet by mouth daily  Dispense: 90 tablet; Refill: 3  - nitroGLYCERIN (NITROSTAT) 0.4 MG SL tablet; Place 1 tablet under the tongue every 5 minutes as needed for Chest pain up to max of 3 total doses. If no relief after 1 dose, call 911. Dispense: 25 tablet; Refill: 3    3. Type 2 diabetes mellitus with diabetic polyneuropathy, with long-term current use of insulin (HCC)  Keep log  Taking short acting for \"bad eating\"  - DULoxetine (CYMBALTA) 60 MG extended release capsule; Take 1 capsule by mouth 2 times daily  Dispense: 180 capsule; Refill: 3    4. E. coli UTI  Refuses keflex and cipro  (rash)  Will use macrobid  If rash will call   Caution with kidneys    - nitrofurantoin, macrocrystal-monohydrate, (MACROBID) 100 MG capsule; Take 1 capsule by mouth 2 times daily for 10 days  Dispense: 20 capsule; Refill: 0    5. Elevated liver enzymes  Improving   Richardson diet  Limit fatty foods    Due to patients co-morbidities and risk for potential exposure of COVID 19 pandemic, Telehealth was initiated. 12 minutes were spent on the phone with patient. Provider performed history of present illness and review of systems. Diagnosis and treatment plan was discussed with patient. Pharmacy of choice was reviewed along with past medical history, medication allergies, and current medications. Education provided to patient or patient parents/guardian with current illness diagnosis as well as when to seek additional healthcare due to changing or for worsening symptoms. Patient voiced understanding. No orders of the defined types were placed in this encounter. Return in about 18 days (around 4/20/2020) for medication follow up for . There are no Patient Instructions on file for this visit. Controlled Substances Monitoring:                   Additional Instructions: As always, patient is advisedto bring

## 2020-04-06 ENCOUNTER — PATIENT MESSAGE (OUTPATIENT)
Dept: PRIMARY CARE CLINIC | Age: 60
End: 2020-04-06

## 2020-04-06 RX ORDER — LANCETS 30 GAUGE
1 EACH MISCELLANEOUS DAILY
Qty: 100 EACH | Refills: 3 | Status: SHIPPED | OUTPATIENT
Start: 2020-04-06

## 2020-04-06 RX ORDER — BLOOD-GLUCOSE METER
1 KIT MISCELLANEOUS 4 TIMES DAILY
Qty: 100 EACH | Refills: 3 | Status: SHIPPED | OUTPATIENT
Start: 2020-04-06 | End: 2021-06-28 | Stop reason: SDUPTHER

## 2020-04-06 RX ORDER — AMITRIPTYLINE HYDROCHLORIDE 100 MG/1
200 TABLET, FILM COATED ORAL NIGHTLY
Qty: 180 TABLET | Refills: 0 | Status: SHIPPED | OUTPATIENT
Start: 2020-04-06 | End: 2020-05-07 | Stop reason: SDUPTHER

## 2020-04-06 NOTE — TELEPHONE ENCOUNTER
From: Norma Cabello  To: TRESSA Wild  Sent: 4/6/2020 1:24 PM CDT  Subject: Non-Urgent Medical Question    Hi this is Ian Monaeon. I am texting because I need diabetic supplies. [employer] won't cover us meds, that's where I was getting my supplies. My monitor is a freedstyle lite. Will u please order them through [facility], [facility]. Thank u for your time & I appreciate you Melisa Cooley. And I appreciate the girls. Y'all are absolutely the best. Thank u 4 all u have done & all you do.

## 2020-04-07 ENCOUNTER — TELEPHONE (OUTPATIENT)
Dept: PRIMARY CARE CLINIC | Age: 60
End: 2020-04-07

## 2020-04-08 ENCOUNTER — TELEPHONE (OUTPATIENT)
Dept: PRIMARY CARE CLINIC | Age: 60
End: 2020-04-08

## 2020-04-14 ENCOUNTER — TELEPHONE (OUTPATIENT)
Dept: PRIMARY CARE CLINIC | Age: 60
End: 2020-04-14

## 2020-04-17 ENCOUNTER — OFFICE VISIT (OUTPATIENT)
Dept: PRIMARY CARE CLINIC | Age: 60
End: 2020-04-17
Payer: MEDICARE

## 2020-04-17 VITALS
TEMPERATURE: 98.4 F | BODY MASS INDEX: 43.55 KG/M2 | HEART RATE: 87 BPM | WEIGHT: 223 LBS | OXYGEN SATURATION: 98 % | SYSTOLIC BLOOD PRESSURE: 132 MMHG | DIASTOLIC BLOOD PRESSURE: 74 MMHG

## 2020-04-17 PROCEDURE — 99214 OFFICE O/P EST MOD 30 MIN: CPT | Performed by: PEDIATRICS

## 2020-04-17 ASSESSMENT — ENCOUNTER SYMPTOMS
SINUS PRESSURE: 0
CONSTIPATION: 1
ABDOMINAL DISTENTION: 0
SORE THROAT: 0
WHEEZING: 0
CHEST TIGHTNESS: 0
VOMITING: 0
VOICE CHANGE: 0
BACK PAIN: 0
SHORTNESS OF BREATH: 0
NAUSEA: 0
DIARRHEA: 0
TROUBLE SWALLOWING: 0
CHOKING: 0
ABDOMINAL PAIN: 1
COUGH: 0

## 2020-04-17 NOTE — PROGRESS NOTES
03/31/2020 100     ALT 03/31/2020 60*    AST 03/31/2020 56*    WBC 03/31/2020 10.2     RBC 03/31/2020 4.44     Hemoglobin 03/31/2020 12.5     Hematocrit 03/31/2020 40.2     MCV 03/31/2020 90.5     MCH 03/31/2020 28.2     MCHC 03/31/2020 31.1*    RDW 03/31/2020 16.7*    Platelets 15/83/7492 352     MPV 03/31/2020 11.6     Neutrophils % 03/31/2020 79.5*    Lymphocytes % 03/31/2020 10.2*    Monocytes % 03/31/2020 7.9     Eosinophils % 03/31/2020 1.4     Basophils % 03/31/2020 0.6     Neutrophils Absolute 03/31/2020 8.1*    Immature Granulocytes # 03/31/2020 0.0     Lymphocytes Absolute 03/31/2020 1.0*    Monocytes Absolute 03/31/2020 0.80     Eosinophils Absolute 03/31/2020 0.10     Basophils Absolute 03/31/2020 0.10    Orders Only on 03/25/2020   Component Date Value    Ethanol Lvl 03/25/2020 <10     Hemoglobin A1C 03/25/2020 6.1*    Sodium 03/25/2020 138     Potassium 03/25/2020 3.8     Chloride 03/25/2020 101     CO2 03/25/2020 21*    Anion Gap 03/25/2020 16     Glucose 03/25/2020 127*    BUN 03/25/2020 18     CREATININE 03/25/2020 1.1*    GFR Non- 03/25/2020 51*    Calcium 03/25/2020 9.7     Total Protein 03/25/2020 7.1     Alb 03/25/2020 3.8     Total Bilirubin 03/25/2020 0.4     Alkaline Phosphatase 03/25/2020 90     ALT 03/25/2020 110*    AST 03/25/2020 95*    Lipase 03/25/2020 225*    Amylase 03/25/2020 112*   Orders Only on 03/20/2020   Component Date Value    Sodium 03/20/2020 123*    Potassium 03/20/2020 4.5     Chloride 03/20/2020 87*    CO2 03/20/2020 18*    Anion Gap 03/20/2020 18     Glucose 03/20/2020 132*    BUN 03/20/2020 19     CREATININE 03/20/2020 1.2*    GFR Non- 03/20/2020 46*    Calcium 03/20/2020 8.2*    Total Protein 03/20/2020 7.1     Alb 03/20/2020 3.9     Total Bilirubin 03/20/2020 0.6     Alkaline Phosphatase 03/20/2020 100     ALT 03/20/2020 185*    AST 03/20/2020 772*    Hemoglobin A1C 03/20/2020 6.1*    TSH 03/20/2020 3.630     T4 Free 03/20/2020 1.44     Microalbumin, Random Uri* 03/20/2020 2.90     Creatinine, Ur 03/20/2020 17.3     Microalbumin Creatinine * 03/20/2020 167.6    Office Visit on 01/20/2020   Component Date Value    Amphetamine Screen, Urine 01/20/2020 neg     Benzodiazepine Screen, U* 01/20/2020 pos     Cocaine Metabolite Scree* 01/20/2020 neg     MDMA, Urine 01/20/2020 neg     Methamphetamine, Urine 01/20/2020 neg     Methadone Screen, Urine 01/20/2020 neg     Opiate Scrn, Ur 01/20/2020 pos     Oxycodone Screen, Ur 01/20/2020 neg     THC Screen, Urine 01/20/2020 neg    Orders Only on 12/03/2019   Component Date Value    T4 Free 12/03/2019 1.11     TSH 12/03/2019 1.970     WBC 12/03/2019 11.7*    RBC 12/03/2019 4.56     Hemoglobin 12/03/2019 13.5     Hematocrit 12/03/2019 42.9     MCV 12/03/2019 94.1     MCH 12/03/2019 29.6     MCHC 12/03/2019 31.5*    RDW 12/03/2019 14.3     Platelets 21/73/4086 284     MPV 12/03/2019 12.0     Neutrophils % 12/03/2019 75.8*    Lymphocytes % 12/03/2019 13.2*    Monocytes % 12/03/2019 8.2     Eosinophils % 12/03/2019 1.8     Basophils % 12/03/2019 0.5     Neutrophils Absolute 12/03/2019 8.8*    Immature Granulocytes # 12/03/2019 0.1     Lymphocytes Absolute 12/03/2019 1.5     Monocytes Absolute 12/03/2019 1.00*    Eosinophils Absolute 12/03/2019 0.20     Basophils Absolute 12/03/2019 0.10     Sodium 12/03/2019 136     Potassium 12/03/2019 4.5     Chloride 12/03/2019 98     CO2 12/03/2019 15*    Anion Gap 12/03/2019 23*    Glucose 12/03/2019 178*    BUN 12/03/2019 29*    CREATININE 12/03/2019 1.7*    GFR Non- 12/03/2019 31*    Calcium 12/03/2019 9.9     Total Protein 12/03/2019 8.1     Alb 12/03/2019 4.0     Total Bilirubin 12/03/2019 0.4     Alkaline Phosphatase 12/03/2019 196*    ALT 12/03/2019 14     AST 12/03/2019 23     Hemoglobin A1C 12/03/2019 6.6*    Cholesterol, Total 12/03/2019 Depression Mother     Heart Attack Mother     High Blood Pressure Mother     Kidney Disease Father     Alcohol Abuse Father     Depression Father     Heart Attack Father     High Blood Pressure Father     Kidney Disease Brother     Heart Disease Other     Depression Sister        Past Surgical History:   Procedure Laterality Date    ABDOMINOPLASTY      ANGIOPLASTY  05    stent placement to LAD and diagonal with normal left vent function    BREAST REDUCTION SURGERY      CARDIAC CATHETERIZATION  05, 05    see report  Stents x3    CARDIAC CATHETERIZATION  3/23/15  JDT    EF 50%    CARPAL TUNNEL RELEASE       SECTION      x2    CHOLECYSTECTOMY      GASTRIC BYPASS SURGERY      HERNIA REPAIR      umbilical with mesh    INTRACAPSULAR CATARACT EXTRACTION Left 2016    LT EYE CATARACT EMULSIFICATION IOL IMPLANT performed by Reji Persaud MD at [address] [place] [address] OPEN TREATMENT BIMALLEOLAR ANKLE FRACTURE Right 2018    ORIF RIGHT BIMALLEOLAR ANKLE FRACTURE, RIGHT WRIST CAST REMOVAL performed by Sylvain Herrera MD at [address] [place] [address] Right 2018    ANKLE OPEN REDUCTION INTERNAL FIXATION performed by Sylvain Herrera MD at [facility] Left 2017    KNEE TOTAL ARTHROPLASTY performed by Petros Alcocer DO at [place] [address] History     Tobacco Use    Smoking status: Never Smoker    Smokeless tobacco: Former User     Types: Snuff   Substance Use Topics    Alcohol use: No        Review of Systems   Constitutional: Negative for activity change, appetite change, chills, diaphoresis, fatigue, fever and unexpected weight change. HENT: Negative for congestion, ear pain, postnasal drip, sinus pressure, sneezing, sore throat, tinnitus, trouble swallowing and voice change. Eyes: Negative for visual disturbance.    Respiratory: Negative for cough, choking, chest tightness, shortness of breath and wheezing. Cardiovascular: Negative for chest pain, palpitations and leg swelling. Gastrointestinal: Positive for abdominal pain (Right upper quadrant. she does not have a GB any more) and constipation (she will go 4-5 days between stools). Negative for abdominal distention, diarrhea, nausea and vomiting. Genitourinary: Negative for decreased urine volume, difficulty urinating, dysuria, frequency, hematuria and urgency. Musculoskeletal: Positive for arthralgias (hands and knees). Negative for back pain, gait problem, joint swelling, myalgias, neck pain and neck stiffness. Skin: Negative for rash and wound. Neurological: Negative for dizziness, seizures, weakness, light-headedness, numbness and headaches. Hematological: Does not bruise/bleed easily. Psychiatric/Behavioral: Positive for dysphoric mood (on occasion). Negative for agitation, confusion, decreased concentration, self-injury and sleep disturbance. The patient is nervous/anxious (on occasion). The patient is not hyperactive. Physical Exam  Vitals signs reviewed. Constitutional:       General: She is not in acute distress. Appearance: She is well-developed. She is not toxic-appearing or diaphoretic. Comments: Body habitus is ob   HENT:      Head: Normocephalic and atraumatic. Right Ear: Hearing, tympanic membrane, ear canal and external ear normal.      Left Ear: Hearing, tympanic membrane, ear canal and external ear normal.      Nose: Nose normal.      Mouth/Throat:      Pharynx: No oropharyngeal exudate. Eyes:      General: Lids are normal.         Right eye: No discharge. Left eye: No discharge. Conjunctiva/sclera: Conjunctivae normal.      Pupils: Pupils are equal, round, and reactive to light. Neck:      Musculoskeletal: Normal range of motion and neck supple. Thyroid: No thyromegaly. Vascular: No carotid bruit or JVD.       Trachea: Phonation normal.   Cardiovascular:      Rate and spasm  Recommended caution with m. relaxors. 6. Anxiety  Recommended caution with diazepam.        No orders of the defined types were placed in this encounter. Return in about 3 months (around 7/17/2020). See attached forms.

## 2020-04-20 ENCOUNTER — VIRTUAL VISIT (OUTPATIENT)
Dept: PRIMARY CARE CLINIC | Age: 60
End: 2020-04-20
Payer: MEDICARE

## 2020-04-20 VITALS
RESPIRATION RATE: 17 BRPM | BODY MASS INDEX: 42.38 KG/M2 | HEART RATE: 78 BPM | WEIGHT: 217 LBS | SYSTOLIC BLOOD PRESSURE: 128 MMHG | DIASTOLIC BLOOD PRESSURE: 78 MMHG

## 2020-04-20 PROCEDURE — 99214 OFFICE O/P EST MOD 30 MIN: CPT | Performed by: NURSE PRACTITIONER

## 2020-04-20 RX ORDER — CLONIDINE HYDROCHLORIDE 0.1 MG/1
0.1 TABLET ORAL EVERY 6 HOURS PRN
Qty: 60 TABLET | Refills: 3 | Status: ON HOLD | OUTPATIENT
Start: 2020-04-20 | End: 2020-06-18 | Stop reason: HOSPADM

## 2020-04-20 RX ORDER — AMLODIPINE BESYLATE 2.5 MG/1
2.5 TABLET ORAL DAILY
Qty: 30 TABLET | Refills: 3 | Status: SHIPPED | OUTPATIENT
Start: 2020-04-20 | End: 2020-05-04 | Stop reason: SDUPTHER

## 2020-04-20 RX ORDER — DIAZEPAM 10 MG/1
10 TABLET ORAL NIGHTLY
Qty: 30 TABLET | Refills: 0 | Status: SHIPPED | OUTPATIENT
Start: 2020-04-20 | End: 2020-05-18 | Stop reason: SDUPTHER

## 2020-04-20 RX ORDER — ZOLPIDEM TARTRATE 10 MG/1
10 TABLET ORAL NIGHTLY PRN
Qty: 30 TABLET | Refills: 0 | Status: SHIPPED | OUTPATIENT
Start: 2020-04-20 | End: 2020-05-18 | Stop reason: SDUPTHER

## 2020-04-20 ASSESSMENT — ENCOUNTER SYMPTOMS
EYES NEGATIVE: 1
WHEEZING: 0
SHORTNESS OF BREATH: 0
VOMITING: 0
COUGH: 0
NAUSEA: 0
TROUBLE SWALLOWING: 0
CONSTIPATION: 0
DIARRHEA: 0
ABDOMINAL PAIN: 0
SORE THROAT: 0

## 2020-04-20 NOTE — PROGRESS NOTES
[address]  [place], [address]  Phone [phone]   Fax [phone] visit per doxy me      OFFICE VISIT: 2020    Maribell Fowler Destiney- : 1960      Reason For Visit:  Jeremy Bingham is a 63 y.o. femalewho is here for Diabetes (1 month follow up); Hypertension; and Insomnia         Health Maintenance   HPI    Diabetes type 2    Patient is here for follow up on diabetes  Is doing better with trulicity 1.5 weekly  And doing well  Weight stable but down 9 lbs today  3/25 6.1  Glucose 127    Trying to exercise  Doing well overall  Along with novolog with meals   On trulicity weekly on   And continues novolog with meals  No low sugars              Osteoporosis :    In process of trying to get an injectable  As had open fracture of leg  Last year due again in   Along with fall with mult ribs last month  She has started the prolia  In December  And doing well  No side effects that she recalls   She noticed some bumps on sides   But isn't sure when occurred         Hypothyroid  Synthroid 88mcg daily  2019  And stable     Depression  Much improved as resting  And doing well at present  HTN :   Reports recently elevated  She has been eating more fruit  But not sugar of sodium    Blood pressure reports  At home checking 3 times a day  Reports highest 141/98 75  But after medications improved  And doing well overall    But at times 140s  Depending on what eating    Insomnia  Patient reports chronic insomnia  Has failed seroquel and trazadone medication in past elavil 100mg  (2)  Sleeps 6 hours on medication with some relief of fatigue  Sleep study in past none  Typically goes to bed at 10 awakens at 6  But recently  Is waking up and not able to go back to sleep  She has had this issue a week and half ago   Reports no change in medications  She has not seen any changes  She will take 2 elavil seroquel 200mg and ambien to go to sleep  And she will be awake within three days  In past took valium      She  Hemoglobin A1C 03/25/2020 6.1*    Sodium 03/25/2020 138     Potassium 03/25/2020 3.8     Chloride 03/25/2020 101     CO2 03/25/2020 21*    Anion Gap 03/25/2020 16     Glucose 03/25/2020 127*    BUN 03/25/2020 18     CREATININE 03/25/2020 1.1*    GFR Non- 03/25/2020 51*    Calcium 03/25/2020 9.7     Total Protein 03/25/2020 7.1     Alb 03/25/2020 3.8     Total Bilirubin 03/25/2020 0.4     Alkaline Phosphatase 03/25/2020 90     ALT 03/25/2020 110*    AST 03/25/2020 95*    Lipase 03/25/2020 225*    Amylase 03/25/2020 112*   Orders Only on 03/20/2020   Component Date Value    Sodium 03/20/2020 123*    Potassium 03/20/2020 4.5     Chloride 03/20/2020 87*    CO2 03/20/2020 18*    Anion Gap 03/20/2020 18     Glucose 03/20/2020 132*    BUN 03/20/2020 19     CREATININE 03/20/2020 1.2*    GFR Non- 03/20/2020 46*    Calcium 03/20/2020 8.2*    Total Protein 03/20/2020 7.1     Alb 03/20/2020 3.9     Total Bilirubin 03/20/2020 0.6     Alkaline Phosphatase 03/20/2020 100     ALT 03/20/2020 185*    AST 03/20/2020 772*    Hemoglobin A1C 03/20/2020 6.1*    TSH 03/20/2020 3.630     T4 Free 03/20/2020 1.44     Microalbumin, Random Uri* 03/20/2020 2.90     Creatinine, Ur 03/20/2020 17.3     Microalbumin Creatinine * 03/20/2020 167.6    Office Visit on 01/20/2020   Component Date Value    Amphetamine Screen, Urine 01/20/2020 neg     Benzodiazepine Screen, U* 01/20/2020 pos     Cocaine Metabolite Scree* 01/20/2020 neg     MDMA, Urine 01/20/2020 neg     Methamphetamine, Urine 01/20/2020 neg     Methadone Screen, Urine 01/20/2020 neg     Opiate Scrn, Ur 01/20/2020 pos     Oxycodone Screen, Ur 01/20/2020 neg     THC Screen, Urine 01/20/2020 neg    Orders Only on 12/03/2019   Component Date Value    T4 Free 12/03/2019 1.11     TSH 12/03/2019 1.970     WBC 12/03/2019 11.7*    RBC 12/03/2019 4.56     Hemoglobin 12/03/2019 13.5     Hematocrit 12/03/2019 42.9     MCV 12/03/2019 94.1     MCH 12/03/2019 29.6     MCHC 12/03/2019 31.5*    RDW 12/03/2019 14.3     Platelets 62/31/9009 284     MPV 12/03/2019 12.0     Neutrophils % 12/03/2019 75.8*    Lymphocytes % 12/03/2019 13.2*    Monocytes % 12/03/2019 8.2     Eosinophils % 12/03/2019 1.8     Basophils % 12/03/2019 0.5     Neutrophils Absolute 12/03/2019 8.8*    Immature Granulocytes # 12/03/2019 0.1     Lymphocytes Absolute 12/03/2019 1.5     Monocytes Absolute 12/03/2019 1.00*    Eosinophils Absolute 12/03/2019 0.20     Basophils Absolute 12/03/2019 0.10     Sodium 12/03/2019 136     Potassium 12/03/2019 4.5     Chloride 12/03/2019 98     CO2 12/03/2019 15*    Anion Gap 12/03/2019 23*    Glucose 12/03/2019 178*    BUN 12/03/2019 29*    CREATININE 12/03/2019 1.7*    GFR Non- 12/03/2019 31*    Calcium 12/03/2019 9.9     Total Protein 12/03/2019 8.1     Alb 12/03/2019 4.0     Total Bilirubin 12/03/2019 0.4     Alkaline Phosphatase 12/03/2019 196*    ALT 12/03/2019 14     AST 12/03/2019 23     Hemoglobin A1C 12/03/2019 6.6*    Cholesterol, Total 12/03/2019 153*    Triglycerides 12/03/2019 182*    HDL 12/03/2019 50*    LDL Calculated 12/03/2019 67     Microalbumin, Random Uri* 12/03/2019 <1.20     Creatinine, Ur 12/03/2019 21.2     Microalbumin Creatinine * 12/03/2019 see below      Copies of these are in the chart. Prior to Visit Medications    Medication Sig Taking? Authorizing Provider   zolpidem (AMBIEN) 10 MG tablet Take 1 tablet by mouth nightly as needed for Sleep for up to 30 days. Yes Pennelope Shabnamter, TRESSA   diazePAM (VALIUM) 10 MG tablet Take 1 tablet by mouth nightly for 30 days.  Yes Pennelope Cutter, APRN   cloNIDine (CATAPRES) 0.1 MG tablet Take 1 tablet by mouth every 6 hours as needed for High Blood Pressure sbp greater than 160 or dbp greater than 90 Yes TRESSA Kirkpatrick   amLODIPine (NORVASC) 2.5 MG tablet Take 1 tablet by mouth daily Yes TRESSA Pope   amitriptyline (ELAVIL) 100 MG tablet Take 2 tablets by mouth nightly Yes TRESSA Sosa   blood glucose test strips (FREESTYLE LITE) strip 1 each by In Vitro route 4 times daily As needed. Yes TRESSA Pope   Lancets MISC 1 each by Does not apply route daily Yes TRESSA Pope   metoprolol succinate (TOPROL XL) 50 MG extended release tablet Take 1 tablet by mouth daily Yes TRESSA Pope   DULoxetine (CYMBALTA) 60 MG extended release capsule Take 1 capsule by mouth 2 times daily Yes TRESSA Pope   nitroGLYCERIN (NITROSTAT) 0.4 MG SL tablet Place 1 tablet under the tongue every 5 minutes as needed for Chest pain up to max of 3 total doses. If no relief after 1 dose, call 911. Yes TRESSA Pope   tiZANidine (ZANAFLEX) 4 MG tablet Take 1 tablet by mouth every 8 hours as needed (spasms) Yes TRESSA Pope   QUEtiapine (SEROQUEL) 200 MG tablet Take 1 tablet by mouth nightly Yes TRESSA Pope   insulin aspart (NOVOLOG FLEXPEN) 100 UNIT/ML injection pen Inject 8 Units into the skin 3 times daily as needed for High Blood Sugar (sliding coverage) Yes TRESSA Pope   fluticasone (FLONASE) 50 MCG/ACT nasal spray 1 spray by Nasal route daily Yes TRESSA Pope   levothyroxine (SYNTHROID) 88 MCG tablet Take 1 tablet by mouth Daily Tube feeding (TF) interaction, obtain physician order to manage, recommend holding TF for 30 minutes before and after dose. Yes TRESSA Pope   pregabalin (LYRICA) 150 MG capsule Take 1 capsule by mouth 2 times daily for 180 days.  Yes TRESSA Pope   pantoprazole (PROTONIX) 40 MG tablet Take 1 tablet by mouth daily Yes TRESSA Pope   Insulin Pen Needle ([employer] PEN NEEDLES 31G) 31G X 8 MM MISC 1 each by Does not apply route daily Yes TRESSA Pope   valsartan (DIOVAN) 320 MG tablet Take 1 tablet by mouth nightly Yes Kale Santo TUNNEL RELEASE       SECTION      x2    CHOLECYSTECTOMY      GASTRIC BYPASS SURGERY      HERNIA REPAIR      umbilical with mesh    INTRACAPSULAR CATARACT EXTRACTION Left 2016    LT EYE CATARACT EMULSIFICATION IOL IMPLANT performed by Ricardo Wilkerson MD at [address] [place] [address] OPEN TREATMENT BIMALLEOLAR ANKLE FRACTURE Right 2018    ORIF RIGHT BIMALLEOLAR ANKLE FRACTURE, RIGHT WRIST CAST REMOVAL performed by Abraham Kiser MD at [facility] [address] Right 2018    ANKLE OPEN REDUCTION INTERNAL FIXATION performed by Abraham Kiser MD at [facility] ARTHROPLASTY Left 2017    KNEE TOTAL ARTHROPLASTY performed by Lauren Macias DO at [facility] OR       Social History     Tobacco Use    Smoking status: Never Smoker    Smokeless tobacco: Former User     Types: Snuff   Substance Use Topics    Alcohol use: No       Review of Systems   Constitutional: Negative for activity change, appetite change, fatigue and fever. HENT: Negative for congestion, postnasal drip, sore throat and trouble swallowing. Eyes: Negative. Respiratory: Negative for cough, shortness of breath and wheezing. Cardiovascular: Negative for chest pain, palpitations and leg swelling (resolved). Gastrointestinal: Negative for abdominal pain, constipation, diarrhea, nausea and vomiting. Endocrine: Negative for polydipsia, polyphagia and polyuria. Genitourinary: Negative for difficulty urinating, dysuria and urgency. Musculoskeletal: Negative for arthralgias. Skin: Negative for rash. Neurological: Positive for headaches. Negative for dizziness and numbness. Psychiatric/Behavioral: Positive for sleep disturbance (refill ambien). Negative for behavioral problems. The patient is nervous/anxious (refill valium). The patient is not hyperactive.          Reports sleeping well   And anxiety controlled           Physical Exam  Constitutional:       Comments: On doxy me     Cardiovascular:      Rate and Rhythm: Normal rate and regular rhythm. Comments: Per patient in 76s    Pulmonary:      Comments: Denies any soa    Abdominal:      General: There is no distension. Tenderness: There is no abdominal tenderness. There is no right CVA tenderness, left CVA tenderness or rebound. Comments: Diarrhea resolved     Musculoskeletal:         General: No swelling. Right lower leg: No edema. Left lower leg: No edema. Skin:     Capillary Refill: Capillary refill takes less than 2 seconds. Findings: No rash. Neurological:      General: No focal deficit present. Mental Status: She is oriented to person, place, and time. Psychiatric:         Mood and Affect: Mood normal.         Behavior: Behavior normal.      Comments: Refill valium and ambien         ASSESSMENT/ PLAN    Due to patients co-morbidities and risk for potential exposure of COVID 19 pandemic, Telehealth was initiated. 25 minutes were spent on the phone with patient. Provider performed history of present illness and review of systems. Diagnosis and treatment plan was discussed with patient. Pharmacy of choice was reviewed along with past medical history, medication allergies, and current medications. Education provided to patient or patient parents/guardian with current illness diagnosis as well as when to seek additional healthcare due to changing or for worsening symptoms. Patient voiced understanding. 1. Insomnia due to medical condition  Cont present  Benefits outweigh risks  - zolpidem (AMBIEN) 10 MG tablet; Take 1 tablet by mouth nightly as needed for Sleep for up to 30 days. Dispense: 30 tablet; Refill: 0    2. Situational anxiety  Takes evening if needed  - diazePAM (VALIUM) 10 MG tablet; Take 1 tablet by mouth nightly for 30 days. Dispense: 30 tablet; Refill: 0    3. Insomnia due to other mental disorder  Stable on regimen together  - diazePAM (VALIUM) 10 MG tablet;  Take 1 under license by [facility] ([facility]). If you have questions about a medical condition or this instruction, always ask your healthcare professional. [address] any warranty or liability for your use of this information. Patient Education        DASH Diet: Care Instructions  Your Care Instructions    The DASH diet is an eating plan that can help lower your blood pressure. DASH stands for Dietary Approaches to Stop Hypertension. Hypertension is high blood pressure. The DASH diet focuses on eating foods that are high in calcium, potassium, and magnesium. These nutrients can lower blood pressure. The foods that are highest in these nutrients are fruits, vegetables, low-fat dairy products, nuts, seeds, and legumes. But taking calcium, potassium, and magnesium supplements instead of eating foods that are high in those nutrients does not have the same effect. The DASH diet also includes whole grains, fish, and poultry. The DASH diet is one of several lifestyle changes your doctor may recommend to lower your high blood pressure. Your doctor may also want you to decrease the amount of sodium in your diet. Lowering sodium while following the DASH diet can lower blood pressure even further than just the DASH diet alone. Follow-up care is a key part of your treatment and safety. Be sure to make and go to all appointments, and call your doctor if you are having problems. It's also a good idea to know your test results and keep a list of the medicines you take. How can you care for yourself at home? Following the DASH diet  · Eat 4 to 5 servings of fruit each day. A serving is 1 medium-sized piece of fruit, ½ cup chopped or canned fruit, 1/4 cup dried fruit, or 4 ounces (½ cup) of fruit juice. Choose fruit more often than fruit juice. · Eat 4 to 5 servings of vegetables each day.  A serving is 1 cup of lettuce or raw leafy vegetables, ½ cup of chopped or cooked vegetables, or 4 ounces (½ cup) of vegetable juice. Choose vegetables more often than vegetable juice. · Get 2 to 3 servings of low-fat and fat-free dairy each day. A serving is 8 ounces of milk, 1 cup of yogurt, or 1 ½ ounces of cheese. · Eat 6 to 8 servings of grains each day. A serving is 1 slice of bread, 1 ounce of dry cereal, or ½ cup of cooked rice, pasta, or cooked cereal. Try to choose whole-grain products as much as possible. · Limit lean meat, poultry, and fish to 2 servings each day. A serving is 3 ounces, about the size of a deck of cards. · Eat 4 to 5 servings of nuts, seeds, and legumes (cooked dried beans, lentils, and split peas) each week. A serving is 1/3 cup of nuts, 2 tablespoons of seeds, or ½ cup of cooked beans or peas. · Limit fats and oils to 2 to 3 servings each day. A serving is 1 teaspoon of vegetable oil or 2 tablespoons of salad dressing. · Limit sweets and added sugars to 5 servings or less a week. A serving is 1 tablespoon jelly or jam, ½ cup sorbet, or 1 cup of lemonade. · Eat less than 2,300 milligrams (mg) of sodium a day. If you limit your sodium to 1,500 mg a day, you can lower your blood pressure even more. Tips for success  · Start small. Do not try to make dramatic changes to your diet all at once. You might feel that you are missing out on your favorite foods and then be more likely to not follow the plan. Make small changes, and stick with them. Once those changes become habit, add a few more changes. · Try some of the following:  ? Make it a goal to eat a fruit or vegetable at every meal and at snacks. This will make it easy to get the recommended amount of fruits and vegetables each day. ? Try yogurt topped with fruit and nuts for a snack or healthy dessert. ? Add lettuce, tomato, cucumber, and onion to sandwiches. ? Combine a ready-made pizza crust with low-fat mozzarella cheese and lots of vegetable toppings.  Try using tomatoes, squash, spinach, broccoli, carrots, cauliflower, and

## 2020-04-20 NOTE — PATIENT INSTRUCTIONS
play a relaxation tape while you drive a car. When should you call for help? Call 911 anytime you think you may need emergency care. For example, call if:    · You feel you cannot stop from hurting yourself or someone else.   Gretel Rudd the numbers for these national suicide hotlines: 4-491-901-TALK (9-115.455.1732) and 7-691-DKBVHZO (7-223.645.2335). If you or someone you know talks about suicide or feeling hopeless, get help right away.   Watch closely for changes in your health, and be sure to contact your doctor if:    · You have anxiety or fear that affects your life.     · You have symptoms of anxiety that are new or different from those you had before. Where can you learn more? Go to https://Synforapechinoeweb.[email or web address]. org and sign in to your [employer] account. Enter P754 in the LightningBuy box to learn more about \"Anxiety Disorder: Care Instructions. \"     If you do not have an account, please click on the \"Sign Up Now\" link. Current as of: May 28, 2019Content Version: 12.4  © 9221-6177 Healthwise, Incorporated. Care instructions adapted under license by [facility] ([facility]). If you have questions about a medical condition or this instruction, always ask your healthcare professional. [address] any warranty or liability for your use of this information. Patient Education        DASH Diet: Care Instructions  Your Care Instructions    The DASH diet is an eating plan that can help lower your blood pressure. DASH stands for Dietary Approaches to Stop Hypertension. Hypertension is high blood pressure. The DASH diet focuses on eating foods that are high in calcium, potassium, and magnesium. These nutrients can lower blood pressure. The foods that are highest in these nutrients are fruits, vegetables, low-fat dairy products, nuts, seeds, and legumes.  But taking calcium, potassium, and magnesium supplements instead of eating foods that are high in those nutrients does not have the same effect. The DASH diet also includes whole grains, fish, and poultry. The DASH diet is one of several lifestyle changes your doctor may recommend to lower your high blood pressure. Your doctor may also want you to decrease the amount of sodium in your diet. Lowering sodium while following the DASH diet can lower blood pressure even further than just the DASH diet alone. Follow-up care is a key part of your treatment and safety. Be sure to make and go to all appointments, and call your doctor if you are having problems. It's also a good idea to know your test results and keep a list of the medicines you take. How can you care for yourself at home? Following the DASH diet  · Eat 4 to 5 servings of fruit each day. A serving is 1 medium-sized piece of fruit, ½ cup chopped or canned fruit, 1/4 cup dried fruit, or 4 ounces (½ cup) of fruit juice. Choose fruit more often than fruit juice. · Eat 4 to 5 servings of vegetables each day. A serving is 1 cup of lettuce or raw leafy vegetables, ½ cup of chopped or cooked vegetables, or 4 ounces (½ cup) of vegetable juice. Choose vegetables more often than vegetable juice. · Get 2 to 3 servings of low-fat and fat-free dairy each day. A serving is 8 ounces of milk, 1 cup of yogurt, or 1 ½ ounces of cheese. · Eat 6 to 8 servings of grains each day. A serving is 1 slice of bread, 1 ounce of dry cereal, or ½ cup of cooked rice, pasta, or cooked cereal. Try to choose whole-grain products as much as possible. · Limit lean meat, poultry, and fish to 2 servings each day. A serving is 3 ounces, about the size of a deck of cards. · Eat 4 to 5 servings of nuts, seeds, and legumes (cooked dried beans, lentils, and split peas) each week. A serving is 1/3 cup of nuts, 2 tablespoons of seeds, or ½ cup of cooked beans or peas. · Limit fats and oils to 2 to 3 servings each day. A serving is 1 teaspoon of vegetable oil or 2 tablespoons of salad dressing.   · Limit sweets and added specifically indicated otherwise. [facility] drug information does not endorse drugs, diagnose patients or recommend therapy. [facility] drug information is an informational resource designed to assist licensed healthcare practitioners in caring for their patients and/or to serve consumers viewing this service as a supplement to, and not a substitute for, the expertise, skill, knowledge and judgment of healthcare practitioners. The absence of a warning for a given drug or drug combination in no way should be construed to indicate that the drug or drug combination is safe, effective or appropriate for any given patient. [facility] does not assume any responsibility for any aspect of healthcare administered with the aid of information [facility] provides. The information contained herein is not intended to cover all possible uses, directions, precautions, warnings, drug interactions, allergic reactions, or adverse effects. If you have questions about the drugs you are taking, check with your doctor, nurse or pharmacist.  Copyright 6116-3370 [address]: 15.01. Revision date: 10/28/2019. Care instructions adapted under license by [facility] ([facility]). If you have questions about a medical condition or this instruction, always ask your healthcare professional. [place] [address] any warranty or liability for your use of this information.

## 2020-04-24 ENCOUNTER — PATIENT MESSAGE (OUTPATIENT)
Dept: PRIMARY CARE CLINIC | Age: 60
End: 2020-04-24

## 2020-04-24 NOTE — TELEPHONE ENCOUNTER
From: Donna Cabello  To: TRESSA Spaulding  Sent: 4/24/2020 4:39 PM CDT  Subject: Non-Urgent Medical Question    Hi Samantha, this is Heidyjose Merritt. I got some papers from my insurance company. Is there a chance I could speak with you concerning this? They want to know when I started taking all my meds & why I started taking then. All my hair just up & fell off my scalp. My fingernails & toenails will b next.lol thank u for hearing me out on this. These people are relentless. U have a good weekend. Go have some fun, u girls deserve it.

## 2020-05-01 RX ORDER — ATORVASTATIN CALCIUM 40 MG/1
TABLET, FILM COATED ORAL
Qty: 90 TABLET | Refills: 3 | Status: SHIPPED | OUTPATIENT
Start: 2020-05-01 | End: 2021-04-23

## 2020-05-04 ENCOUNTER — OFFICE VISIT (OUTPATIENT)
Dept: PRIMARY CARE CLINIC | Age: 60
End: 2020-05-04
Payer: MEDICARE

## 2020-05-04 VITALS
BODY MASS INDEX: 43.39 KG/M2 | DIASTOLIC BLOOD PRESSURE: 82 MMHG | HEIGHT: 60 IN | HEART RATE: 86 BPM | TEMPERATURE: 98.1 F | OXYGEN SATURATION: 97 % | SYSTOLIC BLOOD PRESSURE: 122 MMHG | WEIGHT: 221 LBS

## 2020-05-04 PROCEDURE — 99213 OFFICE O/P EST LOW 20 MIN: CPT | Performed by: NURSE PRACTITIONER

## 2020-05-04 RX ORDER — AMLODIPINE BESYLATE 5 MG/1
5 TABLET ORAL DAILY
Qty: 30 TABLET | Refills: 11 | Status: SHIPPED | OUTPATIENT
Start: 2020-05-04 | End: 2021-06-15

## 2020-05-04 ASSESSMENT — ENCOUNTER SYMPTOMS
COUGH: 0
SORE THROAT: 0
SHORTNESS OF BREATH: 0
EYES NEGATIVE: 1
WHEEZING: 0
ABDOMINAL PAIN: 0
NAUSEA: 0
CONSTIPATION: 0
DIARRHEA: 0
VOMITING: 0
TROUBLE SWALLOWING: 0

## 2020-05-04 NOTE — PROGRESS NOTES
TRESSA Brian   DULoxetine (CYMBALTA) 60 MG extended release capsule Take 1 capsule by mouth 2 times daily Yes TRESSA Brian   nitroGLYCERIN (NITROSTAT) 0.4 MG SL tablet Place 1 tablet under the tongue every 5 minutes as needed for Chest pain up to max of 3 total doses. If no relief after 1 dose, call 911. Yes TRESSA Brian   tiZANidine (ZANAFLEX) 4 MG tablet Take 1 tablet by mouth every 8 hours as needed (spasms) Yes TRESSA Brian   QUEtiapine (SEROQUEL) 200 MG tablet Take 1 tablet by mouth nightly Yes TRESSA Brian   insulin aspart (NOVOLOG FLEXPEN) 100 UNIT/ML injection pen Inject 8 Units into the skin 3 times daily as needed for High Blood Sugar (sliding coverage) Yes TRESSA Brian   fluticasone (FLONASE) 50 MCG/ACT nasal spray 1 spray by Nasal route daily Yes TRESSA Brian   levothyroxine (SYNTHROID) 88 MCG tablet Take 1 tablet by mouth Daily Tube feeding (TF) interaction, obtain physician order to manage, recommend holding TF for 30 minutes before and after dose. Yes TRESSA Brian   pregabalin (LYRICA) 150 MG capsule Take 1 capsule by mouth 2 times daily for 180 days.  Yes TRESSA Brian   pantoprazole (PROTONIX) 40 MG tablet Take 1 tablet by mouth daily Yes TRESSA Brian   Insulin Pen Needle ([employer] PEN NEEDLES 31G) 31G X 8 MM MISC 1 each by Does not apply route daily Yes TRESSA Brian   valsartan (DIOVAN) 320 MG tablet Take 1 tablet by mouth nightly Yes TRESSA Brian   furosemide (LASIX) 40 MG tablet Take 1 tablet by mouth daily Yes TRESSA Brian   Dulaglutide (TRULICITY) 1.5 KM/3.8YV SOPN Inject 1.5 mg into the skin once a week Yes TRESSA Brian   aspirin 81 MG tablet Take 1 tablet by mouth daily Yes TRESSA Brian   pantoprazole (PROTONIX) 40 MG tablet TAKE ONE TABLET BY MOUTH ONCE DAILY Yes TRESSA Brian   Insulin Pen Needle 31G X 8 MM MISC 1 each by Does not apply route 4 times daily Yes Hunter Cea, APRN   Blood Glucose Monitoring Suppl (FREESTYLE LITE) JEZ 1 Device by Does not apply route 4 times daily Yes Hunter Cea, APRN       Allergies: Ciprofloxacin; Dilaudid [hydromorphone hcl]; Keflex [cephalexin];  Nitrofuran derivatives; Pcn [penicillins]; and Cefdinir    Past Medical History:   Diagnosis Date    Anxiety     Arthritis     [facility]     CAD (coronary artery disease)     [facility]est[facility]     Chronic kidney disease (CKD) stage G3b/A2, moderately decreased glomerular filtration rate (GFR) between 30-44 mL/min/1.73 square meter and albuminuria creatinine ratio between  mg/g ([facility] 2016    Depression     DM [facility]diabetes mellitus) ([facility])     GERD (gastroesophageal reflux disease)     History of blood transfusion     History of suicidal ideation     Hyperlipidemia     Hypertension     Hypothyroidism     Insomnia     Kidney disease     stage 3 failure    MI, old 2005    Obesity     Polyarticular arthritis     Type II or unspecified type diabetes mellitus without mention of complication, not stated as uncontrolled        Past Surgical History:   Procedure Laterality Date    ABDOMINOPLASTY      ANGIOPLASTY  05    stent placement to LAD and diagonal with normal left vent function    BREAST REDUCTION SURGERY      CARDIAC CATHETERIZATION  05, 05    see report  Stents x3    CARDIAC CATHETERIZATION  3/23/15  JDT    EF 50%    CARPAL TUNNEL RELEASE       SECTION      x2    CHOLECYSTECTOMY      GASTRIC BYPASS SURGERY      HERNIA REPAIR      umbilical with mesh    INTRACAPSULAR CATARACT EXTRACTION Left 2016    LT EYE CATARACT EMULSIFICATION IOL IMPLANT performed by Rohit Murray MD at [address] [place] [address] Right 2018    ORIF RIGHT BIMALLEOLAR ANKLE FRACTURE, RIGHT WRIST CAST REMOVAL performed by Kenneth Powell MD blood pressure will go back up. You may take one or more types of medicine to lower your blood pressure. Be safe with medicines. Take your medicine exactly as prescribed. Call your doctor if you think you are having a problem with your medicine. · Talk to your doctor before you start taking aspirin every day. Aspirin can help certain people lower their risk of a heart attack or stroke. But taking aspirin isn't right for everyone, because it can cause serious bleeding. · See your doctor regularly. You may need to see the doctor more often at first or until your blood pressure comes down. · If you are taking blood pressure medicine, talk to your doctor before you take decongestants or anti-inflammatory medicine, such as ibuprofen. Some of these medicines can raise blood pressure. · Learn how to check your blood pressure at home. Lifestyle changes  · Stay at a healthy weight. This is especially important if you put on weight around the waist. Losing even 10 pounds can help you lower your blood pressure. · If your doctor recommends it, get more exercise. Walking is a good choice. Bit by bit, increase the amount you walk every day. Try for at least 30 minutes on most days of the week. You also may want to swim, bike, or do other activities. · Avoid or limit alcohol. Talk to your doctor about whether you can drink any alcohol. · Try to limit how much sodium you eat to less than 2,300 milligrams (mg) a day. Your doctor may ask you to try to eat less than 1,500 mg a day. · Eat plenty of fruits (such as bananas and oranges), vegetables, legumes, whole grains, and low-fat dairy products. · Lower the amount of saturated fat in your diet. Saturated fat is found in animal products such as milk, cheese, and meat. Limiting these foods may help you lose weight and also lower your risk for heart disease. · Do not smoke. Smoking increases your risk for heart attack and stroke.  If you need help quitting, talk to your doctor about stop-smoking programs and medicines. These can increase your chances of quitting for good. When should you call for help? Call  911 anytime you think you may need emergency care. This may mean having symptoms that suggest that your blood pressure is causing a serious heart or blood vessel problem. Your blood pressure may be over 180/120.   For example, call  911 if:    · You have symptoms of a heart attack. These may include:  ? Chest pain or pressure, or a strange feeling in the chest.  ? Sweating. ? Shortness of breath. ? Nausea or vomiting. ? Pain, pressure, or a strange feeling in the back, neck, jaw, or upper belly or in one or both shoulders or arms. ? Lightheadedness or sudden weakness. ? A fast or irregular heartbeat.     · You have symptoms of a stroke. These may include:  ? Sudden numbness, tingling, weakness, or loss of movement in your face, arm, or leg, especially on only one side of your body. ? Sudden vision changes. ? Sudden trouble speaking. ? Sudden confusion or trouble understanding simple statements. ? Sudden problems with walking or balance. ? A sudden, severe headache that is different from past headaches.     · You have severe back or belly pain.    Do not wait until your blood pressure comes down on its own. Get help right away.   Call your doctor now or seek immediate care if:    · Your blood pressure is much higher than normal (such as 180/120 or higher), but you don't have symptoms.     · You think high blood pressure is causing symptoms, such as:  ? Severe headache.  ? Blurry vision.    Watch closely for changes in your health, and be sure to contact your doctor if:    · Your blood pressure measures higher than your doctor recommends at least 2 times. That means the top number is higher or the bottom number is higher, or both.     · You think you may be having side effects from your blood pressure medicine. Where can you learn more?   Go to

## 2020-05-06 ENCOUNTER — PATIENT MESSAGE (OUTPATIENT)
Dept: PRIMARY CARE CLINIC | Age: 60
End: 2020-05-06

## 2020-05-06 NOTE — TELEPHONE ENCOUNTER
From: Mahi Cabello  To: TRESSA Calderon  Sent: 5/6/2020 2:13 PM CDT  Subject: Non-Urgent Medical Question    Boyd Singh it's just me I have some meds that need filled 2 are outdated. My linzess, my tazanidine, & my amitriptyline. I hope ur day is Blessed. Tell all the Ladies I love them & I'll c u very soon.

## 2020-05-07 RX ORDER — AMITRIPTYLINE HYDROCHLORIDE 100 MG/1
200 TABLET, FILM COATED ORAL NIGHTLY
Qty: 180 TABLET | Refills: 0 | Status: SHIPPED | OUTPATIENT
Start: 2020-05-07 | End: 2020-08-31

## 2020-05-07 RX ORDER — TIZANIDINE 4 MG/1
4 TABLET ORAL EVERY 8 HOURS PRN
Qty: 60 TABLET | Refills: 0 | Status: SHIPPED | OUTPATIENT
Start: 2020-05-07 | End: 2021-03-22

## 2020-05-07 RX ORDER — LINACLOTIDE 290 UG/1
290 CAPSULE, GELATIN COATED ORAL
Qty: 90 CAPSULE | Refills: 3 | Status: ON HOLD | OUTPATIENT
Start: 2020-05-07 | End: 2020-06-17

## 2020-05-11 ENCOUNTER — PATIENT MESSAGE (OUTPATIENT)
Dept: PRIMARY CARE CLINIC | Age: 60
End: 2020-05-11

## 2020-05-11 RX ORDER — INSULIN LISPRO 100 [IU]/ML
8 INJECTION, SOLUTION INTRAVENOUS; SUBCUTANEOUS 3 TIMES DAILY
Qty: 3 ML | Refills: 0 | Status: SHIPPED
Start: 2020-05-11 | End: 2020-05-12

## 2020-05-12 RX ORDER — INSULIN ASPART 100 [IU]/ML
8 INJECTION, SOLUTION INTRAVENOUS; SUBCUTANEOUS 3 TIMES DAILY PRN
Qty: 9 PEN | Refills: 3 | Status: SHIPPED
Start: 2020-05-12 | End: 2020-05-13 | Stop reason: CLARIF

## 2020-05-13 RX ORDER — INSULIN LISPRO 100 [IU]/ML
INJECTION, SOLUTION INTRAVENOUS; SUBCUTANEOUS
Qty: 9 PEN | Refills: 3 | Status: ON HOLD | OUTPATIENT
Start: 2020-05-13 | End: 2020-06-17

## 2020-05-18 ENCOUNTER — OFFICE VISIT (OUTPATIENT)
Dept: PRIMARY CARE CLINIC | Age: 60
End: 2020-05-18
Payer: MEDICARE

## 2020-05-18 VITALS
SYSTOLIC BLOOD PRESSURE: 118 MMHG | WEIGHT: 220 LBS | HEART RATE: 67 BPM | OXYGEN SATURATION: 97 % | TEMPERATURE: 98 F | BODY MASS INDEX: 42.97 KG/M2 | DIASTOLIC BLOOD PRESSURE: 80 MMHG

## 2020-05-18 PROCEDURE — 99214 OFFICE O/P EST MOD 30 MIN: CPT | Performed by: NURSE PRACTITIONER

## 2020-05-18 RX ORDER — DIAZEPAM 10 MG/1
10 TABLET ORAL NIGHTLY
Qty: 30 TABLET | Refills: 0 | Status: ON HOLD | OUTPATIENT
Start: 2020-05-18 | End: 2020-06-17

## 2020-05-18 RX ORDER — ZOLPIDEM TARTRATE 10 MG/1
10 TABLET ORAL NIGHTLY PRN
Qty: 30 TABLET | Refills: 0 | Status: ON HOLD | OUTPATIENT
Start: 2020-05-18 | End: 2020-06-18 | Stop reason: HOSPADM

## 2020-05-18 RX ORDER — CLOTRIMAZOLE 1 %
CREAM (GRAM) TOPICAL
Qty: 45 G | Refills: 1 | Status: SHIPPED | OUTPATIENT
Start: 2020-05-18 | End: 2020-05-25

## 2020-05-18 ASSESSMENT — ENCOUNTER SYMPTOMS
EYES NEGATIVE: 1
ABDOMINAL PAIN: 0
DIARRHEA: 0
VOMITING: 0
COUGH: 0
SHORTNESS OF BREATH: 0
SORE THROAT: 0
TROUBLE SWALLOWING: 0
NAUSEA: 0
CONSTIPATION: 0
WHEEZING: 0

## 2020-05-18 NOTE — PROGRESS NOTES
Allergies: Ciprofloxacin; Dilaudid [hydromorphone hcl]; Keflex [cephalexin];  Nitrofuran derivatives; Pcn [penicillins]; and Cefdinir    Past Medical History:   Diagnosis Date    Anxiety     Arthritis     [facility] ([facility]     CAD (coronary artery disease)     [facility])     Chronic kidney disease (CKD) stage G3b/A2, moderately decreased glomerular filtration rate (GFR) between 30-44 mL/min/1.73 square meter and albuminuria creatinine ratio between  mg/g ([facility] 2016    Depression     DM [facility]diabetes mellitus) ([facility])     GERD (gastroesophageal reflux disease)     History of blood transfusion     History of suicidal ideation     Hyperlipidemia     Hypertension     Hypothyroidism     Insomnia     Kidney disease     stage 3 failure    MI, old 2005    Obesity     Polyarticular arthritis     Type II or unspecified type diabetes mellitus without mention of complication, not stated as uncontrolled        Past Surgical History:   Procedure Laterality Date    ABDOMINOPLASTY      ANGIOPLASTY  05    stent placement to LAD and diagonal with normal left vent function    BREAST REDUCTION SURGERY      CARDIAC CATHETERIZATION  05, 05    see report  Stents x3    CARDIAC CATHETERIZATION  3/23/15  JDT    EF 50%    CARPAL TUNNEL RELEASE       SECTION      x2    CHOLECYSTECTOMY      GASTRIC BYPASS SURGERY      HERNIA REPAIR      umbilical with mesh    INTRACAPSULAR CATARACT EXTRACTION Left 2016    LT EYE CATARACT EMULSIFICATION IOL IMPLANT performed by Staci Lion MD at [address] [place] [address] Right 2018    ORIF RIGHT BIMALLEOLAR ANKLE FRACTURE, RIGHT WRIST CAST REMOVAL performed by Bill Esposito MD at [address] [place] [address] Right 2018    ANKLE OPEN REDUCTION INTERNAL FIXATION performed by Bill Esposito MD at [facility] ARTHROPLASTY Left 6/16/2017    KNEE TOTAL ARTHROPLASTY performed by Navdeep Esposito DO at [facility] OR       Social History     Tobacco Use    Smoking status: Never Smoker    Smokeless tobacco: Former User     Types: Snuff   Substance Use Topics    Alcohol use: No       Review of Systems   Constitutional: Negative for activity change, appetite change, fatigue and fever. HENT: Negative for congestion, postnasal drip, sore throat and trouble swallowing. Eyes: Negative. Respiratory: Negative for cough, shortness of breath and wheezing. Cardiovascular: Negative for chest pain, palpitations and leg swelling (resolved). Gastrointestinal: Negative for abdominal pain, constipation, diarrhea, nausea and vomiting. Endocrine: Negative for polydipsia, polyphagia and polyuria. Genitourinary: Negative for difficulty urinating, dysuria and urgency. Musculoskeletal: Negative for arthralgias. Skin: Negative for rash. Neurological: Positive for headaches. Negative for dizziness and numbness. Psychiatric/Behavioral: Negative for behavioral problems and sleep disturbance. The patient is not nervous/anxious (refill valium) and is not hyperactive. Stable              Physical Exam  Constitutional:       Appearance: Normal appearance. HENT:      Right Ear: Tympanic membrane normal. There is no impacted cerumen. Left Ear: Tympanic membrane normal. There is no impacted cerumen. Eyes:      General:         Right eye: No discharge. Left eye: No discharge. Cardiovascular:      Rate and Rhythm: Normal rate and regular rhythm. Pulses: Normal pulses. Heart sounds: No murmur. Pulmonary:      Effort: No respiratory distress. Breath sounds: Normal breath sounds. No rhonchi. Skin:     Capillary Refill: Capillary refill takes less than 2 seconds. Findings: No rash (right mouth area red rash irritated at corner mouth). Neurological:      General: No focal deficit present. body.  ? Sudden vision changes. ? Sudden trouble speaking. ? Sudden confusion or trouble understanding simple statements. ? Sudden problems with walking or balance. ? A sudden, severe headache that is different from past headaches.     · You have severe back or belly pain.    Do not wait until your blood pressure comes down on its own. Get help right away.   Call your doctor now or seek immediate care if:    · Your blood pressure is much higher than normal (such as 180/120 or higher), but you don't have symptoms.     · You think high blood pressure is causing symptoms, such as:  ? Severe headache.  ? Blurry vision.    Watch closely for changes in your health, and be sure to contact your doctor if:    · Your blood pressure measures higher than your doctor recommends at least 2 times. That means the top number is higher or the bottom number is higher, or both.     · You think you may be having side effects from your blood pressure medicine. Where can you learn more? Go to https://Ingenicard America.Boats.com. org and sign in to your FarmDrop account. Enter S508 in the Sense Platform box to learn more about \"High Blood Pressure: Care Instructions. \"     If you do not have an account, please click on the \"Sign Up Now\" link. Current as of: December 15, 2019Content Version: 12.4  © 1654-8920 Healthwise, Incorporated. Care instructions adapted under license by [facility] ([facility]). If you have questions about a medical condition or this instruction, always ask your healthcare professional. [place] [address] any warranty or liability for your use of this information. Patient Education        Type 2 Diabetes: Care Instructions  Your Care Instructions    Type 2 diabetes is a disease that develops when the body's tissues cannot use insulin properly. Over time, the pancreas cannot make enough insulin. Insulin is a hormone that helps the body's cells use sugar (glucose) for energy.  It also helps

## 2020-05-18 NOTE — PATIENT INSTRUCTIONS
Patient Education        High Blood Pressure: Care Instructions  Overview    It's normal for blood pressure to go up and down throughout the day. But if it stays up, you have high blood pressure. Another name for high blood pressure is hypertension. Despite what a lot of people think, high blood pressure usually doesn't cause headaches or make you feel dizzy or lightheaded. It usually has no symptoms. But it does increase your risk of stroke, heart attack, and other problems. You and your doctor will talk about your risks of these problems based on your blood pressure. Your doctor will give you a goal for your blood pressure. Your goal will be based on your health and your age. Lifestyle changes, such as eating healthy and being active, are always important to help lower blood pressure. You might also take medicine to reach your blood pressure goal.  Follow-up care is a key part of your treatment and safety. Be sure to make and go to all appointments, and call your doctor if you are having problems. It's also a good idea to know your test results and keep a list of the medicines you take. How can you care for yourself at home? Medical treatment  · If you stop taking your medicine, your blood pressure will go back up. You may take one or more types of medicine to lower your blood pressure. Be safe with medicines. Take your medicine exactly as prescribed. Call your doctor if you think you are having a problem with your medicine. · Talk to your doctor before you start taking aspirin every day. Aspirin can help certain people lower their risk of a heart attack or stroke. But taking aspirin isn't right for everyone, because it can cause serious bleeding. · See your doctor regularly. You may need to see the doctor more often at first or until your blood pressure comes down.   · If you are taking blood pressure medicine, talk to your doctor before you take decongestants or anti-inflammatory medicine, such as ibuprofen. Some of these medicines can raise blood pressure. · Learn how to check your blood pressure at home. Lifestyle changes  · Stay at a healthy weight. This is especially important if you put on weight around the waist. Losing even 10 pounds can help you lower your blood pressure. · If your doctor recommends it, get more exercise. Walking is a good choice. Bit by bit, increase the amount you walk every day. Try for at least 30 minutes on most days of the week. You also may want to swim, bike, or do other activities. · Avoid or limit alcohol. Talk to your doctor about whether you can drink any alcohol. · Try to limit how much sodium you eat to less than 2,300 milligrams (mg) a day. Your doctor may ask you to try to eat less than 1,500 mg a day. · Eat plenty of fruits (such as bananas and oranges), vegetables, legumes, whole grains, and low-fat dairy products. · Lower the amount of saturated fat in your diet. Saturated fat is found in animal products such as milk, cheese, and meat. Limiting these foods may help you lose weight and also lower your risk for heart disease. · Do not smoke. Smoking increases your risk for heart attack and stroke. If you need help quitting, talk to your doctor about stop-smoking programs and medicines. These can increase your chances of quitting for good. When should you call for help? Call  911 anytime you think you may need emergency care. This may mean having symptoms that suggest that your blood pressure is causing a serious heart or blood vessel problem. Your blood pressure may be over 180/120.   For example, call  911 if:    · You have symptoms of a heart attack. These may include:  ? Chest pain or pressure, or a strange feeling in the chest.  ? Sweating. ? Shortness of breath. ? Nausea or vomiting. ? Pain, pressure, or a strange feeling in the back, neck, jaw, or upper belly or in one or both shoulders or arms. ? Lightheadedness or sudden weakness.   ? A fast or irregular heartbeat.     · You have symptoms of a stroke. These may include:  ? Sudden numbness, tingling, weakness, or loss of movement in your face, arm, or leg, especially on only one side of your body. ? Sudden vision changes. ? Sudden trouble speaking. ? Sudden confusion or trouble understanding simple statements. ? Sudden problems with walking or balance. ? A sudden, severe headache that is different from past headaches.     · You have severe back or belly pain.    Do not wait until your blood pressure comes down on its own. Get help right away.   Call your doctor now or seek immediate care if:    · Your blood pressure is much higher than normal (such as 180/120 or higher), but you don't have symptoms.     · You think high blood pressure is causing symptoms, such as:  ? Severe headache.  ? Blurry vision.    Watch closely for changes in your health, and be sure to contact your doctor if:    · Your blood pressure measures higher than your doctor recommends at least 2 times. That means the top number is higher or the bottom number is higher, or both.     · You think you may be having side effects from your blood pressure medicine. Where can you learn more? Go to https://Wanxue Educationpepiceweb.Ignite Media Solutions. org and sign in to your OrderDynamics account. Enter E027 in the [employer] box to learn more about \"High Blood Pressure: Care Instructions. \"     If you do not have an account, please click on the \"Sign Up Now\" link. Current as of: December 15, 2019Content Version: 12.4  © 6921-4953 Healthwise, Incorporated. Care instructions adapted under license by [facility] [employer] [facility] ([facility]). If you have questions about a medical condition or this instruction, always ask your healthcare professional. [place] [address] any warranty or liability for your use of this information.          Patient Education        Type 2 Diabetes: Care Instructions  Your Care Instructions    Type 2 diabetes is a disease that develops prescribes. · Check your blood sugar as often as your doctor recommends. It is important to keep track of any symptoms you have, such as low blood sugar. Also tell your doctor if you have any changes in your activities, diet, or insulin use. · Talk to your doctor before you start taking aspirin every day. Aspirin can help certain people lower their risk of a heart attack or stroke. But taking aspirin isn't right for everyone, because it can cause serious bleeding. · Do not smoke. If you need help quitting, talk to your doctor about stop-smoking programs and medicines. These can increase your chances of quitting for good. · Keep your cholesterol and blood pressure at normal levels. You may need to take one or more medicines to reach your goals. Take them exactly as directed. Do not stop or change a medicine without talking to your doctor first.  When should you call for help? Call 911 anytime you think you may need emergency care. For example, call if:    · You passed out (lost consciousness), or you suddenly become very sleepy or confused. (You may have very low blood sugar.)    Call your doctor now or seek immediate medical care if:    · Your blood sugar is 300 mg/dL or is higher than the level your doctor has set for you.     · You have symptoms of low blood sugar, such as:  ? Sweating. ? Feeling nervous, shaky, and weak. ? Extreme hunger and slight nausea. ? Dizziness and headache.  ? Blurred vision. ? Confusion.    Watch closely for changes in your health, and be sure to contact your doctor if:    · You often have problems controlling your blood sugar.     · You have symptoms of long-term diabetes problems, such as:  ? New vision changes. ? New pain, numbness, or tingling in your hands or feet. ? Skin problems. Where can you learn more? Go to https://zakia.Perfectore. org and sign in to your Mumart account.  Enter C553 in the Advanced Cooling Therapy box to learn more about \"Type 2

## 2020-05-26 ENCOUNTER — TELEPHONE (OUTPATIENT)
Dept: PRIMARY CARE CLINIC | Age: 60
End: 2020-05-26

## 2020-06-09 RX ORDER — DENOSUMAB 60 MG/ML
INJECTION SUBCUTANEOUS
Qty: 1 ML | Refills: 0 | Status: SHIPPED | OUTPATIENT
Start: 2020-06-09 | End: 2020-06-10

## 2020-06-10 RX ORDER — DENOSUMAB 60 MG/ML
60 INJECTION SUBCUTANEOUS ONCE
Qty: 1 ML | Refills: 0 | Status: ON HOLD | OUTPATIENT
Start: 2020-06-10 | End: 2020-06-17

## 2020-06-10 NOTE — TELEPHONE ENCOUNTER
Received fax from pharmacy requesting refill on pts medication(s). Pt was last seen in office on 5/18/2020  and has a follow up scheduled for 6/18/2020. Will send request to  [facility]  for authorization.      Requested Prescriptions     Pending Prescriptions Disp Refills    denosumab (PROLIA) 60 MG/ML SOSY SC injection [Pharmacy Med Name: Prolia 60 MG/ML Subcutaneous Solution Prefilled Syringe] 1 mL 0     Sig: Inject 1 mL into the skin once for 1 dose

## 2020-06-11 ENCOUNTER — TELEPHONE (OUTPATIENT)
Dept: PRIMARY CARE CLINIC | Age: 60
End: 2020-06-11

## 2020-06-11 NOTE — TELEPHONE ENCOUNTER
Carlos called, they are ready to ship pts prolia-her shot is due this month, but it needs a new PA. She asked if you could kelle it expedited because otherwise it can take up to 16 days.

## 2020-06-12 NOTE — TELEPHONE ENCOUNTER
Unless patient has new insurance, her prolia was covered with [employer] cross blue shield through December. I called [facility] and they stated that the medication was still authed but she has a 300 dollar copay out of pocked before she can fill, she prob hasnt met her OOP deduct. Do you want to change med?

## 2020-06-17 ENCOUNTER — APPOINTMENT (OUTPATIENT)
Dept: GENERAL RADIOLOGY | Age: 60
DRG: 565 | End: 2020-06-17
Payer: MEDICARE

## 2020-06-17 ENCOUNTER — APPOINTMENT (OUTPATIENT)
Dept: CT IMAGING | Age: 60
DRG: 565 | End: 2020-06-17
Payer: MEDICARE

## 2020-06-17 ENCOUNTER — HOSPITAL ENCOUNTER (INPATIENT)
Age: 60
LOS: 1 days | Discharge: HOME | DRG: 565 | End: 2020-06-18
Attending: EMERGENCY MEDICINE | Admitting: INTERNAL MEDICINE
Payer: MEDICARE

## 2020-06-17 ENCOUNTER — TRANSCRIBE ORDERS (OUTPATIENT)
Dept: ADMINISTRATIVE | Facility: HOSPITAL | Age: 60
End: 2020-06-17

## 2020-06-17 ENCOUNTER — TELEPHONE (OUTPATIENT)
Dept: PRIMARY CARE CLINIC | Age: 60
End: 2020-06-17

## 2020-06-17 DIAGNOSIS — Z01.818 PREOP TESTING: Primary | ICD-10-CM

## 2020-06-17 PROBLEM — S52.501A CLOSED FRACTURE OF RIGHT DISTAL RADIUS: Status: ACTIVE | Noted: 2018-06-14

## 2020-06-17 PROBLEM — M62.82 RHABDOMYOLYSIS: Status: ACTIVE | Noted: 2020-06-17

## 2020-06-17 LAB
ALBUMIN SERPL-MCNC: 3.6 G/DL (ref 3.5–5.2)
ALP BLD-CCNC: 118 U/L (ref 35–104)
ALT SERPL-CCNC: 26 U/L (ref 5–33)
ANION GAP SERPL CALCULATED.3IONS-SCNC: 13 MMOL/L (ref 7–19)
AST SERPL-CCNC: 57 U/L (ref 5–32)
BACTERIA: ABNORMAL /HPF
BASOPHILS ABSOLUTE: 0 K/UL (ref 0–0.2)
BASOPHILS RELATIVE PERCENT: 0.4 % (ref 0–1)
BILIRUB SERPL-MCNC: 0.3 MG/DL (ref 0.2–1.2)
BILIRUBIN URINE: NEGATIVE
BLOOD, URINE: NEGATIVE
BUN BLDV-MCNC: 31 MG/DL (ref 6–20)
CALCIUM SERPL-MCNC: 8.4 MG/DL (ref 8.6–10)
CHLORIDE BLD-SCNC: 89 MMOL/L (ref 98–111)
CLARITY: CLEAR
CO2: 23 MMOL/L (ref 22–29)
COLOR: YELLOW
CREAT SERPL-MCNC: 1.5 MG/DL (ref 0.5–0.9)
CREATININE URINE: 26 MG/DL (ref 4.2–622)
EOSINOPHILS ABSOLUTE: 0.1 K/UL (ref 0–0.6)
EOSINOPHILS RELATIVE PERCENT: 0.9 % (ref 0–5)
EPITHELIAL CELLS, UA: 2 /HPF (ref 0–5)
GFR NON-AFRICAN AMERICAN: 36
GLUCOSE BLD-MCNC: 115 MG/DL (ref 70–99)
GLUCOSE BLD-MCNC: 87 MG/DL (ref 70–99)
GLUCOSE BLD-MCNC: 95 MG/DL (ref 74–109)
GLUCOSE URINE: NEGATIVE MG/DL
HCT VFR BLD CALC: 36.5 % (ref 37–47)
HEMOGLOBIN: 12 G/DL (ref 12–16)
HYALINE CASTS: 1 /HPF (ref 0–8)
IMMATURE GRANULOCYTES #: 0 K/UL
KETONES, URINE: NEGATIVE MG/DL
LEUKOCYTE ESTERASE, URINE: ABNORMAL
LYMPHOCYTES ABSOLUTE: 1.6 K/UL (ref 1.1–4.5)
LYMPHOCYTES RELATIVE PERCENT: 14.3 % (ref 20–40)
MCH RBC QN AUTO: 28.9 PG (ref 27–31)
MCHC RBC AUTO-ENTMCNC: 32.9 G/DL (ref 33–37)
MCV RBC AUTO: 88 FL (ref 81–99)
MONOCYTES ABSOLUTE: 1 K/UL (ref 0–0.9)
MONOCYTES RELATIVE PERCENT: 9 % (ref 0–10)
NEUTROPHILS ABSOLUTE: 8.5 K/UL (ref 1.5–7.5)
NEUTROPHILS RELATIVE PERCENT: 75 % (ref 50–65)
NITRITE, URINE: NEGATIVE
OSMOLALITY URINE: 204 MOSM/KG (ref 250–1200)
OSMOLALITY: 275 MOSM/KG (ref 275–300)
PDW BLD-RTO: 15 % (ref 11.5–14.5)
PERFORMED ON: ABNORMAL
PERFORMED ON: NORMAL
PH UA: 6 (ref 5–8)
PLATELET # BLD: 277 K/UL (ref 130–400)
PMV BLD AUTO: 11.2 FL (ref 9.4–12.3)
POTASSIUM SERPL-SCNC: 5.1 MMOL/L (ref 3.5–5)
PROTEIN UA: NEGATIVE MG/DL
RBC # BLD: 4.15 M/UL (ref 4.2–5.4)
RBC UA: 0 /HPF (ref 0–4)
SODIUM BLD-SCNC: 125 MMOL/L (ref 136–145)
SODIUM URINE: 33 MMOL/L
SPECIFIC GRAVITY UA: 1.01 (ref 1–1.03)
TOTAL CK: 3193 U/L (ref 26–192)
TOTAL PROTEIN: 6.9 G/DL (ref 6.6–8.7)
TROPONIN: <0.01 NG/ML (ref 0–0.03)
TSH REFLEX FT4: 1.67 UIU/ML (ref 0.35–5.5)
UROBILINOGEN, URINE: 0.2 E.U./DL
WBC # BLD: 11.3 K/UL (ref 4.8–10.8)
WBC UA: 6 /HPF (ref 0–5)
YEAST: ABNORMAL /HPF

## 2020-06-17 PROCEDURE — 73070 X-RAY EXAM OF ELBOW: CPT

## 2020-06-17 PROCEDURE — 83930 ASSAY OF BLOOD OSMOLALITY: CPT

## 2020-06-17 PROCEDURE — 72125 CT NECK SPINE W/O DYE: CPT

## 2020-06-17 PROCEDURE — 80053 COMPREHEN METABOLIC PANEL: CPT

## 2020-06-17 PROCEDURE — G0378 HOSPITAL OBSERVATION PER HR: HCPCS

## 2020-06-17 PROCEDURE — 84300 ASSAY OF URINE SODIUM: CPT

## 2020-06-17 PROCEDURE — 29125 APPL SHORT ARM SPLINT STATIC: CPT

## 2020-06-17 PROCEDURE — 84484 ASSAY OF TROPONIN QUANT: CPT

## 2020-06-17 PROCEDURE — 96372 THER/PROPH/DIAG INJ SC/IM: CPT

## 2020-06-17 PROCEDURE — 70450 CT HEAD/BRAIN W/O DYE: CPT

## 2020-06-17 PROCEDURE — 82570 ASSAY OF URINE CREATININE: CPT

## 2020-06-17 PROCEDURE — 1210000000 HC MED SURG R&B

## 2020-06-17 PROCEDURE — 82947 ASSAY GLUCOSE BLOOD QUANT: CPT

## 2020-06-17 PROCEDURE — 6360000002 HC RX W HCPCS: Performed by: PHYSICIAN ASSISTANT

## 2020-06-17 PROCEDURE — 2W3CX1Z IMMOBILIZATION OF RIGHT LOWER ARM USING SPLINT: ICD-10-PCS | Performed by: INTERNAL MEDICINE

## 2020-06-17 PROCEDURE — 93005 ELECTROCARDIOGRAM TRACING: CPT | Performed by: NURSE PRACTITIONER

## 2020-06-17 PROCEDURE — 84443 ASSAY THYROID STIM HORMONE: CPT

## 2020-06-17 PROCEDURE — 2580000003 HC RX 258: Performed by: PHYSICIAN ASSISTANT

## 2020-06-17 PROCEDURE — 6370000000 HC RX 637 (ALT 250 FOR IP): Performed by: PHYSICIAN ASSISTANT

## 2020-06-17 PROCEDURE — 73110 X-RAY EXAM OF WRIST: CPT

## 2020-06-17 PROCEDURE — 73090 X-RAY EXAM OF FOREARM: CPT

## 2020-06-17 PROCEDURE — 85025 COMPLETE CBC W/AUTO DIFF WBC: CPT

## 2020-06-17 PROCEDURE — 70486 CT MAXILLOFACIAL W/O DYE: CPT

## 2020-06-17 PROCEDURE — 82550 ASSAY OF CK (CPK): CPT

## 2020-06-17 PROCEDURE — 83935 ASSAY OF URINE OSMOLALITY: CPT

## 2020-06-17 PROCEDURE — 99285 EMERGENCY DEPT VISIT HI MDM: CPT

## 2020-06-17 PROCEDURE — 81001 URINALYSIS AUTO W/SCOPE: CPT

## 2020-06-17 PROCEDURE — 36415 COLL VENOUS BLD VENIPUNCTURE: CPT

## 2020-06-17 RX ORDER — SODIUM CHLORIDE, SODIUM LACTATE, POTASSIUM CHLORIDE, CALCIUM CHLORIDE 600; 310; 30; 20 MG/100ML; MG/100ML; MG/100ML; MG/100ML
1000 INJECTION, SOLUTION INTRAVENOUS ONCE
Status: DISCONTINUED | OUTPATIENT
Start: 2020-06-17 | End: 2020-06-18 | Stop reason: HOSPADM

## 2020-06-17 RX ORDER — HEPARIN SODIUM 5000 [USP'U]/ML
5000 INJECTION, SOLUTION INTRAVENOUS; SUBCUTANEOUS EVERY 8 HOURS SCHEDULED
Status: DISCONTINUED | OUTPATIENT
Start: 2020-06-17 | End: 2020-06-18 | Stop reason: HOSPADM

## 2020-06-17 RX ORDER — NITROGLYCERIN 0.4 MG/1
0.4 TABLET SUBLINGUAL EVERY 5 MIN PRN
Status: DISCONTINUED | OUTPATIENT
Start: 2020-06-17 | End: 2020-06-18 | Stop reason: HOSPADM

## 2020-06-17 RX ORDER — LEVOTHYROXINE SODIUM 88 UG/1
88 TABLET ORAL DAILY
Status: DISCONTINUED | OUTPATIENT
Start: 2020-06-17 | End: 2020-06-18 | Stop reason: HOSPADM

## 2020-06-17 RX ORDER — METOPROLOL SUCCINATE 50 MG/1
50 TABLET, EXTENDED RELEASE ORAL DAILY
Status: DISCONTINUED | OUTPATIENT
Start: 2020-06-18 | End: 2020-06-18 | Stop reason: HOSPADM

## 2020-06-17 RX ORDER — ONDANSETRON 2 MG/ML
4 INJECTION INTRAMUSCULAR; INTRAVENOUS EVERY 6 HOURS PRN
Status: DISCONTINUED | OUTPATIENT
Start: 2020-06-17 | End: 2020-06-18 | Stop reason: HOSPADM

## 2020-06-17 RX ORDER — SODIUM CHLORIDE 0.9 % (FLUSH) 0.9 %
10 SYRINGE (ML) INJECTION PRN
Status: DISCONTINUED | OUTPATIENT
Start: 2020-06-17 | End: 2020-06-18 | Stop reason: HOSPADM

## 2020-06-17 RX ORDER — NICOTINE POLACRILEX 4 MG
15 LOZENGE BUCCAL PRN
Status: DISCONTINUED | OUTPATIENT
Start: 2020-06-17 | End: 2020-06-18 | Stop reason: HOSPADM

## 2020-06-17 RX ORDER — 0.9 % SODIUM CHLORIDE 0.9 %
1000 INTRAVENOUS SOLUTION INTRAVENOUS ONCE
Status: DISCONTINUED | OUTPATIENT
Start: 2020-06-17 | End: 2020-06-17

## 2020-06-17 RX ORDER — INSULIN ASPART 100 [IU]/ML
INJECTION, SOLUTION INTRAVENOUS; SUBCUTANEOUS
COMMUNITY
End: 2021-03-22 | Stop reason: SDUPTHER

## 2020-06-17 RX ORDER — DEXTROSE MONOHYDRATE 50 MG/ML
100 INJECTION, SOLUTION INTRAVENOUS PRN
Status: DISCONTINUED | OUTPATIENT
Start: 2020-06-17 | End: 2020-06-18 | Stop reason: HOSPADM

## 2020-06-17 RX ORDER — AMLODIPINE BESYLATE 5 MG/1
5 TABLET ORAL NIGHTLY
Status: DISCONTINUED | OUTPATIENT
Start: 2020-06-17 | End: 2020-06-18 | Stop reason: HOSPADM

## 2020-06-17 RX ORDER — SODIUM CHLORIDE 9 MG/ML
INJECTION, SOLUTION INTRAVENOUS CONTINUOUS
Status: DISCONTINUED | OUTPATIENT
Start: 2020-06-17 | End: 2020-06-18 | Stop reason: HOSPADM

## 2020-06-17 RX ORDER — PROMETHAZINE HYDROCHLORIDE 12.5 MG/1
12.5 TABLET ORAL EVERY 6 HOURS PRN
Status: DISCONTINUED | OUTPATIENT
Start: 2020-06-17 | End: 2020-06-18 | Stop reason: HOSPADM

## 2020-06-17 RX ORDER — SODIUM CHLORIDE 0.9 % (FLUSH) 0.9 %
10 SYRINGE (ML) INJECTION EVERY 12 HOURS SCHEDULED
Status: DISCONTINUED | OUTPATIENT
Start: 2020-06-17 | End: 2020-06-18 | Stop reason: HOSPADM

## 2020-06-17 RX ORDER — ACETAMINOPHEN 325 MG/1
650 TABLET ORAL EVERY 6 HOURS PRN
Status: DISCONTINUED | OUTPATIENT
Start: 2020-06-17 | End: 2020-06-18 | Stop reason: HOSPADM

## 2020-06-17 RX ORDER — TIZANIDINE 4 MG/1
4 TABLET ORAL EVERY 8 HOURS PRN
Status: DISCONTINUED | OUTPATIENT
Start: 2020-06-17 | End: 2020-06-18 | Stop reason: HOSPADM

## 2020-06-17 RX ORDER — QUETIAPINE FUMARATE 100 MG/1
200 TABLET, FILM COATED ORAL NIGHTLY
Status: DISCONTINUED | OUTPATIENT
Start: 2020-06-17 | End: 2020-06-18 | Stop reason: HOSPADM

## 2020-06-17 RX ORDER — INSULIN GLARGINE 100 [IU]/ML
20 INJECTION, SOLUTION SUBCUTANEOUS NIGHTLY
Status: DISCONTINUED | OUTPATIENT
Start: 2020-06-17 | End: 2020-06-18 | Stop reason: HOSPADM

## 2020-06-17 RX ORDER — POLYETHYLENE GLYCOL 3350 17 G/17G
17 POWDER, FOR SOLUTION ORAL DAILY PRN
Status: DISCONTINUED | OUTPATIENT
Start: 2020-06-17 | End: 2020-06-18 | Stop reason: HOSPADM

## 2020-06-17 RX ORDER — ASPIRIN 81 MG/1
81 TABLET, CHEWABLE ORAL DAILY
Status: DISCONTINUED | OUTPATIENT
Start: 2020-06-17 | End: 2020-06-18 | Stop reason: HOSPADM

## 2020-06-17 RX ORDER — DEXTROSE MONOHYDRATE 25 G/50ML
12.5 INJECTION, SOLUTION INTRAVENOUS PRN
Status: DISCONTINUED | OUTPATIENT
Start: 2020-06-17 | End: 2020-06-18 | Stop reason: HOSPADM

## 2020-06-17 RX ORDER — ACETAMINOPHEN 650 MG/1
650 SUPPOSITORY RECTAL EVERY 6 HOURS PRN
Status: DISCONTINUED | OUTPATIENT
Start: 2020-06-17 | End: 2020-06-18 | Stop reason: HOSPADM

## 2020-06-17 RX ORDER — PREGABALIN 150 MG/1
150 CAPSULE ORAL 2 TIMES DAILY
Status: DISCONTINUED | OUTPATIENT
Start: 2020-06-17 | End: 2020-06-18 | Stop reason: HOSPADM

## 2020-06-17 RX ADMIN — SODIUM CHLORIDE: 9 INJECTION, SOLUTION INTRAVENOUS at 16:24

## 2020-06-17 RX ADMIN — PREGABALIN 150 MG: 150 CAPSULE ORAL at 21:57

## 2020-06-17 RX ADMIN — QUETIAPINE FUMARATE 200 MG: 100 TABLET ORAL at 22:03

## 2020-06-17 RX ADMIN — ACETAMINOPHEN 650 MG: 325 TABLET, FILM COATED ORAL at 22:04

## 2020-06-17 RX ADMIN — TIZANIDINE 4 MG: 4 TABLET ORAL at 21:56

## 2020-06-17 RX ADMIN — HEPARIN SODIUM 5000 UNITS: 5000 INJECTION INTRAVENOUS; SUBCUTANEOUS at 22:04

## 2020-06-17 RX ADMIN — AMLODIPINE BESYLATE 5 MG: 5 TABLET ORAL at 21:56

## 2020-06-17 RX ADMIN — AMITRIPTYLINE HYDROCHLORIDE 200 MG: 75 TABLET, FILM COATED ORAL at 21:56

## 2020-06-17 ASSESSMENT — PAIN DESCRIPTION - DIRECTION
RADIATING_TOWARDS: SHOULDER
RADIATING_TOWARDS: SHOULDER

## 2020-06-17 ASSESSMENT — PAIN DESCRIPTION - ORIENTATION
ORIENTATION: RIGHT

## 2020-06-17 ASSESSMENT — PAIN DESCRIPTION - DESCRIPTORS
DESCRIPTORS: ACHING

## 2020-06-17 ASSESSMENT — PAIN DESCRIPTION - PAIN TYPE
TYPE: ACUTE PAIN

## 2020-06-17 ASSESSMENT — PAIN DESCRIPTION - PROGRESSION: CLINICAL_PROGRESSION: NOT CHANGED

## 2020-06-17 ASSESSMENT — PAIN DESCRIPTION - FREQUENCY: FREQUENCY: CONTINUOUS

## 2020-06-17 ASSESSMENT — PAIN - FUNCTIONAL ASSESSMENT: PAIN_FUNCTIONAL_ASSESSMENT: PREVENTS OR INTERFERES SOME ACTIVE ACTIVITIES AND ADLS

## 2020-06-17 ASSESSMENT — PAIN DESCRIPTION - ONSET: ONSET: ON-GOING

## 2020-06-17 ASSESSMENT — PAIN SCALES - GENERAL
PAINLEVEL_OUTOF10: 8
PAINLEVEL_OUTOF10: 5
PAINLEVEL_OUTOF10: 3

## 2020-06-17 ASSESSMENT — ENCOUNTER SYMPTOMS: ABDOMINAL PAIN: 0

## 2020-06-17 ASSESSMENT — PAIN DESCRIPTION - LOCATION
LOCATION: ARM

## 2020-06-17 NOTE — H&P
[id number] [facility]  Hospitalist - History & Physical      PCP: TRESSA Swain    Date of Admission: 6/17/2020    Date of Service: 6/17/2020    Chief Complaint:  Right arm pain s/p fall     History Of Present Illness: The patient is a 62 y.o. female who presented to [address] [place] [address] ED complaining of a fall last night that resulted in right arm pain first noticed this morning. Pain to [facility] described as dull, throbbing, constant, made worse with movement, alleviated with rest. Patient is uncertain how she fell. States she did take Ambien and pain medication prior to going to bed last night, got up at some point and thinks she may have tripped on her nightgown which was laying right next to her bed. A family member found her in the floor and covered her with a blanket. Patient states she lost consciousness and woke up in the floor this morning. Work up in ED revealed an acute buckle fracture through the distal radius without intra-articular extension.  Lab work up revealed Sodium 125, K 5.1, Crt 1.5, CK 3,193    Past Medical History:        Diagnosis Date    Anxiety     Arthritis     Bipolar 1 disorder ([facility]     CAD (coronary artery disease)     CH[facility]congestive heart failure) ([facility])     Chronic kidney disease (CKD) stage G3b/A2, moderately decreased glomerular filtration rate (GFR) between 30-44 mL/min/1.73 square meter and albuminuria creatinine ratio between  mg/g ([facility] 11/21/2016    Depression     DM (diabetes mellitus) ([facility]     GERD (gastroesophageal reflux disease)     History of blood transfusion     History of suicidal ideation     Hyperlipidemia     Hypertension     Hypothyroidism     Insomnia     Kidney disease     stage 3 failure    MI, 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    Obesity     Polyarticular arthritis     Type II or unspecified type diabetes mellitus without mention of complication, not stated as uncontrolled      Past Surgical History:        Procedure Laterality Date    ABDOMINOPLASTY      (NORVASC) 5 MG tablet Take 1 tablet by mouth daily 5/4/20   Zeenat Casey, APRN   atorvastatin (LIPITOR) 40 MG tablet Take 1 tablet by mouth nightly 5/1/20   Zeenat Casey, APRN   cloNIDine (CATAPRES) 0.1 MG tablet Take 1 tablet by mouth every 6 hours as needed for High Blood Pressure sbp greater than 160 or dbp greater than 90 4/20/20   Zeenat Casey, APRN   blood glucose test strips (FREESTYLE LITE) strip 1 each by In Vitro route 4 times daily As needed. 4/6/20   Zeenat Casey, APRN   Lancets MISC 1 each by Does not apply route daily 4/6/20   Zeenat Casey, APRN   metoprolol succinate (TOPROL XL) 50 MG extended release tablet Take 1 tablet by mouth daily 4/2/20   Zeenat Casey, APRN   DULoxetine (CYMBALTA) 60 MG extended release capsule Take 1 capsule by mouth 2 times daily 4/2/20   Zeenat Casey, APRN   nitroGLYCERIN (NITROSTAT) 0.4 MG SL tablet Place 1 tablet under the tongue every 5 minutes as needed for Chest pain up to max of 3 total doses. If no relief after 1 dose, call 911. 4/2/20   Zeenat Casey, TRESSA   QUEtiapine (SEROQUEL) 200 MG tablet Take 1 tablet by mouth nightly 3/26/20   Zeenat Casey, APRN   fluticasone [facility]) 50 MCG/ACT nasal spray 1 spray by Nasal route daily 3/12/20   Zeenat Casey, APRN   levothyroxine (SYNTHROID) 88 MCG tablet Take 1 tablet by mouth Daily Tube feeding (TF) interaction, obtain physician order to manage, recommend holding TF for 30 minutes before and after dose. 3/12/20   Zeenat Casey, APRN   pregabalin (LYRICA) 150 MG capsule Take 1 capsule by mouth 2 times daily for 180 days.  3/12/20 9/8/20  Zeenat Casey, APRN   pantoprazole (PROTONIX) 40 MG tablet Take 1 tablet by mouth daily 3/12/20   Zeenat Casey, APRN   Insulin Pen Needle ([employer] PEN NEEDLES 31G) 31G X 8 MM MISC 1 each by Does not apply route daily 3/12/20   Zeenat Casey, APRN   valsartan (DIOVAN) 320 MG tablet Take 1 tablet by mouth nightly 3/12/20

## 2020-06-17 NOTE — PROGRESS NOTES
4 Eyes Skin Assessment    Mitch Fuchs is being assessed upon: Admission    I agree that I, Monica Bello, along with El Perrin (either 2 RN's or 1 LPN and 1 RN) have performed a thorough Head to Toe Skin Assessment on the patient. ALL assessment sites listed below have been assessed. Areas assessed by both nurses:     [x]   Head, Face, and Ears   [x]   Shoulders, Back, and Chest  [x]   Arms, Elbows, and Hands   [x]   Coccyx, Sacrum, and Ischium  [x]   Legs, Feet, and Heels    Does the Patient have Skin Breakdown?  No    Armani Prevention initiated: NA  Wound Care Orders initiated: NA    WOC nurse consulted for Pressure Injury (Stage 3,4, Unstageable, DTI, NWPT, and Complex wounds) and New or Established Ostomies: NA        Primary Nurse eSignature: Monica Bello RN on 6/17/2020 at 6:54 PM      Co-Signer eSignature: Electronically signed by El Perrin RN on 6/17/20 at 6:55 PM CDT

## 2020-06-17 NOTE — ED PROVIDER NOTES
(coronary artery disease)     CH[facility]congestive [facility] failure) ([facility])     Chronic kidney disease (CKD) stage G3b/A2, moderately decreased glomerular filtration rate (GFR) between 30-44 mL/min/1.73 square meter and albuminuria creatinine ratio between  mg/g ([facility] 2016    Depression     DM (diabetes mellitus) ([facility])     GERD (gastroesophageal reflux disease)     History of blood transfusion     History of suicidal ideation     Hyperlipidemia     Hypertension     Hypothyroidism     Insomnia     Kidney disease     stage 3 failure    MI, old 2005    Obesity     Polyarticular arthritis     Type II or unspecified type diabetes mellitus without mention of complication, not stated as uncontrolled          SURGICAL HISTORY       Past Surgical History:   Procedure Laterality Date    ABDOMINOPLASTY      ANGIOPLASTY  05    stent placement to LAD and diagonal with normal left vent function    BREAST REDUCTION SURGERY      CARDIAC CATHETERIZATION  05, 05    see report  Stents x3    CARDIAC CATHETERIZATION  3/23/15  JDT    EF 50%    CARPAL TUNNEL RELEASE       SECTION      x2    CHOLECYSTECTOMY      GASTRIC BYPASS SURGERY      HERNIA REPAIR      umbilical with mesh    INTRACAPSULAR CATARACT EXTRACTION Left 2016    LT EYE CATARACT EMULSIFICATION IOL IMPLANT performed by Reji Persaud MD at [address] [place] [address] Right 2018    ORIF RIGHT BIMALLEOLAR ANKLE FRACTURE, RIGHT WRIST CAST REMOVAL performed by Sylvain Herrera MD at [address] [place] [address] Right 2018    ANKLE OPEN REDUCTION INTERNAL FIXATION performed by Sylvain Herrera MD at [place] [address] Left 2017    KNEE TOTAL ARTHROPLASTY performed by Petros Alcocer DO at [address] [place] [address]       Current Discharge Medication List      CONTINUE these medications which have NOT CHANGED Diagnoses: Type 2 diabetes mellitus with diabetic polyneuropathy, with long-term current use of insulin ([facility])      Lancets MISC 1 each by Does not apply route daily  Qty: 100 each, Refills: 3      !! Insulin Pen Needle ([employer]ER PEN NEEDLES 31G) 31G X 8 MM MISC 1 each by Does not apply route daily  Qty: 100 each, Refills: 3    Associated Diagnoses: Type 2 diabetes mellitus with hyperglycemia, with long-term current use of insulin ([facility])      furosemide (LASIX) 40 MG tablet Take 1 tablet by mouth daily  Qty: 90 tablet, Refills: 3    Associated Diagnoses: Essential hypertension      !! Insulin Pen Needle 31G X 8 MM MISC 1 each by Does not apply route 4 times daily  Qty: 200 each, Refills: 11    Associated Diagnoses: Type 2 diabetes mellitus with diabetic polyneuropathy, with long-term current use of insulin ([facility])      Blood Glucose Monitoring Suppl (FREESTYLE LITE) JEZ 1 Device by Does not apply route 4 times daily  Qty: 1 Device, Refills: 0    Comments: Ok for monitor and strips for brand that her insurance will cover  Associated Diagnoses: Type 2 diabetes mellitus with diabetic polyneuropathy, with long-term current use of insulin ([facility]       ! ! - Potential duplicate medications found. Please discuss with provider. ALLERGIES     Ciprofloxacin; Dilaudid [hydromorphone hcl]; Keflex [cephalexin];  Nitrofuran derivatives; Pcn [penicillins]; and Cefdinir    FAMILY HISTORY       Family History   Problem Relation Age of Onset    Depression Mother     Heart Attack Mother     High Blood Pressure Mother     Kidney Disease Father     Alcohol Abuse Father     Depression Father     Heart Attack Father     High Blood Pressure Father     Kidney Disease Brother     Heart Disease Other     Depression Sister           SOCIAL HISTORY       Social History     Socioeconomic History    Marital status:      Spouse name: None    Number of children: None    Years of education: None    Highest education level: Rhythm: Normal rate and regular rhythm. Heart sounds: Normal heart sounds. Pulmonary:      Effort: Pulmonary effort is normal.      Breath sounds: Normal breath sounds. Abdominal:      General: Bowel sounds are normal.      Palpations: Abdomen is soft. Musculoskeletal: Normal range of motion. Comments: Right proximal forearm with mild edema  No ttp right elbow/wrist  Normal flexion/extension of right elbow/wrist  No anatomic snuffbox ttp right wrist   Skin:     General: Skin is warm and dry. Neurological:      Mental Status: She is alert and oriented to person, place, and time. DIAGNOSTIC RESULTS     EKG: All EKG's are interpreted by the Emergency Department Physician who either signs or Co-signs this chart in the absence of acardiologist.    There is a regular rate and rhythm. SR hr 72b/min Normal LA interval and normal P waves. Normal QRS interval. Normal QT interval. No obvious ST elevation or ST depression. RADIOLOGY:   Non-plain film images such as CT, Ultrasound andMRI are read by the radiologist. Plain radiographic images are visualized and preliminarily interpreted by the emergency physician with the below findings:        Interpretation per the Radiologist below, if available at the time of this note:    XR ELBOW RIGHT (2 VIEWS)   Final Result   1. No acute bony injury in the right elbow. Signed by Dr Shraddha Vega on 6/17/2020 2:13 PM      XR RADIUS ULNA RIGHT (2 VIEWS)   Final Result   1. Acute buckle fracture through the distal radius without   intra-articular extension. 2. Old ulnar styloid fracture. 3. No other fractures in the forearm. Signed by Dr Shraddha Vega on 6/17/2020 2:13 PM      XR WRIST RIGHT (MIN 3 VIEWS)   Final Result   1. Acute buckle fracture through the distal radius without   intra-articular extension. 2. Old ulnar styloid fracture. 3. No other fractures in the forearm.    Signed by Dr Shraddha Vega on 6/17/2020 2:13 PM      CT Head WO All other components within normal limits   TROPONIN   SODIUM, URINE, RANDOM   OSMOLALITY, SERUM   TSH WITH REFLEX TO FT4   CREATININE, RANDOM URINE       All other labs were within normal range or not returned as of this dictation. RE-ASSESSMENT     Dr. Stu Ruiz discussed case with hospitalist who will admit patient. EMERGENCY DEPARTMENT COURSE and DIFFERENTIALDIAGNOSIS/MDM:   Vitals:    Vitals:    06/17/20 1310 06/17/20 1331 06/17/20 1507   BP:  103/60 112/72   Pulse: 77  73   Resp: 18  20   Temp: 96.6 °F (35.9 °C)     SpO2: 97%  95%   Weight: 220 lb (99.8 kg)         MDM      CONSULTS:  IP CONSULT TO ORTHOPEDIC SURGERY    PROCEDURES:  Unless otherwise notedbelow, none     Splint Application  Date/Time: 6/17/2020 4:14 PM  Performed by: TRESSA Shabazz  Authorized by: Cristóbal Soto MD     Consent:     Consent obtained:  Verbal    Consent given by:  Patient    Risks discussed:  Pain    Alternatives discussed:  No treatment  Pre-procedure details:     Sensation:  Normal  Procedure details:     Laterality:  Right    Location:  Wrist    Wrist:  R wrist    Splint type:  Sugar tong    Supplies:  Cotton padding, Ortho-Glass, elastic bandage and sling  Post-procedure details:     Pain:  Unchanged    Sensation:  Normal    Patient tolerance of procedure: Tolerated well, no immediate complications        FINAL IMPRESSION     1. Traumatic rhabdomyolysis, initial encounter ([facility]    2. Hyponatremia    3. MANSOOR (acute kidney injury) ([facility]    4. Other closed fracture of distal end of right radius, initial encounter    5. Hyperkalemia    6.  Type 2 diabetes mellitus with other specified complication, with long-term current use of insulin [facility])          DISPOSITION/PLAN   DISPOSITION Admitted 06/17/2020 03:01:04 PM      PATIENT REFERRED TO:  TRESSA Harrison  [address] [facility] [address]  [address]  [phone]            DISCHARGE

## 2020-06-18 ENCOUNTER — TELEPHONE (OUTPATIENT)
Dept: PRIMARY CARE CLINIC | Age: 60
End: 2020-06-18

## 2020-06-18 VITALS
OXYGEN SATURATION: 98 % | SYSTOLIC BLOOD PRESSURE: 104 MMHG | BODY MASS INDEX: 42.97 KG/M2 | DIASTOLIC BLOOD PRESSURE: 70 MMHG | HEART RATE: 69 BPM | WEIGHT: 220 LBS | RESPIRATION RATE: 16 BRPM | TEMPERATURE: 97.2 F

## 2020-06-18 LAB
ALBUMIN SERPL-MCNC: 3 G/DL (ref 3.5–5.2)
ALP BLD-CCNC: 101 U/L (ref 35–104)
ALT SERPL-CCNC: 21 U/L (ref 5–33)
ANION GAP SERPL CALCULATED.3IONS-SCNC: 10 MMOL/L (ref 7–19)
AST SERPL-CCNC: 44 U/L (ref 5–32)
BASOPHILS ABSOLUTE: 0 K/UL (ref 0–0.2)
BASOPHILS RELATIVE PERCENT: 0.3 % (ref 0–1)
BILIRUB SERPL-MCNC: <0.2 MG/DL (ref 0.2–1.2)
BUN BLDV-MCNC: 31 MG/DL (ref 6–20)
CALCIUM SERPL-MCNC: 7.9 MG/DL (ref 8.6–10)
CHLORIDE BLD-SCNC: 99 MMOL/L (ref 98–111)
CHOLESTEROL, TOTAL: 107 MG/DL (ref 160–199)
CO2: 22 MMOL/L (ref 22–29)
CREAT SERPL-MCNC: 1.5 MG/DL (ref 0.5–0.9)
EKG P AXIS: 42 DEGREES
EKG P-R INTERVAL: 184 MS
EKG Q-T INTERVAL: 406 MS
EKG QRS DURATION: 90 MS
EKG QTC CALCULATION (BAZETT): 427 MS
EKG T AXIS: 38 DEGREES
EOSINOPHILS ABSOLUTE: 0.1 K/UL (ref 0–0.6)
EOSINOPHILS RELATIVE PERCENT: 1.9 % (ref 0–5)
GFR NON-AFRICAN AMERICAN: 36
GLUCOSE BLD-MCNC: 125 MG/DL (ref 74–109)
GLUCOSE BLD-MCNC: 97 MG/DL (ref 70–99)
HBA1C MFR BLD: 6 % (ref 4–6)
HCT VFR BLD CALC: 32.9 % (ref 37–47)
HDLC SERPL-MCNC: 55 MG/DL (ref 65–121)
HEMOGLOBIN: 10.7 G/DL (ref 12–16)
IMMATURE GRANULOCYTES #: 0 K/UL
LDL CHOLESTEROL CALCULATED: 18 MG/DL
LYMPHOCYTES ABSOLUTE: 1.7 K/UL (ref 1.1–4.5)
LYMPHOCYTES RELATIVE PERCENT: 22.2 % (ref 20–40)
MAGNESIUM: 2.1 MG/DL (ref 1.6–2.6)
MCH RBC QN AUTO: 29 PG (ref 27–31)
MCHC RBC AUTO-ENTMCNC: 32.5 G/DL (ref 33–37)
MCV RBC AUTO: 89.2 FL (ref 81–99)
MONOCYTES ABSOLUTE: 0.8 K/UL (ref 0–0.9)
MONOCYTES RELATIVE PERCENT: 11.1 % (ref 0–10)
NEUTROPHILS ABSOLUTE: 4.8 K/UL (ref 1.5–7.5)
NEUTROPHILS RELATIVE PERCENT: 64 % (ref 50–65)
PDW BLD-RTO: 15.4 % (ref 11.5–14.5)
PERFORMED ON: NORMAL
PLATELET # BLD: 214 K/UL (ref 130–400)
PMV BLD AUTO: 11.7 FL (ref 9.4–12.3)
POTASSIUM REFLEX MAGNESIUM: 4.1 MMOL/L (ref 3.5–5)
POTASSIUM SERPL-SCNC: 4.2 MMOL/L (ref 3.5–5)
RBC # BLD: 3.69 M/UL (ref 4.2–5.4)
SODIUM BLD-SCNC: 131 MMOL/L (ref 136–145)
TOTAL CK: 1888 U/L (ref 26–192)
TOTAL PROTEIN: 5.8 G/DL (ref 6.6–8.7)
TRIGL SERPL-MCNC: 170 MG/DL (ref 0–149)
WBC # BLD: 7.5 K/UL (ref 4.8–10.8)

## 2020-06-18 PROCEDURE — G0378 HOSPITAL OBSERVATION PER HR: HCPCS

## 2020-06-18 PROCEDURE — 83036 HEMOGLOBIN GLYCOSYLATED A1C: CPT

## 2020-06-18 PROCEDURE — 36415 COLL VENOUS BLD VENIPUNCTURE: CPT

## 2020-06-18 PROCEDURE — 83735 ASSAY OF MAGNESIUM: CPT

## 2020-06-18 PROCEDURE — 6370000000 HC RX 637 (ALT 250 FOR IP): Performed by: PHYSICIAN ASSISTANT

## 2020-06-18 PROCEDURE — 82550 ASSAY OF CK (CPK): CPT

## 2020-06-18 PROCEDURE — 82947 ASSAY GLUCOSE BLOOD QUANT: CPT

## 2020-06-18 PROCEDURE — 6360000002 HC RX W HCPCS: Performed by: PHYSICIAN ASSISTANT

## 2020-06-18 PROCEDURE — 80061 LIPID PANEL: CPT

## 2020-06-18 PROCEDURE — 96372 THER/PROPH/DIAG INJ SC/IM: CPT

## 2020-06-18 PROCEDURE — 80053 COMPREHEN METABOLIC PANEL: CPT

## 2020-06-18 PROCEDURE — 85025 COMPLETE CBC W/AUTO DIFF WBC: CPT

## 2020-06-18 RX ORDER — ZOLPIDEM TARTRATE 10 MG/1
10 TABLET ORAL NIGHTLY PRN
Qty: 30 TABLET | Refills: 0 | OUTPATIENT
Start: 2020-06-18 | End: 2020-07-18

## 2020-06-18 RX ADMIN — PREGABALIN 150 MG: 150 CAPSULE ORAL at 08:38

## 2020-06-18 RX ADMIN — ASPIRIN 81 MG 81 MG: 81 TABLET ORAL at 08:38

## 2020-06-18 RX ADMIN — LEVOTHYROXINE SODIUM 88 MCG: 88 TABLET ORAL at 07:07

## 2020-06-18 RX ADMIN — HEPARIN SODIUM 5000 UNITS: 5000 INJECTION INTRAVENOUS; SUBCUTANEOUS at 07:06

## 2020-06-18 NOTE — PLAN OF CARE
Problem: Falls - Risk of:  Goal: Will remain free from falls  Description: Will remain free from falls  6/18/2020 0553 by Maite Kim RN  Outcome: Ongoing  6/18/2020 0533 by Maite Kim RN  Outcome: Ongoing  Goal: Absence of physical injury  Description: Absence of physical injury  6/18/2020 0553 by Maite Kim RN  Outcome: Ongoing  6/18/2020 0533 by Maite Kim RN  Outcome: Ongoing     Problem: Pain:  Goal: Pain level will decrease  Description: Pain level will decrease  6/18/2020 0553 by Maite Kim RN  Outcome: Ongoing  6/18/2020 0533 by Maite Kim RN  Outcome: Ongoing  Goal: Control of acute pain  Description: Control of acute pain  6/18/2020 0553 by Maite Kim RN  Outcome: Ongoing  6/18/2020 0533 by Maite Kim RN  Outcome: Ongoing  Goal: Control of chronic pain  Description: Control of chronic pain  6/18/2020 0553 by Maite Kim RN  Outcome: Ongoing  6/18/2020 0533 by Maite Kim RN  Outcome: Ongoing

## 2020-06-18 NOTE — DISCHARGE SUMMARY
Mihaela Weldon  :  1960  MRN:  [id number]    Admit date:  2020  Discharge date:  2020    Discharging Physician:  Ruy Almonte MD    Advance Directive: Full Code    Consults: Dr. Feroz Otero surgery    Primary Care Physician:  Pablo Lopez, TRESSA    Discharge Diagnoses:    Principal Problem:    Closed fracture of right distal radius  Active Problems:    CAD (coronary artery disease)    Hypertension    Hyperlipidemia    Rhabdomyolysis    Hypothyroidism    GERD (gastroesophageal reflux disease)    Chronic kidney disease (CKD) stage G3b/A2, moderately decreased glomerular filtration rate (GFR) between 30-44 mL/min/1.73 square meter and albuminuria creatinine ratio between  mg/g (HCC)    Iron deficiency anemia    Type 2 diabetes mellitus with diabetic polyneuropathy, with long-term current use of insulin ([facility]  Resolved Problems:    * No resolved hospital problems. *    Portions of this note have been copied forward, however, changed to reflect the most current clinical status of this patient. Hospital Course: The patient is a 62 y.o. female who presented to [facility] ED complaining of a fall last night that resulted in right arm pain first noticed this morning. Pain to RUE described as dull, throbbing, constant, made worse with movement, alleviated with rest. Patient is uncertain how she fell. States she did take Ambien and pain medication prior to going to bed last night, got up at some point and thinks she may have tripped on her nightgown which was laying right next to her bed. A family member found her in the floor and covered her with a blanket. Patient states she lost consciousness and woke up in the floor this morning. Work up in ED revealed an acute buckle fracture through the distal radius without intra-articular extension. Lab work up revealed Sodium 125, K 5.1, Crt 1.5, CK 3,193. Ms. Germaine Amezcua was admitted to Hospitalist service, continued on IVF resuscitation. Orthopedic surgery was consulted who recommended non-operative treatment with immobilization and nonweightbearing right upper extremity with follow up in 3 weeks for conversion to short arm brace. At this time CK has dramatically improved to 1,888. Sodium 131. She is stable for discharge home. Recommend patient discontinue use of Valium and Ambien. Blood pressure has been well controlled with Toprol and Norvasc alone. Diuretics and other antihypertensives held. Significant Diagnostic Studies:   Xr Elbow Right (2 Views)  1. No acute bony injury in the right elbow. Signed by Dr Stanislaw Razo on 6/17/2020 2:13 PM     Xr Radius Ulna Right (2 Views)  1. Acute buckle fracture through the distal radius without intra-articular extension. 2. Old ulnar styloid fracture. 3. No other fractures in the forearm. Signed by Dr Stanislaw Razo on 6/17/2020 2:13 PM     Xr Wrist Right (min 3 Views)  1. Acute buckle fracture through the distal radius without intra-articular extension. 2. Old ulnar styloid fracture. 3. No other fractures in the forearm. Signed by Dr Stanislaw Razo on 6/17/2020 2:13 PM     Ct Head Wo Contrast  1. No acute intracranial abnormality. No intracranial hemorrhage. 2. 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 3. Mild cerebral volume loss with probable early chronic small vessel ischemia. Signed by Dr Howie Medeiros on 6/17/2020 2:07 PM     Ct Facial Bones Wo Contrast  1. No facial fracture. Orbital globes intact. Signed by Dr Howie Medeiros on 6/17/2020 2:10 PM     Ct Cervical Spine Wo Contrast  No acute fracture or malalignment.  Signed by Dr Mahogany Curtis on 6/17/2020 2:12 PM    Pertinent Labs:   CBC:   Recent Labs     06/17/20  1345 06/18/20  0223   WBC 11.3* 7.5   HGB 12.0 10.7*    214     BMP:    Recent Labs     06/17/20  1345 06/18/20  0223   * 131*   K 5.1* 4.2  4.1   CL 89* 99   CO2 23 22   BUN 31* 31*   CREATININE 1.5* 1.5*   GLUCOSE 95 125*     Physical

## 2020-06-18 NOTE — CONSULTS
plane with subsequent reconstruction in coronal and sagittal planes. There is no previous study for comparison or correlation. There is no evidence of fracture or compression. A mild anterolisthesis of C4 over C5 was not noted in the previous study. The remaining alignment is normal. The vertebral body heights are normal. Posterior processes and apophyseal joints are unremarkable. The neural foramina spinal hardware patent. The prevertebral soft tissues are normal.    No acute fracture or malalignment.  Signed by Dr Evelyne Clemente on 6/17/2020 2:12 PM    --------------------------------------------------------------------------------------------------------------------    Assessment: Subacute appearing right distal radius extra-articular buckle fracture    Plan:  1) operative treatment with splint immobilization and nonweightbearing of the right upper extremity - we discussed risks, benefits, and alternatives and patient wishes to proceed  2) Admit postop for pain control, PT/OT  3) we will up in 3 weeks for conversion to a short arm brace     Electronically signed by Bishop Farzad MD on 6/18/2020 at 7:10 AM

## 2020-06-19 NOTE — TELEPHONE ENCOUNTER
[facility] Transitions Initial Follow Up Call    Outreach made within 2 business days of discharge: Yes    Patient: J Luis Medina Patient : 1960   MRN: [id number]  Reason for Admission:   Discharge Date: 20       Spoke with: no one/LMTCB    [facility]      Scheduled appointment with PCP within 7-14 days    Follow Up  Future Appointments   Date Time Provider Sita Tong   2020  9:00 AM [address], [address]

## 2020-06-25 ENCOUNTER — OFFICE VISIT (OUTPATIENT)
Dept: PRIMARY CARE CLINIC | Age: 60
End: 2020-06-25
Payer: MEDICARE

## 2020-06-25 ENCOUNTER — TELEPHONE (OUTPATIENT)
Dept: PRIMARY CARE CLINIC | Age: 60
End: 2020-06-25

## 2020-06-25 VITALS
HEART RATE: 85 BPM | WEIGHT: 223 LBS | TEMPERATURE: 98.2 F | SYSTOLIC BLOOD PRESSURE: 98 MMHG | DIASTOLIC BLOOD PRESSURE: 70 MMHG | OXYGEN SATURATION: 98 % | BODY MASS INDEX: 43.55 KG/M2

## 2020-06-25 DIAGNOSIS — E87.5 HYPERKALEMIA: ICD-10-CM

## 2020-06-25 DIAGNOSIS — E87.1 HYPONATREMIA: ICD-10-CM

## 2020-06-25 PROBLEM — F10.10 ALCOHOL ABUSE: Status: ACTIVE | Noted: 2020-06-25

## 2020-06-25 LAB
ALBUMIN SERPL-MCNC: 3.8 G/DL (ref 3.5–5.2)
ALP BLD-CCNC: 122 U/L (ref 35–104)
ALT SERPL-CCNC: 18 U/L (ref 5–33)
ANION GAP SERPL CALCULATED.3IONS-SCNC: 14 MMOL/L (ref 7–19)
AST SERPL-CCNC: 20 U/L (ref 5–32)
BASOPHILS ABSOLUTE: 0 K/UL (ref 0–0.2)
BASOPHILS RELATIVE PERCENT: 0.4 % (ref 0–1)
BILIRUB SERPL-MCNC: <0.2 MG/DL (ref 0.2–1.2)
BUN BLDV-MCNC: 19 MG/DL (ref 6–20)
CALCIUM SERPL-MCNC: 9.1 MG/DL (ref 8.6–10)
CHLORIDE BLD-SCNC: 99 MMOL/L (ref 98–111)
CO2: 25 MMOL/L (ref 22–29)
CREAT SERPL-MCNC: 1.4 MG/DL (ref 0.5–0.9)
EOSINOPHILS ABSOLUTE: 0.2 K/UL (ref 0–0.6)
EOSINOPHILS RELATIVE PERCENT: 2 % (ref 0–5)
GFR NON-AFRICAN AMERICAN: 38
GLUCOSE BLD-MCNC: 223 MG/DL (ref 74–109)
HCT VFR BLD CALC: 38 % (ref 37–47)
HEMOGLOBIN: 12 G/DL (ref 12–16)
IMMATURE GRANULOCYTES #: 0 K/UL
LYMPHOCYTES ABSOLUTE: 1.1 K/UL (ref 1.1–4.5)
LYMPHOCYTES RELATIVE PERCENT: 13.2 % (ref 20–40)
MAGNESIUM: 2.1 MG/DL (ref 1.6–2.6)
MCH RBC QN AUTO: 29.3 PG (ref 27–31)
MCHC RBC AUTO-ENTMCNC: 31.6 G/DL (ref 33–37)
MCV RBC AUTO: 92.7 FL (ref 81–99)
MONOCYTES ABSOLUTE: 0.5 K/UL (ref 0–0.9)
MONOCYTES RELATIVE PERCENT: 6.1 % (ref 0–10)
NEUTROPHILS ABSOLUTE: 6.4 K/UL (ref 1.5–7.5)
NEUTROPHILS RELATIVE PERCENT: 77.9 % (ref 50–65)
PDW BLD-RTO: 15.4 % (ref 11.5–14.5)
PLATELET # BLD: 268 K/UL (ref 130–400)
PMV BLD AUTO: 11.9 FL (ref 9.4–12.3)
POTASSIUM SERPL-SCNC: 4.3 MMOL/L (ref 3.5–5)
RBC # BLD: 4.1 M/UL (ref 4.2–5.4)
SODIUM BLD-SCNC: 138 MMOL/L (ref 136–145)
TOTAL PROTEIN: 6.9 G/DL (ref 6.6–8.7)
WBC # BLD: 8.2 K/UL (ref 4.8–10.8)

## 2020-06-25 PROCEDURE — 99496 TRANSJ CARE MGMT HIGH F2F 7D: CPT | Performed by: NURSE PRACTITIONER

## 2020-06-25 PROCEDURE — 1111F DSCHRG MED/CURRENT MED MERGE: CPT | Performed by: NURSE PRACTITIONER

## 2020-06-25 ASSESSMENT — ENCOUNTER SYMPTOMS
SINUS PRESSURE: 0
COUGH: 0
TROUBLE SWALLOWING: 0
SHORTNESS OF BREATH: 0
DIARRHEA: 0
NAUSEA: 0
VOMITING: 0
ANAL BLEEDING: 0
CONSTIPATION: 0
WHEEZING: 0

## 2020-06-25 NOTE — PROGRESS NOTES
Post-Discharge Transitional Care Management Services or Hospital Follow Up      Arik Patterson   YOB: 1960    Date of Office Visit:  6/25/2020  Date of Hospital Admission: 6/17/20  Date of Hospital Discharge: 6/18/20  Readmission Risk Score(high >=14%. Medium >=10%):Readmission Risk Score: 23      Care management risk score Rising risk (score 2-5) and Complex Care (Scores >=6): 6      Consults: Dr. Cris Elizondo surgery     Primary Care Physician:  TRESSA Calabrese     Discharge Diagnoses:    Principal Problem:    Closed fracture of right distal radius  Active Problems:    CAD (coronary artery disease)    Hypertension    Hyperlipidemia    Rhabdomyolysis    Hypothyroidism    GERD (gastroesophageal reflux disease)    Chronic kidney disease (CKD) stage G3b/A2, moderately decreased glomerular filtration rate (GFR) between 30-44 mL/min/1.73 square meter and albuminuria creatinine ratio between  mg/g (HCC)    Iron deficiency anemia    Type 2 diabetes mellitus with diabetic polyneuropathy, with long-term current use of insulin ([facility] [address]  Resolved Problems:    * No resolved hospital problems. *     Portions of this note have been copied forward, however, changed to reflect the most current clinical status of this patient.     Hospital Course: The patient is a 62 y.o. female who presented to [facility] ED complaining of a fall last night that resulted in right arm pain first noticed this morning. Pain to RUE described as dull, throbbing, constant, made worse with movement, alleviated with rest. Patient is uncertain how she fell. States she did take Ambien and pain medication prior to going to bed last night, got up at some point and thinks she may have tripped on her nightgown which was laying right next to her bed. A family member found her in the floor and covered her with a blanket. Patient states she lost consciousness and woke up in the floor this morning.  Work up in ED Alcohol abuse       Allergies   Allergen Reactions    Ciprofloxacin Hives     HTN    Dilaudid [Hydromorphone Hcl] Other (See Comments)     Takes pt out of her mind. hallunications. Pt stateshas taken this hospitalization and has not had any problems    Keflex [Cephalexin] Hives    Nitrofuran Derivatives Hives    Pcn [Penicillins] Hives    Cefdinir Rash       Medications listed as ordered at the time of discharge from hospital   Mitch Fuchs   Home Medication Instructions ENA:    Printed on:06/25/20 7679   Medication Information                      amitriptyline (ELAVIL) 100 MG tablet  Take 2 tablets by mouth nightly             amLODIPine (NORVASC) 5 MG tablet  Take 1 tablet by mouth daily             aspirin 81 MG tablet  Take 1 tablet by mouth daily             atorvastatin (LIPITOR) 40 MG tablet  Take 1 tablet by mouth nightly             Blood Glucose Monitoring Suppl (FREESTYLE LITE) JEZ  1 Device by Does not apply route 4 times daily             blood glucose test strips (FREESTYLE LITE) strip  1 each by In Vitro route 4 times daily As needed.              Dulaglutide (TRULICITY) 1.5 UV/5.6II SOPN  Inject 1.5 mg into the skin once a week             DULoxetine (CYMBALTA) 60 MG extended release capsule  Take 1 capsule by mouth 2 times daily             fluticasone (FLONASE) 50 MCG/ACT nasal spray  1 spray by Nasal route daily             insulin aspart (NOVOLOG FLEXPEN) 100 UNIT/ML injection pen  Inject into the skin 3 times daily (before meals) Sliding scale             Insulin Pen Needle ([employer] PEN NEEDLES 31G) 31G X 8 MM MISC  1 each by Does not apply route daily             Insulin Pen Needle 31G X 8 MM MISC  1 each by Does not apply route 4 times daily             Lancets MISC  1 each by Does not apply route daily             levothyroxine (SYNTHROID) 88 MCG tablet  Take 1 tablet by mouth Daily Tube feeding (TF) interaction, obtain physician order to manage, recommend holding TF for pain up to max of 3 total doses. If no relief after 1 dose, call 911. 25 tablet 3    QUEtiapine (SEROQUEL) 200 MG tablet Take 1 tablet by mouth nightly 90 tablet 3    fluticasone (FLONASE) 50 MCG/ACT nasal spray 1 spray by Nasal route daily 3 Bottle 3    levothyroxine (SYNTHROID) 88 MCG tablet Take 1 tablet by mouth Daily Tube feeding (TF) interaction, obtain physician order to manage, recommend holding TF for 30 minutes before and after dose. 90 tablet 3    pregabalin (LYRICA) 150 MG capsule Take 1 capsule by mouth 2 times daily for 180 days. 180 capsule 1    pantoprazole (PROTONIX) 40 MG tablet Take 1 tablet by mouth daily 90 tablet 3    Insulin Pen Needle ([employer] PEN NEEDLES 31G) 31G X 8 MM MISC 1 each by Does not apply route daily 100 each 3    Dulaglutide (TRULICITY) 1.5 RI/5.8PU SOPN Inject 1.5 mg into the skin once a week (Patient taking differently: Inject 1.5 mg into the skin once a week Will be taking last dose this week because insurance no longer covers) 12 pen 3    aspirin 81 MG tablet Take 1 tablet by mouth daily 90 tablet 3    Insulin Pen Needle 31G X 8 MM MISC 1 each by Does not apply route 4 times daily 200 each 11    Blood Glucose Monitoring Suppl (FREESTYLE LITE) JEZ 1 Device by Does not apply route 4 times daily 1 Device 0        Medications patient taking as of now reconciled against medications ordered at time of hospital discharge: Yes    Chief Complaint   Patient presents with    Follow-Up from [address] [place] [address], [address], RIGHT ARM IS NOW IN CAST. HPI    Inpatient course: Discharge summary reviewed- see chart. Interval history/Current status: STABLE    Review of Systems   Constitutional: Positive for activity change. Negative for appetite change, fatigue and fever. HENT: Negative for congestion, postnasal drip, sinus pressure and trouble swallowing. Respiratory: Negative for cough, shortness of breath and wheezing.     Cardiovascular: Negative for chest pain and

## 2020-06-25 NOTE — TELEPHONE ENCOUNTER
----- Message from TRESSA Titus sent at 6/25/2020  1:09 PM CDT -----  Magnesium normal  Sodium normal  Glucose 223 cont tight control   Kidneys stable  Alk phos elevated most likely due to your arm fracture  Will recheck cmp at follow up

## 2020-07-06 RX ORDER — PREGABALIN 150 MG/1
150 CAPSULE ORAL 2 TIMES DAILY
Qty: 60 CAPSULE | Refills: 2 | Status: SHIPPED | OUTPATIENT
Start: 2020-07-06 | End: 2020-07-07 | Stop reason: SDUPTHER

## 2020-07-06 NOTE — TELEPHONE ENCOUNTER
Received fax from pharmacy requesting refill on pts medication(s). Pt was last seen in office on 6/25/2020  and has a follow up scheduled for 7/9/2020. Will send request to  [facility]  for authorization. Requested Prescriptions     Pending Prescriptions Disp Refills    pregabalin (LYRICA) 150 MG capsule [Pharmacy Med Name: Pregabalin 150 MG Oral Capsule] 60 capsule 2     Sig: Take 1 capsule by mouth 2 times daily for 90 days.

## 2020-07-07 RX ORDER — PREGABALIN 150 MG/1
150 CAPSULE ORAL 2 TIMES DAILY
Qty: 60 CAPSULE | Refills: 2 | Status: SHIPPED | OUTPATIENT
Start: 2020-07-07 | End: 2020-10-13 | Stop reason: SDUPTHER

## 2020-07-09 ENCOUNTER — OFFICE VISIT (OUTPATIENT)
Dept: PRIMARY CARE CLINIC | Age: 60
End: 2020-07-09
Payer: MEDICARE

## 2020-07-09 ENCOUNTER — TELEPHONE (OUTPATIENT)
Dept: PRIMARY CARE CLINIC | Age: 60
End: 2020-07-09

## 2020-07-09 VITALS
TEMPERATURE: 96.6 F | HEART RATE: 61 BPM | BODY MASS INDEX: 42.58 KG/M2 | DIASTOLIC BLOOD PRESSURE: 34 MMHG | OXYGEN SATURATION: 91 % | SYSTOLIC BLOOD PRESSURE: 98 MMHG | WEIGHT: 218 LBS

## 2020-07-09 DIAGNOSIS — F10.10 ALCOHOL ABUSE: ICD-10-CM

## 2020-07-09 DIAGNOSIS — N18.32 CHRONIC KIDNEY DISEASE (CKD) STAGE G3B/A2, MODERATELY DECREASED GLOMERULAR FILTRATION RATE (GFR) BETWEEN 30-44 ML/MIN/1.73 SQUARE METER AND ALBUMINURIA CREATININE RATIO BETWEEN 30-299 MG/G (HCC): ICD-10-CM

## 2020-07-09 DIAGNOSIS — E87.1 HYPONATREMIA: ICD-10-CM

## 2020-07-09 DIAGNOSIS — E87.5 HYPERKALEMIA: ICD-10-CM

## 2020-07-09 DIAGNOSIS — E11.42 DIABETIC POLYNEUROPATHY ASSOCIATED WITH TYPE 2 DIABETES MELLITUS (HCC): ICD-10-CM

## 2020-07-09 LAB
ALBUMIN SERPL-MCNC: 3 G/DL (ref 3.5–5.2)
ALP BLD-CCNC: 132 U/L (ref 35–104)
ALT SERPL-CCNC: 26 U/L (ref 5–33)
ANION GAP SERPL CALCULATED.3IONS-SCNC: 15 MMOL/L (ref 7–19)
AST SERPL-CCNC: 40 U/L (ref 5–32)
BASOPHILS ABSOLUTE: 0.1 K/UL (ref 0–0.2)
BASOPHILS RELATIVE PERCENT: 0.8 % (ref 0–1)
BILIRUB SERPL-MCNC: 0.3 MG/DL (ref 0.2–1.2)
BUN BLDV-MCNC: 35 MG/DL (ref 6–20)
CALCIUM SERPL-MCNC: 8.8 MG/DL (ref 8.6–10)
CHLORIDE BLD-SCNC: 99 MMOL/L (ref 98–111)
CO2: 22 MMOL/L (ref 22–29)
CREAT SERPL-MCNC: 1.7 MG/DL (ref 0.5–0.9)
EOSINOPHILS ABSOLUTE: 0.2 K/UL (ref 0–0.6)
EOSINOPHILS RELATIVE PERCENT: 2.2 % (ref 0–5)
GFR NON-AFRICAN AMERICAN: 31
GLUCOSE BLD-MCNC: 176 MG/DL (ref 74–109)
HCT VFR BLD CALC: 34.9 % (ref 37–47)
HEMOGLOBIN: 11 G/DL (ref 12–16)
IMMATURE GRANULOCYTES #: 0.4 K/UL
LYMPHOCYTES ABSOLUTE: 1.4 K/UL (ref 1.1–4.5)
LYMPHOCYTES RELATIVE PERCENT: 15 % (ref 20–40)
MCH RBC QN AUTO: 28.9 PG (ref 27–31)
MCHC RBC AUTO-ENTMCNC: 31.5 G/DL (ref 33–37)
MCV RBC AUTO: 91.8 FL (ref 81–99)
MONOCYTES ABSOLUTE: 0.8 K/UL (ref 0–0.9)
MONOCYTES RELATIVE PERCENT: 8.5 % (ref 0–10)
NEUTROPHILS ABSOLUTE: 6.3 K/UL (ref 1.5–7.5)
NEUTROPHILS RELATIVE PERCENT: 68.8 % (ref 50–65)
PDW BLD-RTO: 15.9 % (ref 11.5–14.5)
PLATELET # BLD: 306 K/UL (ref 130–400)
PMV BLD AUTO: 11.9 FL (ref 9.4–12.3)
POTASSIUM SERPL-SCNC: 4.7 MMOL/L (ref 3.5–5)
RBC # BLD: 3.8 M/UL (ref 4.2–5.4)
SODIUM BLD-SCNC: 136 MMOL/L (ref 136–145)
TOTAL PROTEIN: 6.5 G/DL (ref 6.6–8.7)
WBC # BLD: 9.1 K/UL (ref 4.8–10.8)

## 2020-07-09 PROCEDURE — 99214 OFFICE O/P EST MOD 30 MIN: CPT | Performed by: NURSE PRACTITIONER

## 2020-07-09 ASSESSMENT — ENCOUNTER SYMPTOMS
SINUS PRESSURE: 0
ANAL BLEEDING: 0
VOMITING: 0
CONSTIPATION: 0
WHEEZING: 0
SHORTNESS OF BREATH: 0
TROUBLE SWALLOWING: 0
NAUSEA: 0
DIARRHEA: 0
COUGH: 0

## 2020-07-09 ASSESSMENT — PATIENT HEALTH QUESTIONNAIRE - PHQ9
1. LITTLE INTEREST OR PLEASURE IN DOING THINGS: 0
2. FEELING DOWN, DEPRESSED OR HOPELESS: 0
SUM OF ALL RESPONSES TO PHQ QUESTIONS 1-9: 0
SUM OF ALL RESPONSES TO PHQ9 QUESTIONS 1 & 2: 0
SUM OF ALL RESPONSES TO PHQ QUESTIONS 1-9: 0

## 2020-07-09 NOTE — TELEPHONE ENCOUNTER
Sent pt a My Chart Message with Normal lab results. Asked that they call office back if they have any questions or concerns.

## 2020-07-09 NOTE — PROGRESS NOTES
[address]  [place], [address] [place] [address] Rd  Phone [phone]   Fax [phone]      OFFICE VISIT: 2020    Mitch Olivarez-Destiney- : 1960      Reason For Visit:  Jagdish Wilde is a 62 y.o. femalewho is here for Follow-up (for falls, ckd and diabetes.)         Health Maintenance     HPI    Patient is here for 2 week follow up on falls  Reports no more falls    She reports she is drinking lots of fluids  But she feels dry mouth  She reports she is trying to increase fluids    Reports her glucose levels  Doing well at present  And staying under 150 generally    Weight stable    Alcohol   Denies drinking she is doing well  Noted it was worse with falls after drinking               weight is 218 lb (98.9 kg). Her temporal temperature is 96.6 °F (35.9 °C). Her blood pressure is 98/34 (abnormal) and her pulse is 61. Her oxygen saturation is 91%. Body mass index is 42.58 kg/m².     Results for orders placed or performed in visit on 20   Magnesium   Result Value Ref Range    Magnesium 2.1 1.6 - 2.6 mg/dL   Comprehensive Metabolic Panel   Result Value Ref Range    Sodium 138 136 - 145 mmol/L    Potassium 4.3 3.5 - 5.0 mmol/L    Chloride 99 98 - 111 mmol/L    CO2 25 22 - 29 mmol/L    Anion Gap 14 7 - 19 mmol/L    Glucose 223 (H) 74 - 109 mg/dL    BUN 19 6 - 20 mg/dL    CREATININE 1.4 (H) 0.5 - 0.9 mg/dL    GFR Non- 38 (A) >60    Calcium 9.1 8.6 - 10.0 mg/dL    Total Protein 6.9 6.6 - 8.7 g/dL    Alb 3.8 3.5 - 5.2 g/dL    Total Bilirubin <0.2 0.2 - 1.2 mg/dL    Alkaline Phosphatase 122 (H) 35 - 104 U/L    ALT 18 5 - 33 U/L    AST 20 5 - 32 U/L   CBC Auto Differential   Result Value Ref Range    WBC 8.2 4.8 - 10.8 K/uL    RBC 4.10 (L) 4.20 - 5.40 M/uL    Hemoglobin 12.0 12.0 - 16.0 g/dL    Hematocrit 38.0 37.0 - 47.0 %    MCV 92.7 81.0 - 99.0 fL    MCH 29.3 27.0 - 31.0 pg    MCHC 31.6 (L) 33.0 - 37.0 g/dL    RDW 15.4 (H) 11.5 - 14.5 %    Platelets 294 049 - 418 K/uL    MPV 11.9 9.4 - 12.3 fL Neutrophils % 77.9 (H) 50.0 - 65.0 %    Lymphocytes % 13.2 (L) 20.0 - 40.0 %    Monocytes % 6.1 0.0 - 10.0 %    Eosinophils % 2.0 0.0 - 5.0 %    Basophils % 0.4 0.0 - 1.0 %    Neutrophils Absolute 6.4 1.5 - 7.5 K/uL    Immature Granulocytes # 0.0 K/uL    Lymphocytes Absolute 1.1 1.1 - 4.5 K/uL    Monocytes Absolute 0.50 0.00 - 0.90 K/uL    Eosinophils Absolute 0.20 0.00 - 0.60 K/uL    Basophils Absolute 0.00 0.00 - 0.20 K/uL       I have reviewed the following with the Ms. Fuchs   Lab Review   Orders Only on 06/25/2020   Component Date Value    Magnesium 06/25/2020 2.1     Sodium 06/25/2020 138     Potassium 06/25/2020 4.3     Chloride 06/25/2020 99     CO2 06/25/2020 25     Anion Gap 06/25/2020 14     Glucose 06/25/2020 223*    BUN 06/25/2020 19     CREATININE 06/25/2020 1.4*    GFR Non- 06/25/2020 38*    Calcium 06/25/2020 9.1     Total Protein 06/25/2020 6.9     Alb 06/25/2020 3.8     Total Bilirubin 06/25/2020 <0.2     Alkaline Phosphatase 06/25/2020 122*    ALT 06/25/2020 18     AST 06/25/2020 20     WBC 06/25/2020 8.2     RBC 06/25/2020 4.10*    Hemoglobin 06/25/2020 12.0     Hematocrit 06/25/2020 38.0     MCV 06/25/2020 92.7     MCH 06/25/2020 29.3     MCHC 06/25/2020 31.6*    RDW 06/25/2020 15.4*    Platelets 25/00/0944 268     MPV 06/25/2020 11.9     Neutrophils % 06/25/2020 77.9*    Lymphocytes % 06/25/2020 13.2*    Monocytes % 06/25/2020 6.1     Eosinophils % 06/25/2020 2.0     Basophils % 06/25/2020 0.4     Neutrophils Absolute 06/25/2020 6.4     Immature Granulocytes # 06/25/2020 0.0     Lymphocytes Absolute 06/25/2020 1.1     Monocytes Absolute 06/25/2020 0.50     Eosinophils Absolute 06/25/2020 0.20     Basophils Absolute 06/25/2020 0.00    Admission on 06/17/2020, Discharged on 06/18/2020   Component Date Value    P-R Interval 06/17/2020 184     QRS Duration 06/17/2020 90     Q-T Interval 06/17/2020 406     QTc Calculation (Bazett) 06/17/2020 427     P Axis 06/17/2020 42     T Axis 06/17/2020 38     WBC 06/17/2020 11.3*    RBC 06/17/2020 4.15*    Hemoglobin 06/17/2020 12.0     Hematocrit 06/17/2020 36.5*    MCV 06/17/2020 88.0     MCH 06/17/2020 28.9     MCHC 06/17/2020 32.9*    RDW 06/17/2020 15.0*    Platelets 17/44/2859 277     MPV 06/17/2020 11.2     Neutrophils % 06/17/2020 75.0*    Lymphocytes % 06/17/2020 14.3*    Monocytes % 06/17/2020 9.0     Eosinophils % 06/17/2020 0.9     Basophils % 06/17/2020 0.4     Neutrophils Absolute 06/17/2020 8.5*    Immature Granulocytes # 06/17/2020 0.0     Lymphocytes Absolute 06/17/2020 1.6     Monocytes Absolute 06/17/2020 1.00*    Eosinophils Absolute 06/17/2020 0.10     Basophils Absolute 06/17/2020 0.00     Sodium 06/17/2020 125*    Potassium 06/17/2020 5.1*    Chloride 06/17/2020 89*    CO2 06/17/2020 23     Anion Gap 06/17/2020 13     Glucose 06/17/2020 95     BUN 06/17/2020 31*    CREATININE 06/17/2020 1.5*    GFR Non- 06/17/2020 36*    Calcium 06/17/2020 8.4*    Total Protein 06/17/2020 6.9     Alb 06/17/2020 3.6     Total Bilirubin 06/17/2020 0.3     Alkaline Phosphatase 06/17/2020 118*    ALT 06/17/2020 26     AST 06/17/2020 57*    Total CK 06/17/2020 3,193*    Troponin 06/17/2020 <0.01     Sodium, Ur 06/17/2020 33.0     Osmolality 06/17/2020 275     TSH Reflex FT4 06/17/2020 1.67     Osmolality, Ur 06/17/2020 204*    Creatinine, Ur 06/17/2020 26.0     Color, UA 06/17/2020 YELLOW     Clarity, UA 06/17/2020 Clear     Glucose, Ur 06/17/2020 Negative     Bilirubin Urine 06/17/2020 Negative     Ketones, Urine 06/17/2020 Negative     Specific Gravity, UA 06/17/2020 1.007     Blood, Urine 06/17/2020 Negative     pH, UA 06/17/2020 6.0     Protein, UA 06/17/2020 Negative     Urobilinogen, Urine 06/17/2020 0.2     Nitrite, Urine 06/17/2020 Negative     Leukocyte Esterase, Urine 06/17/2020 TRACE*    Bacteria, UA 06/17/2020 TRACE*  Yeast, UA 06/17/2020 NEG*    Hyaline Casts, UA 06/17/2020 1     WBC, UA 06/17/2020 6*    RBC, UA 06/17/2020 0     Epithelial Cells, UA 06/17/2020 2     POC Glucose 06/17/2020 87     Performed on 06/17/2020 AccuChek     Sodium 06/18/2020 131*    Potassium reflex Magnesi* 06/18/2020 4.1     Chloride 06/18/2020 99     CO2 06/18/2020 22     Anion Gap 06/18/2020 10     Glucose 06/18/2020 125*    BUN 06/18/2020 31*    CREATININE 06/18/2020 1.5*    GFR Non- 06/18/2020 36*    Calcium 06/18/2020 7.9*    Total Protein 06/18/2020 5.8*    Alb 06/18/2020 3.0*    Total Bilirubin 06/18/2020 <0.2     Alkaline Phosphatase 06/18/2020 101     ALT 06/18/2020 21     AST 06/18/2020 44*    WBC 06/18/2020 7.5     RBC 06/18/2020 3.69*    Hemoglobin 06/18/2020 10.7*    Hematocrit 06/18/2020 32.9*    MCV 06/18/2020 89.2     MCH 06/18/2020 29.0     MCHC 06/18/2020 32.5*    RDW 06/18/2020 15.4*    Platelets 63/22/3819 214     MPV 06/18/2020 11.7     Neutrophils % 06/18/2020 64.0     Lymphocytes % 06/18/2020 22.2     Monocytes % 06/18/2020 11.1*    Eosinophils % 06/18/2020 1.9     Basophils % 06/18/2020 0.3     Neutrophils Absolute 06/18/2020 4.8     Immature Granulocytes # 06/18/2020 0.0     Lymphocytes Absolute 06/18/2020 1.7     Monocytes Absolute 06/18/2020 0.80     Eosinophils Absolute 06/18/2020 0.10     Basophils Absolute 06/18/2020 0.00     Total CK 06/18/2020 1,888*    Potassium 06/18/2020 4.2     Magnesium 06/18/2020 2.1     Hemoglobin A1C 06/18/2020 6.0     Cholesterol, Total 06/18/2020 107*    Triglycerides 06/18/2020 170*    HDL 06/18/2020 55*    LDL Calculated 06/18/2020 18     POC Glucose 06/17/2020 115*    Performed on 06/17/2020 AccuChek     POC Glucose 06/18/2020 97     Performed on 06/18/2020 AccuChek    Orders Only on 04/06/2020   Component Date Value    pH, UA 04/01/2020 5.5     Clarity, UA 04/01/2020 Clear     Protein, UA 04/01/2020 Negative     Glucose, Ur 04/01/2020 neg     Ketones, Urine 04/01/2020 Negative     Bilirubin, Urine 04/01/2020 Negative     Urobilinogen, Urine 04/01/2020 Normal     Nitrite, Urine 04/01/2020 Negative     Specific Gravity, Urine 04/01/2020 1.008     Color, UA 04/01/2020 Yellow     Leukocyte Esterase, Urine 04/01/2020 Negative     Blood, Urine 04/01/2020 Negative    Orders Only on 03/31/2020   Component Date Value    Lipase 03/31/2020 61*    Amylase 03/31/2020 64     Sodium 03/31/2020 132*    Potassium 03/31/2020 3.6     Chloride 03/31/2020 93*    CO2 03/31/2020 24     Anion Gap 03/31/2020 15     Glucose 03/31/2020 157*    BUN 03/31/2020 16     CREATININE 03/31/2020 1.2*    GFR Non- 03/31/2020 46*    Calcium 03/31/2020 8.9     Total Protein 03/31/2020 7.2     Alb 03/31/2020 3.8     Total Bilirubin 03/31/2020 0.4     Alkaline Phosphatase 03/31/2020 100     ALT 03/31/2020 60*    AST 03/31/2020 56*    WBC 03/31/2020 10.2     RBC 03/31/2020 4.44     Hemoglobin 03/31/2020 12.5     Hematocrit 03/31/2020 40.2     MCV 03/31/2020 90.5     MCH 03/31/2020 28.2     MCHC 03/31/2020 31.1*    RDW 03/31/2020 16.7*    Platelets 84/81/5795 352     MPV 03/31/2020 11.6     Neutrophils % 03/31/2020 79.5*    Lymphocytes % 03/31/2020 10.2*    Monocytes % 03/31/2020 7.9     Eosinophils % 03/31/2020 1.4     Basophils % 03/31/2020 0.6     Neutrophils Absolute 03/31/2020 8.1*    Immature Granulocytes # 03/31/2020 0.0     Lymphocytes Absolute 03/31/2020 1.0*    Monocytes Absolute 03/31/2020 0.80     Eosinophils Absolute 03/31/2020 0.10     Basophils Absolute 03/31/2020 0.10    Orders Only on 03/25/2020   Component Date Value    Ethanol Lvl 03/25/2020 <10     Hemoglobin A1C 03/25/2020 6.1*    Sodium 03/25/2020 138     Potassium 03/25/2020 3.8     Chloride 03/25/2020 101     CO2 03/25/2020 21*    Anion Gap 03/25/2020 16     Glucose 03/25/2020 127*    BUN 03/25/2020 18     CREATININE 03/25/2020 1.1*    GFR Non- 03/25/2020 51*    Calcium 03/25/2020 9.7     Total Protein 03/25/2020 7.1     Alb 03/25/2020 3.8     Total Bilirubin 03/25/2020 0.4     Alkaline Phosphatase 03/25/2020 90     ALT 03/25/2020 110*    AST 03/25/2020 95*    Lipase 03/25/2020 225*    Amylase 03/25/2020 112*   Orders Only on 03/20/2020   Component Date Value    Sodium 03/20/2020 123*    Potassium 03/20/2020 4.5     Chloride 03/20/2020 87*    CO2 03/20/2020 18*    Anion Gap 03/20/2020 18     Glucose 03/20/2020 132*    BUN 03/20/2020 19     CREATININE 03/20/2020 1.2*    GFR Non- 03/20/2020 46*    Calcium 03/20/2020 8.2*    Total Protein 03/20/2020 7.1     Alb 03/20/2020 3.9     Total Bilirubin 03/20/2020 0.6     Alkaline Phosphatase 03/20/2020 100     ALT 03/20/2020 185*    AST 03/20/2020 772*    Hemoglobin A1C 03/20/2020 6.1*    TSH 03/20/2020 3.630     T4 Free 03/20/2020 1.44     Microalbumin, Random Uri* 03/20/2020 2.90     Creatinine, Ur 03/20/2020 17.3     Microalbumin Creatinine * 03/20/2020 167.6    Office Visit on 01/20/2020   Component Date Value    Amphetamine Screen, Urine 01/20/2020 neg     Benzodiazepine Screen, U* 01/20/2020 pos     Cocaine Metabolite Scree* 01/20/2020 neg     MDMA, Urine 01/20/2020 neg     Methamphetamine, Urine 01/20/2020 neg     Methadone Screen, Urine 01/20/2020 neg     Opiate Scrn, Ur 01/20/2020 pos     Oxycodone Screen, Ur 01/20/2020 neg     THC Screen, Urine 01/20/2020 neg      Copies of these are in the chart. Prior to Visit Medications    Medication Sig Taking? Authorizing Provider   pregabalin (LYRICA) 150 MG capsule Take 1 capsule by mouth 2 times daily for 90 days.  Yes TRESSA Nolan   insulin aspart (NOVOLOG FLEXPEN) 100 UNIT/ML injection pen Inject into the skin 3 times daily (before meals) Sliding scale Yes Historical Provider, MD   amitriptyline (ELAVIL) 100 MG tablet Take 2 tablets by mouth nightly Yes TRESSA Calderon   tiZANidine (ZANAFLEX) 4 MG tablet Take 1 tablet by mouth every 8 hours as needed (spasms) Yes TRESSA Calderon   amLODIPine (NORVASC) 5 MG tablet Take 1 tablet by mouth daily  Patient taking differently: Take 5 mg by mouth nightly  Yes TRESSA Calderon   atorvastatin (LIPITOR) 40 MG tablet Take 1 tablet by mouth nightly Yes TRESSA Calderon   blood glucose test strips (FREESTYLE LITE) strip 1 each by In Vitro route 4 times daily As needed. Yes TRESSA Calderon   Lancets MISC 1 each by Does not apply route daily Yes TRESSA Calderon   metoprolol succinate (TOPROL XL) 50 MG extended release tablet Take 1 tablet by mouth daily Yes TRESSA Calderon   DULoxetine (CYMBALTA) 60 MG extended release capsule Take 1 capsule by mouth 2 times daily Yes TRESSA Calderon   nitroGLYCERIN (NITROSTAT) 0.4 MG SL tablet Place 1 tablet under the tongue every 5 minutes as needed for Chest pain up to max of 3 total doses. If no relief after 1 dose, call 911. Yes TRESSA Calderon   QUEtiapine (SEROQUEL) 200 MG tablet Take 1 tablet by mouth nightly Yes TRESSA Calderon   fluticasone [facility]) 50 MCG/ACT nasal spray 1 spray by Nasal route daily Yes TRESSA Calderon   levothyroxine (SYNTHROID) 88 MCG tablet Take 1 tablet by mouth Daily Tube feeding (TF) interaction, obtain physician order to manage, recommend holding TF for 30 minutes before and after dose.  Yes TRESSA Calderon   pantoprazole (PROTONIX) 40 MG tablet Take 1 tablet by mouth daily Yes TRESSA Calderon   Insulin Pen Needle ([employer] PEN NEEDLES 31G) 31G X 8 MM MISC 1 each by Does not apply route daily Yes TRESSA Calderon   Dulaglutide (TRULICITY) 1.5 VV/9.5WQ SOPN Inject 1.5 mg into the skin once a week  Patient taking differently: Inject 1.5 mg into the skin once a week Will be taking last dose this week because insurance no longer covers Yes Johnathon Carpenter TRESSA   aspirin 81 MG tablet Take 1 tablet by mouth daily Yes TRESSA Rivera   Insulin Pen Needle 31G X 8 MM MISC 1 each by Does not apply route 4 times daily Yes TRESSA Rivera   Blood Glucose Monitoring Suppl (FREESTYLE LITE) JEZ 1 Device by Does not apply route 4 times daily Yes TRESSA Rivera       Allergies: Ciprofloxacin; Dilaudid [hydromorphone hcl]; Keflex [cephalexin];  Nitrofuran derivatives; Pcn [penicillins]; and Cefdinir    Past Medical History:   Diagnosis Date    Anxiety     Arthritis     [facility]     CAD (coronary artery disease)     CH[facility]congestive heart failure) ([facility])     Chronic kidney disease (CKD) stage G3b/A2, moderately decreased glomerular filtration rate (GFR) between 30-44 mL/min/1.73 square meter and albuminuria creatinine ratio between  mg/g ([facility] 2016    Depression     DM (diabetes mellitus) ([facility])     GERD (gastroesophageal reflux disease)     History of blood transfusion     History of suicidal ideation     Hyperlipidemia     Hypertension     Hypothyroidism     Insomnia     Kidney disease     stage 3 failure    MI, old 2005    Obesity     Polyarticular arthritis     Type II or unspecified type diabetes mellitus without mention of complication, not stated as uncontrolled        Past Surgical History:   Procedure Laterality Date    ABDOMINOPLASTY      ANGIOPLASTY  05    stent placement to LAD and diagonal with normal left vent function    BREAST REDUCTION SURGERY      CARDIAC CATHETERIZATION  05, 05    see report  Stents x3    CARDIAC CATHETERIZATION  3/23/15  JDT    EF 50%    CARPAL TUNNEL RELEASE       SECTION      x2    CHOLECYSTECTOMY      GASTRIC BYPASS SURGERY      HERNIA REPAIR      umbilical with mesh    INTRACAPSULAR CATARACT EXTRACTION Left 2016    LT EYE CATARACT EMULSIFICATION IOL IMPLANT performed by Sindy Jolley MD at [address] [place] [address] BIMALLEOLAR ANKLE FRACTURE Right 6/11/2018    ORIF RIGHT BIMALLEOLAR ANKLE FRACTURE, RIGHT WRIST CAST REMOVAL performed by Markos Fall MD at [facility] [address] Right 6/12/2018    ANKLE OPEN REDUCTION INTERNAL FIXATION performed by Markos Fall MD at [place] [id number] Left 6/16/2017    KNEE TOTAL ARTHROPLASTY performed by Filiberto Gambino DO at [facility] OR       Social History     Tobacco Use    Smoking status: Never Smoker    Smokeless tobacco: Former User     Types: Snuff   Substance Use Topics    Alcohol use: No       Review of Systems   Constitutional: Positive for activity change. Negative for appetite change, fatigue and fever. HENT: Negative for congestion, postnasal drip, sinus pressure and trouble swallowing. Respiratory: Negative for cough, shortness of breath and wheezing. Cardiovascular: Negative for chest pain and leg swelling. Gastrointestinal: Negative for anal bleeding, constipation, diarrhea, nausea and vomiting. Genitourinary: Negative for difficulty urinating. Musculoskeletal: Positive for arthralgias (improving). Skin: Negative for rash. Psychiatric/Behavioral: Negative for behavioral problems, self-injury and sleep disturbance. The patient is not nervous/anxious and is not hyperactive. Denies drinking           Physical Exam  Constitutional:       General: She is not in acute distress. Appearance: Normal appearance. She is not ill-appearing. HENT:      Head: Normocephalic. Right Ear: Tympanic membrane normal. There is no impacted cerumen. Left Ear: Tympanic membrane normal. There is no impacted cerumen. Nose: No congestion. Mouth/Throat:      Pharynx: No posterior oropharyngeal erythema. Eyes:      General:         Right eye: No discharge. Left eye: No discharge. Cardiovascular:      Rate and Rhythm: Normal rate and regular rhythm. Pulses: Normal pulses. Heart sounds: No murmur. Musculoskeletal:         General: Tenderness (arm continues) present. Neurological:      Mental Status: She is alert and oriented to person, place, and time. Psychiatric:         Mood and Affect: Mood normal.         Behavior: Behavior normal.         ASSESSMENT/ PLAN    1. Diabetic polyneuropathy associated with type 2 diabetes mellitus (HCC)  Cont tight control  - CBC Auto Differential; Future  - Comprehensive Metabolic Panel; Future    2. Bipolar disorder, in full remission, most recent episode mixed ([facility]  Will monitor for changes  Feeling well      3. Chronic kidney disease (CKD) stage G3b/A2, moderately decreased glomerular filtration rate (GFR) between 30-44 mL/min/1.73 square meter and albuminuria creatinine ratio between  mg/g (HCC)  Cont present  Doing well  - CBC Auto Differential; Future  - Comprehensive Metabolic Panel; Future    4. Hyponatremia  Will check again  - CBC Auto Differential; Future  - Comprehensive Metabolic Panel; Future    5. Hyperkalemia  monitor  - CBC Auto Differential; Future  - Comprehensive Metabolic Panel; Future    6. Alcohol abuse  Reports not drinking  Feeling well    - CBC Auto Differential; Future  - Comprehensive Metabolic Panel; Future      Orders Placed This Encounter   Procedures    CBC Auto Differential    Comprehensive Metabolic Panel        Return in about 6 weeks (around 8/20/2020) for diabetes labs insomnia and depression. Patient Instructions     Patient Education        Learning About Meal Planning for Diabetes  Why plan your meals? Meal planning can be a key part of managing diabetes. Planning meals and snacks with the right balance of carbohydrate, protein, and fat can help you keep your blood sugar at the target level you set with your doctor. You don't have to eat special foods. You can eat what your family eats, including sweets once in a while.  But you do have to pay attention to how often you eat and how much carbohydrate throughout the day helps keep your blood sugar levels within your target range. Your daily amount depends on several things, including your weight, how active you are, which diabetes medicines you take, and what your goals are for your blood sugar levels. A registered dietitian or diabetes educator can help you plan how much carbohydrate to include in each meal and snack. A guideline for your daily amount of carbohydrate is:  · 45 to 60 grams at each meal. That's about the same as 3 to 4 carbohydrate servings. · 15 to 20 grams at each snack. That's about the same as 1 carbohydrate serving. The Nutrition Facts label on packaged foods tells you how much carbohydrate is in a serving of the food. First, look at the serving size on the food label. Is that the amount you eat in a serving? All of the nutrition information on a food label is based on that serving size. So if you eat more or less than that, you'll need to adjust the other numbers. Total carbohydrate is the next thing you need to look for on the label. If you count carbohydrate servings, one serving of carbohydrate is 15 grams. For foods that don't come with labels, such as fresh fruits and vegetables, you'll need a guide that lists carbohydrate in these foods. Ask your doctor, dietitian, or diabetes educator about books or other nutrition guides you can use. If you take insulin, you need to know how many grams of carbohydrate are in a meal. This lets you know how much rapid-acting insulin to take before you eat. If you use an insulin pump, you get a constant rate of insulin during the day. So the pump must be programmed at meals to give you extra insulin to cover the rise in blood sugar after meals. When you know how much carbohydrate you will eat, you can take the right amount of insulin. Or, if you always use the same amount of insulin, you need to make sure that you eat the same amount of carbohydrate at meals.   If you need more help bottles in order to correctly reconcile with our current list.      [facility]  [facility] counseling on the following healthy behaviors: none    Patient giveneducational materials on plan of care    I have instructed Mitch to complete a self tracking handout on blood pressure weight and blood pressure and instructed them to bring it with them to her next appointment. Discussed use, benefit, and side effects of prescribed medications. Barriers to medication compliance addressed. All patient questions answered. Pt voiced understanding.      TRESSA Hoang

## 2020-07-09 NOTE — TELEPHONE ENCOUNTER
----- Message from TRESSA Graham sent at 7/9/2020  4:09 PM CDT -----  cmp shows electrolytes within normal limits  Glucose was 176 continue tight control  Kidney function stable  Protein improving increase protein in diet  CBC shows mild anemia so increase iron rich foods

## 2020-07-09 NOTE — PATIENT INSTRUCTIONS

## 2020-08-20 ENCOUNTER — VIRTUAL VISIT (OUTPATIENT)
Dept: PRIMARY CARE CLINIC | Age: 60
End: 2020-08-20
Payer: MEDICARE

## 2020-08-20 PROCEDURE — 99214 OFFICE O/P EST MOD 30 MIN: CPT | Performed by: NURSE PRACTITIONER

## 2020-08-20 ASSESSMENT — ENCOUNTER SYMPTOMS
ANAL BLEEDING: 0
TROUBLE SWALLOWING: 0
SINUS PRESSURE: 0
NAUSEA: 0
SHORTNESS OF BREATH: 0
WHEEZING: 0
VOMITING: 0
CONSTIPATION: 0
COUGH: 0
DIARRHEA: 0

## 2020-08-20 NOTE — PATIENT INSTRUCTIONS
Patient Education        Noninsulin Medicines for Type 2 Diabetes: Care Instructions  Overview     There are different types of noninsulin medicines for diabetes. Each works in a different way. But they all help you control your blood sugar. Some types help your body make insulin to lower your blood sugar. Others lower how much insulin your body needs. Some can slow how fast your body digests sugars. And some can remove extra glucose through your urine. You may need to take more than one medicine for diabetes. Two or more medicines may work better to lower your blood sugar level than just one does. · Metformin. This lowers how much glucose your liver makes. And it helps you respond better to insulin. It also lowers the amount of stored sugar that your liver releases when you are not eating. · Sulfonylureas. These help your body release more insulin. Some work for many hours. They can cause low blood sugar if you don't eat as you planned. An example is glipizide. · Thiazolidinediones. These reduce the amount of blood glucose. They also help you respond better to insulin. An example is pioglitazone. · SGLT2 inhibitors. These help to remove extra glucose through your urine. They may also help some people lose weight. An example is ertugliflozin. · DPP-4 inhibitors. These help your body raise the level of insulin after you eat. They also help your body make less of a hormone that raises blood sugar. An example is alogliptin. · Incretin hormones (GLP-1 receptor agonists). These help your body make a protein that can raise your insulin level and make you less hungry. They're given as shots or pills. An example is semaglutide. · Meglitinides. These help your body release insulin. They also help slow how your body digests sugars. So they can keep your blood sugar from rising too fast after you eat. · Alpha-glucosidase inhibitors. These keep starches from breaking down.  This means that they lower the amount of glucose absorbed when you eat. They don't help your body make more insulin. So they will not cause low blood sugar unless you use them with other medicines for diabetes. Follow-up care is a key part of your treatment and safety. Be sure to make and go to all appointments, and call your doctor if you are having problems. It's also a good idea to know your test results and keep a list of the medicines you take. How can you care for yourself at home? · Eat a healthy diet. Get some exercise each day. This may help you to reduce how much medicine you need. · Do not take other prescription or over-the-counter medicines, vitamins, herbal products, or supplements without talking to your doctor first. Some medicines for type 2 diabetes can cause problems with other medicines or supplements. · Tell your doctor if you plan to get pregnant. Some of these drugs are not safe for pregnant women. · Be safe with medicines. Take your medicines exactly as prescribed. Meglitinides and sulfonylureas can cause your blood sugar to drop very low. Call your doctor if you think you are having a problem with your medicine. · Check your blood sugar often. You can use a glucose monitor. Keeping track can help you know how certain foods, activities, and medicines affect your blood sugar. And it can help you keep your blood sugar from getting so low that it's not safe. When should you call for help? [id number] anytime you think you may need emergency care. For example, call if:  · You passed out (lost consciousness). · You are confused or cannot think clearly. · Your blood sugar is very high or very low. Watch closely for changes in your health, and be sure to contact your doctor if:  · Your blood sugar stays outside the level your doctor set for you. · You have any problems. Where can you learn more? Go to https://[employer]akia[email or web address]. org and sign in to your [employer] account.  Enter H153 in the Favor box to learn more about \"Noninsulin Medicines for Type 2 Diabetes: Care Instructions. \"     If you do not have an account, please click on the \"Sign Up Now\" link. Current as of: December 20, 2019               Content Version: 12.5  © 1394-5218 [employer]. Care instructions adapted under license by [facility] ([facility]). If you have questions about a medical condition or this instruction, always ask your healthcare professional. [address] any warranty or liability for your use of this information.

## 2020-08-20 NOTE — PROGRESS NOTES
Rachell [address]  [place], [address] [place], [place] [address]  Phone [phone]   Fax [phone]            TELEMEDICINE visit by doxy me    OFFICE VISIT: 2020    Mitch Olivarez-Destiney- : 1960      Reason For Visit:  Erik Jones is a 62 y.o. femalewho is here for 1 Month Follow-Up (diabetes) and Colon Cancer Screening         Health Maintenance colon foot exam    Patient is for follow up    Reports that her spouse had to get major surgery  For prostate cancer: and so they have been stressed  He had been at [facility] and had a horrible experience  And so she has has been stressed    Insomnia   She has not been taking the ambien or valium  She has tried to stay off it with her falls    Alcohol  Reports they havent been drinking at all in two months  And she has done well with this   She has been feeling well    Anxiety  She got busy :   But wants to talk to hemalatha but never heard from them    Fracture radius   Followed with Dr Karson Phoenix  And has done \"well \"  Will do till see someone but to busy right now  And cant afford seeing the specialist  She has stopped with him and the pain specialist  But trying to get a form from [facility] and Bridger Ewing   To get to see other pain management    Colon screen  She had fit test last 2019  But been stressed out   But will do cologuard     Diabetes type 2     Patient is here for follow up on diabetes  Is doing better with trulicity 1.5 weekly  And doing well     6.0  Glucose 120-130s  she doing well  She feels like working well  Had issue with novolog or humulog  Off and on  But doing well at present        Trying to exercise  Doing well overall  Along with novolog with meals   On trulicity weekly on   And continues novolog with meals  No low sugars      vitals were not taken for this visit. There is no height or weight on file to calculate BMI.     Results for orders placed or performed in visit on 20   Comprehensive Metabolic Panel   Result Value Ref Range    Sodium 136 136 - 145 mmol/L    Potassium 4.7 3.5 - 5.0 mmol/L    Chloride 99 98 - 111 mmol/L    CO2 22 22 - 29 mmol/L    Anion Gap 15 7 - 19 mmol/L    Glucose 176 (H) 74 - 109 mg/dL    BUN 35 (H) 6 - 20 mg/dL    CREATININE 1.7 (H) 0.5 - 0.9 mg/dL    GFR Non- 31 (A) >60    Calcium 8.8 8.6 - 10.0 mg/dL    Total Protein 6.5 (L) 6.6 - 8.7 g/dL    Alb 3.0 (L) 3.5 - 5.2 g/dL    Total Bilirubin 0.3 0.2 - 1.2 mg/dL    Alkaline Phosphatase 132 (H) 35 - 104 U/L    ALT 26 5 - 33 U/L    AST 40 (H) 5 - 32 U/L   CBC Auto Differential   Result Value Ref Range    WBC 9.1 4.8 - 10.8 K/uL    RBC 3.80 (L) 4.20 - 5.40 M/uL    Hemoglobin 11.0 (L) 12.0 - 16.0 g/dL    Hematocrit 34.9 (L) 37.0 - 47.0 %    MCV 91.8 81.0 - 99.0 fL    MCH 28.9 27.0 - 31.0 pg    MCHC 31.5 (L) 33.0 - 37.0 g/dL    RDW 15.9 (H) 11.5 - 14.5 %    Platelets 987 087 - 578 K/uL    MPV 11.9 9.4 - 12.3 fL    Neutrophils % 68.8 (H) 50.0 - 65.0 %    Lymphocytes % 15.0 (L) 20.0 - 40.0 %    Monocytes % 8.5 0.0 - 10.0 %    Eosinophils % 2.2 0.0 - 5.0 %    Basophils % 0.8 0.0 - 1.0 %    Neutrophils Absolute 6.3 1.5 - 7.5 K/uL    Immature Granulocytes # 0.4 K/uL    Lymphocytes Absolute 1.4 1.1 - 4.5 K/uL    Monocytes Absolute 0.80 0.00 - 0.90 K/uL    Eosinophils Absolute 0.20 0.00 - 0.60 K/uL    Basophils Absolute 0.10 0.00 - 0.20 K/uL       I have reviewed the following with the Ms. Fuchs   Lab Review   Orders Only on 07/09/2020   Component Date Value    Sodium 07/09/2020 136     Potassium 07/09/2020 4.7     Chloride 07/09/2020 99     CO2 07/09/2020 22     Anion Gap 07/09/2020 15     Glucose 07/09/2020 176*    BUN 07/09/2020 35*    CREATININE 07/09/2020 1.7*    GFR Non- 07/09/2020 31*    Calcium 07/09/2020 8.8     Total Protein 07/09/2020 6.5*    Alb 07/09/2020 3.0*    Total Bilirubin 07/09/2020 0.3     Alkaline Phosphatase 07/09/2020 132*    ALT 07/09/2020 26     AST 07/09/2020 40*    WBC 07/09/2020 9.1     RBC 07/09/2020 3.80*    Hemoglobin 07/09/2020 11.0*    Hematocrit 07/09/2020 34.9*    MCV 07/09/2020 91.8     MCH 07/09/2020 28.9     MCHC 07/09/2020 31.5*    RDW 07/09/2020 15.9*    Platelets 02/91/8648 306     MPV 07/09/2020 11.9     Neutrophils % 07/09/2020 68.8*    Lymphocytes % 07/09/2020 15.0*    Monocytes % 07/09/2020 8.5     Eosinophils % 07/09/2020 2.2     Basophils % 07/09/2020 0.8     Neutrophils Absolute 07/09/2020 6.3     Immature Granulocytes # 07/09/2020 0.4     Lymphocytes Absolute 07/09/2020 1.4     Monocytes Absolute 07/09/2020 0.80     Eosinophils Absolute 07/09/2020 0.20     Basophils Absolute 07/09/2020 0.10    Orders Only on 06/25/2020   Component Date Value    Magnesium 06/25/2020 2.1     Sodium 06/25/2020 138     Potassium 06/25/2020 4.3     Chloride 06/25/2020 99     CO2 06/25/2020 25     Anion Gap 06/25/2020 14     Glucose 06/25/2020 223*    BUN 06/25/2020 19     CREATININE 06/25/2020 1.4*    GFR Non- 06/25/2020 38*    Calcium 06/25/2020 9.1     Total Protein 06/25/2020 6.9     Alb 06/25/2020 3.8     Total Bilirubin 06/25/2020 <0.2     Alkaline Phosphatase 06/25/2020 122*    ALT 06/25/2020 18     AST 06/25/2020 20     WBC 06/25/2020 8.2     RBC 06/25/2020 4.10*    Hemoglobin 06/25/2020 12.0     Hematocrit 06/25/2020 38.0     MCV 06/25/2020 92.7     MCH 06/25/2020 29.3     MCHC 06/25/2020 31.6*    RDW 06/25/2020 15.4*    Platelets 75/29/5988 268     MPV 06/25/2020 11.9     Neutrophils % 06/25/2020 77.9*    Lymphocytes % 06/25/2020 13.2*    Monocytes % 06/25/2020 6.1     Eosinophils % 06/25/2020 2.0     Basophils % 06/25/2020 0.4     Neutrophils Absolute 06/25/2020 6.4     Immature Granulocytes # 06/25/2020 0.0     Lymphocytes Absolute 06/25/2020 1.1     Monocytes Absolute 06/25/2020 0.50     Eosinophils Absolute 06/25/2020 0.20     Basophils Absolute 06/25/2020 0.00    Admission on 06/17/2020, Discharged on 06/18/2020   Component Date Value    P-R Interval 06/17/2020 184     QRS Duration 06/17/2020 90     Q-T Interval 06/17/2020 406     QTc Calculation (Bazett) 06/17/2020 427     P Axis 06/17/2020 42     T Axis 06/17/2020 38     WBC 06/17/2020 11.3*    RBC 06/17/2020 4.15*    Hemoglobin 06/17/2020 12.0     Hematocrit 06/17/2020 36.5*    MCV 06/17/2020 88.0     MCH 06/17/2020 28.9     MCHC 06/17/2020 32.9*    RDW 06/17/2020 15.0*    Platelets 60/64/7753 277     MPV 06/17/2020 11.2     Neutrophils % 06/17/2020 75.0*    Lymphocytes % 06/17/2020 14.3*    Monocytes % 06/17/2020 9.0     Eosinophils % 06/17/2020 0.9     Basophils % 06/17/2020 0.4     Neutrophils Absolute 06/17/2020 8.5*    Immature Granulocytes # 06/17/2020 0.0     Lymphocytes Absolute 06/17/2020 1.6     Monocytes Absolute 06/17/2020 1.00*    Eosinophils Absolute 06/17/2020 0.10     Basophils Absolute 06/17/2020 0.00     Sodium 06/17/2020 125*    Potassium 06/17/2020 5.1*    Chloride 06/17/2020 89*    CO2 06/17/2020 23     Anion Gap 06/17/2020 13     Glucose 06/17/2020 95     BUN 06/17/2020 31*    CREATININE 06/17/2020 1.5*    GFR Non- 06/17/2020 36*    Calcium 06/17/2020 8.4*    Total Protein 06/17/2020 6.9     Alb 06/17/2020 3.6     Total Bilirubin 06/17/2020 0.3     Alkaline Phosphatase 06/17/2020 118*    ALT 06/17/2020 26     AST 06/17/2020 57*    Total CK 06/17/2020 3,193*    Troponin 06/17/2020 <0.01     Sodium, Ur 06/17/2020 33.0     Osmolality 06/17/2020 275     TSH Reflex FT4 06/17/2020 1.67     Osmolality, Ur 06/17/2020 204*    Creatinine, Ur 06/17/2020 26.0     Color, UA 06/17/2020 YELLOW     Clarity, UA 06/17/2020 Clear     Glucose, Ur 06/17/2020 Negative     Bilirubin Urine 06/17/2020 Negative     Ketones, Urine 06/17/2020 Negative     Specific Gravity, UA 06/17/2020 1.007     Blood, Urine 06/17/2020 Negative     pH, UA 06/17/2020 6.0     Protein, UA 06/17/2020 Negative     Urobilinogen, Urine 06/17/2020 0.2  Nitrite, Urine 06/17/2020 Negative     Leukocyte Esterase, Urine 06/17/2020 TRACE*    Bacteria, UA 06/17/2020 TRACE*    Yeast, UA 06/17/2020 NEG*    Hyaline Casts, UA 06/17/2020 1     WBC, UA 06/17/2020 6*    RBC, UA 06/17/2020 0     Epithelial Cells, UA 06/17/2020 2     POC Glucose 06/17/2020 87     Performed on 06/17/2020 AccuChek     Sodium 06/18/2020 131*    Potassium reflex Magnesi* 06/18/2020 4.1     Chloride 06/18/2020 99     CO2 06/18/2020 22     Anion Gap 06/18/2020 10     Glucose 06/18/2020 125*    BUN 06/18/2020 31*    CREATININE 06/18/2020 1.5*    GFR Non- 06/18/2020 36*    Calcium 06/18/2020 7.9*    Total Protein 06/18/2020 5.8*    Alb 06/18/2020 3.0*    Total Bilirubin 06/18/2020 <0.2     Alkaline Phosphatase 06/18/2020 101     ALT 06/18/2020 21     AST 06/18/2020 44*    WBC 06/18/2020 7.5     RBC 06/18/2020 3.69*    Hemoglobin 06/18/2020 10.7*    Hematocrit 06/18/2020 32.9*    MCV 06/18/2020 89.2     MCH 06/18/2020 29.0     MCHC 06/18/2020 32.5*    RDW 06/18/2020 15.4*    Platelets 64/52/2041 214     MPV 06/18/2020 11.7     Neutrophils % 06/18/2020 64.0     Lymphocytes % 06/18/2020 22.2     Monocytes % 06/18/2020 11.1*    Eosinophils % 06/18/2020 1.9     Basophils % 06/18/2020 0.3     Neutrophils Absolute 06/18/2020 4.8     Immature Granulocytes # 06/18/2020 0.0     Lymphocytes Absolute 06/18/2020 1.7     Monocytes Absolute 06/18/2020 0.80     Eosinophils Absolute 06/18/2020 0.10     Basophils Absolute 06/18/2020 0.00     Total CK 06/18/2020 1,888*    Potassium 06/18/2020 4.2     Magnesium 06/18/2020 2.1     Hemoglobin A1C 06/18/2020 6.0     Cholesterol, Total 06/18/2020 107*    Triglycerides 06/18/2020 170*    HDL 06/18/2020 55*    LDL Calculated 06/18/2020 18     POC Glucose 06/17/2020 115*    Performed on 06/17/2020 AccuChek     POC Glucose 06/18/2020 97     Performed on 06/18/2020 AccuChek    Orders Only on 04/06/2020 Component Date Value    pH, UA 04/01/2020 5.5     Clarity, UA 04/01/2020 Clear     Protein, UA 04/01/2020 Negative     Glucose, Ur 04/01/2020 neg     Ketones, Urine 04/01/2020 Negative     Bilirubin, Urine 04/01/2020 Negative     Urobilinogen, Urine 04/01/2020 Normal     Nitrite, Urine 04/01/2020 Negative     Specific Gravity, Urine 04/01/2020 1.008     Color, UA 04/01/2020 Yellow     Leukocyte Esterase, Urine 04/01/2020 Negative     Blood, Urine 04/01/2020 Negative    Orders Only on 03/31/2020   Component Date Value    Lipase 03/31/2020 61*    Amylase 03/31/2020 64     Sodium 03/31/2020 132*    Potassium 03/31/2020 3.6     Chloride 03/31/2020 93*    CO2 03/31/2020 24     Anion Gap 03/31/2020 15     Glucose 03/31/2020 157*    BUN 03/31/2020 16     CREATININE 03/31/2020 1.2*    GFR Non- 03/31/2020 46*    Calcium 03/31/2020 8.9     Total Protein 03/31/2020 7.2     Alb 03/31/2020 3.8     Total Bilirubin 03/31/2020 0.4     Alkaline Phosphatase 03/31/2020 100     ALT 03/31/2020 60*    AST 03/31/2020 56*    WBC 03/31/2020 10.2     RBC 03/31/2020 4.44     Hemoglobin 03/31/2020 12.5     Hematocrit 03/31/2020 40.2     MCV 03/31/2020 90.5     MCH 03/31/2020 28.2     MCHC 03/31/2020 31.1*    RDW 03/31/2020 16.7*    Platelets 60/73/5714 352     MPV 03/31/2020 11.6     Neutrophils % 03/31/2020 79.5*    Lymphocytes % 03/31/2020 10.2*    Monocytes % 03/31/2020 7.9     Eosinophils % 03/31/2020 1.4     Basophils % 03/31/2020 0.6     Neutrophils Absolute 03/31/2020 8.1*    Immature Granulocytes # 03/31/2020 0.0     Lymphocytes Absolute 03/31/2020 1.0*    Monocytes Absolute 03/31/2020 0.80     Eosinophils Absolute 03/31/2020 0.10     Basophils Absolute 03/31/2020 0.10    Orders Only on 03/25/2020   Component Date Value    Ethanol Lvl 03/25/2020 <10     Hemoglobin A1C 03/25/2020 6.1*    Sodium 03/25/2020 138     Potassium 03/25/2020 3.8     Chloride 03/25/2020 101     CO2 03/25/2020 21*    Anion Gap 03/25/2020 16     Glucose 03/25/2020 127*    BUN 03/25/2020 18     CREATININE 03/25/2020 1.1*    GFR Non- 03/25/2020 51*    Calcium 03/25/2020 9.7     Total Protein 03/25/2020 7.1     Alb 03/25/2020 3.8     Total Bilirubin 03/25/2020 0.4     Alkaline Phosphatase 03/25/2020 90     ALT 03/25/2020 110*    AST 03/25/2020 95*    Lipase 03/25/2020 225*    Amylase 03/25/2020 112*   Orders Only on 03/20/2020   Component Date Value    Sodium 03/20/2020 123*    Potassium 03/20/2020 4.5     Chloride 03/20/2020 87*    CO2 03/20/2020 18*    Anion Gap 03/20/2020 18     Glucose 03/20/2020 132*    BUN 03/20/2020 19     CREATININE 03/20/2020 1.2*    GFR Non- 03/20/2020 46*    Calcium 03/20/2020 8.2*    Total Protein 03/20/2020 7.1     Alb 03/20/2020 3.9     Total Bilirubin 03/20/2020 0.6     Alkaline Phosphatase 03/20/2020 100     ALT 03/20/2020 185*    AST 03/20/2020 772*    Hemoglobin A1C 03/20/2020 6.1*    TSH 03/20/2020 3.630     T4 Free 03/20/2020 1.44     Microalbumin, Random Uri* 03/20/2020 2.90     Creatinine, Ur 03/20/2020 17.3     Microalbumin Creatinine * 03/20/2020 167.6      Copies of these are in the chart. Prior to Visit Medications    Medication Sig Taking? Authorizing Provider   pregabalin (LYRICA) 150 MG capsule Take 1 capsule by mouth 2 times daily for 90 days.   TRESSA Alvarez   insulin aspart (NOVOLOG FLEXPEN) 100 UNIT/ML injection pen Inject into the skin 3 times daily (before meals) Sliding scale  Historical Provider, MD   amitriptyline (ELAVIL) 100 MG tablet Take 2 tablets by mouth nightly  TRESSA Alvarez   tiZANidine (ZANAFLEX) 4 MG tablet Take 1 tablet by mouth every 8 hours as needed (spasms)  TRESSA Alvarez   amLODIPine (NORVASC) 5 MG tablet Take 1 tablet by mouth daily  Patient taking differently: Take 5 mg by mouth nightly   TRESSA Alvarez   atorvastatin (LIPITOR) 40 MG tablet Take 1 tablet by mouth nightly  TRESSA Stokes   blood glucose test strips (FREESTYLE LITE) strip 1 each by In Vitro route 4 times daily As needed. TRESSA Stokes   Lancets MISC 1 each by Does not apply route daily  TRESSA Stokes   metoprolol succinate (TOPROL XL) 50 MG extended release tablet Take 1 tablet by mouth daily  TRESSA Stokes   DULoxetine (CYMBALTA) 60 MG extended release capsule Take 1 capsule by mouth 2 times daily  TRESSA Stokes   nitroGLYCERIN (NITROSTAT) 0.4 MG SL tablet Place 1 tablet under the tongue every 5 minutes as needed for Chest pain up to max of 3 total doses. If no relief after 1 dose, call 911. TRESSA Stokes   QUEtiapine (SEROQUEL) 200 MG tablet Take 1 tablet by mouth nightly  TRESSA Stokes   fluticasone [facility]) 50 MCG/ACT nasal spray 1 spray by Nasal route daily  TRESSA Stokes   levothyroxine (SYNTHROID) 88 MCG tablet Take 1 tablet by mouth Daily Tube feeding (TF) interaction, obtain physician order to manage, recommend holding TF for 30 minutes before and after dose.   TRESSA Stokes   pantoprazole (PROTONIX) 40 MG tablet Take 1 tablet by mouth daily  TRESSA Stokes   Insulin Pen Needle ([employer] PEN NEEDLES 31G) 31G X 8 MM MISC 1 each by Does not apply route daily  TRESSA Stokes   Dulaglutide (TRULICITY) 1.5 TH/8.3JZ SOPN Inject 1.5 mg into the skin once a week  Patient taking differently: Inject 1.5 mg into the skin once a week Will be taking last dose this week because insurance no longer covers  TRESSA Stokes   aspirin 81 MG tablet Take 1 tablet by mouth daily  TRESSA Stokes   Insulin Pen Needle 31G X 8 MM MISC 1 each by Does not apply route 4 times daily  TRESSA Stokes   Blood Glucose Monitoring Suppl (FREESTYLE LITE) JEZ 1 Device by Does not apply route 4 times daily  TRESSA Stokes       Allergies: Ciprofloxacin; Dilaudid [hydromorphone hcl]; Keflex [cephalexin];  Nitrofuran derivatives; Pcn [penicillins]; and Cefdinir    Past Medical History:   Diagnosis Date    Anxiety     Arthritis     Bipolar 1 disorder ([facility]     CAD (coronary artery disease)     CH[facility]congestive [facility] failure) [facility])     Chronic kidney disease (CKD) stage G3b/A2, moderately decreased glomerular filtration rate (GFR) between 30-44 mL/min/1.73 square meter and albuminuria creatinine ratio between  mg/g ([facility] 2016    Depression     DM (diabetes mellitus) ([facility])     GERD (gastroesophageal reflux disease)     History of blood transfusion     History of suicidal ideation     Hyperlipidemia     Hypertension     Hypothyroidism     Insomnia     Kidney disease     stage 3 failure    MI, old 2005    Obesity     Polyarticular arthritis     Type II or unspecified type diabetes mellitus without mention of complication, not stated as uncontrolled        Past Surgical History:   Procedure Laterality Date    ABDOMINOPLASTY      ANGIOPLASTY  05    stent placement to LAD and diagonal with normal left vent function    BREAST REDUCTION SURGERY      CARDIAC CATHETERIZATION  05, 05    see report  Stents x3    CARDIAC CATHETERIZATION  3/23/15  JDT    EF 50%    CARPAL TUNNEL RELEASE       SECTION      x2    CHOLECYSTECTOMY      GASTRIC BYPASS SURGERY      HERNIA REPAIR      umbilical with mesh    INTRACAPSULAR CATARACT EXTRACTION Left 2016    LT EYE CATARACT EMULSIFICATION IOL IMPLANT performed by Alonzo Vieyra MD at [address] [place] [address] Right 2018    ORIF RIGHT BIMALLEOLAR ANKLE FRACTURE, RIGHT WRIST CAST REMOVAL performed by Logan Teresa MD at [address] [place] [address] Right 2018    ANKLE OPEN REDUCTION INTERNAL FIXATION performed by Logan Teresa MD at [place] [id number] Left 2017    KNEE TOTAL ARTHROPLASTY performed by Chris Cloud DO at [place] [address] History     Tobacco Use    Smoking status: Never Smoker    Smokeless tobacco: Former User     Types: Snuff   Substance Use Topics    Alcohol use: No       Review of Systems   Constitutional: Positive for activity change. Negative for appetite change, fatigue and fever. HENT: Negative for congestion, postnasal drip, sinus pressure and trouble swallowing. Respiratory: Negative for cough, shortness of breath and wheezing. Cardiovascular: Negative for chest pain and leg swelling. Gastrointestinal: Negative for anal bleeding, constipation, diarrhea, nausea and vomiting. Endocrine: Negative for polydipsia, polyphagia and polyuria. Genitourinary: Negative for difficulty urinating. Musculoskeletal: Positive for arthralgias (improving). Skin: Negative for rash. Psychiatric/Behavioral: Negative for behavioral problems, self-injury and sleep disturbance. The patient is not nervous/anxious and is not hyperactive. Denies drinking           Physical Exam  Constitutional:       General: She is not in acute distress. Appearance: Normal appearance. She is not ill-appearing. HENT:      Head: Normocephalic. Right Ear: Tympanic membrane normal. There is no impacted cerumen. Left Ear: Tympanic membrane normal. There is no impacted cerumen. Nose: No congestion. Mouth/Throat:      Pharynx: No posterior oropharyngeal erythema. Eyes:      General:         Right eye: No discharge. Left eye: No discharge. Cardiovascular:      Rate and Rhythm: Normal rate and regular rhythm. Pulses: Normal pulses. Heart sounds: No murmur. Musculoskeletal:         General: Tenderness (arm continues) present. Neurological:      Mental Status: She is alert and oriented to person, place, and time.    Psychiatric:         Mood and Affect: Mood normal.         Behavior: Behavior normal.         ASSESSMENT/ Linnette Knee is  being evaluated by a Virtual Visit (video visit) encounter to address concerns as mentioned above. A caregiver was present when appropriate. Due to this being a TeleHealth encounter (During [id number] public health emergency), evaluation of the following organ systems was limited: Vitals/Constitutional/EENT/Resp/CV/GI//MS/Neuro/Skin/Heme-Lymph-Imm. Pursuant to the emergency declaration under the [address] [place] [address] and the [employer] Act, this Virtual Visit was conducted with patient's (and/or legal guardian's) consent, to reduce the patient's risk of exposure to COVID-19 and provide necessary medical care. The patient (and/or legal guardian) has also been advised to contact this office for worsening conditions or problems, and seek emergency medical treatment and/or call 911 if deemed necessary. Services were provided through a video synchronous discussion virtually to substitute for in-person clinic visit. Patient and provider were located at their individual homes. 1. Bipolar disorder, in full remission, most recent episode mixed ([address]  Not taking medication  Valium  Stable will continue  - [address], LISW, Counseling, Kim    2. Alcohol abuse  Cont present  Monitor  Not drinking   - Radha Carroll, EN, Counseling, Kim    3. Insomnia due to medical condition  Stable at present  Doing well   Cont to monitor for changes  - [address], LISW, Counseling, Kim    4. Type 2 diabetes mellitus with hyperglycemia, with long-term current use of insulin ([facility])  Will keep log  Tight control    5.  Chronic kidney disease (CKD) stage G3b/A2, moderately decreased glomerular filtration rate (GFR) between 30-44 mL/min/1.73 square meter and albuminuria creatinine ratio between  mg/g ([facility])  Will check   And tight blood pressure and blood sugar control    6. Screen for colon cancer  cologaurd    - Cologuard (For External Results Only); Future      Orders Placed This Encounter   Procedures    Cologuard (For External Results Only)    [facility], [place]        Return in about 6 weeks (around 10/1/2020) for diabetes foot exam and labs. Patient Instructions     Patient Education        Noninsulin Medicines for Type 2 Diabetes: Care Instructions  Overview     There are different types of noninsulin medicines for diabetes. Each works in a different way. But they all help you control your blood sugar. Some types help your body make insulin to lower your blood sugar. Others lower how much insulin your body needs. Some can slow how fast your body digests sugars. And some can remove extra glucose through your urine. You may need to take more than one medicine for diabetes. Two or more medicines may work better to lower your blood sugar level than just one does. · Metformin. This lowers how much glucose your liver makes. And it helps you respond better to insulin. It also lowers the amount of stored sugar that your liver releases when you are not eating. · Sulfonylureas. These help your body release more insulin. Some work for many hours. They can cause low blood sugar if you don't eat as you planned. An example is glipizide. · Thiazolidinediones. These reduce the amount of blood glucose. They also help you respond better to insulin. An example is pioglitazone. · SGLT2 inhibitors. These help to remove extra glucose through your urine. They may also help some people lose weight. An example is ertugliflozin. · DPP-4 inhibitors. These help your body raise the level of insulin after you eat. They also help your body make less of a hormone that raises blood sugar. An example is alogliptin. · Incretin hormones (GLP-1 receptor agonists). These help your body make a protein that can raise your insulin level and make you less hungry. They're given as shots or pills. An example is semaglutide. · Meglitinides. These help your body release insulin. They also help slow how your body digests sugars. So they can keep your blood sugar from rising too fast after you eat. · Alpha-glucosidase inhibitors. These keep starches from breaking down. This means that they lower the amount of glucose absorbed when you eat. They don't help your body make more insulin. So they will not cause low blood sugar unless you use them with other medicines for diabetes. Follow-up care is a key part of your treatment and safety. Be sure to make and go to all appointments, and call your doctor if you are having problems. It's also a good idea to know your test results and keep a list of the medicines you take. How can you care for yourself at home? · Eat a healthy diet. Get some exercise each day. This may help you to reduce how much medicine you need. · Do not take other prescription or over-the-counter medicines, vitamins, herbal products, or supplements without talking to your doctor first. Some medicines for type 2 diabetes can cause problems with other medicines or supplements. · Tell your doctor if you plan to get pregnant. Some of these drugs are not safe for pregnant women. · Be safe with medicines. Take your medicines exactly as prescribed. Meglitinides and sulfonylureas can cause your blood sugar to drop very low. Call your doctor if you think you are having a problem with your medicine. · Check your blood sugar often. You can use a glucose monitor. Keeping track can help you know how certain foods, activities, and medicines affect your blood sugar. And it can help you keep your blood sugar from getting so low that it's not safe. When should you call for help? [id number] anytime you think you may need emergency care. For example, call if:  · You passed out (lost consciousness). · You are confused or cannot think clearly.   · Your blood sugar is very high or very low. Watch closely for changes in your health, and be sure to contact your doctor if:  · Your blood sugar stays outside the level your doctor set for you. · You have any problems. Where can you learn more? Go to [email or web address] and sign in to your [email or web address] account. Enter H153 in the [facility] box to learn more about \"Noninsulin Medicines for Type 2 Diabetes: Care Instructions. \"     If you do not have an account, please click on the \"Sign Up Now\" link. Current as of: December 20, 2019               Content Version: 12.5  © 2693-0767 [employer], [employer]. Care instructions adapted under license by [facility] ([facility]). If you have questions about a medical condition or this instruction, always ask your healthcare professional. [address] any warranty or liability for your use of this information. Controlled Substances Monitoring: Additional Instructions: As always, patient is advisedto bring in medication bottles in order to correctly reconcile with our current list.      Laverne Rosis received counseling on the following healthy behaviors: none    Patient giveneducational materials on plan of care    I have instructed Mitch to complete a self tracking handout on blood pressure and instructed them to bring it with them to her next appointment. Discussed use, benefit, and side effects of prescribed medications. Barriers to medication compliance addressed. All patient questions answered. Pt voiced understanding.      TRESSA Leach

## 2020-08-31 RX ORDER — AMITRIPTYLINE HYDROCHLORIDE 100 MG/1
200 TABLET, FILM COATED ORAL NIGHTLY
Qty: 180 TABLET | Refills: 3 | Status: SHIPPED | OUTPATIENT
Start: 2020-08-31 | End: 2021-11-18 | Stop reason: SDUPTHER

## 2020-08-31 NOTE — TELEPHONE ENCOUNTER
Received fax from pharmacy requesting refill on pts medication(s). Pt was last seen in office on 8/20/2020  and has a follow up scheduled for 10/1/2020. Will send request to  Kena Cheng  for patient.      Requested Prescriptions     Pending Prescriptions Disp Refills    amitriptyline (ELAVIL) 100 MG tablet [Pharmacy Med Name: Amitriptyline HCl 100 MG Oral Tablet] 180 tablet 0     Sig: TAKE 2 TABLETS BY MOUTH NIGHTLY

## 2020-09-01 RX ORDER — DULAGLUTIDE 1.5 MG/.5ML
1.5 INJECTION, SOLUTION SUBCUTANEOUS
Qty: 4 PEN | Refills: 11 | Status: SHIPPED | OUTPATIENT
Start: 2020-09-01 | End: 2021-05-13

## 2020-09-01 NOTE — TELEPHONE ENCOUNTER
Received fax from pharmacy requesting refill on pts medication(s). Pt was last seen in office on 8/20/2020  and has a follow up scheduled for 10/1/2020. Will send request to  Ghislaine Dalton  for patient. Requested Prescriptions     Pending Prescriptions Disp Refills    [employer] 1.5 KW/8.0TP SOPN [Pharmacy Med Name: Trulicity 1.5 VS/9.8JR Subcutaneous Solution Pen-injector] 4 mL 0     Sig: INJECT 1.5 MG (ONE PEN) SUB Q ONCE WEEKLY.

## 2020-09-04 RX ORDER — QUETIAPINE FUMARATE 200 MG/1
200 TABLET, FILM COATED ORAL NIGHTLY
Qty: 90 TABLET | Refills: 3 | Status: SHIPPED | OUTPATIENT
Start: 2020-09-04 | End: 2021-01-04 | Stop reason: SDUPTHER

## 2020-10-01 ENCOUNTER — OFFICE VISIT (OUTPATIENT)
Dept: PRIMARY CARE CLINIC | Age: 60
End: 2020-10-01
Payer: MEDICARE

## 2020-10-01 VITALS
HEART RATE: 74 BPM | WEIGHT: 216.25 LBS | TEMPERATURE: 96.3 F | DIASTOLIC BLOOD PRESSURE: 86 MMHG | SYSTOLIC BLOOD PRESSURE: 130 MMHG | BODY MASS INDEX: 42.23 KG/M2 | OXYGEN SATURATION: 93 %

## 2020-10-01 DIAGNOSIS — N18.32 CHRONIC KIDNEY DISEASE (CKD) STAGE G3B/A2, MODERATELY DECREASED GLOMERULAR FILTRATION RATE (GFR) BETWEEN 30-44 ML/MIN/1.73 SQUARE METER AND ALBUMINURIA CREATININE RATIO BETWEEN 30-299 MG/G (HCC): ICD-10-CM

## 2020-10-01 DIAGNOSIS — E11.65 TYPE 2 DIABETES MELLITUS WITH HYPERGLYCEMIA, WITH LONG-TERM CURRENT USE OF INSULIN (HCC): ICD-10-CM

## 2020-10-01 DIAGNOSIS — F33.41 RECURRENT MAJOR DEPRESSIVE DISORDER, IN PARTIAL REMISSION (HCC): ICD-10-CM

## 2020-10-01 DIAGNOSIS — Z79.4 TYPE 2 DIABETES MELLITUS WITH HYPERGLYCEMIA, WITH LONG-TERM CURRENT USE OF INSULIN (HCC): ICD-10-CM

## 2020-10-01 LAB
ALBUMIN SERPL-MCNC: 4 G/DL (ref 3.5–5.2)
ALP BLD-CCNC: 138 U/L (ref 35–104)
ALT SERPL-CCNC: 15 U/L (ref 5–33)
ANION GAP SERPL CALCULATED.3IONS-SCNC: 14 MMOL/L (ref 7–19)
AST SERPL-CCNC: 21 U/L (ref 5–32)
BASOPHILS ABSOLUTE: 0.1 K/UL (ref 0–0.2)
BASOPHILS RELATIVE PERCENT: 0.7 % (ref 0–1)
BILIRUB SERPL-MCNC: 0.4 MG/DL (ref 0.2–1.2)
BUN BLDV-MCNC: 23 MG/DL (ref 6–20)
CALCIUM SERPL-MCNC: 9.2 MG/DL (ref 8.6–10)
CHLORIDE BLD-SCNC: 100 MMOL/L (ref 98–111)
CO2: 24 MMOL/L (ref 22–29)
CREAT SERPL-MCNC: 1.4 MG/DL (ref 0.5–0.9)
EOSINOPHILS ABSOLUTE: 0.2 K/UL (ref 0–0.6)
EOSINOPHILS RELATIVE PERCENT: 1.8 % (ref 0–5)
GFR AFRICAN AMERICAN: 47
GFR NON-AFRICAN AMERICAN: 38
GLUCOSE BLD-MCNC: 122 MG/DL (ref 74–109)
HBA1C MFR BLD: 6.2 % (ref 4–6)
HCT VFR BLD CALC: 41 % (ref 37–47)
HEMOGLOBIN: 13.1 G/DL (ref 12–16)
IMMATURE GRANULOCYTES #: 0 K/UL
LYMPHOCYTES ABSOLUTE: 1.8 K/UL (ref 1.1–4.5)
LYMPHOCYTES RELATIVE PERCENT: 21.7 % (ref 20–40)
MCH RBC QN AUTO: 29.6 PG (ref 27–31)
MCHC RBC AUTO-ENTMCNC: 32 G/DL (ref 33–37)
MCV RBC AUTO: 92.6 FL (ref 81–99)
MONOCYTES ABSOLUTE: 0.6 K/UL (ref 0–0.9)
MONOCYTES RELATIVE PERCENT: 7 % (ref 0–10)
NEUTROPHILS ABSOLUTE: 5.7 K/UL (ref 1.5–7.5)
NEUTROPHILS RELATIVE PERCENT: 68.3 % (ref 50–65)
PDW BLD-RTO: 14.6 % (ref 11.5–14.5)
PLATELET # BLD: 292 K/UL (ref 130–400)
PMV BLD AUTO: 11.6 FL (ref 9.4–12.3)
POTASSIUM SERPL-SCNC: 4.8 MMOL/L (ref 3.5–5)
RBC # BLD: 4.43 M/UL (ref 4.2–5.4)
SODIUM BLD-SCNC: 138 MMOL/L (ref 136–145)
T4 FREE: 0.93 NG/DL (ref 0.93–1.7)
TOTAL PROTEIN: 7.4 G/DL (ref 6.6–8.7)
TSH SERPL DL<=0.05 MIU/L-ACNC: 1.04 UIU/ML (ref 0.27–4.2)
WBC # BLD: 8.4 K/UL (ref 4.8–10.8)

## 2020-10-01 PROCEDURE — G8482 FLU IMMUNIZE ORDER/ADMIN: HCPCS | Performed by: NURSE PRACTITIONER

## 2020-10-01 PROCEDURE — 2022F DILAT RTA XM EVC RTNOPTHY: CPT | Performed by: NURSE PRACTITIONER

## 2020-10-01 PROCEDURE — 99214 OFFICE O/P EST MOD 30 MIN: CPT | Performed by: NURSE PRACTITIONER

## 2020-10-01 PROCEDURE — 3044F HG A1C LEVEL LT 7.0%: CPT | Performed by: NURSE PRACTITIONER

## 2020-10-01 PROCEDURE — 3017F COLORECTAL CA SCREEN DOC REV: CPT | Performed by: NURSE PRACTITIONER

## 2020-10-01 PROCEDURE — 90686 IIV4 VACC NO PRSV 0.5 ML IM: CPT | Performed by: NURSE PRACTITIONER

## 2020-10-01 PROCEDURE — G8417 CALC BMI ABV UP PARAM F/U: HCPCS | Performed by: NURSE PRACTITIONER

## 2020-10-01 PROCEDURE — G8427 DOCREV CUR MEDS BY ELIG CLIN: HCPCS | Performed by: NURSE PRACTITIONER

## 2020-10-01 PROCEDURE — 1036F TOBACCO NON-USER: CPT | Performed by: NURSE PRACTITIONER

## 2020-10-01 PROCEDURE — G0008 ADMIN INFLUENZA VIRUS VAC: HCPCS | Performed by: NURSE PRACTITIONER

## 2020-10-01 RX ORDER — TEMAZEPAM 30 MG/1
30 CAPSULE ORAL NIGHTLY PRN
Qty: 30 CAPSULE | Refills: 0 | Status: SHIPPED | OUTPATIENT
Start: 2020-10-01 | End: 2020-11-02 | Stop reason: SDUPTHER

## 2020-10-01 ASSESSMENT — ENCOUNTER SYMPTOMS
COUGH: 0
ANAL BLEEDING: 0
SINUS PRESSURE: 0
TROUBLE SWALLOWING: 0
WHEEZING: 0
SHORTNESS OF BREATH: 0
DIARRHEA: 0
CONSTIPATION: 0
VOMITING: 0
NAUSEA: 0

## 2020-10-01 NOTE — PATIENT INSTRUCTIONS
Patient Education        Learning About Depression Screening  What is depression screening? Depression screening is a way to see if you have depression symptoms. It may be done by a doctor or counselor. This screening is often part of a routine checkup. That's because your mental health is just as important as your physical health. Depression is a medical illness that affects how you feel, think, and act. You may:  · Have less energy. · Lose interest in your daily activities. · Feel sad and grouchy for a long time. Depression is very common. It affects men and women of all ages. Many things can trigger depression. Some people become depressed after they have a stroke or find out they have a major illness like cancer or heart disease. The death of a loved one, a breakup, or changes in the natural brain chemicals may lead to depression. It can run in families. Most experts believe that a combination of family history (a person's genes) and stressful life events can cause depression. What happens during screening? Your doctor may ask about your feelings, any changes in eating habits, your energy level, and your interest in your daily tasks. He or she may ask other questions, such as how well you are sleeping and if you can focus on the things you do. This may be an informal talk between the two of you. Or your doctor may ask you to fill out a quick form and then talk about your answers. Some diseases or changes in your body can cause symptoms that look like depression. So your doctor may do blood tests to help rule out other problems, such as hormone changes, a low thyroid level, or anemia. What happens after screening? If you have signs of depression, your doctor will talk to you about your options. Doctors usually treat depression with medicines or counseling. Often, combining the two works best. Many people don't get help because they think that they'll get over the depression on their own.  But people

## 2020-10-01 NOTE — PROGRESS NOTES
[address]  [place], [address] [place] [address] Rd  Phone [phone]   Fax [phone]      OFFICE VISIT: 10/1/2020    Mitch Olivarez-Destiney- : 1960      Reason For Visit:  Lillie Junior is a 62 y.o. femalewho is here for Follow-up (for diabetes)         Health Maintenance foot exam fit test    HPI       Insomnia   She has not been taking the ambien or valium  She has tried to stay off it with her falls     Alcohol  Reports they havent been drinking at all in two months  And she has done well with this   She has been feeling well     Anxiety  She got busy : not sleeping  She reports on seroquel and elavil   But not resting well  At all  She feels like worried about sleeping     Fracture radius   Followed with Dr Brian Price  And has done \"well \"  Will do till see someone but to busy right now  And cant afford seeing the specialist  She has stopped with him and the pain specialist  But trying to get a form from  and Cele Sky   To get to see other pain management     Colon screen  She had fit test last 2019  But been stressed out   But will do cologuard      Diabetes type 2     Patient is here for follow up on diabetes  Is doing better with trulicity 1.5 weekly  And doing well      6.0  Glucose 120-130s  she doing well  She feels like working well  Had issue with novolog or humulog  Off and on  But doing well at present  She brought labs  Doing well  Glucose 109-158 to 199  Been stable  She feels well        Trying to exercise  Doing well overall  Along with novolog with meals   On trulicity weekly on   And continues novolog with meals  No low sugars      Colon screen  Had to much in cologuard  Waiting to recollect         weight is 216 lb 4 oz (98.1 kg). Her temporal temperature is 96.3 °F (35.7 °C). Her blood pressure is 130/86 and her pulse is 74. Her oxygen saturation is 93%. Body mass index is 42.23 kg/m².     Results for orders placed or performed in visit on 20   Comprehensive Metabolic Panel Result Value Ref Range    Sodium 136 136 - 145 mmol/L    Potassium 4.7 3.5 - 5.0 mmol/L    Chloride 99 98 - 111 mmol/L    CO2 22 22 - 29 mmol/L    Anion Gap 15 7 - 19 mmol/L    Glucose 176 (H) 74 - 109 mg/dL    BUN 35 (H) 6 - 20 mg/dL    CREATININE 1.7 (H) 0.5 - 0.9 mg/dL    GFR Non- 31 (A) >60    Calcium 8.8 8.6 - 10.0 mg/dL    Total Protein 6.5 (L) 6.6 - 8.7 g/dL    Alb 3.0 (L) 3.5 - 5.2 g/dL    Total Bilirubin 0.3 0.2 - 1.2 mg/dL    Alkaline Phosphatase 132 (H) 35 - 104 U/L    ALT 26 5 - 33 U/L    AST 40 (H) 5 - 32 U/L   CBC Auto Differential   Result Value Ref Range    WBC 9.1 4.8 - 10.8 K/uL    RBC 3.80 (L) 4.20 - 5.40 M/uL    Hemoglobin 11.0 (L) 12.0 - 16.0 g/dL    Hematocrit 34.9 (L) 37.0 - 47.0 %    MCV 91.8 81.0 - 99.0 fL    MCH 28.9 27.0 - 31.0 pg    MCHC 31.5 (L) 33.0 - 37.0 g/dL    RDW 15.9 (H) 11.5 - 14.5 %    Platelets 861 975 - 501 K/uL    MPV 11.9 9.4 - 12.3 fL    Neutrophils % 68.8 (H) 50.0 - 65.0 %    Lymphocytes % 15.0 (L) 20.0 - 40.0 %    Monocytes % 8.5 0.0 - 10.0 %    Eosinophils % 2.2 0.0 - 5.0 %    Basophils % 0.8 0.0 - 1.0 %    Neutrophils Absolute 6.3 1.5 - 7.5 K/uL    Immature Granulocytes # 0.4 K/uL    Lymphocytes Absolute 1.4 1.1 - 4.5 K/uL    Monocytes Absolute 0.80 0.00 - 0.90 K/uL    Eosinophils Absolute 0.20 0.00 - 0.60 K/uL    Basophils Absolute 0.10 0.00 - 0.20 K/uL       I have reviewed the following with the Ms.  OlivarezAle   Lab Review   Orders Only on 07/09/2020   Component Date Value    Sodium 07/09/2020 136     Potassium 07/09/2020 4.7     Chloride 07/09/2020 99     CO2 07/09/2020 22     Anion Gap 07/09/2020 15     Glucose 07/09/2020 176*    BUN 07/09/2020 35*    CREATININE 07/09/2020 1.7*    GFR Non- 07/09/2020 31*    Calcium 07/09/2020 8.8     Total Protein 07/09/2020 6.5*    Alb 07/09/2020 3.0*    Total Bilirubin 07/09/2020 0.3     Alkaline Phosphatase 07/09/2020 132*    ALT 07/09/2020 26     AST 07/09/2020 40*    WBC 07/09/2020 9.1     RBC 07/09/2020 3.80*    Hemoglobin 07/09/2020 11.0*    Hematocrit 07/09/2020 34.9*    MCV 07/09/2020 91.8     MCH 07/09/2020 28.9     MCHC 07/09/2020 31.5*    RDW 07/09/2020 15.9*    Platelets 89/48/1736 306     MPV 07/09/2020 11.9     Neutrophils % 07/09/2020 68.8*    Lymphocytes % 07/09/2020 15.0*    Monocytes % 07/09/2020 8.5     Eosinophils % 07/09/2020 2.2     Basophils % 07/09/2020 0.8     Neutrophils Absolute 07/09/2020 6.3     Immature Granulocytes # 07/09/2020 0.4     Lymphocytes Absolute 07/09/2020 1.4     Monocytes Absolute 07/09/2020 0.80     Eosinophils Absolute 07/09/2020 0.20     Basophils Absolute 07/09/2020 0.10    Orders Only on 06/25/2020   Component Date Value    Magnesium 06/25/2020 2.1     Sodium 06/25/2020 138     Potassium 06/25/2020 4.3     Chloride 06/25/2020 99     CO2 06/25/2020 25     Anion Gap 06/25/2020 14     Glucose 06/25/2020 223*    BUN 06/25/2020 19     CREATININE 06/25/2020 1.4*    GFR Non- 06/25/2020 38*    Calcium 06/25/2020 9.1     Total Protein 06/25/2020 6.9     Alb 06/25/2020 3.8     Total Bilirubin 06/25/2020 <0.2     Alkaline Phosphatase 06/25/2020 122*    ALT 06/25/2020 18     AST 06/25/2020 20     WBC 06/25/2020 8.2     RBC 06/25/2020 4.10*    Hemoglobin 06/25/2020 12.0     Hematocrit 06/25/2020 38.0     MCV 06/25/2020 92.7     MCH 06/25/2020 29.3     MCHC 06/25/2020 31.6*    RDW 06/25/2020 15.4*    Platelets 11/13/6528 268     MPV 06/25/2020 11.9     Neutrophils % 06/25/2020 77.9*    Lymphocytes % 06/25/2020 13.2*    Monocytes % 06/25/2020 6.1     Eosinophils % 06/25/2020 2.0     Basophils % 06/25/2020 0.4     Neutrophils Absolute 06/25/2020 6.4     Immature Granulocytes # 06/25/2020 0.0     Lymphocytes Absolute 06/25/2020 1.1     Monocytes Absolute 06/25/2020 0.50     Eosinophils Absolute 06/25/2020 0.20     Basophils Absolute 06/25/2020 0.00    Admission on 06/17/2020, Discharged on 06/18/2020   Component Date Value    P-R Interval 06/17/2020 184     QRS Duration 06/17/2020 90     Q-T Interval 06/17/2020 406     QTc Calculation (Bazett) 06/17/2020 427     P Axis 06/17/2020 42     T Axis 06/17/2020 38     WBC 06/17/2020 11.3*    RBC 06/17/2020 4.15*    Hemoglobin 06/17/2020 12.0     Hematocrit 06/17/2020 36.5*    MCV 06/17/2020 88.0     MCH 06/17/2020 28.9     MCHC 06/17/2020 32.9*    RDW 06/17/2020 15.0*    Platelets 47/55/9818 277     MPV 06/17/2020 11.2     Neutrophils % 06/17/2020 75.0*    Lymphocytes % 06/17/2020 14.3*    Monocytes % 06/17/2020 9.0     Eosinophils % 06/17/2020 0.9     Basophils % 06/17/2020 0.4     Neutrophils Absolute 06/17/2020 8.5*    Immature Granulocytes # 06/17/2020 0.0     Lymphocytes Absolute 06/17/2020 1.6     Monocytes Absolute 06/17/2020 1.00*    Eosinophils Absolute 06/17/2020 0.10     Basophils Absolute 06/17/2020 0.00     Sodium 06/17/2020 125*    Potassium 06/17/2020 5.1*    Chloride 06/17/2020 89*    CO2 06/17/2020 23     Anion Gap 06/17/2020 13     Glucose 06/17/2020 95     BUN 06/17/2020 31*    CREATININE 06/17/2020 1.5*    GFR Non- 06/17/2020 36*    Calcium 06/17/2020 8.4*    Total Protein 06/17/2020 6.9     Alb 06/17/2020 3.6     Total Bilirubin 06/17/2020 0.3     Alkaline Phosphatase 06/17/2020 118*    ALT 06/17/2020 26     AST 06/17/2020 57*    Total CK 06/17/2020 3,193*    Troponin 06/17/2020 <0.01     Sodium, Ur 06/17/2020 33.0     Osmolality 06/17/2020 275     TSH Reflex FT4 06/17/2020 1.67     Osmolality, Ur 06/17/2020 204*    Creatinine, Ur 06/17/2020 26.0     Color, UA 06/17/2020 YELLOW     Clarity, UA 06/17/2020 Clear     Glucose, Ur 06/17/2020 Negative     Bilirubin Urine 06/17/2020 Negative     Ketones, Urine 06/17/2020 Negative     Specific Gravity, UA 06/17/2020 1.007     Blood, Urine 06/17/2020 Negative     pH, UA 06/17/2020 6.0     Protein, UA 06/17/2020 Negative     Urobilinogen, Urine 06/17/2020 0.2     Nitrite, Urine 06/17/2020 Negative     Leukocyte Esterase, Urine 06/17/2020 TRACE*    Bacteria, UA 06/17/2020 TRACE*    Yeast, UA 06/17/2020 NEG*    Hyaline Casts, UA 06/17/2020 1     WBC, UA 06/17/2020 6*    RBC, UA 06/17/2020 0     Epithelial Cells, UA 06/17/2020 2     POC Glucose 06/17/2020 87     Performed on 06/17/2020 AccuChek     Sodium 06/18/2020 131*    Potassium reflex Magnesi* 06/18/2020 4.1     Chloride 06/18/2020 99     CO2 06/18/2020 22     Anion Gap 06/18/2020 10     Glucose 06/18/2020 125*    BUN 06/18/2020 31*    CREATININE 06/18/2020 1.5*    GFR Non- 06/18/2020 36*    Calcium 06/18/2020 7.9*    Total Protein 06/18/2020 5.8*    Alb 06/18/2020 3.0*    Total Bilirubin 06/18/2020 <0.2     Alkaline Phosphatase 06/18/2020 101     ALT 06/18/2020 21     AST 06/18/2020 44*    WBC 06/18/2020 7.5     RBC 06/18/2020 3.69*    Hemoglobin 06/18/2020 10.7*    Hematocrit 06/18/2020 32.9*    MCV 06/18/2020 89.2     MCH 06/18/2020 29.0     MCHC 06/18/2020 32.5*    RDW 06/18/2020 15.4*    Platelets 40/81/3073 214     MPV 06/18/2020 11.7     Neutrophils % 06/18/2020 64.0     Lymphocytes % 06/18/2020 22.2     Monocytes % 06/18/2020 11.1*    Eosinophils % 06/18/2020 1.9     Basophils % 06/18/2020 0.3     Neutrophils Absolute 06/18/2020 4.8     Immature Granulocytes # 06/18/2020 0.0     Lymphocytes Absolute 06/18/2020 1.7     Monocytes Absolute 06/18/2020 0.80     Eosinophils Absolute 06/18/2020 0.10     Basophils Absolute 06/18/2020 0.00     Total CK 06/18/2020 1,888*    Potassium 06/18/2020 4.2     Magnesium 06/18/2020 2.1     Hemoglobin A1C 06/18/2020 6.0     Cholesterol, Total 06/18/2020 107*    Triglycerides 06/18/2020 170*    HDL 06/18/2020 55*    LDL Calculated 06/18/2020 18     POC Glucose 06/17/2020 115*    Performed on 06/17/2020 AccuChek     POC Glucose 06/18/2020 97     Performed on 06/18/2020 AccuChek    Orders Only on 04/06/2020   Component Date Value    pH, UA 04/01/2020 5.5     Clarity, UA 04/01/2020 Clear     Protein, UA 04/01/2020 Negative     Glucose, Ur 04/01/2020 neg     Ketones, Urine 04/01/2020 Negative     Bilirubin, Urine 04/01/2020 Negative     Urobilinogen, Urine 04/01/2020 Normal     Nitrite, Urine 04/01/2020 Negative     Specific Gravity, Urine 04/01/2020 1.008     Color, UA 04/01/2020 Yellow     Leukocyte Esterase, Urine 04/01/2020 Negative     Blood, Urine 04/01/2020 Negative      Copies of these are in the chart. Prior to Visit Medications    Medication Sig Taking? Authorizing Provider   temazepam (RESTORIL) 30 MG capsule Take 1 capsule by mouth nightly as needed for Sleep for up to 30 days. Yes TRESSA Erickson   QUEtiapine (SEROQUEL) 200 MG tablet Take 1 tablet by mouth nightly Yes TRESSA Erickson   Dulaglutide (TRULICITY) 1.5 VO/8.3EZ SOPN Inject 1.5 mg into the skin every 7 days Yes TRESSA Erickson   amitriptyline (ELAVIL) 100 MG tablet Take 2 tablets by mouth nightly Yes TRESSA Erickson   pregabalin (LYRICA) 150 MG capsule Take 1 capsule by mouth 2 times daily for 90 days. Yes TRESSA Erickson   insulin aspart (NOVOLOG FLEXPEN) 100 UNIT/ML injection pen Inject into the skin 3 times daily (before meals) Sliding scale Yes Historical Provider, MD   tiZANidine (ZANAFLEX) 4 MG tablet Take 1 tablet by mouth every 8 hours as needed (spasms) Yes TRESSA Erickson   amLODIPine (NORVASC) 5 MG tablet Take 1 tablet by mouth daily  Patient taking differently: Take 5 mg by mouth nightly  Yes TRESSA Erickson   atorvastatin (LIPITOR) 40 MG tablet Take 1 tablet by mouth nightly Yes TRESSA Erickson   blood glucose test strips (FREESTYLE LITE) strip 1 each by In Vitro route 4 times daily As needed.  Yes TRESSA Erickson   Lancets MISC 1 each by Does not apply route daily Yes Lana Dickey History of blood transfusion     History of suicidal ideation     Hyperlipidemia     Hypertension     Hypothyroidism     Insomnia     Kidney disease     stage 3 failure    MI, old 2005    Obesity     Polyarticular arthritis     Type II or unspecified type diabetes mellitus without mention of complication, not stated as uncontrolled        Past Surgical History:   Procedure Laterality Date    ABDOMINOPLASTY      ANGIOPLASTY  05    stent placement to LAD and diagonal with normal left vent function    BREAST REDUCTION SURGERY      CARDIAC CATHETERIZATION  05, 05    see report  Stents x3    CARDIAC CATHETERIZATION  3/23/15  JDT    EF 50%    CARPAL TUNNEL RELEASE       SECTION      x2    CHOLECYSTECTOMY      GASTRIC BYPASS SURGERY      HERNIA REPAIR      umbilical with mesh    INTRACAPSULAR CATARACT EXTRACTION Left 2016    LT EYE CATARACT EMULSIFICATION IOL IMPLANT performed by Cindi Dawkins MD at [address] [place] [address] Right 2018    ORIF RIGHT BIMALLEOLAR ANKLE FRACTURE, RIGHT WRIST CAST REMOVAL performed by Delmar Jordan MD at [address] [place] [address] Right 2018    ANKLE OPEN REDUCTION INTERNAL FIXATION performed by Delmar Jordan MD at [place] [address] Left 2017    KNEE TOTAL ARTHROPLASTY performed by Julio Cesar Cosme DO at [facility] OR       Social History     Tobacco Use    Smoking status: Never Smoker    Smokeless tobacco: Former User     Types: Snuff   Substance Use Topics    Alcohol use: No       Review of Systems   Constitutional: Positive for activity change. Negative for appetite change, fatigue and fever. HENT: Negative for congestion, postnasal drip, sinus pressure and trouble swallowing. Respiratory: Negative for cough, shortness of breath and wheezing. Cardiovascular: Negative for chest pain and leg swelling.    Gastrointestinal: Negative for anal bleeding, constipation, diarrhea, nausea and vomiting. Endocrine: Negative for polydipsia, polyphagia and polyuria. Genitourinary: Negative for difficulty urinating. Musculoskeletal: Positive for arthralgias (improving). Skin: Negative for rash. Psychiatric/Behavioral: Negative for behavioral problems, self-injury and sleep disturbance. The patient is not nervous/anxious and is not hyperactive. Denies drinking           Physical Exam  Constitutional:       General: She is not in acute distress. Appearance: Normal appearance. She is not ill-appearing. HENT:      Head: Normocephalic. Right Ear: Tympanic membrane normal. There is no impacted cerumen. Left Ear: Tympanic membrane normal. There is no impacted cerumen. Nose: No congestion. Mouth/Throat:      Pharynx: No posterior oropharyngeal erythema. Eyes:      General:         Right eye: No discharge. Left eye: No discharge. Cardiovascular:      Rate and Rhythm: Normal rate and regular rhythm. Pulses: Normal pulses. Heart sounds: No murmur. Musculoskeletal:         General: Tenderness (arm continues) present. Neurological:      Mental Status: She is alert and oriented to person, place, and time. Psychiatric:         Mood and Affect: Mood normal.         Behavior: Behavior normal.         ASSESSMENT/ PLAN  Visual inspection:  Deformity/amputation: absent  Skin lesions/pre-ulcerative calluses: absent  Edema: right- negative, left- negative    Sensory exam:  Monofilament sensation: normal  (minimum of 5 random plantar locations tested, avoiding callused areas - > 1 area with absence of sensation is + for neuropathy)    Plus at least one of the following:  Pulses: normal,   Pinprick: N/A  Proprioception: N/A  Vibration (128 Hz): N/A    1. Screen for colon cancer  Will redo cologuard    2.  Need for influenza vaccination  Given today  After verbal consent  - INFLUENZA, QUADV, 3 YRS AND OLDER, IM PF, PREFILL SYR OR SDV, 0.5ML (AFLURIA QUADV, PF)    3. Type 2 diabetes mellitus with hyperglycemia, with long-term current use of insulin (HCC)  Cont tight control  And log  - CBC Auto Differential; Future  - Comprehensive Metabolic Panel; Future  - TSH without Reflex; Future  - T4, Free; Future  - Hemoglobin A1C; Future    4. Recurrent major depressive disorder, in partial remission ([facility] [address]  Doing well  - TSH without Reflex; Future  - T4, Free; Future  - Hemoglobin A1C; Future    5. Situational anxiety  Will try to sleep  - temazepam (RESTORIL) 30 MG capsule; Take 1 capsule by mouth nightly as needed for Sleep for up to 30 days. Dispense: 30 capsule; Refill: 0    6. Insomnia due to other mental disorder  Need 6-8 weeks goal    7. Alcohol abuse  Still not drinking    8. Chronic kidney disease (CKD) stage G3b/A2, moderately decreased glomerular filtration rate (GFR) between 30-44 mL/min/1.73 square meter and albuminuria creatinine ratio between  mg/g  Will cont labs  And doing well  - Comprehensive Metabolic Panel; Future      Orders Placed This Encounter   Procedures    INFLUENZA, QUADV, 3 YRS AND OLDER, IM PF, PREFILL SYR OR SDV, 0.5ML (AFLURIA QUADV, PF)    CBC Auto Differential    Comprehensive Metabolic Panel    TSH without Reflex    T4, Free    Hemoglobin A1C        Return in about 1 month (around 11/1/2020) for diabets and insomnia follow up. Patient Instructions     Patient Education        Learning About Depression Screening  What is depression screening? Depression screening is a way to see if you have depression symptoms. It may be done by a doctor or counselor. This screening is often part of a routine checkup. That's because your mental health is just as important as your physical health. Depression is a medical illness that affects how you feel, think, and act. You may:  · Have less energy. · Lose interest in your daily activities.   · Feel sad and grouchy for a long time.  Depression is very common. It affects men and women of all ages. Many things can trigger depression. Some people become depressed after they have a stroke or find out they have a major illness like cancer or heart disease. The death of a loved one, a breakup, or changes in the natural brain chemicals may lead to depression. It can run in families. Most experts believe that a combination of family history (a person's genes) and stressful life events can cause depression. What happens during screening? Your doctor may ask about your feelings, any changes in eating habits, your energy level, and your interest in your daily tasks. He or she may ask other questions, such as how well you are sleeping and if you can focus on the things you do. This may be an informal talk between the two of you. Or your doctor may ask you to fill out a quick form and then talk about your answers. Some diseases or changes in your body can cause symptoms that look like depression. So your doctor may do blood tests to help rule out other problems, such as hormone changes, a low thyroid level, or anemia. What happens after screening? If you have signs of depression, your doctor will talk to you about your options. Doctors usually treat depression with medicines or counseling. Often, combining the two works best. Many people don't get help because they think that they'll get over the depression on their own. But people with depression may not get better unless they get treatment. Many people feel embarrassed or ashamed about having depression. But it isn't a sign of personal weakness. It's not a character flaw. A person who is depressed is not \"crazy. \" Depression is caused by changes in the brain. A serious symptom of depression is thinking about death or suicide. If you or someone you care about talks about this or about feeling hopeless, get help right away. It's important to know that depression can be treated.  The first

## 2020-10-02 ENCOUNTER — PATIENT MESSAGE (OUTPATIENT)
Dept: PRIMARY CARE CLINIC | Age: 60
End: 2020-10-02

## 2020-10-13 RX ORDER — ONDANSETRON 4 MG/1
4 TABLET, ORALLY DISINTEGRATING ORAL 3 TIMES DAILY PRN
Qty: 21 TABLET | Refills: 0 | Status: SHIPPED | OUTPATIENT
Start: 2020-10-13 | End: 2020-12-04 | Stop reason: SDUPTHER

## 2020-10-13 RX ORDER — PREGABALIN 150 MG/1
150 CAPSULE ORAL 2 TIMES DAILY
Qty: 60 CAPSULE | Refills: 2 | Status: SHIPPED | OUTPATIENT
Start: 2020-10-13 | End: 2021-01-15

## 2020-11-02 ENCOUNTER — VIRTUAL VISIT (OUTPATIENT)
Dept: PRIMARY CARE CLINIC | Age: 60
End: 2020-11-02
Payer: MEDICARE

## 2020-11-02 PROCEDURE — 99443 PR PHYS/QHP TELEPHONE EVALUATION 21-30 MIN: CPT | Performed by: PEDIATRICS

## 2020-11-02 RX ORDER — VALSARTAN 320 MG/1
320 TABLET ORAL NIGHTLY
Qty: 30 TABLET | Refills: 0 | OUTPATIENT
Start: 2020-11-02

## 2020-11-02 RX ORDER — VALSARTAN 320 MG/1
320 TABLET ORAL DAILY
Qty: 90 TABLET | Refills: 3 | Status: SHIPPED | OUTPATIENT
Start: 2020-11-02 | End: 2021-11-08 | Stop reason: SDUPTHER

## 2020-11-02 RX ORDER — BUDESONIDE AND FORMOTEROL FUMARATE DIHYDRATE 160; 4.5 UG/1; UG/1
2 AEROSOL RESPIRATORY (INHALATION) 2 TIMES DAILY
Qty: 3 INHALER | Refills: 1 | Status: SHIPPED | OUTPATIENT
Start: 2020-11-02

## 2020-11-02 RX ORDER — TEMAZEPAM 30 MG/1
30 CAPSULE ORAL NIGHTLY PRN
Qty: 30 CAPSULE | Refills: 0 | OUTPATIENT
Start: 2020-11-02 | End: 2020-12-02

## 2020-11-02 RX ORDER — TEMAZEPAM 30 MG/1
30 CAPSULE ORAL NIGHTLY PRN
Qty: 30 CAPSULE | Refills: 0 | Status: SHIPPED | OUTPATIENT
Start: 2020-11-02 | End: 2020-12-04 | Stop reason: SDUPTHER

## 2020-11-02 RX ORDER — ALBUTEROL SULFATE 90 UG/1
2 AEROSOL, METERED RESPIRATORY (INHALATION) 4 TIMES DAILY PRN
Qty: 1 INHALER | Refills: 5 | Status: SHIPPED | OUTPATIENT
Start: 2020-11-02 | End: 2021-11-18

## 2020-11-02 ASSESSMENT — ENCOUNTER SYMPTOMS
VOMITING: 0
CONSTIPATION: 1
WHEEZING: 0
BACK PAIN: 0
COUGH: 1
TROUBLE SWALLOWING: 0
SINUS PRESSURE: 0
ABDOMINAL DISTENTION: 0
DIARRHEA: 0
SORE THROAT: 1
CHEST TIGHTNESS: 1
NAUSEA: 0
VOICE CHANGE: 0
RHINORRHEA: 1
ABDOMINAL PAIN: 1
SHORTNESS OF BREATH: 1
CHOKING: 0

## 2020-11-02 NOTE — TELEPHONE ENCOUNTER
This medication is a controlled substance and must be associated with an appointment to be prescribed. If you want to set up a virtual visit, I can prescribe this.   If needed, we can set it up at the end of the day

## 2020-11-02 NOTE — PROGRESS NOTES
[facility], [address]  Phone [phone]   Fax [phone]      OFFICE VISIT: 2020    Mitch GarIrhkbz-Pncfqlav-GRP: 1960      HPI  Reason For Visit:  Fred Seymour is a 62 y.o. Insomnia    Insomnia  She is requesting a refill of her temazepam.  She typically sees my partner, Africa Garber. She was out of the office today. This has been very helpful for her. This does help her sleep    Colby Schaumann was unavailable at the time of the evaluation today. URI  She also has uri with exacerbation of her asthma  She is out of her albuterol   She is needing some controller medication as well. Hypertension:   BP today was   BP Readings from Last 1 Encounters:   10/01/20 130/86      Recent BP readings:    BP Readings from Last 3 Encounters:   10/01/20 130/86   20 (!) 98/34   20 98/70     Medication   Valsartan 320mg daily   Norvasc 5mg daily   Toprol XL 50mg daily  Medication compliance:  compliant most of the time  Home blood pressure monitoring: No.    She is not adherent to a low sodium diet. Symptoms: none  Laboratory:  Lab Results   Component Value Date    BUN 23 (H) 10/01/2020    CREATININE 1.4 (H) 10/01/2020        vitals were not taken for this visit. There is no height or weight on file to calculate BMI. I have reviewed the following with the Ms. Fuchs   Lab Review  Orders Only on 10/01/2020   Component Date Value    Hemoglobin A1C 10/01/2020 6.2*    T4 Free 10/01/2020 0.93     TSH 10/01/2020 1.040     Sodium 10/01/2020 138     Potassium 10/01/2020 4.8     Chloride 10/01/2020 100     CO2 10/01/2020 24     Anion Gap 10/01/2020 14     Glucose 10/01/2020 122*    BUN 10/01/2020 23*    CREATININE 10/01/2020 1.4*    GFR Non- 10/01/2020 38*    GFR  10/01/2020 47*    Calcium 10/01/2020 9.2     Total Protein 10/01/2020 7.4     Alb 10/01/2020 4.0     Total Bilirubin 10/01/2020 0.4     Alkaline Phosphatase 10/01/2020 138*    ALT 10/01/2020 15     AST 10/01/2020 21     WBC 10/01/2020 8.4     RBC 10/01/2020 4.43     Hemoglobin 10/01/2020 13.1     Hematocrit 10/01/2020 41.0     MCV 10/01/2020 92.6     MCH 10/01/2020 29.6     MCHC 10/01/2020 32.0*    RDW 10/01/2020 14.6*    Platelets 55/78/8461 292     MPV 10/01/2020 11.6     Neutrophils % 10/01/2020 68.3*    Lymphocytes % 10/01/2020 21.7     Monocytes % 10/01/2020 7.0     Eosinophils % 10/01/2020 1.8     Basophils % 10/01/2020 0.7     Neutrophils Absolute 10/01/2020 5.7     Immature Granulocytes # 10/01/2020 0.0     Lymphocytes Absolute 10/01/2020 1.8     Monocytes Absolute 10/01/2020 0.60     Eosinophils Absolute 10/01/2020 0.20     Basophils Absolute 10/01/2020 0.10    Orders Only on 07/09/2020   Component Date Value    Sodium 07/09/2020 136     Potassium 07/09/2020 4.7     Chloride 07/09/2020 99     CO2 07/09/2020 22     Anion Gap 07/09/2020 15     Glucose 07/09/2020 176*    BUN 07/09/2020 35*    CREATININE 07/09/2020 1.7*    GFR Non- 07/09/2020 31*    Calcium 07/09/2020 8.8     Total Protein 07/09/2020 6.5*    Alb 07/09/2020 3.0*    Total Bilirubin 07/09/2020 0.3     Alkaline Phosphatase 07/09/2020 132*    ALT 07/09/2020 26     AST 07/09/2020 40*    WBC 07/09/2020 9.1     RBC 07/09/2020 3.80*    Hemoglobin 07/09/2020 11.0*    Hematocrit 07/09/2020 34.9*    MCV 07/09/2020 91.8     MCH 07/09/2020 28.9     MCHC 07/09/2020 31.5*    RDW 07/09/2020 15.9*    Platelets 03/29/4760 306     MPV 07/09/2020 11.9     Neutrophils % 07/09/2020 68.8*    Lymphocytes % 07/09/2020 15.0*    Monocytes % 07/09/2020 8.5     Eosinophils % 07/09/2020 2.2     Basophils % 07/09/2020 0.8     Neutrophils Absolute 07/09/2020 6.3     Immature Granulocytes # 07/09/2020 0.4     Lymphocytes Absolute 07/09/2020 1.4     Monocytes Absolute 07/09/2020 0.80     Eosinophils Absolute 07/09/2020 0.20     Basophils Basophils % 06/17/2020 0.4     Neutrophils Absolute 06/17/2020 8.5*    Immature Granulocytes # 06/17/2020 0.0     Lymphocytes Absolute 06/17/2020 1.6     Monocytes Absolute 06/17/2020 1.00*    Eosinophils Absolute 06/17/2020 0.10     Basophils Absolute 06/17/2020 0.00     Sodium 06/17/2020 125*    Potassium 06/17/2020 5.1*    Chloride 06/17/2020 89*    CO2 06/17/2020 23     Anion Gap 06/17/2020 13     Glucose 06/17/2020 95     BUN 06/17/2020 31*    CREATININE 06/17/2020 1.5*    GFR Non- 06/17/2020 36*    Calcium 06/17/2020 8.4*    Total Protein 06/17/2020 6.9     Alb 06/17/2020 3.6     Total Bilirubin 06/17/2020 0.3     Alkaline Phosphatase 06/17/2020 118*    ALT 06/17/2020 26     AST 06/17/2020 57*    Total CK 06/17/2020 3,193*    Troponin 06/17/2020 <0.01     Sodium, Ur 06/17/2020 33.0     Osmolality 06/17/2020 275     TSH Reflex FT4 06/17/2020 1.67     Osmolality, Ur 06/17/2020 204*    Creatinine, Ur 06/17/2020 26.0     Color, UA 06/17/2020 YELLOW     Clarity, UA 06/17/2020 Clear     Glucose, Ur 06/17/2020 Negative     Bilirubin Urine 06/17/2020 Negative     Ketones, Urine 06/17/2020 Negative     Specific Gravity, UA 06/17/2020 1.007     Blood, Urine 06/17/2020 Negative     pH, UA 06/17/2020 6.0     Protein, UA 06/17/2020 Negative     Urobilinogen, Urine 06/17/2020 0.2     Nitrite, Urine 06/17/2020 Negative     Leukocyte Esterase, Urine 06/17/2020 TRACE*    Bacteria, UA 06/17/2020 TRACE*    Yeast, UA 06/17/2020 NEG*    Hyaline Casts, UA 06/17/2020 1     WBC, UA 06/17/2020 6*    RBC, UA 06/17/2020 0     Epithelial Cells, UA 06/17/2020 2     POC Glucose 06/17/2020 87     Performed on 06/17/2020 AccuChek     Sodium 06/18/2020 131*    Potassium reflex Magnesi* 06/18/2020 4.1     Chloride 06/18/2020 99     CO2 06/18/2020 22     Anion Gap 06/18/2020 10     Glucose 06/18/2020 125*    BUN 06/18/2020 31*    CREATININE 06/18/2020 1.5*    GFR Non- 06/18/2020 36*    Calcium 06/18/2020 7.9*    Total Protein 06/18/2020 5.8*    Alb 06/18/2020 3.0*    Total Bilirubin 06/18/2020 <0.2     Alkaline Phosphatase 06/18/2020 101     ALT 06/18/2020 21     AST 06/18/2020 44*    WBC 06/18/2020 7.5     RBC 06/18/2020 3.69*    Hemoglobin 06/18/2020 10.7*    Hematocrit 06/18/2020 32.9*    MCV 06/18/2020 89.2     MCH 06/18/2020 29.0     MCHC 06/18/2020 32.5*    RDW 06/18/2020 15.4*    Platelets 29/77/9905 214     MPV 06/18/2020 11.7     Neutrophils % 06/18/2020 64.0     Lymphocytes % 06/18/2020 22.2     Monocytes % 06/18/2020 11.1*    Eosinophils % 06/18/2020 1.9     Basophils % 06/18/2020 0.3     Neutrophils Absolute 06/18/2020 4.8     Immature Granulocytes # 06/18/2020 0.0     Lymphocytes Absolute 06/18/2020 1.7     Monocytes Absolute 06/18/2020 0.80     Eosinophils Absolute 06/18/2020 0.10     Basophils Absolute 06/18/2020 0.00     Total CK 06/18/2020 1,888*    Potassium 06/18/2020 4.2     Magnesium 06/18/2020 2.1     Hemoglobin A1C 06/18/2020 6.0     Cholesterol, Total 06/18/2020 107*    Triglycerides 06/18/2020 170*    HDL 06/18/2020 55*    LDL Calculated 06/18/2020 18     POC Glucose 06/17/2020 115*    Performed on 06/17/2020 AccuChek     POC Glucose 06/18/2020 97     Performed on 06/18/2020 AccuChek      Copies of these are in the chart. Current Outpatient Medications   Medication Sig Dispense Refill    temazepam (RESTORIL) 30 MG capsule Take 1 capsule by mouth nightly as needed for Sleep for up to 30 days.  30 capsule 0    budesonide-formoterol (SYMBICORT) 160-4.5 MCG/ACT AERO Inhale 2 puffs into the lungs 2 times daily 3 Inhaler 1    albuterol sulfate HFA (VENTOLIN HFA) 108 (90 Base) MCG/ACT inhaler Inhale 2 puffs into the lungs 4 times daily as needed for Wheezing 1 Inhaler 5    valsartan (DIOVAN) 320 MG tablet Take 1 tablet by mouth daily 90 tablet 3    pregabalin (LYRICA) 150 MG capsule Take 1 MISC 1 each by Does not apply route daily 100 each 3    aspirin 81 MG tablet Take 1 tablet by mouth daily 90 tablet 3    Insulin Pen Needle 31G X 8 MM MISC 1 each by Does not apply route 4 times daily 200 each 11    Blood Glucose Monitoring Suppl (FREESTYLE LITE) JEZ 1 Device by Does not apply route 4 times daily 1 Device 0     No current facility-administered medications for this visit. Allergies: Ciprofloxacin; Dilaudid [hydromorphone hcl]; Keflex [cephalexin];  Nitrofuran derivatives; Pcn [penicillins]; and Cefdinir     Past Medical History:   Diagnosis Date    Anxiety     Arthritis     [facility]ipolar [facility] disorder [facility]     CAD (coronary artery disease)     [facility]est[facility] failure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    Obesity     Polyarticular arthritis     Type II or unspecified type diabetes mellitus without mention of complication, not stated as uncontrolled        Family History   Problem Relation Age of Onset    Depression Mother     Heart Attack Mother     High Blood Pressure Mother     Kidney Disease Father     Alcohol Abuse Father     Depression Father     Heart Attack Father     High Blood Pressure Father     Kidney Disease Brother     Heart Disease Other     Depression Sister        Past Surgical History:   Procedure Laterality Date    ABDOMINOPLASTY      ANGIOPLASTY  1/20/05    stent placement to LAD and diagonal with normal left vent function    BREAST REDUCTION SURGERY      CARDIAC CATHETERIZATION  2/07/05, 05    see report  Stents x3    CARDIAC CATHETERIZATION  3/23/15  JDT    EF 50%    CARPAL TUNNEL RELEASE       SECTION      x2    CHOLECYSTECTOMY      GASTRIC BYPASS SURGERY      HERNIA REPAIR      umbilical with mesh    INTRACAPSULAR CATARACT EXTRACTION Left 2016    LT EYE CATARACT EMULSIFICATION IOL IMPLANT performed by Cindi Dawkins MD at [address] [place] [address] OPEN TREATMENT BIMALLEOLAR ANKLE FRACTURE Right 2018    ORIF RIGHT BIMALLEOLAR ANKLE FRACTURE, RIGHT WRIST CAST REMOVAL performed by Delmar Jordan MD at [facility] [address] Right 2018    ANKLE OPEN REDUCTION INTERNAL FIXATION performed by Delmar Jordan MD at [facility] ARTHROPLASTY Left 2017    KNEE TOTAL ARTHROPLASTY performed by Julio Cesar Cosme DO at [facility] OR       Social History     Tobacco Use    Smoking status: Never Smoker    Smokeless tobacco: Former User     Types: Snuff   Substance Use Topics    Alcohol use: No        Review of Systems   Constitutional: Negative for activity change, appetite change, chills, diaphoresis, fatigue, fever and unexpected weight change. HENT: Positive for congestion, postnasal drip, rhinorrhea and sore throat (scratchy). Negative for ear pain, sinus pressure, sneezing, tinnitus, trouble swallowing and voice change. Eyes: Negative for visual disturbance. Respiratory: Positive for cough, chest tightness and shortness of breath. Negative for choking and wheezing. Cardiovascular: Negative for chest pain, palpitations and leg swelling. Gastrointestinal: Positive for abdominal pain (Right upper quadrant. she does not have a GB any more) and constipation (she will go 4-5 days between stools). Negative for abdominal distention, diarrhea, nausea and vomiting. Genitourinary: Negative for decreased urine volume, difficulty urinating, dysuria, frequency, hematuria and urgency.    Musculoskeletal: Positive for arthralgias (hands and knees). Negative for back pain, gait problem, joint swelling, myalgias, neck pain and neck stiffness. Skin: Negative for rash and wound. Neurological: Negative for dizziness, seizures, weakness, light-headedness, numbness and headaches. Hematological: Does not bruise/bleed easily. Psychiatric/Behavioral: Positive for dysphoric mood (on occasion). Negative for agitation, confusion, decreased concentration, self-injury and sleep disturbance. The patient is nervous/anxious (on occasion). The patient is not hyperactive. Physical Exam  Physical exam was not performed as this was a telephone conference visit      ASSESSMENT      ICD-10-CM    1. Moderate persistent asthma with acute exacerbation  J45.41 budesonide-formoterol (SYMBICORT) 160-4.5 MCG/ACT AERO     albuterol sulfate HFA (VENTOLIN HFA) 108 (90 Base) MCG/ACT inhaler   2. Situational anxiety  F41.8 temazepam (RESTORIL) 30 MG capsule   3. Primary insomnia  F51.01 temazepam (RESTORIL) 30 MG capsule   4. Essential hypertension  I10 valsartan (DIOVAN) 320 MG tablet   5. Viral upper respiratory tract infection  J06.9          PLAN    1. Situational anxiety  She notes that temazepam helps with anxiety as well  - temazepam (RESTORIL) 30 MG capsule; Take 1 capsule by mouth nightly as needed for Sleep for up to 30 days. Dispense: 30 capsule; Refill: 0    2. Moderate persistent asthma with acute exacerbation  We are going to give her a refill of her albuterol and prescribed Symbicort 2 puffs twice daily  - budesonide-formoterol (SYMBICORT) 160-4.5 MCG/ACT AERO; Inhale 2 puffs into the lungs 2 times daily  Dispense: 3 Inhaler; Refill: 1  - albuterol sulfate HFA (VENTOLIN HFA) 108 (90 Base) MCG/ACT inhaler; Inhale 2 puffs into the lungs 4 times daily as needed for Wheezing  Dispense: 1 Inhaler; Refill: 5    3. Primary insomnia  Refill temazepam as before  - temazepam (RESTORIL) 30 MG capsule;  Take 1 capsule by mouth nightly as needed for Sleep for up to 30 days. Dispense: 30 capsule; Refill: 0    4. Essential hypertension  She needs a refill of her valsartan. She is down to her last pill  - valsartan (DIOVAN) 320 MG tablet; Take 1 tablet by mouth daily  Dispense: 90 tablet; Refill: 3    5. Viral upper respiratory tract infection  Supportive care. Over-the-counter antihistamine plus cough suppressant plus increase fluids and rest.  If she does develop a fever, she may consider coronavirus testing      No orders of the defined types were placed in this encounter. Return in about 1 month (around 12/2/2020) for 15. Edith Wang is a 62 y.o. female being evaluated by a Virtual Visit (video visit) encounter to address concerns as mentioned above. A caregiver was present when appropriate. Due to this being a TeleHealth encounter (During NEB-37 public health emergency), evaluation of the following organ systems was limited: Vitals/Constitutional/EENT/Resp/CV/GI//MS/Neuro/Skin/Heme-Lymph-Imm. Pursuant to the emergency declaration under the [address] [place] [address], [address] [place] [address] authority and the [employer] and [employer] Act, this Virtual Visit was conducted with patient's (and/or legal guardian's) consent, to reduce the patient's risk of exposure to COVID-19 and provide necessary medical care. The patient (and/or legal guardian) has also been advised to contact this office for worsening conditions or problems, and seek emergency medical treatment and/or call 911 if deemed necessary. Patient identification was verified at the start of the visit: Yes    Total time spent for this encounter: 25m    Services were provided through a video synchronous discussion virtually to substitute for in-person clinic visit. Patient and provider were located at their individual homes. --B. Toya Bosworth, DO on 11/2/2020 at 5:48 PM    An electronic signature was used to authenticate this note.

## 2020-11-02 NOTE — TELEPHONE ENCOUNTER
I had to move patients appointment due to [facility] being out of office today. Patient is needing medication refill before her appointment on Thursday. Shirlene Huber is in media.

## 2020-11-03 ENCOUNTER — TELEPHONE (OUTPATIENT)
Dept: PRIMARY CARE CLINIC | Age: 60
End: 2020-11-03

## 2020-11-03 NOTE — TELEPHONE ENCOUNTER
Called and LM for [facility] that okay to refill early (and that pt knows might have to pay cash price since early) pt aware of this too, that it was okayed for early refill

## 2020-11-03 NOTE — TELEPHONE ENCOUNTER
Pt called and LM that she saw  yesterday. She said that she forgot to discuss her elavil and Seroquel. When she came off the Ambien and valium, she took extra Seroquel and elavil to get her through that month. She would like a refill of those early. She will not do it again.  She has not had any alcohol since March, she doesn't use tobacco.

## 2020-12-01 ENCOUNTER — HOSPITAL ENCOUNTER (OUTPATIENT)
Dept: PAIN MANAGEMENT | Age: 60
Discharge: HOME | End: 2020-12-01
Payer: MEDICARE

## 2020-12-01 VITALS
HEIGHT: 61 IN | BODY MASS INDEX: 40.93 KG/M2 | WEIGHT: 216.8 LBS | TEMPERATURE: 98 F | DIASTOLIC BLOOD PRESSURE: 79 MMHG | HEART RATE: 82 BPM | SYSTOLIC BLOOD PRESSURE: 119 MMHG | OXYGEN SATURATION: 96 %

## 2020-12-01 PROBLEM — M79.18 MYOFASCIAL MUSCLE PAIN: Status: ACTIVE | Noted: 2020-12-01

## 2020-12-01 PROBLEM — M62.838 MUSCLE SPASMS OF NECK: Status: ACTIVE | Noted: 2020-12-01

## 2020-12-01 PROBLEM — M54.2 CERVICALGIA: Status: ACTIVE | Noted: 2020-12-01

## 2020-12-01 PROCEDURE — 99203 OFFICE O/P NEW LOW 30 MIN: CPT | Performed by: NURSE PRACTITIONER

## 2020-12-01 PROCEDURE — 99215 OFFICE O/P EST HI 40 MIN: CPT

## 2020-12-01 RX ORDER — HYDROCODONE BITARTRATE AND ACETAMINOPHEN 5; 325 MG/1; MG/1
1 TABLET ORAL 2 TIMES DAILY PRN
Qty: 60 TABLET | Refills: 0 | Status: SHIPPED | OUTPATIENT
Start: 2020-12-31 | End: 2021-02-01 | Stop reason: SDUPTHER

## 2020-12-01 RX ORDER — HYDROCODONE BITARTRATE AND ACETAMINOPHEN 5; 325 MG/1; MG/1
1 TABLET ORAL 2 TIMES DAILY PRN
Qty: 60 TABLET | Refills: 0 | Status: SHIPPED | OUTPATIENT
Start: 2020-12-01 | End: 2020-12-01 | Stop reason: SDUPTHER

## 2020-12-01 NOTE — PROGRESS NOTES
Clinic Documentation      Education Provided:  [x] Review of Court   [x] Agreement Review  [x] PEG Score Calculated [x] PHQ Score Calculated [x] ORT Score Calculated    [] Compliance Issues Discussed [] Cognitive Behavior Needs [x] Exercise [] Review of Test [] Financial Issues  [x] Tobacco/Alcohol Use Reviewed [x] Teaching [x] New Patient [] Picture Obtained    Physician Plan:  [] Outgoing Referral  [] Pharmacy Consult  [x] Test Ordered [x] Prescription Ordered/Changed   [] Obtained Test Results / Consult Notes        Complete if patient is withholding blood thinner for procedure     Blood Thinner Patient is currently taking:      [] Plavix (Hold for 7 days)  [] Aspirin (Hold for 5 days)     [] Pletal (Hold for 2 days)  [] Pradaxa (Hold for 3 days)    [] Effient (Hold for 7 days)  [] Xarelto (Hold for 2 days)    [] Eliquis (Hold for 2 days)  [] Brilinta (Hold for 7 days)    [] Coumadin (Hold for 5 days) - (INR needs to be drawn the day prior to procedure- INR < 2.0)    [] Aggrenox (Hold for 7 days)        [] Patient will stop medication on their own.    [] Blood Thinner Form Faxed for approval to hold.    Provider form faxed to:   Assessment Completed by:  Electronically signed by Gina Devries on 12/1/2020 at 11:03 AM
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
## 2020-12-04 ENCOUNTER — VIRTUAL VISIT (OUTPATIENT)
Dept: PRIMARY CARE CLINIC | Age: 60
End: 2020-12-04
Payer: MEDICARE

## 2020-12-04 PROCEDURE — 99442 PR PHYS/QHP TELEPHONE EVALUATION 11-20 MIN: CPT | Performed by: NURSE PRACTITIONER

## 2020-12-04 RX ORDER — ONDANSETRON 4 MG/1
4 TABLET, ORALLY DISINTEGRATING ORAL 3 TIMES DAILY PRN
Qty: 21 TABLET | Refills: 0 | Status: SHIPPED | OUTPATIENT
Start: 2020-12-04 | End: 2020-12-14 | Stop reason: SDUPTHER

## 2020-12-04 RX ORDER — TEMAZEPAM 30 MG/1
30 CAPSULE ORAL NIGHTLY PRN
Qty: 30 CAPSULE | Refills: 0 | Status: SHIPPED | OUTPATIENT
Start: 2020-12-04 | End: 2021-01-05 | Stop reason: SDUPTHER

## 2020-12-04 ASSESSMENT — ENCOUNTER SYMPTOMS
SHORTNESS OF BREATH: 0
TROUBLE SWALLOWING: 0
ANAL BLEEDING: 0
SINUS PRESSURE: 0
COUGH: 0
WHEEZING: 0
NAUSEA: 0
CONSTIPATION: 0
DIARRHEA: 0
VOMITING: 0

## 2020-12-04 NOTE — PROGRESS NOTES
[address]  [place], [address]  Phone [phone]   Fax [phone]      OFFICE VISIT: 2020    Mitch Olivarez-Destiney- : 1960      Reason For Visit:  Ezra Doty is a 62 y.o. femalewho is here for 1 Month Follow-Up (insomnia follow up)         Health Maintenance    HPI    Diabetes type 2     Patient is here for follow up on diabetes  Is doing better with trulicity 1.5 weekly  And doing well  Weight stable      Asthma  She is doing better  symbicort and rescue as needed  And back to normal baseline           Osteoporosis :    In process of trying to get an injectable  As had open fracture of leg  Last year due again in   Along with fall with mult ribs last month  She has started the prolia  In December  And doing well  No side effects that she recalls   She noticed some bumps on sides   But isn't sure when occurred    she is changing meds  And will try after       Hypothyroid  Synthroid 88mcg daily  2019  And stable     Depression  Much improved as resting  And doing well at present  HTN :   Reports recently elevated  She has been eating more fruit  But not sugar of sodium        Insomnia  Patient reports chronic insomnia  Has failed seroquel and trazadone medication in past elavil 100mg  (2)  Sleeps 6 hours on medication with some relief of fatigue  Sleep study in past none  Typically goes to bed at 10 awakens at 6  But recently  Is waking up and not able to go back to sleep  She has had this issue a week and half ago   Reports no change in medications  She has not seen any changes  She will take 2 elavil seroquel 200mg and ambien to go to sleep  And she will be awake within three days  In past took valium        Takes medication restoril   Last filled  # 30  Without symptoms include not able to rest and up and down  Benefits outweigh risks  Low risk of diversion with no complaints of abnormal side effects  But wasn't staying asleep    She takes her pain medication in 07/09/2020 22     Anion Gap 07/09/2020 15     Glucose 07/09/2020 176*    BUN 07/09/2020 35*    CREATININE 07/09/2020 1.7*    GFR Non- 07/09/2020 31*    Calcium 07/09/2020 8.8     Total Protein 07/09/2020 6.5*    Alb 07/09/2020 3.0*    Total Bilirubin 07/09/2020 0.3     Alkaline Phosphatase 07/09/2020 132*    ALT 07/09/2020 26     AST 07/09/2020 40*    WBC 07/09/2020 9.1     RBC 07/09/2020 3.80*    Hemoglobin 07/09/2020 11.0*    Hematocrit 07/09/2020 34.9*    MCV 07/09/2020 91.8     MCH 07/09/2020 28.9     MCHC 07/09/2020 31.5*    RDW 07/09/2020 15.9*    Platelets 95/40/5907 306     MPV 07/09/2020 11.9     Neutrophils % 07/09/2020 68.8*    Lymphocytes % 07/09/2020 15.0*    Monocytes % 07/09/2020 8.5     Eosinophils % 07/09/2020 2.2     Basophils % 07/09/2020 0.8     Neutrophils Absolute 07/09/2020 6.3     Immature Granulocytes # 07/09/2020 0.4     Lymphocytes Absolute 07/09/2020 1.4     Monocytes Absolute 07/09/2020 0.80     Eosinophils Absolute 07/09/2020 0.20     Basophils Absolute 07/09/2020 0.10    Orders Only on 06/25/2020   Component Date Value    Magnesium 06/25/2020 2.1     Sodium 06/25/2020 138     Potassium 06/25/2020 4.3     Chloride 06/25/2020 99     CO2 06/25/2020 25     Anion Gap 06/25/2020 14     Glucose 06/25/2020 223*    BUN 06/25/2020 19     CREATININE 06/25/2020 1.4*    GFR Non- 06/25/2020 38*    Calcium 06/25/2020 9.1     Total Protein 06/25/2020 6.9     Alb 06/25/2020 3.8     Total Bilirubin 06/25/2020 <0.2     Alkaline Phosphatase 06/25/2020 122*    ALT 06/25/2020 18     AST 06/25/2020 20     WBC 06/25/2020 8.2     RBC 06/25/2020 4.10*    Hemoglobin 06/25/2020 12.0     Hematocrit 06/25/2020 38.0     MCV 06/25/2020 92.7     MCH 06/25/2020 29.3     MCHC 06/25/2020 31.6*    RDW 06/25/2020 15.4*    Platelets 83/00/5632 268     MPV 06/25/2020 11.9     Neutrophils % 06/25/2020 77.9*    Lymphocytes % 06/25/2020 13.2*    Monocytes % 06/25/2020 6.1     Eosinophils % 06/25/2020 2.0     Basophils % 06/25/2020 0.4     Neutrophils Absolute 06/25/2020 6.4     Immature Granulocytes # 06/25/2020 0.0     Lymphocytes Absolute 06/25/2020 1.1     Monocytes Absolute 06/25/2020 0.50     Eosinophils Absolute 06/25/2020 0.20     Basophils Absolute 06/25/2020 0.00    Admission on 06/17/2020, Discharged on 06/18/2020   Component Date Value    P-R Interval 06/17/2020 184     QRS Duration 06/17/2020 90     Q-T Interval 06/17/2020 406     QTc Calculation (Bazett) 06/17/2020 427     P Axis 06/17/2020 42     T Axis 06/17/2020 38     WBC 06/17/2020 11.3*    RBC 06/17/2020 4.15*    Hemoglobin 06/17/2020 12.0     Hematocrit 06/17/2020 36.5*    MCV 06/17/2020 88.0     MCH 06/17/2020 28.9     MCHC 06/17/2020 32.9*    RDW 06/17/2020 15.0*    Platelets 78/18/2622 277     MPV 06/17/2020 11.2     Neutrophils % 06/17/2020 75.0*    Lymphocytes % 06/17/2020 14.3*    Monocytes % 06/17/2020 9.0     Eosinophils % 06/17/2020 0.9     Basophils % 06/17/2020 0.4     Neutrophils Absolute 06/17/2020 8.5*    Immature Granulocytes # 06/17/2020 0.0     Lymphocytes Absolute 06/17/2020 1.6     Monocytes Absolute 06/17/2020 1.00*    Eosinophils Absolute 06/17/2020 0.10     Basophils Absolute 06/17/2020 0.00     Sodium 06/17/2020 125*    Potassium 06/17/2020 5.1*    Chloride 06/17/2020 89*    CO2 06/17/2020 23     Anion Gap 06/17/2020 13     Glucose 06/17/2020 95     BUN 06/17/2020 31*    CREATININE 06/17/2020 1.5*    GFR Non- 06/17/2020 36*    Calcium 06/17/2020 8.4*    Total Protein 06/17/2020 6.9     Alb 06/17/2020 3.6     Total Bilirubin 06/17/2020 0.3     Alkaline Phosphatase 06/17/2020 118*    ALT 06/17/2020 26     AST 06/17/2020 57*    Total CK 06/17/2020 3,193*    Troponin 06/17/2020 <0.01     Sodium, Ur 06/17/2020 33.0     Osmolality 06/17/2020 275     TSH Reflex FT4 06/17/2020 1.67  Osmolality, Ur 06/17/2020 204*    Creatinine, Ur 06/17/2020 26.0     Color, UA 06/17/2020 YELLOW     Clarity, UA 06/17/2020 Clear     Glucose, Ur 06/17/2020 Negative     Bilirubin Urine 06/17/2020 Negative     Ketones, Urine 06/17/2020 Negative     Specific Gravity, UA 06/17/2020 1.007     Blood, Urine 06/17/2020 Negative     pH, UA 06/17/2020 6.0     Protein, UA 06/17/2020 Negative     Urobilinogen, Urine 06/17/2020 0.2     Nitrite, Urine 06/17/2020 Negative     Leukocyte Esterase, Urine 06/17/2020 TRACE*    Bacteria, UA 06/17/2020 TRACE*    Yeast, UA 06/17/2020 NEG*    Hyaline Casts, UA 06/17/2020 1     WBC, UA 06/17/2020 6*    RBC, UA 06/17/2020 0     Epithelial Cells, UA 06/17/2020 2     POC Glucose 06/17/2020 87     Performed on 06/17/2020 AccuChek     Sodium 06/18/2020 131*    Potassium reflex Magnesi* 06/18/2020 4.1     Chloride 06/18/2020 99     CO2 06/18/2020 22     Anion Gap 06/18/2020 10     Glucose 06/18/2020 125*    BUN 06/18/2020 31*    CREATININE 06/18/2020 1.5*    GFR Non- 06/18/2020 36*    Calcium 06/18/2020 7.9*    Total Protein 06/18/2020 5.8*    Alb 06/18/2020 3.0*    Total Bilirubin 06/18/2020 <0.2     Alkaline Phosphatase 06/18/2020 101     ALT 06/18/2020 21     AST 06/18/2020 44*    WBC 06/18/2020 7.5     RBC 06/18/2020 3.69*    Hemoglobin 06/18/2020 10.7*    Hematocrit 06/18/2020 32.9*    MCV 06/18/2020 89.2     MCH 06/18/2020 29.0     MCHC 06/18/2020 32.5*    RDW 06/18/2020 15.4*    Platelets 00/82/6239 214     MPV 06/18/2020 11.7     Neutrophils % 06/18/2020 64.0     Lymphocytes % 06/18/2020 22.2     Monocytes % 06/18/2020 11.1*    Eosinophils % 06/18/2020 1.9     Basophils % 06/18/2020 0.3     Neutrophils Absolute 06/18/2020 4.8     Immature Granulocytes # 06/18/2020 0.0     Lymphocytes Absolute 06/18/2020 1.7     Monocytes Absolute 06/18/2020 0.80     Eosinophils Absolute 06/18/2020 0.10     Basophils Absolute Dilaudid [hydromorphone hcl]; Keflex [cephalexin];  Nitrofuran derivatives; Pcn [penicillins]; and Cefdinir    Past Medical History:   Diagnosis Date    Anxiety     Arthritis     Bipolar 1 disorder ([facility] [address]     CAD (coronary artery disease)     CH[facility]congestive heart failure) ([facility])     Chronic kidney disease (CKD) stage G3b/A2, moderately decreased glomerular filtration rate (GFR) between 30-44 mL/min/1.73 square meter and albuminuria creatinine ratio between  mg/g 2016    Depression     DM (diabetes mellitus) ([facility])     GERD (gastroesophageal reflux disease)     History of blood transfusion     History of suicidal ideation     Hyperlipidemia     Hypertension     Hypothyroidism     Insomnia     Kidney disease     stage 3 failure    MI, old 2005    Obesity     Polyarticular arthritis     Type II or unspecified type diabetes mellitus without mention of complication, not stated as uncontrolled        Past Surgical History:   Procedure Laterality Date    ABDOMINOPLASTY      ANGIOPLASTY  05    stent placement to LAD and diagonal with normal left vent function    BREAST REDUCTION SURGERY      CARDIAC CATHETERIZATION  05, 05    see report  Stents x3    CARDIAC CATHETERIZATION  3/23/15  JDT    EF 50%    CARPAL TUNNEL RELEASE       SECTION      x2    CHOLECYSTECTOMY      GASTRIC BYPASS SURGERY      HERNIA REPAIR      umbilical with mesh    INTRACAPSULAR CATARACT EXTRACTION Left 2016    LT EYE CATARACT EMULSIFICATION IOL IMPLANT performed by Yonathan Rain MD at [address] [place] [address] Right 2018    ORIF RIGHT BIMALLEOLAR ANKLE FRACTURE, RIGHT WRIST CAST REMOVAL performed by Seda Caruso MD at [address] [place] [address] Right 2018    ANKLE OPEN REDUCTION INTERNAL FIXATION performed by Seda Caruso MD at [facility] ARTHROPLASTY Left 2017    KNEE TOTAL ARTHROPLASTY performed by Marlys Melton DO at [place] [address] History     Tobacco Use    Smoking status: Never Smoker    Smokeless tobacco: Former User     Types: Snuff   Substance Use Topics    Alcohol use: No       Review of Systems   Constitutional: Negative for activity change, appetite change, fatigue and fever. HENT: Negative for congestion, postnasal drip, sinus pressure and trouble swallowing. Respiratory: Negative for cough (resolved), shortness of breath and wheezing. Cardiovascular: Negative for chest pain and leg swelling. Gastrointestinal: Negative for anal bleeding, constipation, diarrhea, nausea and vomiting. Endocrine: Negative for polydipsia, polyphagia and polyuria. Genitourinary: Negative for difficulty urinating. Musculoskeletal: Positive for arthralgias (improving). Skin: Negative for rash. Psychiatric/Behavioral: Negative for behavioral problems, self-injury and sleep disturbance. The patient is not nervous/anxious and is not hyperactive. Denies drinking           Physical Exam  Constitutional:       General: She is not in acute distress. Appearance: Normal appearance. She is not ill-appearing. Comments: Telephone     HENT:      Head: Normocephalic. Right Ear: Tympanic membrane normal. There is no impacted cerumen. Left Ear: Tympanic membrane normal. There is no impacted cerumen. Nose: No congestion. Mouth/Throat:      Pharynx: No posterior oropharyngeal erythema. Eyes:      General:         Right eye: No discharge. Left eye: No discharge. Cardiovascular:      Rate and Rhythm: Normal rate and regular rhythm. Pulses: Normal pulses. Heart sounds: No murmur. Musculoskeletal:         General: Tenderness (arm continues) present. Neurological:      Mental Status: She is alert and oriented to person, place, and time.    Psychiatric:         Mood and Affect: Mood normal.         Behavior: Behavior normal. ASSESSMENT/ PLAN  Christ Alfaro is  being evaluated by a Virtual Visit telephone 15 minutes encounter to address concerns as mentioned above. A caregiver was present when appropriate. Due to this being a TeleHealth encounter (During [id number] public health emergency), evaluation of the following organ systems was limited: Vitals/Constitutional/EENT/Resp/CV/GI//MS/Neuro/Skin/Heme-Lymph-Imm. Pursuant to the emergency declaration under the [address] [place] [address], [address] [place] [address] and the [employer] Act, this Virtual Visit was conducted with patient's (and/or legal guardian's) consent, to reduce the patient's risk of exposure to COVID-19 and provide necessary medical care. The patient (and/or legal guardian) has also been advised to contact this office for worsening conditions or problems, and seek emergency medical treatment and/or call 911 if deemed necessary. Services were provided through a video synchronous discussion virtually to substitute for in-person clinic visit. Patient and provider were located at their individual homes. 1. Situational anxiety  Cont present  Not drinking  Resting well    - temazepam (RESTORIL) 30 MG capsule; Take 1 capsule by mouth nightly as needed for Sleep for up to 30 days. Dispense: 30 capsule; Refill: 0    2. Primary insomnia  Overall well  Doing well:   - temazepam (RESTORIL) 30 MG capsule; Take 1 capsule by mouth nightly as needed for Sleep for up to 30 days. Dispense: 30 capsule; Refill: 0    3. Diabetic polyneuropathy associated with type 2 diabetes mellitus ([facility] [address]  Cont tight control   10/2020 6.2 doing well    - ondansetron (ZOFRAN-ODT) 4 MG disintegrating tablet; Take 1 tablet by mouth 3 times daily as needed for Nausea or Vomiting  Dispense: 21 tablet; Refill: 0      No orders of the defined types were placed in this encounter.        Return in about 2 weeks (around 12/18/2020) for medicare awv. Patient Instructions       Patient Education        Noninsulin Medicines for Type 2 Diabetes: Care Instructions  Overview     There are different types of noninsulin medicines for diabetes. Each works in a different way. But they all help you control your blood sugar. Some types help your body make insulin to lower your blood sugar. Others lower how much insulin your body needs. Some can slow how fast your body digests sugars. And some can remove extra glucose through your urine. You may need to take more than one medicine for diabetes. Two or more medicines may work better to lower your blood sugar level than just one does. · Metformin. This lowers how much glucose your liver makes. And it helps you respond better to insulin. It also lowers the amount of stored sugar that your liver releases when you are not eating. · Sulfonylureas. These help your body release more insulin. Some work for many hours. They can cause low blood sugar if you don't eat as you planned. An example is glipizide. · Thiazolidinediones. These reduce the amount of blood glucose. They also help you respond better to insulin. An example is pioglitazone. · SGLT2 inhibitors. These help to remove extra glucose through your urine. They may also help some people lose weight. An example is ertugliflozin. · DPP-4 inhibitors. These help your body raise the level of insulin after you eat. They also help your body make less of a hormone that raises blood sugar. An example is alogliptin. · Incretin hormones (GLP-1 receptor agonists). These help your body make a protein that can raise your insulin level and make you less hungry. They're given as shots or pills. An example is semaglutide. · Meglitinides. These help your body release insulin. They also help slow how your body digests sugars. So they can keep your blood sugar from rising too fast after you eat. · Alpha-glucosidase inhibitors.  These keep starches from breaking down. This means that they lower the amount of glucose absorbed when you eat. They don't help your body make more insulin. So they will not cause low blood sugar unless you use them with other medicines for diabetes. Follow-up care is a key part of your treatment and safety. Be sure to make and go to all appointments, and call your doctor if you are having problems. It's also a good idea to know your test results and keep a list of the medicines you take. How can you care for yourself at home? · Eat a healthy diet. Get some exercise each day. This may help you to reduce how much medicine you need. · Do not take other prescription or over-the-counter medicines, vitamins, herbal products, or supplements without talking to your doctor first. Some medicines for type 2 diabetes can cause problems with other medicines or supplements. · Tell your doctor if you plan to get pregnant. Some of these drugs are not safe for pregnant women. · Be safe with medicines. Take your medicines exactly as prescribed. Meglitinides and sulfonylureas can cause your blood sugar to drop very low. Call your doctor if you think you are having a problem with your medicine. · Check your blood sugar often. You can use a glucose monitor. Keeping track can help you know how certain foods, activities, and medicines affect your blood sugar. And it can help you keep your blood sugar from getting so low that it's not safe. When should you call for help? Call 911 anytime you think you may need emergency care. For example, call if:    · You passed out (lost consciousness).     · You are confused or cannot think clearly.     · Your blood sugar is very high or very low. Watch closely for changes in your health, and be sure to contact your doctor if:    · Your blood sugar stays outside the level your doctor set for you.     · You have any problems. Where can you learn more? Go to [email or web address] org and sign in to your [email or web address] account. Enter H153 in the [facility] box to learn more about \"Noninsulin Medicines for Type 2 Diabetes: Care Instructions. \"     If you do not have an account, please click on the \"Sign Up Now\" link. Current as of: December 20, 2019               Content Version: 12.6  © 3866-4419 [employer]. Care instructions adapted under license by [facility] ([facility]). If you have questions about a medical condition or this instruction, always ask your healthcare professional. [place] [address] any warranty or liability for your use of this information. Controlled Substances Monitoring:     Attestation: The Prescription Monitoring Report for this patient was reviewed today. TRESSA Fernandez)  Periodic Controlled Substance Monitoring: Possible medication side effects, risk of tolerance/dependence & alternative treatments discussed., No signs of potential drug abuse or diversion identified., Obtaining appropriate analgesic effect of treatment. TRESSA Fernandez)            Additional Instructions: As always, patient is advisedto bring in medication bottles in order to correctly reconcile with our current list.      Bartoloyasmani Edwards received counseling on the following healthy behaviors: none    Patient giveneducational materials on plan of care    I have instructed Mitch to complete a self tracking handout on none and instructed them to bring it with them to her next appointment. Discussed use, benefit, and side effects of prescribed medications. Barriers to medication compliance addressed. All patient questions answered. Pt voiced understanding.      Susann Gitelman, APRN

## 2020-12-04 NOTE — PATIENT INSTRUCTIONS
Patient Education        Noninsulin Medicines for Type 2 Diabetes: Care Instructions  Overview     There are different types of noninsulin medicines for diabetes. Each works in a different way. But they all help you control your blood sugar. Some types help your body make insulin to lower your blood sugar. Others lower how much insulin your body needs. Some can slow how fast your body digests sugars. And some can remove extra glucose through your urine. You may need to take more than one medicine for diabetes. Two or more medicines may work better to lower your blood sugar level than just one does. · Metformin. This lowers how much glucose your liver makes. And it helps you respond better to insulin. It also lowers the amount of stored sugar that your liver releases when you are not eating. · Sulfonylureas. These help your body release more insulin. Some work for many hours. They can cause low blood sugar if you don't eat as you planned. An example is glipizide. · Thiazolidinediones. These reduce the amount of blood glucose. They also help you respond better to insulin. An example is pioglitazone. · SGLT2 inhibitors. These help to remove extra glucose through your urine. They may also help some people lose weight. An example is ertugliflozin. · DPP-4 inhibitors. These help your body raise the level of insulin after you eat. They also help your body make less of a hormone that raises blood sugar. An example is alogliptin. · Incretin hormones (GLP-1 receptor agonists). These help your body make a protein that can raise your insulin level and make you less hungry. They're given as shots or pills. An example is semaglutide. · Meglitinides. These help your body release insulin. They also help slow how your body digests sugars. So they can keep your blood sugar from rising too fast after you eat. · Alpha-glucosidase inhibitors. These keep starches from breaking down.  This means that they lower the amount of glucose absorbed when you eat. They don't help your body make more insulin. So they will not cause low blood sugar unless you use them with other medicines for diabetes. Follow-up care is a key part of your treatment and safety. Be sure to make and go to all appointments, and call your doctor if you are having problems. It's also a good idea to know your test results and keep a list of the medicines you take. How can you care for yourself at home? · Eat a healthy diet. Get some exercise each day. This may help you to reduce how much medicine you need. · Do not take other prescription or over-the-counter medicines, vitamins, herbal products, or supplements without talking to your doctor first. Some medicines for type 2 diabetes can cause problems with other medicines or supplements. · Tell your doctor if you plan to get pregnant. Some of these drugs are not safe for pregnant women. · Be safe with medicines. Take your medicines exactly as prescribed. Meglitinides and sulfonylureas can cause your blood sugar to drop very low. Call your doctor if you think you are having a problem with your medicine. · Check your blood sugar often. You can use a glucose monitor. Keeping track can help you know how certain foods, activities, and medicines affect your blood sugar. And it can help you keep your blood sugar from getting so low that it's not safe. When should you call for help? Call 911 anytime you think you may need emergency care. For example, call if:    · You passed out (lost consciousness).     · You are confused or cannot think clearly.     · Your blood sugar is very high or very low. Watch closely for changes in your health, and be sure to contact your doctor if:    · Your blood sugar stays outside the level your doctor set for you.     · You have any problems. Where can you learn more? Go to [email or web address]://chpechinoewalli.[email or web address] org and sign in to your I-MD account.  Enter H153 in the [address] box to learn more about \"Noninsulin Medicines for Type 2 Diabetes: Care Instructions. \"     If you do not have an account, please click on the \"Sign Up Now\" link. Current as of: December 20, 2019               Content Version: 12.6  © 3221-1634 [employer]. Care instructions adapted under license by [facility] ([facility]). If you have questions about a medical condition or this instruction, always ask your healthcare professional. [address] any warranty or liability for your use of this information.

## 2020-12-14 RX ORDER — ONDANSETRON 4 MG/1
4 TABLET, ORALLY DISINTEGRATING ORAL 3 TIMES DAILY PRN
Qty: 21 TABLET | Refills: 2 | Status: SHIPPED | OUTPATIENT
Start: 2020-12-14 | End: 2021-01-08 | Stop reason: SDUPTHER

## 2020-12-18 ENCOUNTER — VIRTUAL VISIT (OUTPATIENT)
Dept: PRIMARY CARE CLINIC | Age: 60
End: 2020-12-18
Payer: MEDICARE

## 2020-12-18 PROCEDURE — G0439 PPPS, SUBSEQ VISIT: HCPCS | Performed by: NURSE PRACTITIONER

## 2020-12-18 PROCEDURE — 3017F COLORECTAL CA SCREEN DOC REV: CPT | Performed by: NURSE PRACTITIONER

## 2020-12-18 RX ORDER — FLUCONAZOLE 150 MG/1
150 TABLET ORAL DAILY
Qty: 3 TABLET | Refills: 0 | Status: SHIPPED | OUTPATIENT
Start: 2020-12-18 | End: 2021-01-05 | Stop reason: SDUPTHER

## 2020-12-18 ASSESSMENT — LIFESTYLE VARIABLES: HOW OFTEN DO YOU HAVE A DRINK CONTAINING ALCOHOL: 0

## 2020-12-18 ASSESSMENT — PATIENT HEALTH QUESTIONNAIRE - PHQ9
SUM OF ALL RESPONSES TO PHQ QUESTIONS 1-9: 0
SUM OF ALL RESPONSES TO PHQ QUESTIONS 1-9: 0
1. LITTLE INTEREST OR PLEASURE IN DOING THINGS: 0
SUM OF ALL RESPONSES TO PHQ QUESTIONS 1-9: 0
SUM OF ALL RESPONSES TO PHQ9 QUESTIONS 1 & 2: 0
2. FEELING DOWN, DEPRESSED OR HOPELESS: 0

## 2020-12-18 NOTE — PATIENT INSTRUCTIONS
Personalized Preventive Plan for Mitch Fuchs - 12/18/2020  Medicare offers a range of preventive health benefits. Some of the tests and screenings are paid in full while other may be subject to a deductible, co-insurance, and/or copay. Some of these benefits include a comprehensive review of your medical history including lifestyle, illnesses that may run in your family, and various assessments and screenings as appropriate. After reviewing your medical record and screening and assessments performed today your provider may have ordered immunizations, labs, imaging, and/or referrals for you. A list of these orders (if applicable) as well as your Preventive Care list are included within your After Visit Summary for your review. Other Preventive Recommendations:    · A preventive eye exam performed by an eye specialist is recommended every 1-2 years to screen for glaucoma; cataracts, macular degeneration, and other eye disorders. · A preventive dental visit is recommended every 6 months. · Try to get at least 150 minutes of exercise per week or 10,000 steps per day on a pedometer . · Order or download the FREE \"Exercise & Physical Activity: Your Everyday Guide\" from [employer]. Call 9-479.651.1221 or search [employer] online. · You need 4367-1583 mg of calcium and 4069-6828 IU of vitamin D per day. It is possible to meet your calcium requirement with diet alone, but a vitamin D supplement is usually necessary to meet this goal.  · When exposed to the sun, use a sunscreen that protects against both UVA and UVB radiation with an SPF of 30 or greater. Reapply every 2 to 3 hours or after sweating, drying off with a towel, or swimming. · Always wear a seat belt when traveling in a car. Always wear a helmet when riding a bicycle or motorcycle.
none

## 2020-12-18 NOTE — PROGRESS NOTES
Medicare Annual Wellness Visit  Are Name: Cheyenne Massey Date: 2020   MRN: [id number] Sex: Female   Age: 62 y.o. Ethnicity: Non-/Non    : 1960 Race: White      Mitch OlivarezEdmundos is here for Medicare AWV    Screenings for behavioral, psychosocial and functional/safety risks, and cognitive dysfunction are all negative except as indicated below. These results, as well as other patient data from the [address] form, are documented in Flowsheets linked to this Encounter. Allergies   Allergen Reactions    Ciprofloxacin Hives     HTN    Dilaudid [Hydromorphone Hcl] Other (See Comments)     Takes pt out of her mind. hallunications. Pt stateshas taken this hospitalization and has not had any problems    Keflex [Cephalexin] Hives    Nitrofuran Derivatives Hives    Pcn [Penicillins] Hives    Cefdinir Rash       Prior to Visit Medications    Medication Sig Taking? Authorizing Provider   fluconazole (DIFLUCAN) 150 MG tablet Take 1 tablet by mouth daily for 3 days Yes TRESSA Carrizales   ondansetron (ZOFRAN-ODT) 4 MG disintegrating tablet Take 1 tablet by mouth 3 times daily as needed for Nausea or Vomiting  TRESSA Carrizales   temazepam (RESTORIL) 30 MG capsule Take 1 capsule by mouth nightly as needed for Sleep for up to 30 days. TRESSA Carrizales   HYDROcodone-acetaminophen (NORCO) 5-325 MG per tablet Take 1 tablet by mouth 2 times daily as needed for Pain for up to 30 days.  May fill 18-  Marta Pamela TRESSA Patton   budesonide-formoterol (SYMBICORT) 160-4.5 MCG/ACT AERO Inhale 2 puffs into the lungs 2 times daily  B Luciano Coats, DO   albuterol sulfate HFA (VENTOLIN HFA) 108 (90 Base) MCG/ACT inhaler Inhale 2 puffs into the lungs 4 times daily as needed for Wheezing  B Luciano Coats, DO   valsartan (DIOVAN) 320 MG tablet Take 1 tablet by mouth daily  B Luciano Holly, DO pregabalin (LYRICA) 150 MG capsule Take 1 capsule by mouth 2 times daily for 90 days. TRESSA Sanchez   QUEtiapine (SEROQUEL) 200 MG tablet Take 1 tablet by mouth nightly  TRESSA Sanchez   Dulaglutide (TRULICITY) 1.5 JS/9.4WO SOPN Inject 1.5 mg into the skin every 7 days  TRESSA Sanchez   amitriptyline (ELAVIL) 100 MG tablet Take 2 tablets by mouth nightly  TRESSA Sanchez   insulin aspart (NOVOLOG FLEXPEN) 100 UNIT/ML injection pen Inject into the skin 3 times daily (before meals) Sliding scale  Historical Provider, MD   tiZANidine (ZANAFLEX) 4 MG tablet Take 1 tablet by mouth every 8 hours as needed (spasms)  TRESSA Sanchez   amLODIPine (NORVASC) 5 MG tablet Take 1 tablet by mouth daily  Patient taking differently: Take 5 mg by mouth nightly   TRESSA Sanchez   atorvastatin (LIPITOR) 40 MG tablet Take 1 tablet by mouth nightly  TRESSA Sanchez   blood glucose test strips (FREESTYLE LITE) strip 1 each by In Vitro route 4 times daily As needed. TRESSA Sanchez   Lancets MISC 1 each by Does not apply route daily  TRESSA Sanchez   metoprolol succinate (TOPROL XL) 50 MG extended release tablet Take 1 tablet by mouth daily  TRESSA Sanchez   DULoxetine (CYMBALTA) 60 MG extended release capsule Take 1 capsule by mouth 2 times daily  TRESSA Sanchez   nitroGLYCERIN (NITROSTAT) 0.4 MG SL tablet Place 1 tablet under the tongue every 5 minutes as needed for Chest pain up to max of 3 total doses. If no relief after 1 dose, call 911. TRESSA Sanchez   fluticasone (FLONASE) 50 MCG/ACT nasal spray 1 spray by Nasal route daily  TRESSA Sanchez   levothyroxine (SYNTHROID) 88 MCG tablet Take 1 tablet by mouth Daily Tube feeding (TF) interaction, obtain physician order to manage, recommend holding TF for 30 minutes before and after dose.   Christina Yanes, APRN pantoprazole (PROTONIX) 40 MG tablet Take 1 tablet by mouth daily  TRESSA Carrizales   Insulin Pen Needle (Isidore Adas PEN NEEDLES 31G) 31G X 8 MM MISC 1 each by Does not apply route daily  TRESSA Carrizales   aspirin 81 MG tablet Take 1 tablet by mouth daily  TRESSA Carrizales   Insulin Pen Needle 31G X 8 MM MISC 1 each by Does not apply route 4 times daily  TRESSA Carrizales   Blood Glucose Monitoring Suppl (FREESTYLE LITE) JEZ 1 Device by Does not apply route 4 times daily  TRESSA Carrizales       Past Medical History:   Diagnosis Date    Anxiety     Arthritis     B[facility]     CAD (coronary artery disease)     [facility]est[facility] failure[facility]     Chronic kidney disease (CKD) stage G3b/A2, moderately decreased glomerular filtration rate (GFR) between 30-44 mL/min/1.73 square meter and albuminuria creatinine ratio between  mg/g 2016    Depression     DM (diabetes mellitus) ([facility]     GERD (gastroesophageal reflux disease)     History of blood transfusion     History of suicidal ideation     Hyperlipidemia     Hypertension     Hypothyroidism     Insomnia     Kidney disease     stage 3 failure    MI, old 2005    Obesity     Polyarticular arthritis     Type II or unspecified type diabetes mellitus without mention of complication, not stated as uncontrolled        Past Surgical History:   Procedure Laterality Date    ABDOMINOPLASTY      ANGIOPLASTY  05    stent placement to LAD and diagonal with normal left vent function    BREAST REDUCTION SURGERY      CARDIAC CATHETERIZATION  05, 05    see report  Stents x3    CARDIAC CATHETERIZATION  3/23/15  JDT    EF 50%    CARPAL TUNNEL RELEASE       SECTION      x2    CHOLECYSTECTOMY      GASTRIC BYPASS SURGERY      HERNIA REPAIR      umbilical with mesh    INTRACAPSULAR CATARACT EXTRACTION Left 2016 Positive Risk Factor Screenings with Interventions:     Fall Risk:  Timed Up and Go Test > 12 seconds? (Complete if either Fall Risk answers are Yes): no  2 or more falls in past year?: (!) yes  Fall with injury in past year?: (!) yes  Fall Risk Interventions:    · Home safety tips provided        General Health and ACP:  General  In general, how would you say your health is?: Good  In the past 7 days, have you experienced any of the following? New or Increased Pain, New or Increased Fatigue, Loneliness, Social Isolation, Stress or Anger?: None of These  Do you get the social and emotional support that you need?: Yes  Do you have a Living Will?: (!) No  Advance Directives     Power of [address] [place] [address] Will ACP-Advance Directive ACP-Power of     Not on File Filed on 05/22/18 Filed Not on File      General Health Risk Interventions:  · stable    Health Habits/Nutrition:  Health Habits/Nutrition  Do you exercise for at least 20 minutes 2-3 times per week?: (!) No  Have you lost any weight without trying in the past 3 months?: No  Do you eat fewer than 2 meals per day?: No  Have you seen a dentist within the past year?: Yes     Health Habits/Nutrition Interventions:  · Nutritional issues:  educational materials to promote weight loss provided      ADL:  ADLs  In the past 7 days, did you need help from others to perform any of the following everyday activities? Eating, dressing, grooming, bathing, toileting, or walking/balance?: None  In the past 7 days, did you need help from others to take care of any of the following?  Laundry, housekeeping, banking/finances, shopping, telephone use, food preparation, transportation, or taking medications?: (!) Taking Medications  ADL Interventions:  ·  helps    Personalized Preventive Plan   Current Health Maintenance Status  Immunization History   Administered Date(s) Administered

## 2021-01-04 ENCOUNTER — VIRTUAL VISIT (OUTPATIENT)
Dept: PRIMARY CARE CLINIC | Age: 61
End: 2021-01-04
Payer: MEDICARE

## 2021-01-04 DIAGNOSIS — F33.41 RECURRENT MAJOR DEPRESSIVE DISORDER, IN PARTIAL REMISSION (HCC): ICD-10-CM

## 2021-01-04 DIAGNOSIS — F51.04 PSYCHOPHYSIOLOGICAL INSOMNIA: ICD-10-CM

## 2021-01-04 DIAGNOSIS — F31.78 BIPOLAR DISORDER, IN FULL REMISSION, MOST RECENT EPISODE MIXED (HCC): ICD-10-CM

## 2021-01-04 DIAGNOSIS — N30.00 ACUTE CYSTITIS WITHOUT HEMATURIA: Primary | ICD-10-CM

## 2021-01-04 PROCEDURE — 99442 PR PHYS/QHP TELEPHONE EVALUATION 11-20 MIN: CPT | Performed by: NURSE PRACTITIONER

## 2021-01-04 RX ORDER — PHENAZOPYRIDINE HYDROCHLORIDE 200 MG/1
200 TABLET, FILM COATED ORAL 3 TIMES DAILY PRN
Qty: 12 TABLET | Refills: 1 | Status: SHIPPED | OUTPATIENT
Start: 2021-01-04 | End: 2021-01-08

## 2021-01-04 RX ORDER — SULFAMETHOXAZOLE AND TRIMETHOPRIM 800; 160 MG/1; MG/1
1 TABLET ORAL 2 TIMES DAILY
Qty: 20 TABLET | Refills: 0 | Status: SHIPPED | OUTPATIENT
Start: 2021-01-04 | End: 2021-01-14

## 2021-01-04 RX ORDER — QUETIAPINE FUMARATE 200 MG/1
200 TABLET, FILM COATED ORAL NIGHTLY
Qty: 90 TABLET | Refills: 3 | Status: SHIPPED | OUTPATIENT
Start: 2021-01-04 | End: 2021-01-08 | Stop reason: SDUPTHER

## 2021-01-04 ASSESSMENT — ENCOUNTER SYMPTOMS
VOMITING: 0
WHEEZING: 0
RHINORRHEA: 0
EYE DISCHARGE: 0
BLOOD IN STOOL: 0
EYE REDNESS: 0
SORE THROAT: 0
ABDOMINAL PAIN: 0
DIARRHEA: 0
CONSTIPATION: 0
COUGH: 0
TROUBLE SWALLOWING: 0

## 2021-01-05 DIAGNOSIS — B37.9 YEAST INFECTION: ICD-10-CM

## 2021-01-05 DIAGNOSIS — F41.8 SITUATIONAL ANXIETY: ICD-10-CM

## 2021-01-05 DIAGNOSIS — F51.01 PRIMARY INSOMNIA: ICD-10-CM

## 2021-01-05 RX ORDER — FLUCONAZOLE 150 MG/1
150 TABLET ORAL DAILY
Qty: 3 TABLET | Refills: 0 | Status: SHIPPED | OUTPATIENT
Start: 2021-01-05 | End: 2021-01-08

## 2021-01-05 RX ORDER — TEMAZEPAM 30 MG/1
30 CAPSULE ORAL NIGHTLY PRN
Qty: 30 CAPSULE | Refills: 0 | Status: SHIPPED | OUTPATIENT
Start: 2021-01-05 | End: 2021-02-04 | Stop reason: SDUPTHER

## 2021-01-07 ENCOUNTER — HOSPITAL ENCOUNTER (OUTPATIENT)
Dept: PHYSICAL THERAPY | Age: 61
Setting detail: THERAPIES SERIES
Discharge: HOME | End: 2021-01-07
Payer: MEDICARE

## 2021-01-07 PROCEDURE — 97110 THERAPEUTIC EXERCISES: CPT

## 2021-01-07 PROCEDURE — 97162 PT EVAL MOD COMPLEX 30 MIN: CPT

## 2021-01-07 ASSESSMENT — PAIN DESCRIPTION - ORIENTATION: ORIENTATION: RIGHT;LEFT

## 2021-01-07 ASSESSMENT — PAIN SCALES - GENERAL: PAINLEVEL_OUTOF10: 8

## 2021-01-07 ASSESSMENT — PAIN DESCRIPTION - DESCRIPTORS: DESCRIPTORS: ACHING;DISCOMFORT

## 2021-01-07 ASSESSMENT — PAIN DESCRIPTION - LOCATION: LOCATION: NECK;SHOULDER

## 2021-01-07 ASSESSMENT — PAIN DESCRIPTION - PAIN TYPE: TYPE: CHRONIC PAIN

## 2021-01-07 NOTE — PROGRESS NOTES
Physical Therapy  Initial Assessment  Date: 2021  Patient Name: Jefferson Gastelum  MRN: [id number]  : 1960     Treatment Diagnosis: Neck pain      Subjective   General  Chart Reviewed: Yes  Patient assessed for rehabilitation services?: Yes  Additional Pertinent Hx: DM, anxiety/depression, ankle/foot fracture with patient reporting cramping in R leg due to this  Response To Previous Treatment: Not applicable  Family / Caregiver Present: No  Referring Practitioner: TRESSA Mcdaniel  Referral Date : 20  Diagnosis: Cervicalgia (M54.2)  Follows Commands: Within Functional Limits  PT Visit Information  Onset Date: 21  PT Insurance Information: [facility] - no precert  Total # of Visits Approved: 12  Total # of Visits to Date: 1  Plan of Care/Certification Expiration Date: 21  Progress Note Due Date: 21  Subjective  Subjective: Patient reports neck pain today is very high because she has not recently taken pain medication. She reports she has increasd pain when she moves her neck very much. She also reports cramping in R leg (hamstrings). Pain Screening  Patient Currently in Pain: Yes  Pain Assessment  Pain Assessment: 0-10  Pain Level: 8  Pain Type: Chronic pain  Pain Location: Neck; Shoulder  Pain Orientation: Right;Left  Pain Descriptors: Aching;Discomfort  Pain Frequency: Continuous  Vital Signs  Patient Currently in Pain: Yes    Vision/Hearing  Vision  Vision: Within Functional Limits  Hearing  Hearing: Exceptions to [facility]  Hearing Exceptions: Hard of hearing/hearing concerns    Orientation  Orientation  Overall Orientation Status: Within Functional Limits    Social/Functional History  Social/Functional History  Lives With: Spouse  Type of Home: House  Home Access: Ramped entrance(Has trouble using this entrance)  Bathroom Shower/Tub: Tub/Shower unit(Handle bar to help)  Active : Yes  Occupation: On disability  Type of occupation: housework Body structures, Functions, Activity limitations: Decreased functional mobility ; Decreased sensation; Increased pain;Decreased ADL status; Decreased posture;Decreased ROM; Decreased strength;Decreased high-level IADLs  Assessment: Patient reports to therapy with complaint of neck pain and headaches. Upon initial evaluation, patient demonstrates impaired posture with forward head and forward rounded shoulders, decreased UE strength bilaterally, TTP cervical musculature and shoulder, and increased pain. Patient will benefit from skilled therapy to address the impairments listed and improve functional mobility.   Treatment Diagnosis: Neck pain  Prognosis: Fair;Good  Decision Making: Medium Complexity  REQUIRES PT FOLLOW UP: Yes  Treatment Initiated : therapeutic exercise  Discharge Recommendations: Continue to assess pending progress  Activity Tolerance  Activity Tolerance: Patient Tolerated treatment well;Patient limited by pain  Activity Tolerance: Limited by muscle cramp in R hamstrings         Plan   Plan  Times per week: 2  Plan weeks: 4-6  Current Treatment Recommendations: Strengthening, Neuromuscular Re-education, Home Exercise Program, Manual Therapy - Soft Tissue Mobilization, ROM, Patient/Caregiver Education & Training, Functional Mobility Training, Manual Therapy - Joint Manipulation, Modalities      Goals  Short term goals  Time Frame for Short term goals: 3-4 weeks  Short term goal 1: Patient will be independent with HEP  Short term goal 2: Patient will report improved ability to sleep with 25% fewer interruptions due to neck pain  Short term goal 3: Patient will report 50% fewer headaches to demo improved QOL  Long term goals  Time Frame for Long term goals : 4-6 weeks  Long term goal 1: Patient will improve score on NPDIQ to 34% impairment or better to demo decreased disability due to neck pain  Long term goal 2: Patient will improve resting posture with decreased forward head posture

## 2021-01-08 ENCOUNTER — OFFICE VISIT (OUTPATIENT)
Dept: PRIMARY CARE CLINIC | Age: 61
End: 2021-01-08
Payer: MEDICARE

## 2021-01-08 VITALS
WEIGHT: 222.25 LBS | HEART RATE: 98 BPM | OXYGEN SATURATION: 98 % | DIASTOLIC BLOOD PRESSURE: 82 MMHG | TEMPERATURE: 97.8 F | BODY MASS INDEX: 41.99 KG/M2 | SYSTOLIC BLOOD PRESSURE: 124 MMHG

## 2021-01-08 DIAGNOSIS — Z79.4 TYPE 2 DIABETES MELLITUS WITH HYPERGLYCEMIA, WITH LONG-TERM CURRENT USE OF INSULIN (HCC): ICD-10-CM

## 2021-01-08 DIAGNOSIS — M81.0 AGE-RELATED OSTEOPOROSIS WITHOUT CURRENT PATHOLOGICAL FRACTURE: ICD-10-CM

## 2021-01-08 DIAGNOSIS — E11.65 TYPE 2 DIABETES MELLITUS WITH HYPERGLYCEMIA, WITH LONG-TERM CURRENT USE OF INSULIN (HCC): ICD-10-CM

## 2021-01-08 DIAGNOSIS — S52.501D CLOSED FRACTURE OF DISTAL END OF RIGHT RADIUS WITH ROUTINE HEALING, UNSPECIFIED FRACTURE MORPHOLOGY, SUBSEQUENT ENCOUNTER: ICD-10-CM

## 2021-01-08 DIAGNOSIS — F51.04 PSYCHOPHYSIOLOGICAL INSOMNIA: ICD-10-CM

## 2021-01-08 DIAGNOSIS — F33.41 RECURRENT MAJOR DEPRESSIVE DISORDER, IN PARTIAL REMISSION (HCC): ICD-10-CM

## 2021-01-08 DIAGNOSIS — F31.78 BIPOLAR DISORDER, IN FULL REMISSION, MOST RECENT EPISODE MIXED (HCC): ICD-10-CM

## 2021-01-08 DIAGNOSIS — E11.42 DIABETIC POLYNEUROPATHY ASSOCIATED WITH TYPE 2 DIABETES MELLITUS (HCC): Primary | ICD-10-CM

## 2021-01-08 DIAGNOSIS — N18.32 CHRONIC KIDNEY DISEASE (CKD) STAGE G3B/A2, MODERATELY DECREASED GLOMERULAR FILTRATION RATE (GFR) BETWEEN 30-44 ML/MIN/1.73 SQUARE METER AND ALBUMINURIA CREATININE RATIO BETWEEN 30-299 MG/G (HCC): ICD-10-CM

## 2021-01-08 DIAGNOSIS — F41.8 SITUATIONAL ANXIETY: ICD-10-CM

## 2021-01-08 LAB
ALBUMIN SERPL-MCNC: 3.7 G/DL (ref 3.5–5.2)
ALP BLD-CCNC: 167 U/L (ref 35–104)
ALT SERPL-CCNC: 14 U/L (ref 5–33)
ANION GAP SERPL CALCULATED.3IONS-SCNC: 10 MMOL/L (ref 7–19)
AST SERPL-CCNC: 21 U/L (ref 5–32)
BILIRUB SERPL-MCNC: 0.3 MG/DL (ref 0.2–1.2)
BUN BLDV-MCNC: 22 MG/DL (ref 6–20)
CALCIUM SERPL-MCNC: 9 MG/DL (ref 8.6–10)
CHLORIDE BLD-SCNC: 106 MMOL/L (ref 98–111)
CO2: 21 MMOL/L (ref 22–29)
CREAT SERPL-MCNC: 1.7 MG/DL (ref 0.5–0.9)
CREATININE URINE: 26.7 MG/DL (ref 4.2–622)
GFR AFRICAN AMERICAN: 37
GFR NON-AFRICAN AMERICAN: 31
GLUCOSE BLD-MCNC: 86 MG/DL (ref 74–109)
HBA1C MFR BLD: 6.2 % (ref 4–6)
MICROALBUMIN UR-MCNC: <1.2 MG/DL (ref 0–19)
MICROALBUMIN/CREAT UR-RTO: NORMAL MG/G
POTASSIUM SERPL-SCNC: 5.1 MMOL/L (ref 3.5–5)
SODIUM BLD-SCNC: 137 MMOL/L (ref 136–145)
TOTAL PROTEIN: 7.1 G/DL (ref 6.6–8.7)

## 2021-01-08 PROCEDURE — 99214 OFFICE O/P EST MOD 30 MIN: CPT | Performed by: NURSE PRACTITIONER

## 2021-01-08 PROCEDURE — 3046F HEMOGLOBIN A1C LEVEL >9.0%: CPT | Performed by: NURSE PRACTITIONER

## 2021-01-08 PROCEDURE — 2022F DILAT RTA XM EVC RTNOPTHY: CPT | Performed by: NURSE PRACTITIONER

## 2021-01-08 PROCEDURE — G8417 CALC BMI ABV UP PARAM F/U: HCPCS | Performed by: NURSE PRACTITIONER

## 2021-01-08 PROCEDURE — 1036F TOBACCO NON-USER: CPT | Performed by: NURSE PRACTITIONER

## 2021-01-08 PROCEDURE — G8482 FLU IMMUNIZE ORDER/ADMIN: HCPCS | Performed by: NURSE PRACTITIONER

## 2021-01-08 PROCEDURE — G8427 DOCREV CUR MEDS BY ELIG CLIN: HCPCS | Performed by: NURSE PRACTITIONER

## 2021-01-08 PROCEDURE — 3017F COLORECTAL CA SCREEN DOC REV: CPT | Performed by: NURSE PRACTITIONER

## 2021-01-08 RX ORDER — QUETIAPINE FUMARATE 200 MG/1
300 TABLET, FILM COATED ORAL NIGHTLY
Qty: 45 TABLET | Refills: 3 | Status: SHIPPED | OUTPATIENT
Start: 2021-01-08 | End: 2021-04-28

## 2021-01-08 RX ORDER — ONDANSETRON 4 MG/1
4 TABLET, ORALLY DISINTEGRATING ORAL 3 TIMES DAILY PRN
Qty: 30 TABLET | Refills: 2 | Status: SHIPPED | OUTPATIENT
Start: 2021-01-08

## 2021-01-08 ASSESSMENT — ENCOUNTER SYMPTOMS
COUGH: 0
WHEEZING: 0
VOMITING: 0
DIARRHEA: 0
TROUBLE SWALLOWING: 0
SHORTNESS OF BREATH: 0
ANAL BLEEDING: 0
SINUS PRESSURE: 0
CONSTIPATION: 0
NAUSEA: 1

## 2021-01-08 NOTE — PROGRESS NOTES
Mitch Fuchs (:  1960) is a 62 y.o. female,Established patient, here for evaluation of the following chief complaint(s):  Follow-up (for diabetes and anxiety. Would like seroquel increased.)      ASSESSMENT/PLAN:  1. Diabetic polyneuropathy associated with type 2 diabetes mellitus ([address]  Cont tight control  Doing well  -     ondansetron (ZOFRAN-ODT) 4 MG disintegrating tablet; Take 1 tablet by mouth 3 times daily as needed for Nausea or Vomiting, Disp-30 tablet, R-2Normal  -     HM DIABETES FOOT EXAM  2. Recurrent major depressive disorder, in partial remission (HCC)  Will increase to 1.5 daily  Doing well at present  -     QUEtiapine (SEROQUEL) 200 MG tablet; Take 1.5 tablets by mouth nightly, Disp-45 tablet, R-3Ok for refill today 2020Normal  3. Psychophysiological insomnia  Stable on restoril  Doing well  -     QUEtiapine (SEROQUEL) 200 MG tablet; Take 1.5 tablets by mouth nightly, Disp-45 tablet, R-3Ok for refill today 2020Normal  4. Bipolar disorder, in full remission, most recent episode mixed (HCC)  -     QUEtiapine (SEROQUEL) 200 MG tablet; Take 1.5 tablets by mouth nightly, Disp-45 tablet, R-3Ok for refill today 2020Normal  5. Situational anxiety  Improved at present  With increase seroquel  6. Type 2 diabetes mellitus with hyperglycemia, with long-term current use of insulin ([facility])  -     Comprehensive Metabolic Panel; Future  -     Hemoglobin A1C; Future  -     Microalbumin / Creatinine Urine Ratio; Future  7. Chronic kidney disease (CKD) stage G3b/A2, moderately decreased glomerular filtration rate (GFR) between 30-44 mL/min/1.73 square meter and albuminuria creatinine ratio between  mg/g  Check labs  8. Closed fracture of distal end of right radius with routine healing, unspecified fracture morphology, subsequent encounter  Has had 3 fracture  Needs prolia  -     denosumab (PROLIA) 60 MG/ML SOSY SC injection;  Inject 1 mL into the skin once for 1 dose, Disp-1 mL, outweigh risks  Low risk of diversion with no complaints of abnormal side effects  But wasn't staying asleep     She takes her pain medication in afternoon  Away from medication  And knows the risks  She is stable       Reports around 1.5 month ago  Has had issues with mood swings  She started to take 1.5 seroquel   And feels like can manage better  She feels like it is this time of year  But she feels better on that dose      Review of Systems   Constitutional: Negative for activity change, appetite change, fatigue and fever. HENT: Negative for congestion, postnasal drip, sinus pressure and trouble swallowing. Respiratory: Negative for cough (resolved), shortness of breath and wheezing. Cardiovascular: Negative for chest pain and leg swelling. Gastrointestinal: Positive for nausea (twice a day). Negative for anal bleeding, constipation, diarrhea and vomiting. Endocrine: Negative for polydipsia, polyphagia and polyuria. Genitourinary: Negative for difficulty urinating. Musculoskeletal: Positive for arthralgias (improving). Skin: Negative for rash. Psychiatric/Behavioral: Negative for behavioral problems, self-injury and sleep disturbance. The patient is not nervous/anxious and is not hyperactive. Denies drinking         Physical Exam  Constitutional:       General: She is not in acute distress. Appearance: Normal appearance. She is not ill-appearing. Comments:      HENT:      Head: Normocephalic. Right Ear: Tympanic membrane normal. There is no impacted cerumen. Left Ear: Tympanic membrane normal. There is no impacted cerumen. Nose: No congestion. Mouth/Throat:      Pharynx: No posterior oropharyngeal erythema. Eyes:      General:         Right eye: No discharge. Left eye: No discharge. Cardiovascular:      Rate and Rhythm: Normal rate and regular rhythm. Pulses: Normal pulses. Heart sounds: No murmur.    Abdominal:      Comments: gerd twice a day with nausea     Musculoskeletal:         General: Tenderness (arm continues) present. Neurological:      Mental Status: She is alert and oriented to person, place, and time. Psychiatric:         Mood and Affect: Mood normal.         Behavior: Behavior normal.         Diabetic Foot Exam:  Visual inspection:  Deformity/amputation: absent  Skin lesions/pre-ulcerative calluses: absent  Edema: right- negative, left- negative    Monofilament Exam Reveals:  Pulses: normal  Edema:absent  Skin Lesions:normal    Right Foot:    Left Foot:  Normal sensation at    Normal sensation at    Diminished sensation at 1-10   Diminished sensation at 1-10   No sensation at     No sensation at                  On this date 01/08/21 I have spent 35 minutes reviewing previous notes, test results and face to face with the patient discussing the diagnosis and importance of compliance with the treatment plan. An electronic signature was used to authenticate this note.     --TRESSA Kevin

## 2021-01-08 NOTE — PROGRESS NOTES
neck pain  Long term goal 2: Patient will improve resting posture with decreased forward head posture  Long term goal 3: Patient will report pain no greater than 5/10 to demo improved QOL     Discharge goals not assessed due to unexpected self discharge.     Electronically signed by Kaden Del Real PT on 1/8/2021 at 4:29 PM

## 2021-01-08 NOTE — PATIENT INSTRUCTIONS

## 2021-01-12 ENCOUNTER — APPOINTMENT (OUTPATIENT)
Dept: PHYSICAL THERAPY | Age: 61
End: 2021-01-12
Payer: MEDICARE

## 2021-01-15 ENCOUNTER — APPOINTMENT (OUTPATIENT)
Dept: PHYSICAL THERAPY | Age: 61
End: 2021-01-15
Payer: MEDICARE

## 2021-01-15 DIAGNOSIS — E11.42 DIABETIC POLYNEUROPATHY ASSOCIATED WITH TYPE 2 DIABETES MELLITUS (HCC): ICD-10-CM

## 2021-01-15 RX ORDER — PREGABALIN 150 MG/1
150 CAPSULE ORAL 2 TIMES DAILY
Qty: 60 CAPSULE | Refills: 2 | Status: SHIPPED | OUTPATIENT
Start: 2021-01-15 | End: 2021-01-18 | Stop reason: SDUPTHER

## 2021-01-15 NOTE — TELEPHONE ENCOUNTER
Received fax from pharmacy requesting refill on pts medication(s). Pt was last seen in office on 1/8/2021  and has a follow up scheduled for 2/8/2021. Will send request to  Ness [facility]  for patient.      Requested Prescriptions     Pending Prescriptions Disp Refills    pregabalin (LYRICA) 150 MG capsule [Pharmacy Med Name: Pregabalin 150 MG Oral Capsule] 60 capsule 0     Sig: Take 1 capsule by mouth twice daily

## 2021-01-18 DIAGNOSIS — E11.42 DIABETIC POLYNEUROPATHY ASSOCIATED WITH TYPE 2 DIABETES MELLITUS (HCC): ICD-10-CM

## 2021-01-18 RX ORDER — PREGABALIN 150 MG/1
150 CAPSULE ORAL 2 TIMES DAILY
Qty: 60 CAPSULE | Refills: 2 | Status: SHIPPED | OUTPATIENT
Start: 2021-01-18 | End: 2021-06-24 | Stop reason: SDUPTHER

## 2021-01-28 DIAGNOSIS — K21.9 GASTROESOPHAGEAL REFLUX DISEASE WITHOUT ESOPHAGITIS: ICD-10-CM

## 2021-01-28 RX ORDER — PANTOPRAZOLE SODIUM 40 MG/1
40 TABLET, DELAYED RELEASE ORAL DAILY
Qty: 90 TABLET | Refills: 3 | Status: SHIPPED | OUTPATIENT
Start: 2021-01-28 | End: 2022-02-28

## 2021-01-28 NOTE — TELEPHONE ENCOUNTER
Received fax from pharmacy requesting refill on pts medication(s). Pt was last seen in office on 1/8/2021  and has a follow up scheduled for 2/8/2021. Will send request to  Amy Tomlinson  for patient.      Requested Prescriptions     Pending Prescriptions Disp Refills    pantoprazole (PROTONIX) 40 MG tablet [Pharmacy Med Name: Pantoprazole Sodium 40 MG Oral Tablet Delayed Release] 90 tablet 0     Sig: Take 1 tablet by mouth once daily

## 2021-02-01 ENCOUNTER — HOSPITAL ENCOUNTER (OUTPATIENT)
Dept: GENERAL RADIOLOGY | Age: 61
Discharge: HOME | End: 2021-02-01
Payer: MEDICARE

## 2021-02-01 ENCOUNTER — HOSPITAL ENCOUNTER (OUTPATIENT)
Dept: PAIN MANAGEMENT | Age: 61
Discharge: HOME | End: 2021-02-01
Payer: MEDICARE

## 2021-02-01 VITALS
BODY MASS INDEX: 44.17 KG/M2 | HEIGHT: 60 IN | WEIGHT: 225 LBS | TEMPERATURE: 98.6 F | OXYGEN SATURATION: 96 % | HEART RATE: 73 BPM | RESPIRATION RATE: 20 BRPM

## 2021-02-01 DIAGNOSIS — M62.838 MUSCLE SPASMS OF NECK: ICD-10-CM

## 2021-02-01 DIAGNOSIS — M54.2 CERVICALGIA: Primary | ICD-10-CM

## 2021-02-01 DIAGNOSIS — M79.18 MYOFASCIAL MUSCLE PAIN: ICD-10-CM

## 2021-02-01 DIAGNOSIS — M54.12 CERVICAL RADICULOPATHY: ICD-10-CM

## 2021-02-01 DIAGNOSIS — M54.2 CERVICALGIA: ICD-10-CM

## 2021-02-01 PROCEDURE — 99213 OFFICE O/P EST LOW 20 MIN: CPT

## 2021-02-01 PROCEDURE — 72040 X-RAY EXAM NECK SPINE 2-3 VW: CPT

## 2021-02-01 PROCEDURE — 99213 OFFICE O/P EST LOW 20 MIN: CPT | Performed by: NURSE PRACTITIONER

## 2021-02-01 RX ORDER — HYDROCODONE BITARTRATE AND ACETAMINOPHEN 5; 325 MG/1; MG/1
1 TABLET ORAL 2 TIMES DAILY PRN
Qty: 60 TABLET | Refills: 0 | Status: SHIPPED | OUTPATIENT
Start: 2021-02-03 | End: 2022-03-24

## 2021-02-01 ASSESSMENT — PAIN DESCRIPTION - ONSET: ONSET: ON-GOING

## 2021-02-01 ASSESSMENT — PAIN DESCRIPTION - FREQUENCY: FREQUENCY: INTERMITTENT

## 2021-02-01 NOTE — PROGRESS NOTES
Clinic Documentation      Education Provided:  [x] Review of Fernando Childs  [] Agreement Review  [x] PEG Score Calculated [] PHQ Score Calculated [] ORT Score Calculated    [] Compliance Issues Discussed [] Cognitive Behavior Needs [x] Exercise [] Review of Test [] Financial Issues  [x] Tobacco/Alcohol Use Reviewed [x] Teaching [] New Patient [] Picture Obtained    Physician Plan:  [] Outgoing Referral  [] Pharmacy Consult  [] Test Ordered [] Prescription Ordered/Changed   [] Obtained Test Results / Consult Notes        Complete if patient is withholding blood thinner for procedure     Blood Thinner Patient is currently taking:      [] Plavix (Hold for 7 days)  [] Aspirin (Hold for 5 days)     [] Pletal (Hold for 2 days)  [] Pradaxa (Hold for 3 days)    [] Effient (Hold for 7 days)  [] Xarelto (Hold for 2 days)    [] Eliquis (Hold for 2 days)  [] Brilinta (Hold for 7 days)    [] Coumadin (Hold for 5 days) - (INR needs to be drawn the day prior to procedure- INR < 2.0)    [] Aggrenox (Hold for 7 days)        [] Patient will stop medication on their own.    [] Blood Thinner Form Faxed for approval to hold.    Provider form faxed to: ***    Assessment Completed by:  Electronically signed by Mitchell Keen RN on 2/1/2021 at 9:26 AM

## 2021-02-01 NOTE — PROGRESS NOTES
Arthritis     Bipolar 1 disorder ([address]     CAD (coronary artery disease)     CHF (congestive heart failure) ([facility])     Chronic kidney disease (CKD) stage G3b/A2, moderately decreased glomerular filtration rate (GFR) between 30-44 mL/min/1.73 square meter and albuminuria creatinine ratio between  mg/g 2016    Depression     DM (diabetes mellitus) ([facility])     GERD (gastroesophageal reflux disease)     History of blood transfusion     History of suicidal ideation     Hyperlipidemia     Hypertension     Hypothyroidism     Insomnia     Kidney disease     stage 3 failure    MI, old 2005    Obesity     Polyarticular arthritis     Type II or unspecified type diabetes mellitus without mention of complication, not stated as uncontrolled        Surgery History  Past Surgical History:   Procedure Laterality Date    ABDOMINOPLASTY      ANGIOPLASTY  05    stent placement to LAD and diagonal with normal left vent function    BREAST REDUCTION SURGERY      CARDIAC CATHETERIZATION  05, 05    see report  Stents x3    CARDIAC CATHETERIZATION  3/23/15  JDT    EF 50%    CARPAL TUNNEL RELEASE       SECTION      x2    CHOLECYSTECTOMY      GASTRIC BYPASS SURGERY      HERNIA REPAIR      umbilical with mesh    INTRACAPSULAR CATARACT EXTRACTION Left 2016    LT EYE CATARACT EMULSIFICATION IOL IMPLANT performed by Hemant De Souza MD at [address] [place] [address] Right 2018    ORIF RIGHT BIMALLEOLAR ANKLE FRACTURE, RIGHT WRIST CAST REMOVAL performed by Rebeca Garrison MD at [address] [place] [address] Right 2018    ANKLE OPEN REDUCTION INTERNAL FIXATION performed by Rebeca Garrison MD at [place] [id number] Left 2017    KNEE TOTAL ARTHROPLASTY performed by Victorino Osorio DO at [facility] OR        Allergies  Ciprofloxacin, Dilaudid [hydromorphone hcl], Keflex [cephalexin], Nitrofuran derivatives, Pcn [penicillins], and Cefdinir     Current Medications  Current Outpatient Medications   Medication Sig Dispense Refill    [START ON 2/3/2021] HYDROcodone-acetaminophen (NORCO) 5-325 MG per tablet Take 1 tablet by mouth 2 times daily as needed for Pain for up to 30 days. May fill 2/3/2021 60 tablet 0    pantoprazole (PROTONIX) 40 MG tablet Take 1 tablet by mouth daily 90 tablet 3    pregabalin (LYRICA) 150 MG capsule Take 1 capsule by mouth 2 times daily for 90 days. 60 capsule 2    ondansetron (ZOFRAN-ODT) 4 MG disintegrating tablet Take 1 tablet by mouth 3 times daily as needed for Nausea or Vomiting 30 tablet 2    QUEtiapine (SEROQUEL) 200 MG tablet Take 1.5 tablets by mouth nightly 45 tablet 3    denosumab (PROLIA) 60 MG/ML SOSY SC injection Inject 1 mL into the skin once for 1 dose 1 mL 0    temazepam (RESTORIL) 30 MG capsule Take 1 capsule by mouth nightly as needed for Sleep for up to 30 days.  30 capsule 0    budesonide-formoterol (SYMBICORT) 160-4.5 MCG/ACT AERO Inhale 2 puffs into the lungs 2 times daily 3 Inhaler 1    albuterol sulfate HFA (VENTOLIN HFA) 108 (90 Base) MCG/ACT inhaler Inhale 2 puffs into the lungs 4 times daily as needed for Wheezing 1 Inhaler 5    valsartan (DIOVAN) 320 MG tablet Take 1 tablet by mouth daily 90 tablet 3    Dulaglutide (TRULICITY) 1.5 QM/8.1LA SOPN Inject 1.5 mg into the skin every 7 days 4 pen 11    amitriptyline (ELAVIL) 100 MG tablet Take 2 tablets by mouth nightly 180 tablet 3    insulin aspart (NOVOLOG FLEXPEN) 100 UNIT/ML injection pen Inject into the skin 3 times daily (before meals) Sliding scale      tiZANidine (ZANAFLEX) 4 MG tablet Take 1 tablet by mouth every 8 hours as needed (spasms) 60 tablet 0    amLODIPine (NORVASC) 5 MG tablet Take 1 tablet by mouth daily (Patient taking differently: Take 5 mg by mouth nightly ) 30 tablet 11    atorvastatin (LIPITOR) 40 MG tablet Take 1 tablet by mouth nightly 90 tablet 3    blood glucose interferes some active activities and ADLs    Clinical Progression: gradually improving  Effect of Pain on Daily Activities: limits activity  Patient's Stated Pain Goal: No pain  Pain Intervention(s): Medication (see eMar), Repositioning, Rest, Ice    Current PE.33    ORT Score:     PHQ-9 Score:    Physical Exam:    Vitals:    21 0921   Pulse: 73   Resp: 20   Temp: 98.6 °F (37 °C)   TempSrc: Temporal   SpO2: 96%   Weight: 225 lb (102.1 kg)   Height: 5' (1.524 m)       Body mass index is 43.94 kg/m². General Appearance: no acute distress. Appears to be well dressed  Skin Exam: Warm and dry, no jaundice, rashes or lesions  Head Exam: NCAT, PERRLA, EOMI, moist mucus membranes, scalp normal  Neck Exam: Supple, no masses palpated, trachea midline. Tender with palpation of muscles in neck and bilateral shoulders  Lung: Clear to ausculation in all lobes anterior and posterior. Heart: Regular rate and rhythm, no gallops, rubs or murmurs, no edema  Abdomen:  Bowel sounds in all quadrants, soft, non-distended, non-tender with palpation, no guarding  Extremities: No rash, cyanosis or bruising  Musculoskeletal: No joint swelling or deformity   Back Exam: limited ROM  Hip Exam: Full rotation bilateral   Knee Exam: Full flexion and extension bilateral, no crepitus  Shoulder Exam: Full ROM bilateral  Neurologic Exam: Gait and coordination normal, speech normal  Reflexes: Normal brachialis, Negative Castanon's bilateral. Normal Patellar bilateral,   CN EXAM: II-XII intact, face symmetrical, tongue symmetrical, the trapezius and sternocleidomastoid muscle appearance and strength symmetrical, normal achilles bilateral, ankle clonus negative bilateral  Strength: 5/5 RUE Bi's/Tri's, 5/5 LUE Bi's/Tri's, 5/5 RLE knee flex/ext, 5/5 RLE DF/PF, 5/5 LLE knee flex/ext, 5/5 LLE DF/PF  Sensation: Equal and intact to fine touch in all extremities  Mood and affect: Normal   Nurses note reviewed along with current vital signs    Active Problem(s)  Active Problems:    Cervicalgia    Muscle spasms of neck    Myofascial muscle pain    Cervical radiculopathy  Resolved Problems:    * No resolved hospital problems. *                                                                                                                            PLAN:  1. Patient is to call the office with any questions or concerns that may arise prior to next appointment. 2. Continue Norco  3. Re-order PTH  4. X-ray c-spine flex and extension  5. Schedule bilateral cervical trigger point injections. Urine Drug Screen Current/Today:  Yes  [x]  No []     Discussion:  Discussed exam findings and plan of care with patient. Patient agreed with the current plan of care at this time. All questions from the patient were answered by the provider. Activity:   Discussed exercise as beneficial to pain reduction, encouraged stretching exercise with a focus on torso strengthening. Education Provided:  Review of Esteban Gram [x] Agreement Review [x]  Reviewed PHQ-9  [x]    Review of Test [] Compliance Issues Discussed []   Cognitive Behavior Needs [] Exercise [x]  Financial Issues []   Tobacco/Alcohol Use [] Teaching  [x]     Goal:  Pain Management Goals of Therapy:   []        Resolution in pain  [x]        Decrease in pain level  [x]        Improvement in ADL's  [x]        Increase in activities with less pain  [x]        Decrease in medication      [] Benzodiazapine's and Narcotics:  Patient educated on the possible effects of combining Benzodiazapine's and Opioids. Explained \"Black Box Warnings\" such as; possible suppressed breathing, hypoxia, anoxia, depressed cognition, heart arrhythmia, coma and possible death. Patient verbalized understanding concerning possible effects. Controlled Substance Monitoring:  Attestation: The TOMMY report for this patient was reviewed today.   Discussed with patient possible medication side effects, risk of tolerance, dependence and alternative treatments. Discussed the growing epidemic in the [place] with the overprescribing and at times the abuse of narcotics. Discussed the detrimental effects of long term narcotic use. Patient encouraged to set daily goals of exercising and decreasing daily narcotic intake. Discussed with the patient about the development of hyperalgesia with long term narcotic intake. EMR dragon/transcription disclaimer: Much of this encounter note is electronic transcription/translation of spoken language to printed tach. Electronic translation of spoken language may be erroneous, or at times, nonsensical words or phrases may be inadvertently transcribed.  Although, I have reviewed the note for such errors, some may still exist.     CC:  TRESSA Gates APRN, 2/1/2021 at 10:02 AM

## 2021-02-04 DIAGNOSIS — F41.8 SITUATIONAL ANXIETY: ICD-10-CM

## 2021-02-04 DIAGNOSIS — F51.01 PRIMARY INSOMNIA: ICD-10-CM

## 2021-02-04 RX ORDER — TEMAZEPAM 30 MG/1
30 CAPSULE ORAL NIGHTLY PRN
Qty: 30 CAPSULE | Refills: 0 | Status: SHIPPED | OUTPATIENT
Start: 2021-02-04 | End: 2021-02-08 | Stop reason: SDUPTHER

## 2021-02-08 ENCOUNTER — TELEPHONE (OUTPATIENT)
Dept: PAIN MANAGEMENT | Age: 61
End: 2021-02-08

## 2021-02-08 ENCOUNTER — OFFICE VISIT (OUTPATIENT)
Dept: PRIMARY CARE CLINIC | Age: 61
End: 2021-02-08
Payer: MEDICARE

## 2021-02-08 VITALS
OXYGEN SATURATION: 98 % | HEIGHT: 60 IN | WEIGHT: 221 LBS | SYSTOLIC BLOOD PRESSURE: 118 MMHG | BODY MASS INDEX: 43.39 KG/M2 | DIASTOLIC BLOOD PRESSURE: 80 MMHG | HEART RATE: 84 BPM | TEMPERATURE: 96.8 F

## 2021-02-08 DIAGNOSIS — M54.2 CHRONIC NECK PAIN: ICD-10-CM

## 2021-02-08 DIAGNOSIS — E11.65 TYPE 2 DIABETES MELLITUS WITH HYPERGLYCEMIA, WITH LONG-TERM CURRENT USE OF INSULIN (HCC): ICD-10-CM

## 2021-02-08 DIAGNOSIS — Z79.4 TYPE 2 DIABETES MELLITUS WITH HYPERGLYCEMIA, WITH LONG-TERM CURRENT USE OF INSULIN (HCC): ICD-10-CM

## 2021-02-08 DIAGNOSIS — N18.32 CHRONIC KIDNEY DISEASE (CKD) STAGE G3B/A2, MODERATELY DECREASED GLOMERULAR FILTRATION RATE (GFR) BETWEEN 30-44 ML/MIN/1.73 SQUARE METER AND ALBUMINURIA CREATININE RATIO BETWEEN 30-299 MG/G (HCC): ICD-10-CM

## 2021-02-08 DIAGNOSIS — F51.01 PRIMARY INSOMNIA: ICD-10-CM

## 2021-02-08 DIAGNOSIS — E11.42 DIABETIC POLYNEUROPATHY ASSOCIATED WITH TYPE 2 DIABETES MELLITUS (HCC): Primary | ICD-10-CM

## 2021-02-08 DIAGNOSIS — E11.42 DIABETIC POLYNEUROPATHY ASSOCIATED WITH TYPE 2 DIABETES MELLITUS (HCC): ICD-10-CM

## 2021-02-08 DIAGNOSIS — F41.8 SITUATIONAL ANXIETY: ICD-10-CM

## 2021-02-08 DIAGNOSIS — G89.29 CHRONIC NECK PAIN: ICD-10-CM

## 2021-02-08 LAB
ALBUMIN SERPL-MCNC: 3.8 G/DL (ref 3.5–5.2)
ALP BLD-CCNC: 160 U/L (ref 35–104)
ALT SERPL-CCNC: 16 U/L (ref 5–33)
ANION GAP SERPL CALCULATED.3IONS-SCNC: 12 MMOL/L (ref 7–19)
AST SERPL-CCNC: 23 U/L (ref 5–32)
BILIRUB SERPL-MCNC: <0.2 MG/DL (ref 0.2–1.2)
BUN BLDV-MCNC: 16 MG/DL (ref 6–20)
CALCIUM SERPL-MCNC: 9.2 MG/DL (ref 8.6–10)
CHLORIDE BLD-SCNC: 104 MMOL/L (ref 98–111)
CO2: 24 MMOL/L (ref 22–29)
CREAT SERPL-MCNC: 1.3 MG/DL (ref 0.5–0.9)
GFR AFRICAN AMERICAN: 51
GFR NON-AFRICAN AMERICAN: 42
GLUCOSE BLD-MCNC: 107 MG/DL (ref 74–109)
POTASSIUM SERPL-SCNC: 4.2 MMOL/L (ref 3.5–5)
SODIUM BLD-SCNC: 140 MMOL/L (ref 136–145)
TOTAL PROTEIN: 7 G/DL (ref 6.6–8.7)

## 2021-02-08 PROCEDURE — G8427 DOCREV CUR MEDS BY ELIG CLIN: HCPCS | Performed by: NURSE PRACTITIONER

## 2021-02-08 PROCEDURE — G8417 CALC BMI ABV UP PARAM F/U: HCPCS | Performed by: NURSE PRACTITIONER

## 2021-02-08 PROCEDURE — G8482 FLU IMMUNIZE ORDER/ADMIN: HCPCS | Performed by: NURSE PRACTITIONER

## 2021-02-08 PROCEDURE — 3017F COLORECTAL CA SCREEN DOC REV: CPT | Performed by: NURSE PRACTITIONER

## 2021-02-08 PROCEDURE — 2022F DILAT RTA XM EVC RTNOPTHY: CPT | Performed by: NURSE PRACTITIONER

## 2021-02-08 PROCEDURE — 1036F TOBACCO NON-USER: CPT | Performed by: NURSE PRACTITIONER

## 2021-02-08 PROCEDURE — 3044F HG A1C LEVEL LT 7.0%: CPT | Performed by: NURSE PRACTITIONER

## 2021-02-08 PROCEDURE — 99214 OFFICE O/P EST MOD 30 MIN: CPT | Performed by: NURSE PRACTITIONER

## 2021-02-08 RX ORDER — TEMAZEPAM 30 MG/1
30 CAPSULE ORAL NIGHTLY PRN
Qty: 30 CAPSULE | Refills: 0 | Status: SHIPPED | OUTPATIENT
Start: 2021-02-08 | End: 2021-03-04 | Stop reason: SDUPTHER

## 2021-02-08 ASSESSMENT — ENCOUNTER SYMPTOMS
NAUSEA: 1
DIARRHEA: 0
SHORTNESS OF BREATH: 0
WHEEZING: 0
COUGH: 0
ANAL BLEEDING: 0
CONSTIPATION: 0
SINUS PRESSURE: 0
VOMITING: 0
TROUBLE SWALLOWING: 0

## 2021-02-08 NOTE — PROGRESS NOTES
some relief of fatigue  Sleep study in past none  Typically goes to bed at 10 awakens at 6  But recently  Is waking up and not able to go back to sleep  She has had this issue a week and half ago   Reports no change in medications  She has not seen any changes  She will take 2 elavil seroquel 200mg and ambien to go to sleep  And she will be awake within three days  In past took valium         Takes medication restoril   Last filled 2/4 # 30  Without symptoms include not able to rest and up and down  Benefits outweigh risks  Low risk of diversion with no complaints of abnormal side effects  But wasn't staying asleep     She takes her pain medication in afternoon  Away from medication  And knows the risks  She is stable        Reports around 1.5 month ago  Has had issues with mood swings  She started to take 1.5 seroquel   And feels like can manage better  She feels like it is this time of year  But she feels better on that dose         Review of Systems   Constitutional: Negative for activity change, appetite change, fatigue and fever. HENT: Negative for congestion, postnasal drip, sinus pressure and trouble swallowing. Respiratory: Negative for cough (resolved), shortness of breath and wheezing. Cardiovascular: Negative for chest pain and leg swelling. Gastrointestinal: Positive for nausea (comes and goes ). Negative for anal bleeding, constipation, diarrhea and vomiting. Endocrine: Negative for polydipsia, polyphagia and polyuria. Genitourinary: Negative for difficulty urinating and vaginal discharge. Musculoskeletal: Positive for arthralgias (improving). Skin: Negative for rash. Psychiatric/Behavioral: Negative for behavioral problems, self-injury and sleep disturbance. The patient is not nervous/anxious and is not hyperactive. Denies drinking         Physical Exam  Constitutional:       General: She is not in acute distress. Appearance: Normal appearance. She is not ill-appearing. Comments:      HENT:      Head: Normocephalic. Right Ear: Tympanic membrane normal. There is no impacted cerumen. Left Ear: Tympanic membrane normal. There is no impacted cerumen. Nose: No congestion. Mouth/Throat:      Pharynx: No posterior oropharyngeal erythema. Eyes:      General:         Right eye: No discharge. Left eye: No discharge. Cardiovascular:      Rate and Rhythm: Normal rate and regular rhythm. Pulses: Normal pulses. Heart sounds: No murmur. Abdominal:      Comments: gerd twice a day with nausea     Musculoskeletal:         General: Tenderness (arm continues) present. Neurological:      Mental Status: She is alert and oriented to person, place, and time. Psychiatric:         Mood and Affect: Mood normal.         Behavior: Behavior normal.                 An electronic signature was used to authenticate this note.     --TRESSA Parisi

## 2021-02-08 NOTE — TELEPHONE ENCOUNTER
Patient left message on third floor line and call was transferred to nurse line. I attempted to call the patient to find out her inquiry, but she did not answer. I left voicemail with number to nurse line.

## 2021-02-08 NOTE — PATIENT INSTRUCTIONS
Patient Education        Noninsulin Medicines for Type 2 Diabetes: Care Instructions  Overview     There are different types of noninsulin medicines for diabetes. Each works in a different way. But they all help you control your blood sugar. Some types help your body make insulin to lower your blood sugar. Others lower how much insulin your body needs. Some can slow how fast your body digests sugars. And some can remove extra glucose through your urine. You may need to take more than one medicine for diabetes. Two or more medicines may work better to lower your blood sugar level than just one does. · Metformin. This lowers how much glucose your liver makes. And it helps you respond better to insulin. It also lowers the amount of stored sugar that your liver releases when you are not eating. · Sulfonylureas. These help your body release more insulin. Some work for many hours. They can cause low blood sugar if you don't eat as you planned. An example is glipizide. · Thiazolidinediones. These reduce the amount of blood glucose. They also help you respond better to insulin. An example is pioglitazone. · SGLT2 inhibitors. These help to remove extra glucose through your urine. They may also help some people lose weight. An example is ertugliflozin. · DPP-4 inhibitors. These help your body raise the level of insulin after you eat. They also help your body make less of a hormone that raises blood sugar. An example is alogliptin. · Incretin hormones (GLP-1 receptor agonists). These help your body make a protein that can raise your insulin level and make you less hungry. They're given as shots or pills. An example is semaglutide. · Meglitinides. These help your body release insulin. They also help slow how your body digests sugars. So they can keep your blood sugar from rising too fast after you eat. · Alpha-glucosidase inhibitors. These keep starches from breaking down. This means that they lower the amount of glucose absorbed when you eat. They don't help your body make more insulin. So they will not cause low blood sugar unless you use them with other medicines for diabetes. Follow-up care is a key part of your treatment and safety. Be sure to make and go to all appointments, and call your doctor if you are having problems. It's also a good idea to know your test results and keep a list of the medicines you take. How can you care for yourself at home? · Eat a healthy diet. Get some exercise each day. This may help you to reduce how much medicine you need. · Do not take other prescription or over-the-counter medicines, vitamins, herbal products, or supplements without talking to your doctor first. Some medicines for type 2 diabetes can cause problems with other medicines or supplements. · Tell your doctor if you plan to get pregnant. Some of these drugs are not safe for pregnant women. · Be safe with medicines. Take your medicines exactly as prescribed. Meglitinides and sulfonylureas can cause your blood sugar to drop very low. Call your doctor if you think you are having a problem with your medicine. · Check your blood sugar often. You can use a glucose monitor. Keeping track can help you know how certain foods, activities, and medicines affect your blood sugar. And it can help you keep your blood sugar from getting so low that it's not safe. When should you call for help? Call 911 anytime you think you may need emergency care. For example, call if:    · You passed out (lost consciousness).     · You are confused or cannot think clearly.     · Your blood sugar is very high or very low. Watch closely for changes in your health, and be sure to contact your doctor if:    · Your blood sugar stays outside the level your doctor set for you.     · You have any problems. Where can you learn more?

## 2021-02-09 ENCOUNTER — TELEPHONE (OUTPATIENT)
Dept: PAIN MANAGEMENT | Age: 61
End: 2021-02-09

## 2021-03-03 ENCOUNTER — TELEPHONE (OUTPATIENT)
Dept: PRIMARY CARE CLINIC | Age: 61
End: 2021-03-03

## 2021-03-03 NOTE — TELEPHONE ENCOUNTER
[facility], do you know if this patient is still receiving prolia from [facility]? I can never get in touch with her. I was going to take her off of my list but just want to be sure that she is getting what she needs.

## 2021-03-04 ENCOUNTER — HOSPITAL ENCOUNTER (OUTPATIENT)
Dept: GENERAL RADIOLOGY | Facility: HOSPITAL | Age: 61
Discharge: HOME | End: 2021-03-04
Admitting: PAIN MEDICINE

## 2021-03-04 ENCOUNTER — TRANSCRIBE ORDERS (OUTPATIENT)
Dept: ADMINISTRATIVE | Facility: HOSPITAL | Age: 61
End: 2021-03-04

## 2021-03-04 DIAGNOSIS — M54.16 RADICULOPATHY, LUMBAR REGION: ICD-10-CM

## 2021-03-04 DIAGNOSIS — M47.812 CERVICAL ARTHRITIS: ICD-10-CM

## 2021-03-04 DIAGNOSIS — G89.29 CHRONIC ABDOMINAL PAIN: ICD-10-CM

## 2021-03-04 DIAGNOSIS — F41.8 SITUATIONAL ANXIETY: ICD-10-CM

## 2021-03-04 DIAGNOSIS — E03.9 ACQUIRED HYPOTHYROIDISM: ICD-10-CM

## 2021-03-04 DIAGNOSIS — R10.9 CHRONIC ABDOMINAL PAIN: ICD-10-CM

## 2021-03-04 DIAGNOSIS — M47.812 CERVICAL ARTHRITIS: Primary | ICD-10-CM

## 2021-03-04 DIAGNOSIS — F51.01 PRIMARY INSOMNIA: ICD-10-CM

## 2021-03-04 PROCEDURE — 72110 X-RAY EXAM L-2 SPINE 4/>VWS: CPT

## 2021-03-04 RX ORDER — TEMAZEPAM 30 MG/1
30 CAPSULE ORAL NIGHTLY PRN
Qty: 30 CAPSULE | Refills: 0 | Status: SHIPPED | OUTPATIENT
Start: 2021-03-04 | End: 2021-03-08 | Stop reason: SDUPTHER

## 2021-03-04 RX ORDER — LEVOTHYROXINE SODIUM 88 UG/1
88 TABLET ORAL DAILY
Qty: 90 TABLET | Refills: 3 | Status: SHIPPED | OUTPATIENT
Start: 2021-03-04

## 2021-03-04 NOTE — TELEPHONE ENCOUNTER
Not really sure  I can ask at her follow up if you add to visit  You can also try to my chart her she sends them to me often

## 2021-03-08 ENCOUNTER — OFFICE VISIT (OUTPATIENT)
Dept: PRIMARY CARE CLINIC | Age: 61
End: 2021-03-08
Payer: MEDICARE

## 2021-03-08 ENCOUNTER — HOSPITAL ENCOUNTER (OUTPATIENT)
Dept: GENERAL RADIOLOGY | Age: 61
Discharge: HOME | End: 2021-03-08
Payer: MEDICARE

## 2021-03-08 VITALS
SYSTOLIC BLOOD PRESSURE: 136 MMHG | WEIGHT: 220.5 LBS | TEMPERATURE: 97 F | BODY MASS INDEX: 43.06 KG/M2 | OXYGEN SATURATION: 96 % | HEART RATE: 84 BPM | DIASTOLIC BLOOD PRESSURE: 84 MMHG

## 2021-03-08 DIAGNOSIS — N18.32 CHRONIC KIDNEY DISEASE (CKD) STAGE G3B/A2, MODERATELY DECREASED GLOMERULAR FILTRATION RATE (GFR) BETWEEN 30-44 ML/MIN/1.73 SQUARE METER AND ALBUMINURIA CREATININE RATIO BETWEEN 30-299 MG/G (HCC): ICD-10-CM

## 2021-03-08 DIAGNOSIS — M54.2 CERVICALGIA: ICD-10-CM

## 2021-03-08 DIAGNOSIS — M25.551 RIGHT HIP PAIN: Primary | ICD-10-CM

## 2021-03-08 DIAGNOSIS — M70.71 BURSITIS OF OTHER BURSA OF RIGHT HIP: ICD-10-CM

## 2021-03-08 DIAGNOSIS — Z79.4 TYPE 2 DIABETES MELLITUS WITH DIABETIC POLYNEUROPATHY, WITH LONG-TERM CURRENT USE OF INSULIN (HCC): ICD-10-CM

## 2021-03-08 DIAGNOSIS — F51.01 PRIMARY INSOMNIA: ICD-10-CM

## 2021-03-08 DIAGNOSIS — M25.551 RIGHT HIP PAIN: ICD-10-CM

## 2021-03-08 DIAGNOSIS — F41.8 SITUATIONAL ANXIETY: ICD-10-CM

## 2021-03-08 DIAGNOSIS — E11.42 TYPE 2 DIABETES MELLITUS WITH DIABETIC POLYNEUROPATHY, WITH LONG-TERM CURRENT USE OF INSULIN (HCC): ICD-10-CM

## 2021-03-08 PROCEDURE — 3044F HG A1C LEVEL LT 7.0%: CPT | Performed by: NURSE PRACTITIONER

## 2021-03-08 PROCEDURE — G8482 FLU IMMUNIZE ORDER/ADMIN: HCPCS | Performed by: NURSE PRACTITIONER

## 2021-03-08 PROCEDURE — 99214 OFFICE O/P EST MOD 30 MIN: CPT | Performed by: NURSE PRACTITIONER

## 2021-03-08 PROCEDURE — 73502 X-RAY EXAM HIP UNI 2-3 VIEWS: CPT

## 2021-03-08 PROCEDURE — 2022F DILAT RTA XM EVC RTNOPTHY: CPT | Performed by: NURSE PRACTITIONER

## 2021-03-08 PROCEDURE — G8417 CALC BMI ABV UP PARAM F/U: HCPCS | Performed by: NURSE PRACTITIONER

## 2021-03-08 PROCEDURE — 1036F TOBACCO NON-USER: CPT | Performed by: NURSE PRACTITIONER

## 2021-03-08 PROCEDURE — 3017F COLORECTAL CA SCREEN DOC REV: CPT | Performed by: NURSE PRACTITIONER

## 2021-03-08 PROCEDURE — G8427 DOCREV CUR MEDS BY ELIG CLIN: HCPCS | Performed by: NURSE PRACTITIONER

## 2021-03-08 RX ORDER — HYDROCODONE BITARTRATE AND ACETAMINOPHEN 5; 325 MG/1; MG/1
1 TABLET ORAL 2 TIMES DAILY PRN
Qty: 60 TABLET | Refills: 0 | Status: CANCELLED | OUTPATIENT
Start: 2021-03-08 | End: 2021-04-07

## 2021-03-08 RX ORDER — TEMAZEPAM 30 MG/1
30 CAPSULE ORAL NIGHTLY PRN
Qty: 30 CAPSULE | Refills: 0 | Status: SHIPPED | OUTPATIENT
Start: 2021-03-08 | End: 2021-04-29 | Stop reason: SDUPTHER

## 2021-03-08 ASSESSMENT — ENCOUNTER SYMPTOMS
WHEEZING: 0
SHORTNESS OF BREATH: 0
CONSTIPATION: 0
SINUS PRESSURE: 0
VOMITING: 0
TROUBLE SWALLOWING: 0
DIARRHEA: 0
NAUSEA: 1
ANAL BLEEDING: 0
COUGH: 0

## 2021-03-08 NOTE — PROGRESS NOTES
Mitch Fuchs (:  1960) is a 62 y.o. female,Established patient, here for evaluation of the following chief complaint(s):  Follow-up (for restoril and nausea follow up) and Back Pain (had X-Rays at [facility] for Dr. Tarah Luo on friday and is now having increased back pain. )      ASSESSMENT/PLAN:  1. Right hip pain  Continues back xray negative  ROM heat doing well    -     XR HIP RIGHT (2-3 VIEWS); Future  2. Situational anxiety  Cont present benefits outweigh risks  Sleeps well with no issues    -     temazepam (RESTORIL) 30 MG capsule; Take 1 capsule by mouth nightly as needed for Sleep for up to 30 days. , Disp-30 capsule, R-0Normal  3. Primary insomnia  Pain limits her slip  -     temazepam (RESTORIL) 30 MG capsule; Take 1 capsule by mouth nightly as needed for Sleep for up to 30 days. , Disp-30 capsule, R-0Normal  4. Kylie Luo office  Enjoys his relief    5. Bursitis of other bursa of right hip  Will do xray  To call and check on getting bursitis on right hip  Discussed care    6. Type 2 diabetes mellitus with diabetic polyneuropathy, with long-term current use of insulin (HCC)  Cont present  Tight control  Goals with diabetes    7. Chronic kidney disease (CKD) stage G3b/A2, moderately decreased glomerular filtration rate (GFR) between 30-44 mL/min/1.73 square meter and albuminuria creatinine ratio between  mg/g (HCC)  Doing worse at present CKD  Monitor  No nsaids      Return in about 25 days (around 2021) for 1 month insomnia and hip pain follow up.     SUBJECTIVE/OBJECTIVE:  Diabetes type 2     Patient is here for follow up on diabetes  Is doing better with trulicity 1.5 weekly  And doing well  Weight stable   She has kept log  And doing well   She feels like she has kept it great  She is feeling well overall   2021 a1c 6.2  Denies any lows       Asthma  Stable on Symbicort and rescue inhaler  Doing well at present        Hypothyroid  Synthroid 88mcg Systems   Constitutional: Negative for activity change, appetite change, fatigue and fever. HENT: Negative for congestion, postnasal drip, sinus pressure and trouble swallowing. Respiratory: Negative for cough (resolved), shortness of breath and wheezing. Cardiovascular: Negative for chest pain and leg swelling. Gastrointestinal: Positive for nausea (comes and goes ). Negative for anal bleeding, constipation, diarrhea and vomiting. Endocrine: Negative for polydipsia, polyphagia and polyuria. Genitourinary: Negative for difficulty urinating and vaginal discharge. Musculoskeletal: Positive for arthralgias (improving). Right hip pain with movement     Skin: Negative for rash. Psychiatric/Behavioral: Negative for behavioral problems, self-injury and sleep disturbance. The patient is not nervous/anxious and is not hyperactive. Denies drinking         Physical Exam  Constitutional:       General: She is not in acute distress. Appearance: Normal appearance. She is not ill-appearing. Comments:      HENT:      Head: Normocephalic. Right Ear: Tympanic membrane normal. There is no impacted cerumen. Left Ear: Tympanic membrane normal. There is no impacted cerumen. Nose: No congestion. Mouth/Throat:      Pharynx: No posterior oropharyngeal erythema. Eyes:      General:         Right eye: No discharge. Left eye: No discharge. Cardiovascular:      Rate and Rhythm: Normal rate and regular rhythm. Pulses: Normal pulses. Heart sounds: No murmur. Abdominal:      Comments: gerd twice a day with nausea     Musculoskeletal:         General: Tenderness (arm continues) present. Right hip: She exhibits decreased range of motion and tenderness. Neurological:      Mental Status: She is alert and oriented to person, place, and time.    Psychiatric:         Mood and Affect: Mood normal.         Behavior: Behavior normal.                 An electronic signature was used to authenticate this note.     --TRESSA Nava

## 2021-03-08 NOTE — PATIENT INSTRUCTIONS
Patient Education        Bursitis: Care Instructions  Your Care Instructions     A bursa is a small sac of fluid that helps the tissues around a joint slide over one another easily. Injury or overuse of a joint can cause pain, redness, and inflammation in the bursa (bursitis). Bursitis usually gets better if you avoid the activity that caused it. You can help prevent bursitis from coming back by doing stretching and strengthening exercises. You may also need to change the way you do some activities. Follow-up care is a key part of your treatment and safety. Be sure to make and go to all appointments, and call your doctor if you are having problems. It's also a good idea to know your test results and keep a list of the medicines you take. How can you care for yourself at home? · Put ice or a cold pack on the area for 10 to 20 minutes at a time. Try to do this every 1 to 2 hours for the next 3 days (when you are awake) or until the swelling goes down. Put a thin cloth between the ice and your skin. · After the 3 days of using ice, you may use heat on the area. You can use a hot water bottle; a warm, moist towel; or a heating pad set on low. You can also try alternating heat and ice. · Rest the area where you have pain. Stop any activities that cause pain. Switch to activities that do not stress the area. · Take pain medicines exactly as directed. ? If the doctor gave you a prescription medicine for pain, take it as prescribed. ? If you are not taking a prescription pain medicine, ask your doctor if you can take an over-the-counter medicine. ? Do not take two or more pain medicines at the same time unless the doctor told you to. Many pain medicines have acetaminophen, which is Tylenol. Too much acetaminophen (Tylenol) can be harmful. · To prevent stiffness, gently move the joint as much as you can without pain every day. As the pain gets better, keep doing range-of-motion exercises.  Ask your doctor for exercises that will make the muscles around the joint stronger. Do these as directed. · You can slowly return to the activity that caused the pain, but do it with less effort until you can do it without pain or swelling. Be sure to warm up before and stretch after you do the activity. When should you call for help? Call your doctor now or seek immediate medical care if:    · You have new or worse symptoms of infection, such as:  ? Increased pain, swelling, warmth, or redness. ? Red streaks leading from the area. ? Pus draining from the area. ? A fever. Watch closely for changes in your health, and be sure to contact your doctor if:    · You do not get better as expected. Where can you learn more? Go to [email or web address] and sign in to your [employer] account. Enter G404 in the [employer] box to learn more about \"Bursitis: Care Instructions. \"     If you do not have an account, please click on the \"Sign Up Now\" link. Current as of: March 2, 2020               Content Version: 12.6  © 2603-9993 [employer]. Care instructions adapted under license by [facility] ([facility]). If you have questions about a medical condition or this instruction, always ask your healthcare professional. [address] any warranty or liability for your use of this information.

## 2021-03-22 DIAGNOSIS — M62.838 MUSCLE SPASM: ICD-10-CM

## 2021-03-22 RX ORDER — INSULIN ASPART 100 [IU]/ML
INJECTION, SOLUTION INTRAVENOUS; SUBCUTANEOUS
Qty: 5 PEN | Refills: 5 | Status: SHIPPED | OUTPATIENT
Start: 2021-03-22 | End: 2021-04-01

## 2021-03-22 RX ORDER — TIZANIDINE 4 MG/1
4 TABLET ORAL EVERY 8 HOURS PRN
Qty: 90 TABLET | Refills: 2 | Status: SHIPPED | OUTPATIENT
Start: 2021-03-22 | End: 2021-04-29 | Stop reason: SDUPTHER

## 2021-03-22 NOTE — TELEPHONE ENCOUNTER
Received fax from pharmacy requesting refill on pts medication(s). Pt was last seen in office on 3/8/2021  and has a follow up scheduled for 4/1/2021. Will send request to  Ashli Spangler  for patient.      Requested Prescriptions     Pending Prescriptions Disp Refills    tiZANidine (ZANAFLEX) 4 MG tablet [Pharmacy Med Name: tiZANidine HCl 4 MG Oral Tablet] 90 tablet 2     Sig: Take 1 tablet by mouth every 8 hours as needed (spasms)    insulin aspart (NOVOLOG FLEXPEN) 100 UNIT/ML injection pen 5 pen 5     Sig: Inject into skin three times a day using sliding scale (max 50 units daily)

## 2021-03-26 RX ORDER — INSULIN LISPRO 100 [IU]/ML
INJECTION, SOLUTION INTRAVENOUS; SUBCUTANEOUS
Qty: 5 PEN | Refills: 5 | Status: SHIPPED | OUTPATIENT
Start: 2021-03-26 | End: 2021-05-27

## 2021-03-26 NOTE — TELEPHONE ENCOUNTER
Novolog flexpen is non formulary.        Humalog and Ademlog show that they are preferred when I check them

## 2021-04-01 ENCOUNTER — OFFICE VISIT (OUTPATIENT)
Dept: PRIMARY CARE CLINIC | Age: 61
End: 2021-04-01
Payer: MEDICARE

## 2021-04-01 VITALS
TEMPERATURE: 98.2 F | DIASTOLIC BLOOD PRESSURE: 80 MMHG | SYSTOLIC BLOOD PRESSURE: 122 MMHG | BODY MASS INDEX: 44.14 KG/M2 | HEART RATE: 56 BPM | OXYGEN SATURATION: 95 % | WEIGHT: 226 LBS

## 2021-04-01 DIAGNOSIS — J45.909 MODERATE ASTHMA, UNSPECIFIED WHETHER COMPLICATED, UNSPECIFIED WHETHER PERSISTENT: ICD-10-CM

## 2021-04-01 DIAGNOSIS — E11.42 TYPE 2 DIABETES MELLITUS WITH DIABETIC POLYNEUROPATHY, WITH LONG-TERM CURRENT USE OF INSULIN (HCC): Primary | ICD-10-CM

## 2021-04-01 DIAGNOSIS — I10 ESSENTIAL HYPERTENSION: ICD-10-CM

## 2021-04-01 DIAGNOSIS — E03.4 HYPOTHYROIDISM DUE TO ACQUIRED ATROPHY OF THYROID: ICD-10-CM

## 2021-04-01 DIAGNOSIS — F41.8 SITUATIONAL ANXIETY: ICD-10-CM

## 2021-04-01 DIAGNOSIS — F51.01 PRIMARY INSOMNIA: ICD-10-CM

## 2021-04-01 DIAGNOSIS — Z79.4 TYPE 2 DIABETES MELLITUS WITH DIABETIC POLYNEUROPATHY, WITH LONG-TERM CURRENT USE OF INSULIN (HCC): Primary | ICD-10-CM

## 2021-04-01 DIAGNOSIS — N18.31 STAGE 3A CHRONIC KIDNEY DISEASE (HCC): ICD-10-CM

## 2021-04-01 PROCEDURE — G8417 CALC BMI ABV UP PARAM F/U: HCPCS | Performed by: NURSE PRACTITIONER

## 2021-04-01 PROCEDURE — 3017F COLORECTAL CA SCREEN DOC REV: CPT | Performed by: NURSE PRACTITIONER

## 2021-04-01 PROCEDURE — G8427 DOCREV CUR MEDS BY ELIG CLIN: HCPCS | Performed by: NURSE PRACTITIONER

## 2021-04-01 PROCEDURE — 2022F DILAT RTA XM EVC RTNOPTHY: CPT | Performed by: NURSE PRACTITIONER

## 2021-04-01 PROCEDURE — 3044F HG A1C LEVEL LT 7.0%: CPT | Performed by: NURSE PRACTITIONER

## 2021-04-01 PROCEDURE — 99214 OFFICE O/P EST MOD 30 MIN: CPT | Performed by: NURSE PRACTITIONER

## 2021-04-01 PROCEDURE — 1036F TOBACCO NON-USER: CPT | Performed by: NURSE PRACTITIONER

## 2021-04-01 RX ORDER — TEMAZEPAM 30 MG/1
30 CAPSULE ORAL NIGHTLY PRN
Qty: 30 CAPSULE | Refills: 0 | Status: SHIPPED | OUTPATIENT
Start: 2021-04-01 | End: 2021-05-01

## 2021-04-01 ASSESSMENT — ENCOUNTER SYMPTOMS
SINUS PRESSURE: 0
DIARRHEA: 0
WHEEZING: 0
NAUSEA: 1
CONSTIPATION: 0
TROUBLE SWALLOWING: 0
VOMITING: 0
SHORTNESS OF BREATH: 0
COUGH: 0
ANAL BLEEDING: 0

## 2021-04-01 NOTE — PROGRESS NOTES
Mitch Fuchs (:  1960) is a 62 y.o. female,Established patient, here for evaluation of the following chief complaint(s):  Follow-up (for insomnia and hip pain)      ASSESSMENT/PLAN:  1. Type 2 diabetes mellitus with diabetic polyneuropathy, with long-term current use of insulin (HCC)  Cont tight control at goal  Will monitor CKD  Tight control history of CKD  Using novolog as needed    2. Situational anxiety  Doing well at present  Stable at present    -     temazepam (RESTORIL) 30 MG capsule; Take 1 capsule by mouth nightly as needed for Sleep for up to 30 days. , Disp-30 capsule, R-0Normal  3. Primary insomnia  With seroquel sleeping well at present    -     temazepam (RESTORIL) 30 MG capsule; Take 1 capsule by mouth nightly as needed for Sleep for up to 30 days. , Disp-30 capsule, R-0Normal  4. Essential hypertension  Cont goals  120s/80s    5. Hypothyroidism due to acquired atrophy of thyroid  At goal  Will check in 6 months  Cont same dose    6. Moderate asthma, unspecified whether complicated, unspecified whether persistent  Stable at present      Return in about 4 weeks (around 2021) for 1 month diabetes COMPA insomnia follow up.     SUBJECTIVE/OBJECTIVE:  HPI  Diabetes type 2     Patient is here for follow up on diabetes  Is doing better with trulicity 1.5 weekly  And doing well  Weight stable   She has kept log  And doing well   She feels like she has kept it great  She is feeling well overall   2021 a1c 6.2  cmp GFR 42  K on recheck normal    Denies any lows        Asthma  Stable on Symbicort and rescue inhaler  Doing well at present  Denies any changes needed          Hypothyroid  Synthroid 88mcg daily  10/2020  And stable     Depression  Much improved as resting  And doing well at present  Denies any need to change       HTN :   Log stable doing well   Denies any issues  Looks like 120s/70s-80s        Insomnia  Patient reports chronic insomnia  Has failed seroquel and breath and wheezing. Cardiovascular: Negative for chest pain and leg swelling. Gastrointestinal: Positive for nausea (comes and goes ). Negative for anal bleeding, constipation, diarrhea and vomiting. Endocrine: Negative for polydipsia, polyphagia and polyuria. Genitourinary: Negative for difficulty urinating and vaginal discharge. Musculoskeletal: Positive for arthralgias (improving). Right hip pain with movement     Skin: Negative for rash. Neurological: Negative for dizziness, seizures, facial asymmetry and speech difficulty. Psychiatric/Behavioral: Negative for behavioral problems, self-injury and sleep disturbance. The patient is not nervous/anxious and is not hyperactive. Denies drinking         Physical Exam  Constitutional:       General: She is not in acute distress. Appearance: Normal appearance. She is not ill-appearing. Comments:      HENT:      Head: Normocephalic. Right Ear: Tympanic membrane normal. There is no impacted cerumen. Left Ear: Tympanic membrane normal. There is no impacted cerumen. Nose: No congestion. Mouth/Throat:      Pharynx: No posterior oropharyngeal erythema. Eyes:      General:         Right eye: No discharge. Left eye: No discharge. Cardiovascular:      Rate and Rhythm: Normal rate and regular rhythm. Pulses: Normal pulses. Heart sounds: No murmur. Abdominal:      Comments: gerd twice a day with nausea     Musculoskeletal:         General: Tenderness (arm continues) present. Right hip: She exhibits decreased range of motion and tenderness. Neurological:      Mental Status: She is alert and oriented to person, place, and time. Psychiatric:         Mood and Affect: Mood normal.         Behavior: Behavior normal.               An electronic signature was used to authenticate this note.     --TRESSA Wang

## 2021-04-01 NOTE — PATIENT INSTRUCTIONS
Patient Education        Type 2 Diabetes: Care Instructions  Your Care Instructions     Type 2 diabetes is a disease that develops when the body's tissues cannot use insulin properly. Over time, the pancreas cannot make enough insulin. Insulin is a hormone that helps the body's cells use sugar (glucose) for energy. It also helps the body store extra sugar in muscle, fat, and liver cells. Without insulin, the sugar cannot get into the cells to do its work. It stays in the blood instead. This can cause high blood sugar levels. A person has diabetes when the blood sugar stays too high too much of the time. Over time, diabetes can lead to diseases of the heart, blood vessels, nerves, kidneys, and eyes. You may be able to control your blood sugar by losing weight, eating a healthy diet, and getting daily exercise. You may also have to take insulin or other diabetes medicine. Follow-up care is a key part of your treatment and safety. Be sure to make and go to all appointments. Call your doctor if you are having problems. It's also a good idea to know your test results and keep a list of the medicines you take. How can you care for yourself at home? · Keep your blood sugar at a target level (which you set with your doctor). ? Carbohydratethe body's main source of fuelaffects blood sugar more than any other nutrient. Carbohydrate is in fruits, vegetables, milk, and yogurt. It also is in breads, cereals, vegetables such as potatoes and corn, and sugary foods such as candy and cakes. Follow your meal plan to know how much carbohydrate to eat at each meal and snack. ? Aim for 30 minutes of exercise on most, preferably all, days of the week. Walking is a good choice. You also may want to do other activities, such as running, swimming, cycling, or playing tennis or team sports. Try to do muscle-strengthening exercises at least 2 times a week. ? Take your medicines exactly as prescribed.  Call your doctor if you think in to your [employer] account. Enter C553 in the [facility] box to learn more about \"Type 2 Diabetes: Care Instructions. \"     If you do not have an account, please click on the \"Sign Up Now\" link. Current as of: August 31, 2020               Content Version: 12.8  © 8139-1491 [employer]. Care instructions adapted under license by [facility] ([facility]). If you have questions about a medical condition or this instruction, always ask your healthcare professional. [address] any warranty or liability for your use of this information.

## 2021-04-23 DIAGNOSIS — E78.2 MIXED HYPERLIPIDEMIA: Primary | ICD-10-CM

## 2021-04-23 RX ORDER — ATORVASTATIN CALCIUM 40 MG/1
TABLET, FILM COATED ORAL
Qty: 90 TABLET | Refills: 3 | Status: SHIPPED | OUTPATIENT
Start: 2021-04-23

## 2021-04-23 NOTE — TELEPHONE ENCOUNTER
Received fax from pharmacy requesting refill on pts medication(s). Pt was last seen in office on 4/1/2021  and has a follow up scheduled for 4/29/2021. Will send request to  Fam Quiñones  for patient.      Requested Prescriptions     Pending Prescriptions Disp Refills    atorvastatin (LIPITOR) 40 MG tablet [Pharmacy Med Name: Atorvastatin Calcium 40 MG Oral Tablet] 90 tablet 0     Sig: Take 1 tablet by mouth nightly

## 2021-04-28 DIAGNOSIS — I25.118 CORONARY ARTERY DISEASE OF NATIVE HEART WITH STABLE ANGINA PECTORIS, UNSPECIFIED VESSEL OR LESION TYPE (HCC): ICD-10-CM

## 2021-04-28 DIAGNOSIS — F51.04 PSYCHOPHYSIOLOGICAL INSOMNIA: ICD-10-CM

## 2021-04-28 DIAGNOSIS — I10 ESSENTIAL HYPERTENSION: ICD-10-CM

## 2021-04-28 DIAGNOSIS — F33.41 RECURRENT MAJOR DEPRESSIVE DISORDER, IN PARTIAL REMISSION (HCC): ICD-10-CM

## 2021-04-28 DIAGNOSIS — F31.78 BIPOLAR DISORDER, IN FULL REMISSION, MOST RECENT EPISODE MIXED (HCC): ICD-10-CM

## 2021-04-28 RX ORDER — QUETIAPINE FUMARATE 200 MG/1
300 TABLET, FILM COATED ORAL NIGHTLY
Qty: 135 TABLET | Refills: 3 | Status: SHIPPED | OUTPATIENT
Start: 2021-04-28 | End: 2021-11-18

## 2021-04-28 RX ORDER — METOPROLOL SUCCINATE 50 MG/1
50 TABLET, EXTENDED RELEASE ORAL DAILY
Qty: 90 TABLET | Refills: 3 | Status: SHIPPED | OUTPATIENT
Start: 2021-04-28 | End: 2022-02-14

## 2021-04-28 NOTE — TELEPHONE ENCOUNTER
Received fax from pharmacy requesting refill on pts medication(s). Pt was last seen in office on 4/1/2021  and has a follow up scheduled for 4/29/2021. Will send request to  Petty Jade  for patient.      Requested Prescriptions     Pending Prescriptions Disp Refills    QUEtiapine (SEROQUEL) 200 MG tablet [Pharmacy Med Name: QUEtiapine Fumarate 200 MG Oral Tablet] 45 tablet 0     Sig: TAKE 1 & 1/2 (ONE & ONE-HALF) TABLETS BY MOUTH NIGHTLY    metoprolol succinate (TOPROL XL) 50 MG extended release tablet [Pharmacy Med Name: Metoprolol Succinate ER 50 MG Oral Tablet Extended Release 24 Hour] 30 tablet 0     Sig: Take 1 tablet by mouth once daily

## 2021-04-29 ENCOUNTER — TELEMEDICINE (OUTPATIENT)
Dept: PRIMARY CARE CLINIC | Age: 61
End: 2021-04-29
Payer: MEDICARE

## 2021-04-29 DIAGNOSIS — M62.838 MUSCLE SPASM: ICD-10-CM

## 2021-04-29 DIAGNOSIS — G44.89 OTHER HEADACHE SYNDROME: ICD-10-CM

## 2021-04-29 DIAGNOSIS — F41.8 SITUATIONAL ANXIETY: Primary | ICD-10-CM

## 2021-04-29 DIAGNOSIS — F51.01 PRIMARY INSOMNIA: ICD-10-CM

## 2021-04-29 PROCEDURE — G8417 CALC BMI ABV UP PARAM F/U: HCPCS | Performed by: NURSE PRACTITIONER

## 2021-04-29 PROCEDURE — G8428 CUR MEDS NOT DOCUMENT: HCPCS | Performed by: NURSE PRACTITIONER

## 2021-04-29 PROCEDURE — 1036F TOBACCO NON-USER: CPT | Performed by: NURSE PRACTITIONER

## 2021-04-29 PROCEDURE — 99214 OFFICE O/P EST MOD 30 MIN: CPT | Performed by: NURSE PRACTITIONER

## 2021-04-29 PROCEDURE — 3017F COLORECTAL CA SCREEN DOC REV: CPT | Performed by: NURSE PRACTITIONER

## 2021-04-29 RX ORDER — TEMAZEPAM 30 MG/1
30 CAPSULE ORAL NIGHTLY PRN
Qty: 30 CAPSULE | Refills: 0 | Status: SHIPPED | OUTPATIENT
Start: 2021-04-29 | End: 2021-05-28 | Stop reason: SDUPTHER

## 2021-04-29 RX ORDER — TIZANIDINE 4 MG/1
4 TABLET ORAL EVERY 8 HOURS PRN
Qty: 90 TABLET | Refills: 2 | Status: SHIPPED | OUTPATIENT
Start: 2021-04-29

## 2021-04-29 ASSESSMENT — ENCOUNTER SYMPTOMS
DIARRHEA: 0
TROUBLE SWALLOWING: 0
SHORTNESS OF BREATH: 0
VOMITING: 0
ANAL BLEEDING: 0
NAUSEA: 1
WHEEZING: 0
COUGH: 0
SINUS PRESSURE: 0
CONSTIPATION: 0

## 2021-04-29 NOTE — PROGRESS NOTES
Mitch OlivarezEdmundos (:  1960) is a 62 y.o. female,Established patient, here for evaluation of the following chief complaint(s): 1 Month Follow-Up (insomnia) and Neck Pain (with headache posterior up back of head)      ASSESSMENT/PLAN:  1. Situational anxiety  Cont present to monitor  Not drinking at present  Stable    -     temazepam (RESTORIL) 30 MG capsule; Take 1 capsule by mouth nightly as needed for Sleep for up to 30 days. , Disp-30 capsule, R-0Normal  2. Muscle spasm  Will take for headaches  Monitor    -     tiZANidine (ZANAFLEX) 4 MG tablet; Take 1 tablet by mouth every 8 hours as needed (spasms), Disp-90 tablet, R-2Normal  3. Primary insomnia  Benefits outweigh risks  -     temazepam (RESTORIL) 30 MG capsule; Take 1 capsule by mouth nightly as needed for Sleep for up to 30 days. , Disp-30 capsule, R-0Normal  4. Other headache syndrome  -     tiZANidine (ZANAFLEX) 4 MG tablet; Take 1 tablet by mouth every 8 hours as needed (spasms), Disp-90 tablet, R-2Normal      No follow-ups on file.     SUBJECTIVE/OBJECTIVE:  HPI     Insomnia  Patient reports chronic insomnia  Has failed seroquel and trazadone medication in past elavil 100mg  (2)  Sleeps 6 hours on medication with some relief of fatigue  Sleep study in past none  Typically goes to bed at 10 awakens at 6  But recently  Is waking up and not able to go back to sleep  She has had this issue a week and half ago   Reports no change in medications  She has not seen any changes  She will take 2 elavil seroquel 200mg and ambien to go to sleep  And she will be awake within three days  In past took valium         Takes medication restoril   Last filled  # 30  Without symptoms include not able to rest and up and down  Benefits outweigh risks  Low risk of diversion with no complaints of abnormal side effects  But wasn't staying asleep     She takes her pain medication in afternoon  Away from medication  And knows the risks  She is stable        Reports around 1.5 month ago  Has had issues with mood swings  She started to take 1.5 seroquel   And feels like can manage better  She feels like it is this time of year  But she feels better on that dose     TOMMY was reviewed today per office protocol. Report shows No discrepancies. Fill pattern is consistent from single provider(s) at single pharmacy(s). Controlled Substance Monitoring:    Acute and Chronic Pain Monitoring:   RX Monitoring 4/29/2021   Attestation The Prescription Monitoring Report for this patient was reviewed today. Periodic Controlled Substance Monitoring Possible medication side effects, risk of tolerance/dependence & alternative treatments discussed. ;No signs of potential drug abuse or diversion identified.;Obtaining appropriate analgesic effect of treatment. Chronic Pain > 50 MEDD Considered consultation with a specialist.     Neck pain   Following with Dr Magnus Ortiz  Reports neck pain worsening  With headaches posterior head up to back  Use zanaflex twice a day  But still present      Review of Systems   Constitutional: Negative for activity change, appetite change, fatigue and fever. HENT: Negative for congestion, postnasal drip, sinus pressure and trouble swallowing. Respiratory: Negative for cough (resolved), shortness of breath and wheezing. Cardiovascular: Negative for chest pain and leg swelling. Gastrointestinal: Positive for nausea (comes and goes ). Negative for anal bleeding, constipation, diarrhea and vomiting. Endocrine: Negative for polydipsia, polyphagia and polyuria. Genitourinary: Negative for difficulty urinating and vaginal discharge. Musculoskeletal: Positive for arthralgias (improving). Right hip pain with movement     Skin: Negative for rash. Neurological: Negative for dizziness, seizures, facial asymmetry and speech difficulty. Psychiatric/Behavioral: Negative for behavioral problems, self-injury and sleep disturbance.  The patient is not

## 2021-05-06 DIAGNOSIS — B37.2 SKIN YEAST INFECTION: Primary | ICD-10-CM

## 2021-05-06 RX ORDER — NYSTATIN 100000 [USP'U]/G
POWDER TOPICAL
Qty: 60 G | Refills: 2 | Status: SHIPPED | OUTPATIENT
Start: 2021-05-06 | End: 2021-05-28

## 2021-05-11 ENCOUNTER — PATIENT MESSAGE (OUTPATIENT)
Dept: PRIMARY CARE CLINIC | Age: 61
End: 2021-05-11

## 2021-05-11 DIAGNOSIS — N62 LARGE BREASTS: ICD-10-CM

## 2021-05-11 DIAGNOSIS — G89.29 CHRONIC MIDLINE THORACIC BACK PAIN: Primary | ICD-10-CM

## 2021-05-11 DIAGNOSIS — M54.6 CHRONIC MIDLINE THORACIC BACK PAIN: Primary | ICD-10-CM

## 2021-05-11 NOTE — TELEPHONE ENCOUNTER
From: Angela Fuchs  To: TRESSA Loera  Sent: 5/11/2021 3:11 PM CDT  Subject: Visit Follow-Up Question    Sofia Flowers, will u or [place] or [place] please call me. When either of u get a minute to slow down. It is important to me & this old hen will b a big 61. Isn't that amazing, I've made it to 61 yrs. I love yall have a beautiful evening.

## 2021-05-12 ENCOUNTER — TELEPHONE (OUTPATIENT)
Dept: PRIMARY CARE CLINIC | Age: 61
End: 2021-05-12

## 2021-05-12 DIAGNOSIS — Z86.2 HX OF IRON DEFICIENCY ANEMIA: Primary | ICD-10-CM

## 2021-05-12 NOTE — TELEPHONE ENCOUNTER
Pt returned call stating that she received the message through my chart but it would not let her respond to it. She would like to have these labs ordered and done here. I will put in the orders for pt. My Chart message sent to pt to let her know that orders are in and she will need to be fasting for these labs.

## 2021-05-12 NOTE — TELEPHONE ENCOUNTER
Patient called in said she spoke with insurance and they will cover a breast reduction, patient Is wanting a referral to the closest plastic surgeon that will bill insurance.

## 2021-05-13 DIAGNOSIS — Z79.4 TYPE 2 DIABETES MELLITUS WITH HYPERGLYCEMIA, WITH LONG-TERM CURRENT USE OF INSULIN (HCC): ICD-10-CM

## 2021-05-13 DIAGNOSIS — E11.65 TYPE 2 DIABETES MELLITUS WITH HYPERGLYCEMIA, WITH LONG-TERM CURRENT USE OF INSULIN (HCC): ICD-10-CM

## 2021-05-13 RX ORDER — DULAGLUTIDE 1.5 MG/.5ML
1.5 INJECTION, SOLUTION SUBCUTANEOUS
Qty: 12 PEN | Refills: 3 | Status: SHIPPED | OUTPATIENT
Start: 2021-05-13

## 2021-05-13 NOTE — TELEPHONE ENCOUNTER
Received fax from pharmacy requesting refill on pts medication(s). Pt was last seen in office on 4/29/2021  and has a follow up scheduled for 5/28/2021. Will send request to  Jimena Brandon  for patient.      Requested Prescriptions     Pending Prescriptions Disp Refills    TRULICITY 1.5 DJ/4.7UF SOPN [Pharmacy Med Name: Trulicity 1.5 [place]ish/1.9GQ Subcutaneous Solution Pen-injector] 12 mL 0     Sig: INJECT 1.5 MG  SUBCUTANEOUSLY EVERY 7 DAYS

## 2021-05-19 ENCOUNTER — HOSPITAL ENCOUNTER (OUTPATIENT)
Dept: MRI IMAGING | Age: 61
Discharge: HOME | End: 2021-05-19
Payer: MEDICARE

## 2021-05-19 DIAGNOSIS — M47.812 CERVICAL SPONDYLOSIS WITHOUT MYELOPATHY: ICD-10-CM

## 2021-05-19 PROCEDURE — 72141 MRI NECK SPINE W/O DYE: CPT

## 2021-05-26 ENCOUNTER — PATIENT MESSAGE (OUTPATIENT)
Dept: PRIMARY CARE CLINIC | Age: 61
End: 2021-05-26

## 2021-05-26 NOTE — TELEPHONE ENCOUNTER
From: Nicole Fuchs  To: TRESSA Fuller  Sent: 5/26/2021 1:07 PM CDT  Subject: Prescription Question    Hi Letitia Vega got a problem & I need help on this one. I've been getting hives & a rash that even benydril is not helping. A pharimist for [facility] called me to go over my med list. I ask her if any of her patients complained of hives & a rash using the lispro insulin. She said it is a side affect & yes there has been another patient has complained of the issue. She advised me to contact you and ask if you would get with my insurance & explain the situation & change me back to the novolog. She thinks it best for me to discontinue the lispro. Please do this if u have to issue a pre authorisation then please do. Have a good rest of ur day have a wonderful memorial weekend to u & all my other beautiful angels.

## 2021-05-27 RX ORDER — INSULIN ASPART 100 [IU]/ML
8 INJECTION, SOLUTION INTRAVENOUS; SUBCUTANEOUS
Qty: 5 PEN | Refills: 3 | Status: SHIPPED | OUTPATIENT
Start: 2021-05-27 | End: 2021-05-28 | Stop reason: SDUPTHER

## 2021-05-27 NOTE — TELEPHONE ENCOUNTER
Pt called and LM about the insulin she was changed to. It is breaking her out in a rash and hives. The [facility] pharmacist called her yesterday and she asked her if this was a sick effect and pharmacist told her yes. The pharmacist told her not to take it anymore. That is all she had to take yesterday evening. She would like a vial of Novolin until her Rx is approved again. She said she is very sorry and thank you for all that you do.

## 2021-05-28 ENCOUNTER — VIRTUAL VISIT (OUTPATIENT)
Dept: PRIMARY CARE CLINIC | Age: 61
End: 2021-05-28
Payer: MEDICARE

## 2021-05-28 DIAGNOSIS — F51.01 PRIMARY INSOMNIA: ICD-10-CM

## 2021-05-28 DIAGNOSIS — E03.4 HYPOTHYROIDISM DUE TO ACQUIRED ATROPHY OF THYROID: ICD-10-CM

## 2021-05-28 DIAGNOSIS — F41.8 SITUATIONAL ANXIETY: ICD-10-CM

## 2021-05-28 DIAGNOSIS — Z79.4 TYPE 2 DIABETES MELLITUS WITH DIABETIC POLYNEUROPATHY, WITH LONG-TERM CURRENT USE OF INSULIN (HCC): Primary | ICD-10-CM

## 2021-05-28 DIAGNOSIS — I10 ESSENTIAL HYPERTENSION: ICD-10-CM

## 2021-05-28 DIAGNOSIS — M54.12 CERVICAL RADICULOPATHY: ICD-10-CM

## 2021-05-28 DIAGNOSIS — E11.42 TYPE 2 DIABETES MELLITUS WITH DIABETIC POLYNEUROPATHY, WITH LONG-TERM CURRENT USE OF INSULIN (HCC): Primary | ICD-10-CM

## 2021-05-28 PROCEDURE — 3044F HG A1C LEVEL LT 7.0%: CPT | Performed by: NURSE PRACTITIONER

## 2021-05-28 PROCEDURE — 2022F DILAT RTA XM EVC RTNOPTHY: CPT | Performed by: NURSE PRACTITIONER

## 2021-05-28 PROCEDURE — G8427 DOCREV CUR MEDS BY ELIG CLIN: HCPCS | Performed by: NURSE PRACTITIONER

## 2021-05-28 PROCEDURE — 1036F TOBACCO NON-USER: CPT | Performed by: NURSE PRACTITIONER

## 2021-05-28 PROCEDURE — G8417 CALC BMI ABV UP PARAM F/U: HCPCS | Performed by: NURSE PRACTITIONER

## 2021-05-28 PROCEDURE — 3017F COLORECTAL CA SCREEN DOC REV: CPT | Performed by: NURSE PRACTITIONER

## 2021-05-28 PROCEDURE — 99214 OFFICE O/P EST MOD 30 MIN: CPT | Performed by: NURSE PRACTITIONER

## 2021-05-28 RX ORDER — INSULIN ASPART 100 [IU]/ML
8 INJECTION, SOLUTION INTRAVENOUS; SUBCUTANEOUS
Qty: 5 PEN | Refills: 3 | Status: SHIPPED | OUTPATIENT
Start: 2021-05-28 | End: 2021-06-24

## 2021-05-28 RX ORDER — TEMAZEPAM 30 MG/1
30 CAPSULE ORAL NIGHTLY PRN
Qty: 30 CAPSULE | Refills: 0 | Status: SHIPPED | OUTPATIENT
Start: 2021-05-28 | End: 2021-06-24 | Stop reason: SDUPTHER

## 2021-05-28 ASSESSMENT — ENCOUNTER SYMPTOMS
COUGH: 0
ANAL BLEEDING: 0
NAUSEA: 1
SINUS PRESSURE: 0
DIARRHEA: 0
WHEEZING: 0
CONSTIPATION: 0
SHORTNESS OF BREATH: 0
TROUBLE SWALLOWING: 0
VOMITING: 0

## 2021-05-28 NOTE — PATIENT INSTRUCTIONS

## 2021-05-28 NOTE — PROGRESS NOTES
Mitch Fuchs (:  1960) is a 62 y.o. female,Established patient, here for evaluation of the following chief complaint(s): 1 Month Follow-Up (insomnia), Depression (follow up), and Diabetes (type 2 )         ASSESSMENT/PLAN:  1. Type 2 diabetes mellitus with diabetic polyneuropathy, with long-term current use of insulin (HCC)  With rash   Stop ademalog  On samples novolog and improved  No itching  Will need PA   Cont present    2. Situational anxiety  Stable at present   As long as resting and seroquel    -     temazepam (RESTORIL) 30 MG capsule; Take 1 capsule by mouth nightly as needed for Sleep for up to 30 days. , Disp-30 capsule, R-0Normal  3. Primary insomnia  Benefits outweigh risks    -     temazepam (RESTORIL) 30 MG capsule; Take 1 capsule by mouth nightly as needed for Sleep for up to 30 days. , Disp-30 capsule, R-0Normal  4. Cervical radiculopathy  Following with [facility]  To evaluate and treat    5. Hypothyroidism due to acquired atrophy of thyroid  Cont present    6. Essential hypertension  Cont present   Clonidine as needed  Stable if continues adjust medication    No follow-ups on file.        SUBJECTIVE/OBJECTIVE:  HPI  Insomnia  Patient reports chronic insomnia  Has failed seroquel and trazadone medication in past elavil 100mg  (2)  Sleeps 6 hours on medication with some relief of fatigue  Sleep study in past none  Typically goes to bed at 10 awakens at 6  But recently  Is waking up and not able to go back to sleep  She has had this issue a week and half ago   Reports no change in medications  She has not seen any changes  She will take 2 elavil seroquel 200mg and ambien to go to sleep  And she will be awake within three days  In past took valium         Takes medication restoril   Last filled # 30  Without symptoms include not able to rest and up and down  Benefits outweigh risks  Low risk of diversion with no complaints of abnormal side effects  But wasn't staying asleep     She takes her pain medication in afternoon  Away from medication  And knows the risks  She is stable        Reports around 1.5 month ago  Has had issues with mood swings  She started to take 1.5 seroquel   And feels like can manage better  She feels like it is this time of year  But she feels better on that dose     TOMMY was reviewed today per office protocol. Report shows No discrepancies.  Fill pattern is consistent from single provider(s) at single pharmacy(s). Controlled Substance Monitoring:    Acute and Chronic Pain Monitoring:   RX Monitoring 5/28/2021   Attestation The Prescription Monitoring Report was requested today but not available. Periodic Controlled Substance Monitoring Possible medication side effects, risk of tolerance/dependence & alternative treatments discussed. ;No signs of potential drug abuse or diversion identified.;Obtaining appropriate analgesic effect of treatment.    Chronic Pain > 50 MEDD -     Neck pain  Continues with pain management  Had another MRI and following with treatment  She continues to have issues  Diabetes type 2     Patient is here for follow up on diabetes  Is doing better with trulicity 1.5 weekly  And doing well  Weight stable   She has kept log  And doing well   She feels like she has kept it great  She is feeling well overall   1/2021 a1c 6.2  cmp GFR 42  K on recheck normal     Denies any lows   she notes that the admelog has been causing a rash   She came got samples of novolin  And reports that no rash or itching today  If feeling better    Notes her blood pressure has also coming down at present       Asthma  Stable on Symbicort and rescue inhaler  Doing well at present  Denies any changes needed           Hypothyroid  Synthroid 88mcg daily  10/2020  And stable     Depression  Much improved as resting  And doing well at present  Denies any need to change        HTN :   Log stable doing well   Denies any issues  Looks like 120s/70s-80s  But last few days clonidine  As blood pressure higher          Review of Systems   Constitutional: Negative for activity change, appetite change, fatigue and fever. HENT: Negative for congestion, postnasal drip, sinus pressure and trouble swallowing. Respiratory: Negative for cough (resolved), shortness of breath and wheezing. Cardiovascular: Negative for chest pain and leg swelling. Gastrointestinal: Positive for nausea (comes and goes ). Negative for anal bleeding, constipation, diarrhea and vomiting. Endocrine: Negative for polydipsia, polyphagia and polyuria. Genitourinary: Negative for difficulty urinating and vaginal discharge. Musculoskeletal: Positive for arthralgias (improving). Right hip pain with movement     Skin: Negative for rash. Neurological: Negative for dizziness, seizures, facial asymmetry and speech difficulty. Psychiatric/Behavioral: Negative for behavioral problems, self-injury and sleep disturbance. The patient is not nervous/anxious and is not hyperactive. Denies drinking         No flowsheet data found.      Physical Exam    [INSTRUCTIONS:  \"[x]\" Indicates a positive item  \"[]\" Indicates a negative item  -- DELETE ALL ITEMS NOT EXAMINED]    Constitutional: [x] Appears well-developed and well-nourished [x] No apparent distress      [] Abnormal -     Mental status: [x] Alert and awake  [x] Oriented to person/place/time [x] Able to follow commands    [] Abnormal -     Eyes:   EOM    [x]  Normal    [] Abnormal -   Sclera  [x]  Normal    [] Abnormal -          Discharge [x]  None visible   [] Abnormal -     HENT: [x] Normocephalic, atraumatic  [] Abnormal -   [x] Mouth/Throat: Mucous membranes are moist    External Ears [x] Normal  [] Abnormal -    Neck: [x] No visualized mass [] Abnormal -     Pulmonary/Chest: [x] Respiratory effort normal   [x] No visualized signs of difficulty breathing or respiratory distress        [] Abnormal -      Musculoskeletal:   [x] Normal gait with no signs of ataxia         [x] Normal range of motion of neck        [] Abnormal -     Neurological:        [x] No Facial Asymmetry (Cranial nerve 7 motor function) (limited exam due to video visit)          [x] No gaze palsy        [] Abnormal -          Skin:        [x] No significant exanthematous lesions or discoloration noted on facial skin         [] Abnormal -            Psychiatric:       [x] Normal Affect [] Abnormal -        [x] No Hallucinations    Other pertinent observable physical exam findings:-          On this date 5/28/2021 I have spent 25 minutes reviewing previous notes, test results and face to face (virtual) with the patient discussing the diagnosis and importance of compliance with the treatment plan as well as documenting on the day of the visit. Mitch Fuchs, was evaluated through a synchronous (real-time) audio-video encounter. The patient (or guardian if applicable) is aware that this is a billable service. Verbal consent to proceed has been obtained within the past 12 months. The visit was conducted pursuant to the emergency declaration under the [address] [place] [address], [address] [place] [address] authority and the [employer] General Act. Patient identification was verified, and a caregiver was present when appropriate. The patient was located in a state where the provider was credentialed to provide care. An electronic signature was used to authenticate this note.     --Elvina Cooks, APRN

## 2021-06-03 ENCOUNTER — PATIENT MESSAGE (OUTPATIENT)
Dept: PRIMARY CARE CLINIC | Age: 61
End: 2021-06-03

## 2021-06-04 NOTE — TELEPHONE ENCOUNTER
From: Vito Fuchs  To: TRESSA Adhikari  Sent: 6/3/2021 7:52 PM CDT  Subject: Prescription Question    Good morning Samantha, I think its time 4 my prolia inj. I just remembered it this evening. Let me know what u think.  Samantha murphy u & all the ladies have a very blessed day & a beautiful wk. end.

## 2021-06-08 ENCOUNTER — PATIENT MESSAGE (OUTPATIENT)
Dept: PRIMARY CARE CLINIC | Age: 61
End: 2021-06-08

## 2021-06-08 RX ORDER — CLONIDINE HYDROCHLORIDE 0.1 MG/1
0.1 TABLET ORAL 2 TIMES DAILY
Qty: 60 TABLET | Refills: 3 | Status: SHIPPED | OUTPATIENT
Start: 2021-06-08 | End: 2021-09-08 | Stop reason: SDUPTHER

## 2021-06-13 DIAGNOSIS — I10 ESSENTIAL HYPERTENSION: ICD-10-CM

## 2021-06-15 RX ORDER — AMLODIPINE BESYLATE 5 MG/1
5 TABLET ORAL DAILY
Qty: 90 TABLET | Refills: 3 | Status: SHIPPED | OUTPATIENT
Start: 2021-06-15

## 2021-06-15 NOTE — TELEPHONE ENCOUNTER
Received fax from pharmacy requesting refill on pts medication(s). Pt was last seen in office on 5/28/2021  and has a follow up scheduled for 6/15/2021. Will send request to  Blanco Nevarez  for patient.      Requested Prescriptions     Pending Prescriptions Disp Refills    amLODIPine (NORVASC) 5 MG tablet [Pharmacy Med Name: amLODIPine Besylate 5 MG Oral Tablet] 30 tablet 0     Sig: Take 1 tablet by mouth once daily

## 2021-06-22 DIAGNOSIS — M81.0 AGE-RELATED OSTEOPOROSIS WITHOUT CURRENT PATHOLOGICAL FRACTURE: Primary | ICD-10-CM

## 2021-06-24 ENCOUNTER — OFFICE VISIT (OUTPATIENT)
Dept: PRIMARY CARE CLINIC | Age: 61
End: 2021-06-24
Payer: MEDICARE

## 2021-06-24 VITALS
SYSTOLIC BLOOD PRESSURE: 120 MMHG | BODY MASS INDEX: 43.84 KG/M2 | WEIGHT: 224.5 LBS | DIASTOLIC BLOOD PRESSURE: 84 MMHG | TEMPERATURE: 97.6 F | HEART RATE: 80 BPM | OXYGEN SATURATION: 94 %

## 2021-06-24 DIAGNOSIS — E11.42 DIABETIC POLYNEUROPATHY ASSOCIATED WITH TYPE 2 DIABETES MELLITUS (HCC): ICD-10-CM

## 2021-06-24 DIAGNOSIS — E03.4 HYPOTHYROIDISM DUE TO ACQUIRED ATROPHY OF THYROID: ICD-10-CM

## 2021-06-24 DIAGNOSIS — E78.2 MIXED HYPERLIPIDEMIA: ICD-10-CM

## 2021-06-24 DIAGNOSIS — E11.42 DIABETIC POLYNEUROPATHY ASSOCIATED WITH TYPE 2 DIABETES MELLITUS (HCC): Primary | ICD-10-CM

## 2021-06-24 DIAGNOSIS — F41.8 SITUATIONAL ANXIETY: ICD-10-CM

## 2021-06-24 DIAGNOSIS — F51.01 PRIMARY INSOMNIA: ICD-10-CM

## 2021-06-24 DIAGNOSIS — M54.2 CHRONIC NECK PAIN: ICD-10-CM

## 2021-06-24 DIAGNOSIS — G89.29 CHRONIC NECK PAIN: ICD-10-CM

## 2021-06-24 DIAGNOSIS — I10 ESSENTIAL HYPERTENSION: ICD-10-CM

## 2021-06-24 LAB
ALBUMIN SERPL-MCNC: 4.1 G/DL (ref 3.5–5.2)
ALP BLD-CCNC: 185 U/L (ref 35–104)
ALT SERPL-CCNC: 17 U/L (ref 5–33)
ANION GAP SERPL CALCULATED.3IONS-SCNC: 14 MMOL/L (ref 7–19)
AST SERPL-CCNC: 22 U/L (ref 5–32)
BASOPHILS ABSOLUTE: 0.1 K/UL (ref 0–0.2)
BASOPHILS RELATIVE PERCENT: 0.7 % (ref 0–1)
BILIRUB SERPL-MCNC: 0.4 MG/DL (ref 0.2–1.2)
BUN BLDV-MCNC: 20 MG/DL (ref 8–23)
CALCIUM SERPL-MCNC: 9.4 MG/DL (ref 8.8–10.2)
CHLORIDE BLD-SCNC: 95 MMOL/L (ref 98–111)
CHOLESTEROL, TOTAL: 127 MG/DL (ref 160–199)
CO2: 24 MMOL/L (ref 22–29)
CREAT SERPL-MCNC: 1.4 MG/DL (ref 0.5–0.9)
EOSINOPHILS ABSOLUTE: 0.1 K/UL (ref 0–0.6)
EOSINOPHILS RELATIVE PERCENT: 1.2 % (ref 0–5)
GFR AFRICAN AMERICAN: 46
GFR NON-AFRICAN AMERICAN: 38
GLUCOSE BLD-MCNC: 139 MG/DL (ref 74–109)
HBA1C MFR BLD: 6.5 % (ref 4–6)
HCT VFR BLD CALC: 39.4 % (ref 37–47)
HDLC SERPL-MCNC: 52 MG/DL (ref 65–121)
HEMOGLOBIN: 11.9 G/DL (ref 12–16)
IMMATURE GRANULOCYTES #: 0.1 K/UL
LDL CHOLESTEROL CALCULATED: 50 MG/DL
LYMPHOCYTES ABSOLUTE: 1.8 K/UL (ref 1.1–4.5)
LYMPHOCYTES RELATIVE PERCENT: 16.7 % (ref 20–40)
MCH RBC QN AUTO: 28 PG (ref 27–31)
MCHC RBC AUTO-ENTMCNC: 30.2 G/DL (ref 33–37)
MCV RBC AUTO: 92.7 FL (ref 81–99)
MONOCYTES ABSOLUTE: 0.6 K/UL (ref 0–0.9)
MONOCYTES RELATIVE PERCENT: 5.9 % (ref 0–10)
NEUTROPHILS ABSOLUTE: 8 K/UL (ref 1.5–7.5)
NEUTROPHILS RELATIVE PERCENT: 75 % (ref 50–65)
PDW BLD-RTO: 15.2 % (ref 11.5–14.5)
PLATELET # BLD: 305 K/UL (ref 130–400)
PMV BLD AUTO: 12.4 FL (ref 9.4–12.3)
POTASSIUM SERPL-SCNC: 5.3 MMOL/L (ref 3.5–5)
RBC # BLD: 4.25 M/UL (ref 4.2–5.4)
SODIUM BLD-SCNC: 133 MMOL/L (ref 136–145)
T4 FREE: 0.94 NG/DL (ref 0.93–1.7)
TOTAL PROTEIN: 6.9 G/DL (ref 6.6–8.7)
TRIGL SERPL-MCNC: 126 MG/DL (ref 0–149)
TSH SERPL DL<=0.05 MIU/L-ACNC: 1.22 UIU/ML (ref 0.27–4.2)
WBC # BLD: 10.7 K/UL (ref 4.8–10.8)

## 2021-06-24 PROCEDURE — 3044F HG A1C LEVEL LT 7.0%: CPT | Performed by: NURSE PRACTITIONER

## 2021-06-24 PROCEDURE — 2022F DILAT RTA XM EVC RTNOPTHY: CPT | Performed by: NURSE PRACTITIONER

## 2021-06-24 PROCEDURE — 99214 OFFICE O/P EST MOD 30 MIN: CPT | Performed by: NURSE PRACTITIONER

## 2021-06-24 PROCEDURE — G8417 CALC BMI ABV UP PARAM F/U: HCPCS | Performed by: NURSE PRACTITIONER

## 2021-06-24 PROCEDURE — 1036F TOBACCO NON-USER: CPT | Performed by: NURSE PRACTITIONER

## 2021-06-24 PROCEDURE — 3017F COLORECTAL CA SCREEN DOC REV: CPT | Performed by: NURSE PRACTITIONER

## 2021-06-24 PROCEDURE — G8427 DOCREV CUR MEDS BY ELIG CLIN: HCPCS | Performed by: NURSE PRACTITIONER

## 2021-06-24 RX ORDER — PREGABALIN 150 MG/1
150 CAPSULE ORAL 2 TIMES DAILY
Qty: 60 CAPSULE | Refills: 2 | Status: SHIPPED | OUTPATIENT
Start: 2021-06-24 | End: 2021-09-30 | Stop reason: SDUPTHER

## 2021-06-24 RX ORDER — TEMAZEPAM 30 MG/1
30 CAPSULE ORAL NIGHTLY PRN
Qty: 30 CAPSULE | Refills: 0 | Status: SHIPPED | OUTPATIENT
Start: 2021-06-24 | End: 2021-07-24

## 2021-06-24 ASSESSMENT — ENCOUNTER SYMPTOMS
WHEEZING: 0
CONSTIPATION: 0
ANAL BLEEDING: 0
SHORTNESS OF BREATH: 0
NAUSEA: 1
COUGH: 0
DIARRHEA: 0
TROUBLE SWALLOWING: 0
VOMITING: 0
SINUS PRESSURE: 0

## 2021-06-24 NOTE — PROGRESS NOTES
Mitch Fuchs (:  1960) is a 62 y.o. female,Established patient, here for evaluation of the following chief complaint(s):  Follow-up (1 month follow up)         ASSESSMENT/PLAN:  1. Situational anxiety  Cont present sleeping well with regimen  Along with her seroquel   Denies any issues    -     temazepam (RESTORIL) 30 MG capsule; Take 1 capsule by mouth nightly as needed for Sleep for up to 30 days. , Disp-30 capsule, R-0Normal  2. Primary insomnia  -     temazepam (RESTORIL) 30 MG capsule; Take 1 capsule by mouth nightly as needed for Sleep for up to 30 days. , Disp-30 capsule, R-0Normal  3. Diabetic polyneuropathy associated with type 2 diabetes mellitus ([facility] [address]  Will cont trulicity  Well overall  Keep log  -     pregabalin (LYRICA) 150 MG capsule; Take 1 capsule by mouth 2 times daily for 90 days. , Disp-60 capsule, R-2Normal  -     insulin aspart (NOVOLOG) 100 UNIT/ML injection vial; Inject 5 Units into the skin 3 times daily (before meals), Disp-1 vial, R-3Normal  4. Chronic neck pain  Had injection  Stable at present    5. Essential hypertension  Cont present  If cont   Will adjust      No follow-ups on file. Subjective   SUBJECTIVE/OBJECTIVE:  HPI    Patient is here for diabetes follow up   Diabetes Mellitus Type 2      Diet compliance:  compliant most of the time  Nutrition Consultation Needed:  no  Medication:   trilicity 1.5 weekly  novolog three times a day as needed    Medication compliance:  compliant most of the time  Weight trend: stable  Current exercise: no  Checkin times daily  Home blood sugar records: fasting range: 120-150  Low BG:  no  Eye exam current (within one year): no  Checking Feet regularly:  no  ACE/ARB:  yes - valsartin  Aspirin: Yes  Moderate intensity statin: lipitor    Known diabetic complications: cardiovascular disease  Barriers to success: none  Tobacco history: She  reports that she has never smoked.  She has quit using smokeless tobacco.  Her Negative for activity change, appetite change, fatigue and fever. HENT: Negative for congestion, postnasal drip, sinus pressure and trouble swallowing. Respiratory: Negative for cough (resolved), shortness of breath and wheezing. Cardiovascular: Negative for chest pain and leg swelling. Gastrointestinal: Positive for nausea (comes and goes ). Negative for anal bleeding, constipation, diarrhea and vomiting. Endocrine: Negative for polydipsia, polyphagia and polyuria. Genitourinary: Negative for difficulty urinating and vaginal discharge. Musculoskeletal: Positive for neck pain and neck stiffness. Negative for arthralgias. Skin: Negative for rash. Neurological: Negative for dizziness, seizures, facial asymmetry and speech difficulty. Psychiatric/Behavioral: Negative for behavioral problems, self-injury and sleep disturbance. The patient is not nervous/anxious and is not hyperactive. Denies drinking             Objective   Physical Exam  Vitals reviewed. Constitutional:       General: She is not in acute distress. Appearance: Normal appearance. She is obese. She is not ill-appearing. HENT:      Right Ear: Tympanic membrane normal.      Left Ear: Tympanic membrane normal.   Cardiovascular:      Rate and Rhythm: Normal rate and regular rhythm. Pulses: Normal pulses. Heart sounds: No murmur heard. Pulmonary:      Effort: Pulmonary effort is normal.      Breath sounds: Normal breath sounds. No wheezing or rhonchi. Abdominal:      General: Bowel sounds are normal.   Musculoskeletal:         General: Tenderness (neck pain with headaches) present. Skin:     Findings: No rash. Neurological:      Mental Status: She is alert and oriented to person, place, and time. Psychiatric:         Mood and Affect: Mood normal.         Behavior: Behavior normal.                  An electronic signature was used to authenticate this note.     --TRESSA Gardner

## 2021-06-24 NOTE — PATIENT INSTRUCTIONS
goal for your blood pressure. Your goal will be based on your health and your age. Lifestyle changes, such as eating healthy and being active, are always important to help lower blood pressure. You might also take medicine to reach your blood pressure goal.  Follow-up care is a key part of your treatment and safety. Be sure to make and go to all appointments, and call your doctor if you are having problems. It's also a good idea to know your test results and keep a list of the medicines you take. How can you care for yourself at home? Medical treatment  · If you stop taking your medicine, your blood pressure will go back up. You may take one or more types of medicine to lower your blood pressure. Be safe with medicines. Take your medicine exactly as prescribed. Call your doctor if you think you are having a problem with your medicine. · Talk to your doctor before you start taking aspirin every day. Aspirin can help certain people lower their risk of a heart attack or stroke. But taking aspirin isn't right for everyone, because it can cause serious bleeding. · See your doctor regularly. You may need to see the doctor more often at first or until your blood pressure comes down. · If you are taking blood pressure medicine, talk to your doctor before you take decongestants or anti-inflammatory medicine, such as ibuprofen. Some of these medicines can raise blood pressure. · Learn how to check your blood pressure at home. Lifestyle changes  · Stay at a healthy weight. This is especially important if you put on weight around the waist. Losing even 10 pounds can help you lower your blood pressure. · If your doctor recommends it, get more exercise. Walking is a good choice. Bit by bit, increase the amount you walk every day. Try for at least 30 minutes on most days of the week. You also may want to swim, bike, or do other activities. · Avoid or limit alcohol.  Talk to your doctor about whether you can drink any alcohol. · Try to limit how much sodium you eat to less than 2,300 milligrams (mg) a day. Your doctor may ask you to try to eat less than 1,500 mg a day. · Eat plenty of fruits (such as bananas and oranges), vegetables, legumes, whole grains, and low-fat dairy products. · Lower the amount of saturated fat in your diet. Saturated fat is found in animal products such as milk, cheese, and meat. Limiting these foods may help you lose weight and also lower your risk for heart disease. · Do not smoke. Smoking increases your risk for heart attack and stroke. If you need help quitting, talk to your doctor about stop-smoking programs and medicines. These can increase your chances of quitting for good. When should you call for help? Call 911  anytime you think you may need emergency care. This may mean having symptoms that suggest that your blood pressure is causing a serious heart or blood vessel problem. Your blood pressure may be over 180/120. For example, call 911 if:    · You have symptoms of a heart attack. These may include:  ? Chest pain or pressure, or a strange feeling in the chest.  ? Sweating. ? Shortness of breath. ? Nausea or vomiting. ? Pain, pressure, or a strange feeling in the back, neck, jaw, or upper belly or in one or both shoulders or arms. ? Lightheadedness or sudden weakness. ? A fast or irregular heartbeat.     · You have symptoms of a stroke. These may include:  ? Sudden numbness, tingling, weakness, or loss of movement in your face, arm, or leg, especially on only one side of your body. ? Sudden vision changes. ? Sudden trouble speaking. ? Sudden confusion or trouble understanding simple statements. ? Sudden problems with walking or balance. ? A sudden, severe headache that is different from past headaches.     · You have severe back or belly pain. Do not wait until your blood pressure comes down on its own. Get help right away.   Call your doctor now or seek immediate care if:    · Your blood pressure is much higher than normal (such as 180/120 or higher), but you don't have symptoms.     · You think high blood pressure is causing symptoms, such as:  ? Severe headache.  ? Blurry vision. Watch closely for changes in your health, and be sure to contact your doctor if:    · Your blood pressure measures higher than your doctor recommends at least 2 times. That means the top number is higher or the bottom number is higher, or both.     · You think you may be having side effects from your blood pressure medicine. Where can you learn more? Go to https://[employer]! Entertainment[employer].iogyn. org and sign in to your [employer] account. Enter W778 in the [employer] box to learn more about \"High Blood Pressure: Care Instructions. \"     If you do not have an account, please click on the \"Sign Up Now\" link. Current as of: August 31, 2020               Content Version: 12.9  © 6010-2097 Healthwise, Incorporated. Care instructions adapted under license by [facility]. If you have questions about a medical condition or this instruction, always ask your healthcare professional. [place] [address] any warranty or liability for your use of this information.

## 2021-06-28 ENCOUNTER — PATIENT MESSAGE (OUTPATIENT)
Dept: PRIMARY CARE CLINIC | Age: 61
End: 2021-06-28

## 2021-06-28 DIAGNOSIS — Z79.4 TYPE 2 DIABETES MELLITUS WITH DIABETIC POLYNEUROPATHY, WITH LONG-TERM CURRENT USE OF INSULIN (HCC): ICD-10-CM

## 2021-06-28 DIAGNOSIS — E11.42 TYPE 2 DIABETES MELLITUS WITH DIABETIC POLYNEUROPATHY, WITH LONG-TERM CURRENT USE OF INSULIN (HCC): ICD-10-CM

## 2021-06-28 RX ORDER — BLOOD-GLUCOSE METER
1 KIT MISCELLANEOUS 4 TIMES DAILY
Qty: 100 EACH | Refills: 3 | Status: SHIPPED | OUTPATIENT
Start: 2021-06-28 | End: 2021-07-09 | Stop reason: SDUPTHER

## 2021-06-29 ENCOUNTER — TELEPHONE (OUTPATIENT)
Dept: PRIMARY CARE CLINIC | Age: 61
End: 2021-06-29

## 2021-06-29 DIAGNOSIS — E87.1 LOW SODIUM LEVELS: Primary | ICD-10-CM

## 2021-06-29 DIAGNOSIS — E87.5 SERUM POTASSIUM ELEVATED: ICD-10-CM

## 2021-06-29 NOTE — TELEPHONE ENCOUNTER
----- Message from TRESSA Govea sent at 6/25/2021  9:14 AM CDT -----  Thyroid wnl  no changes needed  Lipids at goal doing well no changes great job !   Cmp noted sodium low K little high   Suggest limit K in diet and decrease water intake   Kidneys stable recheck cmp 1week  CBC normal no anemia or concerns  A1c 6.5 at goal great job no changes recheck in 3 months

## 2021-07-02 ENCOUNTER — TELEPHONE (OUTPATIENT)
Dept: PRIMARY CARE CLINIC | Age: 61
End: 2021-07-02

## 2021-07-02 DIAGNOSIS — M81.0 AGE-RELATED OSTEOPOROSIS WITHOUT CURRENT PATHOLOGICAL FRACTURE: ICD-10-CM

## 2021-07-02 DIAGNOSIS — S52.501D CLOSED FRACTURE OF DISTAL END OF RIGHT RADIUS WITH ROUTINE HEALING, UNSPECIFIED FRACTURE MORPHOLOGY, SUBSEQUENT ENCOUNTER: ICD-10-CM

## 2021-07-02 NOTE — TELEPHONE ENCOUNTER
Pt and [facility] called because pt is needing diabetic supplies. Igor phone number [phone] [address]  phone number [phone]   Pt was using freestyle and its no longer covering under this insurance.

## 2021-07-06 ENCOUNTER — PATIENT MESSAGE (OUTPATIENT)
Dept: PRIMARY CARE CLINIC | Age: 61
End: 2021-07-06

## 2021-07-06 DIAGNOSIS — E11.42 TYPE 2 DIABETES MELLITUS WITH DIABETIC POLYNEUROPATHY, WITH LONG-TERM CURRENT USE OF INSULIN (HCC): Primary | ICD-10-CM

## 2021-07-06 DIAGNOSIS — E87.1 LOW SODIUM LEVELS: ICD-10-CM

## 2021-07-06 DIAGNOSIS — M81.0 AGE-RELATED OSTEOPOROSIS WITHOUT CURRENT PATHOLOGICAL FRACTURE: ICD-10-CM

## 2021-07-06 DIAGNOSIS — E87.5 SERUM POTASSIUM ELEVATED: ICD-10-CM

## 2021-07-06 DIAGNOSIS — Z79.4 TYPE 2 DIABETES MELLITUS WITH DIABETIC POLYNEUROPATHY, WITH LONG-TERM CURRENT USE OF INSULIN (HCC): Primary | ICD-10-CM

## 2021-07-06 DIAGNOSIS — Z86.2 HX OF IRON DEFICIENCY ANEMIA: ICD-10-CM

## 2021-07-06 LAB
ALBUMIN SERPL-MCNC: 3.6 G/DL (ref 3.5–5.2)
ALP BLD-CCNC: 146 U/L (ref 35–104)
ALT SERPL-CCNC: 12 U/L (ref 5–33)
ANION GAP SERPL CALCULATED.3IONS-SCNC: 11 MMOL/L (ref 7–19)
AST SERPL-CCNC: 21 U/L (ref 5–32)
BASOPHILS ABSOLUTE: 0 K/UL (ref 0–0.2)
BASOPHILS RELATIVE PERCENT: 0.4 % (ref 0–1)
BILIRUB SERPL-MCNC: <0.2 MG/DL (ref 0.2–1.2)
BUN BLDV-MCNC: 21 MG/DL (ref 8–23)
CALCIUM SERPL-MCNC: 9.4 MG/DL (ref 8.8–10.2)
CHLORIDE BLD-SCNC: 103 MMOL/L (ref 98–111)
CO2: 21 MMOL/L (ref 22–29)
CREAT SERPL-MCNC: 1.4 MG/DL (ref 0.5–0.9)
EOSINOPHILS ABSOLUTE: 0.2 K/UL (ref 0–0.6)
EOSINOPHILS RELATIVE PERCENT: 1.9 % (ref 0–5)
FERRITIN: 14.3 NG/ML (ref 13–150)
GFR AFRICAN AMERICAN: 46
GFR NON-AFRICAN AMERICAN: 38
GLUCOSE BLD-MCNC: 149 MG/DL (ref 74–109)
HCT VFR BLD CALC: 37.5 % (ref 37–47)
HEMOGLOBIN: 11.8 G/DL (ref 12–16)
IMMATURE GRANULOCYTES #: 0 K/UL
IRON: 53 UG/DL (ref 37–145)
LYMPHOCYTES ABSOLUTE: 1.4 K/UL (ref 1.1–4.5)
LYMPHOCYTES RELATIVE PERCENT: 15.3 % (ref 20–40)
MCH RBC QN AUTO: 28.3 PG (ref 27–31)
MCHC RBC AUTO-ENTMCNC: 31.5 G/DL (ref 33–37)
MCV RBC AUTO: 89.9 FL (ref 81–99)
MONOCYTES ABSOLUTE: 0.7 K/UL (ref 0–0.9)
MONOCYTES RELATIVE PERCENT: 7.4 % (ref 0–10)
NEUTROPHILS ABSOLUTE: 7 K/UL (ref 1.5–7.5)
NEUTROPHILS RELATIVE PERCENT: 74.6 % (ref 50–65)
PDW BLD-RTO: 14.9 % (ref 11.5–14.5)
PLATELET # BLD: 247 K/UL (ref 130–400)
PMV BLD AUTO: 12.2 FL (ref 9.4–12.3)
POTASSIUM SERPL-SCNC: 5.2 MMOL/L (ref 3.5–5)
RBC # BLD: 4.17 M/UL (ref 4.2–5.4)
SODIUM BLD-SCNC: 135 MMOL/L (ref 136–145)
TOTAL PROTEIN: 7 G/DL (ref 6.6–8.7)
WBC # BLD: 9.4 K/UL (ref 4.8–10.8)

## 2021-07-07 ENCOUNTER — TELEPHONE (OUTPATIENT)
Dept: PRIMARY CARE CLINIC | Age: 61
End: 2021-07-07

## 2021-07-07 DIAGNOSIS — Z79.4 TYPE 2 DIABETES MELLITUS WITH DIABETIC POLYNEUROPATHY, WITH LONG-TERM CURRENT USE OF INSULIN (HCC): ICD-10-CM

## 2021-07-07 DIAGNOSIS — E11.42 TYPE 2 DIABETES MELLITUS WITH DIABETIC POLYNEUROPATHY, WITH LONG-TERM CURRENT USE OF INSULIN (HCC): ICD-10-CM

## 2021-07-07 NOTE — TELEPHONE ENCOUNTER
From: Jonna Fuchs  To: TRESSA Sanchez  Sent: 7/6/2021 4:55 PM CDT  Subject: Prescription Question    Yasmine, I am sorry 2 put u on Samantha's page all long as I've had my chart I still can't navigate it. I don't know the list of companies, but I do know accu check is One of the testing kits they use. [facility] may even carry those. If not [facility] or [facility]. [facility]. [employer] bought me an emergency kit this weekend but it's not on covered by my insurance so I can't even buy supplies for it. The insurance co. Is suppose to send me a information packet in The mail so hopefully in 7-10 business days I will have it. I hope I've been of some help. Yasmine I do appreciate all the hard work all of you do for me. Your efforts are an honor 2 me. Thank u so much.

## 2021-07-07 NOTE — TELEPHONE ENCOUNTER
----- Message from TRESSA Nicole sent at 7/6/2021  1:59 PM CDT -----  Please inform patient results show  Cbc is consistent with the past cbcs

## 2021-07-07 NOTE — TELEPHONE ENCOUNTER
----- Message from TRESSA Webber sent at 7/7/2021  7:44 AM CDT -----  Please call patient and let them know results.    Normal iron and ferritin levels  Metabolic panel is essentially normal with mild elevated glucose and potassium, kidney function is stable

## 2021-07-08 ENCOUNTER — TELEPHONE (OUTPATIENT)
Dept: PRIMARY CARE CLINIC | Age: 61
End: 2021-07-08

## 2021-07-08 DIAGNOSIS — M81.0 AGE-RELATED OSTEOPOROSIS WITHOUT CURRENT PATHOLOGICAL FRACTURE: ICD-10-CM

## 2021-07-08 DIAGNOSIS — E11.42 TYPE 2 DIABETES MELLITUS WITH DIABETIC POLYNEUROPATHY, WITH LONG-TERM CURRENT USE OF INSULIN (HCC): ICD-10-CM

## 2021-07-08 DIAGNOSIS — Z79.4 TYPE 2 DIABETES MELLITUS WITH DIABETIC POLYNEUROPATHY, WITH LONG-TERM CURRENT USE OF INSULIN (HCC): ICD-10-CM

## 2021-07-08 DIAGNOSIS — S52.501D CLOSED FRACTURE OF DISTAL END OF RIGHT RADIUS WITH ROUTINE HEALING, UNSPECIFIED FRACTURE MORPHOLOGY, SUBSEQUENT ENCOUNTER: ICD-10-CM

## 2021-07-08 NOTE — TELEPHONE ENCOUNTER
From: Wanda Fuchs  To: TRESSA Márquez  Sent: 7/7/2021  2:40 PM CDT  Subject: Prescription Question    Hi Crystal; I just got off the phone with my insurance co. They suggested [facility] their ph# [phone]. Give them a call & they will take care of the rest. U have a wonderful & blessed evening.

## 2021-07-09 RX ORDER — BLOOD-GLUCOSE METER
1 KIT MISCELLANEOUS 4 TIMES DAILY
Qty: 100 EACH | Refills: 3
Start: 2021-07-09

## 2021-07-09 NOTE — TELEPHONE ENCOUNTER
Finally got saad after 30 min. Their fax is [phone].  Will fax over orders through [facility]

## 2021-07-15 DIAGNOSIS — S52.501D CLOSED FRACTURE OF DISTAL END OF RIGHT RADIUS WITH ROUTINE HEALING, UNSPECIFIED FRACTURE MORPHOLOGY, SUBSEQUENT ENCOUNTER: ICD-10-CM

## 2021-07-15 DIAGNOSIS — M81.0 AGE-RELATED OSTEOPOROSIS WITHOUT CURRENT PATHOLOGICAL FRACTURE: ICD-10-CM

## 2021-07-28 ENCOUNTER — TELEPHONE (OUTPATIENT)
Dept: PRIMARY CARE CLINIC | Age: 61
End: 2021-07-28

## 2021-07-28 NOTE — TELEPHONE ENCOUNTER
Telephone Outcome: Patient not seeing a [facility] PCP.  Actual PCP is at the [facility] in [address] records sent 7/28/21

## 2021-08-03 DIAGNOSIS — I25.118 CORONARY ARTERY DISEASE OF NATIVE HEART WITH STABLE ANGINA PECTORIS, UNSPECIFIED VESSEL OR LESION TYPE (HCC): ICD-10-CM

## 2021-08-04 RX ORDER — NITROGLYCERIN 0.4 MG/1
TABLET SUBLINGUAL
Qty: 25 TABLET | Refills: 0 | Status: SHIPPED | OUTPATIENT
Start: 2021-08-04

## 2021-08-04 NOTE — TELEPHONE ENCOUNTER
Received fax from pharmacy requesting refill on pts medication(s). Pt was last seen in office on 6/24/2021  and has a follow up scheduled for Visit date not found. Will send request to  [employer]  for authorization. Requested Prescriptions     Pending Prescriptions Disp Refills    nitroGLYCERIN (NITROSTAT) 0.4 MG SL tablet [Pharmacy Med Name: Nitroglycerin 0.4 MG Sublingual Tablet Sublingual] 25 tablet 0     Sig: Dissolve one tablet under the tongue every 5 minutes as needed for chest pain. Do not exceed a total of 3 doses in 15 minutes.

## 2021-09-02 ENCOUNTER — OFFICE VISIT (OUTPATIENT)
Dept: PRIMARY CARE CLINIC | Age: 61
End: 2021-09-02
Payer: MEDICARE

## 2021-09-02 VITALS
HEIGHT: 60 IN | SYSTOLIC BLOOD PRESSURE: 110 MMHG | HEART RATE: 86 BPM | BODY MASS INDEX: 42.01 KG/M2 | WEIGHT: 214 LBS | OXYGEN SATURATION: 99 % | TEMPERATURE: 96.6 F | DIASTOLIC BLOOD PRESSURE: 70 MMHG

## 2021-09-02 DIAGNOSIS — Z00.00 ENCOUNTER FOR MEDICAL EXAMINATION TO ESTABLISH CARE: Primary | ICD-10-CM

## 2021-09-02 DIAGNOSIS — E03.4 HYPOTHYROIDISM DUE TO ACQUIRED ATROPHY OF THYROID: ICD-10-CM

## 2021-09-02 DIAGNOSIS — I10 ESSENTIAL HYPERTENSION: ICD-10-CM

## 2021-09-02 DIAGNOSIS — E66.01 CLASS 3 SEVERE OBESITY DUE TO EXCESS CALORIES WITH SERIOUS COMORBIDITY AND BODY MASS INDEX (BMI) OF 40.0 TO 44.9 IN ADULT (HCC): ICD-10-CM

## 2021-09-02 DIAGNOSIS — I25.118 CORONARY ARTERY DISEASE OF NATIVE HEART WITH STABLE ANGINA PECTORIS, UNSPECIFIED VESSEL OR LESION TYPE (HCC): ICD-10-CM

## 2021-09-02 LAB
ALBUMIN SERPL-MCNC: 3.9 G/DL (ref 3.5–5.2)
ALP BLD-CCNC: 130 U/L (ref 35–104)
ALT SERPL-CCNC: 22 U/L (ref 5–33)
ANION GAP SERPL CALCULATED.3IONS-SCNC: 12 MMOL/L (ref 7–19)
AST SERPL-CCNC: 28 U/L (ref 5–32)
BILIRUB SERPL-MCNC: 0.3 MG/DL (ref 0.2–1.2)
BUN BLDV-MCNC: 16 MG/DL (ref 8–23)
CALCIUM SERPL-MCNC: 8.5 MG/DL (ref 8.8–10.2)
CHLORIDE BLD-SCNC: 103 MMOL/L (ref 98–111)
CO2: 22 MMOL/L (ref 22–29)
CREAT SERPL-MCNC: 1.1 MG/DL (ref 0.5–0.9)
GFR AFRICAN AMERICAN: >59
GFR NON-AFRICAN AMERICAN: 51
GLUCOSE BLD-MCNC: 98 MG/DL (ref 74–109)
HCT VFR BLD CALC: 42.3 % (ref 37–47)
HEMOGLOBIN: 12.8 G/DL (ref 12–16)
MCH RBC QN AUTO: 28.1 PG (ref 27–31)
MCHC RBC AUTO-ENTMCNC: 30.3 G/DL (ref 33–37)
MCV RBC AUTO: 92.8 FL (ref 81–99)
PDW BLD-RTO: 16.9 % (ref 11.5–14.5)
PLATELET # BLD: 273 K/UL (ref 130–400)
PMV BLD AUTO: 11.6 FL (ref 9.4–12.3)
POTASSIUM SERPL-SCNC: 5.3 MMOL/L (ref 3.5–5)
RBC # BLD: 4.56 M/UL (ref 4.2–5.4)
SODIUM BLD-SCNC: 137 MMOL/L (ref 136–145)
T4 FREE: 1.24 NG/DL (ref 0.93–1.7)
TOTAL PROTEIN: 7.5 G/DL (ref 6.6–8.7)
TSH SERPL DL<=0.05 MIU/L-ACNC: 1.69 UIU/ML (ref 0.27–4.2)
WBC # BLD: 11.8 K/UL (ref 4.8–10.8)

## 2021-09-02 PROCEDURE — 99204 OFFICE O/P NEW MOD 45 MIN: CPT | Performed by: NURSE PRACTITIONER

## 2021-09-02 RX ORDER — TEMAZEPAM 15 MG/1
CAPSULE ORAL
COMMUNITY
Start: 2021-09-02 | End: 2021-11-18 | Stop reason: SDUPTHER

## 2021-09-02 ASSESSMENT — ENCOUNTER SYMPTOMS
BACK PAIN: 1
EYES NEGATIVE: 1
RESPIRATORY NEGATIVE: 1
ALLERGIC/IMMUNOLOGIC NEGATIVE: 1
GASTROINTESTINAL NEGATIVE: 1

## 2021-09-02 NOTE — PROGRESS NOTES
[address] [facility]  [address]  [address] [address]  Dept: [phone]  Dept Fax: [phone]  Loc: [phone]    Qiana Escobar is a 62 y.o. female who presents today for her medical conditions/complaints as noted below. Qiana Escobar is c/o of Diabetes (Chronic, non-fasting (coffee with creamer) for labs.  )        HPI:     HPI   71-year-old female presents today to establish care. She has previously been seen by TRESSA Roe at [facility] in [facility]. She states that she has ongoing issues with anxiety, insomnia and pain. She states that she recently lost her  to COVID-19. She was performing CPR and became exhausted and had to stop prior to EMS getting there. She is having a hard time dealing with this. She reports in the past that she has had cardiac issues which required 3 stents. She was seen previously by Dr. Dalia Garcia and she request to be referred to reestablish with him at [facility]. She states that she was recently taken off her Restoril and that it is caused her quite a bit of disruption due to inability to sleep. She does take large amounts of other medications at bedtime such as Seroquel 300 mg nightly, tizanidine 4 mg, Elavil 100 mg as well as the Restoril. She denies any issues with sedation. She states that she has been on this medicine for quite some time. Chief Complaint   Patient presents with    Diabetes     Chronic, non-fasting (coffee with creamer) for labs.        Past Medical History:   Diagnosis Date    Anxiety     Arthritis     Bipolar 1 disorder ([facility] [address] [facility]     CAD (coronary artery disease)     CHF (congestive heart failure) (HCC)     Chronic kidney disease (CKD) stage G3b/A2, moderately decreased glomerular filtration rate (GFR) between 30-44 mL/min/1.73 square meter and albuminuria creatinine ratio between  mg/g ([facility] [address] 11/21/2016    Depression     DM (diabetes mellitus) ([facility] [address] [facility]     GERD (gastroesophageal reflux disease)     History of blood transfusion     History of suicidal ideation     Hyperlipidemia     Hypertension     Hypothyroidism     Insomnia     Kidney disease     stage 3 failure    MI, old 2005    Obesity     Polyarticular arthritis     Type II or unspecified type diabetes mellitus without mention of complication, not stated as uncontrolled       Past Surgical History:   Procedure Laterality Date    ABDOMINOPLASTY      ANGIOPLASTY  05    stent placement to LAD and diagonal with normal left vent function    BREAST REDUCTION SURGERY      CARDIAC CATHETERIZATION  05, 05    see report  Stents x3    CARDIAC CATHETERIZATION  3/23/15  JDT    EF 50%    CARPAL TUNNEL RELEASE       SECTION      x2    CHOLECYSTECTOMY      GASTRIC BYPASS SURGERY      HERNIA REPAIR      umbilical with mesh    INTRACAPSULAR CATARACT EXTRACTION Left 2016    LT EYE CATARACT EMULSIFICATION IOL IMPLANT performed by Michaeleen Schaumann, MD at [address] [place] [address] Right 2018    ORIF RIGHT BIMALLEOLAR ANKLE FRACTURE, RIGHT WRIST CAST REMOVAL performed by Regulo Peterson MD at [address] [place] [address] Right 2018    ANKLE OPEN REDUCTION INTERNAL FIXATION performed by Regulo Peterson MD at [place] [address] Left 2017    KNEE TOTAL ARTHROPLASTY performed by Nita Castellon DO at [facility] 2021 2021 2021 3/8/2021 2021 9730   SYSTOLIC 800 [phone] -   DIASTOLIC 70 84 80 84 80 -   Site - Right Upper Arm Right Upper Arm Right Upper Arm Right Upper Arm -   Position - Sitting Sitting Sitting Sitting -   Cuff Size - Large Adult Large Adult Large Adult Large Adult -   Pulse 86 80 56 84 84 73   Temp 96.6 97.6 98.2 97 96.8 98.6   Resp - - - - - 20   SpO2 99 94 95 96 98 96   Weight 214 lb 224 lb 8 oz 226 lb 220 lb 8 (TRULICITY) 1.5 QE/9.4TF SOPN Inject 1.5 mg into the skin every 7 days 12 pen 3    tiZANidine (ZANAFLEX) 4 MG tablet Take 1 tablet by mouth every 8 hours as needed (spasms) 90 tablet 2    QUEtiapine (SEROQUEL) 200 MG tablet Take 1.5 tablets by mouth nightly 135 tablet 3    metoprolol succinate (TOPROL XL) 50 MG extended release tablet Take 1 tablet by mouth daily 90 tablet 3    atorvastatin (LIPITOR) 40 MG tablet Take 1 tablet by mouth nightly 90 tablet 3    levothyroxine (SYNTHROID) 88 MCG tablet Take 1 tablet by mouth Daily Tube feeding (TF) interaction, obtain physician order to manage, recommend holding TF for 30 minutes before and after dose. 90 tablet 3    HYDROcodone-acetaminophen (NORCO) 5-325 MG per tablet Take 1 tablet by mouth 2 times daily as needed for Pain for up to 30 days. May fill 2/3/2021 60 tablet 0    pantoprazole (PROTONIX) 40 MG tablet Take 1 tablet by mouth daily 90 tablet 3    ondansetron (ZOFRAN-ODT) 4 MG disintegrating tablet Take 1 tablet by mouth 3 times daily as needed for Nausea or Vomiting 30 tablet 2    budesonide-formoterol (SYMBICORT) 160-4.5 MCG/ACT AERO Inhale 2 puffs into the lungs 2 times daily 3 Inhaler 1    albuterol sulfate HFA (VENTOLIN HFA) 108 (90 Base) MCG/ACT inhaler Inhale 2 puffs into the lungs 4 times daily as needed for Wheezing 1 Inhaler 5    valsartan (DIOVAN) 320 MG tablet Take 1 tablet by mouth daily 90 tablet 3    amitriptyline (ELAVIL) 100 MG tablet Take 2 tablets by mouth nightly 180 tablet 3    Lancets MISC 1 each by Does not apply route daily 100 each 3    DULoxetine (CYMBALTA) 60 MG extended release capsule Take 1 capsule by mouth 2 times daily 180 capsule 3    fluticasone (FLONASE) 50 MCG/ACT nasal spray 1 spray by Nasal route daily 3 Bottle 3    aspirin 81 MG tablet Take 1 tablet by mouth daily 90 tablet 3     No current facility-administered medications on file prior to visit.      Allergies   Allergen Reactions    Ciprofloxacin Hives HTN    Dilaudid [Hydromorphone Hcl] Other (See Comments)     Takes pt out of her mind. hallunications. Pt stateshas taken this hospitalization and has not had any problems    Keflex [Cephalexin] Hives    Nitrofuran Derivatives Hives    Pcn [Penicillins] Hives    Admelog [Insulin Lispro] Rash    Cefdinir Rash       Health Maintenance   Topic Date Due    DTaP/Tdap/Td vaccine (1 - Tdap) Never done    Diabetic retinal exam  02/27/2019    Breast cancer screen  08/02/2021    Flu vaccine (1) 09/01/2021    Annual Wellness Visit (AWV)  12/19/2021    Diabetic foot exam  01/08/2022    A1C test (Diabetic or Prediabetic)  06/24/2022    Lipid screen  06/24/2022    TSH testing  06/24/2022    Potassium monitoring  07/06/2022    Creatinine monitoring  07/06/2022    Colon cancer screen fecal DNA test (Cologuard)  10/07/2023    Pneumococcal 0-64 years Vaccine (2 of 2 - PPSV23) 05/12/2026    Shingles Vaccine  Completed    COVID-19 Vaccine  Completed    Hepatitis C screen  Completed    HIV screen  Completed    Hepatitis A vaccine  Aged Out    Hib vaccine  Aged Out    Meningococcal (ACWY) vaccine  Aged Out       Subjective:      Review of Systems   Constitutional: Negative. Negative for fatigue. HENT: Negative. Eyes: Negative. Respiratory: Negative. Cardiovascular: Negative. Gastrointestinal: Negative. Endocrine: Negative. Genitourinary: Negative. Musculoskeletal: Positive for arthralgias, back pain and myalgias. Skin: Negative. Allergic/Immunologic: Negative. Neurological: Negative. Hematological: Negative. Psychiatric/Behavioral: Positive for dysphoric mood. The patient is nervous/anxious. Objective:     Physical Exam  Vitals and nursing note reviewed. Constitutional:       General: She is not in acute distress. Appearance: Normal appearance. She is obese. She is not ill-appearing or toxic-appearing. HENT:      Head: Normocephalic and atraumatic.       Nose: Nose normal.      Mouth/Throat:      Mouth: Mucous membranes are moist.   Eyes:      Extraocular Movements: Extraocular movements intact. Pupils: Pupils are equal, round, and reactive to light. Cardiovascular:      Rate and Rhythm: Normal rate and regular rhythm. Pulses: Normal pulses. Pulmonary:      Effort: Pulmonary effort is normal. No respiratory distress. Breath sounds: Normal breath sounds. No wheezing, rhonchi or rales. Abdominal:      General: Bowel sounds are normal.      Palpations: Abdomen is soft. Musculoskeletal:         General: Normal range of motion. Cervical back: Normal range of motion. Skin:     General: Skin is warm and dry. Coloration: Skin is not jaundiced or pale. Findings: No erythema or rash. Neurological:      Mental Status: She is alert and oriented to person, place, and time. Mental status is at baseline. Psychiatric:         Attention and Perception: Attention normal.         Mood and Affect: Mood is anxious. Affect is tearful. Speech: Speech normal.         Behavior: Behavior normal. Behavior is cooperative. Thought Content: Thought content normal.         Cognition and Memory: Cognition and memory normal.         Judgment: Judgment normal.       /70   Pulse 86   Temp 96.6 °F (35.9 °C)   Ht 5' (1.524 m)   Wt 214 lb (97.1 kg)   SpO2 99%   BMI 41.79 kg/m²     Assessment:       Diagnosis Orders   1. Encounter for medical examination to establish care     2. Coronary artery disease of native heart with stable angina pectoris, unspecified vessel or lesion type [facility])  External Referral To Cardiology   3. Essential hypertension  External Referral To Cardiology    Comprehensive Metabolic Panel    TSH without Reflex    T4, Free    CBC   4. Hypothyroidism due to acquired atrophy of thyroid  TSH without Reflex    T4, Free   5.  Class 3 severe obesity due to excess calories with serious comorbidity and body mass index (BMI) of 40.0 to 44.9 in adult [facility])  External Referral To Cardiology    Comprehensive Metabolic Panel    TSH without Reflex    T4, Free    CBC         Plan:   1. Welcome to [address] We encourage our patients to set-up and use [employer] for convenient confidential communication with our office. One of our goals is to meet our patient's needs by being available for unforeseen developments after-hours, nights and weekends. One of our providers is on call 24/7. If you have an after-hours need just call the office and you will directed to the provider on call. 2. Refer to cardiology, Dr. Alberto Heard at [facility]    3. Stable. Reading in office today 110/70. Continue Norvasc 5 mg daily. Continue clonidine 0.1 mg as needed twice a day for elevated levels. Continue Diovan 320 mg daily. BP Readings from Last 3 Encounters:   09/02/21 110/70   06/24/21 120/84   04/01/21 122/80    Monitor BP at home twice a day. Keep a journal and bring with you to your follow up appointment. 4. Labs today to include CMP, TSH, T4, CBC     Patient given educational materials -see patient instructions. Discussed use, benefit, and side effects of prescribed medications. All patient questions answered. Pt voiced understanding. Reviewed health maintenance. Instructed to continue currentmedications, diet and exercise. Patient agreed with treatment plan. Follow up as directed. MEDICATIONS:  No orders of the defined types were placed in this encounter. ORDERS:  Orders Placed This Encounter   Procedures    Comprehensive Metabolic Panel    TSH without Reflex    T4, Free    CBC    External Referral To Cardiology       Follow-up:  Return in about 4 weeks (around 9/30/2021). PATIENT INSTRUCTIONS:  There are no Patient Instructions on file for this visit.   Electronically signed by TRESSA Lyons NP on 9/2/2021 at 12:13 PM    EMR Dragon/transcription disclaimer:  Much of thisencounter note is electronic transcription/translation of spoken language to printed texts. The electronic translation of spoken language may be erroneous, or at times, nonsensical words or phrases may be inadvertentlytranscribed.   Although I have reviewed the note for such errors, some may still exist.

## 2021-09-08 RX ORDER — CLONIDINE HYDROCHLORIDE 0.1 MG/1
0.1 TABLET ORAL 2 TIMES DAILY
Qty: 60 TABLET | Refills: 3 | Status: SHIPPED | OUTPATIENT
Start: 2021-09-08

## 2021-09-10 RX ORDER — CLONIDINE HYDROCHLORIDE 0.1 MG/1
0.1 TABLET ORAL 2 TIMES DAILY
Qty: 60 TABLET | Refills: 3 | OUTPATIENT
Start: 2021-09-10

## 2021-09-30 DIAGNOSIS — E11.42 DIABETIC POLYNEUROPATHY ASSOCIATED WITH TYPE 2 DIABETES MELLITUS (HCC): ICD-10-CM

## 2021-09-30 RX ORDER — PREGABALIN 150 MG/1
150 CAPSULE ORAL 2 TIMES DAILY
Qty: 60 CAPSULE | Refills: 2 | Status: SHIPPED | OUTPATIENT
Start: 2021-09-30 | End: 2021-10-01 | Stop reason: SDUPTHER

## 2021-10-01 DIAGNOSIS — E11.42 DIABETIC POLYNEUROPATHY ASSOCIATED WITH TYPE 2 DIABETES MELLITUS (HCC): ICD-10-CM

## 2021-10-01 RX ORDER — PREGABALIN 150 MG/1
150 CAPSULE ORAL 2 TIMES DAILY
Qty: 60 CAPSULE | Refills: 2 | Status: SHIPPED | OUTPATIENT
Start: 2021-10-01 | End: 2022-03-24

## 2021-10-04 ENCOUNTER — OFFICE VISIT (OUTPATIENT)
Dept: PRIMARY CARE CLINIC | Age: 61
End: 2021-10-04
Payer: MEDICARE

## 2021-10-04 ENCOUNTER — TELEPHONE (OUTPATIENT)
Dept: PRIMARY CARE CLINIC | Age: 61
End: 2021-10-04

## 2021-10-04 VITALS
SYSTOLIC BLOOD PRESSURE: 120 MMHG | BODY MASS INDEX: 43 KG/M2 | HEIGHT: 60 IN | WEIGHT: 219 LBS | HEART RATE: 61 BPM | OXYGEN SATURATION: 98 % | DIASTOLIC BLOOD PRESSURE: 80 MMHG | TEMPERATURE: 96.4 F

## 2021-10-04 DIAGNOSIS — F41.9 ANXIETY: ICD-10-CM

## 2021-10-04 DIAGNOSIS — Z71.89 GRIEF COUNSELING: ICD-10-CM

## 2021-10-04 DIAGNOSIS — Z79.4 TYPE 2 DIABETES MELLITUS WITH HYPERGLYCEMIA, WITH LONG-TERM CURRENT USE OF INSULIN (HCC): Primary | ICD-10-CM

## 2021-10-04 DIAGNOSIS — E11.65 TYPE 2 DIABETES MELLITUS WITH HYPERGLYCEMIA, WITH LONG-TERM CURRENT USE OF INSULIN (HCC): Primary | ICD-10-CM

## 2021-10-04 DIAGNOSIS — F32.A DEPRESSION, UNSPECIFIED DEPRESSION TYPE: ICD-10-CM

## 2021-10-04 LAB — HBA1C MFR BLD: 5.8 % (ref 4–6)

## 2021-10-04 PROCEDURE — 99214 OFFICE O/P EST MOD 30 MIN: CPT | Performed by: NURSE PRACTITIONER

## 2021-10-04 NOTE — PROGRESS NOTES
[address] [facility]  [address]  [address] [place] [address]  Dept: [phone]  Dept Fax: [phone]  Loc: [phone]    Queenie Gonzáles is a 62 y.o. female who presents today for her medical conditions/complaints as noted below. Queenie Gonzáles is c/o of Follow-up (Diabetes.  )        HPI:     HPI   This 81-year-old female presents today for follow-up of her diabetes. She states that she is also wanting to reduce some of her other medications such as  Wants to reduce dose to Elavil 100 mg nightly from 200 mg. She would also like to reduce her Seroquel doses as well. Chief Complaint   Patient presents with    Follow-up     Diabetes.        Past Medical History:   Diagnosis Date    Anxiety     Arthritis     Bipolar 1 disorder ([facility] [address]     CAD (coronary artery disease)     CH[facility]congestive heart failure) ([facility]     Chronic kidney disease (CKD) stage G3b/A2, moderately decreased glomerular filtration rate (GFR) between 30-44 mL/min/1.73 square meter and albuminuria creatinine ratio between  mg/g ([facility] [address] 2016    Depression     DM (diabetes mellitus) ([facility])     GERD (gastroesophageal reflux disease)     History of blood transfusion     History of suicidal ideation     Hyperlipidemia     Hypertension     Hypothyroidism     Insomnia     Kidney disease     stage 3 failure    MI, old 2005    Obesity     Polyarticular arthritis     Type II or unspecified type diabetes mellitus without mention of complication, not stated as uncontrolled       Past Surgical History:   Procedure Laterality Date    ABDOMINOPLASTY      ANGIOPLASTY  05    stent placement to LAD and diagonal with normal left vent function    BREAST REDUCTION SURGERY      CARDIAC CATHETERIZATION  05, 05    see report  Stents x3    CARDIAC CATHETERIZATION  3/23/15  JDT    EF 50%    CARPAL TUNNEL RELEASE       SECTION      x2    CHOLECYSTECTOMY      GASTRIC BYPASS SURGERY      HERNIA REPAIR      umbilical with mesh    INTRACAPSULAR CATARACT EXTRACTION Left 7/11/2016    LT EYE CATARACT EMULSIFICATION IOL IMPLANT performed by Perri Crabtree MD at [facility] OPEN TREATMENT BIMALLEOLAR ANKLE FRACTURE Right 6/11/2018    ORIF RIGHT BIMALLEOLAR ANKLE FRACTURE, RIGHT WRIST CAST REMOVAL performed by Kishor Sandoval MD at [address] [facility] OPEN TREATMENT BIMALLEOLAR ANKLE FRACTURE Right 6/12/2018    ANKLE OPEN REDUCTION INTERNAL FIXATION performed by Kishor Sandoval MD at [facility] ARTHROPLASTY Left 6/16/2017    KNEE TOTAL ARTHROPLASTY performed by Zoya Tapia DO at [address] [facility] 10/4/2021 9/2/2021 6/24/2021 4/1/2021 3/8/2021 7/7/7249   SYSTOLIC [phone] 226   DIASTOLIC 80 70 84 80 84 80   Site - - Right Upper Arm Right Upper Arm Right Upper Arm Right Upper Arm   Position - - Sitting Sitting Sitting Sitting   Cuff Size - - Large Adult Large Adult Large Adult Large Adult   Pulse 61 86 80 56 84 84   Temp 96.4 96.6 97.6 98.2 97 96.8   Resp - - - - - -   SpO2 98 99 94 95 96 98   Weight 219 lb 214 lb 224 lb 8 oz 226 lb 220 lb 8 oz 221 lb   Height 5' 0\" 5' 0\" - - - 5' 0\"   Body mass index 42.77 kg/m2 41.79 kg/m2 - - - 43.16 kg/m2   Pain Level - - - - - -   Some recent data might be hidden       Family History   Problem Relation Age of Onset    Depression Mother     Heart Attack Mother     High Blood Pressure Mother     Kidney Disease Father     Alcohol Abuse Father     Depression Father     Heart Attack Father     High Blood Pressure Father     Kidney Disease Brother     Heart Disease Other     Depression Sister        Social History     Tobacco Use    Smoking status: Never Smoker    Smokeless tobacco: Former User     Types: Snuff   Substance Use Topics    Alcohol use: No      Current Outpatient Medications on File Prior to Visit   Medication Sig Dispense Refill    pregabalin (LYRICA) 150 MG capsule Take 1 capsule by mouth 2 times daily for 90 days. 60 capsule 2    cloNIDine (CATAPRES) 0.1 MG tablet Take 1 tablet by mouth 2 times daily 60 tablet 3    temazepam (RESTORIL) 15 MG capsule       nitroGLYCERIN (NITROSTAT) 0.4 MG SL tablet Dissolve one tablet under the tongue every 5 minutes as needed for chest pain. Do not exceed a total of 3 doses in 15 minutes. 25 tablet 0    blood glucose monitor kit and supplies Dispense sufficient amount for four times daily testing frequency. Dispense all needed supplies to include: monitor, strips, lancing device, lancets, control solutions, alcohol swabs. 1 kit 0    blood glucose test strips (FREESTYLE LITE) strip 1 each by In Vitro route 4 times daily As needed. 100 each 3    insulin aspart (NOVOLOG) 100 UNIT/ML injection vial Inject 5 Units into the skin 3 times daily (before meals) (Patient taking differently: Inject 8 Units into the skin 3 times daily (before meals) ) 1 vial 3    amLODIPine (NORVASC) 5 MG tablet Take 1 tablet by mouth daily 90 tablet 3    Dulaglutide (TRULICITY) 1.5 SQ/5.1VX SOPN Inject 1.5 mg into the skin every 7 days 12 pen 3    tiZANidine (ZANAFLEX) 4 MG tablet Take 1 tablet by mouth every 8 hours as needed (spasms) 90 tablet 2    QUEtiapine (SEROQUEL) 200 MG tablet Take 1.5 tablets by mouth nightly 135 tablet 3    metoprolol succinate (TOPROL XL) 50 MG extended release tablet Take 1 tablet by mouth daily 90 tablet 3    atorvastatin (LIPITOR) 40 MG tablet Take 1 tablet by mouth nightly 90 tablet 3    levothyroxine (SYNTHROID) 88 MCG tablet Take 1 tablet by mouth Daily Tube feeding (TF) interaction, obtain physician order to manage, recommend holding TF for 30 minutes before and after dose.  90 tablet 3    pantoprazole (PROTONIX) 40 MG tablet Take 1 tablet by mouth daily 90 tablet 3    ondansetron (ZOFRAN-ODT) 4 MG disintegrating tablet Take 1 tablet by mouth 3 times daily as needed for Nausea or Vomiting 30 tablet 2    budesonide-formoterol (SYMBICORT) 160-4.5 MCG/ACT AERO Inhale 2 puffs into the lungs 2 times daily 3 Inhaler 1    albuterol sulfate HFA (VENTOLIN HFA) 108 (90 Base) MCG/ACT inhaler Inhale 2 puffs into the lungs 4 times daily as needed for Wheezing 1 Inhaler 5    valsartan (DIOVAN) 320 MG tablet Take 1 tablet by mouth daily 90 tablet 3    amitriptyline (ELAVIL) 100 MG tablet Take 2 tablets by mouth nightly 180 tablet 3    Lancets MISC 1 each by Does not apply route daily 100 each 3    DULoxetine (CYMBALTA) 60 MG extended release capsule Take 1 capsule by mouth 2 times daily 180 capsule 3    fluticasone (FLONASE) 50 MCG/ACT nasal spray 1 spray by Nasal route daily 3 Bottle 3    aspirin 81 MG tablet Take 1 tablet by mouth daily 90 tablet 3    denosumab (PROLIA) 60 MG/ML SOSY SC injection Inject 1 mL into the skin once for 1 dose 1 mL 0    HYDROcodone-acetaminophen (NORCO) 5-325 MG per tablet Take 1 tablet by mouth 2 times daily as needed for Pain for up to 30 days. May fill 2/3/2021 60 tablet 0     No current facility-administered medications on file prior to visit. Allergies   Allergen Reactions    Ciprofloxacin Hives     HTN    Dilaudid [Hydromorphone Hcl] Other (See Comments)     Takes pt out of her mind. hallunications.  Pt stateshas taken this hospitalization and has not had any problems    Keflex [Cephalexin] Hives    Nitrofuran Derivatives Hives    Pcn [Penicillins] Hives    Admelog [Insulin Lispro] Rash    Cefdinir Rash       Health Maintenance   Topic Date Due    DTaP/Tdap/Td vaccine (1 - Tdap) Never done    Diabetic retinal exam  02/27/2019    Breast cancer screen  08/02/2021    Flu vaccine (1) 09/01/2021    Annual Wellness Visit (AWV)  12/19/2021    Diabetic foot exam  01/08/2022    Lipid screen  06/24/2022    TSH testing  09/02/2022    Potassium monitoring  09/02/2022    Creatinine monitoring  09/02/2022    A1C test (Diabetic or Prediabetic)  10/04/2022    Colon cancer screen fecal DNA test (Cologuard)  10/07/2023    Pneumococcal 0-64 years Vaccine (2 of 2 - PPSV23) 05/12/2026    Shingles Vaccine  Completed    COVID-19 Vaccine  Completed    Hepatitis C screen  Completed    HIV screen  Completed    Hepatitis A vaccine  Aged Out    Hib vaccine  Aged Out    Meningococcal (ACWY) vaccine  Aged Out       Subjective:      Review of Systems   Constitutional: Negative. HENT: Negative. Eyes: Negative. Respiratory: Negative. Cardiovascular: Negative. Gastrointestinal: Negative. Endocrine: Negative. Genitourinary: Negative. Musculoskeletal: Negative. Skin: Negative. Allergic/Immunologic: Negative. Neurological: Negative. Hematological: Negative. Psychiatric/Behavioral: Positive for dysphoric mood. Negative for self-injury and suicidal ideas. The patient is nervous/anxious. Objective:     Physical Exam  Vitals and nursing note reviewed. Constitutional:       General: She is not in acute distress. Appearance: Normal appearance. She is obese. She is not ill-appearing or toxic-appearing. HENT:      Head: Normocephalic and atraumatic. Nose: Nose normal.      Mouth/Throat:      Mouth: Mucous membranes are moist.   Eyes:      Extraocular Movements: Extraocular movements intact. Conjunctiva/sclera: Conjunctivae normal.      Pupils: Pupils are equal, round, and reactive to light. Cardiovascular:      Rate and Rhythm: Normal rate and regular rhythm. Pulses: Normal pulses. Heart sounds: Normal heart sounds. No murmur heard. Pulmonary:      Effort: Pulmonary effort is normal. No respiratory distress. Breath sounds: Normal breath sounds. No wheezing, rhonchi or rales. Abdominal:      General: Bowel sounds are normal.      Palpations: Abdomen is soft. Musculoskeletal:         General: Normal range of motion. Cervical back: Normal range of motion. Skin:     General: Skin is warm and dry.       Coloration: Skin is not jaundiced or pale. Findings: No erythema or rash. Neurological:      Mental Status: She is alert and oriented to person, place, and time. Mental status is at baseline. Psychiatric:         Mood and Affect: Mood normal.         Behavior: Behavior normal.         Thought Content: Thought content normal.         Judgment: Judgment normal.       /80   Pulse 61   Temp 96.4 °F (35.8 °C)   Ht 5' (1.524 m)   Wt 219 lb (99.3 kg)   SpO2 98%   BMI 42.77 kg/m²     Assessment:       Diagnosis Orders   1. Type 2 diabetes mellitus with hyperglycemia, with long-term current use of insulin (HCC)  Hemoglobin A1C   2. Grief counseling  External Referral To Behavioral Health   3. Depression, unspecified depression type  External Referral To Behavioral Health   4. Anxiety  External Referral To Behavioral Health         Plan:   1. Improved. Repeat hemoglobin A1c. Lab Results   Component Value Date    LABA1C 5.8 10/04/2021    LABA1C 6.5 (H) 06/24/2021    LABA1C 6.2 (H) 01/08/2021     2.-4. External referral to behavioral health. Reduce Elavil from 200 mg nightly to 100 mg. Continue with current levels of other medications to include Seroquel 300 mg nightly, Lyrica 150 mg twice a day. ,  Cymbalta 60 mg twice a day. Discussed with patient not wanting to come off of her many medications too quickly or with too many changes at one time. She has a history of bipolar as well as new grief and depression. I would like for her to see behavioral health before making any other changes. She is in agreement with this plan. She denies any SI or HI at this time. Patient given educational materials -see patient instructions. Discussed use, benefit, and side effects of prescribed medications. All patient questions answered. Pt voiced understanding. Reviewed health maintenance. Instructed to continue currentmedications, diet and exercise. Patient agreed with treatment plan. Follow up as directed.    MEDICATIONS:  No orders of the defined types were placed in this encounter. ORDERS:  Orders Placed This Encounter   Procedures    Hemoglobin A1C    External Referral To Behavioral Health       Follow-up:  Return in about 4 weeks (around 11/1/2021). PATIENT INSTRUCTIONS:  There are no Patient Instructions on file for this visit. Electronically signed by TRESSA Patterson NP on 10/11/2021 at 9:41 AM    EMR Dragon/transcription disclaimer:  Much of thisencounter note is electronic transcription/translation of spoken language to printed texts. The electronic translation of spoken language may be erroneous, or at times, nonsensical words or phrases may be inadvertentlytranscribed.   Although I have reviewed the note for such errors, some may still exist.

## 2021-10-04 NOTE — TELEPHONE ENCOUNTER
Patient says that she got a letter today saying that her drivers license has been suspended. She is not sure why. She says it says because of medication and alcohol abuse. Patient says she does not drink. She wanted to know if you could write a letter for her to get her license back. She said if you have any questions you can call her and she can explain.

## 2021-10-11 ASSESSMENT — ENCOUNTER SYMPTOMS
GASTROINTESTINAL NEGATIVE: 1
ALLERGIC/IMMUNOLOGIC NEGATIVE: 1
RESPIRATORY NEGATIVE: 1
EYES NEGATIVE: 1

## 2021-11-08 ENCOUNTER — OFFICE VISIT (OUTPATIENT)
Dept: PRIMARY CARE CLINIC | Age: 61
End: 2021-11-08
Payer: MEDICARE

## 2021-11-08 VITALS
WEIGHT: 213 LBS | HEIGHT: 60 IN | OXYGEN SATURATION: 99 % | TEMPERATURE: 97.2 F | BODY MASS INDEX: 41.82 KG/M2 | HEART RATE: 80 BPM | SYSTOLIC BLOOD PRESSURE: 138 MMHG | DIASTOLIC BLOOD PRESSURE: 86 MMHG

## 2021-11-08 DIAGNOSIS — E78.2 MIXED HYPERLIPIDEMIA: ICD-10-CM

## 2021-11-08 DIAGNOSIS — F41.9 ANXIETY: ICD-10-CM

## 2021-11-08 DIAGNOSIS — Z79.899 MEDICATION MANAGEMENT: ICD-10-CM

## 2021-11-08 DIAGNOSIS — F31.78 BIPOLAR DISORDER, IN FULL REMISSION, MOST RECENT EPISODE MIXED (HCC): ICD-10-CM

## 2021-11-08 DIAGNOSIS — E11.42 DIABETIC POLYNEUROPATHY ASSOCIATED WITH TYPE 2 DIABETES MELLITUS (HCC): Primary | ICD-10-CM

## 2021-11-08 DIAGNOSIS — I10 ESSENTIAL HYPERTENSION: ICD-10-CM

## 2021-11-08 LAB
ALCOHOL URINE: POSITIVE
AMPHETAMINE SCREEN, URINE: NORMAL
BARBITURATE SCREEN, URINE: NORMAL
BENZODIAZEPINE SCREEN, URINE: POSITIVE
BUPRENORPHINE URINE: NORMAL
COCAINE METABOLITE SCREEN URINE: NORMAL
FENTANYL SCREEN, URINE: NORMAL
GABAPENTIN SCREEN, URINE: NORMAL
MDMA URINE: NORMAL
METHADONE SCREEN, URINE: NORMAL
METHAMPHETAMINE, URINE: NORMAL
OPIATE SCREEN URINE: POSITIVE
OXYCODONE SCREEN URINE: NORMAL
PHENCYCLIDINE SCREEN URINE: NORMAL
PROPOXYPHENE SCREEN, URINE: NORMAL
SYNTHETIC CANNABINOIDS(K2) SCREEN, URINE: NORMAL
THC SCREEN, URINE: NORMAL
TRAMADOL SCREEN URINE: NORMAL
TRICYCLIC ANTIDEPRESSANTS, UR: NORMAL

## 2021-11-08 PROCEDURE — 80305 DRUG TEST PRSMV DIR OPT OBS: CPT | Performed by: NURSE PRACTITIONER

## 2021-11-08 PROCEDURE — 99214 OFFICE O/P EST MOD 30 MIN: CPT | Performed by: NURSE PRACTITIONER

## 2021-11-08 RX ORDER — VALSARTAN 320 MG/1
320 TABLET ORAL DAILY
Qty: 90 TABLET | Refills: 3 | Status: SHIPPED | OUTPATIENT
Start: 2021-11-08

## 2021-11-08 ASSESSMENT — ENCOUNTER SYMPTOMS
SHORTNESS OF BREATH: 0
GASTROINTESTINAL NEGATIVE: 1
RESPIRATORY NEGATIVE: 1
EYES NEGATIVE: 1
COUGH: 0
ALLERGIC/IMMUNOLOGIC NEGATIVE: 1

## 2021-11-08 NOTE — PROGRESS NOTES
[address] [facility]  [address]  [address] [address]  Dept: [phone]  Dept Fax: [phone]  Loc: [phone]    Andrew Bowles is a 62 y.o. female who presents today for her medical conditions/complaints as noted below. Andrew Bowles is c/o of 1 Month Follow-Up (She is here for a 1 month follow up. She states her blood sugars have been good. )        HPI:     HPI   This 71-year-old female presents today for 1 month follow-up. She states that her blood sugars have been doing much better. She states that she is still having issues with the anxiety and depression. Jose Christopher and Johnny BUSTILLOS in [facility]. [employer] license. Chief Complaint   Patient presents with    1 Month Follow-Up     She is here for a 1 month follow up. She states her blood sugars have been good.       Past Medical History:   Diagnosis Date    Anxiety     Arthritis     Bipolar 1 disorder ([facility] [address]     CAD (coronary artery disease)     CH[facility]congestive heart failure) ([facility]     Chronic kidney disease (CKD) stage G3b/A2, moderately decreased glomerular filtration rate (GFR) between 30-44 mL/min/1.73 square meter and albuminuria creatinine ratio between  mg/g ([facility] [address] 11/21/2016    Depression     DM (diabetes mellitus) ([facility])     GERD (gastroesophageal reflux disease)     History of blood transfusion     History of suicidal ideation     Hyperlipidemia     Hypertension     Hypothyroidism     Insomnia     Kidney disease     stage 3 failure    MI, 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    Obesity     Polyarticular arthritis     Type II or unspecified type diabetes mellitus without mention of complication, not stated as uncontrolled       Past Surgical History:   Procedure Laterality Date    ABDOMINOPLASTY      ANGIOPLASTY  1/20/05    stent placement to LAD and diagonal with normal left vent function    BREAST REDUCTION SURGERY      CARDIAC CATHETERIZATION  05, 05    see report  Stents x3    CARDIAC CATHETERIZATION  3/23/15  JDT    EF 50%    CARPAL TUNNEL RELEASE       SECTION      x2    CHOLECYSTECTOMY      GASTRIC BYPASS SURGERY      HERNIA REPAIR      umbilical with mesh    INTRACAPSULAR CATARACT EXTRACTION Left 2016    LT EYE CATARACT EMULSIFICATION IOL IMPLANT performed by Jadyn Morris MD at [facility] OPEN TREATMENT BIMALLEOLAR ANKLE FRACTURE Right 2018    ORIF RIGHT BIMALLEOLAR ANKLE FRACTURE, RIGHT WRIST CAST REMOVAL performed by Griselda Sadler MD at [address] [facility] OPEN TREATMENT BIMALLEOLAR ANKLE FRACTURE Right 2018    ANKLE OPEN REDUCTION INTERNAL FIXATION [facility]    KNEE TOTAL ARTHROPLASTY performed by Kevin Eden DO at [address] [facility] 2021 10/4/2021 2021 2021 2021 4/3/3965   SYSTOLIC 370 337 [phone]   DIASTOLIC 86 80 70 84 80 84   Site Right Upper Arm - - Right Upper Arm Right Upper Arm Right Upper Arm   Position Sitting - - Sitting Sitting Sitting   Cuff Size Large Adult - - Large Adult Large Adult Large Adult   Pulse 80 61 86 80 56 84   Temp 97.2 96.4 96.6 97.6 98.2 97   Resp - - - - - -   SpO2 99 98 99 94 95 96   Weight 213 lb 219 lb 214 lb 224 lb 8 oz 226 lb 220 lb 8 oz   Height 5' 0\" 5' 0\" 5' 0\" - - -   Body mass index 41.59 kg/m2 42.77 kg/m2 41.79 kg/m2 - - -   Pain Level - - - - - -   Some recent data might be hidden       Family History   Problem Relation Age of Onset    Depression Mother     Heart Attack Mother     High Blood Pressure Mother     Kidney Disease Father     Alcohol Abuse Father     Depression Father     Heart Attack Father     High Blood Pressure Father     Kidney Disease Brother     Heart Disease Other     Depression Sister        Social History     Tobacco Use    Smoking status: Never Smoker    Smokeless tobacco: Former User     Types: Snuff   Substance Use Topics    Alcohol use: No      Current Outpatient Medications on File Prior to Visit   Medication Sig Dispense Refill    pregabalin (LYRICA) 150 MG capsule Take 1 capsule by mouth 2 times daily for 90 days. 60 capsule 2    cloNIDine (CATAPRES) 0.1 MG tablet Take 1 tablet by mouth 2 times daily 60 tablet 3    temazepam (RESTORIL) 15 MG capsule       nitroGLYCERIN (NITROSTAT) 0.4 MG SL tablet Dissolve one tablet under the tongue every 5 minutes as needed for chest pain. Do not exceed a total of 3 doses in 15 minutes. 25 tablet 0    denosumab (PROLIA) 60 MG/ML SOSY SC injection Inject 1 mL into the skin once for 1 dose 1 mL 0    blood glucose monitor kit and supplies Dispense sufficient amount for four times daily testing frequency. Dispense all needed supplies to include: monitor, strips, lancing device, lancets, control solutions, alcohol swabs. 1 kit 0    blood glucose test strips (FREESTYLE LITE) strip 1 each by In Vitro route 4 times daily As needed. 100 each 3    amLODIPine (NORVASC) 5 MG tablet Take 1 tablet by mouth daily 90 tablet 3    Dulaglutide (TRULICITY) 1.5 ZK/3.4DB SOPN Inject 1.5 mg into the skin every 7 days 12 pen 3    tiZANidine (ZANAFLEX) 4 MG tablet Take 1 tablet by mouth every 8 hours as needed (spasms) 90 tablet 2    QUEtiapine (SEROQUEL) 200 MG tablet Take 1.5 tablets by mouth nightly 135 tablet 3    metoprolol succinate (TOPROL XL) 50 MG extended release tablet Take 1 tablet by mouth daily 90 tablet 3    atorvastatin (LIPITOR) 40 MG tablet Take 1 tablet by mouth nightly 90 tablet 3    levothyroxine (SYNTHROID) 88 MCG tablet Take 1 tablet by mouth Daily Tube feeding (TF) interaction, obtain physician order to manage, recommend holding TF for 30 minutes before and after dose.  90 tablet 3    pantoprazole (PROTONIX) 40 MG tablet Take 1 tablet by mouth daily 90 tablet 3    ondansetron (ZOFRAN-ODT) 4 MG disintegrating tablet Take 1 tablet by mouth 3 times daily as needed for Nausea or Vomiting 30 tablet 2    budesonide-formoterol (SYMBICORT) 160-4.5 MCG/ACT AERO Inhale 2 puffs into the lungs 2 times daily 3 Inhaler 1    albuterol sulfate HFA (VENTOLIN HFA) 108 (90 Base) MCG/ACT inhaler Inhale 2 puffs into the lungs 4 times daily as needed for Wheezing 1 Inhaler 5    amitriptyline (ELAVIL) 100 MG tablet Take 2 tablets by mouth nightly 180 tablet 3    Lancets MISC 1 each by Does not apply route daily 100 each 3    DULoxetine (CYMBALTA) 60 MG extended release capsule Take 1 capsule by mouth 2 times daily 180 capsule 3    fluticasone (FLONASE) 50 MCG/ACT nasal spray 1 spray by Nasal route daily 3 Bottle 3    aspirin 81 MG tablet Take 1 tablet by mouth daily 90 tablet 3    HYDROcodone-acetaminophen (NORCO) 5-325 MG per tablet Take 1 tablet by mouth 2 times daily as needed for Pain for up to 30 days. May fill 2/3/2021 60 tablet 0     No current facility-administered medications on file prior to visit. Allergies   Allergen Reactions    Ciprofloxacin Hives     HTN    Dilaudid [Hydromorphone Hcl] Other (See Comments)     Takes pt out of her mind. hallunications.  Pt stateshas taken this hospitalization and has not had any problems    Keflex [Cephalexin] Hives    Nitrofuran Derivatives Hives    Pcn [Penicillins] Hives    Admelog [Insulin Lispro] Rash    Cefdinir Rash       Health Maintenance   Topic Date Due    DTaP/Tdap/Td vaccine (1 - Tdap) Never done    Diabetic retinal exam  02/27/2019    Breast cancer screen  08/02/2021    Flu vaccine (1) 09/01/2021    Annual Wellness Visit (AWV)  12/19/2021    Diabetic foot exam  01/08/2022    Lipid screen  06/24/2022    TSH testing  09/02/2022    Potassium monitoring  09/02/2022    Creatinine monitoring  09/02/2022    A1C test (Diabetic or Prediabetic)  10/04/2022    Colon cancer screen fecal DNA test (Cologuard)  10/07/2023    Pneumococcal 0-64 years Vaccine (2 of 2 - PPSV23) 05/12/2026    Shingles Vaccine Completed    COVID-19 Vaccine  Completed    Hepatitis C screen  Completed    HIV screen  Completed    Hepatitis A vaccine  Aged Out    Hib vaccine  Aged Out    Meningococcal (ACWY) vaccine  Aged Out       Subjective:      Review of Systems   Constitutional: Negative. HENT: Negative. Eyes: Negative. Respiratory: Negative. Negative for cough and shortness of breath. Cardiovascular: Negative. Negative for chest pain and palpitations. Gastrointestinal: Negative. Endocrine: Negative. Genitourinary: Negative. Negative for difficulty urinating and dysuria. Musculoskeletal: Negative. Skin: Negative. Allergic/Immunologic: Negative. Neurological: Negative. Negative for headaches. Hematological: Negative. Psychiatric/Behavioral: Positive for agitation, dysphoric mood and sleep disturbance. Negative for self-injury and suicidal ideas. The patient is nervous/anxious. Objective:     Physical Exam  Vitals and nursing note reviewed. Constitutional:       General: She is not in acute distress. Appearance: Normal appearance. She is obese. She is not ill-appearing or toxic-appearing. HENT:      Head: Normocephalic and atraumatic. Nose: Nose normal.      Mouth/Throat:      Mouth: Mucous membranes are dry. Eyes:      Extraocular Movements: Extraocular movements intact. Conjunctiva/sclera: Conjunctivae normal.      Pupils: Pupils are equal, round, and reactive to light. Cardiovascular:      Rate and Rhythm: Normal rate and regular rhythm. Pulses: Normal pulses. Heart sounds: Normal heart sounds. Pulmonary:      Effort: Pulmonary effort is normal. No respiratory distress. Breath sounds: Normal breath sounds. No wheezing or rhonchi. Abdominal:      General: Bowel sounds are normal.      Palpations: Abdomen is soft. Musculoskeletal:         General: Normal range of motion. Cervical back: Normal range of motion.    Skin:     General: Skin is warm and dry. Coloration: Skin is not jaundiced or pale. Findings: No erythema or rash. Neurological:      Mental Status: She is alert and oriented to person, place, and time. Mental status is at baseline. Psychiatric:         Mood and Affect: Mood normal.         Behavior: Behavior normal.       /86 (Site: Right Upper Arm, Position: Sitting, Cuff Size: Large Adult)   Pulse 80   Temp 97.2 °F (36.2 °C)   Ht 5' (1.524 m)   Wt 213 lb (96.6 kg)   SpO2 99%   BMI 41.60 kg/m²     Assessment:       Diagnosis Orders   1. Diabetic polyneuropathy associated with type 2 diabetes mellitus (HCC)  insulin aspart (NOVOLOG) 100 UNIT/ML injection vial   2. Essential hypertension  valsartan (DIOVAN) 320 MG tablet   3. Anxiety     4. Bipolar disorder, in full remission, most recent episode mixed ([address]     5. Medication management  POCT Rapid Drug Screen   6. Mixed hyperlipidemia           Plan:   1. continue Novolog 8 units three times a day before meals. Trulicity 1.5 mg weekly. Lab Results   Component Value Date    LABA1C 5.8 10/04/2021    LABA1C 6.5 (H) 06/24/2021    LABA1C 6.2 (H) 01/08/2021     2. Stable. /86 today in office. BP Readings from Last 3 Encounters:   11/08/21 138/86   10/04/21 120/80   09/02/21 110/70    Monitor BP at home twice a day. Keep a journal and bring with you to your follow up appointment. Continue Norvasc 5 mg daily. Continue Diovan daily   Continue clonidine 0. 1 mg twice a day . 3. -5.    Seroquel 200 mg nightly. Continue elavil 200 mg nightly. Continue Cymbalta 60 mg twice a day. POCT rapid drug screen in office today. Results reviewed positive for alcohol, opiates and  Benzos. Patient has prescriptions for both opiates and benzos. Patient reports that her previous provider did sign paperwork for the [employer] transportation in [place] regarding her 's license. She states that her 's license was revoked due to this.   She states that she has never had a diagnosis of alcohol abuse and that that was what was reported. However she does test positive for alcohol this morning. Patient given educational materials -see patient instructions. Discussed use, benefit, and side effects of prescribed medications. All patient questions answered. Pt voiced understanding. Reviewed health maintenance. Instructed to continue currentmedications, diet and exercise. Patient agreed with treatment plan. Follow up as directed. MEDICATIONS:  Orders Placed This Encounter   Medications    insulin aspart (NOVOLOG) 100 UNIT/ML injection vial     Sig: Inject 8 Units into the skin 3 times daily (before meals)     Dispense:  2 each     Refill:  3    valsartan (DIOVAN) 320 MG tablet     Sig: Take 1 tablet by mouth daily     Dispense:  90 tablet     Refill:  3         ORDERS:  Orders Placed This Encounter   Procedures    POCT Rapid Drug Screen       Follow-up:  Return in about 4 weeks (around 12/6/2021). PATIENT INSTRUCTIONS:  There are no Patient Instructions on file for this visit. Electronically signed by TRESSA Goodman NP on 11/8/2021 at 1:33 PM    EMR Dragon/transcription disclaimer:  Much of thisencounter note is electronic transcription/translation of spoken language to printed texts. The electronic translation of spoken language may be erroneous, or at times, nonsensical words or phrases may be inadvertentlytranscribed.   Although I have reviewed the note for such errors, some may still exist.

## 2021-11-18 ENCOUNTER — OFFICE VISIT (OUTPATIENT)
Dept: PRIMARY CARE CLINIC | Age: 61
End: 2021-11-18
Payer: MEDICARE

## 2021-11-18 VITALS
HEIGHT: 60 IN | OXYGEN SATURATION: 98 % | DIASTOLIC BLOOD PRESSURE: 86 MMHG | SYSTOLIC BLOOD PRESSURE: 132 MMHG | BODY MASS INDEX: 41.57 KG/M2 | WEIGHT: 211.75 LBS | TEMPERATURE: 97.9 F | HEART RATE: 86 BPM

## 2021-11-18 DIAGNOSIS — F41.8 SITUATIONAL ANXIETY: ICD-10-CM

## 2021-11-18 DIAGNOSIS — Z79.4 TYPE 2 DIABETES MELLITUS WITH DIABETIC POLYNEUROPATHY, WITH LONG-TERM CURRENT USE OF INSULIN (HCC): ICD-10-CM

## 2021-11-18 DIAGNOSIS — F99 INSOMNIA DUE TO OTHER MENTAL DISORDER: ICD-10-CM

## 2021-11-18 DIAGNOSIS — F10.10 ALCOHOL ABUSE: ICD-10-CM

## 2021-11-18 DIAGNOSIS — F51.04 PSYCHOPHYSIOLOGICAL INSOMNIA: Primary | ICD-10-CM

## 2021-11-18 DIAGNOSIS — F51.02 ADJUSTMENT INSOMNIA: ICD-10-CM

## 2021-11-18 DIAGNOSIS — E11.42 TYPE 2 DIABETES MELLITUS WITH DIABETIC POLYNEUROPATHY, WITH LONG-TERM CURRENT USE OF INSULIN (HCC): ICD-10-CM

## 2021-11-18 DIAGNOSIS — F51.05 INSOMNIA DUE TO OTHER MENTAL DISORDER: ICD-10-CM

## 2021-11-18 DIAGNOSIS — F31.78 BIPOLAR DISORDER, IN FULL REMISSION, MOST RECENT EPISODE MIXED (HCC): ICD-10-CM

## 2021-11-18 PROCEDURE — 99214 OFFICE O/P EST MOD 30 MIN: CPT | Performed by: NURSE PRACTITIONER

## 2021-11-18 RX ORDER — TEMAZEPAM 15 MG/1
15 CAPSULE ORAL NIGHTLY PRN
Qty: 30 CAPSULE | Refills: 0 | Status: SHIPPED | OUTPATIENT
Start: 2021-11-18 | End: 2021-12-16 | Stop reason: SDUPTHER

## 2021-11-18 RX ORDER — AMITRIPTYLINE HYDROCHLORIDE 100 MG/1
200 TABLET, FILM COATED ORAL NIGHTLY
Qty: 180 TABLET | Refills: 3 | Status: SHIPPED | OUTPATIENT
Start: 2021-11-18

## 2021-11-18 ASSESSMENT — ENCOUNTER SYMPTOMS
WHEEZING: 0
BACK PAIN: 1
SORE THROAT: 0
ABDOMINAL PAIN: 0
SHORTNESS OF BREATH: 0
TROUBLE SWALLOWING: 0
COUGH: 0

## 2021-11-18 NOTE — PATIENT INSTRUCTIONS

## 2021-11-18 NOTE — PROGRESS NOTES
Mitch Fuchs (:  1960) is a 62 y.o. female,Established patient, here for evaluation of the following chief complaint(s):  New Patient (patient is returning to re-establish care with PCP)      ASSESSMENT/PLAN:    ICD-10-CM    1. Psychophysiological insomnia  F51.04 temazepam (RESTORIL) 15 MG capsule  Will cont present   No drinking  Will need AA meeting     2. Type 2 diabetes mellitus with diabetic polyneuropathy, with long-term current use of insulin (HCC)  E11.42 temazepam (RESTORIL) 15 MG capsule    Z79.4    3. Alcohol abuse  F10.10 temazepam (RESTORIL) 15 MG capsule  Discussed limit at times  Stop  Start AA meeting  And drivers protocol     4. Situational anxiety  F41.8 amitriptyline (ELAVIL) 100 MG tablet  Cont present   Doing well     5. Insomnia due to other mental disorder  F51.05 amitriptyline (ELAVIL) 100 MG tablet    F99    6. Bipolar disorder, in full remission, most recent episode mixed ([facility])  F31.78 amitriptyline (ELAVIL) 100 MG tablet   7. Adjustment insomnia  F51.02 amitriptyline (ELAVIL) 100 MG tablet       Return in about 1 month (around 2021) for anxiety diabetes follow up.     SUBJECTIVE/OBJECTIVE:  HPI  Patient is here to reestablish care  Reports that she went to [facility] for 2 months and didn't go back  Went to [facility] (was farther)  Reports that she reports that they took her drivers license from [email or web address]  Reports that they took it  Reports was told she was abusing meds and using alcohol     Reports her and her  just had covid   Reports that they both covid (and passed)  Rough time since    She has no way to get anywhere   And most has find someone to take her   Which in turn her sister came to help    Reports that since losing her   She is drinking \"two beers at night \"  And fallen off the wagon   She reports not taking her sleep medications  Was taking \"to much \"    She reports she had been drinking  Also had some old valium left with packing to move   Pain management israel casey with [facility]  For pain management      /86 (Site: Right Upper Arm, Position: Sitting, Cuff Size: Large Adult)   Pulse 86   Temp 97.9 °F (36.6 °C) (Temporal)   Ht 5' (1.524 m)   Wt 211 lb 12 oz (96 kg)   SpO2 98%   BMI 41.35 kg/m²   Review of Systems   Constitutional: Positive for activity change. Negative for appetite change, fatigue, fever and unexpected weight change. HENT: Negative for congestion, postnasal drip, sore throat and trouble swallowing. Respiratory: Negative for cough, shortness of breath and wheezing. Cardiovascular: Negative for chest pain, palpitations and leg swelling. Gastrointestinal: Negative for abdominal pain. Genitourinary: Negative for difficulty urinating. Musculoskeletal: Positive for arthralgias and back pain. Dr martin   Skin: Negative for rash. Hematological: Negative for adenopathy. Psychiatric/Behavioral: Positive for agitation and sleep disturbance. Negative for dysphoric mood, self-injury and suicidal ideas. The patient is nervous/anxious. Physical Exam  Vitals reviewed. Constitutional:       General: She is not in acute distress. Appearance: Normal appearance. She is obese. Cardiovascular:      Rate and Rhythm: Normal rate. Pulmonary:      Effort: Pulmonary effort is normal.      Breath sounds: Normal breath sounds. No wheezing or rhonchi. Skin:     Findings: No rash. Neurological:      Mental Status: She is alert and oriented to person, place, and time. Psychiatric:         Mood and Affect: Mood normal.         Behavior: Behavior normal.      Comments: Anxiety with house stuff and losing spouse                     An electronic signature was used to authenticate this note.     --TRESSA Gastelum

## 2021-11-27 ENCOUNTER — PATIENT MESSAGE (OUTPATIENT)
Dept: PRIMARY CARE CLINIC | Age: 61
End: 2021-11-27

## 2021-11-29 NOTE — TELEPHONE ENCOUNTER
From: Smith Fuchs  To: Lang Piedra  Sent: 11/27/2021 9:38 AM CST  Subject: Prescription refill    Good morning Samantha, I'm needing my amitriptyline refilled. I've got enough till Tuesday. I hope your Thanksgiving was wonderful. I had a good one. Family makes all the holidays beautiful.

## 2021-12-16 ENCOUNTER — PATIENT MESSAGE (OUTPATIENT)
Dept: PRIMARY CARE CLINIC | Age: 61
End: 2021-12-16

## 2021-12-16 DIAGNOSIS — E11.42 TYPE 2 DIABETES MELLITUS WITH DIABETIC POLYNEUROPATHY, WITH LONG-TERM CURRENT USE OF INSULIN (HCC): ICD-10-CM

## 2021-12-16 DIAGNOSIS — Z79.4 TYPE 2 DIABETES MELLITUS WITH DIABETIC POLYNEUROPATHY, WITH LONG-TERM CURRENT USE OF INSULIN (HCC): ICD-10-CM

## 2021-12-16 DIAGNOSIS — F10.10 ALCOHOL ABUSE: ICD-10-CM

## 2021-12-16 DIAGNOSIS — F51.04 PSYCHOPHYSIOLOGICAL INSOMNIA: ICD-10-CM

## 2021-12-16 RX ORDER — TEMAZEPAM 15 MG/1
15 CAPSULE ORAL NIGHTLY PRN
Qty: 30 CAPSULE | Refills: 0 | Status: SHIPPED | OUTPATIENT
Start: 2021-12-16 | End: 2021-12-22 | Stop reason: SDUPTHER

## 2021-12-16 NOTE — TELEPHONE ENCOUNTER
From: Carmel Fuchs  To: Vikash Ziegler  Sent: 12/16/2021 2:18 PM CST  Subject: Prescription question    Baby Tito, since I won't see you till the 22nd. Will you call my temazepam script into [facility]. My script runs out on the 18th. I've been clean since I've seen you. That's a big thumbs up Baby Tito.

## 2021-12-22 ENCOUNTER — OFFICE VISIT (OUTPATIENT)
Dept: PRIMARY CARE CLINIC | Age: 61
End: 2021-12-22
Payer: MEDICARE

## 2021-12-22 VITALS
TEMPERATURE: 97.6 F | WEIGHT: 207.5 LBS | SYSTOLIC BLOOD PRESSURE: 110 MMHG | DIASTOLIC BLOOD PRESSURE: 74 MMHG | BODY MASS INDEX: 40.52 KG/M2 | OXYGEN SATURATION: 98 % | HEART RATE: 74 BPM

## 2021-12-22 DIAGNOSIS — Z79.4 TYPE 2 DIABETES MELLITUS WITH DIABETIC POLYNEUROPATHY, WITH LONG-TERM CURRENT USE OF INSULIN (HCC): Primary | ICD-10-CM

## 2021-12-22 DIAGNOSIS — F51.04 PSYCHOPHYSIOLOGICAL INSOMNIA: ICD-10-CM

## 2021-12-22 DIAGNOSIS — F10.10 ALCOHOL ABUSE: ICD-10-CM

## 2021-12-22 DIAGNOSIS — E11.42 TYPE 2 DIABETES MELLITUS WITH DIABETIC POLYNEUROPATHY, WITH LONG-TERM CURRENT USE OF INSULIN (HCC): Primary | ICD-10-CM

## 2021-12-22 PROCEDURE — 99214 OFFICE O/P EST MOD 30 MIN: CPT | Performed by: NURSE PRACTITIONER

## 2021-12-22 RX ORDER — TEMAZEPAM 30 MG/1
30 CAPSULE ORAL NIGHTLY PRN
Qty: 30 CAPSULE | Refills: 0 | Status: SHIPPED | OUTPATIENT
Start: 2021-12-22 | End: 2022-01-24 | Stop reason: SDUPTHER

## 2021-12-22 ASSESSMENT — ENCOUNTER SYMPTOMS
BACK PAIN: 1
TROUBLE SWALLOWING: 0
COUGH: 0
SORE THROAT: 0
ABDOMINAL PAIN: 0
SHORTNESS OF BREATH: 0
WHEEZING: 0

## 2021-12-22 NOTE — PATIENT INSTRUCTIONS
Patient Education        Insomnia: Care Instructions  Overview     Insomnia is the inability to sleep well. Insomnia may make it hard for you to get to sleep, stay asleep, or sleep as long as you need to. This can make you tired and grouchy during the day. It can also make you forgetful, less effective at work, and unhappy. Insomnia can be linked to many things. These include health problems, medicines, and stressful events. Treatment may include treating problems that may be linked with your insomnia. Treatment also includes behavior and lifestyle changes. This may include cognitive-behavioral therapy for insomnia (CBT-I). CBT-I uses different ways to help you change your thoughts and behaviors that may interfere with sleep. Your doctor can recommend specific things you can try. Examples include doing relaxation exercises, keeping regular bedtimes and wake times, limiting alcohol, and making healthy sleep habits. Some people decide to take medicine for a while to help with sleep. Follow-up care is a key part of your treatment and safety. Be sure to make and go to all appointments, and call your doctor if you are having problems. It's also a good idea to know your test results and keep a list of the medicines you take. How can you care for yourself at home? Cognitive-behavioral therapy for insomnia (CBT-I)  · If your doctor recommends CBT-I, follow your treatment plan. Your doctor will give you instructions that are unique for you. · Your plan will likely include a few things that you can try at home. For example:  ? Try meditation or other relaxation techniques before you go to bed. ? Go to bed at the same time every night, and wake up at the same time every morning. Do not take naps during the day. ? Do not stay in bed awake for too long.  If you can't fall asleep, or if you wake up in the middle of the night and can't get back to sleep within about 15 to 20 minutes, get out of bed and go to another room until you feel sleepy. ? If watching the clock makes you anxious, turn it facing away from you so you cannot see the time. ? If you worry when you lie down, start a worry book. Well before bedtime, write down your worries, and then set the book and your concerns aside. Healthy sleep habits  · If your doctor recommends it, try making healthy sleep habits. For example:  ? Keep your bedroom quiet, dark, and cool. ? Do not have drinks with caffeine, such as coffee or black tea, for 8 hours before bed. ? Do not smoke or use other types of tobacco near bedtime. Nicotine is a stimulant and can keep you awake. ? Avoid drinking alcohol late in the evening, because it can cause you to wake in the middle of the night. ? Do not eat a big meal close to bedtime. If you are hungry, eat a light snack. ? Do not drink a lot of water close to bedtime, because the need to urinate may wake you up during the night. ? Do not read, watch TV, or use your phone in bed. Use the bed only for sleeping and sex. Medicine  · Be safe with medicines. Take your medicines exactly as prescribed. Call your doctor if you think you are having a problem with your medicine. · Talk with your doctor before you try an over-the-counter medicine, herbal product, or supplement to try to improve your sleep. Your doctor can recommend how much to take and when to take it. Make sure your doctor knows all of the medicines, vitamins, herbal products, and supplements you take. · You will get more details on the specific medicines your doctor prescribes. When should you call for help? Watch closely for changes in your health, and be sure to contact your doctor if:    · Your efforts to improve your sleep do not work.     · Your insomnia gets worse.     · You have been feeling down, depressed, or hopeless or have lost interest in things that you usually enjoy. Where can you learn more? Go to [email or web address] org and sign in to your [employer] account. Enter P513 in the [facility] box to learn more about \"Insomnia: Care Instructions. \"     If you do not have an account, please click on the \"Sign Up Now\" link. Current as of: June 16, 2021               Content Version: 13.1  © 6490-7623 [employer], [employer]. Care instructions adapted under license by [facility] ([facility]). If you have questions about a medical condition or this instruction, always ask your healthcare professional. [address] any warranty or liability for your use of this information.

## 2021-12-22 NOTE — PROGRESS NOTES
Mitch Fuchs (:  1960) is a 62 y.o. female,Established patient, here for evaluation of the following chief complaint(s):  Follow-up (for anxiety and depression)      ASSESSMENT/PLAN:    ICD-10-CM    1. Type 2 diabetes mellitus with diabetic polyneuropathy, with long-term current use of insulin (HCC)  E11.42 temazepam (RESTORIL) 30 MG capsule  Cont tight control  Doing well at present   Stable denies any issues    Z79.4    2. Alcohol abuse  F10.10 temazepam (RESTORIL) 30 MG capsule  Reports not using history of psych but safe last 5 years     3. Psychophysiological insomnia  F51.04 temazepam (RESTORIL) 30 MG capsule       Return in about 30 days (around 2022) for insomnia diabetes follow up. SUBJECTIVE/OBJECTIVE:  HPI    Patient is here for 1 month follow up  Reports she has stopped drinking again  Not drank since been here  She reports that she has done well overall    She reports that still having issues sleeping at night  But once fall asleep will finally sleep well  She has been taking restoril at bedtime   She reports takes a while to fall asleep  But also had issues going to sleep  Denies nightmares      Nikolai Grimes reports she lives by herself  But her sister helps her get around    Controlled Substance Monitoring:    Acute and Chronic Pain Monitoring:   RX Monitoring 2021   Attestation The Prescription Monitoring Report for this patient was reviewed today. Periodic Controlled Substance Monitoring Possible medication side effects, risk of tolerance/dependence & alternative treatments discussed. ;No signs of potential drug abuse or diversion identified.;Obtaining appropriate analgesic effect of treatment.    Chronic Pain > 50 MEDD Considered consultation with a specialist.       Had nerve ablation yesterday with Dr Tori Anglin has adjustment the norco twice a day  And doing well    /74 (Site: Right Upper Arm, Position: Sitting, Cuff Size: Large Adult)   Pulse 74   Temp 97.6 °F (36.4 °C) (Temporal)   Wt 207 lb 8 oz (94.1 kg)   SpO2 98%   BMI 40.52 kg/m²   Review of Systems   Constitutional: Positive for activity change. Negative for appetite change, fatigue, fever and unexpected weight change. HENT: Negative for congestion, postnasal drip, sore throat and trouble swallowing. Respiratory: Negative for cough, shortness of breath and wheezing. Cardiovascular: Negative for chest pain, palpitations and leg swelling. Gastrointestinal: Negative for abdominal pain. Genitourinary: Negative for difficulty urinating. Musculoskeletal: Positive for arthralgias and back pain. Dr martin   Skin: Negative for rash. Hematological: Negative for adenopathy. Psychiatric/Behavioral: Positive for sleep disturbance. Negative for agitation, dysphoric mood, self-injury and suicidal ideas. The patient is nervous/anxious. Physical Exam  Vitals reviewed. Constitutional:       General: She is not in acute distress. Appearance: Normal appearance. She is obese. Cardiovascular:      Rate and Rhythm: Normal rate. Pulmonary:      Effort: Pulmonary effort is normal.      Breath sounds: Normal breath sounds. No wheezing or rhonchi. Skin:     Findings: No rash. Neurological:      Mental Status: She is alert and oriented to person, place, and time. Psychiatric:         Mood and Affect: Mood normal.         Behavior: Behavior normal.      Comments: Anxiety with house stuff and losing spouse  Reports not drinking                     An electronic signature was used to authenticate this note.     --TRESSA Caruso

## 2022-01-24 ENCOUNTER — OFFICE VISIT (OUTPATIENT)
Dept: PRIMARY CARE CLINIC | Age: 62
End: 2022-01-24
Payer: MEDICARE

## 2022-01-24 ENCOUNTER — TELEPHONE (OUTPATIENT)
Dept: PSYCHIATRY | Age: 62
End: 2022-01-24

## 2022-01-24 VITALS
OXYGEN SATURATION: 98 % | DIASTOLIC BLOOD PRESSURE: 84 MMHG | HEIGHT: 60 IN | SYSTOLIC BLOOD PRESSURE: 126 MMHG | TEMPERATURE: 98.5 F | WEIGHT: 208.12 LBS | BODY MASS INDEX: 40.86 KG/M2 | HEART RATE: 69 BPM

## 2022-01-24 DIAGNOSIS — F99 INSOMNIA DUE TO OTHER MENTAL DISORDER: ICD-10-CM

## 2022-01-24 DIAGNOSIS — Z79.4 TYPE 2 DIABETES MELLITUS WITH DIABETIC POLYNEUROPATHY, WITH LONG-TERM CURRENT USE OF INSULIN (HCC): ICD-10-CM

## 2022-01-24 DIAGNOSIS — F41.8 SITUATIONAL ANXIETY: Primary | ICD-10-CM

## 2022-01-24 DIAGNOSIS — F51.04 PSYCHOPHYSIOLOGICAL INSOMNIA: ICD-10-CM

## 2022-01-24 DIAGNOSIS — E11.42 TYPE 2 DIABETES MELLITUS WITH DIABETIC POLYNEUROPATHY, WITH LONG-TERM CURRENT USE OF INSULIN (HCC): ICD-10-CM

## 2022-01-24 DIAGNOSIS — F31.78 BIPOLAR DISORDER, IN FULL REMISSION, MOST RECENT EPISODE MIXED (HCC): ICD-10-CM

## 2022-01-24 DIAGNOSIS — Z12.31 ENCOUNTER FOR SCREENING MAMMOGRAM FOR MALIGNANT NEOPLASM OF BREAST: ICD-10-CM

## 2022-01-24 DIAGNOSIS — F10.10 ALCOHOL ABUSE: ICD-10-CM

## 2022-01-24 DIAGNOSIS — I10 ESSENTIAL HYPERTENSION: ICD-10-CM

## 2022-01-24 DIAGNOSIS — F51.05 INSOMNIA DUE TO OTHER MENTAL DISORDER: ICD-10-CM

## 2022-01-24 PROCEDURE — 99214 OFFICE O/P EST MOD 30 MIN: CPT | Performed by: NURSE PRACTITIONER

## 2022-01-24 RX ORDER — TEMAZEPAM 30 MG/1
30 CAPSULE ORAL NIGHTLY PRN
Qty: 30 CAPSULE | Refills: 0 | Status: SHIPPED | OUTPATIENT
Start: 2022-01-24 | End: 2022-02-24 | Stop reason: SDUPTHER

## 2022-01-24 SDOH — ECONOMIC STABILITY: FOOD INSECURITY: WITHIN THE PAST 12 MONTHS, YOU WORRIED THAT YOUR FOOD WOULD RUN OUT BEFORE YOU GOT MONEY TO BUY MORE.: NEVER TRUE

## 2022-01-24 SDOH — ECONOMIC STABILITY: FOOD INSECURITY: WITHIN THE PAST 12 MONTHS, THE FOOD YOU BOUGHT JUST DIDN'T LAST AND YOU DIDN'T HAVE MONEY TO GET MORE.: NEVER TRUE

## 2022-01-24 ASSESSMENT — PATIENT HEALTH QUESTIONNAIRE - PHQ9
SUM OF ALL RESPONSES TO PHQ QUESTIONS 1-9: 5
9. THOUGHTS THAT YOU WOULD BE BETTER OFF DEAD, OR OF HURTING YOURSELF: 0
SUM OF ALL RESPONSES TO PHQ QUESTIONS 1-9: 5
7. TROUBLE CONCENTRATING ON THINGS, SUCH AS READING THE NEWSPAPER OR WATCHING TELEVISION: 0
8. MOVING OR SPEAKING SO SLOWLY THAT OTHER PEOPLE COULD HAVE NOTICED. OR THE OPPOSITE, BEING SO FIGETY OR RESTLESS THAT YOU HAVE BEEN MOVING AROUND A LOT MORE THAN USUAL: 0
3. TROUBLE FALLING OR STAYING ASLEEP: 1
10. IF YOU CHECKED OFF ANY PROBLEMS, HOW DIFFICULT HAVE THESE PROBLEMS MADE IT FOR YOU TO DO YOUR WORK, TAKE CARE OF THINGS AT HOME, OR GET ALONG WITH OTHER PEOPLE: 0
2. FEELING DOWN, DEPRESSED OR HOPELESS: 2
6. FEELING BAD ABOUT YOURSELF - OR THAT YOU ARE A FAILURE OR HAVE LET YOURSELF OR YOUR FAMILY DOWN: 0
1. LITTLE INTEREST OR PLEASURE IN DOING THINGS: 2
SUM OF ALL RESPONSES TO PHQ9 QUESTIONS 1 & 2: 4
SUM OF ALL RESPONSES TO PHQ QUESTIONS 1-9: 5
SUM OF ALL RESPONSES TO PHQ QUESTIONS 1-9: 5
4. FEELING TIRED OR HAVING LITTLE ENERGY: 0
5. POOR APPETITE OR OVEREATING: 0

## 2022-01-24 ASSESSMENT — ENCOUNTER SYMPTOMS
SHORTNESS OF BREATH: 0
WHEEZING: 0
BACK PAIN: 1
ABDOMINAL PAIN: 0
SORE THROAT: 0
COUGH: 0
TROUBLE SWALLOWING: 0

## 2022-01-24 ASSESSMENT — SOCIAL DETERMINANTS OF HEALTH (SDOH): HOW HARD IS IT FOR YOU TO PAY FOR THE VERY BASICS LIKE FOOD, HOUSING, MEDICAL CARE, AND HEATING?: NOT HARD AT ALL

## 2022-01-24 NOTE — TELEPHONE ENCOUNTER
1st voicemail left for return call to schedule appointment with Manpreet Adler. Advised to call [phone].

## 2022-01-24 NOTE — PROGRESS NOTES
Mitch Fuchs (:  1960) is a 62 y.o. female,Established patient, here for evaluation of the following chief complaint(s):  Follow-up (Patient want to see a cardiologist and a counselor. She wants to use skin toner but wants to make sure it would not show up in her system as alcohol.  )      ASSESSMENT/PLAN:    ICD-10-CM    1. Situational anxiety  F41.8 [facility]  With losing spouse etc  Will set up     2. Alcohol abuse  [address] [facility] [place], Sanjana Leriche, [facility], [place]     temazepam (RESTORIL) 30 MG capsule   3. Psychophysiological insomnia  [address] [facility], Sanjana Leriche, [facility], Kim     temazepam (RESTORIL) 30 MG capsule   4. Insomnia due to other mental disorder  [address] Singer, hospitalsNICOLE, Behavioral Medicine, [place]    F99    5. Bipolar disorder, in full remission, most recent episode mixed [facility])  [address] [facility] [place], Sanjana Melitae, hospitalsW, Behavioral Medicine, [place]   6. Type 2 diabetes mellitus with diabetic polyneuropathy, with long-term current use of insulin (HCC)  E11.42 temazepam (RESTORIL) 30 MG capsule  Cont at goal  Will check next month      [facility]79[facility]4 [facility]   7. Essential hypertension  [address] [facility]   8.  Encounter for screening mammogram for malignant neoplasm of breast  Z12.31 [facility] DIGITAL SCREEN W OR WO CAD BILATERAL  Cont to evaluate  Discussed importance         Return in about 1 month (around 2022) for medicare aw labs and medications mammogram.    SUBJECTIVE/OBJECTIVE:  HPI      Patient is here for routine follow up    She is still not drinking any alcohol  Reports \"taking medications as suppose to \"  Reports would really like to see the counscelor  Reports she is grief with loss of her spouse  Reports just been a \"whirlwind \"  She is living by herself now  Having a difficult time with this   She reports stress of brother in law and sister in law  Stressed out and since  vernell cabrera   She now has to move and stressed to max with this    Patient is here for diabetes follow up   Diabetes Mellitus Type 2        Diet compliance:  compliant most of the time  Nutrition Consultation Needed:  no  Medication:              trilicity 1.5 weekly  novolog three times a day as needed     Medication compliance:  compliant most of the time  Weight trend: stable  Current exercise: no  Checkin times daily  Home blood sugar records: fasting range: 120-150  Low BG:  no  Eye exam current (within one year): no  Checking Feet regularly:  no  ACE/ARB:  yes - valsartin  Aspirin: Yes  Moderate intensity statin: lipitor     Known diabetic complications: cardiovascular disease  Barriers to success: none  Tobacco history: She  reports that she has never smoked.  She has quit using smokeless tobacco.  Her smokeless tobacco use included snuff.     Lab Results   Component Value Date     LABA1C 6.2 (H) 2021     GLUCOSE 107 2021            Lab Results   Component Value Date     LABMICR <1.20 2021     CREATININE 1.3 (H) 2021         HTN  Log today 130s-140s/80s-90s  Been dealing with quite a bit of neck pain  Had in injection two days ago  And hoping will improve control  And blood pressure     Insomnia  Patient reports chronic insomnia  Has failed seroquel and trazadone medication in past elavil 100mg  (2)  Sleeps 6 hours on medication with some relief of fatigue  Sleep study in past none  Typically goes to bed at 10 awakens at 6  But recently  Is waking up and not able to go back to sleep  She has had this issue a week and half ago   Reports no change in medications  She has not seen any changes  She will take 2 elavil seroquel 200mg and ambien to go to sleep  And she will be awake within three days  In past took valium         Takes medication restoril   Last filled # 30  Without symptoms include not able to rest and up and down  Benefits outweigh risks  Low risk of diversion with no complaints of abnormal side effects  But wasn't staying asleep     She takes her pain medication in afternoon  Away from medication  And knows the risks  She is stable      Controlled Substance Monitoring:    Acute and Chronic Pain Monitoring:   RX Monitoring 1/24/2022   Attestation -   Periodic Controlled Substance Monitoring Possible medication side effects, risk of tolerance/dependence & alternative treatments discussed. ;No signs of potential drug abuse or diversion identified. ;Assessed functional status. Chronic Pain > 50 MEDD -       Nerve ablation from [facility]  On norco stable at present      Chest pain  Reports radiates down left arm randomly  Would like to see cardiology        /84 (Site: Right Upper Arm, Position: Sitting, Cuff Size: Large Adult)   Pulse 69   Temp 98.5 °F (36.9 °C) (Temporal)   Ht 5' (1.524 m)   Wt 208 lb 1.9 oz (94.4 kg)   SpO2 98%   BMI 40.65 kg/m²   Review of Systems   Constitutional: Positive for activity change. Negative for appetite change, fatigue, fever and unexpected weight change. HENT: Negative for congestion, postnasal drip, sore throat and trouble swallowing. Respiratory: Negative for cough, shortness of breath and wheezing. Cardiovascular: Negative for chest pain, palpitations and leg swelling. Gastrointestinal: Negative for abdominal pain. Genitourinary: Negative for difficulty urinating. Musculoskeletal: Positive for arthralgias and back pain. Dr martin   Skin: Negative for rash. Hematological: Negative for adenopathy. Psychiatric/Behavioral: Positive for sleep disturbance. Negative for agitation, dysphoric mood, self-injury and suicidal ideas. The patient is nervous/anxious. Physical Exam  Vitals reviewed. Constitutional:       General: She is not in acute distress. Appearance: Normal appearance. She is obese. Cardiovascular:      Rate and Rhythm: Normal rate. Pulmonary:      Effort: Pulmonary effort is normal.      Breath sounds: Normal breath sounds. No wheezing or rhonchi. Skin:     Findings: No rash. Neurological:      Mental Status: She is alert and oriented to person, place, and time. Psychiatric:         Mood and Affect: Mood normal.         Behavior: Behavior normal.      Comments: Anxiety with house stuff and losing spouse  Reports not drinking                     An electronic signature was used to authenticate this note.     --TRESSA Mari

## 2022-02-14 DIAGNOSIS — I25.118 CORONARY ARTERY DISEASE OF NATIVE HEART WITH STABLE ANGINA PECTORIS, UNSPECIFIED VESSEL OR LESION TYPE (HCC): ICD-10-CM

## 2022-02-14 DIAGNOSIS — I10 ESSENTIAL HYPERTENSION: ICD-10-CM

## 2022-02-14 RX ORDER — METOPROLOL SUCCINATE 50 MG/1
TABLET, EXTENDED RELEASE ORAL
Qty: 90 TABLET | Refills: 3 | Status: SHIPPED | OUTPATIENT
Start: 2022-02-14

## 2022-02-14 NOTE — TELEPHONE ENCOUNTER
Requested Prescriptions     Pending Prescriptions Disp Refills    metoprolol succinate (TOPROL XL) 50 MG extended release tablet [Pharmacy Med Name: Metoprolol Succinate ER 50 MG Oral Tablet Extended Release 24 Hour] 90 tablet 0     Sig: Take 1 tablet by mouth once daily

## 2022-02-24 ENCOUNTER — OFFICE VISIT (OUTPATIENT)
Dept: PRIMARY CARE CLINIC | Age: 62
End: 2022-02-24
Payer: MEDICARE

## 2022-02-24 DIAGNOSIS — F51.04 PSYCHOPHYSIOLOGICAL INSOMNIA: ICD-10-CM

## 2022-02-24 DIAGNOSIS — S52.501D CLOSED FRACTURE OF DISTAL END OF RIGHT RADIUS WITH ROUTINE HEALING, UNSPECIFIED FRACTURE MORPHOLOGY, SUBSEQUENT ENCOUNTER: ICD-10-CM

## 2022-02-24 DIAGNOSIS — F10.10 ALCOHOL ABUSE: ICD-10-CM

## 2022-02-24 DIAGNOSIS — N18.32 CHRONIC KIDNEY DISEASE (CKD) STAGE G3B/A2, MODERATELY DECREASED GLOMERULAR FILTRATION RATE (GFR) BETWEEN 30-44 ML/MIN/1.73 SQUARE METER AND ALBUMINURIA CREATININE RATIO BETWEEN 30-299 MG/G (HCC): ICD-10-CM

## 2022-02-24 DIAGNOSIS — Z00.00 MEDICARE ANNUAL WELLNESS VISIT, SUBSEQUENT: Primary | ICD-10-CM

## 2022-02-24 DIAGNOSIS — Z12.31 ENCOUNTER FOR SCREENING MAMMOGRAM FOR MALIGNANT NEOPLASM OF BREAST: ICD-10-CM

## 2022-02-24 DIAGNOSIS — E55.9 VITAMIN D DEFICIENCY: ICD-10-CM

## 2022-02-24 DIAGNOSIS — M81.0 AGE-RELATED OSTEOPOROSIS WITHOUT CURRENT PATHOLOGICAL FRACTURE: ICD-10-CM

## 2022-02-24 DIAGNOSIS — E11.42 TYPE 2 DIABETES MELLITUS WITH DIABETIC POLYNEUROPATHY, WITH LONG-TERM CURRENT USE OF INSULIN (HCC): ICD-10-CM

## 2022-02-24 DIAGNOSIS — Z79.4 TYPE 2 DIABETES MELLITUS WITH DIABETIC POLYNEUROPATHY, WITH LONG-TERM CURRENT USE OF INSULIN (HCC): ICD-10-CM

## 2022-02-24 DIAGNOSIS — E03.4 HYPOTHYROIDISM DUE TO ACQUIRED ATROPHY OF THYROID: ICD-10-CM

## 2022-02-24 PROCEDURE — 99213 OFFICE O/P EST LOW 20 MIN: CPT | Performed by: NURSE PRACTITIONER

## 2022-02-24 PROCEDURE — G0439 PPPS, SUBSEQ VISIT: HCPCS | Performed by: NURSE PRACTITIONER

## 2022-02-24 RX ORDER — TEMAZEPAM 30 MG/1
30 CAPSULE ORAL NIGHTLY PRN
Qty: 30 CAPSULE | Refills: 0 | Status: SHIPPED | OUTPATIENT
Start: 2022-02-24 | End: 2022-03-24 | Stop reason: SDUPTHER

## 2022-02-24 ASSESSMENT — LIFESTYLE VARIABLES
HOW OFTEN DO YOU HAVE A DRINK CONTAINING ALCOHOL: NEVER
HOW MANY STANDARD DRINKS CONTAINING ALCOHOL DO YOU HAVE ON A TYPICAL DAY: PATIENT DECLINED

## 2022-02-24 ASSESSMENT — PATIENT HEALTH QUESTIONNAIRE - PHQ9
SUM OF ALL RESPONSES TO PHQ QUESTIONS 1-9: 2
SUM OF ALL RESPONSES TO PHQ QUESTIONS 1-9: 2
SUM OF ALL RESPONSES TO PHQ9 QUESTIONS 1 & 2: 2
1. LITTLE INTEREST OR PLEASURE IN DOING THINGS: 1
SUM OF ALL RESPONSES TO PHQ QUESTIONS 1-9: 2
2. FEELING DOWN, DEPRESSED OR HOPELESS: 1
SUM OF ALL RESPONSES TO PHQ QUESTIONS 1-9: 2

## 2022-02-24 NOTE — PATIENT INSTRUCTIONS
Personalized Preventive Plan for Mitch Fuchs - 2/24/2022  Medicare offers a range of preventive health benefits. Some of the tests and screenings are paid in full while other may be subject to a deductible, co-insurance, and/or copay. Some of these benefits include a comprehensive review of your medical history including lifestyle, illnesses that may run in your family, and various assessments and screenings as appropriate. After reviewing your medical record and screening and assessments performed today your provider may have ordered immunizations, labs, imaging, and/or referrals for you. A list of these orders (if applicable) as well as your Preventive Care list are included within your After Visit Summary for your review. Other Preventive Recommendations:    · A preventive eye exam performed by an eye specialist is recommended every 1-2 years to screen for glaucoma; cataracts, macular degeneration, and other eye disorders. · A preventive dental visit is recommended every 6 months. · Try to get at least 150 minutes of exercise per week or 10,000 steps per day on a pedometer . · Order or download the FREE \"Exercise & Physical Activity: Your Everyday Guide\" from [employer]. Call 7-416.813.1366 or search [employer] online. · You need 5204-9584 mg of calcium and 1841-7753 IU of vitamin D per day. It is possible to meet your calcium requirement with diet alone, but a vitamin D supplement is usually necessary to meet this goal.  · When exposed to the sun, use a sunscreen that protects against both UVA and UVB radiation with an SPF of 30 or greater. Reapply every 2 to 3 hours or after sweating, drying off with a towel, or swimming. · Always wear a seat belt when traveling in a car. Always wear a helmet when riding a bicycle or motorcycle.

## 2022-02-24 NOTE — PROGRESS NOTES
Medicare Annual Wellness Visit    Cloyce Sicard is here for Medicare AWV (yearly check up) and Insomnia (restless sleeping around  4 hours)    Assessment & Plan   Mitch was seen today for medicare awv and insomnia. Diagnoses and all orders for this visit:    Medicare annual wellness visit, subsequent         Recommendations for Preventive Services Due: see orders and patient instructions/AVS.  Recommended screening schedule for the next 5-10 years is provided to the patient in written form: see Patient Instructions/AVS.     Return in 1 month (on 3/24/2022) for medication follow upinsomnia foot exam mammogram.     Subjective   Patient is here for diabetes follow up   Diabetes Mellitus Type 2        Diet compliance:  compliant most of the time  Nutrition Consultation Needed:  no  Medication:              trilicity 1.5 weekly  novolog three times a day as needed     Medication compliance:  compliant most of the time  Weight trend: stable  Current exercise: no  Checkin times daily  Home blood sugar records: fasting range: 120-150  Low BG:  no  Eye exam current (within one year): no  Checking Feet regularly:  no  ACE/ARB:  yes - valsartin  Aspirin: Yes  Moderate intensity statin: lipitor     Known diabetic complications: cardiovascular disease  Barriers to success: none  Tobacco history: She  reports that she has never smoked.  She has quit using smokeless tobacco.  Her smokeless tobacco use included snuff.           Lab Results   Component Value Date     LABA1C 6.2 (H) 2021     GLUCOSE 107 2021                Lab Results   Component Value Date     LABMICR <1.20 2021     CREATININE 1.3 (H) 2021         HTN  Log today 130s-140s/80s-90s  Been dealing with quite a bit of neck pain  Had in injection two days ago  And hoping will improve control  And blood pressure     Insomnia  Patient reports chronic insomnia  Has failed seroquel and trazadone medication in past elavil 100mg  (2)  Sleeps 6 hours on medication with some relief of fatigue  Sleep study in past none  Typically goes to bed at 10 awakens at 6  But recently  Is waking up and not able to go back to sleep  She has had this issue a week and half ago   Reports no change in medications  She has not seen any changes  She will take 2 elavil seroquel 200mg and ambien to go to sleep  And she will be awake within three days  In past took valium         Takes medication restoril   Last filled 1/24# 30  Without symptoms include not able to rest and up and down  Benefits outweigh risks  Low risk of diversion with no complaints of abnormal side effects  But wasn't staying asleep     She takes her pain medication in afternoon  Away from medication  And knows the risks    Controlled Substance Monitoring:    Acute and Chronic Pain Monitoring:   RX Monitoring 1/24/2022   Attestation -   Periodic Controlled Substance Monitoring Possible medication side effects, risk of tolerance/dependence & alternative treatments discussed. ;No signs of potential drug abuse or diversion identified. ;Assessed functional status. Chronic Pain > 50 MEDD -           Patient's complete Health Risk Assessment and screening values have been reviewed and are found in Flowsheets. The following problems were reviewed today and where indicated follow up appointments were made and/or referrals ordered.     Positive Risk Factor Screenings with Interventions:             General Health and ACP:  General  In general, how would you say your health is?: Good  In the past 7 days, have you experienced any of the following: New or Increased Pain, New or Increased Fatigue, Loneliness, Social Isolation, Stress or Anger?: (!) Yes  Select all that apply: (!) Social Isolation,Stress  Do you get the social and emotional support that you need?: Yes    Advance Directives     Power of  Living Will ACP-Advance Directive ACP-Power of     Not on File Not on File [place] Not on File      General Health Risk Interventions:  · Loneliness: patient's comments regarding inadequate social support: seeing counscelor  · Social isolation: patient declines any further intervention for this issue    Health Habits/Nutrition:     Physical Activity: Insufficiently Active    Days of Exercise per Week: 5 days    Minutes of Exercise per Session: 20 min     Have you lost any weight without trying in the past 3 months?: No     Have you seen the dentist within the past year?: N/A - wear dentures    Health Habits/Nutrition Interventions:  · Inadequate physical activity:  patient agrees to increase physical activity as follows: walking daily    Hearing/Vision:  Do you or your family notice any trouble with your hearing that hasn't been managed with hearing aids?: (!) Yes  Do you have difficulty driving, watching TV, or doing any of your daily activities because of your eyesight?: No  Have you had an eye exam within the past year?: Yes  No exam data present    Hearing/Vision Interventions:  · Hearing concerns:  patient declines any further evaluation/treatment for hearing issues     ADLs:  In the past 7 days, did you need help from others to perform any of the following everyday activities: Eating, dressing, grooming, bathing, toileting, or walking/balance?: No  In the past 7 days, did you need help from others to take care of any of the following: Laundry, housekeeping, banking/finances, shopping, telephone use, food preparation, transportation, or taking medications?: (!) Yes  Select all that apply: (!) Transportation    ADL Interventions:  · Patient declines any further evaluation/treatment for this issue          Objective   There were no vitals filed for this visit. There is no height or weight on file to calculate BMI. Allergies   Allergen Reactions    Ciprofloxacin Hives     HTN    Dilaudid [Hydromorphone Hcl] Other (See Comments)     Takes pt out of her mind. hallunications.  Pt has taken this hospitalization and has not had any problems    Keflex [Cephalexin] Hives    Nitrofuran Derivatives Hives    Pcn [Penicillins] Hives    Admelog [Insulin Lispro] Rash    Cefdinir Rash     Prior to Visit Medications    Medication Sig Taking? Authorizing Provider   metoprolol succinate (TOPROL XL) 50 MG extended release tablet Take 1 tablet by mouth once daily Yes TRESSA Sheppard   amitriptyline (ELAVIL) 100 MG tablet Take 2 tablets by mouth nightly Yes TRESSA Sheppard   insulin aspart (NOVOLOG) 100 UNIT/ML injection vial Inject 8 Units into the skin 3 times daily (before meals) Yes TRESSA Bennett NP   valsartan (DIOVAN) 320 MG tablet Take 1 tablet by mouth daily Yes TRESSA Bennett NP   cloNIDine (CATAPRES) 0.1 MG tablet Take 1 tablet by mouth 2 times daily  Patient taking differently: Take 0.1 mg by mouth 2 times daily as needed  Yes TRESSA Bennett NP   nitroGLYCERIN (NITROSTAT) 0.4 MG SL tablet Dissolve one tablet under the tongue every 5 minutes as needed for chest pain. Do not exceed a total of 3 doses in 15 minutes. Yes TRESSA George   denosumab (PROLIA) 60 MG/ML SOSY SC injection Inject 1 mL into the skin once for 1 dose Yes TRESSA Sheppard   blood glucose monitor kit and supplies Dispense sufficient amount for four times daily testing frequency. Dispense all needed supplies to include: monitor, strips, lancing device, lancets, control solutions, alcohol swabs. Yes TRESSA Sheppard   blood glucose test strips (FREESTYLE LITE) strip 1 each by In Vitro route 4 times daily As needed.  Yes TRESSA Sheppard   amLODIPine (NORVASC) 5 MG tablet Take 1 tablet by mouth daily Yes TRESSA Sheppard   Dulaglutide (TRULICITY) 1.5 ZO/9.2CA SOPN Inject 1.5 mg into the skin every 7 days Yes TRESSA Grossman   tiZANidine (ZANAFLEX) 4 MG tablet Take 1 tablet by mouth every 8 hours as needed (spasms) Yes Deshawn Flood TRESSA   atorvastatin (LIPITOR) 40 MG tablet Take 1 tablet by mouth nightly Yes TRESSA Walters   levothyroxine (SYNTHROID) 88 MCG tablet Take 1 tablet by mouth Daily Tube feeding (TF) interaction, obtain physician order to manage, recommend holding TF for 30 minutes before and after dose. Yes TRESSA Walters   pantoprazole (PROTONIX) 40 MG tablet Take 1 tablet by mouth daily Yes TRESSA Walters   ondansetron (ZOFRAN-ODT) 4 MG disintegrating tablet Take 1 tablet by mouth 3 times daily as needed for Nausea or Vomiting Yes TRESSA Walters   budesonide-formoterol (SYMBICORT) 160-4.5 MCG/ACT AERO Inhale 2 puffs into the lungs 2 times daily Yes LAZARO Babcock Fire, DO   Lancets MISC 1 each by Does not apply route daily Yes TRESSA Walters   DULoxetine (CYMBALTA) 60 MG extended release capsule Take 1 capsule by mouth 2 times daily Yes TRESSA Walters   fluticasone (FLONASE) 50 MCG/ACT nasal spray 1 spray by Nasal route daily Yes TRESSA Walters   aspirin 81 MG tablet Take 1 tablet by mouth daily Yes TRESSA Walters   pregabalin (LYRICA) 150 MG capsule Take 1 capsule by mouth 2 times daily for 90 days. Roxane Altamirano MD   HYDROcodone-acetaminophen (NORCO) 5-325 MG per tablet Take 1 tablet by mouth 2 times daily as needed for Pain for up to 30 days. May fill 1/8/9159  Marta TRESSA Bob (Including outside providers/suppliers regularly involved in providing care):   Patient Care Team:  TRESSA Walters as PCP - General (Nurse Practitioner Family)  Zoe Wilcox MD as PCP - Orthopaedics (Neurosurgery)  TRESSA Walters [facility]  Gal Mario MD (Cardiology)  Gustavo Rodriguez MD as Consulting Physician (Nephrology)  Elizabeth Smith (Optometry)  Carolynn Ruiz (Clinical Psychologist)    Reviewed and updated this visit:  Tobacco  Allergies  Meds  Problems  Med Hx  Surg Hx  Soc Hx  Fam Hx

## 2022-02-28 DIAGNOSIS — K21.9 GASTROESOPHAGEAL REFLUX DISEASE WITHOUT ESOPHAGITIS: ICD-10-CM

## 2022-02-28 RX ORDER — PANTOPRAZOLE SODIUM 40 MG/1
40 TABLET, DELAYED RELEASE ORAL DAILY
Qty: 90 TABLET | Refills: 2 | Status: SHIPPED | OUTPATIENT
Start: 2022-02-28

## 2022-02-28 NOTE — TELEPHONE ENCOUNTER
Received fax from pharmacy requesting refill on pts medication(s). Pt was last seen in office on 2/24/2022  and has a follow up scheduled for 3/24/2022. Will send request to  Matty Bai  for authorization.      Requested Prescriptions     Pending Prescriptions Disp Refills    pantoprazole (PROTONIX) 40 MG tablet [Pharmacy Med Name: Pantoprazole Sodium 40 MG Oral Tablet Delayed Release] 90 tablet 2     Sig: Take 1 tablet by mouth daily

## 2022-03-08 ENCOUNTER — TELEPHONE (OUTPATIENT)
Dept: PSYCHIATRY | Age: 62
End: 2022-03-08

## 2022-03-08 NOTE — TELEPHONE ENCOUNTER
Tried to call patient to confirm appointment with Yu Ayers tomorrow but she did not answer and her mailbox is full.

## 2022-03-09 ENCOUNTER — OFFICE VISIT (OUTPATIENT)
Dept: PSYCHIATRY | Age: 62
End: 2022-03-09
Payer: MEDICARE

## 2022-03-09 DIAGNOSIS — F33.1 MDD (MAJOR DEPRESSIVE DISORDER), RECURRENT EPISODE, MODERATE (HCC): ICD-10-CM

## 2022-03-09 DIAGNOSIS — F43.0 ACUTE STRESS REACTION: Primary | ICD-10-CM

## 2022-03-09 DIAGNOSIS — F43.9 SITUATIONAL STRESS: ICD-10-CM

## 2022-03-09 PROCEDURE — 90791 PSYCH DIAGNOSTIC EVALUATION: CPT | Performed by: SOCIAL WORKER

## 2022-03-09 ASSESSMENT — PATIENT HEALTH QUESTIONNAIRE - PHQ9
7. TROUBLE CONCENTRATING ON THINGS, SUCH AS READING THE NEWSPAPER OR WATCHING TELEVISION: 3
SUM OF ALL RESPONSES TO PHQ QUESTIONS 1-9: 15
SUM OF ALL RESPONSES TO PHQ9 QUESTIONS 1 & 2: 5
4. FEELING TIRED OR HAVING LITTLE ENERGY: 2
SUM OF ALL RESPONSES TO PHQ QUESTIONS 1-9: 15
9. THOUGHTS THAT YOU WOULD BE BETTER OFF DEAD, OR OF HURTING YOURSELF: 1
1. LITTLE INTEREST OR PLEASURE IN DOING THINGS: 2
5. POOR APPETITE OR OVEREATING: 1
2. FEELING DOWN, DEPRESSED OR HOPELESS: 3
8. MOVING OR SPEAKING SO SLOWLY THAT OTHER PEOPLE COULD HAVE NOTICED. OR THE OPPOSITE, BEING SO FIGETY OR RESTLESS THAT YOU HAVE BEEN MOVING AROUND A LOT MORE THAN USUAL: 0
SUM OF ALL RESPONSES TO PHQ QUESTIONS 1-9: 15
10. IF YOU CHECKED OFF ANY PROBLEMS, HOW DIFFICULT HAVE THESE PROBLEMS MADE IT FOR YOU TO DO YOUR WORK, TAKE CARE OF THINGS AT HOME, OR GET ALONG WITH OTHER PEOPLE: 2
6. FEELING BAD ABOUT YOURSELF - OR THAT YOU ARE A FAILURE OR HAVE LET YOURSELF OR YOUR FAMILY DOWN: 2
3. TROUBLE FALLING OR STAYING ASLEEP: 1
SUM OF ALL RESPONSES TO PHQ QUESTIONS 1-9: 14

## 2022-03-09 ASSESSMENT — COLUMBIA-SUICIDE SEVERITY RATING SCALE - C-SSRS
1. WITHIN THE PAST MONTH, HAVE YOU WISHED YOU WERE DEAD OR WISHED YOU COULD GO TO SLEEP AND NOT WAKE UP?: YES
2. HAVE YOU ACTUALLY HAD ANY THOUGHTS OF KILLING YOURSELF?: NO
6. HAVE YOU EVER DONE ANYTHING, STARTED TO DO ANYTHING, OR PREPARED TO DO ANYTHING TO END YOUR LIFE?: NO

## 2022-03-09 NOTE — PATIENT INSTRUCTIONS
Recommendations to patient:      1. Practice new coping, stress management, relaxation skills at least                   two times a day for at least 10-30 minutes. 2. Find at least one positive outlet per day that makes you feel better. 3. Talk things over with a good friend. Practice letting things go. 4. Stop, breathe, reset. \"I am ok. \"   5. Seek legal advice, maybe [facility] to address DL questions. 6. Make contact with  at [facility]. Scheduled follow up appointment. Call for a sooner appointment if needed or if you need to change or cancel you appointment. Joceline Garcia, [phone] and choose the option for behavioral health  You can also send her a message on My Chart. Be aware that all my messages are linked to her. If you receive a survey after visiting one of our offices, please take time to share your experience about your office visit. These surveys are confidential and no health information about you is shared. We highly welcome your feedback to continuously improve our services for you.       call crisis line [phone] after hours if needed or go to closest ED    When something really bad happens, everything changes. The way we feel, the way we think, the way we deal with things. We feel anxious, on edge, frustrated, upset, sad, defensive, cornered. We question everything. The concepts of how the world is supposed to be, are shaken. There will be ups and downs, and they can be intense. Those moments pass, we can learn to cope with them  Goal of healing is to put the inner world back in order so that we can deal with the outer world in a healthy, safe, secure way. The goal of distress tolerance is to survive a crisis. Some of these skills might make you feel \"better,\" but the goal of them is to prevent you from feeling worse.     Stop  Take a step back  Observe  Proceed Mindfully    TIP:  Will activate the parasympathetic nervous system    Reduce Temperature of face with cold water for 30-60 seconds (can also put ice cube on your face). This activates the \"dive reflex\" which lowers your heart rate. Intense exercise: do aerobic exercise for 20 minutes      Paced Breathin-6 breaths per minute--make exhales longer than inhales      Progressive muscle relaxation can be paired with paced breathing      The wise mind \"ACCEPTS\"    Activities: engage in activities that are neutral or opposite to crisis behaviors  Contributing:  Focus on someone else's wellbeing to distract yourself from the crisis (write a letter or note to someone)  Comparison:  Gives you perspective. Try to focus on a time when you had a problem that you longer have, or think about others who are suffering. Emotions:  Engage in activities that are likely to generate emotions that are different from those related to the crisis (watch a scary movie)  Push away: block the crisis from your mind for a short period of time (I'll think about this in 30 minutes)  Thoughts:  Engage in mental activities that distract yourself from thinking about the crisis (recite times tables, memorize a poem, make a list of all the flowers you can name, as many words as you can think of that start with a particular letter in one minute)  Sensations:  Attend to intense sensations that are not related to your distress or crisis (hold ice cube and let it melt)         STRESS MANAGEMENT STRATEGIES    1. Recognize Stress:  Learning to recognize when your body is reacting to stress and identifying our stressors are the first steps in managing stress. 2. Take a Break:  A change of pace, no matter how short, gives us a new outlook on old problems. Take a vacation 20 minutes a day  enjoy a change from the daily routine. 3. Learn to Relax:  Under stress, the muscles in our bodies stay tight. One of the most effective ways to combat tensions is deep muscle relaxation.   Other techniques that produce muscle and mental relaxation are yoga, prayer, and deep breathing. 4. Be Nutritionally Aware:  Good nutrition is vital to optimum health, and is especially critical when we are under unusual stress, or going through a major life change. 5. Exercise Regularly:  Just like nutrition, exercise is imperative for maintaining good fitness. Whatever you enjoy  swimming, walking, jogging, aerobic exercise  will help you let off steam and work out stress. 6. Plan your Work:  Tension and anxiety really build up when our work seems endless. Plan your work to use time and energy more efficiently. Take one thing at a time. 7. Talk it Over: This may be the most important thing you can do for yourself if you cant get a handle on things. Find a good listener. Just as a pressure relief valve allows steam to flow out of a pressure cooker and keeps it from blowing up, so talking allows stress to flow out of the body and keeps us from blowing up. 8. Accept What You Cannot Change:  If the problem is beyond your control at this time, try your best to accept it until you can change it. It beats spinning your wheels and getting nowhere. 9. Evaluate Your Perceptions:  What we think is sometimes what we feel. If we constantly think unrealistic or alarming thoughts about ourselves or other folks, then our stress level is increased. 10. Relax Unrealistic Standards:  When we set unrealistic standards for ourselves, we usually can never reach them. If we do, we burn out quickly. Set reasonable goals and standards. 11. Reward Yourself:  Find ways to reward yourself when youve completed a minor or major task. We cannot always depend on others to recognize us, so we must develop our own reward system. 12. Become Assertive: Take steps to solve problems instead of feeling helpless.   Distinguishing assertiveness (respecting others rights and your rights) from aggressiveness and passivity can do much to resolve internal stress. 13. Rediscover Humor:  Learn to laugh at yourself and your situation! 14. Increase Pleasurable Activities:  Take time to participate in fun, pleasurable, activities on a regular basis. Bereavement, Grief & Mourning        Bereavement is the state of having lost a significant other to death. Grief is the personal response to the loss. Mourning is the public expression of that loss. What is Normal Grief? Grief reactions vary depending on who we are, who we lost, our relationship with that person, the circumstances around their passing, and how much their loss affects our day-to-day functioning. Different people may express grief differently and you may even have different grief responses between one loss and another. Reactions to grief and loss include not just emotional symptoms, but also behavioral and physical symptoms. These reactions can often change over time. All are normal for a short period of time. Emotional Behavioral Physical   Shock, denial,                   numbness Crying unexpectedly Exhaustion/Fatigue      Sadness, anxiety, guilt,       fear Sleep changes (increase or decrease) Decreased energy        Anger (at others or        God), Not eating/Weight changes Memory problems        Irritability, frustration Withdrawing from others Stomach and intestinal upset    Restlessness, difficulty concentrating, trouble making decisions Pain and headaches     Symptoms that are not normal and may signal the need to talk to a professional include:  Use of drugs, alcohol, violence, and thoughts of killing oneself. The duration of grief varies from person to person. Current research shows that the average recovery time is 18 to 24 months. Also, grief reactions can be stronger around anniversary or other significant dates, such as the anniversary of the persons death, birthdays, and holidays.       The Stages of Grief  Grief therapists often describe stages of grief outlined by the research of Dr. Tyrell Contreras. These stages do not always go in order. You may move back and forth among some of the stages and may even skip some of them. These stages are meant as a guide to help you understand your reactions and those of others who are grieving.  - Denial:  Denial (not acknowledging the loss) can help contain the shock of loss. Denial can act as a safety mechanism to block out grief until we are ready to handle it. - Sadness and depression:  Deep, intense grief and mourning appear during this stage. When the full understanding of our loss comes, it can seem overwhelming. During this stage, you may cry often and unexpectedly. You may not want to be around people or to do things that you normally enjoy. During this stage, it is best to remain as active as possible and to seek supportive people who will allow you to say what you need to or to cry when you need to. It is important to allow yourself to work through your full range and experience of emotions. - Anger:  Rage and anger can be intense toward the person who , toward friends and relatives, and even toward God. It is important to have an outlet to release anger through activities such as exercise, hobbies, or through therapy. Guilt, shame, and blame are feelings that need to be addressed, especially if it is toward you. - Acceptance: This stage includes coming to terms with the loss. It does not mean that you have found the answers to your questions or that you stop thinking about the person who is gone. It does signify a reinvestment in life and a willingness to readjust to your new circumstances while carrying the memory of your loved one with you. How to Help Yourself  1. Give yourself time to grieve. It is normal and important to express your grief and to work through the concerns that arise for you at this time.   Stuffing your feelings may not be helpful and may delay or prolong your grief. 2. Find supportive people to reach out to during your grief. This is the time when the support of others may be the most helpful. Dont be afraid to tell them how they can best help, even if it means just listening. It is often very helpful to talk about your loss with people who will allow you to express your emotions. 3. Take care of your health. Often after a loss, we stop doing the things we need to for health care, such as exercising, eating correctly, keeping Dr. appointments, or taking prescribed medications. If you are on a health care regimen, it is important to continue to adhere to your treatment. 4. Postpone major life changes. Give yourself time to adjust to your loss before making plans to change jobs, move or sell your home, remarry, etc.  Grief can sometimes cloud your judgment and ability to make decisions. 5. Consider keeping a journal.  It is often helpful to write or tell the story of your loss and what it means to you as a way to work through your feelings. 6. Participate in activities. Staying active through exercise, enjoyable activities, outings with supportive others, or even starting new hobbies can help us get through tough times while providing opportunities for constructive development and use of energy. 7. Find a way to memorialize your loved one. Planting a tree or garden in the name of your loved one, dedicating a work to their memory, contributing to a staci in their name, and other such activities can be helpful. 8. Consider joining grief-support groups or contacting a grief counselor for additional support and help. Remember that depressive symptoms (feeling sad) are a fundamental part of normal bereavement. Staying active and finding support from others can help you to work through the grief process. Grief Tips  Help for Those Who Mourn    The following are many ideas to help people who are mourning a loved ones death.   Different kinds of losses dictate different responses, so not all of these ideas will suit everyone. Likewise, no two people grieve alike  what works for one may not work for another. Treat this list for what it is; a gathering of assorted suggestions that various people have tried with success. Perhaps what helped them will help you. The emphasis here is on specific, practical ideas. 13. Talk regularly with a friend. Talking with another about what you think and feel is one of the best things you can do for yourself. It helps relieve some of the pressure you may feel, it can give you a sense of perspective, and it keeps you in touch with others. Look for someone whos a good listener and who has a caring soul. Then speak whats on your mind and in your heart. If this feels one-sided let that be okay for this period of your life. Chances are the other person will find meaning in what theyre doing, and time will come when youll have the chance to be a good listener for someone else. Youll be a better listener then, if youre a good talker now. 16. Walk. Go for walks outside every day if you can. Dont overdo it, but walk briskly enough that it feels invigorating. Sometimes try walking slowly enough so you can look carefully at what you see. Observe what nature has to offer you, what it can teach you. Enjoy as much as you are able to of the sights and sounds that come your way. If you like, walk with another person. 17. Carry or wear a linking object. Carry something in your pocket or purse that reminds you of the one who   a keepsake they gave you perhaps, or small object they once carried or used or a memento you select for just this purpose. You might wear a piece of their jewelry in the same way. Whenever you want, reach for gaze upon this object and remember what it signifies. 18. Visit the grave. Not all people prefer to do this. But if it feels right to you, then do so.   Dont let others convince you this is a morbid thing to do. Spend whatever time feels right there. Stand or sit in the quietness and do what comes naturally: be silent or talk, breathe deeply or cry, recollect or pray. You may wish to add your distinctive touch to the gravesite  straighten it a bit, or add little signs of your love. 23. Create a memory book. Compile photographs, which document your loved ones life. Arrange them into some sort of order so they tell a story. Add other elements if you want: diplomas, newspaper clippings, awards, accomplishments, and reminders of significant events. Put all this in a special binder and keep it for other people to look at if they wish. Go through it on your own if you desire. Reminisce as you do so. 20. Recall your dreams. Your dreams often have important things to say about your feelings and about your relationship with the one who . Your dreams may be scary or sad, especially early on. They may seem weird or crazy to you. You may find that your loved one appears in your dreams. Accept your dreams for what they are and see what you can learn from them. No one knows that better than you. 21. Tell people what helps you and what doesnt. People around you may not understand what you need. So tell them. If hearing your loved ones name spoken aloud by others feels good, say so. If you need more time alone, or assistance with chores youre unable to complete, or an occasional hug, be honest.  People cant read your mind, so youll have to speak it. 25. Write things down. Most people who are grieving become more forgetful than usual.  So help yourself remember what you want by keeping track of it on paper or with whatever system works best for you. This may include writing down things you want to preserve about the person who has . 23. Ask for a copy of the [facility].   If the  liturgy or memorial service holds special meaning for you because of what was spoken or read, ask for the words. Whoever participated in that ritual will feel gratified that what they prepared was appreciated. Turn to these words whenever you want. Some people find these thoughts provide even more help weeks and months after the service. 24. Remember the serenity prayer. This prayer is attributed to theologian Nadya Campbell, but its actually an ancient  [facility]. It has brought comfort and support to many that have suffered various kinds of afflictions. 25. Create a memory area at home. In a space that feels appropriate, arrange a small table that honors the person: a framed photograph or two, perhaps a prized possession or award or something they created or something they loved. This might be placed on a small table, a mantel or a desk. Some people like to use a grouping of candles, representing not just the person who  but others who have  as well. In that case a variety of candles can be arranged each representing a unique life. 26. Drink water. Grieving people can easily become dehydrated. Crying can naturally lead to that. And with your normal routines turned upside down, you may simply not drink as much or as regularly as you did before this death. Make this a way you care for yourself. 27. Use your hands. Sometimes theres value in doing repetitive things with your hands, something you dont have to think about very much because it becomes second nature. Knitting and crocheting are like that. So are carving, woodworking, polishing, solving jigsaw puzzles, painting, braiding, shoveling, washing and countless other activities. 29. Give yourself respites from your grief. Just because youre grieving doesnt mean you must always be feeling sad or     forlorn.   Theres value in sometimes consciously deciding that youll think about something else for awhile, or that youll do something youve always enjoyed doing.  Sometimes this happens naturally and its only later you realize that your grief has taken a back seat. Let it, this is not an indication you love that person any less or that youre forgetting them. Its a sign that youre human and you need relief from the unrelenting pressure. It can also be a healthy sign youre healing. 34. See a grief counselor. If youre concerned about how youre feeling and how well youre adapting make an appointment   with a counselor who specializes in grief. Often youll learn what you need both about grief and about yourself as a griever in only a few sessions. Ask questions of the counselor before you sign on. What specific training does he or she have? What accreditation? A person who is a family therapist or a psychologist doesnt necessarily understand the unique issues of someone in grief. 27. Begin your day with your loved one. If your grief is young, youll probably wake up thinking of that person anyway. So why not decide that youll include her or him from the start? Focus this time in a positive way. Bring to your mind fulfilling memories. Recall lessons that this person taught you, gifts he or she gave you. Think about how you can spend your day in ways that would be keeping in with your loved ones best self and with your best self. Then carry that best self with you through your day. 32. Invite some one to be your telephone ana. If your grief and sadness hit you especially hard at times and you have no   one nearby to turn to, ask someone you trust to be your telephone ana. Ask their permission for you to call them whenever you feel youre at loose ends, day or night. Then put their number beside your phone and call them if you need them. Dont abuse the privilege, of course. And covenant that some day it will be payback time  someday youll make yourself available to help someone else in the same way youve been helped.   That will help you accept the care youre receiving. 18. Avoid certain people if you must.  No one likes to be unfriendly or cold. But if there are people in your life who make it  very difficult for you to do your grieving then do what you can to stay out of their way. Some people may lecture you or belittle you. 23. Donate their possessions meaningfully. Whether you give your loved ones personal possessions to someone you know   or to a stranger, find ways to pass these things along so that others might benefit from them. Family members of friends might like to receive keepsakes. They or others might deserve tools, utensils, books or sporting equipment. [employer] organizations can put clothes to good use. Some wish to do this quickly following the death, while others wish to wait awhile. 21. Donate in the others name. Honor the [employer] and spirit by giving a gift or gifts to a cause the other would   appreciate. A favorite staci? A local ? A building project? Extend that persons influence even further. 21. Create or commission a memory quilt. Sew or invite others to sew with you, or hire someone to sew for you. However you get it completed, put together a wall hanging or a bedroom quilt that remembers the important life events of the one who . Take your time doing this. Make it what it is, a labor of love. 22. Take a yoga class. People of almost any age can do yoga. More than conditioning your body, it helps you relax and focus your mind. It can be woven into a practice of mediation. Its a gentle art for that time in your life when you deserve gentleness all around you. 23. Connect on the Internet. If youre [employer], search the Internet. Chandra Santoyo find many resources for people in grief,   as well as the opportunity to chat with fellow grievers. You can link up with others without leaving your home.   Chandra Santoyo also find more to expand your horizons as a person who is beginning to grow. 24. Speak to a cleargyperson. If youre searching for answers to the larger questions about life and death, Hinduism and spirituality, consider talking with a representative of your jenna, or even anothers jenna. Consider becoming a spiritual friend with another and making your time of grieving a time of personal exploring. Read of how others have responded to a loved ones death. You may feel that your own grief is all you can handle. But if youd like to look at the ways others have done it, try:  Beyond Grief:  A Guide for Recovering from the Death of a Loved One by José 40 by Sonny Morales and Vahid Mccall  The Grief Recovery Handbook: The Action Program for Moving Beyond Death, Divorce, & Other Losses by Romina Evangelista   A Grief Observed by Dontae Toddor  by Chang Luque When Good-Bye Is Forever by Dede Menendez  Men and Grief by Radha Carbone or [address] for a Son. There are many others. Check with a . 22. Learn about your loved one from others. Listen to the stories others have to tell about the one, who , stories youre familiar with and those youve never heard before. Spend time with their friends, schoolmates or colleagues. Invite them into your home. Solicit the writings of others. Preserve whatever you find out. Celebrate your time together. 26. Take a day off. When the mood is just right, take a one-day vacation. Do whatever you want, or dont do whatever you want. Travel somewhere or stay inside by yourself. Be very active or dont do anything at all. Just make it your day, whatever that means for you. 32. Invite someone to give you feedback. Select someone you trust, preferably someone familiar with the working of grief, to give you his or her reaction when you ask for it.   If you want to check out how clearly youre thinking, how accurately youre remembering, how effectively youre coping, go to that person. Pose your questions, then listen to their responses. What you choose to do with that information will be up to you. 28. Vent your anger rather than hold it in. You may feel awkward being angry when youre grieving, but anger is a common reaction. The expression holds true: anger is best out floating rather than in bloating. Even if you feel a bit ashamed as you do it, find ways to get it out of your system. Glenn, even if its in an empty house. Cry. Hit something soft. Throw eggs at something hard. Vacuum up a storm. Resist the temptation to be proper. 29. Give thanks every day. Whatever has happened to you, you still have things to be thankful for. Perhaps its your memories, your remaining family, your support, your work; you own health  all sorts of things. Draw your attention to those parts of life that are worth appreciating, then appreciate them. 30. Monitor signs of dependency. While its normal to become more dependent upon others for awhile immediately after a death, it will not be helpful to continue in that role long-term. Watch for signs that youre prolonging your need for assistance. Congratulate yourself when you do things for yourself. Casi Barragan, Primary & Internal Medicine    [facility], [address] [place] [address]                                                         Phone: [phone]          To: [address] [place] [address] Road        [unfilled]  [unfilled]    [place], [place]        Patient: Malaika Forbes   YOB: 1960 March 9, 2022      Malaika Forbes is my patient and has been under my care since 2022. I am intimately familiar with her history and with the functional limitations imposed by her emotional/mental health-related illness.     Malaika Forbes has certain limitations coping with what would otherwise be considered normal, but significant day to day situations. In order to help alleviate these difficulties, and to enhance her ability to function independently, I have prescribed Mitch Fuchs to keep her two cats as emotional support animals. The presence of these animals is necessary for the emotional/mental health of Mitch Fuchs because its presence will mitigate the symptoms she is currently experiencing. I am licensed by the [facility] to practice clinical social work. My license number is [id number].       Sincerely,        Bart Garrett LCSW

## 2022-03-09 NOTE — PROGRESS NOTES
Behavioral Health Consultation  Erlin Berry, [facility] Consultant  3/9/2022  10:18 AM        Time spent with Patient:  45 minutes  This is patient's first  [place] [facility] appointment. Reason for Consult:  No chief complaint on file. Referring Provider: Filiberto Leon, TRESSA  [facility],  [address]    Pt provided informed consent for the behavioral health program. Discussed with patient model of service to include the limits of confidentiality (i.e. abuse reporting, suicide intervention, etc.) and short-term intervention focused approach. Advised patient/parent to guard AVS and file at home to protect private information. Advised patient/parent to only hand in excuse if needed and not AVS. May receive a confidential survey about services and encouraged to provide feedback. Pt indicated understanding. Feedback given to PCP. S:  Ms. Nikole Curiel reports problems with feeling down. Has seen a counselor in the past for different issues, trauma, I think it will always stay with me. Assured that she does not talk about trauma, unless she is ready to. She appreciates that. Anxiety is at 9 most of the time,with 10 being the worst, panic attacks, come on off and on during the day. Hard time breathing, heart racing. My  passed away from cancer last year and I had to give up my cats. Crying. I lost everything, and now live in a public housing apartment. I feel like my life is shattered and I cannot  the pieces and move on. Released and relived traumatic experience. Working on being able to get drivers license back. I have not had any alcohol in three years and have never abused any substances. A 1 C is in 6 harper, used to take real good care of myself. Have a bone disease, tingling in my hands and knees. Medication and shot every six months, more painful every six months. Kidney failure, it goes up with stress.          O:  MSE:    Mood    Anxious  Depressed  Low self-esteem  [place]  Guilt  Despair  Affect    Labile affect  Appetite don't have one anymore, just wait  Sleep disturbance Yes  Fatigue Yes  Loss of pleasure Yes  Attention/Concentration    intact  Morbid ideation No  Suicide Assessment    No current or recent Si. Never had thought of harming others. It is not about killing myself, I tried multiple times years ago. God does not want me yet. Been at [facility] numerous times, son had guardianship, and I am my own again. I just wish I would not wake up. Assessed. Patient has recurrent, passive SI, no plan or intent, easily dismissed. Patient agreed to access services should thoughts return or worsen. Agreed to call crisis line [phone] after hours if needed or go to closest ED. History:    Medications:   Current Outpatient Medications   Medication Sig Dispense Refill    pantoprazole (PROTONIX) 40 MG tablet Take 1 tablet by mouth daily 90 tablet 2    temazepam (RESTORIL) 30 MG capsule Take 1 capsule by mouth nightly as needed for Sleep for up to 30 days. 30 capsule 0    denosumab (PROLIA) 60 MG/ML SOSY SC injection Inject 1 mL into the skin once for 1 dose 1 mL 0    metoprolol succinate (TOPROL XL) 50 MG extended release tablet Take 1 tablet by mouth once daily 90 tablet 3    amitriptyline (ELAVIL) 100 MG tablet Take 2 tablets by mouth nightly 180 tablet 3    insulin aspart (NOVOLOG) 100 UNIT/ML injection vial Inject 8 Units into the skin 3 times daily (before meals) 2 each 3    valsartan (DIOVAN) 320 MG tablet Take 1 tablet by mouth daily 90 tablet 3    pregabalin (LYRICA) 150 MG capsule Take 1 capsule by mouth 2 times daily for 90 days.  60 capsule 2    cloNIDine (CATAPRES) 0.1 MG tablet Take 1 tablet by mouth 2 times daily (Patient taking differently: Take 0.1 mg by mouth 2 times daily as needed ) 60 tablet 3    nitroGLYCERIN (NITROSTAT) 0.4 MG SL tablet Dissolve one tablet under the tongue every 5 minutes as needed for chest pain. Do not exceed a total of 3 doses in 15 minutes. 25 tablet 0    blood glucose monitor kit and supplies Dispense sufficient amount for four times daily testing frequency. Dispense all needed supplies to include: monitor, strips, lancing device, lancets, control solutions, alcohol swabs. 1 kit 0    blood glucose test strips (FREESTYLE LITE) strip 1 each by In Vitro route 4 times daily As needed. 100 each 3    amLODIPine (NORVASC) 5 MG tablet Take 1 tablet by mouth daily 90 tablet 3    Dulaglutide (TRULICITY) 1.5 CQ/9.8VB SOPN Inject 1.5 mg into the skin every 7 days 12 pen 3    tiZANidine (ZANAFLEX) 4 MG tablet Take 1 tablet by mouth every 8 hours as needed (spasms) 90 tablet 2    atorvastatin (LIPITOR) 40 MG tablet Take 1 tablet by mouth nightly 90 tablet 3    levothyroxine (SYNTHROID) 88 MCG tablet Take 1 tablet by mouth Daily Tube feeding (TF) interaction, obtain physician order to manage, recommend holding TF for 30 minutes before and after dose. 90 tablet 3    HYDROcodone-acetaminophen (NORCO) 5-325 MG per tablet Take 1 tablet by mouth 2 times daily as needed for Pain for up to 30 days. May fill 2/3/2021 60 tablet 0    ondansetron (ZOFRAN-ODT) 4 MG disintegrating tablet Take 1 tablet by mouth 3 times daily as needed for Nausea or Vomiting 30 tablet 2    budesonide-formoterol (SYMBICORT) 160-4.5 MCG/ACT AERO Inhale 2 puffs into the lungs 2 times daily 3 Inhaler 1    Lancets MISC 1 each by Does not apply route daily 100 each 3    DULoxetine (CYMBALTA) 60 MG extended release capsule Take 1 capsule by mouth 2 times daily 180 capsule 3    fluticasone (FLONASE) 50 MCG/ACT nasal spray 1 spray by Nasal route daily 3 Bottle 3    aspirin 81 MG tablet Take 1 tablet by mouth daily 90 tablet 3     No current facility-administered medications for this visit. Social History:   Social History     Socioeconomic History    Marital status:       Spouse name: Not on file    Number of children: Not on file    Years of education: Not on file    Highest education level: Not on file   Occupational History    Not on file   Tobacco Use    Smoking status: Never Smoker    Smokeless tobacco: Former User     Types: Snuff   Vaping Use    Vaping Use: Never used   Substance and Sexual Activity    Alcohol use: Not Currently    Drug use: No    Sexual activity: Not Currently   Other Topics Concern    Not on file   Social History Narrative    Not on file     Social Determinants of Health     Financial Resource Strain: Low Risk     Difficulty of Paying Living Expenses: Not hard at all   Food Insecurity: No Food Insecurity    Worried About [address] in the Last Year: Never true    [address] in the Last Year: Never true   Transportation Needs:     Lack of Transportation (Medical): Not on file    Lack of Transportation (Non-Medical): Not on file   Physical Activity: Insufficiently Active    Days of Exercise per Week: 5 days    Minutes of Exercise per Session: 20 min   Stress:     Feeling of Stress : Not on file   Social Connections:     Frequency of Communication with Friends and Family: Not on file    Frequency of Social Gatherings with Friends and Family: Not on file    Attends [employer] Services: Not on file    Active Member of Clubs or Organizations: Not on file    Attends Club or Organization Meetings: Not on file    Marital Status: Not on file   Intimate Partner Violence:     Fear of Current or Ex-Partner: Not on file    Emotionally Abused: Not on file    Physically Abused: Not on file    Sexually Abused: Not on file   Housing Stability:     Unable to Pay for Housing in the Last Year: Not on file    Number of [place] in the Last Year: Not on file    Unstable Housing in the Last Year: Not on file       TOBACCO:   reports that she has never smoked. She has quit using smokeless tobacco.  Her smokeless tobacco use included snuff. ETOH:   reports previous alcohol use.     Family History:   Family History   Problem Relation Age of Onset    Depression Mother     Heart Attack Mother     High Blood Pressure Mother     Kidney Disease Father     Alcohol Abuse Father     Depression Father     Heart Attack Father     High Blood Pressure Father     Kidney Disease Brother     Heart Disease Other     Depression Sister          A:  Patient presents for consult due to problems with Acute Stress Reaction, after loss of  last year, having to give up her animals, depression, anxiety, multiple stressors, chronic health issues. Prognosis is guarded. Continued consultation is clinically/medically necessary to support in learning new skills and build confidence to deal better with these issues. Patient response to consults, finds new strategies helpful. PHQ Scores 3/9/2022 2/24/2022 1/24/2022 12/18/2020 7/9/2020 11/21/2019 9/11/2018   PHQ2 Score 5 2 4 0 0 0 0   PHQ9 Score 15 2 5 0 0 0 0     Interpretation of Total Score Depression Severity: 1-4 = Minimal depression, 5-9 = Mild depression, 10-14 = Moderate depression, 15-19 = Moderately severe depression, 20-27 = Severe depression           Diagnosis:    1. Acute stress reaction    2. MDD (major depressive disorder), recurrent episode, moderate ([facility]    3.  Situational stress          Diagnosis Date    Anxiety     Arthritis     Bipolar 1 disorder ([facility]     CAD (coronary artery disease)     CHF (congestive heart failure) ([facility])     Chronic kidney disease (CKD) stage G3b/A2, moderately decreased glomerular filtration rate (GFR) between 30-44 mL/min/1.73 square meter and albuminuria creatinine ratio between  mg/g ([facility] [address] [facility] 11/21/2016    Depression     DM (diabetes mellitus) ([facility])     GERD (gastroesophageal reflux disease)     History of blood transfusion     History of suicidal ideation     Hyperlipidemia     Hypertension     Hypothyroidism     Insomnia     Kidney disease     stage 3 failure    MI, 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    Obesity     Polyarticular arthritis     Substance abuse ([facility]     alcohol    Type II or unspecified type diabetes mellitus without mention of complication, not stated as uncontrolled          Plan:  Pt interventions:  Discussed and set plan for behavioral activation, Trained in relaxation strategies, Provided education, Discussed self-care (sleep, nutrition, rewarding activities, social support, exercise) and [place]-setting to identify pt's primary goals for [facility] visit / overall health. Affirmation and Normalization. Educated about effects of stressors on emotions, behaviors, overall functioning and relationships. Explored strengths, envisioned hopes, needs, trying new things and goals. Grief support. (CBT)      Pt Behavioral Change Plan:    See patient instructions.

## 2022-03-24 ENCOUNTER — OFFICE VISIT (OUTPATIENT)
Dept: PRIMARY CARE CLINIC | Age: 62
End: 2022-03-24
Payer: MEDICARE

## 2022-03-24 VITALS
SYSTOLIC BLOOD PRESSURE: 132 MMHG | WEIGHT: 204.5 LBS | HEART RATE: 86 BPM | DIASTOLIC BLOOD PRESSURE: 86 MMHG | BODY MASS INDEX: 39.94 KG/M2 | TEMPERATURE: 97.8 F | OXYGEN SATURATION: 96 %

## 2022-03-24 DIAGNOSIS — E11.42 TYPE 2 DIABETES MELLITUS WITH DIABETIC POLYNEUROPATHY, WITH LONG-TERM CURRENT USE OF INSULIN (HCC): ICD-10-CM

## 2022-03-24 DIAGNOSIS — M13.0 POLYARTICULAR ARTHRITIS: ICD-10-CM

## 2022-03-24 DIAGNOSIS — N18.32 CHRONIC KIDNEY DISEASE (CKD) STAGE G3B/A2, MODERATELY DECREASED GLOMERULAR FILTRATION RATE (GFR) BETWEEN 30-44 ML/MIN/1.73 SQUARE METER AND ALBUMINURIA CREATININE RATIO BETWEEN 30-299 MG/G (HCC): ICD-10-CM

## 2022-03-24 DIAGNOSIS — E03.4 HYPOTHYROIDISM DUE TO ACQUIRED ATROPHY OF THYROID: ICD-10-CM

## 2022-03-24 DIAGNOSIS — F10.10 ALCOHOL ABUSE: ICD-10-CM

## 2022-03-24 DIAGNOSIS — Z79.4 TYPE 2 DIABETES MELLITUS WITH DIABETIC POLYNEUROPATHY, WITH LONG-TERM CURRENT USE OF INSULIN (HCC): ICD-10-CM

## 2022-03-24 DIAGNOSIS — E11.42 TYPE 2 DIABETES MELLITUS WITH DIABETIC POLYNEUROPATHY, WITH LONG-TERM CURRENT USE OF INSULIN (HCC): Primary | ICD-10-CM

## 2022-03-24 DIAGNOSIS — E55.9 VITAMIN D DEFICIENCY: ICD-10-CM

## 2022-03-24 DIAGNOSIS — Z79.4 TYPE 2 DIABETES MELLITUS WITH DIABETIC POLYNEUROPATHY, WITH LONG-TERM CURRENT USE OF INSULIN (HCC): Primary | ICD-10-CM

## 2022-03-24 DIAGNOSIS — F51.04 PSYCHOPHYSIOLOGICAL INSOMNIA: ICD-10-CM

## 2022-03-24 LAB
ALBUMIN SERPL-MCNC: 3.9 G/DL (ref 3.5–5.2)
ALP BLD-CCNC: 155 U/L (ref 35–104)
ALT SERPL-CCNC: 16 U/L (ref 5–33)
ANION GAP SERPL CALCULATED.3IONS-SCNC: 15 MMOL/L (ref 7–19)
AST SERPL-CCNC: 23 U/L (ref 5–32)
BASOPHILS ABSOLUTE: 0.1 K/UL (ref 0–0.2)
BASOPHILS RELATIVE PERCENT: 0.6 % (ref 0–1)
BILIRUB SERPL-MCNC: <0.2 MG/DL (ref 0.2–1.2)
BUN BLDV-MCNC: 18 MG/DL (ref 8–23)
CALCIUM SERPL-MCNC: 9.2 MG/DL (ref 8.8–10.2)
CHLORIDE BLD-SCNC: 102 MMOL/L (ref 98–111)
CHOLESTEROL, TOTAL: 129 MG/DL (ref 160–199)
CO2: 24 MMOL/L (ref 22–29)
CREAT SERPL-MCNC: 1.2 MG/DL (ref 0.5–0.9)
EOSINOPHILS ABSOLUTE: 0.2 K/UL (ref 0–0.6)
EOSINOPHILS RELATIVE PERCENT: 2.6 % (ref 0–5)
GFR AFRICAN AMERICAN: 55
GFR NON-AFRICAN AMERICAN: 46
GLUCOSE BLD-MCNC: 140 MG/DL (ref 74–109)
HBA1C MFR BLD: 6.3 % (ref 4–6)
HCT VFR BLD CALC: 38.9 % (ref 37–47)
HDLC SERPL-MCNC: 61 MG/DL (ref 65–121)
HEMOGLOBIN: 11.4 G/DL (ref 12–16)
IMMATURE GRANULOCYTES #: 0 K/UL
LDL CHOLESTEROL CALCULATED: 45 MG/DL
LYMPHOCYTES ABSOLUTE: 1.3 K/UL (ref 1.1–4.5)
LYMPHOCYTES RELATIVE PERCENT: 16 % (ref 20–40)
MCH RBC QN AUTO: 25.9 PG (ref 27–31)
MCHC RBC AUTO-ENTMCNC: 29.3 G/DL (ref 33–37)
MCV RBC AUTO: 88.4 FL (ref 81–99)
MONOCYTES ABSOLUTE: 0.6 K/UL (ref 0–0.9)
MONOCYTES RELATIVE PERCENT: 7.9 % (ref 0–10)
NEUTROPHILS ABSOLUTE: 5.9 K/UL (ref 1.5–7.5)
NEUTROPHILS RELATIVE PERCENT: 72.5 % (ref 50–65)
PDW BLD-RTO: 16 % (ref 11.5–14.5)
PLATELET # BLD: 282 K/UL (ref 130–400)
PMV BLD AUTO: 11.8 FL (ref 9.4–12.3)
POTASSIUM SERPL-SCNC: 4.4 MMOL/L (ref 3.5–5)
RBC # BLD: 4.4 M/UL (ref 4.2–5.4)
SODIUM BLD-SCNC: 141 MMOL/L (ref 136–145)
T4 FREE: 0.88 NG/DL (ref 0.93–1.7)
TOTAL PROTEIN: 6.4 G/DL (ref 6.6–8.7)
TRIGL SERPL-MCNC: 114 MG/DL (ref 0–149)
TSH SERPL DL<=0.05 MIU/L-ACNC: 2.31 UIU/ML (ref 0.27–4.2)
VITAMIN D 25-HYDROXY: 25.6 NG/ML
WBC # BLD: 8.1 K/UL (ref 4.8–10.8)

## 2022-03-24 PROCEDURE — 99214 OFFICE O/P EST MOD 30 MIN: CPT | Performed by: NURSE PRACTITIONER

## 2022-03-24 RX ORDER — TEMAZEPAM 30 MG/1
30 CAPSULE ORAL NIGHTLY PRN
Qty: 30 CAPSULE | Refills: 0 | Status: SHIPPED | OUTPATIENT
Start: 2022-03-24 | End: 2022-04-26 | Stop reason: SDUPTHER

## 2022-03-24 RX ORDER — ERGOCALCIFEROL 1.25 MG/1
50000 CAPSULE ORAL WEEKLY
Qty: 4 CAPSULE | Refills: 1 | Status: SHIPPED | OUTPATIENT
Start: 2022-03-24

## 2022-03-24 ASSESSMENT — ENCOUNTER SYMPTOMS
SORE THROAT: 0
ABDOMINAL PAIN: 0
COUGH: 0
TROUBLE SWALLOWING: 0
SHORTNESS OF BREATH: 0
WHEEZING: 0
BACK PAIN: 1

## 2022-03-24 NOTE — PATIENT INSTRUCTIONS
Patient Education        Noninsulin Medicines for Type 2 Diabetes: Care Instructions  Overview     There are different types of noninsulin medicines for diabetes. Each works in a different way. But they all help you control your blood sugar. Some types help your body make insulin to lower your blood sugar. Others lower how much insulin your body needs. Some can slow how fast your body digests sugars. And some can remove extra glucose through your urine. You may need to take more than one medicine for diabetes. Two or more medicines may work better to lower your blood sugar level than just one does. · Metformin. This lowers how much glucose your liver makes. And it helps you respond better to insulin. It also lowers the amount of stored sugar that your liver releases when you are not eating. · Sulfonylureas. These help your body release more insulin. Some work for many hours. They can cause low blood sugar if you don't eat as you planned. An example is glipizide. · Thiazolidinediones. These reduce the amount of blood glucose. They also help you respond better to insulin. An example is pioglitazone. · SGLT2 inhibitors. These help to remove extra glucose through your urine. They may also help some people lose weight. An example is ertugliflozin. · DPP-4 inhibitors. These help your body raise the level of insulin after you eat. They also help your body make less of a hormone that raises blood sugar. An example is alogliptin. · GLP-1 receptor agonists. These help your body make a protein that can raise your insulin level and make you less hungry. They're given as shots or pills. An example is semaglutide. · Meglitinides. These help your body release insulin. They also help slow how your body digests sugars. So they can keep your blood sugar from rising too fast after you eat. · Alpha-glucosidase inhibitors. These keep starches from breaking down.  This means that they lower the amount of glucose absorbed when you eat. They don't help your body make more insulin. So they will not cause low blood sugar unless you use them with other medicines for diabetes. Follow-up care is a key part of your treatment and safety. Be sure to make and go to all appointments, and call your doctor if you are having problems. It's also a good idea to know your test results and keep a list of the medicines you take. How can you care for yourself at home? · Eat a healthy diet. Get some exercise each day. This may help you to reduce how much medicine you need. · Do not take other prescription or over-the-counter medicines, vitamins, herbal products, or supplements without talking to your doctor first. Some medicines for type 2 diabetes can cause problems with other medicines or supplements. · Tell your doctor if you plan to get pregnant. Some of these drugs are not safe for pregnant women. · Be safe with medicines. Take your medicines exactly as prescribed. Meglitinides and sulfonylureas can cause your blood sugar to drop very low. Call your doctor if you think you are having a problem with your medicine. · Check your blood sugar often. You can use a glucose monitor. Keeping track can help you know how certain foods, activities, and medicines affect your blood sugar. And it can help you keep your blood sugar from getting so low that it's not safe. When should you call for help? Call 911 anytime you think you may need emergency care. For example, call if:    · You passed out (lost consciousness).     · You are confused or cannot think clearly.     · Your blood sugar is very high or very low. Watch closely for changes in your health, and be sure to contact your doctor if:    · Your blood sugar stays outside the level your doctor set for you.     · You have any problems. Where can you learn more? Go to https://zakia.Sandata. org and sign in to your [employer] account.  Enter H153 in the Ringly box to learn more about \"Noninsulin Medicines for Type 2 Diabetes: Care Instructions. \"     If you do not have an account, please click on the \"Sign Up Now\" link. Current as of: July 28, 2021               Content Version: 13.1  © 6384-5238 Secure[employer]. Care instructions adapted under license by [facility] ([facility]). If you have questions about a medical condition or this instruction, always ask your healthcare professional. [place] [address] any warranty or liability for your use of this information. Patient Education        Insomnia: Care Instructions  Overview     Insomnia is the inability to sleep well. Insomnia may make it hard for you to get to sleep, stay asleep, or sleep as long as you need to. This can make you tired and grouchy during the day. It can also make you forgetful, less effective at work, and unhappy. Insomnia can be linked to many things. These include health problems, medicines, and stressful events. Treatment may include treating problems that may be linked with your insomnia. Treatment also includes behavior and lifestyle changes. This may include cognitive-behavioral therapy for insomnia (CBT-I). CBT-I uses different ways to help you change your thoughts and behaviors that may interfere with sleep. Your doctor can recommend specific things you can try. Examples include doing relaxation exercises, keeping regular bedtimes and wake times, limiting alcohol, and making healthy sleep habits. Some people decide to take medicine for a while to help with sleep. Follow-up care is a key part of your treatment and safety. Be sure to make and go to all appointments, and call your doctor if you are having problems. It's also a good idea to know your test results and keep a list of the medicines you take. How can you care for yourself at home? Cognitive-behavioral therapy for insomnia (CBT-I)  · If your doctor recommends CBT-I, follow your treatment plan.  Your doctor will give you instructions that are unique for you. · Your plan will likely include a few things that you can try at home. For example:  ? Try meditation or other relaxation techniques before you go to bed. ? Go to bed at the same time every night, and wake up at the same time every morning. Do not take naps during the day. ? Do not stay in bed awake for too long. If you can't fall asleep, or if you wake up in the middle of the night and can't get back to sleep within about 15 to 20 minutes, get out of bed and go to another room until you feel sleepy. ? If watching the clock makes you anxious, turn it facing away from you so you cannot see the time. ? If you worry when you lie down, start a worry book. Well before bedtime, write down your worries, and then set the book and your concerns aside. Healthy sleep habits  · If your doctor recommends it, try making healthy sleep habits. For example:  ? Keep your bedroom quiet, dark, and cool. ? Do not have drinks with caffeine, such as coffee or black tea, for 8 hours before bed. ? Do not smoke or use other types of tobacco near bedtime. Nicotine is a stimulant and can keep you awake. ? Avoid drinking alcohol late in the evening, because it can cause you to wake in the middle of the night. ? Do not eat a big meal close to bedtime. If you are hungry, eat a light snack. ? Do not drink a lot of water close to bedtime, because the need to urinate may wake you up during the night. ? Do not read, watch TV, or use your phone in bed. Use the bed only for sleeping and sex. Medicine  · Be safe with medicines. Take your medicines exactly as prescribed. Call your doctor if you think you are having a problem with your medicine. · Talk with your doctor before you try an over-the-counter medicine, herbal product, or supplement to try to improve your sleep. Your doctor can recommend how much to take and when to take it.  Make sure your doctor knows all of the medicines, vitamins, herbal products, and supplements you take. · You will get more details on the specific medicines your doctor prescribes. When should you call for help? Watch closely for changes in your health, and be sure to contact your doctor if:    · Your efforts to improve your sleep do not work.     · Your insomnia gets worse.     · You have been feeling down, depressed, or hopeless or have lost interest in things that you usually enjoy. Where can you learn more? Go to https://InboxpeAdvanced Voice Recognition Systems.[email or web address] org and sign in to your Galazar account. Enter P513 in the Cinch Systems box to learn more about \"Insomnia: Care Instructions. \"     If you do not have an account, please click on the \"Sign Up Now\" link. Current as of: June 16, 2021               Content Version: 13.1  © 1677-6650 Healthwise, Incorporated. Care instructions adapted under license by [facility] ([facility]). If you have questions about a medical condition or this instruction, always ask your healthcare professional. [address] any warranty or liability for your use of this information.

## 2022-03-24 NOTE — PROGRESS NOTES
Mitch Fuchs (:  1960) is a 62 y.o. female,Established patient, here for evaluation of the following chief complaint(s):  Follow-up (for medication)      ASSESSMENT/PLAN:    ICD-10-CM    1. Type 2 diabetes mellitus with diabetic polyneuropathy, with long-term current use of insulin (HCC)  E11.42 temazepam (RESTORIL) 30 MG capsule  Cont at present  On tight control  Labs due today  Will send to lab      Z79.4  DIABETES FOOT EXAM   2. Alcohol abuse  F10.10 temazepam (RESTORIL) 30 MG capsule  Following with counscelor     3. Psychophysiological insomnia  F51.04 temazepam (RESTORIL) 30 MG capsule  Cont at bedtime     4. Polyarticular arthritis  M13.0 With ruxer         Return in about 1 month (around 2022) for diabetes insomnia and mammogram [facility].     SUBJECTIVE/OBJECTIVE:  HPI    Patient is here for anxiety follow up  Reports she lost 6 of her cats  Has kept two but had to move   And so she been really stressed with the housing authority  She is pretty upset  With people living above and beside her  And they have been upset about it     She has not been drinking  She is sleeping   Is resting when she takes it   HTN  Log today 130s-140s/80s-90s  Been dealing with quite a bit of neck pain  Had in injection two days ago  And hoping will improve control  And blood pressure     Insomnia  Patient reports chronic insomnia  Has failed seroquel and trazadone medication in past elavil 100mg  (2)  Sleeps 6 hours on medication with some relief of fatigue  Sleep study in past none  Typically goes to bed at 10 awakens at 6  But recently  Is waking up and not able to go back to sleep  She has had this issue a week and half ago   Reports no change in medications  She has not seen any changes  She will take 2 elavil seroquel 200mg and ambien to go to sleep  And she will be awake within three days  In past took valium         Takes medication restoril   Last filled # 30  Without symptoms include not able to rest and up and down  Benefits outweigh risks  Low risk of diversion with no complaints of abnormal side effects  But wasn't staying asleep     She takes her pain medication in afternoon  Away from medication  And knows the risks    Controlled Substance Monitoring:    Acute and Chronic Pain Monitoring:   RX Monitoring 3/24/2022   Attestation -   Periodic Controlled Substance Monitoring Possible medication side effects, risk of tolerance/dependence & alternative treatments discussed. ;No signs of potential drug abuse or diversion identified. ;Assessed functional status. Chronic Pain > 50 MEDD Considered consultation with a specialist.         /86 (Site: Right Upper Arm, Position: Sitting, Cuff Size: Large Adult)   Pulse 86   Temp 97.8 °F (36.6 °C) (Temporal)   Wt 204 lb 8 oz (92.8 kg)   SpO2 96%   BMI 39.94 kg/m²   Review of Systems   Constitutional: Positive for activity change. Negative for appetite change, fatigue, fever and unexpected weight change. HENT: Negative for congestion, postnasal drip, sore throat and trouble swallowing. Respiratory: Negative for cough, shortness of breath and wheezing. Cardiovascular: Negative for chest pain, palpitations and leg swelling. Gastrointestinal: Negative for abdominal pain. Genitourinary: Negative for difficulty urinating. Musculoskeletal: Positive for arthralgias and back pain. Dr martin   Skin: Negative for rash. Hematological: Negative for adenopathy. Psychiatric/Behavioral: Positive for sleep disturbance. Negative for agitation, dysphoric mood, self-injury and suicidal ideas. The patient is nervous/anxious. Physical Exam  Vitals reviewed. Constitutional:       General: She is not in acute distress. Appearance: Normal appearance. She is obese. Cardiovascular:      Rate and Rhythm: Normal rate. Pulmonary:      Effort: Pulmonary effort is normal.      Breath sounds: Normal breath sounds. No wheezing or rhonchi. Skin:     Findings: No rash. Neurological:      Mental Status: She is alert and oriented to person, place, and time. Psychiatric:         Mood and Affect: Mood normal.         Behavior: Behavior normal.      Comments: Anxiety with house stuff and losing spouse  Reports not drinking                   Diabetic Foot Exam:  Visual inspection:  Deformity/amputation: absent  Skin lesions/pre-ulcerative calluses: absent  Edema: right- negative, left- negative    Monofilament Exam Reveals:  Pulses: normal  Edema:absent  Skin Lesions:normal    Right Foot:    Left Foot:  Normal sensation at 1-10   Normal sensation at 1-10   Diminished sensation at    Diminished sensation at    No sensation at     No sensation at                An electronic signature was used to authenticate this note.     --Cameron Dsouza, APRN

## 2022-03-28 ENCOUNTER — PATIENT MESSAGE (OUTPATIENT)
Dept: PRIMARY CARE CLINIC | Age: 62
End: 2022-03-28

## 2022-03-28 RX ORDER — SULFAMETHOXAZOLE AND TRIMETHOPRIM 800; 160 MG/1; MG/1
1 TABLET ORAL 2 TIMES DAILY
Qty: 20 TABLET | Refills: 0 | Status: SHIPPED | OUTPATIENT
Start: 2022-03-28 | End: 2022-04-07

## 2022-03-28 RX ORDER — PHENAZOPYRIDINE HYDROCHLORIDE 100 MG/1
100 TABLET, FILM COATED ORAL 3 TIMES DAILY PRN
Qty: 9 TABLET | Refills: 0 | Status: SHIPPED | OUTPATIENT
Start: 2022-03-28 | End: 2022-04-26

## 2022-03-28 NOTE — TELEPHONE ENCOUNTER
From: Cody Fuchs  To: Sanjuana Humphrey  Sent: 3/28/2022 11:14 AM CDT  Subject: medication    [facility] this is grea, I have a very bad uti. Its bad. Please call me in an antiobotic & some pyridiaum.

## 2022-03-28 NOTE — TELEPHONE ENCOUNTER
Ok for bactrim ds 1 po bid for 10 #20 no refill  Pyridium 200mg 1 po tid #9 no refill  Increase fluids  Hope feel better soon

## 2022-04-03 ENCOUNTER — PATIENT MESSAGE (OUTPATIENT)
Dept: PRIMARY CARE CLINIC | Age: 62
End: 2022-04-03

## 2022-04-03 DIAGNOSIS — E11.42 TYPE 2 DIABETES MELLITUS WITH DIABETIC POLYNEUROPATHY, WITH LONG-TERM CURRENT USE OF INSULIN (HCC): ICD-10-CM

## 2022-04-03 DIAGNOSIS — Z79.4 TYPE 2 DIABETES MELLITUS WITH DIABETIC POLYNEUROPATHY, WITH LONG-TERM CURRENT USE OF INSULIN (HCC): ICD-10-CM

## 2022-04-04 RX ORDER — DULOXETIN HYDROCHLORIDE 60 MG/1
60 CAPSULE, DELAYED RELEASE ORAL 2 TIMES DAILY
Qty: 180 CAPSULE | Refills: 3 | Status: SHIPPED | OUTPATIENT
Start: 2022-04-04

## 2022-04-04 NOTE — TELEPHONE ENCOUNTER
From: Ana Luisa Fuchs  To: Debra Krause  Sent: 4/3/2022 5:15 AM CDT  Subject: medication request    Samantha, I am needing a refill on my cymbalta. Please fill for me. Thank you. You have a blessed day.

## 2022-04-07 ENCOUNTER — OFFICE VISIT (OUTPATIENT)
Dept: PSYCHIATRY | Age: 62
End: 2022-04-07
Payer: MEDICARE

## 2022-04-07 DIAGNOSIS — F43.9 SITUATIONAL STRESS: ICD-10-CM

## 2022-04-07 DIAGNOSIS — F33.1 MDD (MAJOR DEPRESSIVE DISORDER), RECURRENT EPISODE, MODERATE (HCC): ICD-10-CM

## 2022-04-07 DIAGNOSIS — F43.0 ACUTE STRESS REACTION: Primary | ICD-10-CM

## 2022-04-07 PROCEDURE — 90834 PSYTX W PT 45 MINUTES: CPT | Performed by: SOCIAL WORKER

## 2022-04-07 NOTE — PATIENT INSTRUCTIONS
Recommendations to patient:                            1. Practice new coping, stress management, relaxation skills at least                   two times a day for at least 10-30 minutes. 2. Find at least one positive outlet per day that makes you feel better. 3. Talk things over with a good friend. Practice letting things go. 4. Stop, breathe, reset. \"I am ok. \"              5. Seek legal advice. 6. Make contact with  at [place].                    Scheduled follow up appointment.     Call for a sooner appointment if needed or if you need to change or cancel you appointment. Joceline Garcia, [phone] and choose the option for behavioral health  You can also send her a message on My Chart. Be aware that all my messages are linked to her. If you receive a survey after visiting one of our offices, please take time to share your experience about your office visit. These surveys are confidential and no health information about you is shared. We highly welcome your feedback to continuously improve our services for you. Assertive Communication     Assertiveness Is Simple but Hard    NonAssertive   Assertive   Aggressive     (Passive)            (Tactful)             (Rude)           H onest         X  H onest          X  H onest             X A ppropriate        X  A ppropriate                 A ppropriate             X R espectful        X  R espectful             R espectful     D irect                   X  D irect        X  D irect      Assertiveness involves respecting your rights and the rights of others. Important Facts About Assertiveness      Use I or me statements such as When you do ______, I feel _____.     Voice tone, eye contact, and body posture are important parts of assertive communication.  Use a steady and calm voice, stand or sit up straight, look the other person in the eyes without glaring.     Feelings are usually only one word (e.g. angry, anxious, happy, sad, hurt, frustrated, joyful)    Remember, assertiveness doesnt guarantee that you will get what you want or that the other person will understand your concerns or be happy with what you said. It does improve the chances that the other person will understand what you want or how you feel and thus improve your chances of communicating effectively. Four Essential Steps to Assertive Communication   1. Tell the person what you think about their behavior without accusing them. 2. Tell them how you feel when they behave a certain way. 3. Tell them how their behavior affects you and your relationship with them. 4. Tell them what you would prefer them to do instead. XYZ* Formula for Effective Communication   The goal of the XYZ* formula is to express the way you feel (internal world) in response to others behavior (external world) in specific situations. You are the only person who has access to your feelings. Others have no access to your internal world. The only way they will know what you are feeling is if you tell them. Similarly, you only have access to other peoples external world. It is very easy to make a mistake when trying to guess what others are feeling or intending. I feel X  when you do Y  in situation Z  and I would like *    I feel angry  when you leave your socks and underwear on the bedroom floor  after work  and I would like you to put them in the hamper. I felt insignificant  when you left me with an empty gas tank  yesterday  and I would like you to leave the car with at least 1/4 tank of gas. I feel angry  when you dont call me  if you are staying late at work  and I would like you to call as soon as you know you will be late. I feel loved  when you kiss me  when you get home  and I would like you to do that everyday.

## 2022-04-07 NOTE — PROGRESS NOTES
Behavioral Health Consultation  Sherren Dickens, [address] Consultant  4/7/2022  11:15 AM          Time spent with Patient:  45 minutes  This is patient's second  [facility] appointment. Reason for Consult:    Chief Complaint   Patient presents with    Anxiety    Depression    Stress     Referring Provider: No referring provider defined for this encounter. May receive a confidential survey about services and encouraged to provide feedback. Feedback given to PCP. S:  Patient reports problems with feeling sad, exhausted, overwhelmed. I have not finished unpacking. I am too withdrawn. I miss my other cats and him so bad. There is nothing like losing your mate. Have  involved. Reliving stress with extended family since his passing. I feel like I have been violated. Addressed current and underlying issues, explored and released associated emotions, explored new ways to deal and cope with these problems. Released emotions over personal challenges and current events. Happy to see the sun. I can keep my cats with the letter. I do have a wonderful support team. I visit the little ladies often and it is wonderful for thirty minutes. When friends come to visit I am so exhausted. I have to some things everyday and be done with it. O:  MSE:    Mood    Anxious  Depressed  Emptiness  [place]  Despair  Affect    depressed affect  Appetite Ok  Sleep disturbance Yes  Fatigue Yes  Loss of pleasure Yes  Attention/Concentration    Preoccupied  Morbid ideation No  Suicide Assessment    No current or recent Si. Never had thought of harming others. Assessed. Patient has recurrent, passive SI, no plan or intent, easily dismissed. Patient agreed to access services should thoughts return or worsen. Agreed to call crisis line [phone] after hours if needed or go to closest ED.         A:  Patient presents for consult due to problems with Acute Stress Reaction, after loss of  last year, having to give up her animals, depression, anxiety, multiple stressors, chronic health issues. Prognosis is guarded. Continued consultation is clinically/medically necessary to support in learning new skills and build confidence to deal better with these issues. Patient response to consults, finds new strategies helpful. I am glad to get in. Diagnosis:    1. Acute stress reaction    2. MDD (major depressive disorder), recurrent episode, moderate ([facility]    3. Situational stress          Diagnosis Date    Anxiety     Arthritis     Bipolar 1 disorder ([facility]     CAD (coronary artery disease)     CHF (congestive heart failure) ([facility]     Chronic kidney disease (CKD) stage G3b/A2, moderately decreased glomerular filtration rate (GFR) between 30-44 mL/min/1.73 square meter and albuminuria creatinine ratio between  mg/g ([facility] 11/21/2016    Depression     DM (diabetes mellitus) ([facility]     GERD (gastroesophageal reflux disease)     History of blood transfusion     History of suicidal ideation     Hyperlipidemia     Hypertension     Hypothyroidism     Insomnia     Kidney disease     stage 3 failure    MI, 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    Obesity     Polyarticular arthritis     Substance abuse ([facility]     alcohol    Type II or unspecified type diabetes mellitus without mention of complication, not stated as uncontrolled          Plan:  Pt interventions:  Trained in strategies for increasing balanced thinking, Discussed self-care (sleep, nutrition, rewarding activities, social support, exercise) and Discussed use of imagery, distractions, relaxation, mood management, communication training, questioning unhelpful thinking, problem-solving, and behavioral activation to manage pain. Affirmation and Normalization. Educated about effects of stressors on emotions, behaviors, overall functioning and relationships. Explored strengths, envisioned hopes, needs, trying new things and goals.  Grief support. (CBT)      Pt Behavioral Change Plan:  See Pt Instructions

## 2022-04-26 ENCOUNTER — OFFICE VISIT (OUTPATIENT)
Dept: PRIMARY CARE CLINIC | Age: 62
End: 2022-04-26
Payer: MEDICARE

## 2022-04-26 ENCOUNTER — OFFICE VISIT (OUTPATIENT)
Dept: PSYCHIATRY | Age: 62
End: 2022-04-26
Payer: MEDICARE

## 2022-04-26 VITALS
WEIGHT: 195 LBS | OXYGEN SATURATION: 95 % | SYSTOLIC BLOOD PRESSURE: 133 MMHG | HEIGHT: 60 IN | BODY MASS INDEX: 38.28 KG/M2 | DIASTOLIC BLOOD PRESSURE: 87 MMHG | HEART RATE: 76 BPM | TEMPERATURE: 97.5 F

## 2022-04-26 DIAGNOSIS — F33.1 MDD (MAJOR DEPRESSIVE DISORDER), RECURRENT EPISODE, MODERATE (HCC): Primary | ICD-10-CM

## 2022-04-26 DIAGNOSIS — R30.0 DYSURIA: ICD-10-CM

## 2022-04-26 DIAGNOSIS — Z79.4 TYPE 2 DIABETES MELLITUS WITH DIABETIC POLYNEUROPATHY, WITH LONG-TERM CURRENT USE OF INSULIN (HCC): Primary | ICD-10-CM

## 2022-04-26 DIAGNOSIS — E11.42 TYPE 2 DIABETES MELLITUS WITH DIABETIC POLYNEUROPATHY, WITH LONG-TERM CURRENT USE OF INSULIN (HCC): ICD-10-CM

## 2022-04-26 DIAGNOSIS — F99 INSOMNIA DUE TO OTHER MENTAL DISORDER: ICD-10-CM

## 2022-04-26 DIAGNOSIS — Z79.4 TYPE 2 DIABETES MELLITUS WITH DIABETIC POLYNEUROPATHY, WITH LONG-TERM CURRENT USE OF INSULIN (HCC): ICD-10-CM

## 2022-04-26 DIAGNOSIS — F43.9 SITUATIONAL STRESS: ICD-10-CM

## 2022-04-26 DIAGNOSIS — F51.05 INSOMNIA DUE TO OTHER MENTAL DISORDER: ICD-10-CM

## 2022-04-26 DIAGNOSIS — E11.42 TYPE 2 DIABETES MELLITUS WITH DIABETIC POLYNEUROPATHY, WITH LONG-TERM CURRENT USE OF INSULIN (HCC): Primary | ICD-10-CM

## 2022-04-26 DIAGNOSIS — F51.04 PSYCHOPHYSIOLOGICAL INSOMNIA: ICD-10-CM

## 2022-04-26 DIAGNOSIS — Z87.440 HISTORY OF UTI: ICD-10-CM

## 2022-04-26 DIAGNOSIS — F41.8 SITUATIONAL ANXIETY: ICD-10-CM

## 2022-04-26 DIAGNOSIS — F10.10 ALCOHOL ABUSE: ICD-10-CM

## 2022-04-26 DIAGNOSIS — F31.78 BIPOLAR DISORDER, IN FULL REMISSION, MOST RECENT EPISODE MIXED (HCC): ICD-10-CM

## 2022-04-26 LAB
ALBUMIN SERPL-MCNC: 4.1 G/DL (ref 3.5–5.2)
ALP BLD-CCNC: 113 U/L (ref 35–104)
ALT SERPL-CCNC: 18 U/L (ref 5–33)
ANION GAP SERPL CALCULATED.3IONS-SCNC: 11 MMOL/L (ref 7–19)
AST SERPL-CCNC: 34 U/L (ref 5–32)
BASOPHILS ABSOLUTE: 0 K/UL (ref 0–0.2)
BASOPHILS RELATIVE PERCENT: 0.6 % (ref 0–1)
BILIRUB SERPL-MCNC: <0.2 MG/DL (ref 0.2–1.2)
BUN BLDV-MCNC: 16 MG/DL (ref 8–23)
CALCIUM SERPL-MCNC: 8.3 MG/DL (ref 8.8–10.2)
CHLORIDE BLD-SCNC: 101 MMOL/L (ref 98–111)
CO2: 24 MMOL/L (ref 22–29)
CREAT SERPL-MCNC: 1.2 MG/DL (ref 0.5–0.9)
CREATININE URINE: 12.4 MG/DL (ref 4.2–622)
EOSINOPHILS ABSOLUTE: 0.2 K/UL (ref 0–0.6)
EOSINOPHILS RELATIVE PERCENT: 2.6 % (ref 0–5)
GFR AFRICAN AMERICAN: 55
GFR NON-AFRICAN AMERICAN: 46
GLUCOSE BLD-MCNC: 130 MG/DL (ref 74–109)
HBA1C MFR BLD: 6 % (ref 4–6)
HCT VFR BLD CALC: 40 % (ref 37–47)
HEMOGLOBIN: 11.8 G/DL (ref 12–16)
IMMATURE GRANULOCYTES #: 0 K/UL
LYMPHOCYTES ABSOLUTE: 1.9 K/UL (ref 1.1–4.5)
LYMPHOCYTES RELATIVE PERCENT: 25.8 % (ref 20–40)
MCH RBC QN AUTO: 26.6 PG (ref 27–31)
MCHC RBC AUTO-ENTMCNC: 29.5 G/DL (ref 33–37)
MCV RBC AUTO: 90.1 FL (ref 81–99)
MICROALBUMIN UR-MCNC: <1.2 MG/DL (ref 0–19)
MICROALBUMIN/CREAT UR-RTO: NORMAL MG/G
MONOCYTES ABSOLUTE: 0.7 K/UL (ref 0–0.9)
MONOCYTES RELATIVE PERCENT: 9 % (ref 0–10)
NEUTROPHILS ABSOLUTE: 4.5 K/UL (ref 1.5–7.5)
NEUTROPHILS RELATIVE PERCENT: 61.7 % (ref 50–65)
PDW BLD-RTO: 16.8 % (ref 11.5–14.5)
PLATELET # BLD: 244 K/UL (ref 130–400)
PMV BLD AUTO: 11.3 FL (ref 9.4–12.3)
POTASSIUM SERPL-SCNC: 5.4 MMOL/L (ref 3.5–5)
RBC # BLD: 4.44 M/UL (ref 4.2–5.4)
SODIUM BLD-SCNC: 136 MMOL/L (ref 136–145)
TOTAL PROTEIN: 6.8 G/DL (ref 6.6–8.7)
WBC # BLD: 7.3 K/UL (ref 4.8–10.8)

## 2022-04-26 PROCEDURE — 3044F HG A1C LEVEL LT 7.0%: CPT | Performed by: NURSE PRACTITIONER

## 2022-04-26 PROCEDURE — 90832 PSYTX W PT 30 MINUTES: CPT | Performed by: SOCIAL WORKER

## 2022-04-26 PROCEDURE — 99214 OFFICE O/P EST MOD 30 MIN: CPT | Performed by: NURSE PRACTITIONER

## 2022-04-26 RX ORDER — TEMAZEPAM 30 MG/1
30 CAPSULE ORAL NIGHTLY PRN
Qty: 30 CAPSULE | Refills: 0 | Status: SHIPPED | OUTPATIENT
Start: 2022-04-26 | End: 2022-05-26

## 2022-04-26 RX ORDER — DIAZEPAM 5 MG/1
5 TABLET ORAL EVERY 12 HOURS PRN
Qty: 15 TABLET | Refills: 0 | Status: SHIPPED | OUTPATIENT
Start: 2022-04-26 | End: 2022-05-26

## 2022-04-26 RX ORDER — HYDROCODONE BITARTRATE AND ACETAMINOPHEN 7.5; 325 MG/1; MG/1
1 TABLET ORAL AS NEEDED
COMMUNITY

## 2022-04-26 ASSESSMENT — ENCOUNTER SYMPTOMS
TROUBLE SWALLOWING: 0
SORE THROAT: 0
WHEEZING: 0
SHORTNESS OF BREATH: 0
COUGH: 0
ABDOMINAL PAIN: 0
BACK PAIN: 1

## 2022-04-26 NOTE — PROGRESS NOTES
Mitch Fuchs (:  1960) is a 62 y.o. female,Established patient, here for evaluation of the following chief complaint(s):  Follow-up (follow up for diabetes, insomnia and mammo)      ASSESSMENT/PLAN:    ICD-10-CM    1. Type 2 diabetes mellitus with diabetic polyneuropathy, with long-term current use of insulin (HCC)  E11.42 CBC with Auto Differential    Z79.4 Comprehensive Metabolic Panel     Hemoglobin A1C     Microalbumin / Creatinine Urine Ratio  Cont present  Discussed labs  Tight control       temazepam (RESTORIL) 30 MG capsule   2. Psychophysiological insomnia  F51.04 temazepam (RESTORIL) 30 MG capsule  Benefits outweigh risks     3. Alcohol abuse  F10.10 temazepam (RESTORIL) 30 MG capsule  Denies any alcohol use  Stable  Cont counseling       4. Situational anxiety  F41.8 diazePAM (VALIUM) 5 MG tablet   5. Insomnia due to other mental disorder  F51.05 diazePAM (VALIUM) 5 MG tablet    F99    6. Bipolar disorder, in full remission, most recent episode mixed (HCC)  F31.78 diazePAM (VALIUM) 5 MG tablet   7. History of UTI  Z87.440 Culture, Urine   8.  Dysuria  R30.0 Culture, Urine       Return in about 1 month (around 2022) for 1 month follow up diabetes anxiety mammogram .    SUBJECTIVE/OBJECTIVE:  HPI     Patient is here for 1 month follow up    Reports she is frustrated  She \"misses her home \"reports lost all her animals but \"two \"  But paying on the place in her name and her apartment   The family didn't tell her it was in her name   She is upset about it and cant afford to live there    She reports is having a rough month  But reports not drinking  She reports she has taken a rare valium as stressed with all the dispare  But is seeing Billie Fear and trying to work through her emotions      She has not been drinking  She is sleeping   Is resting when she takes it   HTN  Log today 130s-140s/80s-90s  Been dealing with quite a bit of neck pain  Had in injection two days ago  And hoping will improve control  And blood pressure     Insomnia  Patient reports chronic insomnia  Has failed seroquel and trazadone medication in past elavil 100mg  (2)  Sleeps 6 hours on medication with some relief of fatigue  Sleep study in past none  Typically goes to bed at 10 awakens at 6  But recently  Is waking up and not able to go back to sleep  She has had this issue a week and half ago   Reports no change in medications  She has not seen any changes  She will take 2 elavil seroquel 200mg and ambien to go to sleep  And she will be awake within three days  In past took valium         Takes medication restoril   Last filled 3/24# 30  Without symptoms include not able to rest and up and down  Benefits outweigh risks  Low risk of diversion with no complaints of abnormal side effects  But wasn't staying asleep     She takes her pain medication in afternoon  Away from medication  And knows the risks    Controlled Substance Monitoring:    Acute and Chronic Pain Monitoring:   RX Monitoring 3/24/2022   Attestation -   Periodic Controlled Substance Monitoring Possible medication side effects, risk of tolerance/dependence & alternative treatments discussed. ;No signs of potential drug abuse or diversion identified. ;Assessed functional status.    Chronic Pain > 50 MEDD Considered consultation with a specialist.     Diabetes Mellitus Type 2        Diet compliance:  compliant most of the time  Nutrition Consultation Needed:  no  Medication:              trilicity 1.5 weekly  novolog three times a day as needed     Medication compliance:  compliant most of the time  Weight trend: stable  Current exercise: no  Checkin times daily  Home blood sugar records: fasting range: 120-150  Low BG:  no  Eye exam current (within one year): no  Checking Feet regularly:  no  ACE/ARB:  yes - valsartin  Aspirin: Yes  Moderate intensity statin: lipitor     Known diabetic complications: cardiovascular disease  Barriers to success: none  Tobacco history: She  reports that she has never smoked. She has quit using smokeless tobacco.  Her smokeless tobacco use included snuff.           Lab Results   Component Value Date     LABA1C 6.2 (H) 01/08/2021     GLUCOSE 107 02/08/2021                Lab Results   Component Value Date     LABMICR <1.20 01/08/2021     CREATININE 1.3 (H) 02/08/2021        Need for mammogram   She has missed the apts   With stressors        /87 (Site: Right Upper Arm, Position: Sitting, Cuff Size: Medium Adult)   Pulse 76   Temp 97.5 °F (36.4 °C) (Temporal)   Ht 5' (1.524 m)   Wt 195 lb (88.5 kg)   SpO2 95%   BMI 38.08 kg/m²   Review of Systems   Constitutional: Positive for activity change. Negative for appetite change, fatigue, fever and unexpected weight change. HENT: Negative for congestion, postnasal drip, sore throat and trouble swallowing. Respiratory: Negative for cough, shortness of breath and wheezing. Cardiovascular: Negative for chest pain, palpitations and leg swelling. Gastrointestinal: Negative for abdominal pain. Genitourinary: Negative for difficulty urinating. Musculoskeletal: Positive for arthralgias and back pain. Dr martin   Skin: Negative for rash. Hematological: Negative for adenopathy. Psychiatric/Behavioral: Positive for agitation (with issues) and sleep disturbance. Negative for dysphoric mood, self-injury and suicidal ideas. The patient is nervous/anxious. Physical Exam  Vitals reviewed. Constitutional:       General: She is not in acute distress. Appearance: Normal appearance. She is obese. Cardiovascular:      Rate and Rhythm: Normal rate. Pulmonary:      Effort: Pulmonary effort is normal.      Breath sounds: Normal breath sounds. No wheezing or rhonchi. Skin:     Findings: No rash. Neurological:      Mental Status: She is alert and oriented to person, place, and time.    Psychiatric:         Mood and Affect: Mood normal.         Behavior: Behavior normal.      Comments: Anxiety with house stuff and losing spouse  Reports not drinking                     An electronic signature was used to authenticate this note.     --Africa Rawls APRN

## 2022-04-26 NOTE — PATIENT INSTRUCTIONS
Patient Education        Noninsulin Medicines for Type 2 Diabetes: Care Instructions  Overview     There are different types of noninsulin medicines for diabetes. Each works in a different way. But they all help you control your blood sugar. Some types help your body make insulin to lower your blood sugar. Others lower how much insulin your body needs. Some can slow how fast your body digests sugars. And some canremove extra glucose through your urine. You may need to take more than one medicine for diabetes. Two or more medicinesmay work better to lower your blood sugar level than just one does.  Metformin. This lowers how much glucose your liver makes. And it helps you respond better to insulin. It also lowers the amount of stored sugar that your liver releases when you are not eating.  Sulfonylureas. These help your body release more insulin. Some work for many hours. They can cause low blood sugar if you don't eat as you planned. An example is glipizide.  Thiazolidinediones. These reduce the amount of blood glucose. They also help you respond better to insulin. An example is pioglitazone.  SGLT2 inhibitors. These help to remove extra glucose through your urine. They may also help some people lose weight. An example is ertugliflozin.  DPP-4 inhibitors. These help your body raise the level of insulin after you eat. They also help your body make less of a hormone that raises blood sugar. An example is alogliptin.  GLP-1 receptor agonists. These help your body make a protein that can raise your insulin level and make you less hungry. They're given as shots or pills. An example is semaglutide.  Meglitinides. These help your body release insulin. They also help slow how your body digests sugars. So they can keep your blood sugar from rising too fast after you eat.  Alpha-glucosidase inhibitors. These keep starches from breaking down.  This means that they lower the amount of glucose absorbed when you eat. They don't help your body make more insulin. So they will not cause low blood sugar unless you use them with other medicines for diabetes. Follow-up care is a key part of your treatment and safety. Be sure to make and go to all appointments, and call your doctor if you are having problems. It's also a good idea to know your test results and keep alist of the medicines you take. How can you care for yourself at home?  Eat a healthy diet. Get some exercise each day. This may help you to reduce how much medicine you need.  Do not take other prescription or over-the-counter medicines, vitamins, herbal products, or supplements without talking to your doctor first. Some medicines for type 2 diabetes can cause problems with other medicines or supplements.  Tell your doctor if you plan to get pregnant. Some of these drugs are not safe for pregnant women.  Be safe with medicines. Take your medicines exactly as prescribed. Meglitinides and sulfonylureas can cause your blood sugar to drop very low. Call your doctor if you think you are having a problem with your medicine.  Check your blood sugar often. You can use a glucose monitor. Keeping track can help you know how certain foods, activities, and medicines affect your blood sugar. And it can help you keep your blood sugar from getting so low that it's not safe. When should you call for help? Call 911 anytime you think you may need emergency care. For example, call if:     You passed out (lost consciousness).      You are confused or cannot think clearly.      Your blood sugar is very high or very low. Watch closely for changes in your health, and be sure to contact your doctor if:     Your blood sugar stays outside the level your doctor set for you.      You have any problems. Where can you learn more? Go to https://zakia.[employer] org and sign in to your [employer] account.  Enter H153 in the Fixational box to learn more about \"Noninsulin Medicines for Type 2 Diabetes: Care Instructions. \"     If you do not have an account, please click on the \"Sign Up Now\" link. Current as of: July 28, 2021               Content Version: 13.2  © 1922-0012 [employer]. Care instructions adapted under license by [facility] [place] ([facility]). If you have questions about a medical condition or this instruction, always ask your healthcare professional. [place] [address] any warranty or liability for your use of this information. Patient Education        diazepam (oral)  Pronunciation: dye AZ e ricci  Brand: Valium  What is the most important information I should know about diazepam?  Diazepam can slow or stop your breathing, especially if you have recently used an opioid medication or alcohol. MISUSE OF THIS MEDICINE CAN CAUSE ADDICTION, OVERDOSE, OR DEATH. Keep the medication in a place where others cannot get to it. Do not stop using diazepam without asking your doctor. You may have life-threatening withdrawal symptoms if you stop using the medicine suddenly after long-term use. Some withdrawal symptoms may last up to 12 months or longer. Get medical help right away if you stop using diazepam and have symptoms such as: unusual muscle movements, being more active or talkative, sudden and severe changes in mood or behavior, confusion, hallucinations, seizures, or thoughtsabout suicide. What is diazepam?  Diazepam is a benzodiazepine (cruzito-ana-dye-NAOMI-yeimi-leyla) that is used to treatanxiety disorders, or alcohol withdrawal symptoms. Diazepam is sometimes used with other medications to treat muscle spasms andstiffness, or seizures. Diazepam may also be used for purposes not listed in this medication guide.   What should I discuss with my healthcare provider before taking diazepam?  You should not use diazepam if you are allergic to it, or if you have:   myasthenia gravis (a muscle weakness disorder);   a severe breathing problem;   sleep apnea (breathing stops during sleep);   narrow-angle glaucoma;   untreated or uncontrolled open-angle glaucoma; or   severe liver disease. Diazepam should not be given to a child younger than 7 months old. Do not give this medicine to a child without a doctor's advice. Tell your doctor if you have ever had:   breathing problems;   glaucoma;   kidney or liver disease;   seizures (unless you are taking diazepam to treat a seizure disorder);   a drug or alcohol addiction; or   depression, a mood disorder, or suicidal thoughts or behavior. Some people have thoughts about suicide while taking diazepam. Stay alert to changes in your mood or symptoms. Your family or caregivers should also watchfor sudden changes in your behavior. May harm an unborn baby. Do not use if you are pregnant or may become pregnant. If you use diazepam during pregnancy, your baby could be born with life-threatening withdrawal symptoms, and may need medical treatment forseveral weeks. Do not start or stop seizure medication during pregnancy without your doctor's advice. Diazepam may harm an unborn baby, but having a seizure during pregnancy could harm both mother and baby. Preventing seizures may outweigh these risks. Tell your doctor right away if you become pregnant. There may be other seizure medications that are safer to use during pregnancy. You should not breastfeed. How should I take diazepam?  Follow the directions on your prescription label and read all medication guides or instruction sheets. Never use diazepam in larger amounts, or for longer than prescribed. Tell your doctor if you feel an increased urge to use more of this medicine. Never share this medicine with another person, especially someone with a history of drug addiction. MISUSE CAN CAUSE ADDICTION, OVERDOSE, OR DEATH. Keep the medicine where others cannot get to it. Selling or giving away thismedicine is against the law.   Measure liquid medicine with the supplied measuring device (not a kitchen spoon). Diazepam should be used for only a short time. Do not take this medicine for longer than 4 months without your doctor's advice. Do not stop using diazepam without asking your doctor. You may have increased seizures or life-threatening withdrawal symptoms if you stop using the medicine suddenly after long-term use. You will need frequent medical tests. Store at room temperature away from moisture, heat, and light. Keep yourmedicine in a place where no one can use it improperly. What happens if I miss a dose? Take the medicine as soon as you can, but skip the missed dose if it is almost time for your next dose. Do not take two doses at one time. What happens if I overdose? Seek emergency medical attention or call the Poison Help line at 1-408.267.8789. An overdose of diazepam can be fatal if you take it with alcohol, opioid medicine, or other drugs that cause drowsiness or slow your breathing. Overdose symptoms may include extreme drowsiness, loss of balance orcoordination, limp or weak muscles, slow breathing, or coma. What should I avoid while taking diazepam?  Avoid drinking alcohol. Dangerous side effects or death could occur. Grapefruit may interact with diazepam and lead to unwanted side effects. Avoid the use of grapefruit products. Avoid driving or hazardous activity until you know how this medicine will affect you. Dizziness or drowsiness can cause falls, accidents, or severeinjuries. What are the possible side effects of diazepam?  Get emergency medical help if you have signs of an allergic reaction: hives; difficult breathing; swelling of your face, lips, tongue, or throat. Diazepam can slow or stop your breathing, especially if you have recently used an opioid medication or alcohol.  A person caring for you should seek emergency medical attention if you have slow breathing with long pauses, blue colored lips, or if you are hard to wakeup. Tell your doctor right away if you have new or sudden changes in mood or behavior, including new or worse depression or anxiety, panic attacks, trouble sleeping, or if you feel impulsive, irritable, agitated, hostile, aggressive, restless, more activeor talkative, or have thoughts about suicide or hurting yourself. Call your doctor at once if you have:   severe drowsiness or dizziness;   trouble breathing;   confusion, paranoia; or   new or worsening seizures. Drowsiness or dizziness may last longer in older adults. Use caution to avoidfalling or accidental injury. Common side effects may include:   drowsiness;   feeling tired;   muscle weakness; or   problems with balance or muscle movement. After you stop using diazepam, get medical help right away if you have symptoms such as: unusual muscle movements, being more active or talkative, sudden and severe changes in mood orbehavior, confusion, hallucinations, seizures, suicidal thoughts or actions. Some withdrawal symptoms may last up to 12 months or longer after stopping this medicine suddenly. Tell your doctor if you have ongoing anxiety, depression, problems with memory or thinking, trouble sleeping, ringing in your ears, a burning or prickly feeling, or a crawling sensationunder your skin. This is not a complete list of side effects and others may occur. Call your doctor for medical advice about side effects. You may report side effects toFDA at 5-011-YQN-1835. What other drugs will affect diazepam?  Taking diazepam with other drugs that make you sleepy or slow your breathing can cause dangerous side effects or death. Ask your doctor before using opioid medication, a sleeping pill, a musclerelaxer, or medicine for anxiety or seizures. Other drugs may affect diazepam, including prescription and over-the-counter medicines, vitamins, and herbal products. Tell your doctor about all othermedicines you use.   Where can I get more information? Your pharmacist can provide more information about diazepam.  Remember, keep this and all other medicines out of the reach of children, never share your medicines with others, and use this medication only for the indication prescribed. Every effort has been made to ensure that the information provided by [address] is accurate, up-to-date, and complete, but no guarantee is made to that effect. Drug information contained herein may be time sensitive. [facility] information has been compiled for use by healthcare practitioners and consumers in the [place] and therefore [facility] does not warrant that uses outside of the [place] are appropriate, unless specifically indicated otherwise. [facility]'s drug information does not endorse drugs, diagnose patients or recommend therapy. [facility]'s drug information is an informational resource designed to assist licensed healthcare practitioners in caring for their patients and/or to serve consumers viewing this service as a supplement to, and not a substitute for, the expertise, skill, knowledge and judgment of healthcare practitioners. The absence of a warning for a given drug or drug combination in no way should be construed to indicate that the drug or drug combination is safe, effective or appropriate for any given patient. [facility] does not assume any responsibility for any aspect of healthcare administered with the aid of information [facility] provides. The information contained herein is not intended to cover all possible uses, directions, precautions, warnings, drug interactions, allergic reactions, or adverse effects. If you have questions about the drugs you are taking, check with yourdoctor, nurse or pharmacist.  Copyright 0856-1257 [address] Version: 15.01. Revision date:10/19/2021. Care instructions adapted under license by [facility] ([facility]).  If you have questions about a medical condition or this instruction, always ask your healthcare professional. [address] any warranty or liability for your use of this information.

## 2022-04-26 NOTE — PATIENT INSTRUCTIONS
Recommendations to patient:                            0. Practice new coping, stress management, relaxation skills at least                   two times a day for at least 10-30 minutes.              2. Find at least one positive outlet per day that makes you feel better.              3. Talk things over with a good friend. Practice letting things go.              4. Stop, breathe, reset. \"I am ok. \"              4. Seek legal advice.             6. Make contact with  at [place].                    Scheduled follow up appointment.     Call for a sooner appointment if needed or if you need to change or cancel you appointment. Joceline Garcia, [phone] and choose the option for behavioral health  You can also send her a message on My Chart. Be aware that all my messages are linked to her.         Otto Camacho [address]  [place], [address]  [phone]
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
## 2022-04-28 ENCOUNTER — TELEPHONE (OUTPATIENT)
Dept: PRIMARY CARE CLINIC | Age: 62
End: 2022-04-28

## 2022-04-28 DIAGNOSIS — E87.5 SERUM POTASSIUM ELEVATED: Primary | ICD-10-CM

## 2022-04-28 LAB — URINE CULTURE, ROUTINE: NORMAL

## 2022-04-28 NOTE — TELEPHONE ENCOUNTER
Kelly Merlin, APRN   4/28/2022  8:09 AM CDT Back to Top        Urine culture negative    [address], APRN   4/27/2022  8:56 AM CDT         Please call patient and let them know results. Normal blood count    [address], APRN   4/27/2022  8:56 AM CDT         Please call patient and let them know results. No pathologic protein in urine.    Blood sugars controlled with an V6L of 6  Metabolic panel shows slightly elevated potassium with stable kidney function.  Avoid NSAIDs and salt substitutes.  Decrease consumption of potassium containing foods like bananas and potatoes.  Also have to watch coffee intake     Repeat BMP in 1 week

## 2022-04-28 NOTE — TELEPHONE ENCOUNTER
Called patient, spoke with: Patient regarding the results of the patients most recent labs. I advised Patient of Alisa Nesbitt recommendations. Patient did voice understanding      She states that if she doesn't drink her glass of OJ her legs cramp so bad. She wants to know why she is cramping so bad if her K+ is high.

## 2022-05-02 ENCOUNTER — TELEPHONE (OUTPATIENT)
Dept: PRIMARY CARE CLINIC | Age: 62
End: 2022-05-02

## 2022-05-02 NOTE — TELEPHONE ENCOUNTER
----- Message from TRESSA Steven sent at 5/2/2022  8:09 AM CDT -----  Mammogram negative great news  Recheck in 2 years or changes in monthly SBE

## 2022-05-16 ENCOUNTER — TELEPHONE (OUTPATIENT)
Dept: PSYCHIATRY | Age: 62
End: 2022-05-16

## 2022-05-16 NOTE — TELEPHONE ENCOUNTER
Patient missed her appointment today. Left voicemail for return call. She does have an appointment scheduled 6/6. Letter sent.

## 2022-11-30 NOTE — TELEPHONE ENCOUNTER
----- Message from TRESSA Castaneda sent at 8/7/2019  3:33 PM CDT -----  Please call patient and let them know results.    Normal mammogram repeat 1 year
Instruct patient to call for assistance

## 2023-07-03 NOTE — PROGRESS NOTES
After obtaining consent, [facility] injection of Fluzone given in Right deltoid by Gaby Cade. Patient instructed to remain in clinic for 20 minutes afterwards, and to report any adverse reaction to me immediately. 2

## 2023-09-16 NOTE — TELEPHONE ENCOUNTER
Received call/My Chart Message from patient requesting refill on medication(s). Pt was last seen in office on 5/4/2020  and has a follow up scheduled for 5/18/2020. Will send request to provider for authorization.      Requested Prescriptions     Pending Prescriptions Disp Refills    amitriptyline (ELAVIL) 100 MG tablet 180 tablet 0     Sig: Take 2 tablets by mouth nightly    tiZANidine (ZANAFLEX) 4 MG tablet 60 tablet 0     Sig: Take 1 tablet by mouth every 8 hours as needed (spasms)    linaclotide (LINZESS) 290 MCG CAPS capsule 90 capsule 3     Sig: Take 1 capsule by mouth every morning (before breakfast) Cane

## (undated) DEVICE — STERILE POLYISOPRENE POWDER-FREE SURGICAL GLOVES WITH EMOLLIENT COATING: Brand: PROTEXIS

## (undated) DEVICE — BANDAGE CAST W4INXL5YD PLSTR OF PARIS CTRL PLAS COR EXTRA

## (undated) DEVICE — SUTURE ETHLN SZ 3-0 L18IN NONABSORBABLE BLK FS-1 L24MM 3/8 663H

## (undated) DEVICE — BIT DRL L140MM DIA2MM QUIK CPL 3 FLUT CALIB DEPTH MRK W/O

## (undated) DEVICE — GUIDEWIRE ORTH L150MM DIA1.25MM S STL THRD FOR 4MM CANN SCR

## (undated) DEVICE — SUTURE ETHLN SZ 3-0 L18IN NONABSORBABLE BLK L24MM FS-1 3/8 663G

## (undated) DEVICE — BIT DRL L125MM DIA2.7MM QUIK CPL 3 FLUT

## (undated) DEVICE — C-ARM: Brand: UNBRANDED

## (undated) DEVICE — DRESSING FOAM W4XL12IN SIL RECT ADH WTRPRF FLM BK W/ BORD

## (undated) DEVICE — C-ARMOR C-ARM EQUIPMENT COVERS CLEAR STERILE UNIVERSAL FIT 12 PER CASE: Brand: C-ARMOR

## (undated) DEVICE — GLOVE SURG SZ 75 L12IN FNGR THK94MIL TRNSLUC YEL LTX

## (undated) DEVICE — SURGICAL PROCEDURE PACK LOWER EXTREMITY LOURDES HOSP

## (undated) DEVICE — ZIMMER® STERILE DISPOSABLE TOURNIQUET CUFF WITH PLC, DUAL PORT, SINGLE BLADDER, 34 IN. (86 CM)

## (undated) DEVICE — BIT DRL L180MM DIA2.5MM G QUIK CPL W/O STP REUSE

## (undated) DEVICE — BIT DRL L160MM DIA2.7MM CANN QUIK CPL ADJ STP REUSE FOR

## (undated) DEVICE — GLOVE SURG SZ 75 L12IN FNGR THK87MIL DK GRN LTX FREE ISOLEX

## (undated) DEVICE — MCLASS OSCILLATING SAW BLADE 19 X 1.27 (0.050") X 90 MM: Brand: MCLASS

## (undated) DEVICE — CHLORAPREP 26ML ORANGE

## (undated) DEVICE — ABDOMINAL PAD: Brand: DERMACEA

## (undated) DEVICE — SUTURE VCRL SZ 3-0 L27IN ABSRB UD L26MM SH 1/2 CIR J416H

## (undated) DEVICE — THREE QUARTER SHEET: Brand: CONVERTORS

## (undated) DEVICE — BIT DRL L110MM DIA2.5MM G QUIK CPL W/O STP REUSE

## (undated) DEVICE — GLOVE SURG SZ 8 L12IN FNGR THK13MIL BRN LTX SYN POLYMER W

## (undated) DEVICE — Z INACTIVE USE 2660664 SOLUTION IRRIG 3000ML 0.9% SOD CHL USP UROMATIC PLAS CONT

## (undated) DEVICE — DISCONTINUED NO SUB IDED TG GLOVE SURG SENSICARE ALOE LT LF PF ST GRN SZ 8

## (undated) DEVICE — TOWEL,OR,DSP,ST,BLUE,DLX,4/PK,20PK/CS: Brand: MEDLINE

## (undated) DEVICE — ZIMMER® A.T.S. CYLINDRICAL TOURNIQUET CUFF, DUAL PORT, SINGLE BLADDER 42 IN. (107 CM)

## (undated) DEVICE — BIT DRL L125MM DIA2MM QUIK CPL W/O STP REUSE

## (undated) DEVICE — OCCLUSIVE GAUZE STRIP,3% BISMUTH TRIBROMOPHENATE IN PETROLATUM BLEND: Brand: XEROFORM

## (undated) DEVICE — SPLINT CAST W5XL30IN GRN STRENGTH PLSTR OF PARIS FAST SET

## (undated) DEVICE — SOLUTION IV IRRIG POUR BRL 0.9% SODIUM CHL 2F7124

## (undated) DEVICE — Z CONVERTED USE 2271386 BANDAGE COMPR W6INXL11YD WVN COTTON/ELASTIC CLP CLSR DBL

## (undated) DEVICE — SYSTEM SKIN CLSR 22CM DERMBND PRINEO

## (undated) DEVICE — SUTURE VCRL SZ 2-0 L36IN ABSRB UD L36MM CT-1 1/2 CIR J945H

## (undated) DEVICE — IMPLANTABLE DEVICE
Type: IMPLANTABLE DEVICE | Site: ANKLE | Status: NON-FUNCTIONAL
Removed: 2018-06-12

## (undated) DEVICE — SURGICAL PROCEDURE PACK KNEE TOT DBD CDS LOURDES HOSP LF

## (undated) DEVICE — GLOVE SURG SZ 8 CRM LTX FREE POLYISOPRENE POLYMER BEAD ANTI

## (undated) DEVICE — WRIST AND FOREARM: Brand: DEROYAL

## (undated) DEVICE — FAN SPRAY KIT: Brand: PULSAVAC®

## (undated) DEVICE — Device: Brand: POWER-FLO®

## (undated) DEVICE — SCREW BNE L28MM DIA2.7MM CORT S STL ST FULL THRD FOR SM
Type: IMPLANTABLE DEVICE | Site: ANKLE | Status: NON-FUNCTIONAL
Removed: 2018-06-11

## (undated) DEVICE — HIGH CAPACITY FAN SPRAY TIP WITH SHIELD: Brand: PULSAVAC®

## (undated) DEVICE — Device

## (undated) DEVICE — SUTURE FIBERWIRE SZ 2 L38IN NONABSORBABLE BLU L36.6MM 1/2 AR7202

## (undated) DEVICE — LARGE BONE HALL BLADE, RECIPROCATOR, 12.5 X 76 X 1.27 MM: Brand: HALL

## (undated) DEVICE — SUTURE MCRYL SZ 3-0 L18IN ABSRB UD L19MM PS-2 3/8 CIR PRIM Y497G

## (undated) DEVICE — SCREW BNE L12MM DIA3.5MM CORT S STL ST NONCANNULATED LOK
Type: IMPLANTABLE DEVICE | Site: ANKLE | Status: NON-FUNCTIONAL
Removed: 2018-06-11